# Patient Record
Sex: FEMALE | Race: WHITE | NOT HISPANIC OR LATINO | Employment: OTHER | URBAN - NONMETROPOLITAN AREA
[De-identification: names, ages, dates, MRNs, and addresses within clinical notes are randomized per-mention and may not be internally consistent; named-entity substitution may affect disease eponyms.]

---

## 2017-01-12 ENCOUNTER — OFFICE VISIT - GICH (OUTPATIENT)
Dept: FAMILY MEDICINE | Facility: OTHER | Age: 47
End: 2017-01-12

## 2017-01-12 ENCOUNTER — HISTORY (OUTPATIENT)
Dept: FAMILY MEDICINE | Facility: OTHER | Age: 47
End: 2017-01-12

## 2017-01-12 DIAGNOSIS — G57.92 MONONEUROPATHY OF LEFT LOWER EXTREMITY: ICD-10-CM

## 2017-01-12 DIAGNOSIS — G90.522 COMPLEX REGIONAL PAIN SYNDROME OF LEFT LOWER EXTREMITY: ICD-10-CM

## 2017-01-12 DIAGNOSIS — M25.572 PAIN IN LEFT ANKLE: ICD-10-CM

## 2017-01-12 DIAGNOSIS — G89.4 CHRONIC PAIN SYNDROME: ICD-10-CM

## 2017-01-12 DIAGNOSIS — F41.0 PANIC DISORDER WITHOUT AGORAPHOBIA: ICD-10-CM

## 2017-01-12 DIAGNOSIS — Z79.899 OTHER LONG TERM (CURRENT) DRUG THERAPY: ICD-10-CM

## 2017-01-23 LAB
6-MONOACETYL MORPHINE - HISTORICAL: ABNORMAL NG/ML
AMPHETAMINE URINE - HISTORICAL: ABNORMAL NG/ML
BARBITURATE URINE - HISTORICAL: ABNORMAL NG/ML
BENZODIAZEPINE URINE - HISTORICAL: ABNORMAL NG/ML
BUPRENORPHRINE URINE - HISTORICAL: ABNORMAL NG/ML
COCAINE METAB URINE - HISTORICAL: ABNORMAL NG/ML
CREAT UR - HISTORICAL: 163 MG/DL
ETHYLGLUCURONIDE URINE - HISTORICAL: ABNORMAL NG/ML
FENTANYL URINE - HISTORICAL: ABNORMAL NG/ML
MASS SPECTROMETRY URINE - HISTORICAL: ABNORMAL
METHADONE URINE - HISTORICAL: ABNORMAL NG/ML
OPIATES URINE - HISTORICAL: ABNORMAL NG/ML
OXYCODONE URINE - HISTORICAL: ABNORMAL NG/ML
PH URINE - HISTORICAL: 6
PROPOXYPHENE URINE - HISTORICAL: ABNORMAL NG/ML
THC 50 URINE - HISTORICAL: ABNORMAL NG/ML
TRAMADOL - HISTORICAL: ABNORMAL NG/ML

## 2017-04-11 ENCOUNTER — COMMUNICATION - GICH (OUTPATIENT)
Dept: FAMILY MEDICINE | Facility: OTHER | Age: 47
End: 2017-04-11

## 2017-04-11 DIAGNOSIS — F41.0 PANIC DISORDER WITHOUT AGORAPHOBIA: ICD-10-CM

## 2017-04-20 ENCOUNTER — OFFICE VISIT - GICH (OUTPATIENT)
Dept: FAMILY MEDICINE | Facility: OTHER | Age: 47
End: 2017-04-20

## 2017-04-20 ENCOUNTER — HISTORY (OUTPATIENT)
Dept: FAMILY MEDICINE | Facility: OTHER | Age: 47
End: 2017-04-20

## 2017-04-20 DIAGNOSIS — G90.522 COMPLEX REGIONAL PAIN SYNDROME OF LEFT LOWER EXTREMITY: ICD-10-CM

## 2017-04-20 DIAGNOSIS — G89.4 CHRONIC PAIN SYNDROME: ICD-10-CM

## 2017-04-20 DIAGNOSIS — M25.572 PAIN IN LEFT ANKLE: ICD-10-CM

## 2017-04-20 DIAGNOSIS — N30.90 CYSTITIS WITHOUT HEMATURIA: ICD-10-CM

## 2017-04-20 DIAGNOSIS — F51.02 ADJUSTMENT INSOMNIA: ICD-10-CM

## 2017-04-20 DIAGNOSIS — R30.0 DYSURIA: ICD-10-CM

## 2017-04-20 DIAGNOSIS — Z02.89 ENCOUNTER FOR OTHER ADMINISTRATIVE EXAMINATIONS: ICD-10-CM

## 2017-04-20 LAB
BACTERIA URINE: ABNORMAL BACTERIA/HPF
BILIRUB UR QL: ABNORMAL
CLARITY, URINE: ABNORMAL CLARITY
COLOR UR: YELLOW COLOR
EPITHELIAL CELLS: ABNORMAL EPI/HPF
GLUCOSE URINE: NEGATIVE MG/DL
KETONES UR QL: NEGATIVE MG/DL
LEUKOCYTE ESTERASE URINE: ABNORMAL
NITRITE UR QL STRIP: POSITIVE
OCCULT BLOOD,URINE - HISTORICAL: NEGATIVE
PH UR: 6 [PH]
PROTEIN QUALITATIVE,URINE - HISTORICAL: ABNORMAL MG/DL
RBC - HISTORICAL: ABNORMAL /HPF
SP GR UR STRIP: >=1.03
UROBILINOGEN,QUALITATIVE - HISTORICAL: NORMAL EU/DL
WBC - HISTORICAL: ABNORMAL /HPF

## 2017-04-23 LAB
CULTURE - HISTORICAL: ABNORMAL
CULTURE - HISTORICAL: ABNORMAL
SUSCEPTIBILITY RESULT - HISTORICAL: ABNORMAL

## 2017-05-02 LAB
6-MONOACETYL MORPHINE - HISTORICAL: ABNORMAL NG/ML
AMPHETAMINE URINE - HISTORICAL: ABNORMAL NG/ML
BARBITURATE URINE - HISTORICAL: ABNORMAL NG/ML
BENZODIAZEPINE URINE - HISTORICAL: ABNORMAL NG/ML
BUPRENORPHRINE URINE - HISTORICAL: ABNORMAL NG/ML
COCAINE METAB URINE - HISTORICAL: ABNORMAL NG/ML
CREAT UR - HISTORICAL: 236 MG/DL
ETHYLGLUCURONIDE URINE - HISTORICAL: ABNORMAL NG/ML
FENTANYL URINE - HISTORICAL: ABNORMAL NG/ML
MASS SPECTROMETRY URINE - HISTORICAL: ABNORMAL
METHADONE URINE - HISTORICAL: ABNORMAL NG/ML
OPIATES URINE - HISTORICAL: ABNORMAL NG/ML
OXYCODONE URINE - HISTORICAL: ABNORMAL NG/ML
PH URINE - HISTORICAL: 6.1
PROPOXYPHENE URINE - HISTORICAL: ABNORMAL NG/ML
THC 50 URINE - HISTORICAL: ABNORMAL NG/ML
TRAMADOL - HISTORICAL: ABNORMAL NG/ML

## 2017-05-04 ENCOUNTER — COMMUNICATION - GICH (OUTPATIENT)
Dept: FAMILY MEDICINE | Facility: OTHER | Age: 47
End: 2017-05-04

## 2017-06-06 ENCOUNTER — COMMUNICATION - GICH (OUTPATIENT)
Dept: FAMILY MEDICINE | Facility: OTHER | Age: 47
End: 2017-06-06

## 2017-06-06 DIAGNOSIS — M62.830 MUSCLE SPASM OF BACK: ICD-10-CM

## 2017-07-03 ENCOUNTER — COMMUNICATION - GICH (OUTPATIENT)
Dept: FAMILY MEDICINE | Facility: OTHER | Age: 47
End: 2017-07-03

## 2017-07-03 ENCOUNTER — HISTORY (OUTPATIENT)
Dept: EMERGENCY MEDICINE | Facility: OTHER | Age: 47
End: 2017-07-03

## 2017-07-19 ENCOUNTER — OFFICE VISIT - GICH (OUTPATIENT)
Dept: FAMILY MEDICINE | Facility: OTHER | Age: 47
End: 2017-07-19

## 2017-07-19 ENCOUNTER — HISTORY (OUTPATIENT)
Dept: FAMILY MEDICINE | Facility: OTHER | Age: 47
End: 2017-07-19

## 2017-07-19 DIAGNOSIS — Z02.89 ENCOUNTER FOR OTHER ADMINISTRATIVE EXAMINATIONS: ICD-10-CM

## 2017-07-19 DIAGNOSIS — G90.522 COMPLEX REGIONAL PAIN SYNDROME OF LEFT LOWER EXTREMITY: ICD-10-CM

## 2017-07-19 DIAGNOSIS — G89.4 CHRONIC PAIN SYNDROME: ICD-10-CM

## 2017-07-19 DIAGNOSIS — F17.200 NICOTINE DEPENDENCE, UNCOMPLICATED: ICD-10-CM

## 2017-07-19 DIAGNOSIS — M25.572 PAIN IN LEFT ANKLE: ICD-10-CM

## 2017-07-30 ENCOUNTER — HISTORY (OUTPATIENT)
Dept: EMERGENCY MEDICINE | Facility: OTHER | Age: 47
End: 2017-07-30

## 2017-08-01 ENCOUNTER — COMMUNICATION - GICH (OUTPATIENT)
Dept: FAMILY MEDICINE | Facility: OTHER | Age: 47
End: 2017-08-01

## 2017-08-01 DIAGNOSIS — F51.02 ADJUSTMENT INSOMNIA: ICD-10-CM

## 2017-08-03 ENCOUNTER — COMMUNICATION - GICH (OUTPATIENT)
Dept: INTERNAL MEDICINE | Facility: OTHER | Age: 47
End: 2017-08-03

## 2017-08-03 ENCOUNTER — COMMUNICATION - GICH (OUTPATIENT)
Dept: FAMILY MEDICINE | Facility: OTHER | Age: 47
End: 2017-08-03

## 2017-08-28 ENCOUNTER — COMMUNICATION - GICH (OUTPATIENT)
Dept: FAMILY MEDICINE | Facility: OTHER | Age: 47
End: 2017-08-28

## 2017-08-31 ENCOUNTER — COMMUNICATION - GICH (OUTPATIENT)
Dept: FAMILY MEDICINE | Facility: OTHER | Age: 47
End: 2017-08-31

## 2017-09-13 ENCOUNTER — HISTORY (OUTPATIENT)
Dept: FAMILY MEDICINE | Facility: OTHER | Age: 47
End: 2017-09-13

## 2017-09-13 ENCOUNTER — OFFICE VISIT - GICH (OUTPATIENT)
Dept: FAMILY MEDICINE | Facility: OTHER | Age: 47
End: 2017-09-13

## 2017-09-13 DIAGNOSIS — Z02.89 ENCOUNTER FOR OTHER ADMINISTRATIVE EXAMINATIONS: ICD-10-CM

## 2017-09-13 DIAGNOSIS — G90.522 COMPLEX REGIONAL PAIN SYNDROME OF LEFT LOWER EXTREMITY: ICD-10-CM

## 2017-09-13 DIAGNOSIS — G57.92 MONONEUROPATHY OF LEFT LOWER EXTREMITY: ICD-10-CM

## 2017-09-13 DIAGNOSIS — I47.10 SUPRAVENTRICULAR TACHYCARDIA (H): ICD-10-CM

## 2017-09-13 DIAGNOSIS — R41.3 OTHER AMNESIA: ICD-10-CM

## 2017-09-13 DIAGNOSIS — F41.0 PANIC DISORDER WITHOUT AGORAPHOBIA: ICD-10-CM

## 2017-09-13 DIAGNOSIS — G89.4 CHRONIC PAIN SYNDROME: ICD-10-CM

## 2017-10-10 ENCOUNTER — COMMUNICATION - GICH (OUTPATIENT)
Dept: FAMILY MEDICINE | Facility: OTHER | Age: 47
End: 2017-10-10

## 2017-10-11 ENCOUNTER — COMMUNICATION - GICH (OUTPATIENT)
Dept: FAMILY MEDICINE | Facility: OTHER | Age: 47
End: 2017-10-11

## 2017-10-18 ENCOUNTER — OFFICE VISIT - GICH (OUTPATIENT)
Dept: FAMILY MEDICINE | Facility: OTHER | Age: 47
End: 2017-10-18

## 2017-10-18 ENCOUNTER — HISTORY (OUTPATIENT)
Dept: FAMILY MEDICINE | Facility: OTHER | Age: 47
End: 2017-10-18

## 2017-10-18 DIAGNOSIS — G90.522 COMPLEX REGIONAL PAIN SYNDROME OF LEFT LOWER EXTREMITY: ICD-10-CM

## 2017-10-18 DIAGNOSIS — M62.830 MUSCLE SPASM OF BACK: ICD-10-CM

## 2017-10-18 DIAGNOSIS — Z02.89 ENCOUNTER FOR OTHER ADMINISTRATIVE EXAMINATIONS: ICD-10-CM

## 2017-10-18 DIAGNOSIS — M25.572 PAIN IN LEFT ANKLE: ICD-10-CM

## 2017-10-18 DIAGNOSIS — G89.4 CHRONIC PAIN SYNDROME: ICD-10-CM

## 2017-10-18 DIAGNOSIS — Z23 ENCOUNTER FOR IMMUNIZATION: ICD-10-CM

## 2017-10-18 ASSESSMENT — ANXIETY QUESTIONNAIRES
7. FEELING AFRAID AS IF SOMETHING AWFUL MIGHT HAPPEN: NEARLY EVERY DAY
5. BEING SO RESTLESS THAT IT IS HARD TO SIT STILL: NEARLY EVERY DAY
GAD7 TOTAL SCORE: 21
3. WORRYING TOO MUCH ABOUT DIFFERENT THINGS: NEARLY EVERY DAY
4. TROUBLE RELAXING: NEARLY EVERY DAY
1. FEELING NERVOUS, ANXIOUS, OR ON EDGE: NEARLY EVERY DAY
6. BECOMING EASILY ANNOYED OR IRRITABLE: NEARLY EVERY DAY
2. NOT BEING ABLE TO STOP OR CONTROL WORRYING: NEARLY EVERY DAY

## 2017-10-18 ASSESSMENT — PATIENT HEALTH QUESTIONNAIRE - PHQ9: SUM OF ALL RESPONSES TO PHQ QUESTIONS 1-9: 11

## 2017-11-08 ENCOUNTER — COMMUNICATION - GICH (OUTPATIENT)
Dept: FAMILY MEDICINE | Facility: OTHER | Age: 47
End: 2017-11-08

## 2017-11-08 DIAGNOSIS — M62.830 MUSCLE SPASM OF BACK: ICD-10-CM

## 2017-11-13 ENCOUNTER — HISTORY (OUTPATIENT)
Dept: FAMILY MEDICINE | Facility: OTHER | Age: 47
End: 2017-11-13

## 2017-11-13 ENCOUNTER — OFFICE VISIT - GICH (OUTPATIENT)
Dept: FAMILY MEDICINE | Facility: OTHER | Age: 47
End: 2017-11-13

## 2017-11-13 DIAGNOSIS — B97.89 OTHER VIRAL AGENTS AS THE CAUSE OF DISEASES CLASSIFIED ELSEWHERE: ICD-10-CM

## 2017-11-13 DIAGNOSIS — J06.9 ACUTE UPPER RESPIRATORY INFECTION: ICD-10-CM

## 2017-12-28 NOTE — TELEPHONE ENCOUNTER
Patient Information     Patient Name MRN Kym Smith [id number] Female 1970      Telephone Encounter by Darrin Stephen MD at 7/3/2017 11:57 AM     Author:  Darrin Stephen MD Service:  (none) Author Type:  Physician     Filed:  7/3/2017 11:59 AM Encounter Date:  7/3/2017 Status:  Signed     :  Darrin Stephen MD (Physician)            Meds should be good until .  Don't know that she needs a same day appointment.  Recommend she follow the recommendations from the ER and follow up when done with the treatment.  May benefit from urology appointment due to urinary retention.  Darrin Stephen MD ....................  7/3/2017   11:58 AM

## 2017-12-28 NOTE — TELEPHONE ENCOUNTER
Patient Information     Patient Name MRN Sex     Kym Perea [id number] Female 1970      Telephone Encounter by Mary Shafer RN at 10/11/2017  2:11 PM     Author:  Mary Shafer RN Service:  (none) Author Type:  NURS- Registered Nurse     Filed:  10/11/2017  2:12 PM Encounter Date:  10/11/2017 Status:  Signed     :  Mary Shafer RN (NURS- Registered Nurse)            Please disregard. Chart/refill request opened in error.    Mary Shafer RN .............. 10/11/2017  2:11 PM

## 2017-12-28 NOTE — PROGRESS NOTES
Patient Information     Patient Name MRN Sex Kym Meza [id number] Female 1970      Progress Notes by Darrin Stephen MD at 2017  2:00 PM     Author:  Darrin Stephen MD Service:  (none) Author Type:  Physician     Filed:  2017  5:43 PM Encounter Date:  2017 Status:  Signed     :  Darrin Stephen MD (Physician)            Nursing Notes:   Gosselin, Norma J  2017  2:07 PM  Addendum  Patient presents to clinic for ER Visit UTI. Medication refills.  Norma J Gosselin LPN....................  2017   2:03 PM        SUBJECTIVE:  Kym Perea  is a 47 y.o. female who comes in today for medication management and follow-up. She was seen in the emergency room on July 3. She had increased leg swelling at that time. On exam per the ER note, she had pitting edema to the knees. Potassium slightly low at 3, D-dimer was normal as was white count. magnesium was normal. She was told that she probably had a UTI although she had a contaminated specimen with many epithelial cells. She was treated with Cipro and given potassium. This started on some omeprazole for nausea.    She is still smoking. She is down to 6 cigarettes per day.     CHRONIC PAIN MANAGEMENT:    DIAGNOSIS: (G89.4) Chronic pain syndrome  (primary encounter diagnosis)  (G90.522) Complex regional pain syndrome type 1 affecting left lower leg  (Z02.89) Pain medication agreement signed 17  (F17.200) TOBACCO ABUSE  (M25.572) Pain in joint, ankle and foot, left     PAIN MEDICATION AGREEMENT: (YES)    DATE SIGNED: 16, 17      NON-MEDICATION MODALITIES MAXIMIZED? (YES) had surgery 2 years ago with Dr. Skelton, but has not seen him for a while.       NEUROPATHIC PAIN: (YES)  ON GABAPENTIN/LYRICA/TCA/SNRI: (YES) dose increased up to QID.       NOCICEPTIVE PAIN: (YES)      HISTORY OF CD: (NO)      MENTAL HEALTH DIAGNOSIS: (YES)  DIAGNOSIS:Anxiety  ON BENZODIAZEPINES: (YES)  DISCLOSURE REGARDING RISK OF  CONCOMITANT USE WITH OPIOIDS DISCUSSED: (YES)  DATE DISCUSSED: 5/16/16 and 6/2/16 and 8/30/16, 1/12/17      MEDICATION: Oxycodone 10 mg 4-6 tab per day, #180/month.       ORAL MORPHINE EQUIVALENTS: 90 OME/day      LAST TAPER TRIAL: just completed 5/16/16.  Off Morphine and then on Norco at much lower dose, gradual increase to now at 90 OME oxycodone per day max.        QUERY TODAY: (YES)  APPROPRIATE?:         (YES) the  is incorrect as it notes that I am the prescriber of her benzodiazepine's and I am not. They are prescribed by Phuong Magallanes NP.       TOXASSURE TODAY: (NO)  IF NO, DATE OF LAST TOXASURE: 6/2/16 and appropriate. Lorazepam is not listed on our med list but she has been on this as prescribed by Phuong Magallanes for breakthrough anxiety. Last test 1/12/17, 4/20/17.      PAIN SCORE FLOWSHEET REVIEWED: (YES)  STABLE OR IMPROVED: (YES)      AUDIT-10 QUESTIONNAIRE COMPLETED: (NO)      OSWESTRY PAIN DISABILITY INDEX: (NO)                  Past Medical, Family, and Social History reviewed and updated as noted below.   ROS is negative except as noted above       Allergies     Allergen  Reactions     Arthrotec 50 [Diclofenac-Misoprostol] GI Upset     Indocin [Indomethacin] Dizziness     Mobic [Meloxicam] GI Upset   ,   Family History       Problem   Relation Age of Onset     Cancer  Father      Bladder       Other  Father      Mild hypercholesterolemia       Other  Mother      scleroderma/lupus/Parkinson's syndrome       Good Health  Brother      Cancer-colon  Maternal Grandmother 40     Followed by lung  with metastasis to the bone di*     ,   Current Outpatient Prescriptions on File Prior to Visit       Medication  Sig Dispense Refill     albuterol HFA (PRO-AIR,VENTOLIN,PROVENTIL) 90 mcg/actuation inhaler Inhale 2 Puffs by mouth 4 times daily if needed (coughing). 1 Inhaler 11     ALPRAZolam 2 mg tablet Take one tablet by mouth up to 3 times daily as needed for anxiety 24 tablet 0      Amphetamine-Dextroamphetamine (ADDERALL) 30 mg tablet Take 1 tablet by mouth 2 times daily  0     atenolol (TENORMIN) 100 mg tablet TAKE 1 TABLET BY MOUTH ONCE DAILY. 90 tablet 3     Cholecalciferol, Vitamin D3, (VITAMIN D-3) 2,000 unit tablet Take 1 tablet by mouth once daily.  0     cyclobenzaprine (FLEXERIL) 10 mg tablet Take 1 tablet by mouth 3 times daily. Prn muscle spasm 30 tablet 3     fluticasone (50 mcg per actuation) nasal solution (FLONASE) Inhale 1 Spray into both nostrils once daily. 1 Bottle 0     gabapentin (NEURONTIN) 300 mg capsule Take 4 capsules by mouth 3 times daily. 360 capsule 11     LORazepam (ATIVAN) 1 mg tablet Take 1 tablet by mouth every 6 hours if needed.  0     neomycin-polymyxin-dexamethasone (MAXITROL) 3.5mg/mL-10,000 unit/mL-0.1 % ophthalmic suspension PLACE 1 GTT IN OU QID  2     oxyCODONE 10 mg tablet Take 1 tablet by mouth every 4 hours if needed  for Pain Maximum 6 tab per day. 42 tablet 0     traZODone (DESYREL) 150 mg tablet Take 2 tablets by mouth at bedtime. 14 tablet 0     No current facility-administered medications on file prior to visit.    ,   Past Medical History:     Diagnosis  Date     ANKLE PAIN, LEFT     Chronic left ankle pain secondary to reflex sympathetic dystrophy.  Patient  has had 4 previous surgeries, the most recent one done by Dr. Noriega in  2007. On Narcotic contract.       BACK PAIN, LUMBAR      BACK PAIN, THORACIC REGION, LEFT      CHOLECYSTECTOMY, LAPAROSCOPIC, HX OF 9/2/2011     Chronic pain syndrome      Complex regional pain syndrome     left ankle       DEPRESSION, ACUTE, RECURRENT      GERD      History of endometriosis      History of pregnancy     G2, P1-0-1-1 with one vaginal delivery.      Left ankle injury 1997    requiring surgery      Migraine headache      OBESITY      Overweight (BMI 25.0-29.9) 11/25/2014     Pain medication agreement signed 10/3/12, updated 10/1/14 10/2012    Hydrocodone 10/325mg, #240/mo signed 10/3/12, updated 10/1/  14       Pain medication agreement signed 10/3/12, updated 10/1/14 10/1/2014    MS Contin 30 mg q 8 hours #90 per month.  Morphine sulfate IR 15 mg 2-3 tab tid prn #180 per month       PANIC DISORDER     Dr Reynoso       Paroxysmal supraventricular tachycardia (HC) 5/24/2010     TOBACCO ABUSE    ,   Patient Active Problem List       Diagnosis  Date Noted     Pain medication agreement signed 4/20/17 04/21/2017     Class: Chronic      4/20/17:  Oxycodone 10 mg #180/mo    Hydrocodone 10/325mg, #240/mo. Stopped and changed to morphine. Agreement signed 10/3/12, updated 10/1/ 14.  Complex regional pain syndrome (aka Reflex Sympathetic Dystrophy)  MS Contin 30 mg q 8 hours #90 per month.  Morphine sulfate IR 15 mg 2-3 tab tid prn #180 per month, decreased to #90/month.   query 4/9/16 as part of chart review. Seems appropriate. 6/2/16 tapered and off Morphine. [place] 5/325 #120/month.  query appropriate. Tox[employer]ure appropriate.    August 2016Consultation with [facility]. Recommended spinal cord stimulator trial, but she is reluctant to pursue that. Pain clinic stated that continuing opioids was OK.        Complex regional pain syndrome type 1 affecting left lower leg  04/15/2016     Patient with a history of complex regional pain syndrome has seen Dr. Dino Skelton4/23/16         Migraine headache  11/25/2014     Overweight (BMI 25.0-29.9)  11/25/2014     BACK PAIN, LUMBAR  02/06/2012     SHOULDER PAIN, RIGHT  02/06/2012     BACK PAIN, THORACIC REGION, LEFT  10/28/2011     ABDOMINAL PAIN  09/07/2011     DEPRESSION, ACUTE, RECURRENT  07/13/2011     PAROXYSMAL SUPRAVENTRICULAR TACHYCARDIA  05/24/2010     TOBACCO ABUSE       Chronic pain syndrome       complex regional pain syndrome left ankle          ANKLE PAIN, LEFT       Chronic left ankle pain secondary to reflex sympathetic dystrophy.  Patient   has had 4 previous surgeries, the most recent one done by Dr. Noriega in   2007. On Narcotic  contract.          PANIC DISORDER       Previously patient of Dr. Reynoso since her teens. Patient is attempting slow taper down on her Xanax. 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  No other abnormalities noted.         CRANIO/MAXILLOFACIAL SURGERY      Jaw Surgery       LAP CHOLECYSTECTOMY       LAPAROSCOPY ABDOMEN DIAGNOSTIC  1995            LEG/ANKLE SURGERY  1997, 2000    Left ankle surgery x 2        TOTAL ABDOMINAL HYSTERECTOMY  11/1999    secondary to endometriosis, no malignancy; patient reports no malignancy, but abnormal cells were present *      and   Social History        Substance Use Topics          Smoking status:   Current Every Day Smoker      Packs/day:  0.50      Years:  30.00      Types:  Cigarettes      Start date:  12/2/1986      Smokeless tobacco:   Never Used       Comment: working on it       Alcohol use   0.0 oz/week     0 Standard drinks or equivalent per week        Comment: occasional       OBJECTIVE:  /74  Pulse 60  Temp 98.4  F (36.9  C)   EXAM:  Alert and cooperative, no distress. No pedal edema is noted today. Exquisitely sensitive about her left ankle where she has RSD.  ASSESSMENT/Plan :      Kym was seen today for follow up and medication management.    Diagnoses and all orders for this visit:    Chronic pain syndrome  -     Discontinue: oxyCODONE 10 mg tablet; Take 1 tablet by mouth every 4 hours if needed  for Pain Maximum 6 tab per day.  -     Discontinue: oxyCODONE 10 mg tablet; Take 1 tablet by mouth every 4 hours if needed  for Pain Maximum 6 tab per day.  -     oxyCODONE 10 mg tablet; Take 1 tablet by mouth every 4 hours if needed  for Pain Maximum 6 tab per day.    Complex regional pain syndrome type 1 affecting left lower leg  -     Discontinue: oxyCODONE 10 mg  tablet; Take 1 tablet by mouth every 4 hours if needed  for Pain Maximum 6 tab per day.  -     Discontinue: oxyCODONE 10 mg tablet; Take 1 tablet by mouth every 4 hours if needed  for Pain Maximum 6 tab per day.  -     oxyCODONE 10 mg tablet; Take 1 tablet by mouth every 4 hours if needed  for Pain Maximum 6 tab per day.    Pain medication agreement signed 4/20/17    TOBACCO ABUSE    Pain in joint, ankle and foot, left  -     Discontinue: oxyCODONE 10 mg tablet; Take 1 tablet by mouth every 4 hours if needed  for Pain Maximum 6 tab per day.  -     Discontinue: oxyCODONE 10 mg tablet; Take 1 tablet by mouth every 4 hours if needed  for Pain Maximum 6 tab per day.  -     oxyCODONE 10 mg tablet; Take 1 tablet by mouth every 4 hours if needed  for Pain Maximum 6 tab per day.    Encouraged to quit smoking. Renewed oxycodone 10 mg #180×3. She's having dental work done and we discussed pain medication around that time. She'll let us know what the dentist prescribes.     query is appropriate      Darrin Stephen MD

## 2017-12-28 NOTE — TELEPHONE ENCOUNTER
Patient Information     Patient Name MRN Kym Smith [id number] Female 1970      Telephone Encounter by Twyla Faria at 10/10/2017  3:44 PM     Author:  Twyla Faria Service:  (none) Author Type:  (none)     Filed:  10/10/2017  3:45 PM Encounter Date:  10/10/2017 Status:  Signed     :  Twyla Faria            Please call patient regarding questions on medication management. Thank you.

## 2017-12-28 NOTE — PROGRESS NOTES
"Patient Information     Patient Name MRN Sex Kym Meza [id number] Female 1970      Progress Notes by Darrin Stephen MD at 2017 11:15 AM     Author:  Darrin Stehpen MD Service:  (none) Author Type:  Physician     Filed:  2017  4:33 PM Encounter Date:  2017 Status:  Signed     :  Darrin Stephen MD (Physician)            Nursing Notes:   Petra Swanson  2017 11:35 AM  Signed  She is here for a follow up for amnesic episodes.  Petra Swanson LPN..................2017   11:25 AM      SUBJECTIVE:  Kym Perea  is a 47 y.o. female who comes in today for follow-up of some amnestic spells. I last saw her in July and she was having increased leg swelling at that time. She had some low potassium but her workup otherwise was negative. She has chronic pain with complex regional pain syndrome type I affecting her left lower leg and is on chronic opioids. She is on 90 morphine equivalents per day Max, ranging from 4-6 tablets of the 10 mg dose.. She had been on gabapentin at 300 mg 4 times a day, but now takes 2 capsules once daily. That was decreased because of these \"amnestic\" spells. They have her on alpha-lipoic acid as well. She dropped down 6 weeks ago.  Her foot has seemed worse.  She has more nerve pain with a lower dose.     She is on benzodiazepines through [facility]. She takes alprazolam. She is also on Adderall.    She had an incident where she was at work at a [employer] and her head hit the picnic table and she passed out for 25 minutes. They called 911, but she was fine and awoke and felt normal, so she didn't come in. It was like she was asleep but no one could wake her up. Her vitals were fine. She had been having a hard time sleeping.  She will go extended periods without sleeping. She stopped the Adderall completely and noticed no change. She also stopped the trazodone back in July. She is on alprazolam tid for the most part. She rarely will take " a lorazepam for panic, but usually only about once a month. She did take lorazepam that day.     She had a tooth pulled with nitrous oxide in June and slept a lot for 3 days.    She had two previous episodes. With those, she said her head felt funny but wasn't aware of anything that was way off. She asked one of the volunteers to call someone in. She will forget periods of time. She had similar episodes when she was on the methadone and morphine through the [facility] in [place].  She has been delaying taking her oxycodone at work until later in her shift, so during those times, she had not taken the oxycodone. Her coworkers said she was groggy and sleepy, but seemed to make sense. She was put on probation and had no episodes until she put her head down on the picnic table.         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 (YES) had surgery 2 years ago with Dr. Skelton, but has not seen him for a while.       NEUROPATHIC PAIN: (YES)  ON GABAPENTIN/LYRICA/TCA/SNRI: (YES) dose increased up to QID, but now on 600 mg daily.        NOCICEPTIVE PAIN: (YES)      HISTORY OF CD: (NO)      MENTAL HEALTH DIAGNOSIS: (YES)  DIAGNOSIS:Anxiety  ON BENZODIAZEPINES: (YES)  DISCLOSURE REGARDING RISK OF CONCOMITANT USE WITH OPIOIDS DISCUSSED: (YES)  DATE DISCUSSED: 5/16/16 and 6/2/16 and 8/30/16, 1/12/17      MEDICATION: Oxycodone 10 mg 4-6 tab per day, #180/month.       ORAL MORPHINE EQUIVALENTS: 90 OME/day      LAST TAPER TRIAL: just completed 5/16/16.  Off Morphine and then on Norco at much lower dose,  gradual increase to now at 90 OME oxycodone per day max.        QUERY TODAY: (YES)  APPROPRIATE?:         (YES) the  is incorrect as it notes that I am the prescriber of her benzodiazepine's and I am not. They are prescribed by Phuogn Magallanes NP.       TOXASSURE TODAY: (NO)  IF NO, DATE OF LAST TOXASURE: 6/2/16 and appropriate. Lorazepam is not listed on our med list but she has been on this as prescribed by Phuong Magallanes for breakthrough anxiety. Last test 1/12/17, 4/20/17, 7/30/17. Last test was also positive for ETG in addition to amphetamines and benzodiazepines, along with oxycodone.      PAIN SCORE FLOWSHEET REVIEWED: (YES)  STABLE OR IMPROVED: (YES)      AUDIT-10 QUESTIONNAIRE COMPLETED: (NO)      OSWESTRY PAIN DISABILITY INDEX: (NO)                 Past Medical, Family, and Social History reviewed and updated as noted below.   ROS is negative except as noted above       Allergies     Allergen  Reactions     Arthrotec 50 [Diclofenac-Misoprostol] GI Upset     Indocin [Indomethacin] Dizziness     Mobic [Meloxicam] GI Upset   ,   Family History       Problem   Relation Age of Onset     Cancer  Father      Bladder       Other  Father      Mild hypercholesterolemia       Other  Mother      scleroderma/lupus/Parkinson's syndrome       Good Health  Brother      Cancer-colon  Maternal Grandmother 40     Followed by lung  with metastasis to the bone di*     ,   Current Outpatient Prescriptions on File Prior to Visit       Medication  Sig Dispense Refill     albuterol HFA (PRO-AIR,VENTOLIN,PROVENTIL) 90 mcg/actuation inhaler Inhale 2 Puffs by mouth 4 times daily if needed (coughing). 1 Inhaler 11     ALPRAZolam 2 mg tablet Take one tablet by mouth up to 3 times daily as needed for anxiety 24 tablet 0     Amphetamine-Dextroamphetamine (ADDERALL) 30 mg tablet Take 1 tablet by mouth 2 times daily  0     Cholecalciferol, Vitamin D3, (VITAMIN D-3) 2,000 unit tablet Take 1 tablet by mouth once daily.  0     cyclobenzaprine  (FLEXERIL) 10 mg tablet Take 1 tablet by mouth 3 times daily. Prn muscle spasm 30 tablet 3     fluticasone (50 mcg per actuation) nasal solution (FLONASE) Inhale 1 Spray into both nostrils once daily. 1 Bottle 0     LORazepam (ATIVAN) 1 mg tablet Take 1 tablet by mouth every 6 hours if needed.  0     neomycin-polymyxin-dexamethasone (MAXITROL) 3.5mg/mL-10,000 unit/mL-0.1 % ophthalmic suspension PLACE 1 GTT IN OU QID  2     oxyCODONE 10 mg tablet Take 1 tablet by mouth every 4 hours if needed  for Pain Maximum 6 tab per day. 42 tablet 0     No current facility-administered medications on file prior to visit.    ,   Past Medical History:     Diagnosis  Date     ANKLE PAIN, LEFT     Chronic left ankle pain secondary to reflex sympathetic dystrophy.  Patient  has had 4 previous surgeries, the most recent one done by Dr. Noriega in  2007. On Narcotic contract.       BACK PAIN, LUMBAR      BACK PAIN, THORACIC REGION, LEFT      CHOLECYSTECTOMY, LAPAROSCOPIC, HX OF 9/2/2011     Chronic pain syndrome      Complex regional pain syndrome     left ankle       DEPRESSION, ACUTE, RECURRENT      GERD      History of endometriosis      History of pregnancy     G2, P1-0-1-1 with one vaginal delivery.      Left ankle injury 1997    requiring surgery      Migraine headache      OBESITY      Overweight (BMI 25.0-29.9) 11/25/2014     Pain medication agreement signed 10/3/12, updated 10/1/14 10/2012    Hydrocodone 10/325mg, #240/mo signed 10/3/12, updated 10/1/ 14       Pain medication agreement signed 10/3/12, updated 10/1/14 10/1/2014    MS Contin 30 mg q 8 hours #90 per month.  Morphine sulfate IR 15 mg 2-3 tab tid prn #180 per month       PANIC DISORDER     Dr Reynoso       Paroxysmal supraventricular tachycardia (HC) 5/24/2010     TOBACCO ABUSE    ,   Patient Active Problem List       Diagnosis  Date Noted     Pain medication agreement signed 4/20/17 04/21/2017     Class: Chronic      4/20/17:  Oxycodone 10 mg  #180/mo    Hydrocodone 10/325mg, #240/mo. Stopped and changed to morphine. Agreement signed 10/3/12, updated 10/1/ 14.  Complex regional pain syndrome (aka Reflex Sympathetic Dystrophy)  MS Contin 30 mg q 8 hours #90 per month.  Morphine sulfate IR 15 mg 2-3 tab tid prn #180 per month, decreased to #90/month.   query 4/9/16 as part of chart review. Seems appropriate. 6/2/16 tapered and off Morphine. [place] 5/325 #120/month.  query appropriate. ToxAssure appropriate.    August 2016[facility]ultation with [facility]. Recommended spinal cord stimulator trial, but she is reluctant to pursue that. Pain clinic stated that continuing opioids was OK.        Complex regional pain syndrome type 1 affecting left lower leg  04/15/2016     Patient with a history of complex regional pain syndrome has seen Dr. Dino Skelton4/23/16         Migraine headache  11/25/2014     Overweight (BMI 25.0-29.9)  11/25/2014     BACK PAIN, LUMBAR  02/06/2012     SHOULDER PAIN, RIGHT  02/06/2012     BACK PAIN, THORACIC REGION, LEFT  10/28/2011     ABDOMINAL PAIN  09/07/2011     DEPRESSION, ACUTE, RECURRENT  07/13/2011     PAROXYSMAL SUPRAVENTRICULAR TACHYCARDIA  05/24/2010     TOBACCO ABUSE       Chronic pain syndrome       complex regional pain syndrome left ankle          ANKLE PAIN, LEFT       Chronic left ankle pain secondary to reflex sympathetic dystrophy.  Patient   has had 4 previous surgeries, the most recent one done by Dr. Noriega in   2007. On Narcotic contract.          PANIC DISORDER       Previously patient of Dr. Reynoso since her teens. Patient is attempting slow taper down on her Xanax. 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  No other abnormalities noted.         CRANIO/MAXILLOFACIAL SURGERY      Jaw Surgery       LAP  CHOLECYSTECTOMY       LAPAROSCOPY ABDOMEN DIAGNOSTIC  1995            LEG/ANKLE SURGERY  1997, 2000    Left ankle surgery x 2        TOTAL ABDOMINAL HYSTERECTOMY  11/1999    secondary to endometriosis, no malignancy; patient reports no malignancy, but abnormal cells were present *      and   Social History        Substance Use Topics          Smoking status:   Current Every Day Smoker      Packs/day:  0.50      Years:  30.00      Types:  Cigarettes      Start date:  12/2/1986      Smokeless tobacco:   Never Used       Comment: working on it       Alcohol use   0.0 oz/week     0 Standard drinks or equivalent per week        Comment: occasional       OBJECTIVE:  /88  Pulse 88  Breastfeeding? No   EXAM:  Alert and cooperative, no distress. She has no focal neurologic findings. Affect is broad range inappropriate. Speech language and articulation are normal. Gait is normal.  ASSESSMENT/Plan :      Kym was seen today for follow up.    Diagnoses and all orders for this visit:    Chronic pain syndrome    Complex regional pain syndrome type 1 affecting left lower leg    Neuropathic pain of ankle, left    Amnestic state    Pain medication agreement signed 4/20/17    PAROXYSMAL SUPRAVENTRICULAR TACHYCARDIA  -     metoprolol succinate SR (TOPROL XL) 200 mg Sustained-Release tablet; Take 1 tablet by mouth once daily.    PANIC DISORDER     will switch from atenolol to metoprolol succinate as there is a nationwide shortage.    Discussion with regard to her episodes of hypersomnolence. Doesn't clearly sound that she's having amnestic events but certainly the benzodiazepines could be contributing to this. We had a shantal discussion today with regard to the fact that his probably more of a polypharmacy issue than any specific neurologic condition. Recommend that she work with her mental health provider to taper down on her benzodiazepine and had her sign a release so that I can talk with Phuong Magallanes. Recommended that she  decrease her oxycodone and stay towards the lower end of the dosing range that she has. Would recommend to continue to taper. I would favor going up on the gabapentin to help with the neuropathic pain however she is also on it for anxiety and would like to put from her mental health provider. Recommended that she completely abstain from alcohol given the multiple medications. She asked about a muscle relaxant for her bruxism and I told her did not think that was a good idea that she should avoid anything else that would potentially contribute to this problem. Discussed her last ToxAssure which did show that she had had alcohol but had been at their cabin for the weekend.    Recommend that she follow-up in 3-4 weeks to make sure that she is doing well. Plan to be continue to taper her opioids over time, likely continue gabapentin longer term, and avoid any other medications that would cloud her sensorium.    A total of 40 minutes was spent with the patient, greater than 50% of the time was spent in counseling/discussion of the aforementioned concerns.       Darrin Stephen MD

## 2017-12-28 NOTE — TELEPHONE ENCOUNTER
Patient Information     Patient Name MRN Sex Kym Smalls [id number] Female 1970      Telephone Encounter by Radha Gilliam at 2017  2:21 PM     Author:  Radha Gilliam Service:  (none) Author Type:  (none)     Filed:  2017  2:23 PM Encounter Date:  2017 Status:  Signed     :  Radha Gilliam            Patient calling regarding her back pain. States when her back flares up she typically goes to the chiropractor and this helps. This time however, is having a lot of spasms and is hoping to get an rx for Flexeril to try? Has taken in the past and it has helped. States she can hardly get off the couch at this point.  Please advise  Radha Gilliam LPN..............................2017  2:22 PM

## 2017-12-28 NOTE — TELEPHONE ENCOUNTER
Patient Information     Patient Name MRN Kym Smith [id number] Female 1970      Telephone Encounter by Emilia Richmond at 2017  4:17 PM     Author:  Emilia Richmond Service:  (none) Author Type:  (none)     Filed:  2017  4:18 PM Encounter Date:  2017 Status:  Signed     :  Emilia Richmond            [facility] - Patient states that they had another incident at work forgetting. States that they need to be seen ASAP to determine what is going on.  Patient is concerned about their job.  Please call.    Emilia Richmond ....................  2017   4:18 PM

## 2017-12-28 NOTE — TELEPHONE ENCOUNTER
Patient Information     Patient Name MRN Sex     Kym ePrea [id number] Female 1970      Telephone Encounter by Lyle Patterson RN at 11/10/2017  8:56 AM     Author:  Lyle Patterson RN  Service:  (none) Author Type:  NURS- Registered Nurse     Filed:  11/10/2017  9:51 AM  Encounter Date:  2017 Status:  Addendum     :  Lyle Patterson RN (NURS- Registered Nurse)        Related Notes: Original Note by Lyle Patterson RN (NURS- Registered Nurse) filed at 11/10/2017  9:03 AM            This is a Refill request from: [facility] [place]  Name of Medication: Flexeril  Quantity requested: 30 tabs  Last fill date: 10/18/17 as per rx request for a 10 day supply  Due for refill: Yes, as per chart review and per rx request  Last visit with RAKEL STEPHEN was on: 10/18/[place] [facility]  PCP:  Rakel Stephen MD  Controlled Substance Agreement: N/A to this rx   Diagnosis r/t this medication request: Lumbar paraspinal muscle spasm    Chart review shows that patient was last seen by PCP on 10/18/17 for diagnosis as noted above. Plan as per PCP in relation to rx as requested is noted below:    agreed to a small prescription of Flexeril to help with bruxism was she's having her dental work done. Cautioned with regard to possible other interactions.    Patient was also noted to have follow up completed in 3 months, or 2018. Writer is unable to fill rx as requested, and PCP is out of the office until 17. Writer will route a limited supply of rx as requested to PCP's teamlet for her consideration/approval at this time.     Unable to complete prescription refill per RN Medication Refill Policy.................... Lyle Patterson RN ....................  11/10/2017   8:56 AM

## 2017-12-28 NOTE — TELEPHONE ENCOUNTER
Patient Information     Patient Name MRN Kym Smith [id number] Female 1970      Telephone Encounter by Bobbi Samuels at 2017  4:33 PM     Author:  Bobbi Samuels Service:  (none) Author Type:  NURS- Student Practical Nurse     Filed:  2017  4:34 PM Encounter Date:  2017 Status:  Signed     :  Bobbi Samuels (NURS- Student Practical Nurse)            Called and spoke with patient about below message. Worked patient into schedule. Patient states understanding and no further questions at this time.    Bobbi Samuels LPN............................ 2017 4:34 PM

## 2017-12-28 NOTE — TELEPHONE ENCOUNTER
Patient Information     Patient Name MRN Sex Kym Smalls [id number] Female 1970      Telephone Encounter by Yoana Mojica at 7/3/2017  7:08 AM     Author:  Yoana Mojica Service:  (none) Author Type:  (none)     Filed:  7/3/2017  7:09 AM Encounter Date:  7/3/2017 Status:  Signed     :  Yoana Mojica            [facility]-PT SEEN IN ER LAST NITE WITH UTI -PEDAL EDEMA, HYPOCALCEMIA. QUESTIONS FOR NURSE AND POSSIBLE APPT NEED.  Yoana Mojica ....................  7/3/2017   7:09 AM

## 2017-12-28 NOTE — TELEPHONE ENCOUNTER
Patient Information     Patient Name MRN Sex Kym Smalls [id number] Female 1970      Telephone Encounter by Joana Gurrola DO at 8/3/2017 11:10 AM     Author:  Joana Gurrola DO Service:  (none) Author Type:  PHYS- Osteopathic     Filed:  8/3/2017 11:11 AM Encounter Date:  8/3/2017 Status:  Signed     :  Joana Gurrola DO (PHYS- Osteopathic)            Unfortunately I do not have any availability today.

## 2017-12-28 NOTE — PROGRESS NOTES
"Patient Information     Patient Name MRN Kym Smith [id number] Female 1970      Progress Notes by Darrin Stephen MD at 10/18/2017 11:00 AM     Author:  Darrin Stephen MD Service:  (none) Author Type:  Physician     Filed:  10/18/2017 12:50 PM Encounter Date:  10/18/2017 Status:  Signed     :  Darrin Stephen MD (Physician)            Nursing Notes:   Angeli Santamaria  10/18/2017 11:36 AM  Signed  Kym Perea is a 47 y.o. female  presenting today for medication management  Angeli SantamariaSHARA 10/18/2017 11:12 AM       SUBJECTIVE:  Kym Perea  is a 47 y.o. female who comes in today for follow-up and medication management. I last saw her about a month ago for some amnestic spells. The plan at that visit was as follows: \" will switch from atenolol to metoprolol succinate as there is a nationwide shortage.     Discussion with regard to her episodes of hypersomnolence. Doesn't clearly sound that she's having amnestic events but certainly the benzodiazepines could be contributing to this. We had a shantal discussion today with regard to the fact that his probably more of a polypharmacy issue than any specific neurologic condition. Recommend that she work with her mental health provider to taper down on her benzodiazepine and had her sign a release so that I can talk with Phuong Magallanes. Recommended that she decrease her oxycodone and stay towards the lower end of the dosing range that she has. Would recommend to continue to taper. I would favor going up on the gabapentin to help with the neuropathic pain however she is also on it for anxiety and would like to put from her mental health provider. Recommended that she completely abstain from alcohol given the multiple medications. She asked about a muscle relaxant for her bruxism and I told her did not think that was a good idea that she should avoid anything else that would potentially contribute to this problem. Discussed her last " "ToxAssure which did show that she had had alcohol but had been at their cabin for the weekend.     Recommend that she follow-up in 3-4 weeks to make sure that she is doing well. Plan to be continue to taper her opioids over time, likely continue gabapentin longer term, and avoid any other medications that would cloud her sensorium.\"    Phuong Elpidio did contact us and is working to taper her benzodiazepines. She is now down to 6 mg on Xanax. She has had no further spells. She is now supposed to start on Trintillix. She is off trazodone.  She is still on Adderall through Phuong Magallanes.     She is having some dental work done. She is awaiting a partial lower plate.  She is having her top teeth pulled and getting a full upper plate.  She has been using 6 tab per day on the oxycodone because of this.     She is currently on 600 mg gabapentin bid. She has been on this about a week.      CHRONIC PAIN MANAGEMENT:    DIAGNOSIS: (G90.522) Complex regional pain syndrome type 1 affecting left lower leg  (primary encounter diagnosis)  (G89.4) Chronic pain syndrome  (M25.572) Pain in joint, ankle and foot, left  (Z23) Needs flu shot  (Z02.89) Pain medication agreement signed 4/20/17  (M62.830) Lumbar paraspinal muscle spasm     PAIN MEDICATION AGREEMENT: (YES)    DATE SIGNED: 5/16/16, 4/20/17      NON-MEDICATION MODALITIES MAXIMIZED? (YES) had surgery 2 years ago with Dr. Skelton, but has not seen him for a while.       NEUROPATHIC PAIN: (YES)  ON GABAPENTIN/LYRICA/TCA/SNRI: (YES) dose increased up to QID, but now on 600 mg daily.        NOCICEPTIVE PAIN: (YES)      HISTORY OF CD: (NO)      MENTAL HEALTH DIAGNOSIS: (YES)  DIAGNOSIS:Anxiety  ON BENZODIAZEPINES: (YES)  DISCLOSURE REGARDING RISK OF CONCOMITANT USE WITH OPIOIDS DISCUSSED: (YES)  DATE DISCUSSED: 5/16/16 and 6/2/16 and 8/30/16, 1/12/17      MEDICATION: Oxycodone 10 mg 4-6 tab per day, #180/month.       ORAL MORPHINE EQUIVALENTS: 90 OME/day      LAST TAPER TRIAL: just " completed 5/16/16.  Off Morphine and then on Norco at much lower dose, gradual increase to now at 90 OME oxycodone per day max.        QUERY TODAY: (YES)  APPROPRIATE?:         (YES) the  is incorrect as it notes that I am the prescriber of her benzodiazepine's and I am not. They are prescribed by Phuong Magallanes NP. She is working to taper them.       TOXASSURE TODAY: (NO)  IF NO, DATE OF LAST TOXASURE: 6/2/16 and appropriate. Lorazepam is not listed on our med list but she has been on this as prescribed by Phuong Magallanes for breakthrough anxiety. Last test 1/12/17, 4/20/17, 7/30/17. Last test was also positive for ETG in addition to amphetamines and benzodiazepines, along with oxycodone.      PAIN SCORE FLOWSHEET REVIEWED: (YES)  STABLE OR IMPROVED: (YES)      AUDIT-10 QUESTIONNAIRE COMPLETED: (NO)      OSWESTRY PAIN DISABILITY INDEX: (NO)                 Past Medical, Family, and Social History reviewed and updated as noted below.   ROS is negative except as noted above       Allergies     Allergen  Reactions     Arthrotec 50 [Diclofenac-Misoprostol] GI Upset     Indocin [Indomethacin] Dizziness     Mobic [Meloxicam] GI Upset   ,   Family History       Problem   Relation Age of Onset     Cancer  Father      Bladder       Other  Father      Mild hypercholesterolemia       Other  Mother      scleroderma/lupus/Parkinson's syndrome       Good Health  Brother      Cancer-colon  Maternal Grandmother 40     Followed by lung  with metastasis to the bone di*     ,   Current Outpatient Prescriptions on File Prior to Visit       Medication  Sig Dispense Refill     albuterol HFA (PRO-AIR,VENTOLIN,PROVENTIL) 90 mcg/actuation inhaler Inhale 2 Puffs by mouth 4 times daily if needed (coughing). 1 Inhaler 11     ALPRAZolam 2 mg tablet Take one tablet by mouth up to 3 times daily as needed for anxiety 24 tablet 0     Amphetamine-Dextroamphetamine (ADDERALL) 30 mg tablet Take 1 tablet by mouth 2 times daily  0     Cholecalciferol,  Vitamin D3, (VITAMIN D-3) 2,000 unit tablet Take 1 tablet by mouth once daily.  0     fluticasone (50 mcg per actuation) nasal solution (FLONASE) Inhale 1 Spray into both nostrils once daily. 1 Bottle 0     gabapentin (NEURONTIN) 300 mg capsule Take 2 capsules by mouth once daily. 360 capsule 11     metoprolol succinate SR (TOPROL XL) 200 mg Sustained-Release tablet Take 1 tablet by mouth once daily. 90 tablet prn     neomycin-polymyxin-dexamethasone (MAXITROL) 3.5mg/mL-10,000 unit/mL-0.1 % ophthalmic suspension PLACE 1 GTT IN OU QID  2     No current facility-administered medications on file prior to visit.    ,   Past Medical History:     Diagnosis  Date     ANKLE PAIN, LEFT     Chronic left ankle pain secondary to reflex sympathetic dystrophy.  Patient  has had 4 previous surgeries, the most recent one done by Dr. Noriega in  2007. On Narcotic contract.       BACK PAIN, LUMBAR      BACK PAIN, THORACIC REGION, LEFT      CHOLECYSTECTOMY, LAPAROSCOPIC, HX OF 9/2/2011     Chronic pain syndrome      Complex regional pain syndrome     left ankle       DEPRESSION, ACUTE, RECURRENT      GERD      History of endometriosis      History of pregnancy     G2, P1-0-1-1 with one vaginal delivery.      Left ankle injury 1997    requiring surgery      Migraine headache      OBESITY      Overweight (BMI 25.0-29.9) 11/25/2014     Pain medication agreement signed 10/3/12, updated 10/1/14 10/2012    Hydrocodone 10/325mg, #240/mo signed 10/3/12, updated 10/1/ 14       Pain medication agreement signed 10/3/12, updated 10/1/14 10/1/2014    MS Contin 30 mg q 8 hours #90 per month.  Morphine sulfate IR 15 mg 2-3 tab tid prn #180 per month       PANIC DISORDER     Dr Reynoso       Paroxysmal supraventricular tachycardia (HC) 5/24/2010     TOBACCO ABUSE    ,   Patient Active Problem List       Diagnosis  Date Noted     Pain medication agreement signed 4/20/17 04/21/2017     Class: Chronic      4/20/17:  Oxycodone 10 mg  #180/mo    Hydrocodone 10/325mg, #240/mo. Stopped and changed to morphine. Agreement signed 10/3/12, updated 10/1/ 14.  Complex regional pain syndrome (aka Reflex Sympathetic Dystrophy)  MS Contin 30 mg q 8 hours #90 per month.  Morphine sulfate IR 15 mg 2-3 tab tid prn #180 per month, decreased to #90/month.   query 4/9/16 as part of chart review. Seems appropriate. 6/2/16 tapered and off Morphine. [place] 5/325 #120/month.  query appropriate. ToxAssure appropriate.    August 2016[facility]ation with [facility]. Recommended spinal cord stimulator trial, but she is reluctant to pursue that. Pain clinic stated that continuing opioids was OK.        Complex regional pain syndrome type 1 affecting left lower leg  04/15/2016     Patient with a history of complex regional pain syndrome has seen Dr. Dino Skelton4/23/16         Migraine headache  11/25/2014     Overweight (BMI 25.0-29.9)  11/25/2014     BACK PAIN, LUMBAR  02/06/2012     DEPRESSION, ACUTE, RECURRENT  07/13/2011     PAROXYSMAL SUPRAVENTRICULAR TACHYCARDIA  05/24/2010     TOBACCO ABUSE       Chronic pain syndrome       complex regional pain syndrome left ankle          ANKLE PAIN, LEFT       Chronic left ankle pain secondary to reflex sympathetic dystrophy.  Patient   has had 4 previous surgeries, the most recent one done by Dr. Noriega in   2007. On Narcotic contract.          PANIC DISORDER       Previously patient of Dr. Reynoso since her teens. Patient is attempting slow taper down on her Xanax. 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  No other abnormalities noted.         CRANIO/MAXILLOFACIAL SURGERY      Jaw Surgery       LAP CHOLECYSTECTOMY       LAPAROSCOPY ABDOMEN DIAGNOSTIC  1995            LEG/ANKLE SURGERY  1997, 2000    Left ankle  surgery x 2        TOTAL ABDOMINAL HYSTERECTOMY  11/1999    secondary to endometriosis, no malignancy; patient reports no malignancy, but abnormal cells were present *      and   Social History        Substance Use Topics          Smoking status:   Current Every Day Smoker      Packs/day:  0.50      Years:  30.00      Types:  Cigarettes      Start date:  12/2/1986      Smokeless tobacco:   Never Used       Comment: working on it       Alcohol use   0.0 oz/week     0 Standard drinks or equivalent per week        Comment: occasional       OBJECTIVE:  /84  Pulse 100  Temp 98.2  F (36.8  C) (Tympanic)   Breastfeeding? No   EXAM:  Alert and cooperative, no distress. 2 teeth noted on the bottom which her plate will clip to. Affect is broad ranging appropriate and mentation is clear today.  ASSESSMENT/Plan :      Kym was seen today for medication management.    Diagnoses and all orders for this visit:    Complex regional pain syndrome type 1 affecting left lower leg  -     AMB CONSULT TO PAIN CLINIC; Future  -     Discontinue: oxyCODONE 10 mg tablet; Take 1 tablet by mouth every 4 hours if needed  for Pain Maximum 6 tab per day.  -     Discontinue: oxyCODONE 10 mg tablet; Take 1 tablet by mouth every 4 hours if needed  for Pain Maximum 6 tab per day.  -     oxyCODONE 10 mg tablet; Take 1 tablet by mouth every 4 hours if needed  for Pain Maximum 6 tab per day.    Chronic pain syndrome  -     AMB CONSULT TO PAIN CLINIC; Future  -     Discontinue: oxyCODONE 10 mg tablet; Take 1 tablet by mouth every 4 hours if needed  for Pain Maximum 6 tab per day.  -     Discontinue: oxyCODONE 10 mg tablet; Take 1 tablet by mouth every 4 hours if needed  for Pain Maximum 6 tab per day.  -     oxyCODONE 10 mg tablet; Take 1 tablet by mouth every 4 hours if needed  for Pain Maximum 6 tab per day.    Pain in joint, ankle and foot, left  -     AMB CONSULT TO PAIN CLINIC; Future  -     Discontinue: oxyCODONE 10 mg tablet; Take 1  tablet by mouth every 4 hours if needed  for Pain Maximum 6 tab per day.  -     Discontinue: oxyCODONE 10 mg tablet; Take 1 tablet by mouth every 4 hours if needed  for Pain Maximum 6 tab per day.  -     oxyCODONE 10 mg tablet; Take 1 tablet by mouth every 4 hours if needed  for Pain Maximum 6 tab per day.    Needs flu shot  -     FLU VACCINE => 3 YRS PF QUADRIVALENT IIV4 IM  -     FL ADMIN VACC INITIAL SEASONAL AFFILIATE ONLY    Pain medication agreement signed 4/20/17  -     AMB CONSULT TO PAIN CLINIC; Future    Lumbar paraspinal muscle spasm  -     cyclobenzaprine (FLEXERIL) 10 mg tablet; Take 1 tablet by mouth 3 times daily. Prn muscle spasm    Other orders  -     Cancel: oxyCODONE 10 mg tablet; Take 1 tablet by mouth every 4 hours if needed  for Pain Earliest Fill Date: 10/18/17 Maximum 6 tab per day.      query is appropriate. Renewal on oxycodone 10 mg #180×3. She'll try and taper down by 1 tablet per month if possible once her dental work is done. She'll continue to follow-up with the dentist for this.    Discussion today with regard to referral to the [facility] in [place], with Dr. Eryn Ravi MD for assistance with more of a comprehensive pain management plan. She will continue to work with Phuong Magallanes for tapering her Xanax. We'll leave gabapentin at 600 mg twice a day for now but will gradually have her increase that by 1 tablet per day each month. Recommend follow-up in 3 months, sooner if needed    agreed to a small prescription of Flexeril to help with bruxism was she's having her dental work done. Cautioned with regard to possible other interactions.    We'll increase metoprolol up to 200 mg daily as previously directed as are pulses 100 at rest.    A total of 25 minutes was spent with the patient, greater than 50% of the time was spent in counseling/discussion of the aforementioned concerns.         Darrin Stephen MD

## 2017-12-28 NOTE — TELEPHONE ENCOUNTER
Patient Information     Patient Name MRN Kym Smith [id number] Female 1970      Telephone Encounter by Radha Gilliam at 2017  3:52 PM     Author:  Radha Gilliam Service:  (none) Author Type:  (none)     Filed:  2017  3:52 PM Encounter Date:  2017 Status:  Signed     :  Radha Gilliam            Patient notified of rx  Radha Gilliam LPN..............................2017  3:52 PM

## 2017-12-28 NOTE — TELEPHONE ENCOUNTER
Patient Information     Patient Name MRN Kym Smith [id number] Female 1970      Telephone Encounter by Darrin Stephen MD at 10/10/2017  4:23 PM     Author:  Darrin Stephen MD Service:  (none) Author Type:  Physician     Filed:  10/10/2017  4:23 PM Encounter Date:  10/10/2017 Status:  Signed     :  Darrin Stephen MD (Physician)            Can't do schedule II medications outside an office visit.  Darrin Stephen MD ....................  10/10/2017   4:23 PM

## 2017-12-28 NOTE — TELEPHONE ENCOUNTER
Patient Information     Patient Name MRN Kym Smith [id number] Female 1970      Telephone Encounter by Mandy Almazan RN at 8/3/2017 11:13 AM     Author:  Mandy Almazan RN Service:  (none) Author Type:  NURS- Registered Nurse     Filed:  8/3/2017 11:15 AM Encounter Date:  8/3/2017 Status:  Signed     :  Mandy Almazan RN (NURS- Registered Nurse)            Call place to the patient, who states she will call back and make an appointment for today.  Suggest she have a  as she is having memory problems.  MANDY ALMAZAN RN ....................  8/3/2017   11:15 AM

## 2017-12-28 NOTE — TELEPHONE ENCOUNTER
Patient Information     Patient Name MRN Kym Smith [id number] Female 1970      Telephone Encounter by Marianne Mistry at 10/10/2017  4:25 PM     Author:  Marianne Mistry Service:  (none) Author Type:  (none)     Filed:  10/10/2017  4:27 PM Encounter Date:  10/10/2017 Status:  Signed     :  Marianne Mistry            JVC- Patient called back wanting to know if she can get worked in for a med check before 10/24.

## 2017-12-28 NOTE — TELEPHONE ENCOUNTER
Patient Information     Patient Name MRN Sex     Kym Perea [id number] Female 1970      Telephone Encounter by Gosselin, Norma J at 2017  3:19 PM     Author:  Gosselin, Norma J Service:  (none) Author Type:  (none)     Filed:  2017  3:28 PM Encounter Date:  7/3/2017 Status:  Signed     :  Gosselin, Norma J            After patient's name and date of birth verified, patient given the below information.  Transferred to scheduling.  Norma J Gosselin LPN....................  2017   3:19 PM

## 2017-12-28 NOTE — TELEPHONE ENCOUNTER
Patient Information     Patient Name MRKym Petersen [id number] Female 1970      Telephone Encounter by Petra Swanson at 10/10/2017  4:18 PM     Author:  Petra Swanson Service:  (none) Author Type:  (none)     Filed:  10/10/2017  4:23 PM Encounter Date:  10/10/2017 Status:  Signed     :  Petra Swanson            The patient had an appointment for an ED follow up on 17. She is wondering if that would count as a medication management appointment as she needs her pain medications refilled. She is wondering if you would just write out the scripts. I told her they have to be given at an appointment but she wanted you asked.  Petra Swanson LPN..................10/10/2017   4:22 PM

## 2017-12-28 NOTE — TELEPHONE ENCOUNTER
Patient Information     Patient Name MRN Kym Smith [id number] Female 1970      Telephone Encounter by Gosselin, Norma J at 7/3/2017 10:54 AM     Author:  Gosselin, Norma J Service:  (none) Author Type:  (none)     Filed:  7/3/2017 10:59 AM Encounter Date:  7/3/2017 Status:  Signed     :  Gosselin, Norma J            She doesn't agree with the diagnosis from the ER. She was told to be seen in the clinic this week.  You have same day appointment on 17, do you want to use one of those?  She only wants to see you because she also needs narcotics refill.  Norma J Gosselin LPN....................  7/3/2017   10:57 AM

## 2017-12-28 NOTE — TELEPHONE ENCOUNTER
Patient Information     Patient Name MRN Kym Smith [id number] Female 1970      Telephone Encounter by Petra Swanson at 10/10/2017  4:32 PM     Author:  Petra Swanson Service:  (none) Author Type:  (none)     Filed:  10/10/2017  4:38 PM Encounter Date:  10/10/2017 Status:  Signed     :  Petra Swanson            After the patient's name and date of birth was verified, the patient was told the below information. The patient was given an appointment on Oct 18 th.      Petra Swanson LPN..................10/10/2017   4:37 PM

## 2017-12-28 NOTE — TELEPHONE ENCOUNTER
"Patient Information     Patient Name MRKym Petersen [id number] Female 1970      Telephone Encounter by Mandy Almazan RN at 8/3/2017 10:51 AM     Author:  Mandy Almazan RN Service:  (none) Author Type:  NURS- Registered Nurse     Filed:  8/3/2017 11:03 AM Encounter Date:  8/3/2017 Status:  Signed     :  Mandy Almazan RN (NURS- Registered Nurse)              Patient was seen in the ED for a diagnosis of UTI and started on antibiotics. She states that neither this ED visit or the last one showed she actually had an infection.  See ED labs from 7/3/17 (positive culture) and 17.   She feels she is not any better, and her legs are still swelled and her memory problems have not gotten better.  She needs to be seen today, as she has gotten somewhat worse.  The patient was waiting for a call back from the clinic to see if she could get into see Dr. Gurrola today.  I informed the patient that I would send this message to Dr. Gurrola and we would call her back, and see if she could get in today.  Pt requests physician consideration and a callback today please  MANDY ALMAZAN RN ....................  8/3/2017   11:01 AM    Reason for Disposition    [1] Taking antibiotics > 24 hours AND [2] symptoms WORSE    Answer Assessment - Initial Assessment Questions  1. INFECTION: \"What infection is the antibiotic being given for?\"      UTI  2. ANTIBIOTIC: \"What antibiotic are you taking\" \"How many times per day?\"      cipro  3. DURATION: \"When was the antibiotic started?\"      17  4. MAIN CONCERN OR SYMPTOM:  \"What is your main concern right now?\"      Memory problems, legs still swollen  5. BETTER-SAME-WORSE: \"Are you getting better, staying the same, or getting worse compared to when you first started the antibiotics?\" If getting worse, ask: \"In what way?\"       No UTI symptoms, just feeling like she is forgetful  6. FEVER: \"Do you have a fever?\" If so, ask: \"What is your temperature, " "how was it measured, and when did it start?\"      no  7. SYMPTOMS: \"Are there any other symptoms you're concerned about?\" If so, ask: \"When did it start?\"      Memory problems, has had this for a month  8. FOLLOW-UP APPOINTMENT: \"Do you have a follow-up appointment with your doctor?\"      no    Protocols used: ADULT INFECTION ON ANTIBIOTIC FOLLOW-UP CALL-A-AH            "

## 2017-12-28 NOTE — PATIENT INSTRUCTIONS
Patient Information     Patient Name MRN Kym Smith [id number] Female 1970      Patient Instructions by Margaret Obregon NP at 2017  5:00 PM     Author:  Margaret Obregon NP Service:  (none) Author Type:  PHYS- Nurse Practitioner     Filed:  2017  6:49 PM Encounter Date:  2017 Status:  Signed     :  Margaret Obregon NP (PHYS- Nurse Practitioner)            Cold Medicines   What are cold medicines?  Symptoms of the common cold start gradually over several days and usually last about two weeks. Symptoms may include sneezing, a stuffy or runny nose, sore throat, cough, watery eyes, mild headache, or body aches. A cold will go away on its own without treatment. However, there are many nonprescription products that may help relieve some of the symptoms of a cold. Cold medicines often contain more than one ingredient and are used to treat more than one symptom. Read the labels and buy products that have only the ingredients that you need. If you are not sure which medicine is best, ask your pharmacist.  How do they work?  Decongestants reduce swelling in your nose and sinuses. They may also lessen the amount of mucus made by your nose. If you use decongestants more often than directed, your stuffy nose may get worse.   Antihistamines block the effect of histamine. Histamine is a chemical your body makes when you have an allergic reaction. Antihistamines are most often used to treat itchy or watery eyes or a stuffy or runny nose caused by an allergy. Antihistamines may not help a stuffy or runny nose caused by a cold because they can make mucus thick and dry.  Mucolytics are medicines that make mucus thinner so that it is easier to cough up out of your throat and lungs.  Expectorants are cough medicines that may help to keep the mucus thin and bring up mucus from the lungs when you cough. This may relieve chest congestion and make it easier to breathe.   Cough  suppressants (antitussives) are medicines that lessen the urge to cough. They may give relief from a dry, hacking cough. If you have a cough that is wet sounding and produces mucus, it is important for you to cough the mucus up out of your lungs. For this reason, cough suppressants are not recommended for a wet sounding cough.  Fever and pain relievers, such as acetaminophen, aspirin, or other nonsteroidal anti-inflammatory drugs (NSAIDs), are often included in cold medicine. Read labels carefully to avoid taking more medicine than you need.  What else do I need to know about this medicine?    Talk to your healthcare provider if your symptoms start suddenly or you have severe symptoms. This may mean you have something more serious than a cold.    Follow the directions that come with your medicine, including information about food or alcohol. Make sure you know how and when to take your medicine. Do not take more or less than you are supposed to take.    Try to get all of your medicine at the same place. Your pharmacist can help make sure that all of your medicines are safe to take together.    Keep a list of your medicines with you. List all of the prescription medicines, nonprescription medicines, supplements, natural remedies, and vitamins that you take. Tell all healthcare providers who treat you about all of the products you are taking.    Many medicines have side effects. A side effect is a symptom or problem that is caused by the medicine. Ask your healthcare provider or pharmacist what side effects the medicine may cause and what you should do if you have side effects.    Nonsteroidal anti-inflammatory medicines (NSAIDs), such as ibuprofen, naproxen, and aspirin, may cause stomach bleeding and other problems. These risks increase with age. Read the label and take as directed. Unless recommended by your healthcare provider, do not take for more than 10 days for any reason.    Acetaminophen may cause liver  damage or other problems. Unless recommended by your provider, don't take more than 3000 milligrams (mg) in 24 hours. To make sure you don t take too much, check other medicines you take to see if they also contain acetaminophen. Ask your provider if you need to avoid drinking alcohol while taking this medicine.  If you have any questions, ask your healthcare provider or pharmacist for more information. Be sure to keep all appointments for provider visits or tests.      Symptoms likely due to virus. No antibiotic is needed at this time. Symptoms typically worse on days 2-5 and then stabilize and you are sick for days 5-12. Days 12-14 there is slow resolution and if there is a cough, studies show it can linger longer, however one is not as ill as in the beginning. If symptoms begin worsening or fail to improve after 14 days, return to clinic for reevaluation.

## 2017-12-28 NOTE — TELEPHONE ENCOUNTER
Patient Information     Patient Name MRN Kym Smith [id number] Female 1970      Telephone Encounter by Mandy Almazan RN at 8/3/2017 10:44 AM     Author:  Mandy Almazan RN Service:  (none) Author Type:  NURS- Registered Nurse     Filed:  8/3/2017 11:04 AM Encounter Date:  8/3/2017 Status:  Signed     :  Mandy Almazan RN (NURS- Registered Nurse)            See triage encounter.  Unable to complete prescription refill per RN Medication Refill Policy.................... MANDY ALMAZAN RN ....................  8/3/2017   11:04 AM

## 2017-12-28 NOTE — PROGRESS NOTES
Patient Information     Patient Name MRN Elissa Kym Meza [id number] Female 1970      Progress Notes by Margaret Obregon NP at 2017  5:00 PM     Author:  Margaret Obregon NP Service:  (none) Author Type:  PHYS- Nurse Practitioner     Filed:  2017 11:04 PM Encounter Date:  2017 Status:  Signed     :  Margaret Obregon NP (PHYS- Nurse Practitioner)            Nursing Notes:   Bobbi Yates  2017  6:48 PM  Signed  Patient presents to the clinic for URI. Started about week ago, last Wednesday. 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 C/o extreme cough.   Bobbi Yates LPN............................ 2017 6:28 PM    SUBJECTIVE:    Kym Perea is a 47 y.o. female who presents for cough and congestion    Cough   This is a new problem. Episode onset: 5 days. The problem occurs every few minutes. The cough is non-productive. Associated symptoms include ear congestion, nasal congestion, rhinorrhea and a sore throat. Pertinent negatives include no chest pain, chills, fever, headaches, hemoptysis, myalgias, postnasal drip, rash, shortness of breath, sweats, weight loss or wheezing. The symptoms are aggravated by lying down. Risk factors for lung disease include smoking/tobacco exposure. She has tried OTC cough suppressant, rest and a beta-agonist inhaler for the symptoms. The treatment provided mild relief. There is no history of bronchitis, COPD, emphysema, environmental allergies or pneumonia.       Current Outpatient Prescriptions on File Prior to Visit       Medication  Sig Dispense Refill     albuterol HFA (PRO-AIR,VENTOLIN,PROVENTIL) 90 mcg/actuation inhaler Inhale 2 Puffs by mouth 4 times daily if needed (coughing). 1 Inhaler 11     ALPRAZolam 2 mg tablet Take one tablet by mouth up to 3 times daily as needed for anxiety 24 tablet 0     Amphetamine-Dextroamphetamine (ADDERALL) 30 mg tablet Take 1  tablet by mouth 2 times daily  0     Cholecalciferol, Vitamin D3, (VITAMIN D-3) 2,000 unit tablet Take 1 tablet by mouth once daily.  0     cyclobenzaprine (FLEXERIL) 10 mg tablet Take 1 tablet by mouth 3 times daily if needed for Muscle Spasm. 30 tablet 0     fluticasone (50 mcg per actuation) nasal solution (FLONASE) Inhale 1 Spray into both nostrils once daily. 1 Bottle 0     gabapentin (NEURONTIN) 300 mg capsule Take 2 capsules by mouth once daily. 360 capsule 11     metoprolol succinate SR (TOPROL XL) 200 mg Sustained-Release tablet Take 1 tablet by mouth once daily. 90 tablet prn     neomycin-polymyxin-dexamethasone (MAXITROL) 3.5mg/mL-10,000 unit/mL-0.1 % ophthalmic suspension PLACE 1 GTT IN OU QID  2     oxyCODONE 10 mg tablet Take 1 tablet by mouth every 4 hours if needed  for Pain Maximum 6 tab per day. 180 tablet 0     No current facility-administered medications on file prior to visit.        REVIEW OF SYSTEMS:  Review of Systems   Constitutional: Negative for chills, fever and weight loss.   HENT: Positive for rhinorrhea and sore throat. Negative for postnasal drip.    Respiratory: Positive for cough. Negative for hemoptysis, shortness of breath and wheezing.    Cardiovascular: Negative for chest pain.   Musculoskeletal: Negative for myalgias.   Skin: Negative for rash.   Neurological: Negative for headaches.   Endo/Heme/Allergies: Negative for environmental allergies.       OBJECTIVE:  Pulse 100  Temp 99.3  F (37.4  C) (Tympanic)  Resp 22  Wt 90.8 kg (200 lb 3.2 oz)  Breastfeeding? No  BMI 29.14 kg/m2    EXAM:   Physical Exam   Constitutional: She is well-developed, well-nourished, and in no distress.   HENT:   Head: Normocephalic and atraumatic.   Right Ear: Tympanic membrane and ear canal normal.   Left Ear: Tympanic membrane and ear canal normal.   Nose: Mucosal edema and rhinorrhea present.   Mouth/Throat: Uvula is midline and mucous membranes are normal. Posterior oropharyngeal erythema  present. No oropharyngeal exudate or posterior oropharyngeal edema.   Speaks with a hoarse voice.    Eyes: Conjunctivae are normal.   Neck: Neck supple.   Cardiovascular: Normal rate, regular rhythm and normal heart sounds.    Pulmonary/Chest: Effort normal and breath sounds normal. No respiratory distress. She has no wheezes. She has no rales.   Skin: Skin is warm and dry. No rash noted.   Psychiatric: Mood and affect normal.   Nursing note and vitals reviewed.      ASSESSMENT/PLAN:    ICD-10-CM    1. Viral URI with cough J06.9      B97.89         Plan:  Home cares and OTC gone over. Viral illness is discussed. OTC discussed at length as she is asking for Robitussin with Codeine. Symptoms likely due to virus. No antibiotic is needed at this time. Symptoms typically worse on days 2-5 and then stabilize and you are sick for days 5-12. Days 12-14 there is slow resolution and if there is a cough, studies show it can linger longer, however one is not as ill as in the beginning. If symptoms begin worsening or fail to improve after 14 days, return to clinic for reevaluation. All questions were answered and she is in agreement with plan.       KAT EVANS NP ....................  11/13/2017   10:50 PM

## 2017-12-28 NOTE — TELEPHONE ENCOUNTER
Patient Information     Patient Name MRN Kym Smith [id number] Female 1970      Telephone Encounter by Petra Swanson at 2017  8:33 AM     Author:  Petra Swanson Service:  (none) Author Type:  (none)     Filed:  2017  8:34 AM Encounter Date:  2017 Status:  Signed     :  Petra Swanson            The patient was told that Darrin Stephen MD could not see her today. I offered other providers and she agreed. She was transferred to the appointment line.  Petra Swanson LPN..................2017   8:34 AM

## 2017-12-30 NOTE — NURSING NOTE
Patient Information     Patient Name MRN Sex Kym Smalls [id number] Female 1970      Nursing Note by Gosselin, Norma J at 2017  2:00 PM     Author:  Gosselin, Norma J  Service:  (none) Author Type:  (none)     Filed:  2017  2:07 PM  Encounter Date:  2017 Status:  Addendum     :  Gosselin, Norma J        Related Notes: Original Note by Gosselin, Norma J filed at 2017  2:04 PM            Patient presents to clinic for ER Visit UTI. Medication refills.  Norma J Gosselin LPN....................  2017   2:03 PM

## 2017-12-30 NOTE — NURSING NOTE
Patient Information     Patient Name MRN Kym Smith [id number] Female 1970      Nursing Note by Angeli Santamaria at 10/18/2017 11:00 AM     Author:  Angeli Santamaria Service:  (none) Author Type:  (none)     Filed:  10/18/2017 11:36 AM Encounter Date:  10/18/2017 Status:  Signed     :  Angeli Santamaria Eliazar is a 47 y.o. female  presenting today for medication management  Angeli Santamaria LPN 10/18/2017 11:12 AM

## 2017-12-30 NOTE — NURSING NOTE
Patient Information     Patient Name MRN Kym Smith [id number] Female 1970      Nursing Note by Petra Swanson at 2017 11:15 AM     Author:  Petra Swanson Service:  (none) Author Type:  (none)     Filed:  2017 11:35 AM Encounter Date:  2017 Status:  Signed     :  Petra Swanson            She is here for a follow up for amnesic episodes.  Petra Swanson LPN..................2017   11:25 AM

## 2017-12-30 NOTE — NURSING NOTE
Patient Information     Patient Name MRN Kym Smith [id number] Female 1970      Nursing Note by Bobbi Yates at 2017  5:00 PM     Author:  Bobbi Yates Service:  (none) Author Type:  NURS- Student Practical Nurse     Filed:  2017  6:48 PM Encounter Date:  2017 Status:  Signed     :  oBbbi Yates (NURS- Student Practical Nurse)            Patient presents to the clinic for URI. Started about week ago, last Wednesday. 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 C/o extreme cough.   Bobbi Yates, SHARA............................ 2017 6:28 PM

## 2018-01-03 NOTE — PROGRESS NOTES
"Patient Information     Patient Name MRN Sex Kym Meza [id number] Female 1970      Progress Notes by Darrin Stephen MD at 2017  1:30 PM     Author:  Darrin Stephen MD Service:  (none) Author Type:  Physician     Filed:  2017  3:30 PM Encounter Date:  2017 Status:  Signed     :  Darrin Stephen MD (Physician)            There are no exam notes on file for this visit.    SUBJECTIVE:  Kym Perea  is a 46 y.o. female who comes in today for follow-up and medication management. I last saw her 3 months ago. :\"She has complex regional pain syndrome of her left foot. Her previous plan is as follows: \"In the setting of reflex sympathetic dystrophy/complex regional pain syndrome, opiates are considered an appropriate treatment for this. These medications were recommended by [facility] and were managed by primary care. She has done fine with the taper and does not want to go back on higher doses of morphine, but is quite miserable and has a hard time sleeping. We again reviewed the recommendations of the CDC guidelines. We elected to place her on gabapentin 300 mg 3 times a day gradually ramping up to 900 mg 3 times a day.\" We reviewed her notes from [facility] pain clinic and it was their recommendation that she pursue a spinal cord stimulator trial because they feel that it would provide her the best pain relief. They stated that she could continue the current opiate regimen that she is on but that given the limited efficacy of the opiates, a spinal cord stimulator was strongly recommended.     At her last visit, she was referred back to Dr. Skelton for an opinion to make sure there is nothing more surgically that could be offered. Encouraged her to consider a spinal cord stimulator trial to decide whether or not he might be effective before dismissing that is an option.    She has decided to try the stimulator trial.         CHRONIC PAIN MANAGEMENT:    DIAGNOSIS: (G57.92) " Neuropathic pain of ankle, left  (primary encounter diagnosis)  (G90.522) Complex regional pain syndrome type 1 affecting left lower leg  (G89.4) Chronic pain syndrome  (F41.0) PANIC DISORDER  (M25.572) Pain in joint, ankle and foot, left  (Z79.899) Pain medication agreement signed 10/3/12, updated 10/1/14, 5/16/16     PAIN CLINIC EVALUATION:(YES)  DATE: [facility]. Referred back 5/16/16.  Was at [facility] 7/13/16.  Was told that she lives too far away and they would not take care of her since she lives too far away. They no longer do implanted pumps. They had no insights or recommendations.  The [facility] put her on methadone and morphine. Was seen at [facility] by Dr. Harjit Panchal in early August 2016. Dorsal column stimulator trial planned for spring 2017.      PAIN MEDICATION AGREEMENT: (YES)    DATE SIGNED: 5/16/16      NON-MEDICATION MODALITIES MAXIMIZED? (YES) had surgery 2 years ago with Dr. Skelton, but has not seen him for a while.       NEUROPATHIC PAIN: (YES)  ON GABAPENTIN/LYRICA/TCA/SNRI: (YES) dose increased up to QID.       NOCICEPTIVE PAIN: (YES)      HISTORY OF CD: (NO)      MENTAL HEALTH DIAGNOSIS: (YES)  DIAGNOSIS:Anxiety  ON BENZODIAZEPINES: (YES)  DISCLOSURE REGARDING RISK OF CONCOMITANT USE WITH OPIOIDS DISCUSSED: (YES)  DATE DISCUSSED: 5/16/16 and 6/2/16 and 8/30/16, 1/12/17      MEDICATION: Norco 10/325 #180/month, change to oxycodone 10 mg 4-6 tab per day, #180/month.       ORAL MORPHINE EQUIVALENTS: 60/d, now max 90 OME/day      LAST TAPER TRIAL: just completed 5/16/16.  Off Morphine and then on Norco at much lower dose, gradual increase to now at 90 OME oxycodone per day max.        QUERY TODAY: (YES)  APPROPRIATE?:         (YES) the  is incorrect as it notes that I am the prescriber of her benzodiazepine's and I am not. They are prescribed by Phuong Magallanes NP.       TOXASSURE TODAY: (NO)  IF NO, DATE OF LAST TOXASURE: 6/2/16 and appropriate. Lorazepam is not listed on  our med list but she has been on this as prescribed by Phuong Magallanes for breakthrough anxiety.      PAIN SCORE FLOWSHEET REVIEWED: (YES)  STABLE OR IMPROVED: (YES)      AUDIT-10 QUESTIONNAIRE COMPLETED: (NO)      OSWESTRY PAIN DISABILITY INDEX: (NO)      Past Medical, Family, and Social History reviewed and updated as noted below.   ROS is negative except as noted above       Allergies     Allergen  Reactions     Arthrotec 50 [Diclofenac-Misoprostol] GI Upset     Indocin [Indomethacin] Dizziness     Mobic [Meloxicam] GI Upset   ,   Family History       Problem   Relation Age of Onset     Cancer  Father      Bladder       Other  Father      Mild hypercholesterolemia       Other  Mother      scleroderma/lupus/Parkinson's syndrome       Good Health  Brother      Cancer-colon  Maternal Grandmother 40     Followed by lung  with metastasis to the bone di*     ,   Current Outpatient Prescriptions on File Prior to Visit       Medication  Sig Dispense Refill     albuterol HFA (PRO-AIR,VENTOLIN,PROVENTIL) 90 mcg/actuation inhaler Inhale 2 Puffs by mouth 4 times daily if needed (coughing). 1 Inhaler 11     ALPRAZolam 2 mg tablet TAKE 1 TABLET BY MOUTH UP TO FOUR TIMES DAILY AS NEEDED FOR ANXIETY 120 tablet 3     atenolol (TENORMIN) 100 mg tablet TAKE 1 TABLET BY MOUTH ONCE DAILY. 90 tablet 3     Cholecalciferol, Vitamin D3, (VITAMIN D-3) 2,000 unit tablet Take 1 tablet by mouth once daily.  0     fluticasone (50 mcg per actuation) nasal solution (FLONASE) Inhale 1 Spray into both nostrils once daily. 1 Bottle 0     LORazepam (ATIVAN) 1 mg tablet Take 1 tablet by mouth every 6 hours if needed.  0     traZODone (DESYREL) 150 mg tablet Take 2 tablets by mouth at bedtime. 30 tablet 0     No current facility-administered medications on file prior to visit.    ,   Past Medical History      Diagnosis   Date     ANKLE PAIN, LEFT       Chronic left ankle pain secondary to reflex sympathetic dystrophy.  Patient  has had 4 previous  surgeries, the most recent one done by Dr. Noriega in  2007. On Narcotic contract.       BACK PAIN, LUMBAR       BACK PAIN, THORACIC REGION, LEFT       CHOLECYSTECTOMY, LAPAROSCOPIC, HX OF  9/2/2011     Chronic pain syndrome       Complex regional pain syndrome       left ankle       DEPRESSION, ACUTE, RECURRENT       GERD       History of endometriosis       History of pregnancy       G2, P1-0-1-1 with one vaginal delivery.      Left ankle injury  1997     requiring surgery      Migraine headache       OBESITY       Overweight (BMI 25.0-29.9)  11/25/2014     Pain medication agreement signed 10/3/12, updated 10/1/14  10/2012     Hydrocodone 10/325mg, #240/mo signed 10/3/12, updated 10/1/ 14       Pain medication agreement signed 10/3/12, updated 10/1/14  10/1/2014     MS Contin 30 mg q 8 hours #90 per month.  Morphine sulfate IR 15 mg 2-3 tab tid prn #180 per month       PANIC DISORDER       Dr Reynoso       Paroxysmal supraventricular tachycardia (HC)  5/24/2010     TOBACCO ABUSE     ,   Patient Active Problem List       Diagnosis  Date Noted     Complex regional pain syndrome type 1 affecting left lower leg  04/15/2016     Patient with a history of complex regional pain syndrome has seen Dr. Dino Skelton4/23/16         Migraine headache  11/25/2014     Overweight (BMI 25.0-29.9)  11/25/2014     Pain medication agreement signed 10/3/12, updated 10/1/14, 5/16/16 05/29/2013     Class: Chronic      Hydrocodone 10/325mg, #240/mo. Stopped and changed to morphine. Agreement signed 10/3/12, updated 10/1/ 14.  Complex regional pain syndrome (aka Reflex Sympathetic Dystrophy)  MS Contin 30 mg q 8 hours #90 per month.  Morphine sulfate IR 15 mg 2-3 tab tid prn #180 per month, decreased to #90/month.   query 4/9/16 as part of chart review. Seems appropriate. 6/2/16 tapered and off Morphine. [place] 5/325 #120/month.  query appropriate. Tox[employer]ure appropriate.    August 2016Consultation with [facility].  Recommended spinal cord stimulator trial, but she is reluctant to pursue that. Pain clinic stated that continuing opioids was OK.        BACK PAIN, LUMBAR  02/06/2012     SHOULDER PAIN, RIGHT  02/06/2012     BACK PAIN, THORACIC REGION, LEFT  10/28/2011     ABDOMINAL PAIN  09/07/2011     DEPRESSION, ACUTE, RECURRENT  07/13/2011     PAROXYSMAL SUPRAVENTRICULAR TACHYCARDIA  05/24/2010     TOBACCO ABUSE       Chronic pain syndrome       complex regional pain syndrome left ankle          ANKLE PAIN, LEFT       Chronic left ankle pain secondary to reflex sympathetic dystrophy.  Patient   has had 4 previous surgeries, the most recent one done by Dr. Noriega in   2007. On Narcotic contract.          PANIC DISORDER       Previously patient of Dr. Reynoso since her teens. Patient is attempting slow taper down on her Xanax. 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  No other abnormalities noted.         Lap cholecystectomy       and   Social History        Substance Use Topics          Smoking status:   Current Every Day Smoker      Packs/day:  0.50      Types:  Cigarettes      Start date:  12/2/1986      Smokeless tobacco:   Never Used       Comment: working on it       Alcohol use   0.0 oz/week     0 Standard drinks or equivalent per week        Comment: occasional       OBJECTIVE:  Visit Vitals       /84     Pulse 72     Breastfeeding No      EXAM:  Alert cooperative, no distress. Walks with an antalgic  gait. Ankle exam is unchanged.  ASSESSMENT/Plan :      Kym was seen today for medication management.    Diagnoses and all orders for this visit:    Neuropathic pain of ankle, left  -     gabapentin (NEURONTIN) 300 mg capsule; Take 4 capsules by mouth 3 times daily.  -     COMPLIANCE DRUG ANALYSIS; Future  -     AMB CONSULT TO ORTHOPEDICS - AFFILIATE ONLY; Future  -     COMPLIANCE DRUG ANALYSIS    Complex regional pain syndrome type 1 affecting left lower leg  -     gabapentin (NEURONTIN) 300 mg capsule; Take 4 capsules by mouth 3 times daily.  -     COMPLIANCE DRUG ANALYSIS; Future  -     Discontinue: oxyCODONE 10 mg tablet; Take 1 tablet by mouth every 4 hours if needed  for Pain Maximum 6 tab per day.  -     Discontinue: oxyCODONE 10 mg tablet; Take 1 tablet by mouth every 4 hours if needed  for Pain Maximum 6 tab per day.  -     oxyCODONE 10 mg tablet; Take 1 tablet by mouth every 4 hours if needed  for Pain Maximum 6 tab per day.  -     COMPLIANCE DRUG ANALYSIS    Chronic pain syndrome  -     gabapentin (NEURONTIN) 300 mg capsule; Take 4 capsules by mouth 3 times daily.  -     COMPLIANCE DRUG ANALYSIS; Future  -     Discontinue: oxyCODONE 10 mg tablet; Take 1 tablet by mouth every 4 hours if needed  for Pain Maximum 6 tab per day.  -     Discontinue: oxyCODONE 10 mg tablet; Take 1 tablet by mouth every 4 hours if needed  for Pain Maximum 6 tab per day.  -     oxyCODONE 10 mg tablet; Take 1 tablet by mouth every 4 hours if needed  for Pain Maximum 6 tab per day.  -     COMPLIANCE DRUG ANALYSIS    PANIC DISORDER    Pain in joint, ankle and foot, left  -     COMPLIANCE DRUG ANALYSIS; Future  -     AMB CONSULT TO ORTHOPEDICS - AFFILIATE ONLY; Future  -     Discontinue: oxyCODONE 10 mg tablet; Take 1 tablet by mouth every 4 hours if needed  for Pain Maximum 6 tab per day.  -     Discontinue: oxyCODONE 10 mg tablet; Take 1 tablet by mouth every 4 hours if needed  for Pain Maximum 6 tab per day.  -     oxyCODONE 10  mg tablet; Take 1 tablet by mouth every 4 hours if needed  for Pain Maximum 6 tab per day.  -     COMPLIANCE DRUG ANALYSIS    Pain medication agreement signed 10/3/12, updated 10/1/14, 5/16/16    Recommend that she follow through with an appointment with Dr. Skelton to see if there is anything surgical that needs to be done. She will follow through with the [facility] to consider a trial of a spinal cord stimulator in the spring. She will call them to set that up.    We'll discontinue hydrocodone. We'll switch to oxycodone 10 mg 1 tablet every 4 hours as needed for pain with a maximum of 6 per day. This puts her at 90 morphine equivalents which is at the top end of the recommended dose per CDC guidelines. We discussed this in detail. She'll try minimize this and not go higher than 4 or 5 if possible, understanding the fact that there is really not much more room to go up. She's had trials of multiple different medications in the past and it was felt that after discussion, switching analgesia was again may be reasonable. It was recommended that she be on opiates by the pain clinic, and we will continue that until such time as they take over her care again.     query is appropriate and ToxAssure was done today. Prescription for oxycodone 10 mg #180×3. Follow-up in 3 months, sooner if needed    A total of 25 minutes was spent with the patient, greater than 50% of the time was spent in counseling/discussion of the aforementioned concerns.       Darrin Stephen MD

## 2018-01-04 NOTE — PROGRESS NOTES
"Patient Information     Patient Name MRN Sex     Kym Perea [id number] Female 1970      Progress Notes by Darrin Stephen MD at 2017  4:15 PM     Author:  Darrin Stephen MD Service:  (none) Author Type:  Physician     Filed:  2017  1:04 PM Encounter Date:  2017 Status:  Signed     :  Darrin Stephen MD (Physician)            There are no exam notes on file for this visit.      SUBJECTIVE:  Kym Perea  is a 47 y.o. female who comes in today for follow-up and medication management. I last saw her 3 months ago. She has complex regional pain syndrome of her left foot. The plan at her last visit was as follows: \"Recommend that she follow through with an appointment with Dr. Skelton to see if there is anything surgical that needs to be done. She will follow through with the [facility] to consider a trial of a spinal cord stimulator in the spring. She will call them to set that up.     We'll discontinue hydrocodone. We'll switch to oxycodone 10 mg 1 tablet every 4 hours as needed for pain with a maximum of 6 per day. This puts her at 90 morphine equivalents which is at the top end of the recommended dose per CDC guidelines. We discussed this in detail. She'll try minimize this and not go higher than 4 or 5 if possible, understanding the fact that there is really not much more room to go up. She's had trials of multiple different medications in the past and it was felt that after discussion, switching analgesia was again may be reasonable. It was recommended that she be on opiates by the pain clinic, and we will continue that until such time as they take over her care again.      query is appropriate and ToxAssure was done today. Prescription for oxycodone 10 mg #180×3. Follow-up in 3 months, sooner if needed.\"    She owes some money, so can't do the stimulator trial until then.     CHRONIC PAIN MANAGEMENT:    DIAGNOSIS: (G89.4) Chronic pain syndrome  (primary " encounter diagnosis)  (G90.522) Complex regional pain syndrome type 1 affecting left lower leg  (Z02.89) Pain medication agreement signed 10/3/12, updated 10/1/14, 5/16/16  (F51.02) Transient insomnia  (M25.572) Pain in joint, ankle and foot, left  (R30.0) Dysuria     PAIN CLINIC EVALUATION:(YES)  DATE: [facility]. Referred back 5/16/16.  Was at [facility] 7/13/16.  Was told that she lives too far away and they would not take care of her since she lives too far away. They no longer do implanted pumps. They had no insights or recommendations.  The [facility] put her on methadone and morphine. Was seen at [facility] by Dr. Harjit Panchal in early August 2016. Dorsal column stimulator trial planned for spring 2017.      PAIN MEDICATION AGREEMENT: (YES)    DATE SIGNED: 5/16/16      NON-MEDICATION MODALITIES MAXIMIZED? (YES) had surgery 2 years ago with Dr. Skelton, but has not seen him for a while.       NEUROPATHIC PAIN: (YES)  ON GABAPENTIN/LYRICA/TCA/SNRI: (YES) dose increased up to QID.       NOCICEPTIVE PAIN: (YES)      HISTORY OF CD: (NO)      MENTAL HEALTH DIAGNOSIS: (YES)  DIAGNOSIS:Anxiety  ON BENZODIAZEPINES: (YES)  DISCLOSURE REGARDING RISK OF CONCOMITANT USE WITH OPIOIDS DISCUSSED: (YES)  DATE DISCUSSED: 5/16/16 and 6/2/16 and 8/30/16, 1/12/17      MEDICATION: Oxycodone 10 mg 4-6 tab per day, #180/month.       ORAL MORPHINE EQUIVALENTS: 90 OME/day      LAST TAPER TRIAL: just completed 5/16/16.  Off Morphine and then on Norco at much lower dose, gradual increase to now at 90 OME oxycodone per day max.        QUERY TODAY: (YES)  APPROPRIATE?:         (YES) the  is incorrect as it notes that I am the prescriber of her benzodiazepine's and I am not. They are prescribed by Phuong Magallanes NP.       TOXASSURE TODAY: (YES)  IF NO, DATE OF LAST TOXASURE: 6/2/16 and appropriate. Lorazepam is not listed on our med list but she has been on this as prescribed by Phuong Magallanes for breakthrough anxiety. Last test  1/12/17.      PAIN SCORE FLOWSHEET REVIEWED: (YES)  STABLE OR IMPROVED: (YES)      AUDIT-10 QUESTIONNAIRE COMPLETED: (NO)      OSWESTRY PAIN DISABILITY INDEX: (NO)             Past Medical, Family, and Social History reviewed and updated as noted below.   ROS is negative except as noted above       Allergies     Allergen  Reactions     Arthrotec 50 [Diclofenac-Misoprostol] GI Upset     Indocin [Indomethacin] Dizziness     Mobic [Meloxicam] GI Upset   ,   Family History       Problem   Relation Age of Onset     Cancer  Father      Bladder       Other  Father      Mild hypercholesterolemia       Other  Mother      scleroderma/lupus/Parkinson's syndrome       Good Health  Brother      Cancer-colon  Maternal Grandmother 40     Followed by lung  with metastasis to the bone di*     ,   Current Outpatient Prescriptions on File Prior to Visit       Medication  Sig Dispense Refill     albuterol HFA (PRO-AIR,VENTOLIN,PROVENTIL) 90 mcg/actuation inhaler Inhale 2 Puffs by mouth 4 times daily if needed (coughing). 1 Inhaler 11     ALPRAZolam 2 mg tablet Take one tablet by mouth up to 3 times daily as needed for anxiety 24 tablet 0     atenolol (TENORMIN) 100 mg tablet TAKE 1 TABLET BY MOUTH ONCE DAILY. 90 tablet 3     Cholecalciferol, Vitamin D3, (VITAMIN D-3) 2,000 unit tablet Take 1 tablet by mouth once daily.  0     fluticasone (50 mcg per actuation) nasal solution (FLONASE) Inhale 1 Spray into both nostrils once daily. 1 Bottle 0     gabapentin (NEURONTIN) 300 mg capsule Take 4 capsules by mouth 3 times daily. 360 capsule 11     LORazepam (ATIVAN) 1 mg tablet Take 1 tablet by mouth every 6 hours if needed.  0     No current facility-administered medications on file prior to visit.    ,   Past Medical History:     Diagnosis  Date     ANKLE PAIN, LEFT     Chronic left ankle pain secondary to reflex sympathetic dystrophy.  Patient  has had 4 previous surgeries, the most recent one done by Dr. Noriega in  2007. On Narcotic  contract.       BACK PAIN, LUMBAR      BACK PAIN, THORACIC REGION, LEFT      CHOLECYSTECTOMY, LAPAROSCOPIC, HX OF 9/2/2011     Chronic pain syndrome      Complex regional pain syndrome     left ankle       DEPRESSION, ACUTE, RECURRENT      GERD      History of endometriosis      History of pregnancy     G2, P1-0-1-1 with one vaginal delivery.      Left ankle injury 1997    requiring surgery      Migraine headache      OBESITY      Overweight (BMI 25.0-29.9) 11/25/2014     Pain medication agreement signed 10/3/12, updated 10/1/14 10/2012    Hydrocodone 10/325mg, #240/mo signed 10/3/12, updated 10/1/ 14       Pain medication agreement signed 10/3/12, updated 10/1/14 10/1/2014    MS Contin 30 mg q 8 hours #90 per month.  Morphine sulfate IR 15 mg 2-3 tab tid prn #180 per month       PANIC DISORDER     Dr Reynoso       Paroxysmal supraventricular tachycardia (HC) 5/24/2010     TOBACCO ABUSE    ,   Patient Active Problem List       Diagnosis  Date Noted     Pain medication agreement signed 4/20/17 04/21/2017     Class: Chronic      4/20/17:  Oxycodone 10 mg #180/mo    Hydrocodone 10/325mg, #240/mo. Stopped and changed to morphine. Agreement signed 10/3/12, updated 10/1/ 14.  Complex regional pain syndrome (aka Reflex Sympathetic Dystrophy)  MS Contin 30 mg q 8 hours #90 per month.  Morphine sulfate IR 15 mg 2-3 tab tid prn #180 per month, decreased to #90/month.   query 4/9/16 as part of chart review. Seems appropriate. 6/2/16 tapered and off Morphine. [place] 5/325 #120/month.  query appropriate. ToxAssure appropriate.    August 2016Consultation with [facility]. Recommended spinal cord stimulator trial, but she is reluctant to pursue that. Pain clinic stated that continuing opioids was OK.        Complex regional pain syndrome type 1 affecting left lower leg  04/15/2016     Patient with a history of complex regional pain syndrome has seen Dr. Dino Skelton4/23/16         Migraine headache   11/25/2014     Overweight (BMI 25.0-29.9)  11/25/2014     BACK PAIN, LUMBAR  02/06/2012     SHOULDER PAIN, RIGHT  02/06/2012     BACK PAIN, THORACIC REGION, LEFT  10/28/2011     ABDOMINAL PAIN  09/07/2011     DEPRESSION, ACUTE, RECURRENT  07/13/2011     PAROXYSMAL SUPRAVENTRICULAR TACHYCARDIA  05/24/2010     TOBACCO ABUSE       Chronic pain syndrome       complex regional pain syndrome left ankle          ANKLE PAIN, LEFT       Chronic left ankle pain secondary to reflex sympathetic dystrophy.  Patient   has had 4 previous surgeries, the most recent one done by Dr. Noriega in   2007. On Narcotic contract.          PANIC DISORDER       Previously patient of Dr. Reynoso since her teens. Patient is attempting slow taper down on her Xanax. 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  No other abnormalities noted.         CRANIO/MAXILLOFACIAL SURGERY      Jaw Surgery       LAP CHOLECYSTECTOMY       LAPAROSCOPY ABDOMEN DIAGNOSTIC  1995            LEG/ANKLE SURGERY  1997, 2000    Left ankle surgery x 2        TOTAL ABDOMINAL HYSTERECTOMY  11/1999    secondary to endometriosis, no malignancy; patient reports no malignancy, but abnormal cells were present *      and   Social History        Substance Use Topics          Smoking status:   Current Every Day Smoker      Packs/day:  0.50      Types:  Cigarettes      Start date:  12/2/1986      Smokeless tobacco:   Never Used       Comment: working on it       Alcohol use   0.0 oz/week     0 Standard drinks or equivalent per week        Comment: occasional       OBJECTIVE:  /84  Pulse 56  Breastfeeding? No   EXAM:    Alert and cooperative, no distress. Examination of her right foot which has been bothering her really does not show any significant abnormality. As she was leaving, she  stated that she wanted to get checking in case she might have a bladder infection as she had a little bit of discomfort on voiding.  Results for orders placed or performed in visit on 04/20/17      URINALYSIS W REFLEX MICROSCOPIC IF POSITIVE      Result  Value Ref Range    COLOR                     Yellow Yellow Color    CLARITY                   Slightly Cloudy (A) Clear Clarity    SPECIFIC GRAVITY,URINE    >=1.030 (A) 1.010, 1.015, 1.020, 1.025                    PH,URINE                  6.0 6.0, 7.0, 8.0, 5.5, 6.5, 7.5, 8.5                    UROBILINOGEN,QUALITATIVE  Normal Normal EU/dl    PROTEIN, URINE Trace (A) Negative mg/dL    GLUCOSE, URINE Negative Negative mg/dL    KETONES,URINE             Negative Negative mg/dL    BILIRUBIN,URINE           Abnormal (A) Negative                    OCCULT BLOOD,URINE        Negative Negative                    NITRITE                   Positive (A) Negative                    LEUKOCYTE ESTERASE        Trace (A) Negative                   URINALYSIS MICROSCOPIC      Result  Value Ref Range    RBC 0-2 0-2, None Seen /HPF    WBC 26-50 (A) 0-2, 3-5, None Seen /HPF    BACTERIA                  Many (A) None Seen, Rare, Occasional, Few Bacteria/HPF    EPITHELIAL CELLS          Few None Seen, Few Epi/HPF      ASSESSMENT/Plan :      Kym was seen today for medication management.    Diagnoses and all orders for this visit:    Chronic pain syndrome  -     COMPLIANCE DRUG ANALYSIS; Future  -     Discontinue: oxyCODONE 10 mg tablet; Take 1 tablet by mouth every 4 hours if needed  for Pain Maximum 6 tab per day.  -     Discontinue: oxyCODONE 10 mg tablet; Take 1 tablet by mouth every 4 hours if needed  for Pain Maximum 6 tab per day.  -     Discontinue: oxyCODONE 10 mg tablet; Take 1 tablet by mouth every 4 hours if needed  for Pain Maximum 6 tab per day.  -     oxyCODONE 10 mg tablet; Take 1 tablet by mouth every 4 hours if needed  for Pain Maximum 6 tab per day.  -      COMPLIANCE DRUG ANALYSIS    Complex regional pain syndrome type 1 affecting left lower leg  -     COMPLIANCE DRUG ANALYSIS; Future  -     Discontinue: oxyCODONE 10 mg tablet; Take 1 tablet by mouth every 4 hours if needed  for Pain Maximum 6 tab per day.  -     Discontinue: oxyCODONE 10 mg tablet; Take 1 tablet by mouth every 4 hours if needed  for Pain Maximum 6 tab per day.  -     Discontinue: oxyCODONE 10 mg tablet; Take 1 tablet by mouth every 4 hours if needed  for Pain Maximum 6 tab per day.  -     oxyCODONE 10 mg tablet; Take 1 tablet by mouth every 4 hours if needed  for Pain Maximum 6 tab per day.  -     COMPLIANCE DRUG ANALYSIS    Pain medication agreement signed 10/3/12, updated 10/1/14, 5/16/16  -     COMPLIANCE DRUG ANALYSIS; Future  -     COMPLIANCE DRUG ANALYSIS    Transient insomnia  -     traZODone (DESYREL) 150 mg tablet; Take 2 tablets by mouth at bedtime.    Pain in joint, ankle and foot, left  -     Discontinue: oxyCODONE 10 mg tablet; Take 1 tablet by mouth every 4 hours if needed  for Pain Maximum 6 tab per day.  -     Discontinue: oxyCODONE 10 mg tablet; Take 1 tablet by mouth every 4 hours if needed  for Pain Maximum 6 tab per day.  -     Discontinue: oxyCODONE 10 mg tablet; Take 1 tablet by mouth every 4 hours if needed  for Pain Maximum 6 tab per day.  -     oxyCODONE 10 mg tablet; Take 1 tablet by mouth every 4 hours if needed  for Pain Maximum 6 tab per day.    Dysuria  -     URINALYSIS W REFLEX MICROSCOPIC IF POSITIVE; Future  -     URINALYSIS W REFLEX MICROSCOPIC IF POSITIVE  -     URINALYSIS MICROSCOPIC; Future  -     URINALYSIS MICROSCOPIC  -     URINE CULTURE; Future    Will notify of lab results when available. Urine culture submitted. Push fluids.     Prescription for trazodone 150 mg #14 and then her mental health provider will continue to fill this. She is leaving to go out of town and needed a few to get by until she got back. Doesn't get paid until the end of the month and  didn't have money to get the whole prescription for this or her pain medication and so also wanted a smaller prescription for a week of the oxycodone separate from her other longer-term prescription.     query is appropriate. ToxAssure today. Prescription for oxycodone 10 mg #42 for the next week. Then went ahead and refilled her prescription for April May and June 4 180 tablets ×3. Follow-up thereafter. She apparently will not be able to pursue her implanted stimulator trial until she pays off the bill left over at the pain clinic. She feels that the oxycodone gives her better relief than the hydrocodone did. She understands that she is at the maximum dose that I'm able to prescribe. Updated her pain medication agreement today. Continue to follow with Phuong Magallanes for mental health issues.    A total of 25 minutes was spent with the patient, greater than 50% of the time was spent in counseling/discussion of the aforementioned concerns.       Darrin Stephen MD

## 2018-01-04 NOTE — TELEPHONE ENCOUNTER
Patient Information     Patient Name MRN Kym Smith [id number] Female 1970      Telephone Encounter by Angeli Santamaria at 2017  9:26 AM     Author:  Angeli Santamaria Service:  (none) Author Type:  (none)     Filed:  2017  9:26 AM Encounter Date:  2017 Status:  Signed     :  Angeli Santamaria            Prescription faxed to Davide Santamaria LPN 2017 9:26 AM

## 2018-01-04 NOTE — ADDENDUM NOTE
Patient Information     Patient Name MRN Sex Kym Smalls [id number] Female 1970      Addendum Note by Darrin Stephen MD at 2017  3:43 PM     Author:  Darrin Stephen MD Service:  (none) Author Type:  Physician     Filed:  2017  3:43 PM Encounter Date:  2017 Status:  Signed     :  Darrin Stephen MD (Physician)       Addended by: DARRIN STEPHEN on: 2017 03:43 PM        Modules accepted: Orders

## 2018-01-04 NOTE — TELEPHONE ENCOUNTER
Patient Information     Patient Name MRN Kym Smith [id number] Female 1970      Telephone Encounter by Marvin Reyez MD at 2017  8:14 AM     Author:  Marvin Reyez MD Service:  (none) Author Type:  Physician     Filed:  2017  8:16 AM Encounter Date:  2017 Status:  Signed     :  Marvin Reyez MD (Physician)            I have not provided more than than 4 mg of alprazolam typically and never more than 6 mg. If she's at 8 mg I personally would make her see psychiatry for this. Given refill of 1 pill three times daily for the 8 days until she sees Dr Stephen instead of the four times daily.

## 2018-01-04 NOTE — TELEPHONE ENCOUNTER
Patient Information     Patient Name MRN Sex Kym Smalls [id number] Female 1970      Telephone Encounter by Lyle Patterson RN at 2017  4:26 PM     Author:  Lyle Patterson RN Service:  (none) Author Type:  NURS- Registered Nurse     Filed:  2017  4:33 PM Encounter Date:  2017 Status:  Signed     :  Lyle Patterson RN (NURS- Registered Nurse)            This is a Refill request from: [facility]  Name of Medication: Alprazolam  Quantity requested: 20 tabs  Last fill date: 3/22/17  Due for refill: Yes, coming due for refill on 17 per chart, 17 per rx request and last refill date  Last visit with RAKEL STEPHEN was on: 2017 in [facility]  PCP:  Rakel Stephen MD  Controlled Substance Agreement:  Yes, 16   Diagnosis r/t this medication request: Panic disorder without agoraphobia    Chart review shows that patient is due for a refill of requested Rx. However, patient is being seen by PCP per chart review for medication management on 17. Would only need a limited supply. PCP is out of the office until 17, will route request to PCP's teamlet for their consideration/approval.     Unable to complete prescription refill per RN Medication Refill Policy.................... Lyle Patterson RN ....................  2017   4:27 PM

## 2018-01-04 NOTE — TELEPHONE ENCOUNTER
Patient Information     Patient Name MRN Kym Smith [id number] Female 1970      Telephone Encounter by Nichelle Chaves at 2017  3:49 PM     Author:  Nichelle Chaves Service:  (none) Author Type:  (none)     Filed:  2017  3:59 PM Encounter Date:  2017 Status:  Signed     :  Nichelle Chaves            Patient seen on  and had a UA done. She was under the impression that someone was going to call her with these results. No one call regarding results. She went to  another prescription was notified that there was another prescription. It looks like Darrin Stephen MD sent these results via her MyChart. Patient was informed of this and stated that she doesn't have Mychart. Patient Mychart has been deactivate.  She was notified of the results and that she needed to  that antibiotics to treat it.   Nichelle Chaves LPN ..........2017 3:59 PM

## 2018-01-10 ENCOUNTER — HISTORY (OUTPATIENT)
Dept: EMERGENCY MEDICINE | Facility: OTHER | Age: 48
End: 2018-01-10

## 2018-01-11 ENCOUNTER — HISTORY (OUTPATIENT)
Dept: INTENSIVE CARE | Facility: OTHER | Age: 48
End: 2018-01-11

## 2018-01-12 ENCOUNTER — HISTORY (OUTPATIENT)
Dept: MEDSURG UNIT | Facility: OTHER | Age: 48
End: 2018-01-12

## 2018-01-18 ENCOUNTER — HISTORY (OUTPATIENT)
Dept: FAMILY MEDICINE | Facility: OTHER | Age: 48
End: 2018-01-18

## 2018-01-18 ENCOUNTER — OFFICE VISIT - GICH (OUTPATIENT)
Dept: FAMILY MEDICINE | Facility: OTHER | Age: 48
End: 2018-01-18

## 2018-01-18 DIAGNOSIS — Z02.89 ENCOUNTER FOR OTHER ADMINISTRATIVE EXAMINATIONS: ICD-10-CM

## 2018-01-18 DIAGNOSIS — F17.200 NICOTINE DEPENDENCE, UNCOMPLICATED: ICD-10-CM

## 2018-01-18 DIAGNOSIS — M25.572 PAIN IN LEFT ANKLE: ICD-10-CM

## 2018-01-18 DIAGNOSIS — J10.1 INFLUENZA DUE TO OTHER IDENTIFIED INFLUENZA VIRUS WITH OTHER RESPIRATORY MANIFESTATIONS: ICD-10-CM

## 2018-01-18 DIAGNOSIS — G89.4 CHRONIC PAIN SYNDROME: ICD-10-CM

## 2018-01-18 DIAGNOSIS — G90.522 COMPLEX REGIONAL PAIN SYNDROME OF LEFT LOWER EXTREMITY: ICD-10-CM

## 2018-01-18 ASSESSMENT — PATIENT HEALTH QUESTIONNAIRE - PHQ9: SUM OF ALL RESPONSES TO PHQ QUESTIONS 1-9: 10

## 2018-01-26 VITALS
DIASTOLIC BLOOD PRESSURE: 84 MMHG | SYSTOLIC BLOOD PRESSURE: 134 MMHG | DIASTOLIC BLOOD PRESSURE: 88 MMHG | HEART RATE: 56 BPM | SYSTOLIC BLOOD PRESSURE: 126 MMHG | DIASTOLIC BLOOD PRESSURE: 84 MMHG | SYSTOLIC BLOOD PRESSURE: 150 MMHG | HEART RATE: 88 BPM | HEART RATE: 72 BPM

## 2018-01-26 VITALS — DIASTOLIC BLOOD PRESSURE: 84 MMHG | TEMPERATURE: 98.2 F | SYSTOLIC BLOOD PRESSURE: 120 MMHG | HEART RATE: 100 BPM

## 2018-01-26 VITALS — BODY MASS INDEX: 28.99 KG/M2 | RESPIRATION RATE: 22 BRPM | HEART RATE: 100 BPM | WEIGHT: 200.2 LBS | TEMPERATURE: 99.3 F

## 2018-01-26 VITALS — SYSTOLIC BLOOD PRESSURE: 124 MMHG | HEART RATE: 60 BPM | DIASTOLIC BLOOD PRESSURE: 74 MMHG | TEMPERATURE: 98.4 F

## 2018-01-29 ENCOUNTER — DOCUMENTATION ONLY (OUTPATIENT)
Dept: FAMILY MEDICINE | Facility: OTHER | Age: 48
End: 2018-01-29

## 2018-01-29 PROBLEM — G89.4 CHRONIC PAIN DISORDER: Status: ACTIVE | Noted: 2018-01-29

## 2018-01-29 PROBLEM — J10.1 INFLUENZA A: Status: ACTIVE | Noted: 2018-01-11

## 2018-01-29 PROBLEM — E78.5 HYPERLIPIDEMIA: Status: ACTIVE | Noted: 2018-01-29

## 2018-01-29 PROBLEM — M25.579 PAIN IN JOINT, ANKLE AND FOOT: Status: ACTIVE | Noted: 2018-01-29

## 2018-01-29 PROBLEM — Z72.0 TOBACCO ABUSE: Status: ACTIVE | Noted: 2018-01-29

## 2018-01-29 PROBLEM — K21.9 ESOPHAGEAL REFLUX: Status: ACTIVE | Noted: 2018-01-29

## 2018-01-29 PROBLEM — F41.0 PANIC DISORDER: Status: ACTIVE | Noted: 2018-01-29

## 2018-01-29 PROBLEM — G47.00 INSOMNIA: Status: ACTIVE | Noted: 2018-01-29

## 2018-01-29 PROBLEM — Z02.89 PAIN MEDICATION AGREEMENT: Status: ACTIVE | Noted: 2017-04-21

## 2018-01-31 ASSESSMENT — ANXIETY QUESTIONNAIRES: GAD7 TOTAL SCORE: 21

## 2018-01-31 ASSESSMENT — PATIENT HEALTH QUESTIONNAIRE - PHQ9: SUM OF ALL RESPONSES TO PHQ QUESTIONS 1-9: 11

## 2018-02-09 VITALS — HEART RATE: 80 BPM | SYSTOLIC BLOOD PRESSURE: 132 MMHG | TEMPERATURE: 97.6 F | DIASTOLIC BLOOD PRESSURE: 88 MMHG

## 2018-02-10 ASSESSMENT — PATIENT HEALTH QUESTIONNAIRE - PHQ9: SUM OF ALL RESPONSES TO PHQ QUESTIONS 1-9: 10

## 2018-02-13 NOTE — NURSING NOTE
Patient Information     Patient Name MRN Kym Smith [id number] Female 1970      Nursing Note by Petra Swanson at 2018 10:15 AM     Author:  Petra Swanson Service:  (none) Author Type:  (none)     Filed:  2018 10:23 AM Encounter Date:  2018 Status:  Signed     :  Petra Swanson            She is here today for a hospital follow up.  Petra Swanson LPN..................2018   10:21 AM
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
## 2018-03-16 ENCOUNTER — APPOINTMENT (OUTPATIENT)
Dept: CT IMAGING | Facility: OTHER | Age: 48
End: 2018-03-16
Attending: EMERGENCY MEDICINE
Payer: COMMERCIAL

## 2018-03-16 ENCOUNTER — APPOINTMENT (OUTPATIENT)
Dept: GENERAL RADIOLOGY | Facility: OTHER | Age: 48
End: 2018-03-16
Attending: EMERGENCY MEDICINE
Payer: COMMERCIAL

## 2018-03-16 ENCOUNTER — HOSPITAL ENCOUNTER (EMERGENCY)
Facility: OTHER | Age: 48
Discharge: HOME | End: 2018-03-16
Attending: EMERGENCY MEDICINE | Admitting: EMERGENCY MEDICINE
Payer: COMMERCIAL

## 2018-03-16 VITALS
HEART RATE: 93 BPM | SYSTOLIC BLOOD PRESSURE: 138 MMHG | HEIGHT: 68 IN | TEMPERATURE: 97.5 F | WEIGHT: 200 LBS | BODY MASS INDEX: 30.31 KG/M2 | DIASTOLIC BLOOD PRESSURE: 103 MMHG | OXYGEN SATURATION: 94 % | RESPIRATION RATE: 16 BRPM
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
## 2018-03-16 DIAGNOSIS — S46.912A STRAIN OF LEFT SHOULDER, INITIAL ENCOUNTER: ICD-10-CM

## 2018-03-16 DIAGNOSIS — T14.90XA TRAUMA: ICD-10-CM

## 2018-03-16 DIAGNOSIS — S16.1XXA STRAIN OF NECK MUSCLE, INITIAL ENCOUNTER: ICD-10-CM

## 2018-03-16 PROCEDURE — 70450 CT HEAD/BRAIN W/O DYE: CPT

## 2018-03-16 PROCEDURE — 96372 THER/PROPH/DIAG INJ SC/IM: CPT | Performed by: EMERGENCY MEDICINE

## 2018-03-16 PROCEDURE — 72125 CT NECK SPINE W/O DYE: CPT

## 2018-03-16 PROCEDURE — 25000132 ZZH RX MED GY IP 250 OP 250 PS 637: Performed by: EMERGENCY MEDICINE

## 2018-03-16 PROCEDURE — 99284 EMERGENCY DEPT VISIT MOD MDM: CPT | Mod: Z6 | Performed by: EMERGENCY MEDICINE

## 2018-03-16 PROCEDURE — 72100 X-RAY EXAM L-S SPINE 2/3 VWS: CPT

## 2018-03-16 PROCEDURE — 99285 EMERGENCY DEPT VISIT HI MDM: CPT | Mod: 25 | Performed by: EMERGENCY MEDICINE

## 2018-03-16 PROCEDURE — 73030 X-RAY EXAM OF SHOULDER: CPT | Mod: LT

## 2018-03-16 PROCEDURE — 25000128 H RX IP 250 OP 636: Performed by: EMERGENCY MEDICINE

## 2018-03-16 RX ORDER — GABAPENTIN 300 MG/1
CAPSULE ORAL
COMMUNITY
End: 2018-03-16

## 2018-03-16 RX ORDER — DEXTROAMPHETAMINE SACCHARATE, AMPHETAMINE ASPARTATE, DEXTROAMPHETAMINE SULFATE AND AMPHETAMINE SULFATE 7.5; 7.5; 7.5; 7.5 MG/1; MG/1; MG/1; MG/1
30 TABLET ORAL 2 TIMES DAILY
Status: ON HOLD | COMMUNITY
Start: 2017-04-20 | End: 2021-10-22

## 2018-03-16 RX ORDER — ALPRAZOLAM 2 MG
2 TABLET ORAL 3 TIMES DAILY PRN
Status: ON HOLD | COMMUNITY
Start: 2018-01-18 | End: 2021-10-22

## 2018-03-16 RX ORDER — FLUTICASONE PROPIONATE 50 MCG
2 SPRAY, SUSPENSION (ML) NASAL DAILY PRN
COMMUNITY
End: 2023-06-09

## 2018-03-16 RX ORDER — METOPROLOL SUCCINATE 200 MG/1
200 TABLET, EXTENDED RELEASE ORAL
COMMUNITY
Start: 2017-09-13 | End: 2018-05-21

## 2018-03-16 RX ORDER — FENTANYL CITRATE 50 UG/ML
50 INJECTION, SOLUTION INTRAMUSCULAR; INTRAVENOUS ONCE
Status: COMPLETED | OUTPATIENT
Start: 2018-03-16 | End: 2018-03-16

## 2018-03-16 RX ORDER — ALBUTEROL SULFATE 90 UG/1
2 AEROSOL, METERED RESPIRATORY (INHALATION)
COMMUNITY
Start: 2016-07-14 | End: 2018-07-10

## 2018-03-16 RX ORDER — OXYCODONE HYDROCHLORIDE 10 MG/1
10 TABLET ORAL
COMMUNITY
Start: 2018-01-18 | End: 2018-04-18

## 2018-03-16 RX ORDER — AMITRIPTYLINE HYDROCHLORIDE 50 MG/1
50 TABLET ORAL
COMMUNITY
Start: 2018-01-18 | End: 2018-09-18

## 2018-03-16 RX ORDER — ACETAMINOPHEN 500 MG
1000 TABLET ORAL ONCE
Status: COMPLETED | OUTPATIENT
Start: 2018-03-16 | End: 2018-03-16

## 2018-03-16 RX ORDER — LORAZEPAM 2 MG/ML
1 INJECTION INTRAMUSCULAR ONCE
Status: COMPLETED | OUTPATIENT
Start: 2018-03-16 | End: 2018-03-16

## 2018-03-16 RX ADMIN — ACETAMINOPHEN 1000 MG: 500 TABLET, FILM COATED ORAL at 15:31

## 2018-03-16 RX ADMIN — LORAZEPAM 1 MG: 2 INJECTION, SOLUTION INTRAMUSCULAR; INTRAVENOUS at 15:31

## 2018-03-16 RX ADMIN — FENTANYL CITRATE 50 MCG: 50 INJECTION, SOLUTION INTRAMUSCULAR; INTRAVENOUS at 17:00

## 2018-03-16 ASSESSMENT — ENCOUNTER SYMPTOMS
CHEST TIGHTNESS: 0
DYSURIA: 0
NECK PAIN: 1
ARTHRALGIAS: 0
SHORTNESS OF BREATH: 0
FEVER: 0
WOUND: 0
AGITATION: 0
NAUSEA: 0
BACK PAIN: 1
CHILLS: 0
HEADACHES: 1
VOMITING: 0
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
## 2018-03-16 NOTE — ED NOTES
Pt home ambulatory with family, states that pain is improving, VSS, pt verbalizes understanding of discharge instructions

## 2018-03-16 NOTE — DISCHARGE INSTRUCTIONS
Muscle Strain in the Extremities  A muscle strain is a stretching and tearing of muscle fibers. This causes pain, especially when you move that muscle. There may also be some swelling and bruising.  Home care    Keep the hurt area raised to reduce pain and swelling. This is especially important during the first 48 hours.    Apply an ice pack over the injured area for 15 to 20 minutes every 3 to 6 hours. You should do this for the first 24 to 48 hours. You can make an ice pack by filling a plastic bag that seals at the top with ice cubes and then wrapping it with a thin towel. Be careful not to injure your skin with the ice treatments. Ice should never be applied directly to skin. Continue the use of ice packs for relief of pain and swelling as needed. After 48 hours, apply heat (warm shower or warm bath) for 15 to 20 minutes several times a day, or alternate ice and heat.    You may use over-the-counter pain medicine to control pain, unless another medicine was prescribed. If you have chronic liver or kidney disease or ever had a stomach ulcer or GI bleeding, talk with your healthcare provider before using these medicines.    For leg strains: If crutches have been recommended, don t put full weight on the hurt leg until you can do so without pain. You can return to sports when you are able to hop and run on the injured leg without pain.  Follow-up care  Follow up with your healthcare provider, or as advised.  When to seek medical advice  Call your healthcare provider right away if any of these occur:    The toes of the injured leg become swollen, cold, blue, numb, or tingly    Pain or swelling increases  Date Last Reviewed: 11/19/2015 2000-2017 [employer]. [address] [place] [address], [place] [address]. All rights reserved. This information is not intended as a substitute for professional medical care. Always follow your healthcare professional's instructions.          Understanding Cervical  Strain    There are 7 bones (vertebrae) in the neck that are part of the spine. These are called the cervical spine. Cervical strain is a medical term for neck pain. The neck has several layers of muscles. These are connected with tendons to the cervical spine and other bones. Neck pain is often the result of injury to these muscles and tendons.  Causes of cervical strain  Different types of stress on the neck can damage muscles and tendons (soft tissues) and cause cervical strain. Cervical tissues can be damaged by:    The neck being forced past its normal range of motion, such as in a car accident or sports injury    Constant, low-level stress, such as from poor posture or a poorly set-up workspace  Symptoms of cervical strain  These may include:    Neck pain or stiffness    Pain in the shoulders or upper back    Muscle spasms    Headache, often starting at the base of the neck    Irritability, difficulty concentrating, or sleeplessness  Treatment for cervical strain  This problem often gets better on its own. Treatments aim to reduce pain and inflammation and increase the range of motion of the neck. Possible treatments include:    Over-the-counter or prescription pain medicine. These help relieve pain and inflammation.    Stretching exercises to decrease neck stiffness.    Massage to decrease neck stiffness.    Cold or heat pack. These help reduce pain and swelling.  Call 911  Call emergency services right away if you have any of these:    Face drooping or numbness    Numbness or weakness, especially in the arms or on one side    Slurred speech or difficulty speaking    Blurred vision   When to call your healthcare provider  Call your healthcare provider right away if you have any of these:    Fever of 100.4 F (38 C) or higher, or as directed    Pain or stiffness that gets worse    Symptoms that don t get better, or get worse    Numbness, tingling, weakness or shooting pains into the arms or legs    New  symptoms  Date Last Reviewed: 3/10/2016    [id number] [employer]. [address] [place] [address], [place] [address]. All rights reserved. This information is not intended as a substitute for professional medical care. Always follow your healthcare professional's instructions.
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
## 2018-03-16 NOTE — ED AVS SNAPSHOT
[facility]    [address]    [place] [address]    Phone:  [phone]    Fax:  [phone]                                       Kym Perea   MRN: [id number]    Department:  [facility]   Date of Visit:  3/16/2018           After Visit Summary Signature Page     I have received my discharge instructions, and my questions have been answered. I have discussed any challenges I see with this plan with the nurse or doctor.    ..........................................................................................................................................  Patient/Patient Representative Signature      ..........................................................................................................................................  Patient Representative Print Name and Relationship to Patient    ..................................................               ................................................  Date                                            Time    ..........................................................................................................................................  Reviewed by Signature/Title    ...................................................              ..............................................  Date                                                            Time

## 2018-03-19 ENCOUNTER — HEALTH MAINTENANCE LETTER (OUTPATIENT)
Age: 48
End: 2018-03-19

## 2018-04-18 ENCOUNTER — TELEPHONE (OUTPATIENT)
Dept: FAMILY MEDICINE | Facility: OTHER | Age: 48
End: 2018-04-18

## 2018-04-18 ENCOUNTER — OFFICE VISIT (OUTPATIENT)
Dept: FAMILY MEDICINE | Facility: OTHER | Age: 48
End: 2018-04-18
Attending: FAMILY MEDICINE
Payer: MEDICARE

## 2018-04-18 VITALS — HEART RATE: 72 BPM | SYSTOLIC BLOOD PRESSURE: 124 MMHG | DIASTOLIC BLOOD PRESSURE: 84 MMHG

## 2018-04-18 DIAGNOSIS — M25.572 PAIN OF JOINT OF LEFT ANKLE AND FOOT: Primary | ICD-10-CM

## 2018-04-18 DIAGNOSIS — G89.4 CHRONIC PAIN DISORDER: ICD-10-CM

## 2018-04-18 DIAGNOSIS — Z02.89 PAIN MEDICATION AGREEMENT: ICD-10-CM

## 2018-04-18 DIAGNOSIS — G90.522 COMPLEX REGIONAL PAIN SYNDROME TYPE 1 AFFECTING LEFT LOWER LEG: ICD-10-CM

## 2018-04-18 DIAGNOSIS — Z72.0 TOBACCO ABUSE: ICD-10-CM

## 2018-04-18 PROCEDURE — G0463 HOSPITAL OUTPT CLINIC VISIT: HCPCS

## 2018-04-18 PROCEDURE — 99213 OFFICE O/P EST LOW 20 MIN: CPT | Performed by: FAMILY MEDICINE

## 2018-04-18 RX ORDER — OXYCODONE HYDROCHLORIDE 10 MG/1
10 TABLET ORAL EVERY 6 HOURS PRN
Qty: 180 TABLET | Refills: 0 | Status: SHIPPED | OUTPATIENT
Start: 2018-04-18 | End: 2018-04-18

## 2018-04-18 RX ORDER — OXYCODONE HYDROCHLORIDE 10 MG/1
10 TABLET ORAL EVERY 6 HOURS PRN
Qty: 180 TABLET | Refills: 0 | Status: SHIPPED | OUTPATIENT
Start: 2018-04-18 | End: 2018-05-21

## 2018-04-18 ASSESSMENT — ANXIETY QUESTIONNAIRES
2. NOT BEING ABLE TO STOP OR CONTROL WORRYING: MORE THAN HALF THE DAYS
6. BECOMING EASILY ANNOYED OR IRRITABLE: NEARLY EVERY DAY
5. BEING SO RESTLESS THAT IT IS HARD TO SIT STILL: SEVERAL DAYS
IF YOU CHECKED OFF ANY PROBLEMS ON THIS QUESTIONNAIRE, HOW DIFFICULT HAVE THESE PROBLEMS MADE IT FOR YOU TO DO YOUR WORK, TAKE CARE OF THINGS AT HOME, OR GET ALONG WITH OTHER PEOPLE: VERY DIFFICULT
GAD7 TOTAL SCORE: 12
1. FEELING NERVOUS, ANXIOUS, OR ON EDGE: MORE THAN HALF THE DAYS
3. WORRYING TOO MUCH ABOUT DIFFERENT THINGS: MORE THAN HALF THE DAYS
7. FEELING AFRAID AS IF SOMETHING AWFUL MIGHT HAPPEN: NOT AT ALL

## 2018-04-18 ASSESSMENT — PAIN SCALES - GENERAL: PAINLEVEL: EXTREME PAIN (8)

## 2018-04-18 ASSESSMENT — PATIENT HEALTH QUESTIONNAIRE - PHQ9: 5. POOR APPETITE OR OVEREATING: MORE THAN HALF THE DAYS

## 2018-04-18 NOTE — PROGRESS NOTES
Nursing Notes:   Petra Swanson LPN  4/18/2018  3:36 PM  Signed  She is here today to get medication refills.  Petra Swanson LPN..................4/18/2018   3:18 PM      SUBJECTIVE:  Kym Perea  is a 48 year old female who comes in today for follow-up and medication renewal.  I last saw her 3 months ago after her hospital follow-up for influenza.    She has chronic pain in her ankle due to reflex sympathetic dystrophy.  She sees Phuong Magallanes for her Adderall and management of her anxiety.  We were working on a taper of benzodiazepines. She is getting certified for medical cannabis. The hope is to go down on benzos and pain medications.     CHRONIC PAIN MANAGEMENT:    DIAGNOSIS:      1. Pain of joint of left ankle and foot    2. Complex regional pain syndrome type 1 affecting left lower leg    3. Pain medication agreement    4. Tobacco abuse    5. Chronic pain disorder        PAIN MEDICATION AGREEMENT: (YES)    DATE SIGNED: 5/16/16, 4/20/17, 4/18/18      NON-MEDICATION MODALITIES MAXIMIZED? (YES) had surgery 2 years ago with Dr. Skelton, but has not seen him for a while.       NEUROPATHIC PAIN: (YES)  ON GABAPENTIN/LYRICA/TCA/SNRI: (YES) dose increased up to QID, but now on 600 mg daily.        NOCICEPTIVE PAIN: (YES)      HISTORY OF CD: (NO)      MENTAL HEALTH DIAGNOSIS: (YES)  DIAGNOSIS:Anxiety  ON BENZODIAZEPINES: (YES)  DISCLOSURE REGARDING RISK OF CONCOMITANT USE WITH OPIOIDS DISCUSSED: (YES)  DATE DISCUSSED: 5/16/16 and 6/2/16 and 8/30/16, 1/12/17      MEDICATION: Oxycodone 10 mg 4-6 tab per day, #180/month.       ORAL MORPHINE EQUIVALENTS: 90 OME/day      LAST TAPER TRIAL: just completed 5/16/16.  Off Morphine and then on Norco at much lower dose, gradual increase to now at 90 OME oxycodone per day max.        QUERY TODAY: (YES)  APPROPRIATE?:         (YES) the  is incorrect as it notes that I am the prescriber of her benzodiazepine's and I am not. They are prescribed by Phuong Magallanes NP.  She is working to taper them.       TOXASSURE TODAY: (NO)  IF NO, DATE OF LAST TOXASURE: 6/2/16 and appropriate. Lorazepam is not listed on our med list but she has been on this as prescribed by Phuong Magallanes for breakthrough anxiety. Last test 1/12/17, 4/20/17, 7/30/17( test was also positive for ETG in addition to amphetamines and benzodiazepines, along with oxycodone). 1/10/18 no opiates but appropriate for other meds.        PAIN SCORE FLOWSHEET REVIEWED: (YES)  STABLE OR IMPROVED: (YES)      AUDIT-10 QUESTIONNAIRE COMPLETED: (NO)      OSWESTRY PAIN DISABILITY INDEX: (NO)  No flowsheet data found.    Past Medical, Family, and Social History reviewed and updated as noted below.   ROS is negative except as noted above       Allergies   Allergen Reactions     Arthrotec GI Disturbance     Indomethacin      Other reaction(s): Dizziness     Meloxicam GI Disturbance   ,   Family History   Problem Relation Age of Onset     CANCER Father      Cancer,Bladder     Other - See Comments Father      Mild hypercholesterolemia     Other - See Comments Mother      scleroderma/lupus/Parkinson's syndrome     Family History Negative Brother      Good Health     Colon Cancer Maternal Grandmother 40     Cancer-colon,Followed by lung  with metastasis to the bone di*   ,   Current Outpatient Prescriptions   Medication     albuterol (PROAIR HFA/PROVENTIL HFA/VENTOLIN HFA) 108 (90 BASE) MCG/ACT Inhaler     ALPRAZolam (XANAX) 2 MG tablet     amitriptyline (ELAVIL) 50 MG tablet     amphetamine-dextroamphetamine (ADDERALL) 30 MG per tablet     fluticasone (FLONASE) 50 MCG/ACT spray     metoprolol succinate (TOPROL-XL) 200 MG 24 hr tablet     oxyCODONE IR (ROXICODONE) 10 MG tablet     VITAMIN D, CHOLECALCIFEROL, PO     No current facility-administered medications for this visit.    ,   Past Medical History:   Diagnosis Date     Acquired absence of other organs (CODE)     9/2/2011     Chronic pain syndrome     No Comments Provided     Complex  regional pain syndrome I     left ankle     Encounter for other administrative examinations     10/2012,Hydrocodone 10/325mg, #240/mo signed 10/3/12, updated 10/1/ 14     Encounter for other administrative examinations     10/1/2014,MS Contin 30 mg q 8 hours #90 per month.  Morphine sulfate IR 15 mg 2-3 tab tid prn #180 per month     Gastro-esophageal reflux disease without esophagitis     No Comments Provided     Injury of left ankle     1997,requiring surgery     Low back pain     No Comments Provided     Major depressive disorder, recurrent severe without psychotic features (H)     No Comments Provided     Migraine without status migrainosus, not intractable     No Comments Provided     Nicotine dependence, uncomplicated     No Comments Provided     Obesity     No Comments Provided     Overweight     11/25/2014     Pain in ankle     Chronic left ankle pain secondary to reflex sympathetic dystrophy.  Patient  has had 4 previous surgeries, the most recent one done by Dr. Noriega in  2007. On Narcotic contract.     Pain in thoracic spine     No Comments Provided     Panic disorder without agoraphobia     Dr Reynoso     Personal history of other diseases of the female genital tract     No Comments Provided     Personal history of other medical treatment (CODE)     G2, P1-0-1-1 with one vaginal delivery.     Supraventricular tachycardia (H)     5/24/2010   ,   Patient Active Problem List    Diagnosis Date Noted     Pain in joint, ankle and foot 01/29/2018     Priority: Medium     Overview:   Chronic left ankle pain secondary to reflex sympathetic dystrophy.  Patient   has had 4 previous surgeries, the most recent one done by Dr. Noriega in   2007. On Narcotic contract.       Chronic pain disorder 01/29/2018     Priority: Medium     Overview:   complex regional pain syndrome left ankle       Esophageal reflux 01/29/2018     Priority: Medium     Hyperlipidemia 01/29/2018     Priority: Medium     Insomnia 01/29/2018      Priority: Medium     Panic disorder 01/29/2018     Priority: Medium     Overview:   Previously patient of Dr. Reynoso since her teens. Patient is attempting slow taper down on her Xanax. She's been on Xanax 2 mg 4 times a day for years       Tobacco abuse 01/29/2018     Priority: Medium     Influenza A 01/11/2018     Priority: Medium     Pain medication agreement 04/21/2017     Priority: Medium     Overview:   4/20/17:  Oxycodone 10 mg #180/mo    Hydrocodone 10/325mg, #240/mo. Stopped and changed to morphine. Agreement signed 10/3/12, updated 10/1/ 14.  Complex regional pain syndrome (aka Reflex Sympathetic Dystrophy)  MS Contin 30 mg q 8 hours #90 per month.  Morphine sulfate IR 15 mg 2-3 tab tid prn #180 per month, decreased to #90/month.   query 4/9/16 as part of chart review. Seems appropriate. 6/2/16 tapered and off Morphine. [place] 5/325 #120/month.  query appropriate. ToxAssure appropriate.    August 2016Consultation with [facility]. Recommended spinal cord stimulator trial, but she is reluctant to pursue that. Pain clinic stated that continuing opioids was OK.       Complex regional pain syndrome type 1 affecting left lower leg 04/15/2016     Priority: Medium     Overview:   Patient with a history of complex regional pain syndrome has seen Dr. Dino Skelton4/23/16        Migraine headache 11/25/2014     Priority: Medium     Overweight (BMI 25.0-29.9) 11/25/2014     Priority: Medium     Lumbago 02/06/2012     Priority: Medium     Major depressive disorder, recurrent episode, severe (H) 07/13/2011     Priority: Medium     Paroxysmal supraventricular tachycardia (H) 05/24/2010     Priority: Medium   ,   Past Surgical History:   Procedure Laterality Date     ANKLE SURGERY      1997, 2000,Left ankle surgery x 2     HYSTERECTOMY TOTAL ABDOMINAL      11/1999,secondary to endometriosis, no malignancy; patient reports no malignancy, but abnormal cells were present *     LAPAROSCOPIC  CHOLECYSTECTOMY      No Comments Provided     LAPAROSCOPY DIAGNOSTIC (GENERAL)      1995     OTHER SURGICAL HISTORY      21299,CRANIO/MAXILLOFACIAL SURGERY,Jaw Surgery     OTHER SURGICAL HISTORY      207040,[facility] EMG,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  No other abnormalities noted.    and   Social History   Substance Use Topics     Smoking status: Current Every Day Smoker     Packs/day: 1.00     Years: 30.00     Types: Cigarettes     Start date: 12/2/1986     Smokeless tobacco: Never Used     Alcohol use 0.0 oz/week      Comment: Alcoholic Drinks/day: occasional     OBJECTIVE:  /84 (BP Location: Right arm, Patient Position: Sitting, Cuff Size: Adult Large)  Pulse 72  Breastfeeding? No   EXAM:  Alert cooperative, no distress.  Affect is appropriate.  Exam is unchanged.  ASSESSMENT/Plan :    Kym was seen today for recheck medication.    Diagnoses and all orders for this visit:    Pain of joint of left ankle and foot  -     Discontinue: oxyCODONE IR (ROXICODONE) 10 MG tablet; Take 1 tablet (10 mg) by mouth every 6 hours as needed for moderate to severe pain  -     Discontinue: oxyCODONE IR (ROXICODONE) 10 MG tablet; Take 1 tablet (10 mg) by mouth every 6 hours as needed for moderate to severe pain  -     oxyCODONE IR (ROXICODONE) 10 MG tablet; Take 1 tablet (10 mg) by mouth every 6 hours as needed for moderate to severe pain    Complex regional pain syndrome type 1 affecting left lower leg  -     Discontinue: oxyCODONE IR (ROXICODONE) 10 MG tablet; Take 1 tablet (10 mg) by mouth every 6 hours as needed for moderate to severe pain  -     Discontinue: oxyCODONE IR (ROXICODONE) 10 MG tablet; Take 1 tablet (10 mg) by mouth every 6 hours as needed for moderate to severe pain  -     oxyCODONE IR (ROXICODONE) 10 MG tablet; Take 1 tablet (10 mg) by mouth every 6 hours as needed for moderate to severe pain    Pain medication agreement  -     Discontinue: oxyCODONE IR (ROXICODONE) 10 MG tablet; Take  1 tablet (10 mg) by mouth every 6 hours as needed for moderate to severe pain  -     Discontinue: oxyCODONE IR (ROXICODONE) 10 MG tablet; Take 1 tablet (10 mg) by mouth every 6 hours as needed for moderate to severe pain  -     oxyCODONE IR (ROXICODONE) 10 MG tablet; Take 1 tablet (10 mg) by mouth every 6 hours as needed for moderate to severe pain    Tobacco abuse    Chronic pain disorder  -     Discontinue: oxyCODONE IR (ROXICODONE) 10 MG tablet; Take 1 tablet (10 mg) by mouth every 6 hours as needed for moderate to severe pain  -     Discontinue: oxyCODONE IR (ROXICODONE) 10 MG tablet; Take 1 tablet (10 mg) by mouth every 6 hours as needed for moderate to severe pain  -     oxyCODONE IR (ROXICODONE) 10 MG tablet; Take 1 tablet (10 mg) by mouth every 6 hours as needed for moderate to severe pain       query is appropriate.  Renewed medication agreement today.  Renewal on oxycodone 10 mg #180×3.  She is going to pursue medical cannabis through her psychiatry NP for PTSD.  May be able to ramp down on opiates if she gets good pain relief as a side benefit.    Encouraged to quit smoking    A total of 15 minutes was spent with the patient, greater than 50% of the time was spent in counseling/discussion of the aforementioned concerns.     Darrin Stephen MD

## 2018-04-18 NOTE — NURSING NOTE
She is here today to get medication refills.  Petra Swanson LPN..................4/18/2018   3:18 PM

## 2018-04-18 NOTE — MR AVS SNAPSHOT
"              After Visit Summary   2018    Kym Perea    MRN: [id number]           Patient Information     Date Of Birth          1970        Visit Information        Provider Department      2018 3:00 PM Darrin Stephen MD [facility]        Today's Diagnoses     Pain of joint of left ankle and foot    -  1    Complex regional pain syndrome type 1 affecting left lower leg        Pain medication agreement        Tobacco abuse        Chronic pain disorder           Follow-ups after your visit        Who to contact     If you have questions or need follow up information about today's clinic visit or your schedule please contact [facility] directly at [phone].  Normal or non-critical lab and imaging results will be communicated to you by AxisRoomshart, letter or phone within 4 business days after the clinic has received the results. If you do not hear from us within 7 days, please contact the clinic through AxisRoomshart or phone. If you have a critical or abnormal lab result, we will notify you by phone as soon as possible.  Submit refill requests through [employer]jet or call your pharmacy and they will forward the refill request to us. Please allow 3 business days for your refill to be completed.          Additional Information About Your Visit        MyChart Information     G[employer]jet lets you send messages to your doctor, view your test results, renew your prescriptions, schedule appointments and more. To sign up, go to [email or web address] . Click on \"Log in\" on the left side of the screen, which will take you to the Welcome page. Then click on \"Sign up Now\" on the right side of the page.     You will be asked to enter the access code listed below, as well as some personal information. Please follow the directions to create your username and password.     Your access code is: [email or web address]  Expires: 2018  5:44 PM     Your access code will  in 90 " days. If you need help or a new code, please call your [place] clinic or [phone].        Care EveryWhere ID     This is your Care EveryWhere ID. This could be used by other organizations to access your [place] medical records  [id number]        Your Vitals Were     Pulse Breastfeeding?                72 No           Blood Pressure from Last 3 Encounters:   04/18/18 124/84   03/16/18 (!) 138/103   01/18/18 132/88    Weight from Last 3 Encounters:   03/16/18 200 lb (90.7 kg)   11/13/17 200 lb 3.2 oz (90.8 kg)   12/02/16 210 lb (95.3 kg)              Today, you had the following     No orders found for display         Today's Medication Changes          These changes are accurate as of 4/18/18  4:18 PM.  If you have any questions, ask your nurse or doctor.               Start taking these medicines.        Dose/Directions    oxyCODONE IR 10 MG tablet   Commonly known as:  ROXICODONE   Used for:  Pain of joint of left ankle and foot, Complex regional pain syndrome type 1 affecting left lower leg, Pain medication agreement, Chronic pain disorder   Started by:  Darrin Stephen MD        Dose:  10 mg   Take 1 tablet (10 mg) by mouth every 6 hours as needed for moderate to severe pain   Quantity:  180 tablet   Refills:  0            Where to get your medicines      Some of these will need a paper prescription and others can be bought over the counter.  Ask your nurse if you have questions.     Bring a paper prescription for each of these medications     oxyCODONE IR 10 MG tablet               Information about OPIOIDS     PRESCRIPTION OPIOIDS: WHAT YOU NEED TO KNOW   You have a prescription for an opioid (narcotic) pain medicine. Opioids can cause addiction. If you have a history of chemical dependency of any type, you are at a higher risk of becoming addicted to opioids. Only take this medicine after all other options have been tried. Take it for as short a time and as few doses as possible.     Do  not:    Drive. If you drive while taking these medicines, you could be arrested for driving under the influence (DUI).    Operate heavy machinery    Do any other dangerous activities while taking these medicines.     Drink any alcohol while taking these medicines.      Take with any other medicines that contain acetaminophen. Read all labels carefully. Look for the word  acetaminophen  or  Tylenol.  Ask your pharmacist if you have questions or are unsure.    Store your pills in a secure place, locked if possible. We will not replace any lost or stolen medicine. If you don t finish your medicine, please throw away (dispose) as directed by your pharmacist. The [place] Pollution Control Agency has more information about safe disposal: https://www.pca.[facility].[place].us/living-green/managing-unwanted-medications    All opioids tend to cause constipation. Drink plenty of water and eat foods that have a lot of fiber, such as fruits, vegetables, prune juice, apple juice and high-fiber cereal. Take a laxative (Miralax, milk of magnesia, Colace, Senna) if you don t move your bowels at least every other day.          Primary Care Provider Office Phone # Fax #    Darrin Stephen [phone] [address] [facility] [address]        Equal Access to Services     LANDON THOMASON : Hadii davida Nixon, waaxda judy, qaybta kaalmada batsheva, jeff rodgers. So [facility] [phone].    ATENCIÓN: Si habla español, tiene a ahmadi disposición servicios gratuitos de asistencia lingüística. Llame al [phone].    We comply with applicable federal civil rights laws and [place] laws. We do not discriminate on the basis of race, color, national origin, age, disability, sex, sexual orientation, or gender identity.            Thank you!     Thank you for choosing [facility] AND [facility]  for your care. Our goal is always to provide you with excellent care. Hearing back  from our patients is one way we can continue to improve our services. Please take a few minutes to complete the written survey that you may receive in the mail after your visit with us. Thank you!             Your Updated Medication List - Protect others around you: Learn how to safely use, store and throw away your medicines at www.disposemymeds.org.          This list is accurate as of 4/18/18  4:18 PM.  Always use your most recent med list.                   Brand Name Dispense Instructions for use Diagnosis    albuterol 108 (90 Base) MCG/ACT Inhaler    PROAIR HFA/PROVENTIL HFA/VENTOLIN HFA     Inhale 2 puffs into the lungs        ALPRAZolam 2 MG tablet    XANAX     Take 1 tablet 3 times daily prn        amitriptyline 50 MG tablet    ELAVIL     Take 50 mg by mouth        amphetamine-dextroamphetamine 30 MG per tablet    ADDERALL     Take 30 mg by mouth        fluticasone 50 MCG/ACT spray    FLONASE     2 sprays        metoprolol succinate 200 MG 24 hr tablet    TOPROL-XL     Take 200 mg by mouth        oxyCODONE IR 10 MG tablet    ROXICODONE    180 tablet    Take 1 tablet (10 mg) by mouth every 6 hours as needed for moderate to severe pain    Pain of joint of left ankle and foot, Complex regional pain syndrome type 1 affecting left lower leg, Pain medication agreement, Chronic pain disorder       VITAMIN D (CHOLECALCIFEROL) PO      Take 1,000 Units by mouth daily

## 2018-04-19 ASSESSMENT — PATIENT HEALTH QUESTIONNAIRE - PHQ9: SUM OF ALL RESPONSES TO PHQ QUESTIONS 1-9: 12

## 2018-04-19 ASSESSMENT — ANXIETY QUESTIONNAIRES: GAD7 TOTAL SCORE: 12

## 2018-05-16 PROCEDURE — 99284 EMERGENCY DEPT VISIT MOD MDM: CPT | Mod: 25 | Performed by: EMERGENCY MEDICINE

## 2018-05-16 PROCEDURE — 96372 THER/PROPH/DIAG INJ SC/IM: CPT | Performed by: EMERGENCY MEDICINE

## 2018-05-16 PROCEDURE — 99283 EMERGENCY DEPT VISIT LOW MDM: CPT | Mod: Z6 | Performed by: EMERGENCY MEDICINE

## 2018-05-16 RX ORDER — GABAPENTIN 300 MG/1
CAPSULE ORAL
Qty: 4236 CAPSULE | Refills: 0 | OUTPATIENT
Start: 2018-05-16

## 2018-05-16 NOTE — TELEPHONE ENCOUNTER
Chart review shows that Rx for gabapentin was discontinued in patient's chart on 3/16/18 by JUAN Zee for no noted reason. Patient was seen in the ED on that date, and subsequently was seen by PCP on 4/18/18. No mention of Gabapentin in office visit notes on that date (4/18/18). Call placed to patient to discuss. Patient reports she is no longer taking Gabapentin. States she never requested a refill from pharmacy. Patient would like this writer to refuse refill request at this time as well as requests writer contact pharmacy to have Rx removed from her active med list.     Writer completed all of the above as per patient's request. Will close this encounter at this time.    Unable to complete prescription refill per RN Medication Refill Policy. Lyle Patterson 5/16/2018 2:49 PM

## 2018-05-17 ENCOUNTER — HOSPITAL ENCOUNTER (EMERGENCY)
Facility: OTHER | Age: 48
Discharge: HOME | End: 2018-05-17
Attending: EMERGENCY MEDICINE | Admitting: EMERGENCY MEDICINE
Payer: COMMERCIAL

## 2018-05-17 ENCOUNTER — TELEPHONE (OUTPATIENT)
Dept: FAMILY MEDICINE | Facility: OTHER | Age: 48
End: 2018-05-17

## 2018-05-17 VITALS
HEART RATE: 86 BPM | TEMPERATURE: 97.6 F | WEIGHT: 166 LBS | RESPIRATION RATE: 18 BRPM | BODY MASS INDEX: 24.59 KG/M2 | SYSTOLIC BLOOD PRESSURE: 124 MMHG | HEIGHT: 69 IN | OXYGEN SATURATION: 95 % | DIASTOLIC BLOOD PRESSURE: 89 MMHG

## 2018-05-17 DIAGNOSIS — M54.42 CHRONIC LOW BACK PAIN WITH BILATERAL SCIATICA, UNSPECIFIED BACK PAIN LATERALITY: ICD-10-CM

## 2018-05-17 DIAGNOSIS — G89.29 CHRONIC LOW BACK PAIN WITH BILATERAL SCIATICA, UNSPECIFIED BACK PAIN LATERALITY: ICD-10-CM

## 2018-05-17 DIAGNOSIS — M54.41 CHRONIC LOW BACK PAIN WITH BILATERAL SCIATICA, UNSPECIFIED BACK PAIN LATERALITY: ICD-10-CM

## 2018-05-17 PROCEDURE — 25000128 H RX IP 250 OP 636: Performed by: EMERGENCY MEDICINE

## 2018-05-17 RX ORDER — KETOROLAC TROMETHAMINE 30 MG/ML
30 INJECTION, SOLUTION INTRAMUSCULAR; INTRAVENOUS ONCE
Status: COMPLETED | OUTPATIENT
Start: 2018-05-17 | End: 2018-05-17

## 2018-05-17 RX ADMIN — KETOROLAC TROMETHAMINE 30 MG: 30 INJECTION, SOLUTION INTRAMUSCULAR; INTRAVENOUS at 00:27

## 2018-05-17 ASSESSMENT — ENCOUNTER SYMPTOMS
FEVER: 0
DIZZINESS: 0
LIGHT-HEADEDNESS: 0
CHILLS: 0
HEADACHES: 0
BACK PAIN: 1
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
## 2018-05-17 NOTE — ED AVS SNAPSHOT
[facility]    [address]    [place] [address]    Phone:  [phone]    Fax:  [phone]                                       Kym Perea   MRN: [id number]    Department:  [facility]   Date of Visit:  5/16/2018           After Visit Summary Signature Page     I have received my discharge instructions, and my questions have been answered. I have discussed any challenges I see with this plan with the nurse or doctor.    ..........................................................................................................................................  Patient/Patient Representative Signature      ..........................................................................................................................................  Patient Representative Print Name and Relationship to Patient    ..................................................               ................................................  Date                                            Time    ..........................................................................................................................................  Reviewed by Signature/Title    ...................................................              ..............................................  Date                                                            Time

## 2018-05-17 NOTE — ED NOTES
COLUMBIA-SUICIDE SEVERITY RATING SCALE   Screen with Triage Points for Emergency Department      Ask questions that are bolded and underlined.   Past  month   Ask Questions 1 and 2 YES NO   1)  Have you wished you were dead or wished you could go to sleep and not wake up?   no   2)  Have you actually had any thoughts of killing yourself?   no   If YES to 2, ask questions 3, 4, 5, and 6.  If NO to 2, go directly to question 6.   3)  Have you been thinking about how you might do this?   E.g.  I thought about taking an overdose but I never made a specific plan as to when where or how I would actually do it .and I would never go through with it.       4)  Have you had these thoughts and had some intention of acting on them?   As opposed to  I have the thoughts but I definitely will not do anything about them.       5)  Have you started to work out or worked out the details of how to kill yourself? Do you intend to carry out this plan?      6)  Have you ever done anything, started to do anything, or prepared to do anything to end your life?  Examples: Collected pills, obtained a gun, gave away valuables, wrote a will or suicide note, took out pills but didn t swallow any, held a gun but changed your mind or it was grabbed from your hand, went to the roof but didn t jump; or actually took pills, tried to shoot yourself, cut yourself, tried to hang yourself, etc.    If YES, ask: Was this within the past three months?  Lifetime     no    Past 3 Months        Item 1:  Behavioral Health Referral at Discharge  Item 2:  Behavioral Health Referral at Discharge   Item 3:  Behavioral Health Consult (Psychiatric Nurse/) and consider Patient Safety Precautions  Item 4:  Immediate Notification of Physician and/or Behavioral Health and Patient Safety Precautions   Item 5:  Immediate Notification of Physician and/or Behavioral Health and Patient Safety Precautions  Item 6:  Over 3 months ago: Behavioral Health Consult  (Psychiatric Nurse/) and consider Patient Safety Precautions  OR  Item 6:  3 months ago or less: Immediate Notification of Physician and/or Behavioral Health and Patient Safety Precautions

## 2018-05-17 NOTE — ED AVS SNAPSHOT
[facility]    [address]    [place] [address]    Phone:  [phone]    Fax:  [phone]                                       Kym Perea   MRN: [id number]    Department:  [facility]   Date of Visit:  5/16/2018           Patient Information     Date Of Birth          1970        Your diagnoses for this visit were:     Chronic low back pain with bilateral sciatica, unspecified back pain laterality        You were seen by Samir Lan MD.        Discharge Instructions       1.  If he having worsening lower back pain, fever or chills, worsening lower extremity weakness or groin sensory loss or incontinence or urinary retention return to ER right away  2.  Otherwise consult with your primary care physician in 1-2 days for further recommendation    24 Hour Appointment Hotline       To make an appointment at any [facility], call [phone] ([phone]). If you don't have a family doctor or clinic, we will help you find one. [facility] are conveniently located to serve the needs of you and your family.             Review of your medicines      Our records show that you are taking the medicines listed below. If these are incorrect, please call your family doctor or clinic.        Dose / Directions Last dose taken    albuterol 108 (90 Base) MCG/ACT Inhaler   Commonly known as:  PROAIR HFA/PROVENTIL HFA/VENTOLIN HFA   Dose:  2 puff        Inhale 2 puffs into the lungs   Refills:  0        ALPRAZolam 2 MG tablet   Commonly known as:  XANAX        Take 1 tablet 3 times daily prn   Refills:  0        amitriptyline 50 MG tablet   Commonly known as:  ELAVIL   Dose:  50 mg        Take 50 mg by mouth   Refills:  0        amphetamine-dextroamphetamine 30 MG per tablet   Commonly known as:  ADDERALL   Dose:  30 mg        Take 30 mg by mouth   Refills:  0        fluticasone 50 MCG/ACT spray   Commonly known as:  FLONASE   Dose:  2  "spray        2 sprays   Refills:  0        metoprolol succinate 200 MG 24 hr tablet   Commonly known as:  TOPROL-XL   Dose:  200 mg        Take 200 mg by mouth   Refills:  0        oxyCODONE IR 10 MG tablet   Commonly known as:  ROXICODONE   Dose:  10 mg   Quantity:  180 tablet        Take 1 tablet (10 mg) by mouth every 6 hours as needed for moderate to severe pain   Refills:  0        VITAMIN D (CHOLECALCIFEROL) PO   Dose:  1000 Units        Take 1,000 Units by mouth daily   Refills:  0                Orders Needing Specimen Collection     None      Pending Results     No orders found from 5/15/2018 to 5/18/2018.            Pending Culture Results     No orders found from 5/15/2018 to 5/18/2018.            Pending Results Instructions     If you had any lab results that were not finalized at the time of your Discharge, you can call the ED Lab Result RN at [phone]. You will be contacted by this team for any positive Lab results or changes in treatment. The nurses are available 7 days a week from 10A to 6:30P.  You can leave a message 24 hours per day and they will return your call.        Thank you for choosing [facility]       Thank you for choosing [facility] for your care. Our goal is always to provide you with excellent care. Hearing back from our patients is one way we can continue to improve our services. Please take a few minutes to complete the written survey that you may receive in the mail after you visit with us. Thank you!        [employer] Information     [employer] lets you send messages to your doctor, view your test results, renew your prescriptions, schedule appointments and more. To sign up, go to [email or web address] . Click on \"Log in\" on the left side of the screen, which will take you to the Welcome page. Then click on \"Sign up Now\" on the right side of the page.     You will be asked to enter the access code listed below, as well as some personal information. Please follow the directions to " create your username and password.     Your access code is: [email or web address]  Expires: 2018  5:44 PM     Your access code will  in 90 days. If you need help or a new code, please call your [facility] clinic or [phone].        Care EveryWhere ID     This is your Care EveryWhere ID. This could be used by other organizations to access your [facility] medical records  [id number]        Equal Access to Services     [facility] CenterIVANNA : Hadii davida taylor hadasho Soomaali, waaxda luqadaha, qaybta kaalmada adeegyada, jeff del toro . So [facility] [phone].    ATENCIÓN: Si habla español, tiene a ahmadi disposición servicios gratuitos de asistencia lingüística. Llame al [phone].    We comply with applicable federal civil rights laws and [place] laws. We do not discriminate on the basis of race, color, national origin, age, disability, sex, sexual orientation, or gender identity.            After Visit Summary       This is your record. Keep this with you and show to your community pharmacist(s) and doctor(s) at your next visit.

## 2018-05-17 NOTE — TELEPHONE ENCOUNTER
The patient was told that Darrin Stephen MD would not be able to see her next week. She was offered other providers and she stated she would work something out.  Petra Swanson LPN..................5/17/2018   2:55 PM
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
## 2018-05-17 NOTE — DISCHARGE INSTRUCTIONS
1.  If he having worsening lower back pain, fever or chills, worsening lower extremity weakness or groin sensory loss or incontinence or urinary retention return to ER right away  2.  Otherwise consult with your primary care physician in 1-2 days for further recommendation

## 2018-05-18 ENCOUNTER — TELEPHONE (OUTPATIENT)
Dept: FAMILY MEDICINE | Facility: OTHER | Age: 48
End: 2018-05-18

## 2018-05-18 NOTE — TELEPHONE ENCOUNTER
Writer returned call to patient. Patient is questioning the amount of oxycodone she can utilize in a month as per her contract. States that she used to be able to take 180 oxycodone 10 mg in a month, with a maximum of 6 per day. However, without patient knowing as per her report, the sig on her Rx for oxycodone was changed and so she could only have 4 tabs a day per current order. Writer reviewed last two office visits with PCP. Notes previous order for oxycodone on 1/18/18 as follows:    -     oxyCODONE 10 mg tablet; Take 1 tablet by mouth every 4 hours if needed  for Pain Maximum 6 tab per day.    Current order for oxycodone on 4/18/18 changed to read as follows:    -     oxyCODONE IR (ROXICODONE) 10 MG tablet; Take 1 tablet (10 mg) by mouth every 6 hours as needed for moderate to severe pain    No indication as to why Rx was changed in office visit notes on 4/18/18, and now patient is out of Rx as requested, PCP is out of the office, as well as clinic closes in 30 minutes.     Patient is in need of a new Rx. Writer advised patient that he would route this encounter to the doc of the day, as well as speak to the doc of the day to see what he could do. Patient happy with plan of care. Would like a call back with a plan.    Lyle Patterson RN on 5/18/2018 at 4:03 PM

## 2018-05-18 NOTE — TELEPHONE ENCOUNTER
Writer spoke to Dr. Pratt who reviewed patient's chart as well as this writer's documentation. Dr. Pratt is not able to fill patient's oxycodone at this time. Request will have to have to wait until next week, and patient should be seen by her PCP. Dr. Pratt states that if patient needs pain management or goes into withdrawals, she will need to be seen in the ED in the interim. As writer was communicating this information to patient, Dr. Pratt states she will give an approval for pharmacy to fill current rx early if they will do it. Patient requests that this writer try that route. Call placed to [facility] in [employer]. Spoke to adria sy. Pharmacist states it is too early to fill Rx even with an approval from a provider without a new script. He is unable to process Rx in their system. Writer again placed call to patient. Alerted her as such. Patient was advised to see a provider on Monday. PCP with an opening at 1015. Writer gave approval for patient to see PCP at 1015 on Monday 5/21/18.    Patient happy with plan of care. Scheduling staff scheduled appointment as noted above. Patient will present to the ED over the weekend as needed. Writer will close this encounter at this time.    Lyle Patterson RN on 5/18/2018 at 4:28 PM

## 2018-05-18 NOTE — TELEPHONE ENCOUNTER
Patient called and has some questions in regards to her pain contract, she is wanting to speak with someone.     Kennedi Brizuela on 5/18/2018 at 3:45 PM

## 2018-05-21 ENCOUNTER — TELEPHONE (OUTPATIENT)
Dept: FAMILY MEDICINE | Facility: OTHER | Age: 48
End: 2018-05-21

## 2018-05-21 ENCOUNTER — OFFICE VISIT (OUTPATIENT)
Dept: FAMILY MEDICINE | Facility: OTHER | Age: 48
End: 2018-05-21
Attending: FAMILY MEDICINE
Payer: MEDICARE

## 2018-05-21 VITALS
WEIGHT: 188.6 LBS | SYSTOLIC BLOOD PRESSURE: 134 MMHG | BODY MASS INDEX: 27.85 KG/M2 | DIASTOLIC BLOOD PRESSURE: 102 MMHG | HEART RATE: 100 BPM

## 2018-05-21 DIAGNOSIS — G90.522 COMPLEX REGIONAL PAIN SYNDROME TYPE 1 AFFECTING LEFT LOWER LEG: Primary | ICD-10-CM

## 2018-05-21 DIAGNOSIS — Z02.89 PAIN MEDICATION AGREEMENT: ICD-10-CM

## 2018-05-21 DIAGNOSIS — M54.16 LUMBAR RADICULOPATHY: ICD-10-CM

## 2018-05-21 DIAGNOSIS — M25.572 PAIN OF JOINT OF LEFT ANKLE AND FOOT: ICD-10-CM

## 2018-05-21 DIAGNOSIS — G89.4 CHRONIC PAIN DISORDER: ICD-10-CM

## 2018-05-21 DIAGNOSIS — I47.10 PAROXYSMAL SUPRAVENTRICULAR TACHYCARDIA (H): ICD-10-CM

## 2018-05-21 PROCEDURE — G0463 HOSPITAL OUTPT CLINIC VISIT: HCPCS

## 2018-05-21 PROCEDURE — 99214 OFFICE O/P EST MOD 30 MIN: CPT | Performed by: FAMILY MEDICINE

## 2018-05-21 RX ORDER — OXYCODONE HYDROCHLORIDE 10 MG/1
10 TABLET ORAL EVERY 4 HOURS PRN
Qty: 180 TABLET | Refills: 0 | Status: SHIPPED | OUTPATIENT
Start: 2018-05-21 | End: 2018-05-21

## 2018-05-21 RX ORDER — ATENOLOL 100 MG/1
100 TABLET ORAL DAILY
Qty: 90 TABLET | Refills: 3 | Status: SHIPPED | OUTPATIENT
Start: 2018-05-21 | End: 2019-04-03

## 2018-05-21 RX ORDER — OXYCODONE HYDROCHLORIDE 10 MG/1
10 TABLET ORAL EVERY 4 HOURS PRN
Qty: 180 TABLET | Refills: 0 | Status: SHIPPED | OUTPATIENT
Start: 2018-05-21 | End: 2018-09-18

## 2018-05-21 ASSESSMENT — ANXIETY QUESTIONNAIRES
GAD7 TOTAL SCORE: 19
6. BECOMING EASILY ANNOYED OR IRRITABLE: NEARLY EVERY DAY
IF YOU CHECKED OFF ANY PROBLEMS ON THIS QUESTIONNAIRE, HOW DIFFICULT HAVE THESE PROBLEMS MADE IT FOR YOU TO DO YOUR WORK, TAKE CARE OF THINGS AT HOME, OR GET ALONG WITH OTHER PEOPLE: VERY DIFFICULT
3. WORRYING TOO MUCH ABOUT DIFFERENT THINGS: NEARLY EVERY DAY
2. NOT BEING ABLE TO STOP OR CONTROL WORRYING: NEARLY EVERY DAY
7. FEELING AFRAID AS IF SOMETHING AWFUL MIGHT HAPPEN: SEVERAL DAYS
1. FEELING NERVOUS, ANXIOUS, OR ON EDGE: NEARLY EVERY DAY
5. BEING SO RESTLESS THAT IT IS HARD TO SIT STILL: NEARLY EVERY DAY

## 2018-05-21 ASSESSMENT — PATIENT HEALTH QUESTIONNAIRE - PHQ9: 5. POOR APPETITE OR OVEREATING: NEARLY EVERY DAY

## 2018-05-21 ASSESSMENT — PAIN SCALES - GENERAL: PAINLEVEL: EXTREME PAIN (9)
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
## 2018-05-21 NOTE — MR AVS SNAPSHOT
After Visit Summary   5/21/2018    Kym Perea    MRN: [id number]           Patient Information     Date Of Birth          1970        Visit Information        Provider Department      5/21/2018 10:15 AM Darrin Stephen MD [facility]        Today's Diagnoses     Complex regional pain syndrome type 1 affecting left lower leg    -  1    Chronic pain disorder        Pain of joint of left ankle and foot        Pain medication agreement 4/18/18        Pain medication agreement        Lumbar radiculopathy        Paroxysmal supraventricular tachycardia (H)           Follow-ups after your visit        Your next 10 appointments already scheduled     May 23, 2018 12:35 PM CDT   (Arrive by 12:20 PM)   MR LUMBAR SPINE W/O CONTRAST with [address] ([facility])    [address]  [place] [address] [phone]           Take your medicines as usual, unless your doctor tells you not to. Bring a list of your current medicines to your exam (including vitamins, minerals and over-the-counter drugs). Also bring the results of similar scans you may have had.  Please remove any body piercings and hair extensions before you arrive.  Follow your doctor s orders. If you do not, we may have to postpone your exam.  You may or may not receive IV contrast for this exam pending the discretion of the Radiologist.  You do not need to do anything special to prepare.  The MRI machine uses a strong magnet. Please wear clothes without metal (snaps, zippers). A sweatsuit works well, or we may give you a hospital gown.   **IMPORTANT** THE INSTRUCTIONS BELOW ARE ONLY FOR THOSE PATIENTS WHO HAVE BEEN PRESCRIBED SEDATION OR GENERAL ANESTHESIA DURING THEIR MRI PROCEDURE:  IF YOUR DOCTOR PRESCRIBED ORAL SEDATION (take medicine to help you relax during your exam):   You must get the medicine from your doctor (oral medication) before you  arrive. Bring the medicine to the exam. Do not take it at home. You ll be told when to take it upon arriving for your exam.   Arrive one hour early. Bring someone who can take you home after the test. Your medicine will make you sleepy. After the exam, you may not drive, take a bus or take a taxi by yourself.  IF YOUR DOCTOR PRESCRIBED IV SEDATION:   Arrive one hour early. Bring someone who can take you home after the test. Your medicine will make you sleepy. After the exam, you may not drive, take a bus or take a taxi by yourself.   No eating 6 hours before your exam. You may have clear liquids up until 4 hours before your exam. (Clear liquids include water, clear tea, black coffee and fruit juice without pulp.)  IF YOUR DOCTOR PRESCRIBED ANESTHESIA (be asleep for your exam):   Arrive 1 1/2 hours early. Bring someone who can take you home after the test. You may not drive, take a bus or take a taxi by yourself.   No eating 8 hours before your exam. You may have clear liquids up until 4 hours before your exam. (Clear liquids include water, clear tea, black coffee and fruit juice without pulp.)   You will spend four to five hours in the recovery room.  Please call the Imaging Department at your exam site with any questions.            Jun 06, 2018  8:00 AM CDT   Office Visit with Darrin Stephen MD   [facility]    [address]  [place] [address]   [phone]           Bring a current list of meds and any records pertaining to this visit. For Physicals, please bring immunization records and any forms needing to be filled out. Please arrive 10 minutes early to complete paperwork.              Future tests that were ordered for you today     Open Future Orders        Priority Expected Expires Ordered    MR Lumbar Spine w/o Contrast Routine  5/21/2019 5/21/2018            Who to contact     If you have questions or need follow up information  "about today's clinic visit or your schedule please contact [facility] directly at [phone].  Normal or non-critical lab and imaging results will be communicated to you by [employer], letter or phone within 4 business days after the clinic has received the results. If you do not hear from us within 7 days, please contact the clinic through [employer] or phone. If you have a critical or abnormal lab result, we will notify you by phone as soon as possible.  Submit refill requests through [employer] or call your pharmacy and they will forward the refill request to us. Please allow 3 business days for your refill to be completed.          Additional Information About Your Visit        [employer] App Information     [employer] lets you send messages to your doctor, view your test results, renew your prescriptions, schedule appointments and more. To sign up, go to [email or web address] . Click on \"Log in\" on the left side of the screen, which will take you to the Welcome page. Then click on \"Sign up Now\" on the right side of the page.     You will be asked to enter the access code listed below, as well as some personal information. Please follow the directions to create your username and password.     Your access code is: [email or web address]  Expires: 2018  5:44 PM     Your access code will  in 90 days. If you need help or a new code, please call your [facility] clinic or [phone].        Care EveryWhere ID     This is your Care EveryWhere ID. This could be used by other organizations to access your [facility] medical records  [id number]        Your Vitals Were     Pulse Breastfeeding? BMI (Body Mass Index)             100 No 27.85 kg/m2          Blood Pressure from Last 3 Encounters:   18 (!) 134/102   18 124/89   18 124/84    Weight from Last 3 Encounters:   18 188 lb 9.6 oz (85.5 kg)   18 166 lb (75.3 kg)   18 200 lb (90.7 kg)                 Today's Medication Changes "          These changes are accurate as of 5/21/18  6:44 PM.  If you have any questions, ask your nurse or doctor.               Start taking these medicines.        Dose/Directions    atenolol 100 MG tablet   Commonly known as:  TENORMIN   Used for:  Paroxysmal supraventricular tachycardia (H)   Started by:  Darrin Stephen MD        Dose:  100 mg   Take 1 tablet (100 mg) by mouth daily   Quantity:  90 tablet   Refills:  3       oxyCODONE IR 10 MG tablet   Commonly known as:  ROXICODONE   Used for:  Pain of joint of left ankle and foot, Complex regional pain syndrome type 1 affecting left lower leg, Pain medication agreement, Chronic pain disorder   Started by:  Darrin Stephen MD        Dose:  10 mg   Take 1 tablet (10 mg) by mouth every 4 hours as needed for moderate to severe pain Maximum 6 tab per day   Quantity:  180 tablet   Refills:  0         Stop taking these medicines if you haven't already. Please contact your care team if you have questions.     metoprolol succinate 200 MG 24 hr tablet   Commonly known as:  TOPROL-XL   Stopped by:  Darrin Stephen MD                Where to get your medicines      These medications were sent to [facility] [address] [place], [place] - [address]  [address] [facility] [address]     Phone:  [phone]     atenolol 100 MG tablet         Some of these will need a paper prescription and others can be bought over the counter.  Ask your nurse if you have questions.     Bring a paper prescription for each of these medications     oxyCODONE IR 10 MG tablet               Information about OPIOIDS     PRESCRIPTION OPIOIDS: WHAT YOU NEED TO KNOW   You have a prescription for an opioid (narcotic) pain medicine. Opioids can cause addiction. If you have a history of chemical dependency of any type, you are at a higher risk of becoming addicted to opioids. Only take this medicine after all other options have been tried. Take it for as short a time and as  few doses as possible.     Do not:    Drive. If you drive while taking these medicines, you could be arrested for driving under the influence (DUI).    Operate heavy machinery    Do any other dangerous activities while taking these medicines.     Drink any alcohol while taking these medicines.      Take with any other medicines that contain acetaminophen. Read all labels carefully. Look for the word  acetaminophen  or  Tylenol.  Ask your pharmacist if you have questions or are unsure.    Store your pills in a secure place, locked if possible. We will not replace any lost or stolen medicine. If you don t finish your medicine, please throw away (dispose) as directed by your pharmacist. The [place] Pollution Control Agency has more information about safe disposal: https://www.pca.[facility].[place].us/living-green/managing-unwanted-medications    All opioids tend to cause constipation. Drink plenty of water and eat foods that have a lot of fiber, such as fruits, vegetables, prune juice, apple juice and high-fiber cereal. Take a laxative (Miralax, milk of magnesia, Colace, Senna) if you don t move your bowels at least every other day.          Primary Care Provider Office Phone # Fax #    Darrin GRIFFITH MD [facility] [phone]       [address] [facility] [address]        Equal Access to Services     LANDON THOMASON AH: Hadii davida resendizo Sonoreen, waaxda luqadaha, qaybta kaalmada adeекатеринаyada, jeff rodgers. So [facility] [phone].    ATENCIÓN: Si habla español, tiene a ahmadi disposición servicios gratuitos de asistencia lingüística. Llame al [phone].    We comply with applicable federal civil rights laws and [place] laws. We do not discriminate on the basis of race, color, national origin, age, disability, sex, sexual orientation, or gender identity.            Thank you!     Thank you for choosing [facility]  for your care. Our goal is always to provide you  with excellent care. Hearing back from our patients is one way we can continue to improve our services. Please take a few minutes to complete the written survey that you may receive in the mail after your visit with us. Thank you!             Your Updated Medication List - Protect others around you: Learn how to safely use, store and throw away your medicines at www.disposemymeds.org.          This list is accurate as of 5/21/18  6:44 PM.  Always use your most recent med list.                   Brand Name Dispense Instructions for use Diagnosis    albuterol 108 (90 Base) MCG/ACT Inhaler    PROAIR HFA/PROVENTIL HFA/VENTOLIN HFA     Inhale 2 puffs into the lungs        ALPRAZolam 2 MG tablet    XANAX     Take 1 tablet 3 times daily prn        amitriptyline 50 MG tablet    ELAVIL     Take 50 mg by mouth        amphetamine-dextroamphetamine 30 MG per tablet    ADDERALL     Take 30 mg by mouth        atenolol 100 MG tablet    TENORMIN    90 tablet    Take 1 tablet (100 mg) by mouth daily    Paroxysmal supraventricular tachycardia (H)       fluticasone 50 MCG/ACT spray    FLONASE     2 sprays        oxyCODONE IR 10 MG tablet    ROXICODONE    180 tablet    Take 1 tablet (10 mg) by mouth every 4 hours as needed for moderate to severe pain Maximum 6 tab per day    Pain of joint of left ankle and foot, Complex regional pain syndrome type 1 affecting left lower leg, Pain medication agreement, Chronic pain disorder       VITAMIN D (CHOLECALCIFEROL) PO      Take 1,000 Units by mouth daily

## 2018-05-21 NOTE — TELEPHONE ENCOUNTER
Spoke with patient and notified her that she will have to wait for 1st available. Scheduled patient for early June.     Olga Perez LPN...................5/21/2018  2:06 PM

## 2018-05-21 NOTE — NURSING NOTE
She is here today to talk about her Rx's.  Petra Swanson LPN..................5/21/2018   10:32 AM

## 2018-05-22 ASSESSMENT — PATIENT HEALTH QUESTIONNAIRE - PHQ9: SUM OF ALL RESPONSES TO PHQ QUESTIONS 1-9: 13

## 2018-05-22 ASSESSMENT — ANXIETY QUESTIONNAIRES: GAD7 TOTAL SCORE: 19

## 2018-05-23 ENCOUNTER — HOSPITAL ENCOUNTER (OUTPATIENT)
Dept: MRI IMAGING | Facility: OTHER | Age: 48
Discharge: HOME | End: 2018-05-23
Attending: FAMILY MEDICINE | Admitting: FAMILY MEDICINE
Payer: MEDICARE

## 2018-05-23 DIAGNOSIS — M54.16 LUMBAR RADICULOPATHY: ICD-10-CM

## 2018-05-23 PROCEDURE — 72148 MRI LUMBAR SPINE W/O DYE: CPT

## 2018-06-04 RX ORDER — GABAPENTIN 300 MG/1
1200 CAPSULE ORAL 3 TIMES DAILY
Qty: 120 CAPSULE | Refills: 1 | OUTPATIENT
Start: 2018-06-04

## 2018-06-06 ENCOUNTER — TELEPHONE (OUTPATIENT)
Dept: FAMILY MEDICINE | Facility: OTHER | Age: 48
End: 2018-06-06

## 2018-06-06 NOTE — TELEPHONE ENCOUNTER
The patient was told that Darrin Stephen MD would not be able to see her. She would have to see another provider.  Petra Swanson LPN..................6/6/2018   1:42 PM

## 2018-06-08 ENCOUNTER — TELEPHONE (OUTPATIENT)
Dept: FAMILY MEDICINE | Facility: OTHER | Age: 48
End: 2018-06-08

## 2018-06-08 NOTE — TELEPHONE ENCOUNTER
Spoke with patient and also scheduling put in Iliana Salvador MD Schedule on June 12th at 330 pm. Kym Cole LPN......................6/8/2018 3:54 PM

## 2018-06-08 NOTE — TELEPHONE ENCOUNTER
Please call patient regarding work in, for MRI follow up and cough/nasal congestion. She would like to see an MD.

## 2018-06-11 RX ORDER — GABAPENTIN 300 MG/1
CAPSULE ORAL
COMMUNITY
Start: 2018-04-10 | End: 2018-07-10

## 2018-06-11 RX ORDER — METOPROLOL SUCCINATE 200 MG/1
200 TABLET, EXTENDED RELEASE ORAL DAILY
COMMUNITY
Start: 2018-05-08 | End: 2018-12-20

## 2018-06-12 ENCOUNTER — OFFICE VISIT (OUTPATIENT)
Dept: FAMILY MEDICINE | Facility: OTHER | Age: 48
End: 2018-06-12
Attending: FAMILY MEDICINE
Payer: MEDICARE

## 2018-06-12 VITALS
SYSTOLIC BLOOD PRESSURE: 122 MMHG | HEIGHT: 69 IN | BODY MASS INDEX: 27.4 KG/M2 | DIASTOLIC BLOOD PRESSURE: 80 MMHG | HEART RATE: 74 BPM | WEIGHT: 185 LBS

## 2018-06-12 DIAGNOSIS — Z53.9 ERRONEOUS ENCOUNTER--DISREGARD: Primary | ICD-10-CM

## 2018-06-12 ASSESSMENT — ANXIETY QUESTIONNAIRES
3. WORRYING TOO MUCH ABOUT DIFFERENT THINGS: MORE THAN HALF THE DAYS
7. FEELING AFRAID AS IF SOMETHING AWFUL MIGHT HAPPEN: NOT AT ALL
GAD7 TOTAL SCORE: 11
5. BEING SO RESTLESS THAT IT IS HARD TO SIT STILL: SEVERAL DAYS
1. FEELING NERVOUS, ANXIOUS, OR ON EDGE: NEARLY EVERY DAY
2. NOT BEING ABLE TO STOP OR CONTROL WORRYING: MORE THAN HALF THE DAYS
6. BECOMING EASILY ANNOYED OR IRRITABLE: MORE THAN HALF THE DAYS
IF YOU CHECKED OFF ANY PROBLEMS ON THIS QUESTIONNAIRE, HOW DIFFICULT HAVE THESE PROBLEMS MADE IT FOR YOU TO DO YOUR WORK, TAKE CARE OF THINGS AT HOME, OR GET ALONG WITH OTHER PEOPLE: SOMEWHAT DIFFICULT

## 2018-06-12 ASSESSMENT — PATIENT HEALTH QUESTIONNAIRE - PHQ9: 5. POOR APPETITE OR OVEREATING: SEVERAL DAYS

## 2018-06-12 NOTE — NURSING NOTE
Patient is here to follow up for MRI she had done.   Emma Grayson LPN ....................6/12/2018  3:45 PM

## 2018-06-12 NOTE — MR AVS SNAPSHOT
After Visit Summary   6/12/2018    Kym Perea    MRN: [id number]           Patient Information     Date Of Birth          1970        Visit Information        Provider Department      6/12/2018 3:30 PM Iliana Salvador MD [facility]        Today's Diagnoses     ERRONEOUS ENCOUNTER--DISREGARD    -  1       Follow-ups after your visit        Your next 10 appointments already scheduled     Jun 14, 2018  9:30 AM CDT   [facility]    [address]  [place] [address] [phone]           Bring a current list of meds and any records pertaining to this visit. For Physicals, please bring immunization records and any forms needing to be filled out. Please arrive 10 minutes early to complete paperwork.            Jun 20, 2018  9:15 AM CDT   SHORT with Darrin Stephen MD   [facility]    [address]  [place] [id number] [phone]              Who to contact     If you have questions or need follow up information about today's clinic visit or your schedule please contact [facility] directly at [phone].  Normal or non-critical lab and imaging results will be communicated to you by Cadenthart, letter or phone within 4 business days after the clinic has received the results. If you do not hear from us within 7 days, please contact the clinic through Cadenthart or phone. If you have a critical or abnormal lab result, we will notify you by phone as soon as possible.  Submit refill requests through Teros or call your pharmacy and they will forward the refill request to us. Please allow 3 business days for your refill to be completed.          Additional Information About Your Visit        Teros Information     Teros lets you send messages to your doctor,  "view your test results, renew your prescriptions, schedule appointments and more. To sign up, go to [email or web address] . Click on \"Log in\" on the left side of the screen, which will take you to the Welcome page. Then click on \"Sign up Now\" on the right side of the page.     You will be asked to enter the access code listed below, as well as some personal information. Please follow the directions to create your username and password.     Your access code is: [email or web address]  Expires: 2018  5:44 PM     Your access code will  in 90 days. If you need help or a new code, please call your [facility] clinic or [phone].        Care EveryWhere ID     This is your Care EveryWhere ID. This could be used by other organizations to access your [facility] medical records  [id number]        Your Vitals Were     Pulse Height BMI (Body Mass Index)             74 5' 9\" (1.753 m) 27.32 kg/m2          Blood Pressure from Last 3 Encounters:   18 122/80   18 (!) 134/102   18 124/89    Weight from Last 3 Encounters:   18 185 lb (83.9 kg)   18 188 lb 9.6 oz (85.5 kg)   18 166 lb (75.3 kg)              Today, you had the following     No orders found for display       Primary Care Provider Office Phone # Fax #    Darrin GRIFFITH MD Z[facility] [phone]       [address] [facility] [address]        Equal Access to Services     [facility]: Hadii davida taylor hadasho Sonoreen, waaxda luqadaha, qaybta kaalmada batsheva, jeff del toro . So [facility] [phone].    ATENCIÓN: Si habla español, tiene a ahmadi disposición servicios gratuitos de asistencia lingüística. Llame al [phone].    We comply with applicable federal civil rights laws and [place] laws. We do not discriminate on the basis of race, color, national origin, age, disability, sex, sexual orientation, or gender identity.            Thank you!     Thank you for choosing [facility] AND " [facility]  for your care. Our goal is always to provide you with excellent care. Hearing back from our patients is one way we can continue to improve our services. Please take a few minutes to complete the written survey that you may receive in the mail after your visit with us. Thank you!             Your Updated Medication List - Protect others around you: Learn how to safely use, store and throw away your medicines at www.disposemymeds.org.          This list is accurate as of 6/12/18  5:12 PM.  Always use your most recent med list.                   Brand Name Dispense Instructions for use Diagnosis    albuterol 108 (90 Base) MCG/ACT Inhaler    PROAIR HFA/PROVENTIL HFA/VENTOLIN HFA     Inhale 2 puffs into the lungs        ALPRAZolam 2 MG tablet    XANAX     Take 1 tablet 3 times daily prn        amitriptyline 50 MG tablet    ELAVIL     Take 50 mg by mouth        amphetamine-dextroamphetamine 30 MG per tablet    ADDERALL     Take 30 mg by mouth        atenolol 100 MG tablet    TENORMIN    90 tablet    Take 1 tablet (100 mg) by mouth daily    Paroxysmal supraventricular tachycardia (H)       fluticasone 50 MCG/ACT spray    FLONASE     2 sprays        gabapentin 300 MG capsule    NEURONTIN          metoprolol succinate 200 MG 24 hr tablet    TOPROL-XL     Take 200 mg by mouth daily        oxyCODONE IR 10 MG tablet    ROXICODONE    180 tablet    Take 1 tablet (10 mg) by mouth every 4 hours as needed for moderate to severe pain Maximum 6 tab per day    Pain of joint of left ankle and foot, Complex regional pain syndrome type 1 affecting left lower leg, Pain medication agreement, Chronic pain disorder       VITAMIN D (CHOLECALCIFEROL) PO      Take 1,000 Units by mouth daily

## 2018-06-12 NOTE — PROGRESS NOTES
Patient was not seen today as I was called away for a delivery.  Iliana Salvador MD on 6/12/2018 at 5:12 PM

## 2018-06-13 ASSESSMENT — ANXIETY QUESTIONNAIRES: GAD7 TOTAL SCORE: 11

## 2018-06-13 ASSESSMENT — PATIENT HEALTH QUESTIONNAIRE - PHQ9: SUM OF ALL RESPONSES TO PHQ QUESTIONS 1-9: 10

## 2018-06-14 ENCOUNTER — OFFICE VISIT (OUTPATIENT)
Dept: FAMILY MEDICINE | Facility: OTHER | Age: 48
End: 2018-06-14
Attending: FAMILY MEDICINE
Payer: MEDICARE

## 2018-06-14 VITALS
BODY MASS INDEX: 27.4 KG/M2 | HEART RATE: 76 BPM | DIASTOLIC BLOOD PRESSURE: 80 MMHG | HEIGHT: 69 IN | WEIGHT: 185 LBS | SYSTOLIC BLOOD PRESSURE: 124 MMHG

## 2018-06-14 DIAGNOSIS — M47.816 LUMBAR FACET ARTHROPATHY: Primary | ICD-10-CM

## 2018-06-14 PROCEDURE — G0463 HOSPITAL OUTPT CLINIC VISIT: HCPCS

## 2018-06-14 PROCEDURE — 99213 OFFICE O/P EST LOW 20 MIN: CPT | Performed by: FAMILY MEDICINE

## 2018-06-14 NOTE — LETTER
June 14, 2018      Kym Perea  [address] [address]        To Whom It May Concern:    Kym Perea was seen in our clinic today. I would recommend no work until she is seen by her primary care provider Dr. Stephen on 6/20/18.    Sincerely,        Iliana Salvador MD

## 2018-06-14 NOTE — NURSING NOTE
Patient is here to go over results of MRI. Emma Grayson LPN ....................6/14/2018  9:15 AM
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
## 2018-06-14 NOTE — MR AVS SNAPSHOT
"              After Visit Summary   6/14/2018    Kym Perea    MRN: [id number]           Patient Information     Date Of Birth          1970        Visit Information        Provider Department      6/14/2018 9:30 AM Iliana Salvador MD [facility]        Today's Diagnoses     Lumbar facet arthropathy    -  1       Follow-ups after your visit        Your next 10 appointments already scheduled     Jun 20, 2018  9:15 AM CDT   [facility]    [address]  [place] [id number] [phone]              Who to contact     If you have questions or need follow up information about today's clinic visit or your schedule please contact [facility] directly at [phone].  Normal or non-critical lab and imaging results will be communicated to you by Pinnacle Pharmaceuticalshart, letter or phone within 4 business days after the clinic has received the results. If you do not hear from us within 7 days, please contact the clinic through P[employer] Pharm[employer]shart or phone. If you have a critical or abnormal lab result, we will notify you by phone as soon as possible.  Submit refill requests through [email or web address] or call your pharmacy and they will forward the refill request to us. Please allow 3 business days for your refill to be completed.          Additional Information About Your Visit        MyChart Information     Buyt.In lets you send messages to your doctor, view your test results, renew your prescriptions, schedule appointments and more. To sign up, go to [email or web address] . Click on \"Log in\" on the left side of the screen, which will take you to the Welcome page. Then click on \"Sign up Now\" on the right side of the page.     You will be asked to enter the access code listed below, as well as some personal information. Please follow the directions to create your username and password.     Your " "access code is: [email or web address]  Expires: 2018  5:44 PM     Your access code will  in 90 days. If you need help or a new code, please call your [facility] clinic or [phone].        Care EveryWhere ID     This is your Care EveryWhere ID. This could be used by other organizations to access your [facility] medical records  [id number]        Your Vitals Were     Pulse Height BMI (Body Mass Index)             76 5' 9\" (1.753 m) 27.32 kg/m2          Blood Pressure from Last 3 Encounters:   18 124/80   18 122/80   18 (!) 134/102    Weight from Last 3 Encounters:   18 185 lb (83.9 kg)   18 185 lb (83.9 kg)   18 188 lb 9.6 oz (85.5 kg)              Today, you had the following     No orders found for display       Primary Care Provider Office Phone # Fax #    Darrin GRIFFITH MD [facility] [phone]       [address] [facility] [address]        Equal Access to Services     [facility]: Hadii davida ku hadmarilino Sonoreen, waaxda luqadaha, qaybta kaalmada batsheva, jeff del toro . So [facility] [phone].    ATENCIÓN: Si habla español, tiene a ahmadi disposición servicios gratuitos de asistencia lingüística. [facility] [phone].    We comply with applicable federal civil rights laws and [place] laws. We do not discriminate on the basis of race, color, national origin, age, disability, sex, sexual orientation, or gender identity.            Thank you!     Thank you for choosing [facility] AND [facility]  for your care. Our goal is always to provide you with excellent care. Hearing back from our patients is one way we can continue to improve our services. Please take a few minutes to complete the written survey that you may receive in the mail after your visit with us. Thank you!             Your Updated Medication List - Protect others around you: Learn how to safely use, store and throw away your medicines at www.disposemymeds.org.    "       This list is accurate as of 6/14/18  9:54 AM.  Always use your most recent med list.                   Brand Name Dispense Instructions for use Diagnosis    albuterol 108 (90 Base) MCG/ACT Inhaler    PROAIR HFA/PROVENTIL HFA/VENTOLIN HFA     Inhale 2 puffs into the lungs        ALPRAZolam 2 MG tablet    XANAX     Take 1 tablet 3 times daily prn        amitriptyline 50 MG tablet    ELAVIL     Take 50 mg by mouth        amphetamine-dextroamphetamine 30 MG per tablet    ADDERALL     Take 30 mg by mouth        atenolol 100 MG tablet    TENORMIN    90 tablet    Take 1 tablet (100 mg) by mouth daily    Paroxysmal supraventricular tachycardia (H)       fluticasone 50 MCG/ACT spray    FLONASE     2 sprays        gabapentin 300 MG capsule    NEURONTIN          metoprolol succinate 200 MG 24 hr tablet    TOPROL-XL     Take 200 mg by mouth daily        oxyCODONE IR 10 MG tablet    ROXICODONE    180 tablet    Take 1 tablet (10 mg) by mouth every 4 hours as needed for moderate to severe pain Maximum 6 tab per day    Pain of joint of left ankle and foot, Complex regional pain syndrome type 1 affecting left lower leg, Pain medication agreement, Chronic pain disorder       VITAMIN D (CHOLECALCIFEROL) PO      Take 1,000 Units by mouth daily

## 2018-06-16 ASSESSMENT — ENCOUNTER SYMPTOMS
UNEXPECTED WEIGHT CHANGE: 0
BACK PAIN: 1
PARESTHESIAS: 1

## 2018-06-20 ENCOUNTER — TELEPHONE (OUTPATIENT)
Dept: FAMILY MEDICINE | Facility: OTHER | Age: 48
End: 2018-06-20

## 2018-06-20 NOTE — TELEPHONE ENCOUNTER
[facility] - Patient called and stated that her rides car wouldn't start.  She is requesting another appointment and is asking the nurse to please call her.      Radha Dodson

## 2018-06-20 NOTE — TELEPHONE ENCOUNTER
Patient notified per [facility] nurse unable to work in, not able to be seen until 7/10/18. Patient upset, said was told by another provider to be seen by [facility] to get note to return  to work. Patient accepted to be scheduled 7[facility]10/18[facility]  Inge Rodriguez LPN .............6/20/2018     11:27 AM

## 2018-07-05 ENCOUNTER — TELEPHONE (OUTPATIENT)
Dept: FAMILY MEDICINE | Facility: OTHER | Age: 48
End: 2018-07-05

## 2018-07-06 NOTE — TELEPHONE ENCOUNTER
Left message to call back. Patient needs to go to release of information to get paperwork. ...................Ezra Gaspar....7/6/2018 8:12 AM

## 2018-07-10 ENCOUNTER — OFFICE VISIT (OUTPATIENT)
Dept: FAMILY MEDICINE | Facility: OTHER | Age: 48
End: 2018-07-10
Attending: FAMILY MEDICINE
Payer: MEDICARE

## 2018-07-10 VITALS — DIASTOLIC BLOOD PRESSURE: 84 MMHG | SYSTOLIC BLOOD PRESSURE: 136 MMHG | HEART RATE: 92 BPM

## 2018-07-10 DIAGNOSIS — J45.20 MILD INTERMITTENT ASTHMA WITHOUT COMPLICATION: ICD-10-CM

## 2018-07-10 DIAGNOSIS — G90.522 COMPLEX REGIONAL PAIN SYNDROME TYPE 1 AFFECTING LEFT LOWER LEG: Primary | ICD-10-CM

## 2018-07-10 DIAGNOSIS — Z79.899 MEDICAL CANNABIS USE: ICD-10-CM

## 2018-07-10 DIAGNOSIS — M47.816 LUMBAR FACET ARTHROPATHY: ICD-10-CM

## 2018-07-10 DIAGNOSIS — Z02.89 PAIN MEDICATION AGREEMENT: ICD-10-CM

## 2018-07-10 PROCEDURE — G0463 HOSPITAL OUTPT CLINIC VISIT: HCPCS

## 2018-07-10 PROCEDURE — 99214 OFFICE O/P EST MOD 30 MIN: CPT | Performed by: FAMILY MEDICINE

## 2018-07-10 RX ORDER — ALBUTEROL SULFATE 90 UG/1
2 AEROSOL, METERED RESPIRATORY (INHALATION) EVERY 4 HOURS PRN
Qty: 1 INHALER | Refills: 11 | Status: SHIPPED | OUTPATIENT
Start: 2018-07-10 | End: 2021-02-11

## 2018-07-10 NOTE — PROGRESS NOTES
Nursing Notes:   Hermelinda Portillo LPN  7/10/2018  1:42 PM  Signed  Patient here for medication management.  Hermelinda Fontanez............................... 7/10/2018 1:38 PM      SUBJECTIVE:  Kym Perea  is a 48 year old female who comes in today for follow-up and medication management.  I last saw her in May about a month and a half ago and we referred her for MRI scan of the lumbar spine because of radicular symptoms.  She had that done in May 23:  MR LUMBAR SPINE W/O CONTRAST     HISTORY: Lumbar radiculopathy. .      TECHNIQUE: Sagittal T1, T2 and STIR as well as axial T2 images of the  lumbar spine were obtained.     COMPARISON: Radiographs 3/16/2018.     FINDINGS:       Trace degenerative anterolisthesis of L4 and L5 is present without  pars defects. Lumbar lordosis is otherwise preserved.  The vertebral  body heights are preserved.  The marrow signal is unremarkable.  Intervertebral disc heights are fairly well-maintained.     The distal cord and conus medullaris have a normal caliber and  morphology.  The conus terminates at L2.  No abnormal cord signal is  seen in the distal cord or conus.  There are no masses in the spinal  canal or paravertebral soft tissues.     At L1-2 and L2-3, the central spinal canal and neural foramina are  patent.     At L3-4, there is a mild diffuse disc bulge with mild facet and  ligamentum flavum hypertrophy. Spinal stenosis is mild. The neural  foramen remain patent.     At L4-5, there is a moderate diffuse disc bulge with advanced  degenerative facet hypertrophy with bilateral facet effusions. Spinal  stenosis is moderate. Bilateral subarticular zone narrowing is  present. Foraminal narrowing is minimal.      At L5-S1, there is a minimal diffuse disc bulge with moderate facet  hypertrophy. Spinal canal remains patent. Foraminal stenoses are  minimal.         IMPRESSION:     Focal advanced facet hypertrophy at L4-5, associated bilateral facet  effusions and edema,  results in moderate spinal and subarticular zone  narrowing. Consider bilateral L4-5 facet steroid injections for  diagnostic and therapeutic purposes.     TOBY LIGHT MD    She did not have significant radicular findings but did have bilateral facet arthropathy at L4-5.  While he was out of town.  Patient saw Dr. Tutu Holbrook and discussed the possibility of injections but the patient was adamantly opposed to that and she declined physical therapy.    She has a court date yesterday.  She had a urine drug test showing oxycodone, oxymorphone, and Adderall. She is certified for medical cannabis.      Past Medical, Family, and Social History reviewed and updated as noted below.   ROS is negative except as noted above       Allergies   Allergen Reactions     Arthrotec GI Disturbance     Indomethacin      Other reaction(s): Dizziness     Meloxicam GI Disturbance   ,   Family History   Problem Relation Age of Onset     Cancer Father      Cancer,Bladder     Other - See Comments Father      Mild hypercholesterolemia     Other - See Comments Mother      scleroderma/lupus/Parkinson's syndrome     Family History Negative Brother      Good Health     Colon Cancer Maternal Grandmother 40     Cancer-colon,Followed by lung  with metastasis to the bone di*   ,   Current Outpatient Prescriptions   Medication     albuterol (PROAIR HFA/PROVENTIL HFA/VENTOLIN HFA) 108 (90 Base) MCG/ACT Inhaler     ALPRAZolam (XANAX) 2 MG tablet     amitriptyline (ELAVIL) 50 MG tablet     amphetamine-dextroamphetamine (ADDERALL) 30 MG per tablet     atenolol (TENORMIN) 100 MG tablet     fluticasone (FLONASE) 50 MCG/ACT spray     metoprolol succinate (TOPROL-XL) 200 MG 24 hr tablet     oxyCODONE IR (ROXICODONE) 10 MG tablet     VITAMIN D, CHOLECALCIFEROL, PO     [DISCONTINUED] albuterol (PROAIR HFA/PROVENTIL HFA/VENTOLIN HFA) 108 (90 BASE) MCG/ACT Inhaler     No current facility-administered medications for this visit.    ,   Past Medical  History:   Diagnosis Date     Acquired absence of other organs (CODE)     9/2/2011     Chronic pain syndrome     No Comments Provided     Complex regional pain syndrome I     left ankle     Encounter for other administrative examinations     10/2012,Hydrocodone 10/325mg, #240/mo signed 10/3/12, updated 10/1/ 14     Encounter for other administrative examinations     10/1/2014,MS Contin 30 mg q 8 hours #90 per month.  Morphine sulfate IR 15 mg 2-3 tab tid prn #180 per month     Gastro-esophageal reflux disease without esophagitis     No Comments Provided     Injury of left ankle     1997,requiring surgery     Low back pain     No Comments Provided     Major depressive disorder, recurrent severe without psychotic features (H)     No Comments Provided     Migraine without status migrainosus, not intractable     No Comments Provided     Nicotine dependence, uncomplicated     No Comments Provided     Obesity     No Comments Provided     Overweight     11/25/2014     Pain in ankle     Chronic left ankle pain secondary to reflex sympathetic dystrophy.  Patient  has had 4 previous surgeries, the most recent one done by Dr. Noriega in  2007. On Narcotic contract.     Pain in thoracic spine     No Comments Provided     Panic disorder without agoraphobia     Dr Reynoso     Personal history of other diseases of the female genital tract     No Comments Provided     Personal history of other medical treatment (CODE)     G2, P1-0-1-1 with one vaginal delivery.     Supraventricular tachycardia (H)     5/24/2010   ,   Patient Active Problem List    Diagnosis Date Noted     Medical cannabis use 07/10/2018     Priority: Medium     Pain in joint, ankle and foot 01/29/2018     Priority: Medium     Overview:   Chronic left ankle pain secondary to reflex sympathetic dystrophy.  Patient   has had 4 previous surgeries, the most recent one done by Dr. Noriega in   2007. On Narcotic contract.       Chronic pain disorder 01/29/2018      Priority: Medium     Overview:   complex regional pain syndrome left ankle       Esophageal reflux 01/29/2018     Priority: Medium     Hyperlipidemia 01/29/2018     Priority: Medium     Insomnia 01/29/2018     Priority: Medium     Panic disorder 01/29/2018     Priority: Medium     Overview:   Previously patient of Dr. Reynoso since her teens. Patient is attempting slow taper down on her Xanax. She's been on Xanax 2 mg 4 times a day for years       Tobacco abuse 01/29/2018     Priority: Medium     Influenza A 01/11/2018     Priority: Medium     Pain medication agreement 4/18/18 04/21/2017     Priority: Medium     Overview:   4/20/17:  Oxycodone 10 mg #180/mo    Hydrocodone 10/325mg, #240/mo. Stopped and changed to morphine. Agreement signed 10/3/12, updated 10/1/ 14.  Complex regional pain syndrome (aka Reflex Sympathetic Dystrophy)  MS Contin 30 mg q 8 hours #90 per month.  Morphine sulfate IR 15 mg 2-3 tab tid prn #180 per month, decreased to #90/month.   query 4/9/16 as part of chart review. Seems appropriate. 6/2/16 tapered and off Morphine. Hominy 5/325 #120/month.  query appropriate. ToxAssure appropriate.    August 2016 Consultation with [facility]. Recommended spinal cord stimulator trial, but she is reluctant to pursue that. Pain clinic stated that continuing opioids was OK.       Complex regional pain syndrome type 1 affecting left lower leg 04/15/2016     Priority: Medium     Overview:   Patient with a history of complex regional pain syndrome has seen Dr. Dino Skelton4/23/16        Migraine headache 11/25/2014     Priority: Medium     Overweight (BMI 25.0-29.9) 11/25/2014     Priority: Medium     Lumbago 02/06/2012     Priority: Medium     Major depressive disorder, recurrent episode, severe (H) 07/13/2011     Priority: Medium     Paroxysmal supraventricular tachycardia (H) 05/24/2010     Priority: Medium   ,   Past Surgical History:   Procedure Laterality Date     ANKLE SURGERY       1997, 2000,Left ankle surgery x 2     HYSTERECTOMY TOTAL ABDOMINAL      11/1999,secondary to endometriosis, no malignancy; patient reports no malignancy, but abnormal cells were present *     LAPAROSCOPIC CHOLECYSTECTOMY      No Comments Provided     LAPAROSCOPY DIAGNOSTIC (GENERAL)      1995     OTHER SURGICAL HISTORY      21299,CRANIO/MAXILLOFACIAL SURGERY,Jaw Surgery     OTHER SURGICAL HISTORY      207040,[facility] EMG,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  No other abnormalities noted.    and   Social History   Substance Use Topics     Smoking status: Current Every Day Smoker     Packs/day: 1.00     Years: 30.00     Types: Cigarettes     Start date: 12/2/1986     Smokeless tobacco: Never Used     Alcohol use 0.0 oz/week      Comment: Alcoholic Drinks/day: occasional     OBJECTIVE:  /84 (BP Location: Right arm, Patient Position: Sitting, Cuff Size: Adult Regular)  Pulse 92   EXAM:  Alert cooperative, no distress.  Back exam was not repeated but MRI was reviewed with her.   ASSESSMENT/Plan :    Kym was seen today for recheck medication.    Diagnoses and all orders for this visit:    Complex regional pain syndrome type 1 affecting left lower leg    Pain medication agreement 4/18/18    Medical cannabis use    Lumbar facet arthropathy  -     XR Lumbar Sacral Facet Inj Single Bilateral; Future    Mild intermittent asthma without complication  -     albuterol (PROAIR HFA/PROVENTIL HFA/VENTOLIN HFA) 108 (90 Base) MCG/ACT Inhaler; Inhale 2 puffs into the lungs every 4 hours as needed for shortness of breath / dyspnea or wheezing    Renewed albuterol.    Discussion about her back in the facet arthropathy and recommended that she do diagnostic/therapeutic L4-5 bilateral facet injection and referral sent for same.  She feels she would not be able to work at her regular job with her back in the condition that it is.  She declines physical therapy.  She is already on a medication contract and that does  not seem to be terribly helpful when comes to her back.    Letter written stating that oxymorphone is a known metabolite of oxycodone which is prescribed under her medication contract.    A total of 25 minutes was spent with the patient, greater than 50% of the time was spent in counseling/discussion of the aforementioned concerns.     Darrin Stephen MD

## 2018-07-10 NOTE — NURSING NOTE
Patient here for medication management.  Hermelinda Fontanez............................... 7/10/2018 1:38 PM

## 2018-07-10 NOTE — MR AVS SNAPSHOT
After Visit Summary   7/10/2018    Kym Perea    MRN: [id number]           Patient Information     Date Of Birth          1970        Visit Information        Provider Department      7/10/2018 1:00 PM Darrin Stephen MD [facility]        Today's Diagnoses     Complex regional pain syndrome type 1 affecting left lower leg    -  1    Pain medication agreement 4/18/18        Medical cannabis use        Lumbar facet arthropathy        Mild intermittent asthma without complication           Follow-ups after your visit        Future tests that were ordered for you today     Open Future Orders        Priority Expected Expires Ordered    XR Lumbar Sacral Facet Inj Single Bilateral Routine 7/10/2018 7/10/2019 7/10/2018            Who to contact     If you have questions or need follow up information about today's clinic visit or your schedule please contact [facility] directly at [phone].  Normal or non-critical lab and imaging results will be communicated to you by MyChart, letter or phone within 4 business days after the clinic has received the results. If you do not hear from us within 7 days, please contact the clinic through MyChart or phone. If you have a critical or abnormal lab result, we will notify you by phone as soon as possible.  Submit refill requests through [employer] or call your pharmacy and they will forward the refill request to us. Please allow 3 business days for your refill to be completed.          Additional Information About Your Visit        Care EveryWhere ID     This is your Care EveryWhere ID. This could be used by other organizations to access your [place] medical records  [id number]        Your Vitals Were     Pulse                   92            Blood Pressure from Last 3 Encounters:   07/10/18 136/84   06/14/18 124/80   06/12/18 122/80    Weight from Last 3 Encounters:   06/14/18 185 lb (83.9 kg)   06/12/18 185  lb (83.9 kg)   05/21/18 188 lb 9.6 oz (85.5 kg)                 Today's Medication Changes          These changes are accurate as of 7/10/18  5:22 PM.  If you have any questions, ask your nurse or doctor.               These medicines have changed or have updated prescriptions.        Dose/Directions    albuterol 108 (90 Base) MCG/ACT Inhaler   Commonly known as:  PROAIR HFA/PROVENTIL HFA/VENTOLIN HFA   This may have changed:    - when to take this  - reasons to take this   Used for:  Mild intermittent asthma without complication   Changed by:  Darrin Stephen MD        Dose:  2 puff   Inhale 2 puffs into the lungs every 4 hours as needed for shortness of breath / dyspnea or wheezing   Quantity:  1 Inhaler   Refills:  11            Where to get your medicines      These medications were sent to [facility] [facility] - [place], [place] - [address]  [address] [facility] [address]     Phone:  [phone]     albuterol 108 (90 Base) MCG/ACT Inhaler                Primary Care Provider Office Phone # Fax #    Darrin Stephen [phone]       [address]  [facility] [address]        Equal Access to Services     LANDON THOMASON AH: Hadii davida taylor hadasho Soomaali, waaxda luqadaha, qaybta kaalmada adeegyada, jeff rodgers. So [facility] [phone].    ATENCIÓN: Si habla español, tiene a ahmadi disposición servicios gratuitos de asistencia lingüística. Llame al [phone].    We comply with applicable federal civil rights laws and [place] laws. We do not discriminate on the basis of race, color, national origin, age, disability, sex, sexual orientation, or gender identity.            Thank you!     Thank you for choosing [facility]  for your care. Our goal is always to provide you with excellent care. Hearing back from our patients is one way we can continue to improve our services. Please take a few minutes to complete the  written survey that you may receive in the mail after your visit with us. Thank you!             Your Updated Medication List - Protect others around you: Learn how to safely use, store and throw away your medicines at www.[employer].org.          This list is accurate as of 7/10/18  5:22 PM.  Always use your most recent med list.                   Brand Name Dispense Instructions for use Diagnosis    albuterol 108 (90 Base) MCG/ACT Inhaler    PROAIR HFA/PROVENTIL HFA/VENTOLIN HFA    1 Inhaler    Inhale 2 puffs into the lungs every 4 hours as needed for shortness of breath / dyspnea or wheezing    Mild intermittent asthma without complication       ALPRAZolam 2 MG tablet    XANAX     Take 1 tablet 3 times daily prn        amitriptyline 50 MG tablet    ELAVIL     Take 50 mg by mouth        amphetamine-dextroamphetamine 30 MG per tablet    ADDERALL     Take 30 mg by mouth        atenolol 100 MG tablet    TENORMIN    90 tablet    Take 1 tablet (100 mg) by mouth daily    Paroxysmal supraventricular tachycardia (H)       fluticasone 50 MCG/ACT spray    FLONASE     2 sprays        metoprolol succinate 200 MG 24 hr tablet    TOPROL-XL     Take 200 mg by mouth daily        oxyCODONE IR 10 MG tablet    ROXICODONE    180 tablet    Take 1 tablet (10 mg) by mouth every 4 hours as needed for moderate to severe pain Maximum 6 tab per day    Pain of joint of left ankle and foot, Complex regional pain syndrome type 1 affecting left lower leg, Pain medication agreement, Chronic pain disorder       VITAMIN D (CHOLECALCIFEROL) PO      Take 1,000 Units by mouth daily

## 2018-07-10 NOTE — LETTER
[facility]  [address]  [place] [address]  Phone: [phone]  Fax: [phone]    July 10, 2018        Kym Perea  [address]  [place] [address]          To whom it may concern:    RE: Kym Perea (: 1970)    Patient was seen and treated today at our clinic.  She is under a medication contract for oxycodone for chronic ankle pain with reflex sympathetic dystrophy/chronic regional pain syndrome.  She has been compliant with this contract and has met [facility] guidelines for being prescribed this medication including urine drug testing and  queries from the state data base on a regular basis. She is prescribed Adderall and alprazolam by her mental health provider.     It is my understanding that she had a positive urine drug test for stimulants, oxycodone and oxymorphone.  Oxymorphone is a common metabolite from oxycodone so is considered an appropriate finding in this setting.    Please contact me for questions or concerns.      Sincerely,        Darrin Stephen MD

## 2018-07-23 NOTE — PROGRESS NOTES
Patient Information     Patient Name  Kym Perea MRN  [id number] Sex  Female   1970      Letter by Darrin Stephen MD at      Author:  Darrin Stephen MD Service:  (none) Author Type:  (none)    Filed:   Encounter Date:  2017 Status:  (Other)             Work Note         2017        Kym Perea  was seen today at [facility]. She has no evidence of a neurologic abnormality that would preclude her performing her normal duties at work.  It appears she may have had a side effect from a medication and that has been adjusted.  This may take further modification over time.    Kym is able to return to work with no restrictions.            If you have any questions regarding the validity of this note please call the number above.             Darrin Stephen MD

## 2018-07-25 ENCOUNTER — OFFICE VISIT (OUTPATIENT)
Dept: FAMILY MEDICINE | Facility: OTHER | Age: 48
End: 2018-07-25
Attending: PHYSICIAN ASSISTANT
Payer: MEDICARE

## 2018-07-25 ENCOUNTER — HOSPITAL ENCOUNTER (OUTPATIENT)
Dept: GENERAL RADIOLOGY | Facility: OTHER | Age: 48
End: 2018-07-25
Attending: PHYSICIAN ASSISTANT
Payer: MEDICARE

## 2018-07-25 ENCOUNTER — HOSPITAL ENCOUNTER (OUTPATIENT)
Dept: GENERAL RADIOLOGY | Facility: OTHER | Age: 48
Discharge: HOME | End: 2018-07-25
Attending: PHYSICIAN ASSISTANT | Admitting: PHYSICIAN ASSISTANT
Payer: MEDICARE

## 2018-07-25 VITALS — HEART RATE: 92 BPM | TEMPERATURE: 96.9 F | RESPIRATION RATE: 20 BRPM

## 2018-07-25 DIAGNOSIS — M79.601 PAIN OF RIGHT UPPER EXTREMITY: ICD-10-CM
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
## 2018-07-25 DIAGNOSIS — S43.401A SPRAIN OF RIGHT SHOULDER, UNSPECIFIED SHOULDER SPRAIN TYPE, INITIAL ENCOUNTER: ICD-10-CM

## 2018-07-25 DIAGNOSIS — S40.021A CONTUSION OF RIGHT UPPER EXTREMITY, INITIAL ENCOUNTER: Primary | ICD-10-CM

## 2018-07-25 PROCEDURE — 73030 X-RAY EXAM OF SHOULDER: CPT | Mod: RT

## 2018-07-25 PROCEDURE — G0463 HOSPITAL OUTPT CLINIC VISIT: HCPCS | Mod: 25

## 2018-07-25 PROCEDURE — 99213 OFFICE O/P EST LOW 20 MIN: CPT | Performed by: PHYSICIAN ASSISTANT

## 2018-07-25 PROCEDURE — G0463 HOSPITAL OUTPT CLINIC VISIT: HCPCS

## 2018-07-25 PROCEDURE — 25000125 ZZHC RX 250: Performed by: PHYSICIAN ASSISTANT

## 2018-07-25 PROCEDURE — 73080 X-RAY EXAM OF ELBOW: CPT | Mod: RT

## 2018-07-25 RX ORDER — ONDANSETRON 4 MG/1
4 TABLET, ORALLY DISINTEGRATING ORAL ONCE
Status: COMPLETED | OUTPATIENT
Start: 2018-07-25 | End: 2018-07-25

## 2018-07-25 RX ADMIN — ONDANSETRON 4 MG: 4 TABLET, ORALLY DISINTEGRATING ORAL at 17:10

## 2018-07-25 ASSESSMENT — PAIN SCALES - GENERAL: PAINLEVEL: WORST PAIN (10)

## 2018-07-25 NOTE — PATIENT INSTRUCTIONS
Fall with right arm injury and bruising  XR should and elbow - negative for fracture or dislocation  Will treat symptomatically for sprain/contusion  Apply ice to affected areas 10-20 minutes every 1-2 hours  Ibuprofen 600-800 mg every 6-8 hours, max 2400 mg/day. Take with food and or Tylenol 650-1000 mg every 4-6 hours, max 4000 mg/day  Follow up with PCP in one week if symptoms persist or worsen  Seek immediate care for    Increased pain or swelling    Hand or fingers become cold, blue, numb or tingly    Signs of infection: Warmth, drainage, or increased redness or pain around the injury    Inability to move the injured body part     Frequent bruising for unknown reasons    Upper Extremity Contusion  You have a contusion (bruise) of an upper extremity (arm, wrist, hand, or fingers). Symptoms include pain, swelling, and skin discoloration. No bones are broken. This injury may take from a few days to a few weeks to heal.  During that time, the bruise may change from reddish in color, to purple-blue, to green-yellow, to yellow-brown.  Home care    Unless another medicine was prescribed, you can take acetaminophen, ibuprofen, or naproxen to control pain. (If you have chronic liver or kidney disease or ever had a stomach ulcer or gastrointestinal bleeding, talk with your doctor before using these medicines.)    Elevate the injured area to reduce pain and swelling. As much as possible, sit or lie down with the injured area raised about the level of your heart. This is especially important during the first 48 hours.    Ice the injured area to help reduce pain and swelling. Wrap a cold source (ice pack or ice cubes in a plastic bag) in a thin towel. Apply to the bruised area for 20 minutes every 1 to 2 hours the first day. Continue this 3 to 4 times a day until the pain and swelling goes away.    If a sling was provided, you may remove it to shower or bathe. To prevent joint stiffness, do not wear it for more than 1  week.  Follow-up care  Follow up with your healthcare provide, or as advised. Call if you are not improving within the next 1 to 2 weeks.  When to seek medical advice   Call your healthcare provider right away if any of these occur:    Increased pain or swelling    Hand or fingers become cold, blue, numb or tingly    Signs of infection: Warmth, drainage, or increased redness or pain around the injury    Inability to move the injured body part     Frequent bruising for unknown reasons  Date Last Reviewed: 2/1/2017 2000-2017 [employer]. [address] [place] [id number]. All rights reserved. This information is not intended as a substitute for professional medical care. Always follow your healthcare professional's instructions.

## 2018-07-25 NOTE — NURSING NOTE
Patient presents to the clinic for right arm pain. States her left leg gave out this weekend and she fell and landed on a gamez cot that was on the ground.   Bobbi Yates LPN............. July 25, 2018 4:46 PM
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
## 2018-07-25 NOTE — MR AVS SNAPSHOT
After Visit Summary   7/25/2018    Kym Perea    MRN: [id number]           Patient Information     Date Of Birth          1970        Visit Information        Provider Department      7/25/2018 4:30 PM Rosanne Smalls PA-C [facility]        Today's Diagnoses     Fall, initial encounter    -  1    Pain of right upper extremity          Care Instructions    Fall with right arm injury and bruising  XR should and elbow - negative for fracture or dislocation  Will treat symptomatically for sprain/contusion  Apply ice to affected areas 10-20 minutes every 1-2 hours  Ibuprofen 600-800 mg every 6-8 hours, max 2400 mg/day. Take with food and or Tylenol 650-1000 mg every 4-6 hours, max 4000 mg/day  Follow up with PCP in one week if symptoms persist or worsen  Seek immediate care for    Increased pain or swelling    Hand or fingers become cold, blue, numb or tingly    Signs of infection: Warmth, drainage, or increased redness or pain around the injury    Inability to move the injured body part     Frequent bruising for unknown reasons    Upper Extremity Contusion  You have a contusion (bruise) of an upper extremity (arm, wrist, hand, or fingers). Symptoms include pain, swelling, and skin discoloration. No bones are broken. This injury may take from a few days to a few weeks to heal.  During that time, the bruise may change from reddish in color, to purple-blue, to green-yellow, to yellow-brown.  Home care    Unless another medicine was prescribed, you can take acetaminophen, ibuprofen, or naproxen to control pain. (If you have chronic liver or kidney disease or ever had a stomach ulcer or gastrointestinal bleeding, talk with your doctor before using these medicines.)    Elevate the injured area to reduce pain and swelling. As much as possible, sit or lie down with the injured area raised about the level of your heart. This is especially important during the first 48 hours.    Ice  the injured area to help reduce pain and swelling. Wrap a cold source (ice pack or ice cubes in a plastic bag) in a thin towel. Apply to the bruised area for 20 minutes every 1 to 2 hours the first day. Continue this 3 to 4 times a day until the pain and swelling goes away.    If a sling was provided, you may remove it to shower or bathe. To prevent joint stiffness, do not wear it for more than 1 week.  Follow-up care  Follow up with your healthcare provide, or as advised. Call if you are not improving within the next 1 to 2 weeks.  When to seek medical advice   Call your healthcare provider right away if any of these occur:    Increased pain or swelling    Hand or fingers become cold, blue, numb or tingly    Signs of infection: Warmth, drainage, or increased redness or pain around the injury    Inability to move the injured body part     Frequent bruising for unknown reasons  Date Last Reviewed: 2/1/2017 2000-2017 [employer]. [address] [place] [address]. All rights reserved. This information is not intended as a substitute for professional medical care. Always follow your healthcare professional's instructions.                Follow-ups after your visit        Follow-up notes from your care team     Return if symptoms worsen or fail to improve.      Future tests that were ordered for you today     Open Future Orders        Priority Expected Expires Ordered    XR Shoulder Right G/E 3 Views Routine 7/25/2018 7/25/2019 7/25/2018    XR Elbow Right G/E 3 Views Routine 7/25/2018 7/25/2019 7/25/2018            Who to contact     If you have questions or need follow up information about today's clinic visit or your schedule please contact [facility] directly at [phone].  Normal or non-critical lab and imaging results will be communicated to you by MyChart, letter or phone within 4 business days after the clinic has received the results. If you do not hear from us  within 7 days, please contact the clinic through [employer] or phone. If you have a critical or abnormal lab result, we will notify you by phone as soon as possible.  Submit refill requests through [employer] or call your pharmacy and they will forward the refill request to us. Please allow 3 business days for your refill to be completed.          Additional Information About Your Visit        Care EveryWhere ID     This is your Care EveryWhere ID. This could be used by other organizations to access your [place] medical records  [id number]        Your Vitals Were     Pulse Temperature Respirations Breastfeeding?          92 96.9  F (36.1  C) (Tympanic) 20 No         Blood Pressure from Last 3 Encounters:   07/10/18 136/84   06/14/18 124/80   06/12/18 122/80    Weight from Last 3 Encounters:   06/14/18 185 lb (83.9 kg)   06/12/18 185 lb (83.9 kg)   05/21/18 188 lb 9.6 oz (85.5 kg)               Primary Care Provider Office Phone # Fax #    Darrin GRIFFITH MD Zafar [phone]       [address] [facility] [address]        Equal Access to Services     [facility]: Hadii aad ku hadasho Soomaali, waaxda luqadaha, qaybta kaalmada adeекатеринаyadeny, jeff del toro . So [facility] [phone].    ATENCIÓN: Si habla español, tiene a ahmadi disposición servicios gratweros de asistencia lingüística. [facility] [phone].    We comply with applicable federal civil rights laws and [place] laws. We do not discriminate on the basis of race, color, national origin, age, disability, sex, sexual orientation, or gender identity.            Thank you!     Thank you for choosing [facility]  for your care. Our goal is always to provide you with excellent care. Hearing back from our patients is one way we can continue to improve our services. Please take a few minutes to complete the written survey that you may receive in the mail after your visit with us. Thank you!             Your  Updated Medication List - Protect others around you: Learn how to safely use, store and throw away your medicines at www.disposemymeds.org.          This list is accurate as of 7/25/18  6:34 PM.  Always use your most recent med list.                   Brand Name Dispense Instructions for use Diagnosis    albuterol 108 (90 Base) MCG/ACT Inhaler    PROAIR HFA/PROVENTIL HFA/VENTOLIN HFA    1 Inhaler    Inhale 2 puffs into the lungs every 4 hours as needed for shortness of breath / dyspnea or wheezing    Mild intermittent asthma without complication       ALPRAZolam 2 MG tablet    XANAX     Take 1 tablet 3 times daily prn        amitriptyline 50 MG tablet    ELAVIL     Take 50 mg by mouth        amphetamine-dextroamphetamine 30 MG per tablet    ADDERALL     Take 30 mg by mouth        atenolol 100 MG tablet    TENORMIN    90 tablet    Take 1 tablet (100 mg) by mouth daily    Paroxysmal supraventricular tachycardia (H)       fluticasone 50 MCG/ACT spray    FLONASE     2 sprays        metoprolol succinate 200 MG 24 hr tablet    TOPROL-XL     Take 200 mg by mouth daily        oxyCODONE IR 10 MG tablet    ROXICODONE    180 tablet    Take 1 tablet (10 mg) by mouth every 4 hours as needed for moderate to severe pain Maximum 6 tab per day    Pain of joint of left ankle and foot, Complex regional pain syndrome type 1 affecting left lower leg, Pain medication agreement, Chronic pain disorder       VITAMIN D (CHOLECALCIFEROL) PO      Take 1,000 Units by mouth daily

## 2018-08-17 ENCOUNTER — TELEPHONE (OUTPATIENT)
Dept: FAMILY MEDICINE | Facility: OTHER | Age: 48
End: 2018-08-17

## 2018-08-17 NOTE — TELEPHONE ENCOUNTER
Patient states that her oxycodone script is due on Sunday and she doesn't believe she got another rx to continue with her oxycodone schedule. After looking through her chart, it appears that [facility] gave her 3 rx on her 5/21 visit, the 3rd script being filled on July 20th. Patient should be due for an additional apt at this point and no other scripts should've been given. Informed patient of this. Patient states she has an apt on Tuesday 8/21 and will wait until then if necessary. Informed her of the policy of needing an apt for narcotic refills.  Azeb Cordero LPN .............8/17/2018  11:37 AM

## 2018-08-17 NOTE — TELEPHONE ENCOUNTER
[facility]:  Patient states she was only given 1 script for last month & will need another prior to her upcomming appointment on 8/21/2018. She would like a call today to advise  She will be in for a back injection today at 12:40      Thank you

## 2018-09-17 ENCOUNTER — TELEPHONE (OUTPATIENT)
Dept: FAMILY MEDICINE | Facility: OTHER | Age: 48
End: 2018-09-17

## 2018-09-17 NOTE — TELEPHONE ENCOUNTER
Chart review reveals that patient is on a CSA dated 4/19/18 with relation to Rx as requested. All refills should come at office visits with a provider. Writer also notes that as per 8/17/18 telephone encounter, patient was reminded of this as she was requesting a refill of her oxycodone over the phone at that time. Patient subsequently no showed her appointment as scheduled with PCP for 8/21/18.     No appointment is scheduled at this time. Call placed to patient to discuss. Patient was unavailable at listed number at this time. Writer was unable to leave a message for patient as well on her voicemail. Writer will attempt to contact patient at a later time.    Lyle Patterson RN on 9/17/2018 at 3:38 PM

## 2018-09-17 NOTE — TELEPHONE ENCOUNTER
Writer placed call to patient again to discuss her concerns as noted previously. Was able to reach patient at this time. Writer and patient discussed her CSA, the need for an office visit for refills of her oxycodone, as well as that it is a violation to call the facility for a refill as per her CSA.    Patient states understanding of all of the above. States she is willing to see PCP in the office. Appointment is noted to be open for tomorrow at 1545 with PCP for a 30 minute slot. Patient would like that time.    Was transferred to scheduling for appointment as noted above. Writer will close this encounter.    Lyle Patterson RN on 9/17/2018 at 4:05 PM

## 2018-09-18 ENCOUNTER — OFFICE VISIT (OUTPATIENT)
Dept: FAMILY MEDICINE | Facility: OTHER | Age: 48
End: 2018-09-18
Attending: FAMILY MEDICINE
Payer: MEDICARE

## 2018-09-18 VITALS
RESPIRATION RATE: 20 BRPM | SYSTOLIC BLOOD PRESSURE: 116 MMHG | DIASTOLIC BLOOD PRESSURE: 74 MMHG | HEART RATE: 76 BPM | BODY MASS INDEX: 26.7 KG/M2 | TEMPERATURE: 97.3 F | HEIGHT: 69 IN | WEIGHT: 180.25 LBS

## 2018-09-18 DIAGNOSIS — Z02.89 PAIN MEDICATION AGREEMENT: ICD-10-CM

## 2018-09-18 DIAGNOSIS — G90.522 COMPLEX REGIONAL PAIN SYNDROME TYPE 1 AFFECTING LEFT LOWER LEG: Primary | ICD-10-CM

## 2018-09-18 DIAGNOSIS — M25.572 PAIN OF JOINT OF LEFT ANKLE AND FOOT: ICD-10-CM

## 2018-09-18 DIAGNOSIS — Z79.899 MEDICAL CANNABIS USE: ICD-10-CM

## 2018-09-18 DIAGNOSIS — G89.4 CHRONIC PAIN DISORDER: ICD-10-CM

## 2018-09-18 PROCEDURE — G0463 HOSPITAL OUTPT CLINIC VISIT: HCPCS

## 2018-09-18 PROCEDURE — 99213 OFFICE O/P EST LOW 20 MIN: CPT | Performed by: FAMILY MEDICINE

## 2018-09-18 PROCEDURE — 80307 DRUG TEST PRSMV CHEM ANLYZR: CPT | Performed by: FAMILY MEDICINE

## 2018-09-18 RX ORDER — AMITRIPTYLINE HYDROCHLORIDE 50 MG/1
50 TABLET ORAL DAILY PRN
Qty: 60 TABLET | COMMUNITY
Start: 2018-09-18 | End: 2018-12-20

## 2018-09-18 RX ORDER — OXYCODONE HYDROCHLORIDE 10 MG/1
10 TABLET ORAL EVERY 4 HOURS PRN
Qty: 180 TABLET | Refills: 0 | Status: SHIPPED | OUTPATIENT
Start: 2018-09-18 | End: 2018-12-20

## 2018-09-18 RX ORDER — OXYCODONE HYDROCHLORIDE 10 MG/1
10 TABLET ORAL EVERY 4 HOURS PRN
Qty: 180 TABLET | Refills: 0 | Status: SHIPPED | OUTPATIENT
Start: 2018-09-18 | End: 2018-09-18

## 2018-09-18 ASSESSMENT — ANXIETY QUESTIONNAIRES
5. BEING SO RESTLESS THAT IT IS HARD TO SIT STILL: NEARLY EVERY DAY
3. WORRYING TOO MUCH ABOUT DIFFERENT THINGS: NEARLY EVERY DAY
1. FEELING NERVOUS, ANXIOUS, OR ON EDGE: NEARLY EVERY DAY
2. NOT BEING ABLE TO STOP OR CONTROL WORRYING: NEARLY EVERY DAY
GAD7 TOTAL SCORE: 18
7. FEELING AFRAID AS IF SOMETHING AWFUL MIGHT HAPPEN: NOT AT ALL
6. BECOMING EASILY ANNOYED OR IRRITABLE: NEARLY EVERY DAY
IF YOU CHECKED OFF ANY PROBLEMS ON THIS QUESTIONNAIRE, HOW DIFFICULT HAVE THESE PROBLEMS MADE IT FOR YOU TO DO YOUR WORK, TAKE CARE OF THINGS AT HOME, OR GET ALONG WITH OTHER PEOPLE: SOMEWHAT DIFFICULT

## 2018-09-18 ASSESSMENT — PATIENT HEALTH QUESTIONNAIRE - PHQ9: 5. POOR APPETITE OR OVEREATING: NEARLY EVERY DAY

## 2018-09-18 ASSESSMENT — PAIN SCALES - GENERAL: PAINLEVEL: MODERATE PAIN (5)

## 2018-09-18 NOTE — NURSING NOTE
Patient presents to clinic for medication management and ongoing back pain.    Mary Finley LPN............9/18/2018 4:04 PM

## 2018-09-18 NOTE — PROGRESS NOTES
Nursing Notes:   Mary Finley LPN  9/18/2018  4:12 PM  Signed  Patient presents to clinic for medication management and ongoing back pain.    Mary Finley LPN............9/18/2018 4:04 PM      SUBJECTIVE:  Kym Perea  is a 48 year old female who comes in today for follow-up ongoing medication management.  I last saw her in early July.  She had MRI that showed significant bilateral facet arthropathy at L4-5.  She saw 1 of my partners and physical therapy and injections were offered but she declined.  It was my recommendation at her last appointment that she do diagnostic/therapeutic L4-5 bilateral facet injections and I sent a referral for same.  She has not followed through with that.    She continues on medication contract for oxycodone 10 mg up to 6 tablets per day for complex regional pain syndrome involving her left foot and ankle..  She has certification for medical cannabis as well.  She also sees Ramiro Portillo DC for her back.    Past Medical, Family, and Social History reviewed and updated as noted below.   ROS is negative except as noted above       Allergies   Allergen Reactions     Arthrotec GI Disturbance     Indomethacin      Other reaction(s): Dizziness     Meloxicam GI Disturbance   ,   Family History   Problem Relation Age of Onset     Cancer Father      Cancer,Bladder     Other - See Comments Father      Mild hypercholesterolemia     Other - See Comments Mother      scleroderma/lupus/Parkinson's syndrome     Family History Negative Brother      Good Health     Colon Cancer Maternal Grandmother 40     Cancer-colon,Followed by lung  with metastasis to the bone di*   ,   Current Outpatient Prescriptions   Medication     albuterol (PROAIR HFA/PROVENTIL HFA/VENTOLIN HFA) 108 (90 Base) MCG/ACT Inhaler     ALPRAZolam (XANAX) 2 MG tablet     amitriptyline (ELAVIL) 50 MG tablet     amphetamine-dextroamphetamine (ADDERALL) 30 MG per tablet     atenolol (TENORMIN) 100 MG tablet     fluticasone  (FLONASE) 50 MCG/ACT spray     metoprolol succinate (TOPROL-XL) 200 MG 24 hr tablet     order for DME     oxyCODONE IR (ROXICODONE) 10 MG tablet     VITAMIN D, CHOLECALCIFEROL, PO     [DISCONTINUED] amitriptyline (ELAVIL) 50 MG tablet     No current facility-administered medications for this visit.    ,   Past Medical History:   Diagnosis Date     Acquired absence of other organs (CODE)     9/2/2011     Chronic pain syndrome     No Comments Provided     Complex regional pain syndrome I     left ankle     Encounter for other administrative examinations     10/2012,Hydrocodone 10/325mg, #240/mo signed 10/3/12, updated 10/1/ 14     Encounter for other administrative examinations     10/1/2014,MS Contin 30 mg q 8 hours #90 per month.  Morphine sulfate IR 15 mg 2-3 tab tid prn #180 per month     Gastro-esophageal reflux disease without esophagitis     No Comments Provided     Injury of left ankle     1997,requiring surgery     Low back pain     No Comments Provided     Major depressive disorder, recurrent severe without psychotic features (H)     No Comments Provided     Migraine without status migrainosus, not intractable     No Comments Provided     Nicotine dependence, uncomplicated     No Comments Provided     Obesity     No Comments Provided     Overweight     11/25/2014     Pain in ankle     Chronic left ankle pain secondary to reflex sympathetic dystrophy.  Patient  has had 4 previous surgeries, the most recent one done by Dr. Noriega in  2007. On Narcotic contract.     Pain in thoracic spine     No Comments Provided     Panic disorder without agoraphobia     Dr Reynoso     Personal history of other diseases of the female genital tract     No Comments Provided     Personal history of other medical treatment (CODE)     G2, P1-0-1-1 with one vaginal delivery.     Supraventricular tachycardia (H)     5/24/2010   ,   Patient Active Problem List    Diagnosis Date Noted     Medical cannabis use 07/10/2018      Priority: Medium     Pain in joint, ankle and foot 01/29/2018     Priority: Medium     Overview:   Chronic left ankle pain secondary to reflex sympathetic dystrophy.  Patient   has had 4 previous surgeries, the most recent one done by Dr. Noriega in   2007. On Narcotic contract.       Chronic pain disorder 01/29/2018     Priority: Medium     Overview:   complex regional pain syndrome left ankle       Esophageal reflux 01/29/2018     Priority: Medium     Hyperlipidemia 01/29/2018     Priority: Medium     Insomnia 01/29/2018     Priority: Medium     Panic disorder 01/29/2018     Priority: Medium     Overview:   Previously patient of Dr. Reynoso since her teens. Patient is attempting slow taper down on her Xanax. She's been on Xanax 2 mg 4 times a day for years       Tobacco abuse 01/29/2018     Priority: Medium     Influenza A 01/11/2018     Priority: Medium     Pain medication agreement 4/18/18 04/21/2017     Priority: Medium     Overview:   4/20/17:  Oxycodone 10 mg #180/mo    Hydrocodone 10/325mg, #240/mo. Stopped and changed to morphine. Agreement signed 10/3/12, updated 10/1/ 14.  Complex regional pain syndrome (aka Reflex Sympathetic Dystrophy)  MS Contin 30 mg q 8 hours #90 per month.  Morphine sulfate IR 15 mg 2-3 tab tid prn #180 per month, decreased to #90/month.   query 4/9/16 as part of chart review. Seems appropriate. 6/2/16 tapered and off Morphine. [place] 5/325 #120/month.  query appropriate. ToxAssure appropriate.    August 2016 Consultation with [facility]. Recommended spinal cord stimulator trial, but she is reluctant to pursue that. Pain clinic stated that continuing opioids was OK.       Complex regional pain syndrome type 1 affecting left lower leg 04/15/2016     Priority: Medium     Overview:   Patient with a history of complex regional pain syndrome has seen Dr. Dino Skelton4/23/16        Migraine headache 11/25/2014     Priority: Medium     Overweight (BMI 25.0-29.9)  "11/25/2014     Priority: Medium     Lumbago 02/06/2012     Priority: Medium     Major depressive disorder, recurrent episode, severe (H) 07/13/2011     Priority: Medium     Paroxysmal supraventricular tachycardia (H) 05/24/2010     Priority: Medium   ,   Past Surgical History:   Procedure Laterality Date     ANKLE SURGERY      1997, 2000,Left ankle surgery x 2     HYSTERECTOMY TOTAL ABDOMINAL      11/1999,secondary to endometriosis, no malignancy; patient reports no malignancy, but abnormal cells were present *     LAPAROSCOPIC CHOLECYSTECTOMY      No Comments Provided     LAPAROSCOPY DIAGNOSTIC (GENERAL)      1995     OTHER SURGICAL HISTORY      21299,CRANIO/MAXILLOFACIAL SURGERY,Jaw Surgery     OTHER SURGICAL HISTORY      207040,[facility] EMG,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  No other abnormalities noted.    and   Social History   Substance Use Topics     Smoking status: Current Every Day Smoker     Packs/day: 1.00     Years: 30.00     Types: Cigarettes     Start date: 12/2/1986     Smokeless tobacco: Never Used     Alcohol use 0.0 oz/week      Comment: Alcoholic Drinks/day: occasional     OBJECTIVE:  /74 (BP Location: Right arm, Patient Position: Sitting, Cuff Size: Adult Regular)  Pulse 76  Temp 97.3  F (36.3  C) (Tympanic)  Resp 20  Ht 5' 9\" (1.753 m)  Wt 180 lb 4 oz (81.8 kg)  Breastfeeding? No  BMI 26.62 kg/m2   EXAM:  Alert cooperative, no distress.  Affect is broad ranging and appropriate.  ASSESSMENT/Plan :    Kym was seen today for medication follow-up.    Diagnoses and all orders for this visit:    Complex regional pain syndrome type 1 affecting left lower leg  -     Drug  Screen Comprehensive , Urine with Reported Meds (MedTox) (Pain Care Package)  -     Discontinue: oxyCODONE IR (ROXICODONE) 10 MG tablet; Take 1 tablet (10 mg) by mouth every 4 hours as needed for moderate to severe pain Maximum 6 tab per day  -     Discontinue: oxyCODONE IR (ROXICODONE) 10 MG tablet; " Take 1 tablet (10 mg) by mouth every 4 hours as needed for moderate to severe pain Maximum 6 tab per day  -     oxyCODONE IR (ROXICODONE) 10 MG tablet; Take 1 tablet (10 mg) by mouth every 4 hours as needed for moderate to severe pain Maximum 6 tab per day    Chronic pain disorder  -     Drug  Screen Comprehensive , Urine with Reported Meds (MedTox) (Pain Care Package)  -     Discontinue: oxyCODONE IR (ROXICODONE) 10 MG tablet; Take 1 tablet (10 mg) by mouth every 4 hours as needed for moderate to severe pain Maximum 6 tab per day  -     Discontinue: oxyCODONE IR (ROXICODONE) 10 MG tablet; Take 1 tablet (10 mg) by mouth every 4 hours as needed for moderate to severe pain Maximum 6 tab per day  -     oxyCODONE IR (ROXICODONE) 10 MG tablet; Take 1 tablet (10 mg) by mouth every 4 hours as needed for moderate to severe pain Maximum 6 tab per day    Pain of joint of left ankle and foot  -     Drug  Screen Comprehensive , Urine with Reported Meds (MedTox) (Pain Care Package)  -     Discontinue: oxyCODONE IR (ROXICODONE) 10 MG tablet; Take 1 tablet (10 mg) by mouth every 4 hours as needed for moderate to severe pain Maximum 6 tab per day  -     Discontinue: oxyCODONE IR (ROXICODONE) 10 MG tablet; Take 1 tablet (10 mg) by mouth every 4 hours as needed for moderate to severe pain Maximum 6 tab per day  -     oxyCODONE IR (ROXICODONE) 10 MG tablet; Take 1 tablet (10 mg) by mouth every 4 hours as needed for moderate to severe pain Maximum 6 tab per day    Pain medication agreement 4/18/18  -     Drug  Screen Comprehensive , Urine with Reported Meds (MedTox) (Pain Care Package)  -     Discontinue: oxyCODONE IR (ROXICODONE) 10 MG tablet; Take 1 tablet (10 mg) by mouth every 4 hours as needed for moderate to severe pain Maximum 6 tab per day  -     Discontinue: oxyCODONE IR (ROXICODONE) 10 MG tablet; Take 1 tablet (10 mg) by mouth every 4 hours as needed for moderate to severe pain Maximum 6 tab per day  -     oxyCODONE IR  (ROXICODONE) 10 MG tablet; Take 1 tablet (10 mg) by mouth every 4 hours as needed for moderate to severe pain Maximum 6 tab per day    Medical cannabis use  -     Drug  Screen Comprehensive , Urine with Reported Meds (MedTox) (Pain Care Package)    Pain medication agreement  -     Drug  Screen Comprehensive , Urine with Reported Meds (MedTox) (Pain Care Package)  -     Discontinue: oxyCODONE IR (ROXICODONE) 10 MG tablet; Take 1 tablet (10 mg) by mouth every 4 hours as needed for moderate to severe pain Maximum 6 tab per day  -     Discontinue: oxyCODONE IR (ROXICODONE) 10 MG tablet; Take 1 tablet (10 mg) by mouth every 4 hours as needed for moderate to severe pain Maximum 6 tab per day  -     oxyCODONE IR (ROXICODONE) 10 MG tablet; Take 1 tablet (10 mg) by mouth every 4 hours as needed for moderate to severe pain Maximum 6 tab per day       query is appropriate.  ToxAssure today.  Renewal on oxycodone 10 mg #180×3.    Encouraged her to pursue the facet injections as ordered.  She has a lot of anxiety around this because she is afraid of needles.    She has been working on weight loss and was congratulated that things are heading in the right direction.    Still has not quit smoking and was encouraged to do so.    A total of 15 minutes was spent with the patient, greater than 50% of the time was spent in counseling/discussion of the aforementioned concerns.     Darrin Stephen MD

## 2018-09-18 NOTE — MR AVS SNAPSHOT
"              After Visit Summary   9/18/2018    Kym Perea    MRN: [id number]           Patient Information     Date Of Birth          1970        Visit Information        Provider Department      9/18/2018 3:45 PM Darrin Stephen MD [facility]        Today's Diagnoses     Complex regional pain syndrome type 1 affecting left lower leg    -  1    Chronic pain disorder        Pain of joint of left ankle and foot        Pain medication agreement 4/18/18        Medical cannabis use        Pain medication agreement           Follow-ups after your visit        Who to contact     If you have questions or need follow up information about today's clinic visit or your schedule please contact [facility] directly at [phone].  Normal or non-critical lab and imaging results will be communicated to you by MyChart, letter or phone within 4 business days after the clinic has received the results. If you do not hear from us within 7 days, please contact the clinic through MyChart or phone. If you have a critical or abnormal lab result, we will notify you by phone as soon as possible.  Submit refill requests through [employer] or call your pharmacy and they will forward the refill request to us. Please allow 3 business days for your refill to be completed.          Additional Information About Your Visit        Care EveryWhere ID     This is your Care EveryWhere ID. This could be used by other organizations to access your [facility] medical records  [id number]        Your Vitals Were     Pulse Temperature Respirations Height Breastfeeding? BMI (Body Mass Index)    76 97.3  F (36.3  C) (Tympanic) 20 5' 9\" (1.753 m) No 26.62 kg/m2       Blood Pressure from Last 3 Encounters:   09/18/18 116/74   07/10/18 136/84   06/14/18 124/80    Weight from Last 3 Encounters:   09/18/18 180 lb 4 oz (81.8 kg)   06/14/18 185 lb (83.9 kg)   06/12/18 185 lb (83.9 kg)              We Performed " the Following     Drug  Screen Comprehensive , Urine with Reported Meds (MedTox) (Pain Care Package)          Today's Medication Changes          These changes are accurate as of 9/18/18  5:55 PM.  If you have any questions, ask your nurse or doctor.               Start taking these medicines.        Dose/Directions    oxyCODONE IR 10 MG tablet   Commonly known as:  ROXICODONE   Used for:  Pain of joint of left ankle and foot, Complex regional pain syndrome type 1 affecting left lower leg, Pain medication agreement, Chronic pain disorder   Started by:  Darrin Stephen MD        Dose:  10 mg   Take 1 tablet (10 mg) by mouth every 4 hours as needed for moderate to severe pain Maximum 6 tab per day   Quantity:  180 tablet   Refills:  0         These medicines have changed or have updated prescriptions.        Dose/Directions    amitriptyline 50 MG tablet   Commonly known as:  ELAVIL   This may have changed:    - when to take this  - reasons to take this   Changed by:  Darrin Stephen MD        Dose:  50 mg   Take 1 tablet (50 mg) by mouth daily as needed for sleep   Quantity:  60 tablet   Refills:  0            Where to get your medicines      Some of these will need a paper prescription and others can be bought over the counter.  Ask your nurse if you have questions.     Bring a paper prescription for each of these medications     oxyCODONE IR 10 MG tablet               Information about OPIOIDS     PRESCRIPTION OPIOIDS: WHAT YOU NEED TO KNOW   We gave you an opioid (narcotic) pain medicine. It is important to manage your pain, but opioids are not always the best choice. You should first try all the other options your care team gave you. Take this medicine for as short a time (and as few doses) as possible.    Some activities can increase your pain, such as bandage changes or therapy sessions. It may help to take your pain medicine 30 to 60 minutes before these activities. Reduce your stress by getting enough  sleep, working on hobbies you enjoy and practicing relaxation or meditation. Talk to your care team about ways to manage your pain beyond prescription opioids.    These medicines have risks:    DO NOT drive when on new or higher doses of pain medicine. These medicines can affect your alertness and reaction times, and you could be arrested for driving under the influence (DUI). If you need to use opioids long-term, talk to your care team about driving.    DO NOT operate heavy machinery    DO NOT do any other dangerous activities while taking these medicines.    DO NOT drink any alcohol while taking these medicines.     If the opioid prescribed includes acetaminophen, DO NOT take with any other medicines that contain acetaminophen. Read all labels carefully. Look for the word  acetaminophen  or  Tylenol.  Ask your pharmacist if you have questions or are unsure.    You can get addicted to pain medicines, especially if you have a history of addiction (chemical, alcohol or substance dependence). Talk to your care team about ways to reduce this risk.    All opioids tend to cause constipation. Drink plenty of water and eat foods that have a lot of fiber, such as fruits, vegetables, prune juice, apple juice and high-fiber cereal. Take a laxative (Miralax, milk of magnesia, Colace, Senna) if you don t move your bowels at least every other day. Other side effects include upset stomach, sleepiness, dizziness, throwing up, tolerance (needing more of the medicine to have the same effect), physical dependence and slowed breathing.    Store your pills in a secure place, locked if possible. We will not replace any lost or stolen medicine. If you don t finish your medicine, please throw away (dispose) as directed by your pharmacist. The [place] [employer]ution [employer] has more information about safe disposal: https://www.pca.state.[place]/living-green/managing-unwanted-medications         Primary Care Provider Office Phone # Fax #     Darrin Stephen [phone]       [address] [facility] [address]        Equal Access to Services     LANDON THOMASON : Jeanette aad ku hadmarilinevette Sharonnoreen, kit shimaviancaha, koffita kaimani herman, jeff dodson yinkaекатерина goodwin lajanetzhanna vishal. So [facility] [phone].    ATENCIÓN: Si habla español, tiene a ahmadi disposición servicios gratuitos de asistencia lingüística. Llame al [phone].    We comply with applicable federal civil rights laws and [place] laws. We do not discriminate on the basis of race, color, national origin, age, disability, sex, sexual orientation, or gender identity.            Thank you!     Thank you for choosing [facility] AND [facility]  for your care. Our goal is always to provide you with excellent care. Hearing back from our patients is one way we can continue to improve our services. Please take a few minutes to complete the written survey that you may receive in the mail after your visit with us. Thank you!             Your Updated Medication List - Protect others around you: Learn how to safely use, store and throw away your medicines at www.disposemymeds.org.          This list is accurate as of 9/18/18  5:55 PM.  Always use your most recent med list.                   Brand Name Dispense Instructions for use Diagnosis    albuterol 108 (90 Base) MCG/ACT inhaler    PROAIR HFA/PROVENTIL HFA/VENTOLIN HFA    1 Inhaler    Inhale 2 puffs into the lungs every 4 hours as needed for shortness of breath / dyspnea or wheezing    Mild intermittent asthma without complication       ALPRAZolam 2 MG tablet    XANAX     Take 1 tablet 3 times daily prn        amitriptyline 50 MG tablet    ELAVIL    60 tablet    Take 1 tablet (50 mg) by mouth daily as needed for sleep        amphetamine-dextroamphetamine 30 MG per tablet    ADDERALL     Take 30 mg by mouth        atenolol 100 MG tablet    TENORMIN    90 tablet    Take 1 tablet (100 mg) by mouth daily    Paroxysmal  supraventricular tachycardia (H)       fluticasone 50 MCG/ACT spray    FLONASE     2 sprays        metoprolol succinate 200 MG 24 hr tablet    TOPROL-XL     Take 200 mg by mouth daily        order for DME     1 each    Equipment being ordered: right arm sling    Fall, initial encounter, Pain of right upper extremity       oxyCODONE IR 10 MG tablet    ROXICODONE    180 tablet    Take 1 tablet (10 mg) by mouth every 4 hours as needed for moderate to severe pain Maximum 6 tab per day    Pain of joint of left ankle and foot, Complex regional pain syndrome type 1 affecting left lower leg, Pain medication agreement, Chronic pain disorder       VITAMIN D (CHOLECALCIFEROL) PO      Take 1,000 Units by mouth daily

## 2018-09-20 ASSESSMENT — PATIENT HEALTH QUESTIONNAIRE - PHQ9: SUM OF ALL RESPONSES TO PHQ QUESTIONS 1-9: 13

## 2018-09-20 ASSESSMENT — ASTHMA QUESTIONNAIRES: ACT_TOTALSCORE: 23

## 2018-09-20 ASSESSMENT — ANXIETY QUESTIONNAIRES: GAD7 TOTAL SCORE: 18

## 2018-09-23 LAB — PAIN DRUG SCR UR W RPTD MEDS: NORMAL

## 2018-10-16 ENCOUNTER — TELEPHONE (OUTPATIENT)
Dept: FAMILY MEDICINE | Facility: OTHER | Age: 48
End: 2018-10-16

## 2018-10-16 NOTE — TELEPHONE ENCOUNTER
Spoke with Dr. Maddox from [facility] ([phone]).  Patient was they are asking for pain medications and refill of oxycodone and Xanax.  Patient is under contract with us for oxycodone and should have prescriptions for October and November.  Her alprazolam and Adderall are actually prescribed by her psychiatrist.  They did not fill any of her medications.  Seeking medications elsewhere is not appropriate given her contract.  She did not mention the fact that she has a contract with me to the physician she saw in [place].  They did not fill any prescriptions for her.     Darrin Stephen MD on 10/16/2018 at 3:20 PM

## 2018-10-16 NOTE — TELEPHONE ENCOUNTER
States they hurt their back in [place], saw a doctor and that the doctor stated they were going to call [facility]. She would like to know why they would call

## 2018-11-05 ENCOUNTER — HOSPITAL ENCOUNTER (EMERGENCY)
Facility: OTHER | Age: 48
Discharge: HOME | End: 2018-11-05
Attending: PHYSICIAN ASSISTANT | Admitting: PHYSICIAN ASSISTANT
Payer: MEDICARE

## 2018-11-05 VITALS
SYSTOLIC BLOOD PRESSURE: 114 MMHG | DIASTOLIC BLOOD PRESSURE: 71 MMHG | HEIGHT: 69 IN | BODY MASS INDEX: 24.73 KG/M2 | WEIGHT: 167 LBS | RESPIRATION RATE: 14 BRPM | OXYGEN SATURATION: 100 % | TEMPERATURE: 96.8 F

## 2018-11-05 DIAGNOSIS — F41.0 ANXIETY ATTACK: ICD-10-CM

## 2018-11-05 PROCEDURE — 99283 EMERGENCY DEPT VISIT LOW MDM: CPT | Mod: Z6 | Performed by: PHYSICIAN ASSISTANT

## 2018-11-05 PROCEDURE — 96372 THER/PROPH/DIAG INJ SC/IM: CPT | Performed by: PHYSICIAN ASSISTANT

## 2018-11-05 PROCEDURE — 99284 EMERGENCY DEPT VISIT MOD MDM: CPT | Mod: 25 | Performed by: PHYSICIAN ASSISTANT

## 2018-11-05 PROCEDURE — 25000128 H RX IP 250 OP 636: Performed by: PHYSICIAN ASSISTANT

## 2018-11-05 RX ORDER — LORAZEPAM 2 MG/ML
2 INJECTION INTRAMUSCULAR ONCE
Status: COMPLETED | OUTPATIENT
Start: 2018-11-05 | End: 2018-11-05

## 2018-11-05 RX ADMIN — LORAZEPAM 2 MG: 2 INJECTION INTRAMUSCULAR; INTRAVENOUS at 15:53

## 2018-11-05 ASSESSMENT — ENCOUNTER SYMPTOMS
DIFFICULTY URINATING: 0
HALLUCINATIONS: 0
NERVOUS/ANXIOUS: 1
FEVER: 0
COLOR CHANGE: 0
HYPERACTIVE: 0
HEADACHES: 0
EYE REDNESS: 0
NECK STIFFNESS: 0
SHORTNESS OF BREATH: 0
CONFUSION: 0
SLEEP DISTURBANCE: 0
DECREASED CONCENTRATION: 0
ABDOMINAL PAIN: 0
DYSPHORIC MOOD: 0
ARTHRALGIAS: 0

## 2018-11-05 NOTE — ED PROVIDER NOTES
History     Chief Complaint   Patient presents with     Mental Health Problem     HPI Comments: This is a 48-year-old female who has a history of lumbago, chronic pain disorder, complex regional pain syndrome affecting her left lower leg, anxiety with panic disorder.  Currently on Xanax as well as medical cannabis for her pain.  Reports that maybe once or twice a year she has issues where her medications did not help with her anxiety and this is 1 of them.  She denies any suicidal thoughts no depression no suicidal ideation.  She is here mainly for shot of Ativan to get ahead of her anxiety.  No fever no chills, no sore throat, no lightheadedness or dizziness, no nausea or vomiting, no diarrhea or constipation.    The history is provided by the patient.         Problem List:    Patient Active Problem List    Diagnosis Date Noted     Medical cannabis use 07/10/2018     Priority: Medium     Pain in joint, ankle and foot 01/29/2018     Priority: Medium     Overview:   Chronic left ankle pain secondary to reflex sympathetic dystrophy.  Patient   has had 4 previous surgeries, the most recent one done by Dr. Noriega in   2007. On Narcotic contract.       Chronic pain disorder 01/29/2018     Priority: Medium     Overview:   complex regional pain syndrome left ankle       Esophageal reflux 01/29/2018     Priority: Medium     Hyperlipidemia 01/29/2018     Priority: Medium     Insomnia 01/29/2018     Priority: Medium     Panic disorder 01/29/2018     Priority: Medium     Overview:   Previously patient of Dr. Reynoso since her teens. Patient is attempting slow taper down on her Xanax. She's been on Xanax 2 mg 4 times a day for years       Tobacco abuse 01/29/2018     Priority: Medium     Influenza A 01/11/2018     Priority: Medium     Pain medication agreement 4/18/18 04/21/2017     Priority: Medium     Overview:   4/20/17:  Oxycodone 10 mg #180/mo    Hydrocodone 10/325mg, #240/mo. Stopped and changed to morphine.  Agreement signed 10/3/12, updated 10/1/ 14.  Complex regional pain syndrome (aka Reflex Sympathetic Dystrophy)  MS Contin 30 mg q 8 hours #90 per month.  Morphine sulfate IR 15 mg 2-3 tab tid prn #180 per month, decreased to #90/month.   query 4/9/16 as part of chart review. Seems appropriate. 6/2/16 tapered and off Morphine. [place] 5/325 #120/month.  query appropriate. ToxAssure appropriate.    August 2016 Consultation with [facility]. Recommended spinal cord stimulator trial, but she is reluctant to pursue that. Pain clinic stated that continuing opioids was OK.       Complex regional pain syndrome type 1 affecting left lower leg 04/15/2016     Priority: Medium     Overview:   Patient with a history of complex regional pain syndrome has seen Dr. Dino Skelton4/23/16        Migraine headache 11/25/2014     Priority: Medium     Overweight (BMI 25.0-29.9) 11/25/2014     Priority: Medium     Lumbago 02/06/2012     Priority: Medium     Major depressive disorder, recurrent episode, severe (H) 07/13/2011     Priority: Medium     Paroxysmal supraventricular tachycardia (H) 05/24/2010     Priority: Medium        Past Medical History:    Past Medical History:   Diagnosis Date     Acquired absence of other organs (CODE)      Chronic pain syndrome      Complex regional pain syndrome I      Encounter for other administrative examinations      Encounter for other administrative examinations      Gastro-esophageal reflux disease without esophagitis      Injury of left ankle      Low back pain      Major depressive disorder, recurrent severe without psychotic features (H)      Migraine without status migrainosus, not intractable      Nicotine dependence, uncomplicated      Obesity      Overweight      Pain in ankle      Pain in thoracic spine      Panic disorder without agoraphobia      Personal history of other diseases of the female genital tract      Personal history of other medical treatment (CODE)       Supraventricular tachycardia (H)        Past Surgical History:    Past Surgical History:   Procedure Laterality Date     ANKLE SURGERY      1997, 2000,Left ankle surgery x 2     HYSTERECTOMY TOTAL ABDOMINAL      11/1999,secondary to endometriosis, no malignancy; patient reports no malignancy, but abnormal cells were present *     LAPAROSCOPIC CHOLECYSTECTOMY      No Comments Provided     LAPAROSCOPY DIAGNOSTIC (GENERAL)      1995     OTHER SURGICAL HISTORY      21299,CRANIO/MAXILLOFACIAL SURGERY,Jaw Surgery     OTHER SURGICAL HISTORY      207040,[facility] EMG,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  No other abnormalities noted.       Family History:    Family History   Problem Relation Age of Onset     Cancer Father      Cancer,Bladder     Other - See Comments Father      Mild hypercholesterolemia     Other - See Comments Mother      scleroderma/lupus/Parkinson's syndrome     Family History Negative Brother      Good Health     Colon Cancer Maternal Grandmother 40     Cancer-colon,Followed by lung  with metastasis to the bone di*       Social History:  Marital Status:   [4]  Social History   Substance Use Topics     Smoking status: Current Every Day Smoker     Packs/day: 1.00     Years: 30.00     Types: Cigarettes     Start date: 12/2/1986     Smokeless tobacco: Never Used     Alcohol use 0.0 oz/week      Comment: Alcoholic Drinks/day: occasional        Medications:      albuterol (PROAIR HFA/PROVENTIL HFA/VENTOLIN HFA) 108 (90 Base) MCG/ACT Inhaler   ALPRAZolam (XANAX) 2 MG tablet   amitriptyline (ELAVIL) 50 MG tablet   amphetamine-dextroamphetamine (ADDERALL) 30 MG per tablet   atenolol (TENORMIN) 100 MG tablet   oxyCODONE IR (ROXICODONE) 10 MG tablet   VITAMIN D, CHOLECALCIFEROL, PO   fluticasone (FLONASE) 50 MCG/ACT spray   metoprolol succinate (TOPROL-XL) 200 MG 24 hr tablet   order for DME         Review of Systems   Constitutional: Negative for fever.   HENT: Negative for congestion.    Eyes:  "Negative for redness.   Respiratory: Negative for shortness of breath.    Cardiovascular: Negative for chest pain.   Gastrointestinal: Negative for abdominal pain.   Genitourinary: Negative for difficulty urinating.   Musculoskeletal: Negative for arthralgias and neck stiffness.   Skin: Negative for color change.   Neurological: Negative for headaches.   Psychiatric/Behavioral: Negative for behavioral problems, confusion, decreased concentration, dysphoric mood, hallucinations, self-injury, sleep disturbance and suicidal ideas. The patient is nervous/anxious. The patient is not hyperactive.        Physical Exam   BP: 114/71  Heart Rate: 65  Temp: 96.8  F (36  C)  Resp: 14  Height: 175.3 cm (5' 9\")  Weight: 75.8 kg (167 lb)  SpO2: 100 %      Physical Exam   Constitutional: She is oriented to person, place, and time. No distress.   HENT:   Head: Atraumatic.   Mouth/Throat: Oropharynx is clear and moist. No oropharyngeal exudate.   Eyes: Pupils are equal, round, and reactive to light. No scleral icterus.   Cardiovascular: Normal heart sounds and intact distal pulses.    Pulmonary/Chest: Breath sounds normal. No respiratory distress.   Abdominal: Soft. Bowel sounds are normal. There is no tenderness.   Musculoskeletal: She exhibits no edema or tenderness.   Neurological: She is alert and oriented to person, place, and time.   Skin: Skin is warm. No rash noted. She is not diaphoretic.       ED Course     ED Course     Procedures             No results found for this or any previous visit (from the past 24 hour(s)).    Medications   LORazepam (ATIVAN) injection 2 mg (2 mg Intramuscular Given 11/5/18 2543)       Assessments & Plan (with Medical Decision Making)     I have reviewed the nursing notes.    I have reviewed the findings, diagnosis, plan and need for follow up with the patient.      New Prescriptions    No medications on file       Final diagnoses:   Anxiety attack     Afebrile.  Vital signs stable.  History of " anxiety and panic attacks.  Currently on Xanax as well as medical marijuana for chronic pain syndrome.  She was given 2 mg Ativan IM.  She was observed over an extended time frame with improvement of her anxiety attack.  She feels ready to return home at this time.  She does have Xanax for chronic long-term use.  Discussed close follow-up with her primary care provider if her symptoms worsen for further evaluation as needed.  11/5/2018   [facility]     Librado Sheets PA-C  11/05/18 8219

## 2018-11-05 NOTE — ED AVS SNAPSHOT
[facility]    [address]    [place] [address]    Phone:  [phone]    Fax:  [phone]                                       Kym Perea   MRN: [id number]    Department:  [facility]   Date of Visit:  11/5/2018           After Visit Summary Signature Page     I have received my discharge instructions, and my questions have been answered. I have discussed any challenges I see with this plan with the nurse or doctor.    ..........................................................................................................................................  Patient/Patient Representative Signature      ..........................................................................................................................................  Patient Representative Print Name and Relationship to Patient    ..................................................               ................................................  Date                                   Time    ..........................................................................................................................................  Reviewed by Signature/Title    ...................................................              ..............................................  Date                                               Time          22EPIC Rev 08/18

## 2018-11-05 NOTE — ED AVS SNAPSHOT
[facility]    [address]    [place] [address]    Phone:  [phone]    Fax:  [phone]                                       Kym Perea   MRN: [id number]    Department:  [facility]   Date of Visit:  11/5/2018           Patient Information     Date Of Birth          1970        Your diagnoses for this visit were:     Anxiety attack        You were seen by Librado Sheets PA-C.      24 Hour Appointment Hotline     To schedule an appointment at [facility], please call [phone]. If you don't have a family doctor or clinic, we will help you find one. [facility] clinics are conveniently located to serve the needs of you and your family.           Review of your medicines      Our records show that you are taking the medicines listed below. If these are incorrect, please call your family doctor or clinic.        Dose / Directions Last dose taken    albuterol 108 (90 Base) MCG/ACT inhaler   Commonly known as:  PROAIR HFA/PROVENTIL HFA/VENTOLIN HFA   Dose:  2 puff   Quantity:  1 Inhaler        Inhale 2 puffs into the lungs every 4 hours as needed for shortness of breath / dyspnea or wheezing   Refills:  11        ALPRAZolam 2 MG tablet   Commonly known as:  XANAX        Take 1 tablet 3 times daily prn   Refills:  0        amitriptyline 50 MG tablet   Commonly known as:  ELAVIL   Dose:  50 mg   Quantity:  60 tablet        Take 1 tablet (50 mg) by mouth daily as needed for sleep   Refills:  0        amphetamine-dextroamphetamine 30 MG per tablet   Commonly known as:  ADDERALL   Dose:  30 mg        Take 30 mg by mouth   Refills:  0        atenolol 100 MG tablet   Commonly known as:  TENORMIN   Dose:  100 mg   Quantity:  90 tablet        Take 1 tablet (100 mg) by mouth daily   Refills:  3        fluticasone 50 MCG/ACT spray   Commonly known as:  FLONASE   Dose:  2 spray        2 sprays   Refills:  0        metoprolol succinate 200 MG 24 hr  "tablet   Commonly known as:  TOPROL-XL   Dose:  200 mg        Take 200 mg by mouth daily   Refills:  0        order for DME   Quantity:  1 each        Equipment being ordered: right arm sling   Refills:  0        oxyCODONE IR 10 MG tablet   Commonly known as:  ROXICODONE   Dose:  10 mg   Quantity:  180 tablet        Take 1 tablet (10 mg) by mouth every 4 hours as needed for moderate to severe pain Maximum 6 tab per day   Refills:  0        VITAMIN D (CHOLECALCIFEROL) PO   Dose:  1000 Units        Take 1,000 Units by mouth daily   Refills:  0                Orders Needing Specimen Collection     None      Pending Results     No orders found from 11/3/2018 to 11/6/2018.            Pending Culture Results     No orders found from 11/3/2018 to 11/6/2018.            Pending Results Instructions     If you had any lab results that were not finalized at the time of your Discharge, you can call the ED Lab Result RN at [phone]. You will be contacted by this team for any positive Lab results or changes in treatment. The nurses are available 7 days a week from 10A to 6:30P.  You can leave a message 24 hours per day and they will return your call.        Thank you for choosing [facility]       Thank you for choosing [facility] for your care. Our goal is always to provide you with excellent care. Hearing back from our patients is one way we can continue to improve our services. Please take a few minutes to complete the written survey that you may receive in the mail after you visit with us. Thank you!        [employer] Information     [employer] lets you send messages to your doctor, view your test results, renew your prescriptions, schedule appointments and more. To sign up, go to [email or web address].[email or web address]org/[employer] . Click on \"Log in\" on the left side of the screen, which will take you to the Welcome page. Then click on \"Sign up Now\" on the right side of the page.     You will be asked to enter the access code listed below, as well as some " personal information. Please follow the directions to create your username and password.     Your access code is: [email or web address]  Expires: 2/3/2019  4:22 PM     Your access code will  in 90 days. If you need help or a new code, please call your [facility] clinic or [phone].        Care EveryWhere ID     This is your Care EveryWhere ID. This could be used by other organizations to access your [facility] medical records  [id number]        Equal Access to Services     [facility] : Hadnai resendizo Sonoreen, waaxda luqadaha, qaybta kaalmada adeespinoza, jeff del toro . So [facility] [phone].    ATENCIÓN: Si habla español, tiene a ahmadi disposición servicios gratuitos de asistencia lingüística. Llame al [phone].    We comply with applicable federal civil rights laws and [place] laws. We do not discriminate on the basis of race, color, national origin, age, disability, sex, sexual orientation, or gender identity.            After Visit Summary       This is your record. Keep this with you and show to your community pharmacist(s) and doctor(s) at your next visit.

## 2018-11-05 NOTE — ED TRIAGE NOTES
"Patient has history of anxiety is on Xanax at home for panic attacks.  States \" Once in awhile my anxiety gets out of control and the Xanax doesn't help.\"  Patient states that she has multiple stressors in her life right now.   Patient denies thoughts of suicide or hurting herself, is restless in chair.      "

## 2018-11-19 DIAGNOSIS — I47.10 PAROXYSMAL SUPRAVENTRICULAR TACHYCARDIA (H): ICD-10-CM

## 2018-11-20 RX ORDER — ATENOLOL 100 MG/1
TABLET ORAL
Qty: 90 TABLET | Refills: 3 | OUTPATIENT
Start: 2018-11-20

## 2018-11-20 NOTE — TELEPHONE ENCOUNTER
Redundant Refill Request for Atenolol refused;    Medication Detail         Disp Refills Start End MERISSA     atenolol (TENORMIN) 100 MG tablet 90 tablet 3 5/21/2018  --     Sig - Route: Take 1 tablet (100 mg) by mouth daily - Oral     Class: E-Prescribe     Order: [id number]     E-Prescribing Status: Receipt confirmed by pharmacy (5/21/2018 11:23 AM CDT)       Printout Tracking      External Result Report       Pharmacy      [id number] - [place], [place] - [address]     Unable to complete prescription refill per RN Medication Refill Policy. Lyle Patterson 11/20/2018 4:38 PM

## 2018-11-30 DIAGNOSIS — I47.10 PAROXYSMAL SUPRAVENTRICULAR TACHYCARDIA (H): ICD-10-CM

## 2018-12-03 RX ORDER — ATENOLOL 100 MG/1
TABLET ORAL
Qty: 90 TABLET | Refills: 3 | OUTPATIENT
Start: 2018-12-03

## 2018-12-03 NOTE — TELEPHONE ENCOUNTER
Redundant Refill Request for Atenolol refused;    Medication Detail         Disp Refills Start End MERISSA     atenolol (TENORMIN) 100 MG tablet 90 tablet 3 5/21/2018  --     Sig - Route: Take 1 tablet (100 mg) by mouth daily - Oral     Class: E-Prescribe     Order: [id number]     E-Prescribing Status: Receipt confirmed by pharmacy (5/21/2018 11:23 AM CDT)       Printout Tracking      External Result Report       Pharmacy      [id number] - [place], [place] - [address]     Unable to complete prescription refill per RN Medication Refill Policy. Lyle Patterson 12/3/2018 11:16 AM

## 2018-12-19 ENCOUNTER — TELEPHONE (OUTPATIENT)
Dept: FAMILY MEDICINE | Facility: OTHER | Age: 48
End: 2018-12-19

## 2018-12-19 NOTE — TELEPHONE ENCOUNTER
The patient was told that Darrin Stephen MD can not see her before Jan. 2 nd.  Petra Swanson LPN..................12/19/2018   2:48 PM

## 2018-12-19 NOTE — TELEPHONE ENCOUNTER
J-Pt has appt to see you for her med chk, Jan 2nd but is out of meds. Please advise. Thank you.  Yoana Mojica

## 2018-12-20 ENCOUNTER — OFFICE VISIT (OUTPATIENT)
Dept: FAMILY MEDICINE | Facility: OTHER | Age: 48
End: 2018-12-20
Attending: NURSE PRACTITIONER
Payer: MEDICARE

## 2018-12-20 VITALS
DIASTOLIC BLOOD PRESSURE: 80 MMHG | SYSTOLIC BLOOD PRESSURE: 112 MMHG | WEIGHT: 165.38 LBS | BODY MASS INDEX: 24.42 KG/M2 | HEART RATE: 66 BPM

## 2018-12-20 DIAGNOSIS — G90.522 COMPLEX REGIONAL PAIN SYNDROME TYPE 1 AFFECTING LEFT LOWER LEG: ICD-10-CM

## 2018-12-20 DIAGNOSIS — G89.4 CHRONIC PAIN DISORDER: ICD-10-CM

## 2018-12-20 DIAGNOSIS — Z02.89 PAIN MEDICATION AGREEMENT: ICD-10-CM

## 2018-12-20 DIAGNOSIS — Z79.899 MEDICATION MANAGEMENT: Primary | ICD-10-CM

## 2018-12-20 DIAGNOSIS — M25.572 PAIN OF JOINT OF LEFT ANKLE AND FOOT: ICD-10-CM

## 2018-12-20 PROCEDURE — 99214 OFFICE O/P EST MOD 30 MIN: CPT | Performed by: NURSE PRACTITIONER

## 2018-12-20 PROCEDURE — G0463 HOSPITAL OUTPT CLINIC VISIT: HCPCS

## 2018-12-20 RX ORDER — OXYCODONE HYDROCHLORIDE 10 MG/1
10 TABLET ORAL EVERY 4 HOURS PRN
Qty: 84 TABLET | Refills: 0 | Status: SHIPPED | OUTPATIENT
Start: 2018-12-20 | End: 2019-01-02

## 2018-12-20 ASSESSMENT — PAIN SCALES - GENERAL: PAINLEVEL: EXTREME PAIN (8)

## 2018-12-20 NOTE — NURSING NOTE
Patient presents to clinic today for medication refill on oxycodone.     No LMP recorded. Patient has had a hysterectomy.  Medication Reconciliation: complete     Declines PHQ and LAINE    Olga Perez LPN  12/20/2018 9:08 AM

## 2018-12-20 NOTE — PROGRESS NOTES
HPI:    Kym Perea is a 48 year old female who presents to clinic today for medication management. Her PCP is Dr Stephen. Has routine evaluation with him every 3 months. Has chronic pain related to complex regional pain syndrome of ankle. Takes OxyContin 10 mg up to every 4 hours, she is working on medical cannabis. Trying to get OxyContin and xanax down with Phuong Magallanes. Has follow-up with Dr Stephen 1/2/2019 but is out of medications currently. Has been getting refills about the 20th monthly.     Past Medical History:   Diagnosis Date     Acquired absence of other organs (CODE)     9/2/2011     Chronic pain syndrome     No Comments Provided     Complex regional pain syndrome I     left ankle     Encounter for other administrative examinations     10/2012,Hydrocodone 10/325mg, #240/mo signed 10/3/12, updated 10/1/ 14     Encounter for other administrative examinations     10/1/2014,MS Contin 30 mg q 8 hours #90 per month.  Morphine sulfate IR 15 mg 2-3 tab tid prn #180 per month     Gastro-esophageal reflux disease without esophagitis     No Comments Provided     Injury of left ankle     1997,requiring surgery     Low back pain     No Comments Provided     Major depressive disorder, recurrent severe without psychotic features (H)     No Comments Provided     Migraine without status migrainosus, not intractable     No Comments Provided     Nicotine dependence, uncomplicated     No Comments Provided     Obesity     No Comments Provided     Overweight     11/25/2014     Pain in ankle     Chronic left ankle pain secondary to reflex sympathetic dystrophy.  Patient  has had 4 previous surgeries, the most recent one done by Dr. Noriega in  2007. On Narcotic contract.     Pain in thoracic spine     No Comments Provided     Panic disorder without agoraphobia     Dr Reynoso     Personal history of other diseases of the female genital tract     No Comments Provided     Personal history of other medical treatment  (CODE)     G2, P1-0-1-1 with one vaginal delivery.     Supraventricular tachycardia (H)     5/24/2010       Past Surgical History:   Procedure Laterality Date     ANKLE SURGERY      1997, 2000,Left ankle surgery x 2     HYSTERECTOMY TOTAL ABDOMINAL      11/1999,secondary to endometriosis, no malignancy; patient reports no malignancy, but abnormal cells were present *     LAPAROSCOPIC CHOLECYSTECTOMY      No Comments Provided     LAPAROSCOPY DIAGNOSTIC (GENERAL)      1995     OTHER SURGICAL HISTORY      21299,CRANIO/MAXILLOFACIAL SURGERY,Jaw Surgery     OTHER SURGICAL HISTORY      207040,[facility] EMG,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  No other abnormalities noted.       Family History   Problem Relation Age of Onset     Cancer Father         Cancer,Bladder     Other - See Comments Father         Mild hypercholesterolemia     Other - See Comments Mother         scleroderma/lupus/Parkinson's syndrome     Family History Negative Brother         Good Health     Colon Cancer Maternal Grandmother 40        Cancer-colon,Followed by lung  with metastasis to the bone di*       Social History     Socioeconomic History     Marital status:      Spouse name: Not on file     Number of children: Not on file     Years of education: Not on file     Highest education level: Not on file   Social Needs     Financial resource strain: Not on file     Food insecurity - worry: Not on file     Food insecurity - inability: Not on file     Transportation needs - medical: Not on file     Transportation needs - non-medical: Not on file   Occupational History     Not on file   Tobacco Use     Smoking status: Current Every Day Smoker     Packs/day: 1.00     Years: 30.00     Pack years: 30.00     Types: Cigarettes     Start date: 12/2/1986     Smokeless tobacco: Never Used   Substance and Sexual Activity     Alcohol use: Yes     Alcohol/week: 0.0 oz     Comment: Alcoholic Drinks/day: occasional     Drug use: No     Comment:  Drug use: No     Sexual activity: Not Currently     Partners: Male   Other Topics Concern     Parent/sibling w/ CABG, MI or angioplasty before 65F 55M? 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 She has a daughter who is now 20, lives in [place]       Current Outpatient Medications   Medication Sig Dispense Refill     albuterol (PROAIR HFA/PROVENTIL HFA/VENTOLIN HFA) 108 (90 Base) MCG/ACT Inhaler Inhale 2 puffs into the lungs every 4 hours as needed for shortness of breath / dyspnea or wheezing 1 Inhaler 11     ALPRAZolam (XANAX) 2 MG tablet Take 1 tablet 3 times daily prn       amphetamine-dextroamphetamine (ADDERALL) 30 MG per tablet Take 30 mg by mouth       atenolol (TENORMIN) 100 MG tablet Take 1 tablet (100 mg) by mouth daily 90 tablet 3     fluticasone (FLONASE) 50 MCG/ACT spray 2 sprays       oxyCODONE IR (ROXICODONE) 10 MG tablet Take 1 tablet (10 mg) by mouth every 4 hours as needed for moderate to severe pain Maximum 6 tab per day 84 tablet 0     VITAMIN D, CHOLECALCIFEROL, PO Take 1,000 Units by mouth daily         Allergies   Allergen Reactions     Arthrotec GI Disturbance     Indomethacin      Other reaction(s): Dizziness     Meloxicam GI Disturbance       ROS:  Pertinent positives and negatives are noted in HPI.    EXAM:  General appearance: well appearing female, in no acute distress  Respiratory: clear to auscultation bilaterally  Cardiac: RRR with no murmurs  Musculoskeletal: slow to change positions  Dermatological: no rashes or lesions  Psychological: normal affect, alert and pleasant    PHQ Depression Screen  PHQ-9 SCORE 5/21/2018 6/12/2018 9/18/2018   PHQ-9 Total Score 13 10 13       ASSESSMENT AND PLAN:    1. Medication management    2. Complex regional pain syndrome type 1 affecting left lower leg    3. Pain medication agreement 4/18/18    4. Pain of joint of left ankle and foot    5. Pain medication  agreement    6. Chronic pain disorder      [place]  reviewed and scanned into [place]. This is appropriate with no concerning findings. Last drug screen 9/18/2018 with findings as expected. Refilled OxyContin to bridge until follow-up with PCP 1/2/2019. I spent approximately 30 minutes with the patient (exclusive of separately billed services/procedures), with greater than 50% spent in counseling, prognosis, risks and benefits of management or follow-up.  Reviewed importance of compliance with chosen treatment options and follow-up.  Risk factor reduction and patient education and coordinating care, establishing and/or reviewing the patient's medical record also completed during today's exam .        Chela Nolan..................12/20/2018 9:06 AM

## 2019-01-02 ENCOUNTER — OFFICE VISIT (OUTPATIENT)
Dept: FAMILY MEDICINE | Facility: OTHER | Age: 49
End: 2019-01-02
Attending: FAMILY MEDICINE
Payer: MEDICARE

## 2019-01-02 VITALS
DIASTOLIC BLOOD PRESSURE: 88 MMHG | SYSTOLIC BLOOD PRESSURE: 134 MMHG | HEART RATE: 76 BPM | BODY MASS INDEX: 23.78 KG/M2 | TEMPERATURE: 98.9 F | WEIGHT: 161 LBS | RESPIRATION RATE: 16 BRPM

## 2019-01-02 DIAGNOSIS — G90.522 COMPLEX REGIONAL PAIN SYNDROME TYPE 1 AFFECTING LEFT LOWER LEG: ICD-10-CM

## 2019-01-02 DIAGNOSIS — Z02.89 PAIN MEDICATION AGREEMENT: ICD-10-CM

## 2019-01-02 DIAGNOSIS — F43.21 GRIEF REACTION: ICD-10-CM

## 2019-01-02 DIAGNOSIS — Z72.0 TOBACCO ABUSE: ICD-10-CM

## 2019-01-02 DIAGNOSIS — M25.572 PAIN OF JOINT OF LEFT ANKLE AND FOOT: Primary | ICD-10-CM

## 2019-01-02 DIAGNOSIS — G89.4 CHRONIC PAIN DISORDER: ICD-10-CM

## 2019-01-02 DIAGNOSIS — F41.0 PANIC DISORDER: ICD-10-CM

## 2019-01-02 DIAGNOSIS — Z79.899 MEDICAL CANNABIS USE: ICD-10-CM

## 2019-01-02 PROCEDURE — G0463 HOSPITAL OUTPT CLINIC VISIT: HCPCS

## 2019-01-02 PROCEDURE — 99214 OFFICE O/P EST MOD 30 MIN: CPT | Performed by: FAMILY MEDICINE

## 2019-01-02 RX ORDER — OXYCODONE HYDROCHLORIDE 10 MG/1
10 TABLET ORAL EVERY 4 HOURS PRN
Qty: 180 TABLET | Refills: 0 | Status: SHIPPED | OUTPATIENT
Start: 2019-01-02 | End: 2019-04-03

## 2019-01-02 RX ORDER — OXYCODONE HYDROCHLORIDE 10 MG/1
10 TABLET ORAL EVERY 4 HOURS PRN
Qty: 180 TABLET | Refills: 0 | Status: SHIPPED | OUTPATIENT
Start: 2019-01-02 | End: 2019-01-02

## 2019-01-02 ASSESSMENT — PAIN SCALES - GENERAL: PAINLEVEL: WORST PAIN (10)

## 2019-01-02 NOTE — NURSING NOTE
"Chief Complaint   Patient presents with     Recheck Medication       Initial /88   Pulse 76   Temp 98.9  F (37.2  C) (Temporal)   Resp 16   Wt 73 kg (161 lb)   Breastfeeding? No   BMI 23.78 kg/m   Estimated body mass index is 23.78 kg/m  as calculated from the following:    Height as of 11/5/18: 1.753 m (5' 9\").    Weight as of this encounter: 73 kg (161 lb).  Medication Reconciliation: complete    Petra Swanson LPN  "

## 2019-01-02 NOTE — PROGRESS NOTES
"Nursing Notes:   Petra Swanson LPN  2019 11:40 AM  Signed  Chief Complaint   Patient presents with     Recheck Medication       Initial /88   Pulse 76   Temp 98.9  F (37.2  C) (Temporal)   Resp 16   Wt 73 kg (161 lb)   Breastfeeding? No   BMI 23.78 kg/m    Estimated body mass index is 23.78 kg/m  as calculated from the following:    Height as of 18: 1.753 m (5' 9\").    Weight as of this encounter: 73 kg (161 lb).  Medication Reconciliation: complete    Petra Swanson LPN    SUBJECTIVE:  Kym Perea  is a 48 year old female who comes in today for follow-up and medication management.    I last saw her in September.  She has complex regional pain syndrome regarding her left foot and ankle.  She also has bilateral facet arthropathy at L4-5 and we had recommended diagnostic/therapeutic L4-5 bilateral facet injections but she did not want to do that.  She continues to use medical cannabis and follows with Ramiro Portillo DC for her back. She has been adjusted on the medical cannabis recently but the cost is significant.  It is helpful.     She was seen by Chela Nolan NP on  because she forgot to make an appointment with me and could not get in.  She was given a small prescription to get her through to today when she had a scheduled appointment.    Jerry (her former boyfriend)  of an esophageal variceal rupture this weekend. He was 47.     Her panic has been increased.     She has redness of both eyes.      CHRONIC PAIN MANAGEMENT:    DIAGNOSIS:      1. Pain of joint of left ankle and foot    2. Complex regional pain syndrome type 1 affecting left lower leg    3. Medical cannabis use    4. Panic disorder    5. Tobacco abuse    6. Chronic pain disorder    7. Pain medication agreement    8. Grief reaction        PAIN MEDICATION AGREEMENT: (YES)    DATE SIGNED: 16, 17, 18      NON-MEDICATION MODALITIES MAXIMIZED? (YES) had surgery 2 years ago with Dr. Skelton, " but has not seen him for a while.       NEUROPATHIC PAIN: (YES)  ON GABAPENTIN/LYRICA/TCA/SNRI: (YES) dose increased up to QID, but now on 600 mg daily.        NOCICEPTIVE PAIN: (YES)      HISTORY OF CD: (NO)      MENTAL HEALTH DIAGNOSIS: (YES)  DIAGNOSIS:Anxiety  ON BENZODIAZEPINES: (YES)  DISCLOSURE REGARDING RISK OF CONCOMITANT USE WITH OPIOIDS DISCUSSED: (YES)  DATE DISCUSSED: 5/16/16 and 6/2/16 and 8/30/16, 1/12/17, 1/2/19      MEDICATION: Oxycodone 10 mg 4-6 tab per day, #180/month.       ORAL MORPHINE EQUIVALENTS: 90 OME/day      LAST TAPER TRIAL: just completed 5/16/16.  Off Morphine and then on Norco at much lower dose, gradual increase to now at 90 OME oxycodone per day max.        QUERY TODAY: (YES)  APPROPRIATE?:         (YES) the  is incorrect as it notes that I am the prescriber of her benzodiazepine's and I am not. They are prescribed by Phuong Magallanes NP. She is working to taper them.       TOXASSURE TODAY: (NO)  IF NO, DATE OF LAST TOXASURE: 1/12/17, 4/20/17, 7/30/17( test was also positive for ETG in addition to amphetamines and benzodiazepines, along with oxycodone). 1/10/18 no opiates but appropriate for other meds. 9/18/18 appropriate but no opiates.        PAIN SCORE FLOWSHEET REVIEWED: (YES)  STABLE OR IMPROVED: (YES)      AUDIT-10 QUESTIONNAIRE COMPLETED: (NO)      OSWESTRY PAIN DISABILITY INDEX: (NO)  No flowsheet data found.             Past Medical, Family, and Social History reviewed and updated as noted below.   ROS is negative except as noted above       Allergies   Allergen Reactions     Arthrotec GI Disturbance     Indomethacin      Other reaction(s): Dizziness     Meloxicam GI Disturbance   ,   Family History   Problem Relation Age of Onset     Cancer Father         Cancer,Bladder     Other - See Comments Father         Mild hypercholesterolemia     Other - See Comments Mother         scleroderma/lupus/Parkinson's syndrome     Family History Negative Brother         Good Health      Colon Cancer Maternal Grandmother 40        Cancer-colon,Followed by lung  with metastasis to the bone di*   ,   Current Outpatient Medications   Medication     albuterol (PROAIR HFA/PROVENTIL HFA/VENTOLIN HFA) 108 (90 Base) MCG/ACT Inhaler     ALPRAZolam (XANAX) 2 MG tablet     amphetamine-dextroamphetamine (ADDERALL) 30 MG per tablet     atenolol (TENORMIN) 100 MG tablet     fluticasone (FLONASE) 50 MCG/ACT spray     oxyCODONE IR (ROXICODONE) 10 MG tablet     VITAMIN D, CHOLECALCIFEROL, PO     No current facility-administered medications for this visit.    ,   Past Medical History:   Diagnosis Date     Acquired absence of other organs (CODE)     9/2/2011     Chronic pain syndrome     No Comments Provided     Complex regional pain syndrome I     left ankle     Encounter for other administrative examinations     10/2012,Hydrocodone 10/325mg, #240/mo signed 10/3/12, updated 10/1/ 14     Encounter for other administrative examinations     10/1/2014,MS Contin 30 mg q 8 hours #90 per month.  Morphine sulfate IR 15 mg 2-3 tab tid prn #180 per month     Gastro-esophageal reflux disease without esophagitis     No Comments Provided     Injury of left ankle     1997,requiring surgery     Low back pain     No Comments Provided     Major depressive disorder, recurrent severe without psychotic features (H)     No Comments Provided     Migraine without status migrainosus, not intractable     No Comments Provided     Nicotine dependence, uncomplicated     No Comments Provided     Obesity     No Comments Provided     Overweight     11/25/2014     Pain in ankle     Chronic left ankle pain secondary to reflex sympathetic dystrophy.  Patient  has had 4 previous surgeries, the most recent one done by Dr. Noriega in  2007. On Narcotic contract.     Pain in thoracic spine     No Comments Provided     Panic disorder without agoraphobia     Dr Reynoso     Personal history of other diseases of the female genital tract     No  Comments Provided     Personal history of other medical treatment (CODE)     G2, P1-0-1-1 with one vaginal delivery.     Supraventricular tachycardia (H)     5/24/2010   ,   Patient Active Problem List    Diagnosis Date Noted     Medical cannabis use 07/10/2018     Priority: Medium     Pain in joint, ankle and foot 01/29/2018     Priority: Medium     Overview:   Chronic left ankle pain secondary to reflex sympathetic dystrophy.  Patient   has had 4 previous surgeries, the most recent one done by Dr. Noriega in   2007. On Narcotic contract.       Chronic pain disorder 01/29/2018     Priority: Medium     Overview:   complex regional pain syndrome left ankle       Esophageal reflux 01/29/2018     Priority: Medium     Hyperlipidemia 01/29/2018     Priority: Medium     Insomnia 01/29/2018     Priority: Medium     Panic disorder 01/29/2018     Priority: Medium     Overview:   Previously patient of Dr. Reynoso since her teens. Patient is attempting slow taper down on her Xanax. She's been on Xanax 2 mg 4 times a day for years       Tobacco abuse 01/29/2018     Priority: Medium     Influenza A 01/11/2018     Priority: Medium     Pain medication agreement 4/18/18 04/21/2017     Priority: Medium     Overview:   4/20/17:  Oxycodone 10 mg #180/mo    Hydrocodone 10/325mg, #240/mo. Stopped and changed to morphine. Agreement signed 10/3/12, updated 10/1/ 14.  Complex regional pain syndrome (aka Reflex Sympathetic Dystrophy)  MS Contin 30 mg q 8 hours #90 per month.  Morphine sulfate IR 15 mg 2-3 tab tid prn #180 per month, decreased to #90/month.   query 4/9/16 as part of chart review. Seems appropriate. 6/2/16 tapered and off Morphine. [place] 5/325 #120/month.  query appropriate. ToxAssure appropriate.    August 2016 Consultation with [facility]. Recommended spinal cord stimulator trial, but she is reluctant to pursue that. Pain clinic stated that continuing opioids was OK.       Complex regional pain  syndrome type 1 affecting left lower leg 04/15/2016     Priority: Medium     Overview:   Patient with a history of complex regional pain syndrome has seen Dr. Dino Skelton4/23/16        Migraine headache 11/25/2014     Priority: Medium     Overweight (BMI 25.0-29.9) 11/25/2014     Priority: Medium     Lumbago 02/06/2012     Priority: Medium     Major depressive disorder, recurrent episode, severe (H) 07/13/2011     Priority: Medium     Paroxysmal supraventricular tachycardia (H) 05/24/2010     Priority: Medium   ,   Past Surgical History:   Procedure Laterality Date     ANKLE SURGERY      1997, 2000,Left ankle surgery x 2     HYSTERECTOMY TOTAL ABDOMINAL      11/1999,secondary to endometriosis, no malignancy; patient reports no malignancy, but abnormal cells were present *     LAPAROSCOPIC CHOLECYSTECTOMY      No Comments Provided     LAPAROSCOPY DIAGNOSTIC (GENERAL)      1995     OTHER SURGICAL HISTORY      21299,CRANIO/MAXILLOFACIAL SURGERY,Jaw Surgery     OTHER SURGICAL HISTORY      207040,[facility] EMG,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  No other abnormalities noted.    and   Social History     Tobacco Use     Smoking status: Current Every Day Smoker     Packs/day: 1.00     Years: 30.00     Pack years: 30.00     Types: Cigarettes     Start date: 12/2/1986     Smokeless tobacco: Never Used   Substance Use Topics     Alcohol use: Yes     Alcohol/week: 0.0 oz     Comment: Alcoholic Drinks/day: occasional     OBJECTIVE:  /88   Pulse 76   Temp 98.9  F (37.2  C) (Temporal)   Resp 16   Wt 73 kg (161 lb)   Breastfeeding? No   BMI 23.78 kg/m     EXAM:  Alert cooperative, no distress.  Has conjunctival injection of her left side no preauricular nodes are palpable.  Right eye is less injected.  Extraocular movements are intact.  Affect is appropriately sad with grief reaction.   query is appropriate.  ASSESSMENT/Plan :    Kym was seen today for recheck medication.    Diagnoses and all  orders for this visit:    Pain of joint of left ankle and foot  -     Discontinue: oxyCODONE IR (ROXICODONE) 10 MG tablet; Take 1 tablet (10 mg) by mouth every 4 hours as needed for moderate to severe pain Maximum 6 tab per day  -     Discontinue: oxyCODONE IR (ROXICODONE) 10 MG tablet; Take 1 tablet (10 mg) by mouth every 4 hours as needed for moderate to severe pain Maximum 6 tab per day  -     oxyCODONE IR (ROXICODONE) 10 MG tablet; Take 1 tablet (10 mg) by mouth every 4 hours as needed for moderate to severe pain Maximum 6 tab per day    Complex regional pain syndrome type 1 affecting left lower leg  -     Discontinue: oxyCODONE IR (ROXICODONE) 10 MG tablet; Take 1 tablet (10 mg) by mouth every 4 hours as needed for moderate to severe pain Maximum 6 tab per day  -     Discontinue: oxyCODONE IR (ROXICODONE) 10 MG tablet; Take 1 tablet (10 mg) by mouth every 4 hours as needed for moderate to severe pain Maximum 6 tab per day  -     oxyCODONE IR (ROXICODONE) 10 MG tablet; Take 1 tablet (10 mg) by mouth every 4 hours as needed for moderate to severe pain Maximum 6 tab per day    Medical cannabis use    Panic disorder    Tobacco abuse    Chronic pain disorder  -     Discontinue: oxyCODONE IR (ROXICODONE) 10 MG tablet; Take 1 tablet (10 mg) by mouth every 4 hours as needed for moderate to severe pain Maximum 6 tab per day  -     Discontinue: oxyCODONE IR (ROXICODONE) 10 MG tablet; Take 1 tablet (10 mg) by mouth every 4 hours as needed for moderate to severe pain Maximum 6 tab per day  -     oxyCODONE IR (ROXICODONE) 10 MG tablet; Take 1 tablet (10 mg) by mouth every 4 hours as needed for moderate to severe pain Maximum 6 tab per day    Pain medication agreement  -     Discontinue: oxyCODONE IR (ROXICODONE) 10 MG tablet; Take 1 tablet (10 mg) by mouth every 4 hours as needed for moderate to severe pain Maximum 6 tab per day  -     Discontinue: oxyCODONE IR (ROXICODONE) 10 MG tablet; Take 1 tablet (10 mg) by mouth every  4 hours as needed for moderate to severe pain Maximum 6 tab per day  -     oxyCODONE IR (ROXICODONE) 10 MG tablet; Take 1 tablet (10 mg) by mouth every 4 hours as needed for moderate to severe pain Maximum 6 tab per day    Grief reaction      Recommend she see the eye doctor about her eye.    Support and encouragement offered regarding her grief.      query is appropriate.  Renewal on oxycodone 10 mg #180 x 3.  Cautioned her again about use of benzodiazepines and opiates.  She has been trying to decrease her oxycodone intake especially with medical cannabis and was encouraged to continue to do so.    A total of 25 minutes was spent with the patient, greater than 50% of the time was spent in counseling/discussion of the aforementioned concerns.     Darrin Stephen MD

## 2019-02-28 ENCOUNTER — HOSPITAL ENCOUNTER (EMERGENCY)
Facility: OTHER | Age: 49
Discharge: HOME | End: 2019-02-28
Attending: FAMILY MEDICINE | Admitting: FAMILY MEDICINE
Payer: MEDICARE

## 2019-02-28 VITALS
TEMPERATURE: 97.4 F | HEART RATE: 74 BPM | DIASTOLIC BLOOD PRESSURE: 94 MMHG | RESPIRATION RATE: 20 BRPM | SYSTOLIC BLOOD PRESSURE: 168 MMHG | HEIGHT: 69 IN | OXYGEN SATURATION: 100 % | WEIGHT: 159 LBS | BODY MASS INDEX: 23.55 KG/M2

## 2019-02-28 DIAGNOSIS — G43.809 OTHER MIGRAINE WITHOUT STATUS MIGRAINOSUS, NOT INTRACTABLE: ICD-10-CM

## 2019-02-28 PROCEDURE — 99284 EMERGENCY DEPT VISIT MOD MDM: CPT | Mod: 25 | Performed by: FAMILY MEDICINE

## 2019-02-28 PROCEDURE — 99283 EMERGENCY DEPT VISIT LOW MDM: CPT | Mod: Z6 | Performed by: FAMILY MEDICINE

## 2019-02-28 PROCEDURE — 25800030 ZZH RX IP 258 OP 636: Performed by: FAMILY MEDICINE

## 2019-02-28 PROCEDURE — 25000128 H RX IP 250 OP 636: Performed by: FAMILY MEDICINE

## 2019-02-28 PROCEDURE — 96374 THER/PROPH/DIAG INJ IV PUSH: CPT | Performed by: FAMILY MEDICINE

## 2019-02-28 PROCEDURE — 96375 TX/PRO/DX INJ NEW DRUG ADDON: CPT | Performed by: FAMILY MEDICINE

## 2019-02-28 RX ORDER — DIPHENHYDRAMINE HYDROCHLORIDE 50 MG/ML
25 INJECTION INTRAMUSCULAR; INTRAVENOUS ONCE
Status: COMPLETED | OUTPATIENT
Start: 2019-02-28 | End: 2019-02-28

## 2019-02-28 RX ORDER — SODIUM CHLORIDE, SODIUM LACTATE, POTASSIUM CHLORIDE, CALCIUM CHLORIDE 600; 310; 30; 20 MG/100ML; MG/100ML; MG/100ML; MG/100ML
1000 INJECTION, SOLUTION INTRAVENOUS CONTINUOUS
Status: DISCONTINUED | OUTPATIENT
Start: 2019-02-28 | End: 2019-02-28 | Stop reason: HOSPADM

## 2019-02-28 RX ADMIN — DIPHENHYDRAMINE HYDROCHLORIDE 25 MG: 50 INJECTION INTRAMUSCULAR; INTRAVENOUS at 21:27

## 2019-02-28 RX ADMIN — SODIUM CHLORIDE, SODIUM LACTATE, POTASSIUM CHLORIDE, AND CALCIUM CHLORIDE 1000 ML: 600; 310; 30; 20 INJECTION, SOLUTION INTRAVENOUS at 21:28

## 2019-02-28 RX ADMIN — PROCHLORPERAZINE EDISYLATE 10 MG: 5 INJECTION INTRAMUSCULAR; INTRAVENOUS at 21:27

## 2019-02-28 ASSESSMENT — MIFFLIN-ST. JEOR: SCORE: 1420.6

## 2019-02-28 NOTE — ED AVS SNAPSHOT
[facility]  [address]  [place] [address]  Phone:  [phone]  Fax:  [phone]                                    Kym Perea   MRN: [id number]    Department:  [facility]   Date of Visit:  2/28/2019           After Visit Summary Signature Page    I have received my discharge instructions, and my questions have been answered. I have discussed any challenges I see with this plan with the nurse or doctor.    ..........................................................................................................................................  Patient/Patient Representative Signature      ..........................................................................................................................................  Patient Representative Print Name and Relationship to Patient    ..................................................               ................................................  Date                                   Time    ..........................................................................................................................................  Reviewed by Signature/Title    ...................................................              ..............................................  Date                                               Time          22EPIC Rev 08/18

## 2019-03-01 ASSESSMENT — ENCOUNTER SYMPTOMS
EYE REDNESS: 0
ABDOMINAL PAIN: 0
CONFUSION: 0
FEVER: 0
POLYPHAGIA: 0
POLYDIPSIA: 0
ARTHRALGIAS: 0
HEADACHES: 1
DIFFICULTY URINATING: 0
NECK STIFFNESS: 0
COLOR CHANGE: 0
PHOTOPHOBIA: 1
SHORTNESS OF BREATH: 0

## 2019-03-01 NOTE — ED PROVIDER NOTES
History   No chief complaint on file.    HPI  Kym Perea is a 48 year old female who comes into the ER with her usual migraine.  Nurse's notes below were reviewed, similar history is related to me.  Nausea without vomiting.  Headache came on slowly.  Her usual home meds are not working.  Pt presents to ED with c/o migraine. Pt reports she developed a HA today and it has gotten worse, no home medication is helping. Pt reports right sided pressure in her head and face. Pt is sensitive to light and is nauseated.  Allergies:  Allergies   Allergen Reactions     Arthrotec GI Disturbance     Indomethacin      Other reaction(s): Dizziness     Meloxicam GI Disturbance       Problem List:    Patient Active Problem List    Diagnosis Date Noted     Medical cannabis use 07/10/2018     Priority: Medium     Pain in joint, ankle and foot 01/29/2018     Priority: Medium     Overview:   Chronic left ankle pain secondary to reflex sympathetic dystrophy.  Patient   has had 4 previous surgeries, the most recent one done by Dr. Noriega in   2007. On Narcotic contract.       Chronic pain disorder 01/29/2018     Priority: Medium     Overview:   complex regional pain syndrome left ankle       Esophageal reflux 01/29/2018     Priority: Medium     Hyperlipidemia 01/29/2018     Priority: Medium     Insomnia 01/29/2018     Priority: Medium     Panic disorder 01/29/2018     Priority: Medium     Overview:   Previously patient of Dr. Reynoso since her teens. Patient is attempting slow taper down on her Xanax. She's been on Xanax 2 mg 4 times a day for years       Tobacco abuse 01/29/2018     Priority: Medium     Influenza A 01/11/2018     Priority: Medium     Pain medication agreement 4/18/18 04/21/2017     Priority: Medium     Overview:   4/20/17:  Oxycodone 10 mg #180/mo    Hydrocodone 10/325mg, #240/mo. Stopped and changed to morphine. Agreement signed 10/3/12, updated 10/1/ 14.  Complex regional pain syndrome (aka Reflex  Sympathetic Dystrophy)  MS Contin 30 mg q 8 hours #90 per month.  Morphine sulfate IR 15 mg 2-3 tab tid prn #180 per month, decreased to #90/month.   query 4/9/16 as part of chart review. Seems appropriate. 6/2/16 tapered and off Morphine. [place] 5/325 #120/month.  query appropriate. ToxAssure appropriate.    August 2016 Consultation with [facility]. Recommended spinal cord stimulator trial, but she is reluctant to pursue that. Pain clinic stated that continuing opioids was OK.       Complex regional pain syndrome type 1 affecting left lower leg 04/15/2016     Priority: Medium     Overview:   Patient with a history of complex regional pain syndrome has seen Dr. Dino Skelton4/23/16        Migraine headache 11/25/2014     Priority: Medium     Overweight (BMI 25.0-29.9) 11/25/2014     Priority: Medium     Lumbago 02/06/2012     Priority: Medium     Major depressive disorder, recurrent episode, severe (H) 07/13/2011     Priority: Medium     Paroxysmal supraventricular tachycardia (H) 05/24/2010     Priority: Medium        Past Medical History:    Past Medical History:   Diagnosis Date     Acquired absence of other organs (CODE)      Chronic pain syndrome      Complex regional pain syndrome I      Encounter for other administrative examinations      Encounter for other administrative examinations      Gastro-esophageal reflux disease without esophagitis      Injury of left ankle      Low back pain      Major depressive disorder, recurrent severe without psychotic features (H)      Migraine without status migrainosus, not intractable      Nicotine dependence, uncomplicated      Obesity      Overweight      Pain in ankle      Pain in thoracic spine      Panic disorder without agoraphobia      Personal history of other diseases of the female genital tract      Personal history of other medical treatment (CODE)      Supraventricular tachycardia (H)        Past Surgical History:    Past Surgical History:    Procedure Laterality Date     ANKLE SURGERY      1997, 2000,Left ankle surgery x 2     HYSTERECTOMY TOTAL ABDOMINAL      11/1999,secondary to endometriosis, no malignancy; patient reports no malignancy, but abnormal cells were present *     LAPAROSCOPIC CHOLECYSTECTOMY      No Comments Provided     LAPAROSCOPY DIAGNOSTIC (GENERAL)      1995     OTHER SURGICAL HISTORY      21299,CRANIO/MAXILLOFACIAL SURGERY,Jaw Surgery     OTHER SURGICAL HISTORY      207040,[facility] EMG,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  No other abnormalities noted.       Family History:    Family History   Problem Relation Age of Onset     Cancer Father         Cancer,Bladder     Other - See Comments Father         Mild hypercholesterolemia     Other - See Comments Mother         scleroderma/lupus/Parkinson's syndrome     Family History Negative Brother         Good Health     Colon Cancer Maternal Grandmother 40        Cancer-colon,Followed by lung  with metastasis to the bone di*       Social History:  Marital Status:   [4]  Social History     Tobacco Use     Smoking status: Current Every Day Smoker     Packs/day: 1.00     Years: 30.00     Pack years: 30.00     Types: Cigarettes     Start date: 12/2/1986     Smokeless tobacco: Never Used   Substance Use Topics     Alcohol use: Yes     Alcohol/week: 0.0 oz     Comment: Alcoholic Drinks/day: occasional     Drug use: No     Comment: Drug use: No        Medications:      albuterol (PROAIR HFA/PROVENTIL HFA/VENTOLIN HFA) 108 (90 Base) MCG/ACT Inhaler   ALPRAZolam (XANAX) 2 MG tablet   amphetamine-dextroamphetamine (ADDERALL) 30 MG per tablet   atenolol (TENORMIN) 100 MG tablet   oxyCODONE IR (ROXICODONE) 10 MG tablet   fluticasone (FLONASE) 50 MCG/ACT spray   VITAMIN D, CHOLECALCIFEROL, PO         Review of Systems   Constitutional: Negative for fever.   HENT: Negative for congestion.    Eyes: Positive for photophobia. Negative for redness.   Respiratory: Negative for  "shortness of breath.    Cardiovascular: Negative for chest pain.   Gastrointestinal: Negative for abdominal pain.   Endocrine: Negative for polydipsia, polyphagia and polyuria.   Genitourinary: Negative for difficulty urinating.   Musculoskeletal: Negative for arthralgias and neck stiffness.   Skin: Negative for color change.   Neurological: Positive for headaches.   Psychiatric/Behavioral: Negative for confusion.       Physical Exam   BP: (!) 168/94  Pulse: 74  Temp: 97.4  F (36.3  C)  Resp: 20  Height: 175.3 cm (5' 9\")  Weight: 72.1 kg (159 lb)  SpO2: 100 %      Physical Exam   Constitutional: No distress.   HENT:   Head: Atraumatic.   Mouth/Throat: Oropharynx is clear and moist. No oropharyngeal exudate.   Eyes: Pupils are equal, round, and reactive to light. No scleral icterus.   Cardiovascular: Normal heart sounds and intact distal pulses.   Pulmonary/Chest: Breath sounds normal. No respiratory distress.   Abdominal: Soft. Bowel sounds are normal. There is no tenderness.   Musculoskeletal: She exhibits no edema or tenderness.   Skin: Skin is warm. No rash noted. She is not diaphoretic.   Nursing note and vitals reviewed.      ED Course        Procedures    No results found for this or any previous visit (from the past 24 hour(s)).    Medications   lactated ringers BOLUS 1,000 mL (0 mLs Intravenous Stopped 2/28/19 2143)   diphenhydrAMINE (BENADRYL) injection 25 mg (25 mg Intravenous Given 2/28/19 2127)   prochlorperazine (COMPAZINE) injection 10 mg (10 mg Intravenous Given 2/28/19 2127)       Assessments & Plan (with Medical Decision Making)          Medication List      There are no discharge medications for this visit.     After a relatively short time in the emergency department she is feeling better and asking to go home.  Resume normal outpatient regimen.  Patient verbalized understanding of plan is in agreement she left the ER and considerably improved condition.    Final diagnoses:   Other migraine " without status migrainosus, not intractable       2/28/2019   [facility] Hospital[facility]ksandr MD  03/01/19 [id number]

## 2019-03-01 NOTE — ED TRIAGE NOTES
Pt presents to ED with c/o migraine. Pt reports she developed a HA today and it has gotten worse, no home medication is helping. Pt reports right sided pressure in her head and face. Pt is sensitive to light and is nauseated.    .Staci Small RN on 2/28/2019 at 8:49 PM

## 2019-03-11 ENCOUNTER — OFFICE VISIT (OUTPATIENT)
Dept: FAMILY MEDICINE | Facility: OTHER | Age: 49
End: 2019-03-11
Attending: FAMILY MEDICINE
Payer: MEDICARE

## 2019-03-11 VITALS
SYSTOLIC BLOOD PRESSURE: 110 MMHG | RESPIRATION RATE: 14 BRPM | TEMPERATURE: 97.3 F | DIASTOLIC BLOOD PRESSURE: 68 MMHG | HEART RATE: 56 BPM

## 2019-03-11 DIAGNOSIS — Z86.72 PERSONAL HISTORY OF THROMBOPHLEBITIS: Primary | ICD-10-CM

## 2019-03-11 PROCEDURE — 99213 OFFICE O/P EST LOW 20 MIN: CPT | Performed by: FAMILY MEDICINE

## 2019-03-11 PROCEDURE — G0463 HOSPITAL OUTPT CLINIC VISIT: HCPCS

## 2019-03-11 ASSESSMENT — PAIN SCALES - GENERAL: PAINLEVEL: SEVERE PAIN (6)

## 2019-03-11 NOTE — NURSING NOTE
Patient here for IV site check on the top of right hand. Medication Reconciliation: complete.    Angeli Dorsey LPN  3/11/2019 4:14 PM

## 2019-04-03 ENCOUNTER — OFFICE VISIT (OUTPATIENT)
Dept: FAMILY MEDICINE | Facility: OTHER | Age: 49
End: 2019-04-03
Attending: FAMILY MEDICINE
Payer: MEDICARE

## 2019-04-03 VITALS
SYSTOLIC BLOOD PRESSURE: 122 MMHG | DIASTOLIC BLOOD PRESSURE: 86 MMHG | HEART RATE: 96 BPM | TEMPERATURE: 98 F | RESPIRATION RATE: 16 BRPM

## 2019-04-03 DIAGNOSIS — M25.572 PAIN OF JOINT OF LEFT ANKLE AND FOOT: Primary | ICD-10-CM

## 2019-04-03 DIAGNOSIS — R11.0 NAUSEA: ICD-10-CM

## 2019-04-03 DIAGNOSIS — G89.4 CHRONIC PAIN DISORDER: ICD-10-CM

## 2019-04-03 DIAGNOSIS — G90.522 COMPLEX REGIONAL PAIN SYNDROME TYPE 1 AFFECTING LEFT LOWER LEG: ICD-10-CM

## 2019-04-03 DIAGNOSIS — M26.69 TMJ INFLAMMATION: ICD-10-CM

## 2019-04-03 DIAGNOSIS — Z79.899 MEDICAL CANNABIS USE: ICD-10-CM

## 2019-04-03 DIAGNOSIS — I47.10 PAROXYSMAL SUPRAVENTRICULAR TACHYCARDIA (H): ICD-10-CM

## 2019-04-03 DIAGNOSIS — Z02.89 PAIN MEDICATION AGREEMENT: ICD-10-CM

## 2019-04-03 DIAGNOSIS — Z72.0 TOBACCO ABUSE: ICD-10-CM

## 2019-04-03 DIAGNOSIS — F41.0 PANIC DISORDER: ICD-10-CM

## 2019-04-03 PROCEDURE — G0463 HOSPITAL OUTPT CLINIC VISIT: HCPCS

## 2019-04-03 PROCEDURE — 99214 OFFICE O/P EST MOD 30 MIN: CPT | Performed by: FAMILY MEDICINE

## 2019-04-03 PROCEDURE — 80307 DRUG TEST PRSMV CHEM ANLYZR: CPT | Performed by: FAMILY MEDICINE

## 2019-04-03 RX ORDER — ATENOLOL 100 MG/1
100 TABLET ORAL DAILY
Qty: 90 TABLET | Refills: 3 | Status: SHIPPED | OUTPATIENT
Start: 2019-04-03 | End: 2020-03-18

## 2019-04-03 RX ORDER — PROMETHAZINE HYDROCHLORIDE 25 MG/1
25 TABLET ORAL EVERY 6 HOURS PRN
Qty: 30 TABLET | Refills: 3 | Status: SHIPPED | OUTPATIENT
Start: 2019-04-03 | End: 2019-07-05

## 2019-04-03 RX ORDER — OXYCODONE HYDROCHLORIDE 10 MG/1
10 TABLET ORAL EVERY 4 HOURS PRN
Qty: 180 TABLET | Refills: 0 | Status: SHIPPED | OUTPATIENT
Start: 2019-04-03 | End: 2019-04-03

## 2019-04-03 RX ORDER — OXYCODONE HYDROCHLORIDE 10 MG/1
10 TABLET ORAL EVERY 4 HOURS PRN
Qty: 180 TABLET | Refills: 0 | Status: SHIPPED | OUTPATIENT
Start: 2019-04-03 | End: 2019-07-05

## 2019-04-03 ASSESSMENT — PATIENT HEALTH QUESTIONNAIRE - PHQ9
SUM OF ALL RESPONSES TO PHQ QUESTIONS 1-9: 13
5. POOR APPETITE OR OVEREATING: NEARLY EVERY DAY

## 2019-04-03 ASSESSMENT — ANXIETY QUESTIONNAIRES
3. WORRYING TOO MUCH ABOUT DIFFERENT THINGS: NEARLY EVERY DAY
1. FEELING NERVOUS, ANXIOUS, OR ON EDGE: NEARLY EVERY DAY
6. BECOMING EASILY ANNOYED OR IRRITABLE: NEARLY EVERY DAY
GAD7 TOTAL SCORE: 18
5. BEING SO RESTLESS THAT IT IS HARD TO SIT STILL: NEARLY EVERY DAY
2. NOT BEING ABLE TO STOP OR CONTROL WORRYING: NEARLY EVERY DAY
7. FEELING AFRAID AS IF SOMETHING AWFUL MIGHT HAPPEN: NOT AT ALL
IF YOU CHECKED OFF ANY PROBLEMS ON THIS QUESTIONNAIRE, HOW DIFFICULT HAVE THESE PROBLEMS MADE IT FOR YOU TO DO YOUR WORK, TAKE CARE OF THINGS AT HOME, OR GET ALONG WITH OTHER PEOPLE: VERY DIFFICULT

## 2019-04-03 ASSESSMENT — PAIN SCALES - GENERAL: PAINLEVEL: MODERATE PAIN (4)

## 2019-04-03 NOTE — PROGRESS NOTES
"Nursing Notes:   Petra Swanson LPN  4/3/2019  3:45 PM  Signed  Chief Complaint   Patient presents with     Recheck Medication       Initial /86   Pulse 96   Temp 98  F (36.7  C) (Temporal)   Resp 16   Breastfeeding? No  Estimated body mass index is 23.48 kg/m  as calculated from the following:    Height as of 2/28/19: 1.753 m (5' 9\").    Weight as of 2/28/19: 72.1 kg (159 lb).  Medication Reconciliation: complete    Petra Swanson LPN    SUBJECTIVE:  Kym Perea  is a 49 year old female who comes in today for follow-up and medication management.  I last saw her 3 months ago.  She has complex regional pain syndrome involving her left foot and ankle.  She has bilateral facet arthropathy at L4-5 and we had recommended diagnostic/therapeutic L4-5 bilateral facet injections but she did not want to go ahead with that.  She is followed by Ramiro Portillo DC for her back.  She continues to use medical cannabis but cost is a significant factor.  She does find that it is helpful.    She was seen a few weeks ago with sore hand likely due to a thrombophlebitis from an IV after having been in the emergency room for migraine a week or so prior.    She has had some trouble with persistent diarrhea.  She had her teeth pulled and got dentures fitted and they don't really fit right. She has trouble with her jaw joints. She has been grinding at night. She doesn't have a mouth/.  She goes to [facility].     She has lost nearly 50# in the last year. She has some nausea.     CHRONIC PAIN MANAGEMENT:    DIAGNOSIS:      1. Pain of joint of left ankle and foot    2. Medical cannabis use    3. Tobacco abuse    4. Pain medication agreement 4/18/18, 4/3/19    5. Panic disorder    6. Complex regional pain syndrome type 1 affecting left lower leg    7. Pain medication agreement    8. Chronic pain disorder    9. Paroxysmal supraventricular tachycardia (H)    10. TMJ inflammation    11. Nausea        PAIN MEDICATION " AGREEMENT: (YES)    DATE SIGNED: 5/16/16, 4/20/17, 4/18/18, 4/3/19      NON-MEDICATION MODALITIES MAXIMIZED? (YES) had surgery 2 years ago with Dr. Skelton, but has not seen him for a while.       NEUROPATHIC PAIN: (YES)  ON GABAPENTIN/LYRICA/TCA/SNRI: (YES) dose increased up to QID, but now on 600 mg daily.        NOCICEPTIVE PAIN: (YES)      HISTORY OF CD: (NO)      MENTAL HEALTH DIAGNOSIS: (YES)  DIAGNOSIS:Anxiety  ON BENZODIAZEPINES: (YES)  DISCLOSURE REGARDING RISK OF CONCOMITANT USE WITH OPIOIDS DISCUSSED: (YES)  DATE DISCUSSED: 5/16/16 and 6/2/16 and 8/30/16, 1/12/17, 1/2/19, 4/3/19      MEDICATION: Oxycodone 10 mg 4-6 tab per day, #180/month.       ORAL MORPHINE EQUIVALENTS: 90 OME/day      LAST TAPER TRIAL: just completed 5/16/16.  Off Morphine and then on Norco at much lower dose, gradual increase to now at 90 OME oxycodone per day max.        QUERY TODAY: (YES)  APPROPRIATE?:         (YES) the  is incorrect as it notes that I am the prescriber of her benzodiazepine's and I am not. They are prescribed by Phuong Magallanes NP. She is working to taper them.       TOXASSURE TODAY: (YES)  IF NO, DATE OF LAST TOXASURE: 1/12/17, 4/20/17, 7/30/17( test was also positive for ETG in addition to amphetamines and benzodiazepines, along with oxycodone). 1/10/18 no opiates but appropriate for other meds. 9/18/18 appropriate but no opiates.        PAIN SCORE FLOWSHEET REVIEWED: (YES)  STABLE OR IMPROVED: (YES)      AUDIT-10 QUESTIONNAIRE COMPLETED: (NO)      OSWESTRY PAIN DISABILITY INDEX: (NO)  No flowsheet data found.               Past Medical, Family, and Social History reviewed and updated as noted below.   ROS is negative except as noted above       Allergies   Allergen Reactions     Arthrotec GI Disturbance     Indomethacin      Other reaction(s): Dizziness     Meloxicam GI Disturbance   ,   Family History   Problem Relation Age of Onset     Cancer Father         Cancer,Bladder     Other - See Comments Father          Mild hypercholesterolemia     Other - See Comments Mother         scleroderma/lupus/Parkinson's syndrome     Family History Negative Brother         Good Health     Colon Cancer Maternal Grandmother 40        Cancer-colon,Followed by lung  with metastasis to the bone di*   ,   Current Outpatient Medications   Medication     albuterol (PROAIR HFA/PROVENTIL HFA/VENTOLIN HFA) 108 (90 Base) MCG/ACT Inhaler     ALPRAZolam (XANAX) 2 MG tablet     amphetamine-dextroamphetamine (ADDERALL) 30 MG per tablet     atenolol (TENORMIN) 100 MG tablet     fluticasone (FLONASE) 50 MCG/ACT spray     oxyCODONE IR (ROXICODONE) 10 MG tablet     promethazine (PHENERGAN) 25 MG tablet     VITAMIN D, CHOLECALCIFEROL, PO     No current facility-administered medications for this visit.    ,   Past Medical History:   Diagnosis Date     Acquired absence of other organs (CODE)     9/2/2011     Chronic pain syndrome     No Comments Provided     Complex regional pain syndrome I     left ankle     Encounter for other administrative examinations     10/2012,Hydrocodone 10/325mg, #240/mo signed 10/3/12, updated 10/1/ 14     Encounter for other administrative examinations     10/1/2014,MS Contin 30 mg q 8 hours #90 per month.  Morphine sulfate IR 15 mg 2-3 tab tid prn #180 per month     Gastro-esophageal reflux disease without esophagitis     No Comments Provided     Injury of left ankle     1997,requiring surgery     Low back pain     No Comments Provided     Major depressive disorder, recurrent severe without psychotic features (H)     No Comments Provided     Migraine without status migrainosus, not intractable     No Comments Provided     Nicotine dependence, uncomplicated     No Comments Provided     Obesity     No Comments Provided     Overweight     11/25/2014     Pain in ankle     Chronic left ankle pain secondary to reflex sympathetic dystrophy.  Patient  has had 4 previous surgeries, the most recent one done by Dr. Noriega in  2007.  On Narcotic contract.     Pain in thoracic spine     No Comments Provided     Panic disorder without agoraphobia     Dr Reynoso     Personal history of other diseases of the female genital tract     No Comments Provided     Personal history of other medical treatment (CODE)     G2, P1-0-1-1 with one vaginal delivery.     Supraventricular tachycardia (H)     5/24/2010   ,   Patient Active Problem List    Diagnosis Date Noted     Medical cannabis use 07/10/2018     Priority: Medium     Pain in joint, ankle and foot 01/29/2018     Priority: Medium     Overview:   Chronic left ankle pain secondary to reflex sympathetic dystrophy.  Patient   has had 4 previous surgeries, the most recent one done by Dr. Noriega in   2007. On Narcotic contract.       Chronic pain disorder 01/29/2018     Priority: Medium     Overview:   complex regional pain syndrome left ankle       Esophageal reflux 01/29/2018     Priority: Medium     Hyperlipidemia 01/29/2018     Priority: Medium     Insomnia 01/29/2018     Priority: Medium     Panic disorder 01/29/2018     Priority: Medium     Overview:   Previously patient of Dr. Reynoso since her teens. Patient is attempting slow taper down on her Xanax. She's been on Xanax 2 mg 4 times a day for years       Tobacco abuse 01/29/2018     Priority: Medium     Influenza A 01/11/2018     Priority: Medium     Pain medication agreement 4/18/18, 4/3/19 04/21/2017     Priority: Medium     Overview:   4/20/17:  Oxycodone 10 mg #180/mo    Hydrocodone 10/325mg, #240/mo. Stopped and changed to morphine. Agreement signed 10/3/12, updated 10/1/ 14.  Complex regional pain syndrome (aka Reflex Sympathetic Dystrophy)  MS Contin 30 mg q 8 hours #90 per month.  Morphine sulfate IR 15 mg 2-3 tab tid prn #180 per month, decreased to #90/month.   query 4/9/16 as part of chart review. Seems appropriate. 6/2/16 tapered and off Morphine. Roma 5/325 #120/month.  query appropriate. ToxAssure appropriate.    August  2016 Consultation with [facility]. Recommended spinal cord stimulator trial, but she is reluctant to pursue that. Pain clinic stated that continuing opioids was OK.       Complex regional pain syndrome type 1 affecting left lower leg 04/15/2016     Priority: Medium     Overview:   Patient with a history of complex regional pain syndrome has seen Dr. Dino Skelton4/23/16        Migraine headache 11/25/2014     Priority: Medium     Overweight (BMI 25.0-29.9) 11/25/2014     Priority: Medium     Lumbago 02/06/2012     Priority: Medium     Major depressive disorder, recurrent episode, severe (H) 07/13/2011     Priority: Medium     Paroxysmal supraventricular tachycardia (H) 05/24/2010     Priority: Medium   ,   Past Surgical History:   Procedure Laterality Date     ANKLE SURGERY      1997, 2000,Left ankle surgery x 2     HYSTERECTOMY TOTAL ABDOMINAL      11/1999,secondary to endometriosis, no malignancy; patient reports no malignancy, but abnormal cells were present *     LAPAROSCOPIC CHOLECYSTECTOMY      No Comments Provided     LAPAROSCOPY DIAGNOSTIC (GENERAL)      1995     OTHER SURGICAL HISTORY      21299,CRANIO/MAXILLOFACIAL SURGERY,Jaw Surgery     OTHER SURGICAL HISTORY      207040,[facility] EMG,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  No other abnormalities noted.    and   Social History     Tobacco Use     Smoking status: Current Every Day Smoker     Packs/day: 1.00     Years: 30.00     Pack years: 30.00     Types: Cigarettes     Start date: 12/2/1986     Smokeless tobacco: Never Used   Substance Use Topics     Alcohol use: Yes     Alcohol/week: 0.0 oz     Comment: Alcoholic Drinks/day: occasional     OBJECTIVE:  /86   Pulse 96   Temp 98  F (36.7  C) (Temporal)   Resp 16   Breastfeeding? No    EXAM:  Alert and cooperative, no distress.  Weight is down significantly.  Dentures are not well fitting.  Some tenderness over both TMJ.  Affect appears appropriate.  ASSESSMENT/Plan  :    Kym was seen today for recheck medication.    Diagnoses and all orders for this visit:    Pain of joint of left ankle and foot  -     Drug  Screen Comprehensive , Urine with Reported Meds (MedTox) (Pain Care Package)  -     Discontinue: oxyCODONE IR (ROXICODONE) 10 MG tablet; Take 1 tablet (10 mg) by mouth every 4 hours as needed for moderate to severe pain Maximum 6 tab per day  -     Discontinue: oxyCODONE IR (ROXICODONE) 10 MG tablet; Take 1 tablet (10 mg) by mouth every 4 hours as needed for moderate to severe pain Maximum 6 tab per day  -     oxyCODONE IR (ROXICODONE) 10 MG tablet; Take 1 tablet (10 mg) by mouth every 4 hours as needed for moderate to severe pain Maximum 6 tab per day    Medical cannabis use  -     Drug  Screen Comprehensive , Urine with Reported Meds (MedTox) (Pain Care Package)    Tobacco abuse  -     Drug  Screen Comprehensive , Urine with Reported Meds (MedTox) (Pain Care Package)    Pain medication agreement 4/18/18, 4/3/19  -     Drug  Screen Comprehensive , Urine with Reported Meds (MedTox) (Pain Care Package)  -     Discontinue: oxyCODONE IR (ROXICODONE) 10 MG tablet; Take 1 tablet (10 mg) by mouth every 4 hours as needed for moderate to severe pain Maximum 6 tab per day  -     Discontinue: oxyCODONE IR (ROXICODONE) 10 MG tablet; Take 1 tablet (10 mg) by mouth every 4 hours as needed for moderate to severe pain Maximum 6 tab per day  -     oxyCODONE IR (ROXICODONE) 10 MG tablet; Take 1 tablet (10 mg) by mouth every 4 hours as needed for moderate to severe pain Maximum 6 tab per day    Panic disorder  -     Drug  Screen Comprehensive , Urine with Reported Meds (MedTox) (Pain Care Package)    Complex regional pain syndrome type 1 affecting left lower leg  -     Discontinue: oxyCODONE IR (ROXICODONE) 10 MG tablet; Take 1 tablet (10 mg) by mouth every 4 hours as needed for moderate to severe pain Maximum 6 tab per day  -     Discontinue: oxyCODONE IR (ROXICODONE) 10 MG tablet; Take  1 tablet (10 mg) by mouth every 4 hours as needed for moderate to severe pain Maximum 6 tab per day  -     oxyCODONE IR (ROXICODONE) 10 MG tablet; Take 1 tablet (10 mg) by mouth every 4 hours as needed for moderate to severe pain Maximum 6 tab per day    Pain medication agreement  -     Drug  Screen Comprehensive , Urine with Reported Meds (MedTox) (Pain Care Package)  -     Discontinue: oxyCODONE IR (ROXICODONE) 10 MG tablet; Take 1 tablet (10 mg) by mouth every 4 hours as needed for moderate to severe pain Maximum 6 tab per day  -     Discontinue: oxyCODONE IR (ROXICODONE) 10 MG tablet; Take 1 tablet (10 mg) by mouth every 4 hours as needed for moderate to severe pain Maximum 6 tab per day  -     oxyCODONE IR (ROXICODONE) 10 MG tablet; Take 1 tablet (10 mg) by mouth every 4 hours as needed for moderate to severe pain Maximum 6 tab per day    Chronic pain disorder  -     Discontinue: oxyCODONE IR (ROXICODONE) 10 MG tablet; Take 1 tablet (10 mg) by mouth every 4 hours as needed for moderate to severe pain Maximum 6 tab per day  -     Discontinue: oxyCODONE IR (ROXICODONE) 10 MG tablet; Take 1 tablet (10 mg) by mouth every 4 hours as needed for moderate to severe pain Maximum 6 tab per day  -     oxyCODONE IR (ROXICODONE) 10 MG tablet; Take 1 tablet (10 mg) by mouth every 4 hours as needed for moderate to severe pain Maximum 6 tab per day    Paroxysmal supraventricular tachycardia (H)  -     atenolol (TENORMIN) 100 MG tablet; Take 1 tablet (100 mg) by mouth daily    TMJ inflammation  -     PHYSICAL THERAPY REFERRAL; Future    Nausea  -     promethazine (PHENERGAN) 25 MG tablet; Take 1 tablet (25 mg) by mouth every 6 hours as needed for nausea      Trial of Phenergan for nausea and referred to physical therapy for evaluation and treatment of her TMJ inflammation.  Continue to follow-up with a dentist.     query is appropriate.  ToxAssure ordered today but since she had diarrhea did not leave a specimen.  Renewal on  oxycodone 10 mg #180 x 3.  Follow-up in 3 months, sooner if needed.    A total of 25 minutes was spent with the patient, greater than 50% of the time was spent in counseling/discussion of the aforementioned concerns.         Darrni Stephen MD

## 2019-04-03 NOTE — NURSING NOTE
"Chief Complaint   Patient presents with     Recheck Medication       Initial /86   Pulse 96   Temp 98  F (36.7  C) (Temporal)   Resp 16   Breastfeeding? No  Estimated body mass index is 23.48 kg/m  as calculated from the following:    Height as of 2/28/19: 1.753 m (5' 9\").    Weight as of 2/28/19: 72.1 kg (159 lb).  Medication Reconciliation: complete    Petra Swanson LPN  "

## 2019-04-04 ASSESSMENT — ANXIETY QUESTIONNAIRES: GAD7 TOTAL SCORE: 18

## 2019-04-15 ENCOUNTER — APPOINTMENT (OUTPATIENT)
Dept: LAB | Facility: OTHER | Age: 49
End: 2019-04-15
Attending: FAMILY MEDICINE
Payer: MEDICARE

## 2019-04-18 ENCOUNTER — HOSPITAL ENCOUNTER (EMERGENCY)
Facility: OTHER | Age: 49
Discharge: HOME | End: 2019-04-18
Attending: FAMILY MEDICINE | Admitting: FAMILY MEDICINE
Payer: MEDICARE

## 2019-04-18 ENCOUNTER — APPOINTMENT (OUTPATIENT)
Dept: GENERAL RADIOLOGY | Facility: OTHER | Age: 49
End: 2019-04-18
Attending: FAMILY MEDICINE
Payer: MEDICARE

## 2019-04-18 VITALS
RESPIRATION RATE: 38 BRPM | HEIGHT: 69 IN | HEART RATE: 96 BPM | WEIGHT: 150 LBS | BODY MASS INDEX: 22.22 KG/M2 | SYSTOLIC BLOOD PRESSURE: 145 MMHG | TEMPERATURE: 97.8 F | OXYGEN SATURATION: 97 % | DIASTOLIC BLOOD PRESSURE: 104 MMHG

## 2019-04-18 DIAGNOSIS — E87.6 HYPOKALEMIA: ICD-10-CM

## 2019-04-18 DIAGNOSIS — R11.0 NAUSEA: ICD-10-CM

## 2019-04-18 DIAGNOSIS — F41.0 PANIC DISORDER WITHOUT AGORAPHOBIA: ICD-10-CM

## 2019-04-18 DIAGNOSIS — R63.0 ANOREXIA: ICD-10-CM

## 2019-04-18 DIAGNOSIS — F43.21 GRIEF REACTION: ICD-10-CM

## 2019-04-18 DIAGNOSIS — G89.4 CHRONIC PAIN DISORDER: ICD-10-CM

## 2019-04-18 DIAGNOSIS — F17.200 TOBACCO DEPENDENCE SYNDROME: ICD-10-CM

## 2019-04-18 LAB
ANION GAP SERPL CALCULATED.3IONS-SCNC: 10 MMOL/L (ref 3–14)
BASOPHILS # BLD AUTO: 0 10E9/L (ref 0–0.2)
BASOPHILS NFR BLD AUTO: 0.2 %
BUN SERPL-MCNC: 11 MG/DL (ref 7–25)
CALCIUM SERPL-MCNC: 10.1 MG/DL (ref 8.6–10.3)
CHLORIDE SERPL-SCNC: 105 MMOL/L (ref 98–107)
CO2 SERPL-SCNC: 22 MMOL/L (ref 21–31)
CREAT SERPL-MCNC: 0.65 MG/DL (ref 0.6–1.2)
DIFFERENTIAL METHOD BLD: ABNORMAL
EOSINOPHIL # BLD AUTO: 0 10E9/L (ref 0–0.7)
EOSINOPHIL NFR BLD AUTO: 0.1 %
ERYTHROCYTE [DISTWIDTH] IN BLOOD BY AUTOMATED COUNT: 12.9 % (ref 10–15)
ETHANOL SERPL-MCNC: <0.01 %
GFR SERPL CREATININE-BSD FRML MDRD: >90 ML/MIN/{1.73_M2}
GLUCOSE SERPL-MCNC: 118 MG/DL (ref 70–105)
HCT VFR BLD AUTO: 43.8 % (ref 35–47)
HGB BLD-MCNC: 15.2 G/DL (ref 11.7–15.7)
IMM GRANULOCYTES # BLD: 0.1 10E9/L (ref 0–0.4)
IMM GRANULOCYTES NFR BLD: 0.3 %
LYMPHOCYTES # BLD AUTO: 2.5 10E9/L (ref 0.8–5.3)
LYMPHOCYTES NFR BLD AUTO: 17.6 %
MCH RBC QN AUTO: 31.5 PG (ref 26.5–33)
MCHC RBC AUTO-ENTMCNC: 34.7 G/DL (ref 31.5–36.5)
MCV RBC AUTO: 91 FL (ref 78–100)
MONOCYTES # BLD AUTO: 0.5 10E9/L (ref 0–1.3)
MONOCYTES NFR BLD AUTO: 3.6 %
NEUTROPHILS # BLD AUTO: 11.2 10E9/L (ref 1.6–8.3)
NEUTROPHILS NFR BLD AUTO: 78.2 %
PLATELET # BLD AUTO: 309 10E9/L (ref 150–450)
POTASSIUM SERPL-SCNC: 2.5 MMOL/L (ref 3.5–5.1)
RBC # BLD AUTO: 4.83 10E12/L (ref 3.8–5.2)
SODIUM SERPL-SCNC: 137 MMOL/L (ref 134–144)
WBC # BLD AUTO: 14.4 10E9/L (ref 4–11)

## 2019-04-18 PROCEDURE — 85025 COMPLETE CBC W/AUTO DIFF WBC: CPT | Performed by: FAMILY MEDICINE

## 2019-04-18 PROCEDURE — 93010 ELECTROCARDIOGRAM REPORT: CPT | Performed by: INTERNAL MEDICINE

## 2019-04-18 PROCEDURE — 93005 ELECTROCARDIOGRAM TRACING: CPT | Performed by: FAMILY MEDICINE

## 2019-04-18 PROCEDURE — 99284 EMERGENCY DEPT VISIT MOD MDM: CPT | Mod: Z6 | Performed by: FAMILY MEDICINE

## 2019-04-18 PROCEDURE — 80320 DRUG SCREEN QUANTALCOHOLS: CPT | Performed by: FAMILY MEDICINE

## 2019-04-18 PROCEDURE — 80048 BASIC METABOLIC PNL TOTAL CA: CPT | Performed by: FAMILY MEDICINE

## 2019-04-18 PROCEDURE — 71046 X-RAY EXAM CHEST 2 VIEWS: CPT

## 2019-04-18 PROCEDURE — 36415 COLL VENOUS BLD VENIPUNCTURE: CPT | Performed by: FAMILY MEDICINE

## 2019-04-18 PROCEDURE — 99285 EMERGENCY DEPT VISIT HI MDM: CPT | Mod: 25 | Performed by: FAMILY MEDICINE

## 2019-04-18 RX ORDER — ONDANSETRON 2 MG/ML
4 INJECTION INTRAMUSCULAR; INTRAVENOUS EVERY 6 HOURS PRN
Status: DISCONTINUED | OUTPATIENT
Start: 2019-04-18 | End: 2019-04-18 | Stop reason: HOSPADM

## 2019-04-18 RX ORDER — POTASSIUM CHLORIDE 1500 MG/1
20 TABLET, EXTENDED RELEASE ORAL
Status: DISCONTINUED | OUTPATIENT
Start: 2019-04-18 | End: 2019-04-18 | Stop reason: HOSPADM

## 2019-04-18 RX ORDER — SODIUM CHLORIDE AND POTASSIUM CHLORIDE 150; 900 MG/100ML; MG/100ML
INJECTION, SOLUTION INTRAVENOUS CONTINUOUS
Status: DISCONTINUED | OUTPATIENT
Start: 2019-04-18 | End: 2019-04-18 | Stop reason: HOSPADM

## 2019-04-18 ASSESSMENT — ENCOUNTER SYMPTOMS
NERVOUS/ANXIOUS: 1
SHORTNESS OF BREATH: 1
CHILLS: 0
FEVER: 0
APPETITE CHANGE: 1
BACK PAIN: 1
LIGHT-HEADEDNESS: 1
EYES NEGATIVE: 1
NAUSEA: 1
CONSTIPATION: 1
DIAPHORESIS: 1
VOMITING: 0
AGITATION: 1
DYSURIA: 0
DYSPHORIC MOOD: 1

## 2019-04-18 ASSESSMENT — MIFFLIN-ST. JEOR: SCORE: 1374.78

## 2019-04-18 NOTE — ED TRIAGE NOTES
"Patient passed out in triage in wc, did not fall. Prior to passing out, patient was agitated and upset at this nurse for asking why she was here. Patient was brought to [facility] with provider present doing jaw thrust to open airway. Patient awoke with this. Patient getting agitated with questions and states, \"my fiance  on new years dimitri\". Patient shaking and hyperventilating and moving around in bed.   Marianne Ventura RN on 2019 at 8:10 AM    "

## 2019-04-18 NOTE — ED PROVIDER NOTES
History     Chief Complaint   Patient presents with     Shortness of Breath     Anxiety     HPI  Kym Perea is a 49 year old female who presents to clinic initially with c/o SOB, passing out, and not eating or  drinking for a couple days.  She is sent to the ED as she is both agitated and presyncopal.  Here in the ED triage she is getting into a wheelchair and has a bizarre episode of reporting impending syncope and slumping her head forward but also aware.  Help is called by triage nursing and patient has good color.  With jaw lift she opens eyes and becomes agitated and cursing.  She states she needs to lay down and abruptly tries to stand out of her wheel chair and rapidly removes her sweat shirt and curses staff and then lays down in the fetal position.     She has a long hx of anxiety and panic d/o and has been having much more problems since her   at New Years.  She lives alone without support.  She is not currently in counseling.      Allergies:  Allergies   Allergen Reactions     Arthrotec GI Disturbance     Indomethacin      Other reaction(s): Dizziness     Meloxicam GI Disturbance       Problem List:    Patient Active Problem List    Diagnosis Date Noted     Medical cannabis use 07/10/2018     Priority: Medium     Pain in joint, ankle and foot 2018     Priority: Medium     Overview:   Chronic left ankle pain secondary to reflex sympathetic dystrophy.  Patient   has had 4 previous surgeries, the most recent one done by Dr. Noriega in   . On Narcotic contract.       Chronic pain disorder 2018     Priority: Medium     Overview:   complex regional pain syndrome left ankle       Esophageal reflux 2018     Priority: Medium     Hyperlipidemia 2018     Priority: Medium     Insomnia 2018     Priority: Medium     Panic disorder 2018     Priority: Medium     Overview:   Previously patient of Dr. Reynoso since her teens. Patient is attempting slow taper  down on her Xanax. She's been on Xanax 2 mg 4 times a day for years       Tobacco abuse 01/29/2018     Priority: Medium     Influenza A 01/11/2018     Priority: Medium     Pain medication agreement 4/18/18, 4/3/19 04/21/2017     Priority: Medium     Overview:   4/20/17:  Oxycodone 10 mg #180/mo    Hydrocodone 10/325mg, #240/mo. Stopped and changed to morphine. Agreement signed 10/3/12, updated 10/1/ 14.  Complex regional pain syndrome (aka Reflex Sympathetic Dystrophy)  MS Contin 30 mg q 8 hours #90 per month.  Morphine sulfate IR 15 mg 2-3 tab tid prn #180 per month, decreased to #90/month.   query 4/9/16 as part of chart review. Seems appropriate. 6/2/16 tapered and off Morphine. [place] 5/325 #120/month.  query appropriate. ToxAssure appropriate.    August 2016 Consultation with [facility]. Recommended spinal cord stimulator trial, but she is reluctant to pursue that. Pain clinic stated that continuing opioids was OK.       Complex regional pain syndrome type 1 affecting left lower leg 04/15/2016     Priority: Medium     Overview:   Patient with a history of complex regional pain syndrome has seen Dr. Dino Skelton4/23/16        Migraine headache 11/25/2014     Priority: Medium     Overweight (BMI 25.0-29.9) 11/25/2014     Priority: Medium     Lumbago 02/06/2012     Priority: Medium     Major depressive disorder, recurrent episode, severe (H) 07/13/2011     Priority: Medium     Paroxysmal supraventricular tachycardia (H) 05/24/2010     Priority: Medium        Past Medical History:    Past Medical History:   Diagnosis Date     Acquired absence of other organs (CODE)      Chronic pain syndrome      Complex regional pain syndrome I      Encounter for other administrative examinations      Encounter for other administrative examinations      Gastro-esophageal reflux disease without esophagitis      Injury of left ankle      Low back pain      Major depressive disorder, recurrent severe without  psychotic features (H)      Migraine without status migrainosus, not intractable      Nicotine dependence, uncomplicated      Obesity      Overweight      Pain in ankle      Pain in thoracic spine      Panic disorder without agoraphobia      Personal history of other diseases of the female genital tract      Personal history of other medical treatment (CODE)      Supraventricular tachycardia (H)        Past Surgical History:    Past Surgical History:   Procedure Laterality Date     ANKLE SURGERY      1997, 2000,Left ankle surgery x 2     HYSTERECTOMY TOTAL ABDOMINAL      11/1999,secondary to endometriosis, no malignancy; patient reports no malignancy, but abnormal cells were present *     LAPAROSCOPIC CHOLECYSTECTOMY      No Comments Provided     LAPAROSCOPY DIAGNOSTIC (GENERAL)      1995     OTHER SURGICAL HISTORY      21299,CRANIO/MAXILLOFACIAL SURGERY,Jaw Surgery     OTHER SURGICAL HISTORY      207040,[facility] EMG,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  No other abnormalities noted.       Family History:    Family History   Problem Relation Age of Onset     Cancer Father         Cancer,Bladder     Other - See Comments Father         Mild hypercholesterolemia     Other - See Comments Mother         scleroderma/lupus/Parkinson's syndrome     Family History Negative Brother         Good Health     Colon Cancer Maternal Grandmother 40        Cancer-colon,Followed by lung  with metastasis to the bone di*       Social History:  Marital Status:   [4]  Social History     Tobacco Use     Smoking status: Current Every Day Smoker     Packs/day: 1.00     Years: 30.00     Pack years: 30.00     Types: Cigarettes     Start date: 12/2/1986     Smokeless tobacco: Never Used   Substance Use Topics     Alcohol use: Yes     Alcohol/week: 0.0 oz     Comment: Alcoholic Drinks/day: occasional     Drug use: No     Comment: medical cannabis        Medications:      albuterol (PROAIR HFA/PROVENTIL HFA/VENTOLIN HFA) 108  "(90 Base) MCG/ACT Inhaler   ALPRAZolam (XANAX) 2 MG tablet   amphetamine-dextroamphetamine (ADDERALL) 30 MG per tablet   atenolol (TENORMIN) 100 MG tablet   fluticasone (FLONASE) 50 MCG/ACT spray   oxyCODONE IR (ROXICODONE) 10 MG tablet   promethazine (PHENERGAN) 25 MG tablet   VITAMIN D, CHOLECALCIFEROL, PO         Review of Systems   Constitutional: Positive for appetite change and diaphoresis (gets sweats when she panics.). Negative for chills and fever.        Poor appetite and unable to eat for 2 days.   HENT: Negative.    Eyes: Negative.    Respiratory: Positive for shortness of breath.    Cardiovascular: Positive for chest pain.   Gastrointestinal: Positive for constipation (no BM for several days.) and nausea. Negative for vomiting.   Genitourinary: Positive for decreased urine volume. Negative for dysuria.   Musculoskeletal: Positive for back pain.   Skin: Negative for rash.   Neurological: Positive for light-headedness.   Psychiatric/Behavioral: Positive for agitation and dysphoric mood. Negative for self-injury and suicidal ideas. The patient is nervous/anxious.        Physical Exam   BP: (!) 149/98  Pulse: 110  Heart Rate: 113  Temp: 97.8  F (36.6  C)  Resp: (!) 43  Height: 175.3 cm (5' 9\")  Weight: 68 kg (150 lb)  SpO2: 99 %    Vitals:    04/18/19 0915 04/18/19 0930 04/18/19 0945 04/18/19 1000   BP:  (!) 156/113 (!) 149/105 (!) 145/104   Pulse:  94 85 96   Resp: (!) 38      Temp:       TempSrc:       SpO2: 97% 97% 96% 97%   Weight:       Height:           Physical Exam   Constitutional: She appears well-developed and well-nourished. She appears distressed.   Anxious oppositional insulting mid-aged female who is thought to be possibly passing out in triage, but with fairly normal tone, and then with jaw thrust she is obviously awake and begins cussing and disparaging staff.  She stands and vigorously whips off her clothes and climbs into bed say she is passing out and then lays in the right decubitus " fetal position.  She is intermittently tachycardic and having hyperventilation syndrome and does become very agitated at times and does get sweaty when this is happening.     HENT:   Head: Normocephalic and atraumatic.   Right Ear: External ear normal.   Left Ear: External ear normal.   Nose: Nose normal.   OP with dry lips and poor dentition but no obvious infection and still moist mucous membranes.   Eyes: Pupils are equal, round, and reactive to light. Conjunctivae and EOM are normal. No scleral icterus.   Neck: Normal range of motion. Neck supple. No tracheal deviation present. No thyromegaly present.   Cardiovascular: Regular rhythm and normal heart sounds. Exam reveals no gallop and no friction rub.   No murmur heard.  Intermittent tachycardia with agitation.   Pulmonary/Chest: Effort normal and breath sounds normal. No respiratory distress.   Abdominal: Soft. Bowel sounds are normal. She exhibits no distension. There is no tenderness.   Musculoskeletal: Normal range of motion. She exhibits no edema.   Lymphadenopathy:     She has no cervical adenopathy.   Neurological: She is alert. No cranial nerve deficit. She exhibits normal muscle tone. Coordination normal.   Patient reporting she is confused as to date and thinks it is about .  She knows where she is.     Skin: Skin is warm and dry. Capillary refill takes 2 to 3 seconds. No rash noted. She is not diaphoretic.   Psychiatric:   Extremely agitated and oppositional refusing to answer nursing questions at first.  She later confides that she is grieving the loss of her  since he  the first of the year.  She reports hx of panic d/o since age 19.  She lives alone without support.   Nursing note and vitals reviewed.      ED Course        Procedures          EKG: NSR and no acute ST/T elevation/depression.    Critical Care time:  none               Results for orders placed or performed during the hospital encounter of 19 (from the past  24 hour(s))   CBC with platelets differential   Result Value Ref Range    WBC 14.4 (H) 4.0 - 11.0 10e9/L    RBC Count 4.83 3.8 - 5.2 10e12/L    Hemoglobin 15.2 11.7 - 15.7 g/dL    Hematocrit 43.8 35.0 - 47.0 %    MCV 91 78 - 100 fl    MCH 31.5 26.5 - 33.0 pg    MCHC 34.7 31.5 - 36.5 g/dL    RDW 12.9 10.0 - 15.0 %    Platelet Count 309 150 - 450 10e9/L    Diff Method Automated Method     % Neutrophils 78.2 %    % Lymphocytes 17.6 %    % Monocytes 3.6 %    % Eosinophils 0.1 %    % Basophils 0.2 %    % Immature Granulocytes 0.3 %    Absolute Neutrophil 11.2 (H) 1.6 - 8.3 10e9/L    Absolute Lymphocytes 2.5 0.8 - 5.3 10e9/L    Absolute Monocytes 0.5 0.0 - 1.3 10e9/L    Absolute Eosinophils 0.0 0.0 - 0.7 10e9/L    Absolute Basophils 0.0 0.0 - 0.2 10e9/L    Abs Immature Granulocytes 0.1 0 - 0.4 10e9/L   Basic metabolic panel   Result Value Ref Range    Sodium 137 134 - 144 mmol/L    Potassium 2.5 (L) 3.5 - 5.1 mmol/L    Chloride 105 98 - 107 mmol/L    Carbon Dioxide 22 21 - 31 mmol/L    Anion Gap 10 3 - 14 mmol/L    Glucose 118 (H) 70 - 105 mg/dL    Urea Nitrogen 11 7 - 25 mg/dL    Creatinine 0.65 0.60 - 1.20 mg/dL    GFR Estimate >90 >60 mL/min/[1.73_m2]    GFR Estimate If Black >90 >60 mL/min/[1.73_m2]    Calcium 10.1 8.6 - 10.3 mg/dL   Ethanol GH   Result Value Ref Range    Ethanol g/dL <0.01 <0.01 %   XR Chest 2 Views    Narrative    XR CHEST 2 VW    HISTORY: 49 years Female cough SOB    COMPARISON: 1/10/2018    TECHNIQUE: 2 views of the chest were obtained.    FINDINGS: Two views of the chest were obtained. Heart size and  pulmonary vascularity are within normal limits, lungs are clear on  both views. No consolidating air space opacities are present.          Impression    IMPRESSION: Clear chest.    RANJITH POWELL MD       Medications - No data to display  10:30 AM Patient eloped as staff was coming in to start IV and give potassium.    10:45 AM Nursing has tried to call patient without success and I have now  tried to call her but no answer and no ability to leave a message.    11:40 AM Nursing has talked with [employer] [facility] and patient is not working there or living there.  I have discussed with CRT who is familiar with patient but doesn't know her current living situation.   We will try talking with Law Enforcement.    11:50 AM We have talked with Law Enforcement who will try and do a safety check on patient and have her call here for treatment of her hypokalemia.    1:00 PM Police have been to her house but she is not there and they will continue to check for her.    Assessments & Plan (with Medical Decision Making)   49 year old female with grief reaction and behavioral disturbance complicating her chronic anxiety and panic d/o presenting with nausea and anorexia and CRT is asked to see patient and labs are returned with hypokalemia; but before CRT arrives and nursing can place her IV she has eloped from the ED and left AMA.  We have tried to contact her to encourage her to follow up in the ED or at least begin some oral potassium replacement but we have not been able to contact her.  It is possible that she has periodic paralysis with intermittent hypokalemia contributing to her sx or it could be due to hyperventilation or excessive albuterol use, and would benefit from outpatient follow up to advise her on diagnosing and managing her sx.  We did discuss grief reaction as well as long term treatment of panic d/o with counseling and she is encouraged to f/u with counseling.     I have reviewed the nursing notes.    I have reviewed the findings, diagnosis, plan and need for follow up with the patient.          Medication List      There are no discharge medications for this visit.         Final diagnoses:   Panic disorder without agoraphobia   Grief reaction   Nausea   Anorexia   Hypokalemia   Tobacco dependence syndrome   Chronic pain disorder       4/18/2019   [facility]     Rich Nolan,  MD  04/18/19 2035

## 2019-04-18 NOTE — ED NOTES
Pt has been refusing everything and yelling at staff, MD notified and will go back into talk to pt

## 2019-04-18 NOTE — ED TRIAGE NOTES
Patient arrives from clinic with c/o not eating or drinking for the last couple of days. States she is very weak and short of breath. Also experiencing anxiety due to fiance passing last year. Marianne Ventura RN on 4/18/2019 at 8:06 AM

## 2019-04-19 LAB — PAIN DRUG SCR UR W RPTD MEDS: NORMAL

## 2019-04-26 ENCOUNTER — OFFICE VISIT (OUTPATIENT)
Dept: FAMILY MEDICINE | Facility: OTHER | Age: 49
End: 2019-04-26
Attending: NURSE PRACTITIONER
Payer: MEDICARE

## 2019-04-26 VITALS
BODY MASS INDEX: 22.15 KG/M2 | TEMPERATURE: 97.8 F | HEART RATE: 84 BPM | DIASTOLIC BLOOD PRESSURE: 70 MMHG | SYSTOLIC BLOOD PRESSURE: 110 MMHG | RESPIRATION RATE: 16 BRPM | HEIGHT: 69 IN

## 2019-04-26 DIAGNOSIS — M26.609 TEMPOROMANDIBULAR JOINT DISORDER: Primary | ICD-10-CM

## 2019-04-26 PROCEDURE — 99214 OFFICE O/P EST MOD 30 MIN: CPT | Performed by: NURSE PRACTITIONER

## 2019-04-26 PROCEDURE — G0463 HOSPITAL OUTPT CLINIC VISIT: HCPCS

## 2019-04-26 RX ORDER — CYCLOBENZAPRINE HCL 10 MG
5-10 TABLET ORAL 3 TIMES DAILY PRN
Qty: 20 TABLET | Refills: 0 | Status: SHIPPED | OUTPATIENT
Start: 2019-04-26 | End: 2019-07-05

## 2019-04-26 ASSESSMENT — PAIN SCALES - GENERAL: PAINLEVEL: SEVERE PAIN (7)

## 2019-04-27 ASSESSMENT — ENCOUNTER SYMPTOMS
HEADACHES: 1
NECK STIFFNESS: 0
CONSTITUTIONAL NEGATIVE: 1
SORE THROAT: 0
COUGH: 0
NECK PAIN: 0
JOINT SWELLING: 0

## 2019-04-27 NOTE — PATIENT INSTRUCTIONS
Patient Education   Keep up on Ibuprofen, take flexeril as needed.  TMJ Syndrome  The temporomandibular joint (TMJ) is the joint that connects your lower jaw to your head. You can feel it in front of your ears when you open and close your mouth. TMJ disorders involve chronic or recurrent pain in the joint. When treated, symptoms of TMJ disorders usually go away within a few months.  Causes  There is no widely agreed-on cause of TMJ disorders. They have been linked to injury, arthritis, chronic fatigue syndrome, and fibromyalgia. A definite connection has not been shown, though.  Symptoms    Pain in the face, jaw, or neck    Pain with jaw movement or chewing    Locking or catching sensation of the jaw    Clicking, popping, or grinding sounds with movement of the TMJ    Headache    Ear pain  Home care  Modest, nonsurgical treatments are a good first step toward relieving symptoms. Try the approaches described below.    Rest the jaw by avoiding crunchy or hard-to-chew foods. Don t eat hard or sticky candies. Soft foods and liquids are easier on the jaw.    Protect your jaw while yawning. If you need to yawn, put your fist under your chin to prevent your mouth from opening up too wide.    To help relieve pain, try applying hot or cold packs to the painful area. Try both hot and cold to find out which works best for you. To make a cold pack, put ice cubes in a plastic bag that seals at the top. Wrap the bag in a clean, thin towel or cloth. Never put ice or an ice pack directly on the skin. If you use hot packs (small towels soaked in hot water), be careful not to burn yourself.    You may take acetaminophen or ibuprofen for pain, unless you were given a different pain medicine. (Note: If you have chronic liver or kidney disease or have ever had a stomach ulcer or gastrointestinal bleeding, talk with your healthcare provider before using these medicines. Also talk to your provider if you are taking medicine to prevent  blood clots.) Don t give aspirin to a child younger than age 19 unless directed by the child s provider. Taking aspirin can put a child at risk for Reye syndrome. This is a rare but very serious disorder that most often affects the brain and the liver.  Reducing stress  If stress seems to be contributing to your symptoms, try to identify the sources of stress in your life. These aren t always obvious. Common stressors include:    Everyday hassles. These include things such as traffic jams, missed appointments, or car trouble.    Major life changes. These can be good, such as a new baby or job promotion. And they can be bad, such as losing a job or losing a loved one.    Overload. The feeling that you have too many responsibilities and can't take care of everything at once.    Helplessness. Feeling like your problems are more than you can solve.  When possible, do something about your sources of stress. See if you can avoid hassles, limit the amount of change in your life at one time, and take breaks when you feel overloaded.  Unfortunately, many stressful situations cannot be avoided. So learning how to manage stress better is very important. Getting regular exercise, eating nutritious, balanced meals, and getting adequate rest all help to make everyday stress more manageable. Certain techniques are also helpful: relaxation and breathing exercises, visualization, biofeedback, meditation, or simply taking some time out to clear your mind. For more information, talk with your healthcare provider.  Follow-up care  Follow up with your healthcare provider, or as advised.

## 2019-04-27 NOTE — PROGRESS NOTES
SUBJECTIVE:   Kym Perea is a 49 year old female who presents to clinic today for the following health issues:    HPI  Presents with bilateral ear and jaw pain.   Lost her dentures and can't find them which has caused her symptoms to increase.    Has a history of TMJ, has been treated with muscle relaxer in the past with good improvement. Was into the dentist this past week and has new dentures ordered.   Has been taking Tylenol and ibuprofen for pain.    Patient Active Problem List    Diagnosis Date Noted     Medical cannabis use 07/10/2018     Priority: Medium     Pain in joint, ankle and foot 01/29/2018     Priority: Medium     Overview:   Chronic left ankle pain secondary to reflex sympathetic dystrophy.  Patient   has had 4 previous surgeries, the most recent one done by Dr. Noriega in   2007. On Narcotic contract.       Chronic pain disorder 01/29/2018     Priority: Medium     Overview:   complex regional pain syndrome left ankle       Esophageal reflux 01/29/2018     Priority: Medium     Hyperlipidemia 01/29/2018     Priority: Medium     Insomnia 01/29/2018     Priority: Medium     Panic disorder 01/29/2018     Priority: Medium     Overview:   Previously patient of Dr. Reynoso since her teens. Patient is attempting slow taper down on her Xanax. She's been on Xanax 2 mg 4 times a day for years       Tobacco abuse 01/29/2018     Priority: Medium     Influenza A 01/11/2018     Priority: Medium     Pain medication agreement 4/18/18, 4/3/19 04/21/2017     Priority: Medium     Overview:   4/20/17:  Oxycodone 10 mg #180/mo    Hydrocodone 10/325mg, #240/mo. Stopped and changed to morphine. Agreement signed 10/3/12, updated 10/1/ 14.  Complex regional pain syndrome (aka Reflex Sympathetic Dystrophy)  MS Contin 30 mg q 8 hours #90 per month.  Morphine sulfate IR 15 mg 2-3 tab tid prn #180 per month, decreased to #90/month.   query 4/9/16 as part of chart review. Seems appropriate. 6/2/16 tapered and off  Morphine. Norco 5/325 #120/month.  query appropriate. ToxAssure appropriate.    August 2016 Consultation with [facility]. Recommended spinal cord stimulator trial, but she is reluctant to pursue that. Pain clinic stated that continuing opioids was OK.       Complex regional pain syndrome type 1 affecting left lower leg 04/15/2016     Priority: Medium     Overview:   Patient with a history of complex regional pain syndrome has seen Dr. Dino Skelton4/23/16        Migraine headache 11/25/2014     Priority: Medium     Overweight (BMI 25.0-29.9) 11/25/2014     Priority: Medium     Lumbago 02/06/2012     Priority: Medium     Major depressive disorder, recurrent episode, severe (H) 07/13/2011     Priority: Medium     Paroxysmal supraventricular tachycardia (H) 05/24/2010     Priority: Medium     Past Medical History:   Diagnosis Date     Acquired absence of other organs (CODE)     9/2/2011     Chronic pain syndrome     No Comments Provided     Complex regional pain syndrome I     left ankle     Encounter for other administrative examinations     10/2012,Hydrocodone 10/325mg, #240/mo signed 10/3/12, updated 10/1/ 14     Encounter for other administrative examinations     10/1/2014,MS Contin 30 mg q 8 hours #90 per month.  Morphine sulfate IR 15 mg 2-3 tab tid prn #180 per month     Gastro-esophageal reflux disease without esophagitis     No Comments Provided     Injury of left ankle     1997,requiring surgery     Low back pain     No Comments Provided     Major depressive disorder, recurrent severe without psychotic features (H)     No Comments Provided     Migraine without status migrainosus, not intractable     No Comments Provided     Nicotine dependence, uncomplicated     No Comments Provided     Obesity     No Comments Provided     Overweight     11/25/2014     Pain in ankle     Chronic left ankle pain secondary to reflex sympathetic dystrophy.  Patient  has had 4 previous surgeries, the most recent  one done by Dr. Noriega in  2007. On Narcotic contract.     Pain in thoracic spine     No Comments Provided     Panic disorder without agoraphobia     Dr Reynoso     Personal history of other diseases of the female genital tract     No Comments Provided     Personal history of other medical treatment (CODE)     G2, P1-0-1-1 with one vaginal delivery.     Supraventricular tachycardia (H)     5/24/2010      Past Surgical History:   Procedure Laterality Date     ANKLE SURGERY      1997, 2000,Left ankle surgery x 2     HYSTERECTOMY TOTAL ABDOMINAL      11/1999,secondary to endometriosis, no malignancy; patient reports no malignancy, but abnormal cells were present *     LAPAROSCOPIC CHOLECYSTECTOMY      No Comments Provided     LAPAROSCOPY DIAGNOSTIC (GENERAL)      1995     OTHER SURGICAL HISTORY      21299,CRANIO/MAXILLOFACIAL SURGERY,Jaw Surgery     OTHER SURGICAL HISTORY      207040,[facility],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  No other abnormalities noted.     Family History   Problem Relation Age of Onset     Cancer Father         Cancer,Bladder     Other - See Comments Father         Mild hypercholesterolemia     Other - See Comments Mother         scleroderma/lupus/Parkinson's syndrome     Family History Negative Brother         Good Health     Colon Cancer Maternal Grandmother 40        Cancer-colon,Followed by lung  with metastasis to the bone di*     Social History     Tobacco Use     Smoking status: Current Every Day Smoker     Packs/day: 0.50     Years: 30.00     Pack years: 15.00     Types: Cigarettes     Start date: 12/2/1986     Smokeless tobacco: Never Used   Substance Use Topics     Alcohol use: Not Currently     Alcohol/week: 0.0 oz     Social History     Social History Narrative     ; currently unemployed. 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 She has a daughter who is now 20, lives in [place]     Current Outpatient Medications   Medication Sig Dispense  "Refill     albuterol (PROAIR HFA/PROVENTIL HFA/VENTOLIN HFA) 108 (90 Base) MCG/ACT Inhaler Inhale 2 puffs into the lungs every 4 hours as needed for shortness of breath / dyspnea or wheezing 1 Inhaler 11     ALPRAZolam (XANAX) 2 MG tablet Take 1 tablet 3 times daily prn       amphetamine-dextroamphetamine (ADDERALL) 30 MG per tablet Take 30 mg by mouth       atenolol (TENORMIN) 100 MG tablet Take 1 tablet (100 mg) by mouth daily 90 tablet 3     cyclobenzaprine (FLEXERIL) 10 MG tablet Take 0.5-1 tablets (5-10 mg) by mouth 3 times daily as needed for muscle spasms 20 tablet 0     fluticasone (FLONASE) 50 MCG/ACT spray 2 sprays       oxyCODONE IR (ROXICODONE) 10 MG tablet Take 1 tablet (10 mg) by mouth every 4 hours as needed for moderate to severe pain Maximum 6 tab per day 180 tablet 0     promethazine (PHENERGAN) 25 MG tablet Take 1 tablet (25 mg) by mouth every 6 hours as needed for nausea 30 tablet 3     VITAMIN D, CHOLECALCIFEROL, PO Take 1,000 Units by mouth daily       Allergies   Allergen Reactions     Arthrotec GI Disturbance     Indomethacin      Other reaction(s): Dizziness     Meloxicam GI Disturbance       Review of Systems   Constitutional: Negative.    HENT: Positive for ear pain. Negative for ear discharge and sore throat.         TMJ jaw pain   Respiratory: Negative for cough.    Musculoskeletal: Negative for joint swelling, neck pain and neck stiffness.   Skin: Negative.    Neurological: Positive for headaches.        OBJECTIVE:     /70 (BP Location: Right arm, Patient Position: Sitting, Cuff Size: Adult Regular)   Pulse 84   Temp 97.8  F (36.6  C) (Tympanic)   Resp 16   Ht 1.753 m (5' 9\")   BMI 22.15 kg/m    Body mass index is 22.15 kg/m .  Physical Exam   Constitutional: She is oriented to person, place, and time. Vital signs are normal. She appears well-developed and well-nourished. She is active and cooperative.  Non-toxic appearance. She does not appear ill.   HENT:   Head: " Normocephalic and atraumatic.   Right Ear: Hearing, tympanic membrane and external ear normal.   Left Ear: Hearing, tympanic membrane and external ear normal.   Nose: Nose normal.   Mouth/Throat: Uvula is midline, oropharynx is clear and moist and mucous membranes are normal. She does not have dentures. Abnormal dentition. No dental abscesses or dental caries. No oropharyngeal exudate.   Tenderness bilaterally both temporomandibular joint areas. No erythema, no edema, no ecchymosis.    Eyes: Conjunctivae are normal. Right eye exhibits no discharge. Left eye exhibits no discharge. No scleral icterus.   Neck: Normal range of motion. Neck supple.   Cardiovascular: Normal rate.   Pulmonary/Chest: Effort normal.   Lymphadenopathy:     She has no cervical adenopathy.   Neurological: She is alert and oriented to person, place, and time.   Skin: Skin is warm and dry.   Nursing note and vitals reviewed.      Diagnostic Test Results:  No results found for this or any previous visit (from the past 24 hour(s)).    ASSESSMENT/PLAN:       ICD-10-CM    1. Temporomandibular joint disorder M26.609 cyclobenzaprine (FLEXERIL) 10 MG tablet       PLAN:  Patient is well versed in her diagnosis.   Advised to massage and continue with ibuprofen.  Given flexeril for additional pain relief.   Encouraged to continue working with her dentist on dentures and TMJ treatments.   Given Epic educational materials.    I explained my diagnostic considerations and recommendations to patient who voiced understanding and agreement with the treatment plan. All questions were answered. We discussed potential side effects of any prescribed or recommended therapies, as well as expectations for response to treatments.    Disclaimer:  This note consists of words and symbols derived from keyboarding, dictation, or using voice recognition software. As a result, there may be errors in the script that have gone undetected. Please consider this when interpreting  information found in this note.      CHRISTY Martin, NP-C  4/26/2019 at 7:21 PM  [facility]

## 2019-04-27 NOTE — NURSING NOTE
Patient presents in the clinic with concerns of jaw pain, that is related to her lack of dentures. Patient reports he lost them around two weeks ago, and since has had issues with jaw pain.  Gay Steele LPN 4/26/2019 7:20 PM  Chief Complaint   Patient presents with     Jaw Pain       Medication Reconciliation: complete    Joanne Steele LPN

## 2019-05-30 ENCOUNTER — TELEPHONE (OUTPATIENT)
Dept: FAMILY MEDICINE | Facility: OTHER | Age: 49
End: 2019-05-30

## 2019-05-30 NOTE — LETTER
May 30, 2019      Kym Perea  [address] [address]        Dear Kym,     In order to optimize your asthma management, we recently reviewed your medical record and found that you are due for an asthma follow up.      Please take the time to fill out the attached asthma control test and we will call you to follow up and go over these questions.      We appreciate the opportunity to serve you and look forward to supporting your healthcare needs in the future.       Sincerely,    SHARA Hidalgo

## 2019-05-30 NOTE — TELEPHONE ENCOUNTER
Panel Management Review      Patient has the following on her problem list:     Asthma review     ACT Total Scores 9/18/2018   ACT TOTAL SCORE (Goal Greater than or Equal to 20) 23   In the past 12 months, how many times did you visit the emergency room for your asthma without being admitted to the hospital? 0   In the past 12 months, how many times were you hospitalized overnight because of your asthma? 0      1. Is Asthma diagnosis on the Problem List? No   2. Is Asthma listed on Health Maintenance? Yes    3. Patient is due for:  ACT      Composite cancer screening  Chart review shows that this patient is due/due soon for the following None  Summary:    Patient is due/failing the following:   ACT    Action needed:   Patient needs to do ACT.    Type of outreach:    Copy of ACT mailed to patient, will reach out in 5 days.    Questions for provider review:    None                                                                                                                                    Shirin Daly LPN 5/30/2019   9:21 AM       Chart routed to Care Team .

## 2019-06-05 NOTE — TELEPHONE ENCOUNTER
Attempted to contact patient.  The current number we have for this patient is not a working or valid number.  Shirin Daly LPN 6/5/2019   10:23 AM

## 2019-07-05 ENCOUNTER — TELEPHONE (OUTPATIENT)
Dept: FAMILY MEDICINE | Facility: OTHER | Age: 49
End: 2019-07-05

## 2019-07-05 ENCOUNTER — OFFICE VISIT (OUTPATIENT)
Dept: FAMILY MEDICINE | Facility: OTHER | Age: 49
End: 2019-07-05
Attending: NURSE PRACTITIONER
Payer: MEDICARE

## 2019-07-05 VITALS
DIASTOLIC BLOOD PRESSURE: 74 MMHG | OXYGEN SATURATION: 99 % | WEIGHT: 161 LBS | RESPIRATION RATE: 20 BRPM | HEART RATE: 80 BPM | SYSTOLIC BLOOD PRESSURE: 124 MMHG | BODY MASS INDEX: 23.85 KG/M2 | HEIGHT: 69 IN | TEMPERATURE: 98.5 F

## 2019-07-05 DIAGNOSIS — M25.572 PAIN OF JOINT OF LEFT ANKLE AND FOOT: ICD-10-CM

## 2019-07-05 DIAGNOSIS — G89.4 CHRONIC PAIN DISORDER: ICD-10-CM

## 2019-07-05 DIAGNOSIS — Z02.89 PAIN MEDICATION AGREEMENT: ICD-10-CM

## 2019-07-05 DIAGNOSIS — G90.522 COMPLEX REGIONAL PAIN SYNDROME TYPE 1 AFFECTING LEFT LOWER LEG: ICD-10-CM

## 2019-07-05 PROCEDURE — 99213 OFFICE O/P EST LOW 20 MIN: CPT | Performed by: NURSE PRACTITIONER

## 2019-07-05 PROCEDURE — G0463 HOSPITAL OUTPT CLINIC VISIT: HCPCS

## 2019-07-05 RX ORDER — OXYCODONE HYDROCHLORIDE 10 MG/1
10 TABLET ORAL EVERY 4 HOURS PRN
Qty: 72 TABLET | Refills: 0 | Status: SHIPPED | OUTPATIENT
Start: 2019-07-05 | End: 2019-07-18

## 2019-07-05 ASSESSMENT — PAIN SCALES - GENERAL: PAINLEVEL: SEVERE PAIN (7)

## 2019-07-05 ASSESSMENT — ANXIETY QUESTIONNAIRES
2. NOT BEING ABLE TO STOP OR CONTROL WORRYING: NEARLY EVERY DAY
1. FEELING NERVOUS, ANXIOUS, OR ON EDGE: NEARLY EVERY DAY
5. BEING SO RESTLESS THAT IT IS HARD TO SIT STILL: NEARLY EVERY DAY
6. BECOMING EASILY ANNOYED OR IRRITABLE: NEARLY EVERY DAY
3. WORRYING TOO MUCH ABOUT DIFFERENT THINGS: NEARLY EVERY DAY
7. FEELING AFRAID AS IF SOMETHING AWFUL MIGHT HAPPEN: NEARLY EVERY DAY
IF YOU CHECKED OFF ANY PROBLEMS ON THIS QUESTIONNAIRE, HOW DIFFICULT HAVE THESE PROBLEMS MADE IT FOR YOU TO DO YOUR WORK, TAKE CARE OF THINGS AT HOME, OR GET ALONG WITH OTHER PEOPLE: EXTREMELY DIFFICULT
GAD7 TOTAL SCORE: 21

## 2019-07-05 ASSESSMENT — PATIENT HEALTH QUESTIONNAIRE - PHQ9
SUM OF ALL RESPONSES TO PHQ QUESTIONS 1-9: 18
5. POOR APPETITE OR OVEREATING: NEARLY EVERY DAY

## 2019-07-05 ASSESSMENT — MIFFLIN-ST. JEOR: SCORE: 1424.67

## 2019-07-05 NOTE — TELEPHONE ENCOUNTER
Patient called trying to see if [facility] has had any cancellations. Patient is aware that [facility] is out of clinic until Tuesday.  Patient will be out of meds today. Has upcoming appt with PCP but needing medications to get her through until 07/16/2019. Please call patient and advise how she can obtain medication to get her through to appt date of 07/16/2019. Thank you!    [place] on 7/5/2019 at 9:25 AM

## 2019-07-05 NOTE — NURSING NOTE
"Chief Complaint   Patient presents with     Refill Request     Pain med     Has appt scheduled with Darrin Stephen 7/17/19.      Initial /74   Pulse 80   Temp 98.5  F (36.9  C) (Temporal)   Resp 20   Ht 1.753 m (5' 9\")   Wt 73 kg (161 lb)   SpO2 99%   BMI 23.78 kg/m   Estimated body mass index is 23.78 kg/m  as calculated from the following:    Height as of this encounter: 1.753 m (5' 9\").    Weight as of this encounter: 73 kg (161 lb).    Medication Reconciliation: complete      Norma J. Gosselin, LPN  "

## 2019-07-05 NOTE — TELEPHONE ENCOUNTER
After verifying pts name and date of birth with pt, pt notified she can make an appointment with another physician, wait until Dr. Stephen is back until Tuesday and if the pain gets out of hand she can go to the ER. Pt requesting a clinic appointment today and was scheduled on 7/5 @1500 for medication management.  Svetlana Nolan

## 2019-07-06 ASSESSMENT — ANXIETY QUESTIONNAIRES: GAD7 TOTAL SCORE: 21

## 2019-07-06 ASSESSMENT — ASTHMA QUESTIONNAIRES: ACT_TOTALSCORE: 18

## 2019-07-18 ENCOUNTER — OFFICE VISIT (OUTPATIENT)
Dept: FAMILY MEDICINE | Facility: OTHER | Age: 49
End: 2019-07-18
Attending: FAMILY MEDICINE
Payer: MEDICARE

## 2019-07-18 VITALS
RESPIRATION RATE: 16 BRPM | HEART RATE: 64 BPM | DIASTOLIC BLOOD PRESSURE: 68 MMHG | SYSTOLIC BLOOD PRESSURE: 104 MMHG | BODY MASS INDEX: 20.94 KG/M2 | WEIGHT: 141.8 LBS | TEMPERATURE: 97.3 F

## 2019-07-18 DIAGNOSIS — M25.572 PAIN OF JOINT OF LEFT ANKLE AND FOOT: Primary | ICD-10-CM

## 2019-07-18 DIAGNOSIS — Z02.89 PAIN MEDICATION AGREEMENT: ICD-10-CM

## 2019-07-18 DIAGNOSIS — Z72.0 TOBACCO ABUSE: ICD-10-CM

## 2019-07-18 DIAGNOSIS — G90.522 COMPLEX REGIONAL PAIN SYNDROME TYPE 1 AFFECTING LEFT LOWER LEG: ICD-10-CM

## 2019-07-18 DIAGNOSIS — Z79.899 MEDICAL CANNABIS USE: ICD-10-CM

## 2019-07-18 DIAGNOSIS — G89.4 CHRONIC PAIN DISORDER: ICD-10-CM

## 2019-07-18 PROCEDURE — 99213 OFFICE O/P EST LOW 20 MIN: CPT | Performed by: FAMILY MEDICINE

## 2019-07-18 PROCEDURE — G0463 HOSPITAL OUTPT CLINIC VISIT: HCPCS

## 2019-07-18 RX ORDER — OXYCODONE HYDROCHLORIDE 10 MG/1
10 TABLET ORAL EVERY 4 HOURS PRN
Qty: 168 TABLET | Refills: 0 | Status: SHIPPED | OUTPATIENT
Start: 2019-07-18 | End: 2019-09-11

## 2019-07-18 RX ORDER — OXYCODONE HYDROCHLORIDE 10 MG/1
10 TABLET ORAL EVERY 4 HOURS PRN
Qty: 168 TABLET | Refills: 0 | Status: SHIPPED | OUTPATIENT
Start: 2019-07-18 | End: 2019-07-18

## 2019-07-18 ASSESSMENT — PATIENT HEALTH QUESTIONNAIRE - PHQ9
SUM OF ALL RESPONSES TO PHQ QUESTIONS 1-9: 16
5. POOR APPETITE OR OVEREATING: NEARLY EVERY DAY

## 2019-07-18 ASSESSMENT — ANXIETY QUESTIONNAIRES
IF YOU CHECKED OFF ANY PROBLEMS ON THIS QUESTIONNAIRE, HOW DIFFICULT HAVE THESE PROBLEMS MADE IT FOR YOU TO DO YOUR WORK, TAKE CARE OF THINGS AT HOME, OR GET ALONG WITH OTHER PEOPLE: VERY DIFFICULT
3. WORRYING TOO MUCH ABOUT DIFFERENT THINGS: NEARLY EVERY DAY
2. NOT BEING ABLE TO STOP OR CONTROL WORRYING: NEARLY EVERY DAY
1. FEELING NERVOUS, ANXIOUS, OR ON EDGE: NEARLY EVERY DAY
7. FEELING AFRAID AS IF SOMETHING AWFUL MIGHT HAPPEN: NEARLY EVERY DAY
GAD7 TOTAL SCORE: 21
5. BEING SO RESTLESS THAT IT IS HARD TO SIT STILL: NEARLY EVERY DAY
6. BECOMING EASILY ANNOYED OR IRRITABLE: NEARLY EVERY DAY

## 2019-07-18 ASSESSMENT — PAIN SCALES - GENERAL: PAINLEVEL: SEVERE PAIN (7)

## 2019-07-18 NOTE — PROGRESS NOTES
"Nursing Notes:   Petra Swanson LPN  7/18/2019  9:28 AM  Signed  Chief Complaint   Patient presents with     Recheck Medication       Initial /68   Pulse 64   Temp 97.3  F (36.3  C) (Temporal)   Resp 16   Wt 64.3 kg (141 lb 12.8 oz)   Breastfeeding? No   BMI 20.94 kg/m    Estimated body mass index is 20.94 kg/m  as calculated from the following:    Height as of 7/5/19: 1.753 m (5' 9\").    Weight as of this encounter: 64.3 kg (141 lb 12.8 oz).  Medication Reconciliation: complete    Petra Swanson LPN    SUBJECTIVE:  Kym Perea  is a 49 year old female who comes in today for follow-up and medication management.  I last saw her 3 months ago.  She has complex regional pain syndrome involving her left foot and ankle.  She has bilateral facet arthropathy at L4-5 but has not really wanted to pursue injections. She sees a chiropractor regularly.  She uses medical cannabis but cost is limiting for her.    Her daughter got  a few weeks ago.     She is still smoking.     CHRONIC PAIN MANAGEMENT:    DIAGNOSIS:      1. Pain of joint of left ankle and foot    2. Chronic pain disorder    3. Complex regional pain syndrome type 1 affecting left lower leg    4. Pain medication agreement 4/18/18, 4/3/19    5. Medical cannabis use    6. Tobacco abuse    7. Pain medication agreement        PAIN MEDICATION AGREEMENT: (YES)    DATE SIGNED: 5/16/16, 4/20/17, 4/18/18, 4/3/19      NON-MEDICATION MODALITIES MAXIMIZED? (YES) had surgery 2 years ago with Dr. Skelton, but has not seen him for a while.       NEUROPATHIC PAIN: (YES)  ON GABAPENTIN/LYRICA/TCA/SNRI: (YES) dose increased up to QID, but now on 600 mg daily.        NOCICEPTIVE PAIN: (YES)      HISTORY OF CD: (NO)      MENTAL HEALTH DIAGNOSIS: (YES)  DIAGNOSIS:Anxiety  ON BENZODIAZEPINES: (YES)  DISCLOSURE REGARDING RISK OF CONCOMITANT USE WITH OPIOIDS DISCUSSED: (YES)  DATE DISCUSSED: 5/16/16 and 6/2/16 and 8/30/16, 1/12/17, 1/2/19, " 4/3/19      MEDICATION: Oxycodone 10 mg 4-6 tab per day, #168/q 28 days.       ORAL MORPHINE EQUIVALENTS: 90 OME/day      LAST TAPER TRIAL: just completed 5/16/16.  Off Morphine and then on Norco at much lower dose, gradual increase to now at 90 OME oxycodone per day max.        QUERY TODAY: (YES)  APPROPRIATE?:         (YES) the  is incorrect as it notes that I am the prescriber of her benzodiazepine's and I am not. They are prescribed by Phuong Magallanes NP. She is working to taper them.       TOXASSURE TODAY: (NO)  IF NO, DATE OF LAST TOXASURE: 1/12/17, 4/20/17, 7/30/17( test was also positive for ETG in addition to amphetamines and benzodiazepines, along with oxycodone). 1/10/18 no opiates but appropriate for other meds. 9/18/18 appropriate but no opiates. 4/3/19 appropriate.       PAIN SCORE FLOWSHEET REVIEWED: (YES)  STABLE OR IMPROVED: (YES)      AUDIT-10 QUESTIONNAIRE COMPLETED: (NO)      OSWESTRY PAIN DISABILITY INDEX: (NO)  No flowsheet data found.        Past Medical, Family, and Social History reviewed and updated as noted below.   ROS is negative except as noted above       Allergies   Allergen Reactions     Arthrotec GI Disturbance     Indomethacin      Other reaction(s): Dizziness     Meloxicam GI Disturbance   ,   Family History   Problem Relation Age of Onset     Cancer Father         Cancer,Bladder     Other - See Comments Father         Mild hypercholesterolemia     Other - See Comments Mother         scleroderma/lupus/Parkinson's syndrome     Family History Negative Brother         Good Health     Colon Cancer Maternal Grandmother 40        Cancer-colon,Followed by lung  with metastasis to the bone di*   ,   Current Outpatient Medications   Medication     albuterol (PROAIR HFA/PROVENTIL HFA/VENTOLIN HFA) 108 (90 Base) MCG/ACT Inhaler     ALPRAZolam (XANAX) 2 MG tablet     amphetamine-dextroamphetamine (ADDERALL) 30 MG per tablet     atenolol (TENORMIN) 100 MG tablet     fluticasone (FLONASE) 50  MCG/ACT spray     oxyCODONE IR (ROXICODONE) 10 MG tablet     VITAMIN D, CHOLECALCIFEROL, PO     No current facility-administered medications for this visit.    ,   Past Medical History:   Diagnosis Date     Acquired absence of other organs (CODE)     9/2/2011     Chronic pain syndrome     No Comments Provided     Complex regional pain syndrome I     left ankle     Encounter for other administrative examinations     10/2012,Hydrocodone 10/325mg, #240/mo signed 10/3/12, updated 10/1/ 14     Encounter for other administrative examinations     10/1/2014,MS Contin 30 mg q 8 hours #90 per month.  Morphine sulfate IR 15 mg 2-3 tab tid prn #180 per month     Gastro-esophageal reflux disease without esophagitis     No Comments Provided     Injury of left ankle     1997,requiring surgery     Low back pain     No Comments Provided     Major depressive disorder, recurrent severe without psychotic features (H)     No Comments Provided     Migraine without status migrainosus, not intractable     No Comments Provided     Nicotine dependence, uncomplicated     No Comments Provided     Obesity     No Comments Provided     Overweight     11/25/2014     Pain in ankle     Chronic left ankle pain secondary to reflex sympathetic dystrophy.  Patient  has had 4 previous surgeries, the most recent one done by Dr. Noriega in  2007. On Narcotic contract.     Pain in thoracic spine     No Comments Provided     Panic disorder without agoraphobia     Dr Reynoso     Personal history of other diseases of the female genital tract     No Comments Provided     Personal history of other medical treatment (CODE)     G2, P1-0-1-1 with one vaginal delivery.     Supraventricular tachycardia (H)     5/24/2010   ,   Patient Active Problem List    Diagnosis Date Noted     Medical cannabis use 07/10/2018     Priority: Medium     Pain in joint, ankle and foot 01/29/2018     Priority: Medium     Overview:   Chronic left ankle pain secondary to reflex  sympathetic dystrophy.  Patient   has had 4 previous surgeries, the most recent one done by Dr. Noriega in   2007. On Narcotic contract.       Chronic pain disorder 01/29/2018     Priority: Medium     Overview:   complex regional pain syndrome left ankle       Esophageal reflux 01/29/2018     Priority: Medium     Hyperlipidemia 01/29/2018     Priority: Medium     Insomnia 01/29/2018     Priority: Medium     Panic disorder 01/29/2018     Priority: Medium     Overview:   Previously patient of Dr. Reynoso since her teens. Patient is attempting slow taper down on her Xanax. She's been on Xanax 2 mg 4 times a day for years       Tobacco abuse 01/29/2018     Priority: Medium     Influenza A 01/11/2018     Priority: Medium     Pain medication agreement 4/18/18, 4/3/19 04/21/2017     Priority: Medium     Overview:   4/20/17:  Oxycodone 10 mg #180/mo    Hydrocodone 10/325mg, #240/mo. Stopped and changed to morphine. Agreement signed 10/3/12, updated 10/1/ 14.  Complex regional pain syndrome (aka Reflex Sympathetic Dystrophy)  MS Contin 30 mg q 8 hours #90 per month.  Morphine sulfate IR 15 mg 2-3 tab tid prn #180 per month, decreased to #90/month.   query 4/9/16 as part of chart review. Seems appropriate. 6/2/16 tapered and off Morphine. [place] 5/325 #120/month.  query appropriate. ToxAssure appropriate.    August 2016 Consultation with [facility]. Recommended spinal cord stimulator trial, but she is reluctant to pursue that. Pain clinic stated that continuing opioids was OK.       Complex regional pain syndrome type 1 affecting left lower leg 04/15/2016     Priority: Medium     Overview:   Patient with a history of complex regional pain syndrome has seen Dr. Dino Skelton4/23/16        Migraine headache 11/25/2014     Priority: Medium     Overweight (BMI 25.0-29.9) 11/25/2014     Priority: Medium     Lumbago 02/06/2012     Priority: Medium     Major depressive disorder, recurrent episode, severe (H)  07/13/2011     Priority: Medium     Paroxysmal supraventricular tachycardia (H) 05/24/2010     Priority: Medium   ,   Past Surgical History:   Procedure Laterality Date     ANKLE SURGERY      1997, 2000,Left ankle surgery x 2     HYSTERECTOMY TOTAL ABDOMINAL      11/1999,secondary to endometriosis, no malignancy; patient reports no malignancy, but abnormal cells were present *     LAPAROSCOPIC CHOLECYSTECTOMY      No Comments Provided     LAPAROSCOPY DIAGNOSTIC (GENERAL)      1995     OTHER SURGICAL HISTORY      21299,CRANIO/MAXILLOFACIAL SURGERY,Jaw Surgery     OTHER SURGICAL HISTORY      207040,[facility],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  No other abnormalities noted.    and   Social History     Tobacco Use     Smoking status: Current Every Day Smoker     Packs/day: 0.50     Years: 30.00     Pack years: 15.00     Types: Cigarettes     Start date: 12/2/1986     Smokeless tobacco: Never Used   Substance Use Topics     Alcohol use: Not Currently     Alcohol/week: 0.0 oz     OBJECTIVE:  /68   Pulse 64   Temp 97.3  F (36.3  C) (Temporal)   Resp 16   Wt 64.3 kg (141 lb 12.8 oz)   Breastfeeding? No   BMI 20.94 kg/m     EXAM:  Alert and cooperative, no distress.  Affect is appropriate.  Foot exam was not repeated today.  ASSESSMENT/Plan :    Kym was seen today for recheck medication.    Diagnoses and all orders for this visit:    Pain of joint of left ankle and foot  -     Discontinue: oxyCODONE IR (ROXICODONE) 10 MG tablet; Take 1 tablet (10 mg) by mouth every 4 hours as needed for moderate to severe pain Maximum 6 tab per day  -     oxyCODONE IR (ROXICODONE) 10 MG tablet; Take 1 tablet (10 mg) by mouth every 4 hours as needed for moderate to severe pain Maximum 6 tab per day    Chronic pain disorder  -     Discontinue: oxyCODONE IR (ROXICODONE) 10 MG tablet; Take 1 tablet (10 mg) by mouth every 4 hours as needed for moderate to severe pain Maximum 6 tab per day  -     oxyCODONE IR  (ROXICODONE) 10 MG tablet; Take 1 tablet (10 mg) by mouth every 4 hours as needed for moderate to severe pain Maximum 6 tab per day    Complex regional pain syndrome type 1 affecting left lower leg  -     Discontinue: oxyCODONE IR (ROXICODONE) 10 MG tablet; Take 1 tablet (10 mg) by mouth every 4 hours as needed for moderate to severe pain Maximum 6 tab per day  -     oxyCODONE IR (ROXICODONE) 10 MG tablet; Take 1 tablet (10 mg) by mouth every 4 hours as needed for moderate to severe pain Maximum 6 tab per day    Pain medication agreement 4/18/18, 4/3/19  -     Discontinue: oxyCODONE IR (ROXICODONE) 10 MG tablet; Take 1 tablet (10 mg) by mouth every 4 hours as needed for moderate to severe pain Maximum 6 tab per day  -     oxyCODONE IR (ROXICODONE) 10 MG tablet; Take 1 tablet (10 mg) by mouth every 4 hours as needed for moderate to severe pain Maximum 6 tab per day    Medical cannabis use    Tobacco abuse    Pain medication agreement  -     Discontinue: oxyCODONE IR (ROXICODONE) 10 MG tablet; Take 1 tablet (10 mg) by mouth every 4 hours as needed for moderate to severe pain Maximum 6 tab per day  -     oxyCODONE IR (ROXICODONE) 10 MG tablet; Take 1 tablet (10 mg) by mouth every 4 hours as needed for moderate to severe pain Maximum 6 tab per day      Discussed the new federal law regarding opiate prescriptions not being able to be filled any further than 30 days from the prescription date.  Discussed doing 28-day prescription so that she has to come in every 2 months rather than monthly.   query is appropriate.  Prescription for oxycodone 10 mg #1 six 8 x 2.  Follow-up in 2 months, sooner if needed.    Encouraged to quit smoking.    A total of 15 minutes was spent with the patient, greater than 50% of the time was spent in counseling/discussion of the aforementioned concerns.     Darrin Stephen MD

## 2019-07-18 NOTE — NURSING NOTE
"Chief Complaint   Patient presents with     Recheck Medication       Initial /68   Pulse 64   Temp 97.3  F (36.3  C) (Temporal)   Resp 16   Wt 64.3 kg (141 lb 12.8 oz)   Breastfeeding? No   BMI 20.94 kg/m   Estimated body mass index is 20.94 kg/m  as calculated from the following:    Height as of 7/5/19: 1.753 m (5' 9\").    Weight as of this encounter: 64.3 kg (141 lb 12.8 oz).  Medication Reconciliation: complete    Petra Swanson LPN  "

## 2019-07-19 ASSESSMENT — ASTHMA QUESTIONNAIRES: ACT_TOTALSCORE: 21

## 2019-07-19 ASSESSMENT — ANXIETY QUESTIONNAIRES: GAD7 TOTAL SCORE: 21

## 2019-09-11 ENCOUNTER — OFFICE VISIT (OUTPATIENT)
Dept: FAMILY MEDICINE | Facility: OTHER | Age: 49
End: 2019-09-11
Attending: FAMILY MEDICINE
Payer: MEDICARE

## 2019-09-11 VITALS
RESPIRATION RATE: 16 BRPM | SYSTOLIC BLOOD PRESSURE: 112 MMHG | HEART RATE: 73 BPM | OXYGEN SATURATION: 99 % | DIASTOLIC BLOOD PRESSURE: 86 MMHG | TEMPERATURE: 97.2 F

## 2019-09-11 DIAGNOSIS — M25.572 PAIN OF JOINT OF LEFT ANKLE AND FOOT: ICD-10-CM

## 2019-09-11 DIAGNOSIS — Z02.89 PAIN MEDICATION AGREEMENT: ICD-10-CM

## 2019-09-11 DIAGNOSIS — G90.522 COMPLEX REGIONAL PAIN SYNDROME TYPE 1 AFFECTING LEFT LOWER LEG: ICD-10-CM

## 2019-09-11 DIAGNOSIS — Z72.0 TOBACCO ABUSE: ICD-10-CM

## 2019-09-11 DIAGNOSIS — G89.4 CHRONIC PAIN DISORDER: Primary | ICD-10-CM

## 2019-09-11 DIAGNOSIS — Z79.899 MEDICAL CANNABIS USE: ICD-10-CM

## 2019-09-11 DIAGNOSIS — F41.0 PANIC DISORDER: ICD-10-CM

## 2019-09-11 DIAGNOSIS — R10.9 FLANK PAIN: ICD-10-CM

## 2019-09-11 DIAGNOSIS — M26.609 TEMPOROMANDIBULAR JOINT DISORDER: ICD-10-CM

## 2019-09-11 LAB
ALBUMIN UR-MCNC: NEGATIVE MG/DL
APPEARANCE UR: CLEAR
BACTERIA #/AREA URNS HPF: ABNORMAL /HPF
BILIRUB UR QL STRIP: NEGATIVE
COLOR UR AUTO: YELLOW
GLUCOSE UR STRIP-MCNC: NEGATIVE MG/DL
HGB UR QL STRIP: NEGATIVE
KETONES UR STRIP-MCNC: NEGATIVE MG/DL
LEUKOCYTE ESTERASE UR QL STRIP: ABNORMAL
NITRATE UR QL: POSITIVE
PH UR STRIP: 5.5 PH (ref 5–9)
RBC #/AREA URNS AUTO: ABNORMAL /HPF
SOURCE: ABNORMAL
SP GR UR STRIP: <1.005 (ref 1–1.03)
UROBILINOGEN UR STRIP-ACNC: 0.2 EU/DL (ref 0.2–1)
WBC #/AREA URNS AUTO: ABNORMAL /HPF

## 2019-09-11 PROCEDURE — 99214 OFFICE O/P EST MOD 30 MIN: CPT | Performed by: FAMILY MEDICINE

## 2019-09-11 PROCEDURE — 87086 URINE CULTURE/COLONY COUNT: CPT | Mod: ZL | Performed by: FAMILY MEDICINE

## 2019-09-11 PROCEDURE — 87088 URINE BACTERIA CULTURE: CPT | Mod: ZL | Performed by: FAMILY MEDICINE

## 2019-09-11 PROCEDURE — G0463 HOSPITAL OUTPT CLINIC VISIT: HCPCS

## 2019-09-11 PROCEDURE — 81001 URINALYSIS AUTO W/SCOPE: CPT | Mod: ZL | Performed by: FAMILY MEDICINE

## 2019-09-11 PROCEDURE — 80307 DRUG TEST PRSMV CHEM ANLYZR: CPT | Mod: ZL | Performed by: FAMILY MEDICINE

## 2019-09-11 RX ORDER — OXYCODONE HYDROCHLORIDE 10 MG/1
10 TABLET ORAL EVERY 4 HOURS PRN
Qty: 168 TABLET | Refills: 0 | Status: SHIPPED | OUTPATIENT
Start: 2019-09-11 | End: 2019-09-11

## 2019-09-11 RX ORDER — CYCLOBENZAPRINE HCL 10 MG
10 TABLET ORAL 3 TIMES DAILY PRN
Qty: 30 TABLET | Refills: 3 | Status: SHIPPED | OUTPATIENT
Start: 2019-09-11 | End: 2020-01-07

## 2019-09-11 RX ORDER — OXYCODONE HYDROCHLORIDE 10 MG/1
10 TABLET ORAL EVERY 4 HOURS PRN
Qty: 168 TABLET | Refills: 0 | Status: SHIPPED | OUTPATIENT
Start: 2019-09-11 | End: 2019-10-18

## 2019-09-11 ASSESSMENT — PAIN SCALES - GENERAL: PAINLEVEL: SEVERE PAIN (7)

## 2019-09-11 NOTE — NURSING NOTE
"Chief Complaint   Patient presents with     Recheck Medication       Initial /86   Pulse 73   Temp 97.2  F (36.2  C) (Tympanic)   Resp 16   SpO2 99%   Breastfeeding? No  Estimated body mass index is 20.94 kg/m  as calculated from the following:    Height as of 7/5/19: 1.753 m (5' 9\").    Weight as of 7/18/19: 64.3 kg (141 lb 12.8 oz).  Medication Reconciliation: complete    Petra Swanson LPN  "

## 2019-09-11 NOTE — PROGRESS NOTES
"Nursing Notes:   Petra Swanson LPN  2019  9:34 AM  Signed  Chief Complaint   Patient presents with     Recheck Medication       Initial /86   Pulse 73   Temp 97.2  F (36.2  C) (Tympanic)   Resp 16   SpO2 99%   Breastfeeding? No  Estimated body mass index is 20.94 kg/m  as calculated from the following:    Height as of 19: 1.753 m (5' 9\").    Weight as of 19: 64.3 kg (141 lb 12.8 oz).  Medication Reconciliation: complete    Petra Swanson LPN    SUBJECTIVE:  Kym Perea  is a 49 year old female who comes in today for follow-up and medication management.  I have not seen her since July.  She has complex regional pain syndrome involving her left foot and ankle.  She has bilateral facet arthropathy of L4-5 and she declined facet injections at L4-5.  She continues to see Ramiro Portillo DC for her back.  She uses medical cannabis but because of cost it is difficult for her to afford it.    [facility]. She has had some issues and arguing with the  since New Year's Natali when Max  after getting out of treatment. There were people he let live with him and basically were squatters. The house went into [facility].  She is living in apartment near the [facility].     Her daughter lives in [place]. She is in a bit of spat with her mom and with her daughter. Her mom has Parkinson's disease. She has some cognitive issues.  Her daughter was just diagnosed with Ehler-Danlos syndrome.     She is having a bit of low back and flank pain and wonders about a UTI. No fever. No dysuria.     She has not been taking her oxycodone in the last couple of weeks. She thinks they are making her vomit.     CHRONIC PAIN MANAGEMENT:    DIAGNOSIS:      1. Chronic pain disorder    2. Pain medication agreement 18, 4/3/19    3. Complex regional pain syndrome type 1 affecting left lower leg    4. Pain of joint of left ankle and foot    5. Medical cannabis use    6. " Panic disorder    7. Tobacco abuse    8. Pain medication agreement    9. Flank pain           PAIN MEDICATION AGREEMENT: (YES)    DATE SIGNED: 5/16/16, 4/20/17, 4/18/18, 4/3/19      NON-MEDICATION MODALITIES MAXIMIZED? (YES) had surgery 2 years ago with Dr. Skelton, but has not seen him for a while.       NEUROPATHIC PAIN: (YES)  ON GABAPENTIN/LYRICA/TCA/SNRI: (YES) dose increased up to QID, but now on 600 mg daily.        NOCICEPTIVE PAIN: (YES)      HISTORY OF CD: (NO)      MENTAL HEALTH DIAGNOSIS: (YES)  DIAGNOSIS:Anxiety  ON BENZODIAZEPINES: (YES)  DISCLOSURE REGARDING RISK OF CONCOMITANT USE WITH OPIOIDS DISCUSSED: (YES)  DATE DISCUSSED: 5/16/16 and 6/2/16 and 8/30/16, 1/12/17, 1/2/19, 4/3/19, 9/11/19      MEDICATION: Oxycodone 10 mg 4-6 tab per day, #168/q 28 days.       ORAL MORPHINE EQUIVALENTS: 90 OME/day      LAST TAPER TRIAL: just completed 5/16/16.  Off Morphine and then on Norco at much lower dose, gradual increase to now at 90 OME oxycodone per day max.        QUERY TODAY: (YES)  APPROPRIATE?:         (YES) the  is incorrect as it notes that I am the prescriber of her benzodiazepine's and I am not. They are prescribed by Phuong Magallanes NP. She is working to taper them.       TOXASSURE TODAY: (YES)  IF NO, DATE OF LAST TOXASURE: 1/12/17, 4/20/17, 7/30/17( test was also positive for ETG in addition to amphetamines and benzodiazepines, along with oxycodone). 1/10/18 no opiates but appropriate for other meds. 9/18/18 appropriate but no opiates. 4/3/19 appropriate.       PAIN SCORE FLOWSHEET REVIEWED: (YES)  STABLE OR IMPROVED: (YES)      AUDIT-10 QUESTIONNAIRE COMPLETED: (NO)      OSWESTRY PAIN DISABILITY INDEX: (NO)  No flowsheet data found.         Past Medical, Family, and Social History reviewed and updated as noted below.   ROS is negative except as noted above       Allergies   Allergen Reactions     Arthrotec GI Disturbance     Indomethacin      Other reaction(s): Dizziness     Meloxicam GI  Disturbance   ,   Family History   Problem Relation Age of Onset     Cancer Father         Cancer,Bladder     Other - See Comments Father         Mild hypercholesterolemia     Other - See Comments Mother         scleroderma/lupus/Parkinson's syndrome     Family History Negative Brother         Good Health     Ariel-Danlos syndrome Daughter      Colon Cancer Maternal Grandmother 40        Cancer-colon,Followed by lung  with metastasis to the bone di*   ,   Current Outpatient Medications   Medication     albuterol (PROAIR HFA/PROVENTIL HFA/VENTOLIN HFA) 108 (90 Base) MCG/ACT Inhaler     ALPRAZolam (XANAX) 2 MG tablet     amphetamine-dextroamphetamine (ADDERALL) 30 MG per tablet     atenolol (TENORMIN) 100 MG tablet     fluticasone (FLONASE) 50 MCG/ACT spray     oxyCODONE IR (ROXICODONE) 10 MG tablet     VITAMIN D, CHOLECALCIFEROL, PO     No current facility-administered medications for this visit.    ,   Past Medical History:   Diagnosis Date     Acquired absence of other organs (CODE)     9/2/2011     Chronic pain syndrome     No Comments Provided     Complex regional pain syndrome I     left ankle     Encounter for other administrative examinations     10/2012,Hydrocodone 10/325mg, #240/mo signed 10/3/12, updated 10/1/ 14     Encounter for other administrative examinations     10/1/2014,MS Contin 30 mg q 8 hours #90 per month.  Morphine sulfate IR 15 mg 2-3 tab tid prn #180 per month     Gastro-esophageal reflux disease without esophagitis     No Comments Provided     Injury of left ankle     1997,requiring surgery     Low back pain     No Comments Provided     Major depressive disorder, recurrent severe without psychotic features (H)     No Comments Provided     Migraine without status migrainosus, not intractable     No Comments Provided     Nicotine dependence, uncomplicated     No Comments Provided     Obesity     No Comments Provided     Overweight     11/25/2014     Pain in ankle     Chronic left ankle pain  secondary to reflex sympathetic dystrophy.  Patient  has had 4 previous surgeries, the most recent one done by Dr. Noriega in  2007. On Narcotic contract.     Pain in thoracic spine     No Comments Provided     Panic disorder without agoraphobia     Dr Reynoso     Personal history of other diseases of the female genital tract     No Comments Provided     Personal history of other medical treatment (CODE)     G2, P1-0-1-1 with one vaginal delivery.     Supraventricular tachycardia (H)     5/24/2010   ,   Patient Active Problem List    Diagnosis Date Noted     Medical cannabis use 07/10/2018     Priority: Medium     Pain in joint, ankle and foot 01/29/2018     Priority: Medium     Overview:   Chronic left ankle pain secondary to reflex sympathetic dystrophy.  Patient   has had 4 previous surgeries, the most recent one done by Dr. Noriega in   2007. On Narcotic contract.       Chronic pain disorder 01/29/2018     Priority: Medium     Overview:   complex regional pain syndrome left ankle       Esophageal reflux 01/29/2018     Priority: Medium     Hyperlipidemia 01/29/2018     Priority: Medium     Insomnia 01/29/2018     Priority: Medium     Panic disorder 01/29/2018     Priority: Medium     Overview:   Previously patient of Dr. Reynoso since her teens. Patient is attempting slow taper down on her Xanax. She's been on Xanax 2 mg 4 times a day for years       Tobacco abuse 01/29/2018     Priority: Medium     Influenza A 01/11/2018     Priority: Medium     Pain medication agreement 4/18/18, 4/3/19 04/21/2017     Priority: Medium     Overview:   4/20/17:  Oxycodone 10 mg #180/mo    Hydrocodone 10/325mg, #240/mo. Stopped and changed to morphine. Agreement signed 10/3/12, updated 10/1/ 14.  Complex regional pain syndrome (aka Reflex Sympathetic Dystrophy)  MS Contin 30 mg q 8 hours #90 per month.  Morphine sulfate IR 15 mg 2-3 tab tid prn #180 per month, decreased to #90/month.   query 4/9/16 as part of chart review.  Seems appropriate. 6/2/16 tapered and off Morphine. Cameron 5/325 #120/month.  query appropriate. ToxAssure appropriate.    August 2016 Consultation with [facility]. Recommended spinal cord stimulator trial, but she is reluctant to pursue that. Pain clinic stated that continuing opioids was OK.       Complex regional pain syndrome type 1 affecting left lower leg 04/15/2016     Priority: Medium     Overview:   Patient with a history of complex regional pain syndrome has seen Dr. Dino Skelton4/23/16        Migraine headache 11/25/2014     Priority: Medium     Overweight (BMI 25.0-29.9) 11/25/2014     Priority: Medium     Lumbago 02/06/2012     Priority: Medium     Major depressive disorder, recurrent episode, severe (H) 07/13/2011     Priority: Medium     Paroxysmal supraventricular tachycardia (H) 05/24/2010     Priority: Medium   ,   Past Surgical History:   Procedure Laterality Date     ANKLE SURGERY      1997, 2000,Left ankle surgery x 2     HYSTERECTOMY TOTAL ABDOMINAL      11/1999,secondary to endometriosis, no malignancy; patient reports no malignancy, but abnormal cells were present *     LAPAROSCOPIC CHOLECYSTECTOMY      No Comments Provided     LAPAROSCOPY DIAGNOSTIC (GENERAL)      1995     OTHER SURGICAL HISTORY      21299,CRANIO/MAXILLOFACIAL SURGERY,Jaw Surgery     OTHER SURGICAL HISTORY      207040,[facility] EMG,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  No other abnormalities noted.    and   Social History     Tobacco Use     Smoking status: Current Every Day Smoker     Packs/day: 0.50     Years: 30.00     Pack years: 15.00     Types: Cigarettes     Start date: 12/2/1986     Smokeless tobacco: Never Used   Substance Use Topics     Alcohol use: Not Currently     Alcohol/week: 0.0 oz     OBJECTIVE:  /86   Pulse 73   Temp 97.2  F (36.2  C) (Tympanic)   Resp 16   SpO2 99%   Breastfeeding? No    EXAM:  Appears to be her normal self, alert and cooperative, no distress.  No  flank pain is noted.  Foot exam is unchanged.    Results for orders placed or performed in visit on 09/11/19   *UA reflex to Microscopic   Result Value Ref Range    Color Urine Yellow     Appearance Urine Clear     Glucose Urine Negative NEG^Negative mg/dL    Bilirubin Urine Negative NEG^Negative    Ketones Urine Negative NEG^Negative mg/dL    Specific Gravity Urine <1.005 1.000 - 1.030    Blood Urine Negative NEG^Negative    pH Urine 5.5 5.0 - 9.0 pH    Protein Albumin Urine Negative NEG^Negative mg/dL    Urobilinogen Urine 0.2 0.2 - 1.0 EU/dL    Nitrite Urine Positive (A) NEG^Negative    Leukocyte Esterase Urine Small (A) NEG^Negative    Source Midstream Urine    Urine Microscopic   Result Value Ref Range    WBC Urine 5-10 (A) OTO5^0 - 5 /HPF    RBC Urine O - 2 OTO2^O - 2 /HPF    Bacteria Urine Many (A) NEG^Negative /HPF      ASSESSMENT/Plan :    Kym was seen today for recheck medication.    Diagnoses and all orders for this visit:    Chronic pain disorder  -     Drug  Screen Comprehensive , Urine with Reported Meds (MedTox) (Pain Care Package)  -     Discontinue: oxyCODONE IR (ROXICODONE) 10 MG tablet; Take 1 tablet (10 mg) by mouth every 4 hours as needed for moderate to severe pain Maximum 6 tab per day  -     oxyCODONE IR (ROXICODONE) 10 MG tablet; Take 1 tablet (10 mg) by mouth every 4 hours as needed for moderate to severe pain Maximum 6 tab per day    Pain medication agreement 4/18/18, 4/3/19  -     Drug  Screen Comprehensive , Urine with Reported Meds (MedTox) (Pain Care Package)  -     Discontinue: oxyCODONE IR (ROXICODONE) 10 MG tablet; Take 1 tablet (10 mg) by mouth every 4 hours as needed for moderate to severe pain Maximum 6 tab per day  -     oxyCODONE IR (ROXICODONE) 10 MG tablet; Take 1 tablet (10 mg) by mouth every 4 hours as needed for moderate to severe pain Maximum 6 tab per day    Complex regional pain syndrome type 1 affecting left lower leg  -     Drug  Screen Comprehensive , Urine with  Reported Meds (MedTox) (Pain Care Package)  -     Discontinue: oxyCODONE IR (ROXICODONE) 10 MG tablet; Take 1 tablet (10 mg) by mouth every 4 hours as needed for moderate to severe pain Maximum 6 tab per day  -     oxyCODONE IR (ROXICODONE) 10 MG tablet; Take 1 tablet (10 mg) by mouth every 4 hours as needed for moderate to severe pain Maximum 6 tab per day    Pain of joint of left ankle and foot  -     Drug  Screen Comprehensive , Urine with Reported Meds (MedTox) (Pain Care Package)  -     Discontinue: oxyCODONE IR (ROXICODONE) 10 MG tablet; Take 1 tablet (10 mg) by mouth every 4 hours as needed for moderate to severe pain Maximum 6 tab per day  -     oxyCODONE IR (ROXICODONE) 10 MG tablet; Take 1 tablet (10 mg) by mouth every 4 hours as needed for moderate to severe pain Maximum 6 tab per day    Medical cannabis use    Panic disorder  -     Drug  Screen Comprehensive , Urine with Reported Meds (MedTox) (Pain Care Package)    Tobacco abuse    Pain medication agreement  -     Drug  Screen Comprehensive , Urine with Reported Meds (MedTox) (Pain Care Package)  -     Discontinue: oxyCODONE IR (ROXICODONE) 10 MG tablet; Take 1 tablet (10 mg) by mouth every 4 hours as needed for moderate to severe pain Maximum 6 tab per day  -     oxyCODONE IR (ROXICODONE) 10 MG tablet; Take 1 tablet (10 mg) by mouth every 4 hours as needed for moderate to severe pain Maximum 6 tab per day    Flank pain  -     *UA reflex to Microscopic      Urine culture is submitted.  Will hold on antibiotic treatment until we know for sure if there is an infection based on her symptoms being more likely musculoskeletal.    Her nausea is unlikely to be due to her pain medication.  Discussed decreasing that and trying to stay off of it if possible.  She can also continue to use medical cannabis.  Prescription for Flexeril which she uses from time to time for TMJ symptoms.     query is appropriate.  ToxAssure today.  Renewal on oxycodone 10 mg  #168×2.    Encouraged to quit smoking.    Continue to follow with psychiatry.  Support and encouragement offered regarding her multiple psychosocial stressors.    A total of 25 minutes was spent with the patient, greater than 50% of the time was spent in counseling/discussion of the aforementioned concerns.     Darrin Stephen MD

## 2019-09-13 ENCOUNTER — TELEPHONE (OUTPATIENT)
Dept: FAMILY MEDICINE | Facility: OTHER | Age: 49
End: 2019-09-13

## 2019-09-13 DIAGNOSIS — N30.00 ACUTE CYSTITIS WITHOUT HEMATURIA: Primary | ICD-10-CM

## 2019-09-13 LAB
BACTERIA SPEC CULT: ABNORMAL
SPECIMEN SOURCE: ABNORMAL

## 2019-09-13 RX ORDER — NITROFURANTOIN 25; 75 MG/1; MG/1
100 CAPSULE ORAL 2 TIMES DAILY
Qty: 10 CAPSULE | Refills: 0 | Status: SHIPPED | OUTPATIENT
Start: 2019-09-13 | End: 2019-11-13

## 2019-09-13 RX ORDER — SULFAMETHOXAZOLE/TRIMETHOPRIM 800-160 MG
1 TABLET ORAL 2 TIMES DAILY
Qty: 10 TABLET | Refills: 0 | Status: SHIPPED | OUTPATIENT
Start: 2019-09-13 | End: 2019-11-13

## 2019-09-13 NOTE — PROGRESS NOTES
I canceled Nitrofurantoin as patient notes to have some back pain and this provides poor coverage for pylo.     Plan on Bactrim BID x 5 days.     I called pharmacy to cancel Macrobid order.   I have yet to get hold of patient to notify that prescription is available.

## 2019-09-13 NOTE — TELEPHONE ENCOUNTER
Urine culture grew E. Coli with full sensitivities    Plan on treatment with antibiotics Macrobid 100 mg BID for 5 days.     Attempted to call patient with these results no answer and was unable to leave voicemail.

## 2019-09-15 ENCOUNTER — HOSPITAL ENCOUNTER (EMERGENCY)
Facility: OTHER | Age: 49
Discharge: LEFT BEFORE DISCHARGE | End: 2019-09-15
Attending: FAMILY MEDICINE
Payer: MEDICARE

## 2019-09-15 VITALS
RESPIRATION RATE: 18 BRPM | WEIGHT: 139 LBS | DIASTOLIC BLOOD PRESSURE: 85 MMHG | BODY MASS INDEX: 20.59 KG/M2 | TEMPERATURE: 97.9 F | OXYGEN SATURATION: 100 % | HEIGHT: 69 IN | SYSTOLIC BLOOD PRESSURE: 118 MMHG | HEART RATE: 65 BPM

## 2019-09-15 DIAGNOSIS — N30.00 ACUTE CYSTITIS WITHOUT HEMATURIA: ICD-10-CM

## 2019-09-15 RX ORDER — PHENAZOPYRIDINE HYDROCHLORIDE 100 MG/1
200 TABLET, FILM COATED ORAL ONCE
Status: DISCONTINUED | OUTPATIENT
Start: 2019-09-15 | End: 2019-09-15

## 2019-09-15 RX ORDER — SULFAMETHOXAZOLE/TRIMETHOPRIM 800-160 MG
1 TABLET ORAL ONCE
Status: DISCONTINUED | OUTPATIENT
Start: 2019-09-15 | End: 2019-09-15

## 2019-09-15 ASSESSMENT — MIFFLIN-ST. JEOR: SCORE: 1324.88

## 2019-09-16 LAB — PAIN DRUG SCR UR W RPTD MEDS: NORMAL

## 2019-09-16 NOTE — ED NOTES
Patient came out to ER nurses station stating she was not going to wait any longer. Patient was offered AMA forms to fill out, as she was choosing to leave AMA and prior to being seen by MD. Patient stated she was too upset/anxious and felt like she was going to throw up and refused to sign the AMA forms prior to leaving. Patient left ER at 2010.

## 2019-09-16 NOTE — ED TRIAGE NOTES
Patient presents with dysuria and back pain. Was seen by primary MD on Wednesday and had urine done which came back positive for UTI. Patient reports she was not notified of prescription being called in until after pharmacy was closed on Friday so has not been able to start antibiotic. Also states she is out of all her medications but she can not pick them up until tomorrow. Reports having severe anxiety without xanax available.

## 2019-09-16 NOTE — ED NOTES
"Patient brought back to room 907. Asked patient to change into a gown, patient declined. Asked patient to do her vital signs, patient upset and explained she knew what she had going on and that the main problem was that her perscriptions were  \"sitting behind the counter at the [facility] pharmacy and she needs her medications.\" Patient was very upset and stated she apologizes but when she gets to having a panic attack she gets cranky. Obtained patient's vital signs and informed patient that writer would let the MD know she was having some anxiety and did not want to wait any longer then she had to. Patient states she has been her between rapid clinic and ER since around 1200.     Notified MD about patients request, MD aware and will see patient when she is available.   "

## 2019-09-16 NOTE — TELEPHONE ENCOUNTER
Called again, no answer, unable to leave message.   Inge Rodriguez LPN .............9/16/2019     9:00 AM

## 2019-09-17 NOTE — TELEPHONE ENCOUNTER
Notified patient of lab results, that antibiotics have been sent to pharmacy.  She will call if symptoms persist or with any questions or concerns.    Mary Finley LPN............9/17/2019 11:32 AM

## 2019-10-18 ENCOUNTER — TELEPHONE (OUTPATIENT)
Dept: FAMILY MEDICINE | Facility: OTHER | Age: 49
End: 2019-10-18

## 2019-10-18 DIAGNOSIS — M25.572 PAIN OF JOINT OF LEFT ANKLE AND FOOT: ICD-10-CM

## 2019-10-18 DIAGNOSIS — Z02.89 PAIN MEDICATION AGREEMENT: ICD-10-CM

## 2019-10-18 DIAGNOSIS — G90.522 COMPLEX REGIONAL PAIN SYNDROME TYPE 1 AFFECTING LEFT LOWER LEG: ICD-10-CM

## 2019-10-18 DIAGNOSIS — G89.4 CHRONIC PAIN DISORDER: ICD-10-CM

## 2019-10-18 RX ORDER — OXYCODONE HYDROCHLORIDE 10 MG/1
10 TABLET ORAL EVERY 4 HOURS PRN
Qty: 168 TABLET | Refills: 0 | Status: SHIPPED | OUTPATIENT
Start: 2019-10-18 | End: 2019-11-13

## 2019-10-18 NOTE — TELEPHONE ENCOUNTER
Prescription that was written on 9/11/19 was turned in by patient and shredded.  Prescription written today for the Oxycodone 10 mg #168.  She should know that this is a one-time event.  In the future, she needs to make sure than any prescriptions given need to come from her primary care provider and need to be filled in a timely manner.  In the future, prescriptions will not be replaced.  Iliana Salvador MD

## 2019-10-18 NOTE — TELEPHONE ENCOUNTER
Patient stopped by the clinic. She has an rx for oxycodone, that was written on September 11th. She tried to refill it today and it has been over 30 days so it cannot be filled. She is looking to get another rx, can this be done? She turned in the old script and I have it for you to look at. She is waiting in the waiting room.  Parish Tamayo LPN on 10/18/2019 at 2:27 PM

## 2019-10-18 NOTE — TELEPHONE ENCOUNTER
Pt is looking for cultured results and states that new prescription was not received at the pharmacy

## 2019-10-18 NOTE — TELEPHONE ENCOUNTER
Prescription was given to [facility] up front and the patient was told the information.   Parish Tamayo LPN on 10/18/2019 at 3:10 PM

## 2019-11-11 ENCOUNTER — TELEPHONE (OUTPATIENT)
Dept: FAMILY MEDICINE | Facility: OTHER | Age: 49
End: 2019-11-11

## 2019-11-11 NOTE — TELEPHONE ENCOUNTER
Patient had question about seeing a different provider to get her medications refilled when her provider is not available.  She was told if she is running out of medication she can make an appointment with another provider.  Patient also informed to schedule her appointments well in advance.    Marianne Elkins LPN on 11/11/2019 at 10:48 AM

## 2019-11-13 ENCOUNTER — TELEPHONE (OUTPATIENT)
Dept: FAMILY MEDICINE | Facility: OTHER | Age: 49
End: 2019-11-13

## 2019-11-13 ENCOUNTER — OFFICE VISIT (OUTPATIENT)
Dept: FAMILY MEDICINE | Facility: OTHER | Age: 49
End: 2019-11-13
Attending: FAMILY MEDICINE
Payer: MEDICARE

## 2019-11-13 VITALS
DIASTOLIC BLOOD PRESSURE: 66 MMHG | TEMPERATURE: 97.5 F | SYSTOLIC BLOOD PRESSURE: 122 MMHG | HEART RATE: 68 BPM | RESPIRATION RATE: 16 BRPM

## 2019-11-13 DIAGNOSIS — Z72.0 TOBACCO ABUSE: ICD-10-CM

## 2019-11-13 DIAGNOSIS — Z23 NEEDS FLU SHOT: ICD-10-CM

## 2019-11-13 DIAGNOSIS — R30.0 DYSURIA: ICD-10-CM

## 2019-11-13 DIAGNOSIS — G89.4 CHRONIC PAIN DISORDER: ICD-10-CM

## 2019-11-13 DIAGNOSIS — Z79.899 MEDICAL CANNABIS USE: ICD-10-CM

## 2019-11-13 DIAGNOSIS — M25.572 PAIN OF JOINT OF LEFT ANKLE AND FOOT: ICD-10-CM

## 2019-11-13 DIAGNOSIS — Z02.89 PAIN MEDICATION AGREEMENT: ICD-10-CM

## 2019-11-13 DIAGNOSIS — G90.522 COMPLEX REGIONAL PAIN SYNDROME TYPE 1 AFFECTING LEFT LOWER LEG: Primary | ICD-10-CM

## 2019-11-13 PROCEDURE — 99213 OFFICE O/P EST LOW 20 MIN: CPT | Performed by: FAMILY MEDICINE

## 2019-11-13 PROCEDURE — G0463 HOSPITAL OUTPT CLINIC VISIT: HCPCS

## 2019-11-13 RX ORDER — OXYCODONE HYDROCHLORIDE 10 MG/1
10 TABLET ORAL EVERY 4 HOURS PRN
Qty: 168 TABLET | Refills: 0 | Status: SHIPPED | OUTPATIENT
Start: 2019-11-13 | End: 2019-11-13

## 2019-11-13 RX ORDER — OXYCODONE HYDROCHLORIDE 10 MG/1
10 TABLET ORAL EVERY 4 HOURS PRN
Qty: 168 TABLET | Refills: 0 | Status: SHIPPED | OUTPATIENT
Start: 2019-11-13 | End: 2020-01-07

## 2019-11-13 RX ORDER — DIVALPROEX SODIUM 125 MG/1
125 TABLET, DELAYED RELEASE ORAL DAILY
Refills: 0 | COMMUNITY
Start: 2019-11-06 | End: 2020-06-26 | Stop reason: DRUGHIGH

## 2019-11-13 RX ORDER — DIVALPROEX SODIUM 250 MG/1
250 TABLET, DELAYED RELEASE ORAL
Refills: 0 | Status: ON HOLD | COMMUNITY
Start: 2019-10-30 | End: 2021-10-22

## 2019-11-13 ASSESSMENT — PAIN SCALES - GENERAL: PAINLEVEL: SEVERE PAIN (7)

## 2019-11-13 NOTE — NURSING NOTE
"Chief Complaint   Patient presents with     Recheck Medication       Initial /66 (BP Location: Right arm, Patient Position: Sitting, Cuff Size: Adult Regular)   Pulse 68   Temp 97.5  F (36.4  C) (Tympanic)   Resp 16   LMP  (LMP Unknown)   Breastfeeding No  Estimated body mass index is 20.53 kg/m  as calculated from the following:    Height as of 9/15/19: 1.753 m (5' 9\").    Weight as of 9/15/19: 63 kg (139 lb).  Medication Reconciliation: Completed     Marianne Hsu LPN  "

## 2019-11-13 NOTE — PROGRESS NOTES
"Nursing Notes:   Marianne Hsu LPN  11/13/2019 12:53 PM  Sign at exiting of workspace  Chief Complaint   Patient presents with     Recheck Medication       Initial /66 (BP Location: Right arm, Patient Position: Sitting, Cuff Size: Adult Regular)   Pulse 68   Temp 97.5  F (36.4  C) (Tympanic)   Resp 16   LMP  (LMP Unknown)   Breastfeeding No  Estimated body mass index is 20.53 kg/m  as calculated from the following:    Height as of 9/15/19: 1.753 m (5' 9\").    Weight as of 9/15/19: 63 kg (139 lb).  Medication Reconciliation: Completed     Marianne Hsu LPN      SUBJECTIVE:  Kym Perea  is a 49 year old female who comes in today for follow-up and medication management.  I last saw her 2 months ago.  She has complex regional pain syndrome involving her left foot and ankle and bilateral facet arthropathy of L4-5.  She has declined facet injections and she continues to see Dr. Portillo for chiropractic treatment of her back.  She is on medical cannabis but because it makes it difficult for her to use it.    At her last visit, she was having some nausea but we felt it was unlikely to be due to her pain medication but discussed decreasing it and trying to stay off of it if possible.  She was encouraged to quit smoking and continue to follow with psychiatry. She is now on Depakote 250-125-250    She had recent UTI and was treated with 5 days of Bactrim.  She has got recurrent symptoms, but is unable to void today so did not leave a urine.  She was going to come back and then did not have her return.    CHRONIC PAIN MANAGEMENT:    DIAGNOSIS:      1. Complex regional pain syndrome type 1 affecting left lower leg    2. Pain of joint of left ankle and foot    3. Medical cannabis use    4. Chronic pain disorder    5. Tobacco abuse    6. Pain medication agreement 4/18/18, 4/3/19    7. Pain medication agreement    8. Needs flu shot    9. Dysuria        PAIN MEDICATION AGREEMENT: (YES)    DATE SIGNED: " 5/16/16, 4/20/17, 4/18/18, 4/3/19      NON-MEDICATION MODALITIES MAXIMIZED? (YES) had surgery 2 years ago with Dr. Skelton, but has not seen him for a while.       NEUROPATHIC PAIN: (YES)  ON GABAPENTIN/LYRICA/TCA/SNRI: (YES) dose increased up to QID, but now on 600 mg daily.        NOCICEPTIVE PAIN: (YES)      HISTORY OF CD: (NO)      MENTAL HEALTH DIAGNOSIS: (YES)  DIAGNOSIS:Anxiety  ON BENZODIAZEPINES: (YES)  DISCLOSURE REGARDING RISK OF CONCOMITANT USE WITH OPIOIDS DISCUSSED: (YES)  DATE DISCUSSED: 5/16/16 and 6/2/16 and 8/30/16, 1/12/17, 1/2/19, 4/3/19, 9/11/19      MEDICATION: Oxycodone 10 mg 4-6 tab per day, #168/q 28 days.       ORAL MORPHINE EQUIVALENTS: 90 OME/day      LAST TAPER TRIAL: just completed 5/16/16.  Off Morphine and then on Norco at much lower dose, gradual increase to now at 90 OME oxycodone per day max.        QUERY TODAY: (YES)  APPROPRIATE?:         (YES) the  is incorrect as it notes that I am the prescriber of her benzodiazepine's and I am not. They are prescribed by Phuong Magallanes NP. She is working to taper them.       TOXASSURE TODAY: (NO)  IF NO, DATE OF LAST TOXASURE: 1/12/17, 4/20/17, 7/30/17( test was also positive for ETG in addition to amphetamines and benzodiazepines, along with oxycodone). 1/10/18 no opiates but appropriate for other meds. 9/18/18 appropriate but no opiates. 4/3/19, 9/11/19 appropriate.       PAIN SCORE FLOWSHEET REVIEWED: (YES)  STABLE OR IMPROVED: (YES)      AUDIT-10 QUESTIONNAIRE COMPLETED: (NO)      OSWESTRY PAIN DISABILITY INDEX: (NO)  No flowsheet data found.        She is due for her flu shot.         Past Medical, Family, and Social History reviewed and updated as noted below.   ROS is negative except as noted above       Allergies   Allergen Reactions     Arthrotec GI Disturbance     Indomethacin      Other reaction(s): Dizziness     Meloxicam GI Disturbance   ,   Family History   Problem Relation Age of Onset     Cancer Father          Cancer,Bladder     Other - See Comments Father         Mild hypercholesterolemia     Other - See Comments Mother         scleroderma/lupus/Parkinson's syndrome     Family History Negative Brother         Good Health     Ariel-Danlos syndrome Daughter      Colon Cancer Maternal Grandmother 40        Cancer-colon,Followed by lung  with metastasis to the bone di*   ,   Current Outpatient Medications   Medication     oxyCODONE IR (ROXICODONE) 10 MG tablet     albuterol (PROAIR HFA/PROVENTIL HFA/VENTOLIN HFA) 108 (90 Base) MCG/ACT Inhaler     ALPRAZolam (XANAX) 2 MG tablet     amphetamine-dextroamphetamine (ADDERALL) 30 MG per tablet     atenolol (TENORMIN) 100 MG tablet     cyclobenzaprine (FLEXERIL) 10 MG tablet     divalproex sodium delayed-release (DEPAKOTE) 125 MG DR tablet     divalproex sodium delayed-release (DEPAKOTE) 250 MG DR tablet     fluticasone (FLONASE) 50 MCG/ACT spray     VITAMIN D, CHOLECALCIFEROL, PO     No current facility-administered medications for this visit.    ,   Past Medical History:   Diagnosis Date     Acquired absence of other organs (CODE)     9/2/2011     Chronic pain syndrome     No Comments Provided     Complex regional pain syndrome I     left ankle     Encounter for other administrative examinations     10/2012,Hydrocodone 10/325mg, #240/mo signed 10/3/12, updated 10/1/ 14     Encounter for other administrative examinations     10/1/2014,MS Contin 30 mg q 8 hours #90 per month.  Morphine sulfate IR 15 mg 2-3 tab tid prn #180 per month     Gastro-esophageal reflux disease without esophagitis     No Comments Provided     Injury of left ankle     1997,requiring surgery     Low back pain     No Comments Provided     Major depressive disorder, recurrent severe without psychotic features (H)     No Comments Provided     Migraine without status migrainosus, not intractable     No Comments Provided     Nicotine dependence, uncomplicated     No Comments Provided     Obesity     No Comments  Provided     Overweight     11/25/2014     Pain in ankle     Chronic left ankle pain secondary to reflex sympathetic dystrophy.  Patient  has had 4 previous surgeries, the most recent one done by Dr. Noriega in  2007. On Narcotic contract.     Pain in thoracic spine     No Comments Provided     Panic disorder without agoraphobia     Dr Reynoso     Personal history of other diseases of the female genital tract     No Comments Provided     Personal history of other medical treatment (CODE)     G2, P1-0-1-1 with one vaginal delivery.     Supraventricular tachycardia (H)     5/24/2010   ,   Patient Active Problem List    Diagnosis Date Noted     Medical cannabis use 07/10/2018     Priority: Medium     Pain in joint, ankle and foot 01/29/2018     Priority: Medium     Overview:   Chronic left ankle pain secondary to reflex sympathetic dystrophy.  Patient   has had 4 previous surgeries, the most recent one done by Dr. Noriega in   2007. On Narcotic contract.       Chronic pain disorder 01/29/2018     Priority: Medium     Overview:   complex regional pain syndrome left ankle       Esophageal reflux 01/29/2018     Priority: Medium     Hyperlipidemia 01/29/2018     Priority: Medium     Insomnia 01/29/2018     Priority: Medium     Panic disorder 01/29/2018     Priority: Medium     Overview:   Previously patient of Dr. Reynoso since her teens. Patient is attempting slow taper down on her Xanax. She's been on Xanax 2 mg 4 times a day for years       Tobacco abuse 01/29/2018     Priority: Medium     Influenza A 01/11/2018     Priority: Medium     Pain medication agreement 4/18/18, 4/3/19 04/21/2017     Priority: Medium     Overview:   4/20/17:  Oxycodone 10 mg #180/mo    Hydrocodone 10/325mg, #240/mo. Stopped and changed to morphine. Agreement signed 10/3/12, updated 10/1/ 14.  Complex regional pain syndrome (aka Reflex Sympathetic Dystrophy)  MS Contin 30 mg q 8 hours #90 per month.  Morphine sulfate IR 15 mg 2-3 tab tid  prn #180 per month, decreased to #90/month.   query 4/9/16 as part of chart review. Seems appropriate. 6/2/16 tapered and off Morphine. [place] 5/325 #120/month.  query appropriate. ToxAssure appropriate.    August 2016 Consultation with [facility]. Recommended spinal cord stimulator trial, but she is reluctant to pursue that. Pain clinic stated that continuing opioids was OK.       Complex regional pain syndrome type 1 affecting left lower leg 04/15/2016     Priority: Medium     Overview:   Patient with a history of complex regional pain syndrome has seen Dr. Dino Skelton4/23/16        Migraine headache 11/25/2014     Priority: Medium     Overweight (BMI 25.0-29.9) 11/25/2014     Priority: Medium     Lumbago 02/06/2012     Priority: Medium     Major depressive disorder, recurrent episode, severe (H) 07/13/2011     Priority: Medium     Paroxysmal supraventricular tachycardia (H) 05/24/2010     Priority: Medium   ,   Past Surgical History:   Procedure Laterality Date     ANKLE SURGERY      1997, 2000,Left ankle surgery x 2     HYSTERECTOMY TOTAL ABDOMINAL      11/1999,secondary to endometriosis, no malignancy; patient reports no malignancy, but abnormal cells were present *     LAPAROSCOPIC CHOLECYSTECTOMY      No Comments Provided     LAPAROSCOPY DIAGNOSTIC (GENERAL)      1995     OTHER SURGICAL HISTORY      21299,CRANIO/MAXILLOFACIAL SURGERY,Jaw Surgery     OTHER SURGICAL HISTORY      207040,[facility] EMG,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  No other abnormalities noted.    and   Social History     Tobacco Use     Smoking status: Current Every Day Smoker     Packs/day: 0.50     Years: 30.00     Pack years: 15.00     Types: Cigarettes     Start date: 12/2/1986     Smokeless tobacco: Never Used   Substance Use Topics     Alcohol use: Not Currently     Alcohol/week: 0.0 standard drinks     OBJECTIVE:  /66 (BP Location: Right arm, Patient Position: Sitting, Cuff Size: Adult  Regular)   Pulse 68   Temp 97.5  F (36.4  C) (Tympanic)   Resp 16   LMP  (LMP Unknown)   Breastfeeding No    EXAM:  Alert and cooperative, no distress.  Affect appears appropriate.  Foot and ankle exam are unchanged.  ASSESSMENT/Plan :    Kym was seen today for recheck medication.    Diagnoses and all orders for this visit:    Complex regional pain syndrome type 1 affecting left lower leg  -     Discontinue: oxyCODONE IR (ROXICODONE) 10 MG tablet; Take 1 tablet (10 mg) by mouth every 4 hours as needed for moderate to severe pain Maximum 6 tab per day  -     oxyCODONE IR (ROXICODONE) 10 MG tablet; Take 1 tablet (10 mg) by mouth every 4 hours as needed for moderate to severe pain Maximum 6 tab per day    Pain of joint of left ankle and foot  -     Discontinue: oxyCODONE IR (ROXICODONE) 10 MG tablet; Take 1 tablet (10 mg) by mouth every 4 hours as needed for moderate to severe pain Maximum 6 tab per day  -     oxyCODONE IR (ROXICODONE) 10 MG tablet; Take 1 tablet (10 mg) by mouth every 4 hours as needed for moderate to severe pain Maximum 6 tab per day    Medical cannabis use    Chronic pain disorder  -     Discontinue: oxyCODONE IR (ROXICODONE) 10 MG tablet; Take 1 tablet (10 mg) by mouth every 4 hours as needed for moderate to severe pain Maximum 6 tab per day  -     oxyCODONE IR (ROXICODONE) 10 MG tablet; Take 1 tablet (10 mg) by mouth every 4 hours as needed for moderate to severe pain Maximum 6 tab per day    Tobacco abuse    Pain medication agreement 4/18/18, 4/3/19  -     Discontinue: oxyCODONE IR (ROXICODONE) 10 MG tablet; Take 1 tablet (10 mg) by mouth every 4 hours as needed for moderate to severe pain Maximum 6 tab per day  -     oxyCODONE IR (ROXICODONE) 10 MG tablet; Take 1 tablet (10 mg) by mouth every 4 hours as needed for moderate to severe pain Maximum 6 tab per day    Pain medication agreement  -     Discontinue: oxyCODONE IR (ROXICODONE) 10 MG tablet; Take 1 tablet (10 mg) by mouth every 4  hours as needed for moderate to severe pain Maximum 6 tab per day  -     oxyCODONE IR (ROXICODONE) 10 MG tablet; Take 1 tablet (10 mg) by mouth every 4 hours as needed for moderate to severe pain Maximum 6 tab per day    Needs flu shot  -     HC FLU VAC PRESRV FREE QUAD SPLIT VIR > 6 MONTHS IM    Dysuria  -     *UA reflex to Microscopic  -     Urine Culture Aerobic Bacterial      Flu vaccine today.     query is appropriate.  Renewal on oxycodone 10 mg #168 x2.  Reviewed the new legislation regarding medication refills in some detail.    Return for urinalysis if symptoms persist.    A total of 15 minutes was spent with the patient, greater than 50% of the time was spent in counseling/discussion of the aforementioned concerns.       Darrin Stephen MD

## 2020-01-07 ENCOUNTER — OFFICE VISIT (OUTPATIENT)
Dept: FAMILY MEDICINE | Facility: OTHER | Age: 50
End: 2020-01-07
Attending: FAMILY MEDICINE
Payer: MEDICARE

## 2020-01-07 VITALS
HEART RATE: 78 BPM | TEMPERATURE: 96.5 F | DIASTOLIC BLOOD PRESSURE: 80 MMHG | SYSTOLIC BLOOD PRESSURE: 122 MMHG | RESPIRATION RATE: 16 BRPM | OXYGEN SATURATION: 99 %

## 2020-01-07 DIAGNOSIS — Z79.899 MEDICAL CANNABIS USE: ICD-10-CM

## 2020-01-07 DIAGNOSIS — Z02.89 PAIN MEDICATION AGREEMENT: ICD-10-CM

## 2020-01-07 DIAGNOSIS — G89.4 CHRONIC PAIN DISORDER: Primary | ICD-10-CM

## 2020-01-07 DIAGNOSIS — G90.522 COMPLEX REGIONAL PAIN SYNDROME TYPE 1 AFFECTING LEFT LOWER LEG: ICD-10-CM

## 2020-01-07 DIAGNOSIS — Z72.0 TOBACCO ABUSE: ICD-10-CM

## 2020-01-07 DIAGNOSIS — M75.82 ROTATOR CUFF TENDINITIS, LEFT: ICD-10-CM

## 2020-01-07 DIAGNOSIS — M25.572 PAIN OF JOINT OF LEFT ANKLE AND FOOT: ICD-10-CM

## 2020-01-07 DIAGNOSIS — M26.609 TEMPOROMANDIBULAR JOINT DISORDER: ICD-10-CM

## 2020-01-07 PROCEDURE — 99214 OFFICE O/P EST MOD 30 MIN: CPT | Performed by: FAMILY MEDICINE

## 2020-01-07 PROCEDURE — G0463 HOSPITAL OUTPT CLINIC VISIT: HCPCS

## 2020-01-07 RX ORDER — OXYCODONE HYDROCHLORIDE 10 MG/1
10 TABLET ORAL EVERY 4 HOURS PRN
Qty: 168 TABLET | Refills: 0 | Status: SHIPPED | OUTPATIENT
Start: 2020-01-07 | End: 2020-04-14

## 2020-01-07 RX ORDER — OXYCODONE HYDROCHLORIDE 10 MG/1
10 TABLET ORAL EVERY 4 HOURS PRN
Qty: 168 TABLET | Refills: 0 | Status: CANCELLED | OUTPATIENT
Start: 2020-01-07

## 2020-01-07 RX ORDER — CYCLOBENZAPRINE HCL 10 MG
10 TABLET ORAL 3 TIMES DAILY PRN
Qty: 30 TABLET | Refills: 3 | Status: SHIPPED | OUTPATIENT
Start: 2020-01-07 | End: 2020-02-03 | Stop reason: ALTCHOICE

## 2020-01-07 RX ORDER — OXYCODONE HYDROCHLORIDE 10 MG/1
10 TABLET ORAL EVERY 4 HOURS PRN
Qty: 168 TABLET | Refills: 0 | Status: SHIPPED | OUTPATIENT
Start: 2020-01-07 | End: 2020-02-28

## 2020-01-07 ASSESSMENT — PAIN SCALES - GENERAL: PAINLEVEL: WORST PAIN (10)

## 2020-01-07 NOTE — NURSING NOTE
"Chief Complaint   Patient presents with     Medication Follow-up     narcotic refill     Her for narcotic refill  Also would like to have here left foot.    Initial /80   Pulse 78   Temp 96.5  F (35.8  C) (Tympanic)   Resp 16   LMP  (LMP Unknown)   SpO2 99%  Estimated body mass index is 20.53 kg/m  as calculated from the following:    Height as of 9/15/19: 1.753 m (5' 9\").    Weight as of 9/15/19: 63 kg (139 lb).    Medication Reconciliation: complete      Garret Gaspar LPN  "

## 2020-01-07 NOTE — PROGRESS NOTES
"Nursing Notes:   Garret Gaspar LPN  1/7/2020  1:58 PM  Signed  Chief Complaint   Patient presents with     Medication Follow-up     narcotic refill     Her for narcotic refill  Also would like to have here left foot.    Initial /80   Pulse 78   Temp 96.5  F (35.8  C) (Tympanic)   Resp 16   LMP  (LMP Unknown)   SpO2 99%  Estimated body mass index is 20.53 kg/m  as calculated from the following:    Height as of 9/15/19: 1.753 m (5' 9\").    Weight as of 9/15/19: 63 kg (139 lb).    Medication Reconciliation: complete      Garret Gaspar LPN    SUBJECTIVE:  Kym Perea  is a 49 year old female who comes in for follow up and medication management. I last saw her 2 months ago.  She has complex regional pain syndrome involving her left foot and ankle and bilateral facet arthropathy of L4-5.  She follows with Dr. Portillo for chiropractic treatment.  She is on medical cannabis.  She has declined facet injection.  She continues to follow with psychiatry for her mental health issues.    She fell before Xmas.  She bent her left foot under herself and sprained her foot.  She showed me a picture from December 20 which was about a week after the fall and it was quite swollen and black and blue.  It is much better now.    She also has had a pinched nerve and has some rib issues since her fall and Dr. Portillo has been working with her.     CHRONIC PAIN MANAGEMENT:    DIAGNOSIS:      1. Chronic pain disorder    2. Pain of joint of left ankle and foot    3. Complex regional pain syndrome type 1 affecting left lower leg    4. Medical cannabis use    5. Pain medication agreement 4/18/18, 4/3/19    6. Tobacco abuse    7. Temporomandibular joint disorder    8. Pain medication agreement    9. Rotator cuff tendinitis, left        PAIN MEDICATION AGREEMENT: (YES)    DATE SIGNED: 5/16/16, 4/20/17, 4/18/18, 4/3/19      NON-MEDICATION MODALITIES MAXIMIZED? (YES) had surgery 2 years ago with Dr. Skelton, but has not " seen him for a while.       NEUROPATHIC PAIN: (YES)  ON GABAPENTIN/LYRICA/TCA/SNRI: (YES) dose increased up to QID, but now on 600 mg daily.        NOCICEPTIVE PAIN: (YES)      HISTORY OF CD: (NO)      MENTAL HEALTH DIAGNOSIS: (YES)  DIAGNOSIS:Anxiety  ON BENZODIAZEPINES: (YES)  DISCLOSURE REGARDING RISK OF CONCOMITANT USE WITH OPIOIDS DISCUSSED: (YES)  DATE DISCUSSED: 5/16/16 and 6/2/16 and 8/30/16, 1/12/17, 1/2/19, 4/3/19, 9/11/19      MEDICATION: Oxycodone 10 mg 4-6 tab per day, #168/q 28 days.       ORAL MORPHINE EQUIVALENTS: 90 OME/day      LAST TAPER TRIAL: just completed 5/16/16.  Off Morphine and then on Norco at much lower dose, gradual increase to now at 90 OME oxycodone per day max.        QUERY TODAY: (YES)  APPROPRIATE?:         (YES) the  is incorrect as it notes that I am the prescriber of her benzodiazepine's and I am not. They are prescribed by Phuong Magallanes NP. She is working to taper them.       TOXASSURE TODAY: (NO)  IF NO, DATE OF LAST TOXASURE: 1/12/17, 4/20/17, 7/30/17( test was also positive for ETG in addition to amphetamines and benzodiazepines, along with oxycodone). 1/10/18 no opiates but appropriate for other meds. 9/18/18 appropriate but no opiates. 4/3/19, 9/11/19 appropriate.       PAIN SCORE FLOWSHEET REVIEWED: (YES)  STABLE OR IMPROVED: (YES)      AUDIT-10 QUESTIONNAIRE COMPLETED: (NO)      OSWESTRY PAIN DISABILITY INDEX: (NO)  No flowsheet data found.               Past Medical, Family, and Social History reviewed and updated as noted below.   ROS is negative except as noted above       Allergies   Allergen Reactions     Arthrotec GI Disturbance     Indomethacin      Other reaction(s): Dizziness     Meloxicam GI Disturbance   ,   Family History   Problem Relation Age of Onset     Cancer Father         Cancer,Bladder     Other - See Comments Father         Mild hypercholesterolemia     Other - See Comments Mother         scleroderma/lupus/Parkinson's syndrome     Family History  Negative Brother         Good Health     Ariel-Danlos syndrome Daughter      Colon Cancer Maternal Grandmother 40        Cancer-colon,Followed by lung  with metastasis to the bone di*   ,   Current Outpatient Medications   Medication     albuterol (PROAIR HFA/PROVENTIL HFA/VENTOLIN HFA) 108 (90 Base) MCG/ACT Inhaler     ALPRAZolam (XANAX) 2 MG tablet     amphetamine-dextroamphetamine (ADDERALL) 30 MG per tablet     atenolol (TENORMIN) 100 MG tablet     cyclobenzaprine (FLEXERIL) 10 MG tablet     divalproex sodium delayed-release (DEPAKOTE) 125 MG DR tablet     divalproex sodium delayed-release (DEPAKOTE) 250 MG DR tablet     fluticasone (FLONASE) 50 MCG/ACT spray     oxyCODONE IR (ROXICODONE) 10 MG tablet     oxyCODONE IR (ROXICODONE) 10 MG tablet     VITAMIN D, CHOLECALCIFEROL, PO     No current facility-administered medications for this visit.    ,   Past Medical History:   Diagnosis Date     Acquired absence of other organs (CODE)     9/2/2011     Chronic pain syndrome     No Comments Provided     Complex regional pain syndrome I     left ankle     Encounter for other administrative examinations     10/2012,Hydrocodone 10/325mg, #240/mo signed 10/3/12, updated 10/1/ 14     Encounter for other administrative examinations     10/1/2014,MS Contin 30 mg q 8 hours #90 per month.  Morphine sulfate IR 15 mg 2-3 tab tid prn #180 per month     Gastro-esophageal reflux disease without esophagitis     No Comments Provided     Injury of left ankle     1997,requiring surgery     Low back pain     No Comments Provided     Major depressive disorder, recurrent severe without psychotic features (H)     No Comments Provided     Migraine without status migrainosus, not intractable     No Comments Provided     Nicotine dependence, uncomplicated     No Comments Provided     Obesity     No Comments Provided     Overweight     11/25/2014     Pain in ankle     Chronic left ankle pain secondary to reflex sympathetic dystrophy.  Patient   has had 4 previous surgeries, the most recent one done by Dr. Noriega in  2007. On Narcotic contract.     Pain in thoracic spine     No Comments Provided     Panic disorder without agoraphobia     Dr Reynoso     Personal history of other diseases of the female genital tract     No Comments Provided     Personal history of other medical treatment (CODE)     G2, P1-0-1-1 with one vaginal delivery.     Supraventricular tachycardia (H)     5/24/2010   ,   Patient Active Problem List    Diagnosis Date Noted     Medical cannabis use 07/10/2018     Priority: Medium     Pain in joint, ankle and foot 01/29/2018     Priority: Medium     Overview:   Chronic left ankle pain secondary to reflex sympathetic dystrophy.  Patient   has had 4 previous surgeries, the most recent one done by Dr. Noriega in   2007. On Narcotic contract.       Chronic pain disorder 01/29/2018     Priority: Medium     Overview:   complex regional pain syndrome left ankle       Esophageal reflux 01/29/2018     Priority: Medium     Hyperlipidemia 01/29/2018     Priority: Medium     Insomnia 01/29/2018     Priority: Medium     Panic disorder 01/29/2018     Priority: Medium     Overview:   Previously patient of Dr. Reynoso since her teens. Patient is attempting slow taper down on her Xanax. She's been on Xanax 2 mg 4 times a day for years       Tobacco abuse 01/29/2018     Priority: Medium     Influenza A 01/11/2018     Priority: Medium     Pain medication agreement 4/18/18, 4/3/19 04/21/2017     Priority: Medium     Overview:   4/20/17:  Oxycodone 10 mg #180/mo    Hydrocodone 10/325mg, #240/mo. Stopped and changed to morphine. Agreement signed 10/3/12, updated 10/1/ 14.  Complex regional pain syndrome (aka Reflex Sympathetic Dystrophy)  MS Contin 30 mg q 8 hours #90 per month.  Morphine sulfate IR 15 mg 2-3 tab tid prn #180 per month, decreased to #90/month.   query 4/9/16 as part of chart review. Seems appropriate. 6/2/16 tapered and off Morphine.  Norco 5/325 #120/month.  query appropriate. ToxAssure appropriate.    August 2016 Consultation with [facility]. Recommended spinal cord stimulator trial, but she is reluctant to pursue that. Pain clinic stated that continuing opioids was OK.       Complex regional pain syndrome type 1 affecting left lower leg 04/15/2016     Priority: Medium     Overview:   Patient with a history of complex regional pain syndrome has seen Dr. Dino Skelton4/23/16        Migraine headache 11/25/2014     Priority: Medium     Overweight (BMI 25.0-29.9) 11/25/2014     Priority: Medium     Lumbago 02/06/2012     Priority: Medium     Major depressive disorder, recurrent episode, severe (H) 07/13/2011     Priority: Medium     Paroxysmal supraventricular tachycardia (H) 05/24/2010     Priority: Medium   ,   Past Surgical History:   Procedure Laterality Date     ANKLE SURGERY      1997, 2000,Left ankle surgery x 2     HYSTERECTOMY TOTAL ABDOMINAL      11/1999,secondary to endometriosis, no malignancy; patient reports no malignancy, but abnormal cells were present *     LAPAROSCOPIC CHOLECYSTECTOMY      No Comments Provided     LAPAROSCOPY DIAGNOSTIC (GENERAL)      1995     OTHER SURGICAL HISTORY      21299,CRANIO/MAXILLOFACIAL SURGERY,Jaw Surgery     OTHER SURGICAL HISTORY      207040,[facility],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  No other abnormalities noted.    and   Social History     Tobacco Use     Smoking status: Current Every Day Smoker     Packs/day: 0.50     Years: 30.00     Pack years: 15.00     Types: Cigarettes     Start date: 12/2/1986     Smokeless tobacco: Never Used   Substance Use Topics     Alcohol use: Not Currently     Alcohol/week: 0.0 standard drinks     OBJECTIVE:  /80   Pulse 78   Temp 96.5  F (35.8  C) (Tympanic)   Resp 16   LMP  (LMP Unknown)   SpO2 99%    EXAM:  Alert and cooperative, no distress.  Her left foot looks to be about her baseline.  She has exquisite  tenderness and sensitivity on the outside part of the ankle which is her baseline.  May be a little worse since her injury.  Suspect she probably sprained it and flared up her RSD.  Shoulder range of motion is preserved except on internal rotation and positive impingement testing is noted on cocking motion but no definite weakness is noted in the rotator cuff.  ASSESSMENT/Plan :    Kym was seen today for medication follow-up.    Diagnoses and all orders for this visit:    Chronic pain disorder  -     oxyCODONE IR (ROXICODONE) 10 MG tablet; Take 1 tablet (10 mg) by mouth every 4 hours as needed for moderate to severe pain Maximum 6 tab per day  -     oxyCODONE IR (ROXICODONE) 10 MG tablet; Take 1 tablet (10 mg) by mouth every 4 hours as needed for severe pain Max 6 tab per day.    Pain of joint of left ankle and foot  -     oxyCODONE IR (ROXICODONE) 10 MG tablet; Take 1 tablet (10 mg) by mouth every 4 hours as needed for moderate to severe pain Maximum 6 tab per day  -     oxyCODONE IR (ROXICODONE) 10 MG tablet; Take 1 tablet (10 mg) by mouth every 4 hours as needed for severe pain Max 6 tab per day.    Complex regional pain syndrome type 1 affecting left lower leg  -     oxyCODONE IR (ROXICODONE) 10 MG tablet; Take 1 tablet (10 mg) by mouth every 4 hours as needed for moderate to severe pain Maximum 6 tab per day  -     oxyCODONE IR (ROXICODONE) 10 MG tablet; Take 1 tablet (10 mg) by mouth every 4 hours as needed for severe pain Max 6 tab per day.    Medical cannabis use    Pain medication agreement 4/18/18, 4/3/19  -     oxyCODONE IR (ROXICODONE) 10 MG tablet; Take 1 tablet (10 mg) by mouth every 4 hours as needed for moderate to severe pain Maximum 6 tab per day  -     oxyCODONE IR (ROXICODONE) 10 MG tablet; Take 1 tablet (10 mg) by mouth every 4 hours as needed for severe pain Max 6 tab per day.    Tobacco abuse    Temporomandibular joint disorder  -     cyclobenzaprine (FLEXERIL) 10 MG tablet; Take 1 tablet  (10 mg) by mouth 3 times daily as needed for muscle spasms    Pain medication agreement  -     oxyCODONE IR (ROXICODONE) 10 MG tablet; Take 1 tablet (10 mg) by mouth every 4 hours as needed for moderate to severe pain Maximum 6 tab per day  -     oxyCODONE IR (ROXICODONE) 10 MG tablet; Take 1 tablet (10 mg) by mouth every 4 hours as needed for severe pain Max 6 tab per day.    Rotator cuff tendinitis, left      Discussed ice and anti-inflammatories for her rotator cuff tendinitis and she will continue to see Dr. Portillo.  Renewed Flexeril.     query is appropriate.  Renewed oxycodone 10 mg #168.  Continue to follow with psychiatry for her mental health issues.    I suspect with her foot she flared up her complex regional pain syndrome after her sprain.  Seems to be recovering reasonably well but may take some time to get better.    Encouraged to quit smoking.    A total of 25 minutes was spent with the patient, greater than 50% of the time was spent in counseling/discussion of the aforementioned concerns.     Darrin Stephen MD

## 2020-02-03 ENCOUNTER — APPOINTMENT (OUTPATIENT)
Dept: CT IMAGING | Facility: OTHER | Age: 50
End: 2020-02-03
Attending: PHYSICIAN ASSISTANT
Payer: MEDICARE

## 2020-02-03 ENCOUNTER — HOSPITAL ENCOUNTER (EMERGENCY)
Facility: OTHER | Age: 50
Discharge: HOME | End: 2020-02-03
Attending: PHYSICIAN ASSISTANT | Admitting: PHYSICIAN ASSISTANT
Payer: MEDICARE

## 2020-02-03 VITALS
HEIGHT: 69 IN | OXYGEN SATURATION: 97 % | TEMPERATURE: 97.4 F | WEIGHT: 140 LBS | DIASTOLIC BLOOD PRESSURE: 79 MMHG | SYSTOLIC BLOOD PRESSURE: 97 MMHG | BODY MASS INDEX: 20.73 KG/M2 | RESPIRATION RATE: 16 BRPM

## 2020-02-03 DIAGNOSIS — S16.1XXA CERVICAL STRAIN, INITIAL ENCOUNTER: ICD-10-CM

## 2020-02-03 DIAGNOSIS — M54.2 CERVICAL MUSCLE PAIN: ICD-10-CM
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
## 2020-02-03 DIAGNOSIS — S46.002A ROTATOR CUFF INJURY, LEFT, INITIAL ENCOUNTER: ICD-10-CM

## 2020-02-03 DIAGNOSIS — M75.42 IMPINGEMENT SYNDROME, SHOULDER, LEFT: ICD-10-CM

## 2020-02-03 DIAGNOSIS — M25.512 ACUTE PAIN OF LEFT SHOULDER: ICD-10-CM

## 2020-02-03 PROCEDURE — 99284 EMERGENCY DEPT VISIT MOD MDM: CPT | Mod: 25 | Performed by: PHYSICIAN ASSISTANT

## 2020-02-03 PROCEDURE — 72128 CT CHEST SPINE W/O DYE: CPT

## 2020-02-03 PROCEDURE — 99284 EMERGENCY DEPT VISIT MOD MDM: CPT | Mod: Z6 | Performed by: PHYSICIAN ASSISTANT

## 2020-02-03 PROCEDURE — 73200 CT UPPER EXTREMITY W/O DYE: CPT | Mod: LT

## 2020-02-03 PROCEDURE — 25000132 ZZH RX MED GY IP 250 OP 250 PS 637: Mod: GY | Performed by: PHYSICIAN ASSISTANT

## 2020-02-03 PROCEDURE — 72125 CT NECK SPINE W/O DYE: CPT

## 2020-02-03 PROCEDURE — 96372 THER/PROPH/DIAG INJ SC/IM: CPT | Performed by: PHYSICIAN ASSISTANT

## 2020-02-03 PROCEDURE — 25000128 H RX IP 250 OP 636: Performed by: PHYSICIAN ASSISTANT

## 2020-02-03 RX ORDER — LORAZEPAM 2 MG/ML
0.5 INJECTION INTRAMUSCULAR ONCE
Status: COMPLETED | OUTPATIENT
Start: 2020-02-03 | End: 2020-02-03

## 2020-02-03 RX ORDER — KETOROLAC TROMETHAMINE 30 MG/ML
60 INJECTION, SOLUTION INTRAMUSCULAR; INTRAVENOUS ONCE
Status: COMPLETED | OUTPATIENT
Start: 2020-02-03 | End: 2020-02-03

## 2020-02-03 RX ORDER — FENTANYL CITRATE 50 UG/ML
100 INJECTION, SOLUTION INTRAMUSCULAR; INTRAVENOUS ONCE
Status: COMPLETED | OUTPATIENT
Start: 2020-02-03 | End: 2020-02-03

## 2020-02-03 RX ORDER — CYCLOBENZAPRINE HCL 10 MG
10 TABLET ORAL 3 TIMES DAILY PRN
Qty: 6 TABLET | Refills: 0 | Status: SHIPPED | OUTPATIENT
Start: 2020-02-03 | End: 2020-02-27

## 2020-02-03 RX ORDER — IBUPROFEN 800 MG/1
800 TABLET, FILM COATED ORAL EVERY 8 HOURS PRN
Qty: 60 TABLET | Refills: 0 | Status: SHIPPED | OUTPATIENT
Start: 2020-02-03 | End: 2020-08-04

## 2020-02-03 RX ORDER — CYCLOBENZAPRINE HCL 10 MG
10 TABLET ORAL ONCE
Status: COMPLETED | OUTPATIENT
Start: 2020-02-03 | End: 2020-02-03

## 2020-02-03 RX ADMIN — CYCLOBENZAPRINE HYDROCHLORIDE 10 MG: 10 TABLET, FILM COATED ORAL at 20:30

## 2020-02-03 RX ADMIN — LORAZEPAM 0.5 MG: 2 INJECTION INTRAMUSCULAR; INTRAVENOUS at 20:31

## 2020-02-03 RX ADMIN — FENTANYL CITRATE 100 MCG: 50 INJECTION, SOLUTION INTRAMUSCULAR; INTRAVENOUS at 20:34

## 2020-02-03 RX ADMIN — KETOROLAC TROMETHAMINE 60 MG: 30 INJECTION, SOLUTION INTRAMUSCULAR at 20:34

## 2020-02-03 ASSESSMENT — ENCOUNTER SYMPTOMS
DIARRHEA: 0
SORE THROAT: 0
APPETITE CHANGE: 0
FACIAL ASYMMETRY: 0
WEAKNESS: 0
FATIGUE: 0
CONSTIPATION: 0
WHEEZING: 0
RHINORRHEA: 0
TROUBLE SWALLOWING: 0
SHORTNESS OF BREATH: 0
FREQUENCY: 0
LIGHT-HEADEDNESS: 0
FACIAL SWELLING: 0
ACTIVITY CHANGE: 0
TREMORS: 0
SPEECH DIFFICULTY: 0
COLOR CHANGE: 0
NAUSEA: 0
FEVER: 0
HEADACHES: 0
DIZZINESS: 0
NECK STIFFNESS: 0
EYE PAIN: 0
STRIDOR: 0
CHEST TIGHTNESS: 0
BACK PAIN: 0
SEIZURES: 0
NECK PAIN: 1
COUGH: 0
ABDOMINAL PAIN: 0
VOMITING: 0
DYSURIA: 0

## 2020-02-03 ASSESSMENT — MIFFLIN-ST. JEOR: SCORE: 1329.42

## 2020-02-03 NOTE — ED TRIAGE NOTES
"ED Nursing Triage Note (General)   ________________________________    Kym Perea is a 49 year old Female that presents to triage private car  With history of  FALL ONE MONTH AGO AND CONTINUED PAIN NOW 10/10 DESPITE SEEING A CHIROPRACTOR reported by patient   Significant symptoms had onset 1 month(s) ago.  /68   Temp 97.8  F (36.6  C) (Tympanic)   Ht 1.753 m (5' 9\")   Wt 63.5 kg (140 lb)   LMP  (LMP Unknown)   SpO2 98%   BMI 20.67 kg/m  t  Patient appears alert , in moderate distress., and cooperative behavior.    GCS Total = 15  Airway: intact  Breathing noted as Normal.  Circulation Normal  Skin normal  Action taken: TO WAITING ROOM      PRE HOSPITAL PRIOR LIVING SITUATION Alone  "

## 2020-02-03 NOTE — ED AVS SNAPSHOT
[facility]  [address]  [place] [address]  Phone:  [phone]  Fax:  [phone]                                    Kym Perea   MRN: [id number]    Department:  [facility]   Date of Visit:  2/3/2020           After Visit Summary Signature Page    I have received my discharge instructions, and my questions have been answered. I have discussed any challenges I see with this plan with the nurse or doctor.    ..........................................................................................................................................  Patient/Patient Representative Signature      ..........................................................................................................................................  Patient Representative Print Name and Relationship to Patient    ..................................................               ................................................  Date                                   Time    ..........................................................................................................................................  Reviewed by Signature/Title    ...................................................              ..............................................  Date                                               Time          22EPIC Rev 08/18

## 2020-02-04 NOTE — PROGRESS NOTES
"Pt reports \"things are starting to relax.\" Rates left shoulder pain 4/10, neck pain 6/10. Sitting in chair.   "

## 2020-02-04 NOTE — ED PROVIDER NOTES
History     Chief Complaint   Patient presents with     Fall     LEFT SHOULDER INJURY     This is a 49-year-old female who reports that she has a history of RSD.  This makes her left leg sometimes give out on her.  She reports that a few weeks ago she was walking when she felt her left leg gave out she fell falling forward and hit her face on the ground.  Initially she had significant lumbar pain as well as neck pain she was seen by her chiropractor and has had lots of adjustments with no significant improvement.  Furthermore she thinks she injured her left shoulder as well.  She rates her pain as 10/10.  As far as her left arm is concerned she has difficulty raising it above shoulder level due to pain.  The pain is primarily in the armpit area and in the subscapular area of her left shoulder.  Denies any other issues.            Allergies:  Allergies   Allergen Reactions     Arthrotec GI Disturbance     Indomethacin      Other reaction(s): Dizziness     Meloxicam GI Disturbance       Problem List:    Patient Active Problem List    Diagnosis Date Noted     Medical cannabis use 07/10/2018     Priority: Medium     Pain in joint, ankle and foot 01/29/2018     Priority: Medium     Overview:   Chronic left ankle pain secondary to reflex sympathetic dystrophy.  Patient   has had 4 previous surgeries, the most recent one done by Dr. Noriega in   2007. On Narcotic contract.       Chronic pain disorder 01/29/2018     Priority: Medium     Overview:   complex regional pain syndrome left ankle       Esophageal reflux 01/29/2018     Priority: Medium     Hyperlipidemia 01/29/2018     Priority: Medium     Insomnia 01/29/2018     Priority: Medium     Panic disorder 01/29/2018     Priority: Medium     Overview:   Previously patient of Dr. Reynoso since her teens. Patient is attempting slow taper down on her Xanax. She's been on Xanax 2 mg 4 times a day for years       Tobacco abuse 01/29/2018     Priority: Medium     Influenza  A 01/11/2018     Priority: Medium     Pain medication agreement 4/18/18, 4/3/19 04/21/2017     Priority: Medium     Overview:   4/20/17:  Oxycodone 10 mg #180/mo    Hydrocodone 10/325mg, #240/mo. Stopped and changed to morphine. Agreement signed 10/3/12, updated 10/1/ 14.  Complex regional pain syndrome (aka Reflex Sympathetic Dystrophy)  MS Contin 30 mg q 8 hours #90 per month.  Morphine sulfate IR 15 mg 2-3 tab tid prn #180 per month, decreased to #90/month.   query 4/9/16 as part of chart review. Seems appropriate. 6/2/16 tapered and off Morphine. [place] 5/325 #120/month.  query appropriate. ToxAssure appropriate.    August 2016 Consultation with [facility]. Recommended spinal cord stimulator trial, but she is reluctant to pursue that. Pain clinic stated that continuing opioids was OK.       Complex regional pain syndrome type 1 affecting left lower leg 04/15/2016     Priority: Medium     Overview:   Patient with a history of complex regional pain syndrome has seen Dr. Dino Skelton4/23/16        Migraine headache 11/25/2014     Priority: Medium     Overweight (BMI 25.0-29.9) 11/25/2014     Priority: Medium     Lumbago 02/06/2012     Priority: Medium     Major depressive disorder, recurrent episode, severe (H) 07/13/2011     Priority: Medium     Paroxysmal supraventricular tachycardia (H) 05/24/2010     Priority: Medium        Past Medical History:    Past Medical History:   Diagnosis Date     Acquired absence of other organs (CODE)      Chronic pain syndrome      Complex regional pain syndrome I      Encounter for other administrative examinations      Encounter for other administrative examinations      Gastro-esophageal reflux disease without esophagitis      Injury of left ankle      Low back pain      Major depressive disorder, recurrent severe without psychotic features (H)      Migraine without status migrainosus, not intractable      Nicotine dependence, uncomplicated      Obesity       Overweight      Pain in ankle      Pain in thoracic spine      Panic disorder without agoraphobia      Personal history of other diseases of the female genital tract      Personal history of other medical treatment (CODE)      Supraventricular tachycardia (H)        Past Surgical History:    Past Surgical History:   Procedure Laterality Date     ANKLE SURGERY      1997, 2000,Left ankle surgery x 2     HYSTERECTOMY TOTAL ABDOMINAL      11/1999,secondary to endometriosis, no malignancy; patient reports no malignancy, but abnormal cells were present *     LAPAROSCOPIC CHOLECYSTECTOMY      No Comments Provided     LAPAROSCOPY DIAGNOSTIC (GENERAL)      1995     OTHER SURGICAL HISTORY      21299,CRANIO/MAXILLOFACIAL SURGERY,Jaw Surgery     OTHER SURGICAL HISTORY      207040,[facility] EMG,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  No other abnormalities noted.       Family History:    Family History   Problem Relation Age of Onset     Cancer Father         Cancer,Bladder     Other - See Comments Father         Mild hypercholesterolemia     Other - See Comments Mother         scleroderma/lupus/Parkinson's syndrome     Family History Negative Brother         Good Health     Ariel-Danlos syndrome Daughter      Colon Cancer Maternal Grandmother 40        Cancer-colon,Followed by lung  with metastasis to the bone di*       Social History:  Marital Status:   [4]  Social History     Tobacco Use     Smoking status: Current Every Day Smoker     Packs/day: 0.50     Years: 30.00     Pack years: 15.00     Types: Cigarettes     Start date: 12/2/1986     Smokeless tobacco: Never Used   Substance Use Topics     Alcohol use: Not Currently     Alcohol/week: 0.0 standard drinks     Comment: OCCASIONAL WINE     Drug use: No     Comment: medical cannabis        Medications:    albuterol (PROAIR HFA/PROVENTIL HFA/VENTOLIN HFA) 108 (90 Base) MCG/ACT Inhaler  ALPRAZolam (XANAX) 2 MG tablet  amphetamine-dextroamphetamine (ADDERALL)  "30 MG per tablet  atenolol (TENORMIN) 100 MG tablet  cyclobenzaprine (FLEXERIL) 10 MG tablet  divalproex sodium delayed-release (DEPAKOTE) 125 MG DR tablet  divalproex sodium delayed-release (DEPAKOTE) 250 MG DR tablet  oxyCODONE IR (ROXICODONE) 10 MG tablet  VITAMIN D, CHOLECALCIFEROL, PO  fluticasone (FLONASE) 50 MCG/ACT spray  oxyCODONE IR (ROXICODONE) 10 MG tablet          Review of Systems   Constitutional: Negative for activity change, appetite change, fatigue and fever.   HENT: Negative for drooling, facial swelling, rhinorrhea, sore throat and trouble swallowing.    Eyes: Negative for pain and visual disturbance.   Respiratory: Negative for cough, chest tightness, shortness of breath, wheezing and stridor.    Cardiovascular: Negative for chest pain and leg swelling.   Gastrointestinal: Negative for abdominal pain, constipation, diarrhea, nausea and vomiting.   Genitourinary: Negative for dysuria, frequency and urgency.   Musculoskeletal: Positive for neck pain. Negative for back pain and neck stiffness.        Neck pain and thoracic pain as well as left shoulder pain   Skin: Negative for color change.   Neurological: Negative for dizziness, tremors, seizures, facial asymmetry, speech difficulty, weakness, light-headedness and headaches.       Physical Exam   BP: 115/68  Heart Rate: 70  Temp: 97.8  F (36.6  C)  Height: 175.3 cm (5' 9\")  Weight: 63.5 kg (140 lb)  SpO2: 98 %      Physical Exam  Constitutional:       General: She is not in acute distress.     Appearance: She is not ill-appearing, toxic-appearing or diaphoretic.   HENT:      Head: No raccoon eyes or De Paz's sign.      Jaw: No trismus.      Right Ear: No drainage or tenderness.      Left Ear: No drainage or tenderness.      Nose: Nose normal.   Eyes:      General: No scleral icterus.     Extraocular Movements: Extraocular movements intact.      Right eye: Normal extraocular motion and no nystagmus.      Left eye: Normal extraocular motion and no " nystagmus.      Pupils: Pupils are equal, round, and reactive to light.      Right eye: Pupil is reactive and not sluggish.      Left eye: Pupil is reactive and not sluggish.      Funduscopic exam:     Right eye: No AV nicking, arteriolar narrowing or papilledema. Red reflex present.         Left eye: No AV nicking, arteriolar narrowing or papilledema. Red reflex present.  Neck:      Musculoskeletal: Normal range of motion. Normal range of motion. No neck rigidity, pain with movement, spinous process tenderness or muscular tenderness.      Vascular: No JVD.      Trachea: No tracheal deviation.   Cardiovascular:      Rate and Rhythm: Normal rate and regular rhythm.   Pulmonary:      Effort: Pulmonary effort is normal. No respiratory distress.      Breath sounds: Normal breath sounds. No stridor. No wheezing.   Abdominal:      General: There is no distension.      Palpations: There is no mass.      Tenderness: There is no abdominal tenderness. There is no right CVA tenderness, left CVA tenderness, guarding or rebound.   Musculoskeletal:         General: Tenderness and signs of injury present. No deformity.      Comments: Tenderness to palpation at the C7 area.  She also has some tenderness to about the T10 area.  Pain on palpation to the left subscapular area.  Her left shoulder she has active range of motion up to 90 degrees of abduction and flexion.  Passively this is full but elicits pain.  Empty can test is positive for eliciting pain in her armpit.  Otherwise she has good distal cap refill and neurologically she is intact.   Lymphadenopathy:      Cervical: No cervical adenopathy.      Right cervical: No superficial cervical adenopathy.     Left cervical: No superficial cervical adenopathy.   Skin:     General: Skin is warm and dry.      Capillary Refill: Capillary refill takes less than 2 seconds.   Neurological:      Mental Status: She is alert and oriented to person, place, and time.      GCS: GCS eye subscore  is 4. GCS verbal subscore is 5. GCS motor subscore is 6.      Motor: No tremor or seizure activity.      Coordination: Coordination normal.      Gait: Gait normal.         ED Course     Results for orders placed or performed during the hospital encounter of 02/03/20 (from the past 24 hour(s))   CT Cervical Spine w/o Contrast    Narrative    CT CERVICAL SPINE W/O CONTRAST, 2/3/2020 8:51 PM    History: Female, age 49 years; C-spine trauma, ligamentous injury  suspected    Comparison: C-spine CT 3/16/2018    TECHNIQUE: CT was performed of the cervical spine. Sagittal, coronal,  and axial reconstructions were reviewed.    FINDINGS: The  cervical spine demonstrates mild generalized  straightening. No acute fracture, no acute subluxation. Paraspinous  musculature is normal in appearance. There is slight fat stranding  involving the nuchal ligament at C7 and T1.      Impression    IMPRESSION:   No evidence of acute or subacute bony abnormality.     Mild inflammatory changes suggesting localized ligamentous injury  overlying the spinous process at C7 and T1.    Mild cervical spine straightening suggesting muscle spasm.    Mild degenerative changes.    STEVEN WELLER MD   CT Thoracic Spine w/o Contrast    Narrative    CT THORACIC SPINE W/O CONTRAST, 2/3/2020 9:00 PM    History: Female, age 49 years; Polytrauma, critical, T/L spine injury  suspected    Comparison: None.    TECHNIQUE: CT was performed of the thoracic spine. Sagittal, coronal,  and axial reconstructions were reviewed.    FINDINGS: The  cervical spine demonstrates normal curvature. No acute  fracture, no acute subluxation. Paraspinous musculature is normal.  There is no lymphadenopathy in the neck. Visualized portions of the  lungs demonstrate mild atelectasis.      Impression    IMPRESSION:   No evidence of acute abnormality. No evidence of fracture, subluxation  or apparent soft tissue injury.     Mild degenerative changes of the thoracic spine.    STEVEN  MD NITHIN   CT Shoulder Left w/o Contrast    Narrative    CT SHOULDER LEFT W/O CONTRAST, 2/3/2020 9:02 PM    History: Female, age 49 years; Shoulder pain, rotator cuff  tear/impingement suspected    Comparison: Left shoulder x-rays 3/16/2018    Technique: CT scan was performed of the left shoulder without  contrast. Sagittal, coronal and axial reconstructions were reviewed. .    FINDINGS: Bony structures are normal. Glenohumeral and AC joints are  congruent. There is no evidence of an acute rib fracture.    Undersurface spurring of the acromion process abuts the rotator cuff.  There is no distinct evidence of acute injury. Small effusion  suggested at the glenohumeral joint. Neurovascular structures are  grossly normal.      Impression    IMPRESSION:   1.  No distinct evidence of acute bony or soft tissue injury.    2.  Findings suggesting subacromial impingement and possibly a small  glenohumeral joint effusion.    STEVEN WELLER MD       Medications   ketorolac (TORADOL) injection 60 mg (60 mg Intramuscular Given 2/3/20 2034)   fentaNYL (PF) (SUBLIMAZE) injection 100 mcg (100 mcg Intramuscular Given 2/3/20 2034)   cyclobenzaprine (FLEXERIL) tablet 10 mg (10 mg Oral Given 2/3/20 2030)   LORazepam (ATIVAN) injection 0.5 mg (0.5 mg Intramuscular Given 2/3/20 2031)       Assessments & Plan (with Medical Decision Making)     I have reviewed the nursing notes.    I have reviewed the findings, diagnosis, plan and need for follow up with the patient.      Discharge Medication List as of 2/3/2020  9:48 PM      START taking these medications    Details   ibuprofen (ADVIL/MOTRIN) 800 MG tablet Take 1 tablet (800 mg) by mouth every 8 hours as needed for moderate pain, Disp-60 tablet, R-0, Local Print             Final diagnoses:   Fall, initial encounter   Cervical strain, initial encounter   Cervical muscle pain   Impingement syndrome, shoulder, left   Rotator cuff injury, left, initial encounter   Acute pain of left  shoulder     Afebrile.  Vital signs stable.  Patient with a fall few weeks ago and now with continued neck pain and left shoulder pain.  She was given Toradol, Flexeril, fentanyl, and Ativan in the ER IM for pain relief.  CT cervical shows No evidence of acute or subacute bony abnormality.  Mild inflammatory changes suggesting localized ligamentous injury  overlying the spinous process at C7 and T1. Mild cervical spine straightening suggesting muscle spasm.  Mild degenerative changes.  CT thoracic is unremarkable.  CT of her left shoulder without contrast shows  No distinct evidence of acute bony or soft tissue injury.   Findings suggesting subacromial impingement and possibly a small glenohumeral joint effusion.  I discussed these findings with the patient.  I discussed the need for further follow-up with orthopedics for further evaluation.  I discussed ice and heat therapies.  Rx for Flexeril and Motrin.  She has a pain contract and has medications for pain.  Follow-up sooner if there is any other concerns problems or questions.  2/3/2020   [facility]     Librado Sheets PA-C  02/03/20 [id number]

## 2020-02-04 NOTE — PROGRESS NOTES
Pt reports left shoulder and neck pain rated 8/10. Medications administered per orders. Heat pack given.

## 2020-02-06 ENCOUNTER — TRANSFERRED RECORDS (OUTPATIENT)
Dept: HEALTH INFORMATION MANAGEMENT | Facility: OTHER | Age: 50
End: 2020-02-06

## 2020-02-06 ENCOUNTER — OFFICE VISIT (OUTPATIENT)
Dept: FAMILY MEDICINE | Facility: OTHER | Age: 50
End: 2020-02-06
Attending: FAMILY MEDICINE
Payer: MEDICARE

## 2020-02-06 VITALS
SYSTOLIC BLOOD PRESSURE: 94 MMHG | RESPIRATION RATE: 16 BRPM | TEMPERATURE: 98 F | DIASTOLIC BLOOD PRESSURE: 66 MMHG | OXYGEN SATURATION: 98 % | HEART RATE: 120 BPM

## 2020-02-06 DIAGNOSIS — M75.82 TENDINITIS OF LEFT ROTATOR CUFF: ICD-10-CM

## 2020-02-06 DIAGNOSIS — M75.42 IMPINGEMENT SYNDROME OF SHOULDER REGION, LEFT: Primary | ICD-10-CM

## 2020-02-06 DIAGNOSIS — M19.112 POST-TRAUMATIC OSTEOARTHRITIS, LEFT SHOULDER: ICD-10-CM

## 2020-02-06 PROCEDURE — 99213 OFFICE O/P EST LOW 20 MIN: CPT | Mod: 25 | Performed by: FAMILY MEDICINE

## 2020-02-06 PROCEDURE — G0463 HOSPITAL OUTPT CLINIC VISIT: HCPCS | Mod: 25 | Performed by: FAMILY MEDICINE

## 2020-02-06 PROCEDURE — 25000128 H RX IP 250 OP 636: Performed by: FAMILY MEDICINE

## 2020-02-06 PROCEDURE — 20610 DRAIN/INJ JOINT/BURSA W/O US: CPT | Performed by: FAMILY MEDICINE

## 2020-02-06 RX ORDER — TRIAMCINOLONE ACETONIDE 40 MG/ML
80 INJECTION, SUSPENSION INTRA-ARTICULAR; INTRAMUSCULAR ONCE
Status: COMPLETED | OUTPATIENT
Start: 2020-02-06 | End: 2020-02-06

## 2020-02-06 RX ADMIN — TRIAMCINOLONE ACETONIDE 80 MG: 40 INJECTION, SUSPENSION INTRA-ARTICULAR; INTRAMUSCULAR at 17:36

## 2020-02-06 ASSESSMENT — PAIN SCALES - GENERAL: PAINLEVEL: EXTREME PAIN (9)

## 2020-02-06 NOTE — PROGRESS NOTES
"Nursing Notes:   Petra Swanson LPN  2/6/2020  4:07 PM  Signed  Chief Complaint   Patient presents with     RECHECK     from the ED       Initial BP 94/66   Pulse 120   Temp 98  F (36.7  C) (Temporal)   Resp 16   LMP  (LMP Unknown)   SpO2 98%   Breastfeeding No  Estimated body mass index is 20.67 kg/m  as calculated from the following:    Height as of 2/3/20: 1.753 m (5' 9\").    Weight as of 2/3/20: 63.5 kg (140 lb).  Medication Reconciliation: complete    SHARA Arroyo Kelly K., LPN  2/6/2020  5:38 PM  Signed  Chief Complaint   Patient presents with     RECHECK     from the ED     Clinic Administered Medication Documentation    MEDICATION LIST:   Injection Documentation     Injection was administered by provider (please see MAR for given by information). Please see MAR and medication order for additional information.     Site: Joint injection   Medication Used: Kenalog 80 mg    Exp: 02/2021  Petra Swanson LPN..................2/6/2020   5:38 PM                    SUBJECTIVE:  Kym Perea  is a 49 year old female who comes in today for follow-up from the emergency room.  She was seen 3 days ago because she was having pain in her shoulder and left arm.  Apparently she fell a few weeks prior.  She had some lumbar pain initially and some neck pain and was seen by the chiropractor and had some adjustments.CT of her C-spine was negative.  CT of her thoracic spine was negative.  CT of her left shoulder showed some undersurface spurring of the acromion process where it abuts the rotator cuff and could suggest radiologic findings of subacromial impingement and possibly a small glenohumeral joint effusion.  Flexeril,  Motrin and ice were recommended.  She had pain medications through her pain contract.  I last saw her for refill of her medications on 7 January.    She has pain in her shoulder when she reaches above her head.  Has some axillary pain as well.      Past Medical, Family, " and Social History reviewed and updated as noted below.   ROS is negative except as noted above       Allergies   Allergen Reactions     Arthrotec GI Disturbance     Indomethacin      Other reaction(s): Dizziness     Meloxicam GI Disturbance   ,   Family History   Problem Relation Age of Onset     Cancer Father         Cancer,Bladder     Other - See Comments Father         Mild hypercholesterolemia     Other - See Comments Mother         scleroderma/lupus/Parkinson's syndrome     Family History Negative Brother         Good Health     Ariel-Danlos syndrome Daughter      Colon Cancer Maternal Grandmother 40        Cancer-colon,Followed by lung  with metastasis to the bone di*   ,   Current Outpatient Medications   Medication     albuterol (PROAIR HFA/PROVENTIL HFA/VENTOLIN HFA) 108 (90 Base) MCG/ACT Inhaler     ALPRAZolam (XANAX) 2 MG tablet     amphetamine-dextroamphetamine (ADDERALL) 30 MG per tablet     atenolol (TENORMIN) 100 MG tablet     cyclobenzaprine (FLEXERIL) 10 MG tablet     divalproex sodium delayed-release (DEPAKOTE) 125 MG DR tablet     divalproex sodium delayed-release (DEPAKOTE) 250 MG DR tablet     fluticasone (FLONASE) 50 MCG/ACT spray     ibuprofen (ADVIL/MOTRIN) 800 MG tablet     oxyCODONE IR (ROXICODONE) 10 MG tablet     oxyCODONE IR (ROXICODONE) 10 MG tablet     VITAMIN D, CHOLECALCIFEROL, PO     No current facility-administered medications for this visit.    ,   Past Medical History:   Diagnosis Date     Acquired absence of other organs (CODE)     9/2/2011     Chronic pain syndrome     No Comments Provided     Complex regional pain syndrome I     left ankle     Encounter for other administrative examinations     10/2012,Hydrocodone 10/325mg, #240/mo signed 10/3/12, updated 10/1/ 14     Encounter for other administrative examinations     10/1/2014,MS Contin 30 mg q 8 hours #90 per month.  Morphine sulfate IR 15 mg 2-3 tab tid prn #180 per month     Gastro-esophageal reflux disease without  esophagitis     No Comments Provided     Injury of left ankle     1997,requiring surgery     Low back pain     No Comments Provided     Major depressive disorder, recurrent severe without psychotic features (H)     No Comments Provided     Migraine without status migrainosus, not intractable     No Comments Provided     Nicotine dependence, uncomplicated     No Comments Provided     Obesity     No Comments Provided     Overweight     11/25/2014     Pain in ankle     Chronic left ankle pain secondary to reflex sympathetic dystrophy.  Patient  has had 4 previous surgeries, the most recent one done by Dr. Noriega in  2007. On Narcotic contract.     Pain in thoracic spine     No Comments Provided     Panic disorder without agoraphobia     Dr Reynoso     Personal history of other diseases of the female genital tract     No Comments Provided     Personal history of other medical treatment (CODE)     G2, P1-0-1-1 with one vaginal delivery.     Supraventricular tachycardia (H)     5/24/2010   ,   Patient Active Problem List    Diagnosis Date Noted     Medical cannabis use 07/10/2018     Priority: Medium     Pain in joint, ankle and foot 01/29/2018     Priority: Medium     Overview:   Chronic left ankle pain secondary to reflex sympathetic dystrophy.  Patient   has had 4 previous surgeries, the most recent one done by Dr. Noriega in   2007. On Narcotic contract.       Chronic pain disorder 01/29/2018     Priority: Medium     Overview:   complex regional pain syndrome left ankle       Esophageal reflux 01/29/2018     Priority: Medium     Hyperlipidemia 01/29/2018     Priority: Medium     Insomnia 01/29/2018     Priority: Medium     Panic disorder 01/29/2018     Priority: Medium     Overview:   Previously patient of Dr. Reynoso since her teens. Patient is attempting slow taper down on her Xanax. She's been on Xanax 2 mg 4 times a day for years       Tobacco abuse 01/29/2018     Priority: Medium     Influenza A 01/11/2018      Priority: Medium     Pain medication agreement 4/18/18, 4/3/19 04/21/2017     Priority: Medium     Overview:   4/20/17:  Oxycodone 10 mg #180/mo    Hydrocodone 10/325mg, #240/mo. Stopped and changed to morphine. Agreement signed 10/3/12, updated 10/1/ 14.  Complex regional pain syndrome (aka Reflex Sympathetic Dystrophy)  MS Contin 30 mg q 8 hours #90 per month.  Morphine sulfate IR 15 mg 2-3 tab tid prn #180 per month, decreased to #90/month.   query 4/9/16 as part of chart review. Seems appropriate. 6/2/16 tapered and off Morphine. [place] 5/325 #120/month.  query appropriate. ToxAssure appropriate.    August 2016 Consultation with [facility]. Recommended spinal cord stimulator trial, but she is reluctant to pursue that. Pain clinic stated that continuing opioids was OK.       Complex regional pain syndrome type 1 affecting left lower leg 04/15/2016     Priority: Medium     Overview:   Patient with a history of complex regional pain syndrome has seen Dr. Dino Skelton4/23/16        Migraine headache 11/25/2014     Priority: Medium     Overweight (BMI 25.0-29.9) 11/25/2014     Priority: Medium     Lumbago 02/06/2012     Priority: Medium     Major depressive disorder, recurrent episode, severe (H) 07/13/2011     Priority: Medium     Paroxysmal supraventricular tachycardia (H) 05/24/2010     Priority: Medium   ,   Past Surgical History:   Procedure Laterality Date     ANKLE SURGERY      1997, 2000,Left ankle surgery x 2     HYSTERECTOMY TOTAL ABDOMINAL      11/1999,secondary to endometriosis, no malignancy; patient reports no malignancy, but abnormal cells were present *     LAPAROSCOPIC CHOLECYSTECTOMY      No Comments Provided     LAPAROSCOPY DIAGNOSTIC (GENERAL)      1995     OTHER SURGICAL HISTORY      21299,CRANIO/MAXILLOFACIAL SURGERY,Jaw Surgery     OTHER SURGICAL HISTORY      207040,[facility],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  No other abnormalities noted.    and    Social History     Tobacco Use     Smoking status: Current Every Day Smoker     Packs/day: 0.50     Years: 30.00     Pack years: 15.00     Types: Cigarettes     Start date: 12/2/1986     Smokeless tobacco: Never Used   Substance Use Topics     Alcohol use: Not Currently     Alcohol/week: 0.0 standard drinks     Comment: OCCASIONAL WINE     OBJECTIVE:  BP 94/66   Pulse 120   Temp 98  F (36.7  C) (Temporal)   Resp 16   LMP  (LMP Unknown)   SpO2 98%   Breastfeeding No    EXAM:  Alert and cooperative, no distress.  Shoulder range of motion is diminished on flexion at about 90 degrees and abduction about 90 degrees.  She has positive impingement testing and seems to be hyperesthetic to palpation about the bony landmarks of the shoulder.  She has no weakness on isolation of the supraspinatus.  Reviewed imaging studies were done in the emergency room  ASSESSMENT/Plan :    Kym was seen today for recheck.    Diagnoses and all orders for this visit:    Impingement syndrome of shoulder region, left  -     Large Joint/Bursa injection and/or drainage (Shoulder, Knee)  -     triamcinolone (KENALOG-40) injection 80 mg  -     PHYSICAL THERAPY REFERRAL; Future    Tendinitis of left rotator cuff  -     Large Joint/Bursa injection and/or drainage (Shoulder, Knee)  -     triamcinolone (KENALOG-40) injection 80 mg  -     PHYSICAL THERAPY REFERRAL; Future    Post-traumatic osteoarthritis, left shoulder   -     Large Joint/Bursa injection and/or drainage (Shoulder, Knee)  -     PHYSICAL THERAPY REFERRAL; Future    While she probably has some early osteoarthritis, I think she has symptoms that are most consistent with rotator cuff tendinitis and shoulder impingement.  After discussion of options, we elected to go ahead with a subacromial space injection on the left.     LEFT SHOULDER INJECTION:Risks, benefits, alternatives discussed. Pt wishes to proceed.  Chloraprep used for sterile prep x2 prior to injection. Timeout is  performed. Ethyl Chloride used as skin refrigerant for topical anesthesia. A posterior approach is used.  The subacromial space is injected with a mixture of 2ml each of 1% lidocaine without epinephrine, 0.5% Marcaine, and Kenalog 40mg/ml. After injection, bandaid placed. Patient noted improvement in symptoms.  Ice 20 min tid and prn.  RTC if not improved over next 7 days.     She was referred to physical therapy for evaluation and treatment.  If she is not improving over the course of the next several weeks, would then consider further evaluation.  She will continue with icing and NSAIDs as tolerated.  Darrin Stephen MD

## 2020-02-06 NOTE — NURSING NOTE
Chief Complaint   Patient presents with     RECHECK     from the ED     Clinic Administered Medication Documentation    MEDICATION LIST:   Injection Documentation     Injection was administered by provider (please see MAR for given by information). Please see MAR and medication order for additional information.     Site: Joint injection   Medication Used: Kenalog 80 mg    Exp: 02/2021  Petra Swanson LPN..................2/6/2020   5:38 PM

## 2020-02-06 NOTE — NURSING NOTE
"Chief Complaint   Patient presents with     RECHECK     from the ED       Initial BP 94/66   Pulse 120   Temp 98  F (36.7  C) (Temporal)   Resp 16   LMP  (LMP Unknown)   SpO2 98%   Breastfeeding No  Estimated body mass index is 20.67 kg/m  as calculated from the following:    Height as of 2/3/20: 1.753 m (5' 9\").    Weight as of 2/3/20: 63.5 kg (140 lb).  Medication Reconciliation: complete    Petra Swanson LPN  "

## 2020-02-27 DIAGNOSIS — M62.838 MUSCLE SPASM: Primary | ICD-10-CM

## 2020-02-27 RX ORDER — CYCLOBENZAPRINE HCL 10 MG
10 TABLET ORAL 3 TIMES DAILY PRN
Qty: 6 TABLET | Refills: 0 | Status: SHIPPED | OUTPATIENT
Start: 2020-02-27 | End: 2020-03-04

## 2020-02-28 ENCOUNTER — OFFICE VISIT (OUTPATIENT)
Dept: FAMILY MEDICINE | Facility: OTHER | Age: 50
End: 2020-02-28
Attending: FAMILY MEDICINE
Payer: MEDICARE

## 2020-02-28 VITALS
RESPIRATION RATE: 16 BRPM | TEMPERATURE: 97.2 F | DIASTOLIC BLOOD PRESSURE: 88 MMHG | OXYGEN SATURATION: 99 % | SYSTOLIC BLOOD PRESSURE: 126 MMHG | HEART RATE: 102 BPM

## 2020-02-28 DIAGNOSIS — Z02.89 PAIN MEDICATION AGREEMENT: ICD-10-CM

## 2020-02-28 DIAGNOSIS — M75.42 IMPINGEMENT SYNDROME OF SHOULDER REGION, LEFT: Primary | ICD-10-CM

## 2020-02-28 DIAGNOSIS — G90.522 COMPLEX REGIONAL PAIN SYNDROME TYPE 1 AFFECTING LEFT LOWER LEG: ICD-10-CM

## 2020-02-28 DIAGNOSIS — Z79.899 MEDICAL CANNABIS USE: ICD-10-CM

## 2020-02-28 DIAGNOSIS — Z01.812 PRE-PROCEDURE LAB EXAM: ICD-10-CM

## 2020-02-28 DIAGNOSIS — M25.572 PAIN OF JOINT OF LEFT ANKLE AND FOOT: ICD-10-CM

## 2020-02-28 DIAGNOSIS — G89.4 CHRONIC PAIN DISORDER: ICD-10-CM

## 2020-02-28 DIAGNOSIS — Z72.0 TOBACCO ABUSE: ICD-10-CM

## 2020-02-28 PROCEDURE — G0463 HOSPITAL OUTPT CLINIC VISIT: HCPCS

## 2020-02-28 PROCEDURE — 99213 OFFICE O/P EST LOW 20 MIN: CPT | Performed by: FAMILY MEDICINE

## 2020-02-28 PROCEDURE — 80307 DRUG TEST PRSMV CHEM ANLYZR: CPT | Mod: ZL | Performed by: FAMILY MEDICINE

## 2020-02-28 RX ORDER — OXYCODONE HYDROCHLORIDE 10 MG/1
10 TABLET ORAL EVERY 4 HOURS PRN
Qty: 168 TABLET | Refills: 0 | Status: SHIPPED | OUTPATIENT
Start: 2020-02-28 | End: 2020-03-18

## 2020-02-28 ASSESSMENT — ANXIETY QUESTIONNAIRES
IF YOU CHECKED OFF ANY PROBLEMS ON THIS QUESTIONNAIRE, HOW DIFFICULT HAVE THESE PROBLEMS MADE IT FOR YOU TO DO YOUR WORK, TAKE CARE OF THINGS AT HOME, OR GET ALONG WITH OTHER PEOPLE: VERY DIFFICULT
5. BEING SO RESTLESS THAT IT IS HARD TO SIT STILL: MORE THAN HALF THE DAYS
3. WORRYING TOO MUCH ABOUT DIFFERENT THINGS: MORE THAN HALF THE DAYS
GAD7 TOTAL SCORE: 14
6. BECOMING EASILY ANNOYED OR IRRITABLE: MORE THAN HALF THE DAYS
1. FEELING NERVOUS, ANXIOUS, OR ON EDGE: MORE THAN HALF THE DAYS
7. FEELING AFRAID AS IF SOMETHING AWFUL MIGHT HAPPEN: MORE THAN HALF THE DAYS
2. NOT BEING ABLE TO STOP OR CONTROL WORRYING: MORE THAN HALF THE DAYS

## 2020-02-28 ASSESSMENT — PATIENT HEALTH QUESTIONNAIRE - PHQ9
SUM OF ALL RESPONSES TO PHQ QUESTIONS 1-9: 16
5. POOR APPETITE OR OVEREATING: MORE THAN HALF THE DAYS

## 2020-02-28 ASSESSMENT — PAIN SCALES - GENERAL: PAINLEVEL: EXTREME PAIN (8)

## 2020-02-28 NOTE — NURSING NOTE
"Chief Complaint   Patient presents with     Anxiety     increased anxiety       Initial /88   Pulse 102   Temp 97.2  F (36.2  C) (Temporal)   Resp 16   LMP  (LMP Unknown)   SpO2 99%   Breastfeeding No  Estimated body mass index is 20.67 kg/m  as calculated from the following:    Height as of 2/3/20: 1.753 m (5' 9\").    Weight as of 2/3/20: 63.5 kg (140 lb).  Medication Reconciliation: complete    Petra Swanson LPN  "

## 2020-02-28 NOTE — PROGRESS NOTES
"Nursing Notes:   Perta Swanson, LPN  2/28/2020 10:50 AM  Signed  Chief Complaint   Patient presents with     Anxiety     increased anxiety       Initial /88   Pulse 102   Temp 97.2  F (36.2  C) (Temporal)   Resp 16   LMP  (LMP Unknown)   SpO2 99%   Breastfeeding No  Estimated body mass index is 20.67 kg/m  as calculated from the following:    Height as of 2/3/20: 1.753 m (5' 9\").    Weight as of 2/3/20: 63.5 kg (140 lb).  Medication Reconciliation: complete    Petra Swanson LPN    SUBJECTIVE:  Kym Perea  is a 49 year old female who comes in today for follow-up.  I saw her 3 weeks ago for a shoulder impingement on the left and we injected her shoulder and referred her to physical therapy.  She was also to continue ice and anti-inflammatories.  She has not started physical therapy yet.  She got transient relief from the injection in her shoulder but no relief from the steroid.  She is interested in pursuing further evaluation    She has complex regional pain syndrome involving her left foot and ankle and bilateral facet arthropathy of L4-5.  She has declined facet injection and is on medical cannabis in addition to a pain medication agreement.  She continues to follow with psychiatry for her mental health issues.    She continues to smoke.    She would be due for her opiate refill on March 5.    She is back living with her parents.  Her roommate Lex (who is an addict) who is related to [employer] who works at [facility] had concerns about how she was using her medications.  He apparently talked to [employer].  They sent her for a rule 25 which she had.  She sees Phuong Magallanes for her psychiatric medications..  Lex took over her medications and still has them.  She has not had any meds for a week.  She did a rule 25 at [facility].     She has another court date because apparently she was pulled over again. She has court on Tuesday March 3. She was going through the bank and she was " pulling out and apparently was stopped and looked like she was sleeping.  She has been trying to taper the Depakote    CHRONIC PAIN MANAGEMENT:    DIAGNOSIS:      1. Impingement syndrome of shoulder region, left    2. Pain of joint of left ankle and foot    3. Complex regional pain syndrome type 1 affecting left lower leg    4. Pain medication agreement 4/18/18, 4/3/19    5. Medical cannabis use    6. Tobacco abuse    7. Pain medication agreement    8. Chronic pain disorder    9. Pre-procedure lab exam        PAIN MEDICATION AGREEMENT: (YES)    DATE SIGNED: 5/16/16, 4/20/17, 4/18/18, 4/3/19      NON-MEDICATION MODALITIES MAXIMIZED? (YES) had surgery 2 years ago with Dr. Skelton, but has not seen him for a while.       NEUROPATHIC PAIN: (YES)  ON GABAPENTIN/LYRICA/TCA/SNRI: (YES) dose increased up to QID, but now on 600 mg daily.        NOCICEPTIVE PAIN: (YES)      HISTORY OF CD: (NO)      MENTAL HEALTH DIAGNOSIS: (YES)  DIAGNOSIS:Anxiety  ON BENZODIAZEPINES: (YES)  DISCLOSURE REGARDING RISK OF CONCOMITANT USE WITH OPIOIDS DISCUSSED: (YES)  DATE DISCUSSED: 5/16/16 and 6/2/16 and 8/30/16, 1/12/17, 1/2/19, 4/3/19, 9/11/19, 2/28/2020      MEDICATION: Oxycodone 10 mg 4-6 tab per day, #168/q 28 days.       ORAL MORPHINE EQUIVALENTS: 90 OME/day      LAST TAPER TRIAL: just completed 5/16/16.  Off Morphine and then on Norco at much lower dose, gradual increase to now at 90 OME oxycodone per day max.        QUERY TODAY: (YES)  APPROPRIATE?:         (YES) the  is incorrect as it notes that I am the prescriber of her benzodiazepine's and I am not. They are prescribed by Phuong Magallanes NP. She is working to taper them.       TOXASSURE TODAY: (YES)  IF NO, DATE OF LAST TOXASURE: 1/12/17, 4/20/17, 7/30/17( test was also positive for ETG in addition to amphetamines and benzodiazepines, along with oxycodone). 1/10/18 no opiates but appropriate for other meds. 9/18/18 appropriate but no opiates. 4/3/19,  9/11/19 appropriate.       PAIN SCORE FLOWSHEET REVIEWED: (YES)  STABLE OR IMPROVED: (YES)      AUDIT-10 QUESTIONNAIRE COMPLETED: (NO)      OSWESTRY PAIN DISABILITY INDEX: (NO)  No flowsheet data found.                 Past Medical, Family, and Social History reviewed and updated as noted below.   ROS is negative except as noted above       Allergies   Allergen Reactions     Arthrotec GI Disturbance     Indomethacin      Other reaction(s): Dizziness     Meloxicam GI Disturbance   ,   Family History   Problem Relation Age of Onset     Cancer Father         Cancer,Bladder     Other - See Comments Father         Mild hypercholesterolemia     Other - See Comments Mother         scleroderma/lupus/Parkinson's syndrome     Family History Negative Brother         Good Health     Ariel-Danlos syndrome Daughter      Colon Cancer Maternal Grandmother 40        Cancer-colon,Followed by lung  with metastasis to the bone di*   ,   Current Outpatient Medications   Medication     albuterol (PROAIR HFA/PROVENTIL HFA/VENTOLIN HFA) 108 (90 Base) MCG/ACT Inhaler     ALPRAZolam (XANAX) 2 MG tablet     amphetamine-dextroamphetamine (ADDERALL) 30 MG per tablet     atenolol (TENORMIN) 100 MG tablet     cyclobenzaprine (FLEXERIL) 10 MG tablet     divalproex sodium delayed-release (DEPAKOTE) 125 MG DR tablet     divalproex sodium delayed-release (DEPAKOTE) 250 MG DR tablet     fluticasone (FLONASE) 50 MCG/ACT spray     ibuprofen (ADVIL/MOTRIN) 800 MG tablet     oxyCODONE IR (ROXICODONE) 10 MG tablet     oxyCODONE IR (ROXICODONE) 10 MG tablet     VITAMIN D, CHOLECALCIFEROL, PO     No current facility-administered medications for this visit.    ,   Past Medical History:   Diagnosis Date     Acquired absence of other organs (CODE)     9/2/2011     Chronic pain syndrome     No Comments Provided     Complex regional pain syndrome I     left ankle     Encounter for other administrative examinations     10/2012,Hydrocodone 10/325mg, #240/mo signed  10/3/12, updated 10/1/ 14     Encounter for other administrative examinations     10/1/2014,MS Contin 30 mg q 8 hours #90 per month.  Morphine sulfate IR 15 mg 2-3 tab tid prn #180 per month     Gastro-esophageal reflux disease without esophagitis     No Comments Provided     Injury of left ankle     1997,requiring surgery     Low back pain     No Comments Provided     Major depressive disorder, recurrent severe without psychotic features (H)     No Comments Provided     Migraine without status migrainosus, not intractable     No Comments Provided     Nicotine dependence, uncomplicated     No Comments Provided     Obesity     No Comments Provided     Overweight     11/25/2014     Pain in ankle     Chronic left ankle pain secondary to reflex sympathetic dystrophy.  Patient  has had 4 previous surgeries, the most recent one done by Dr. Noriega in  2007. On Narcotic contract.     Pain in thoracic spine     No Comments Provided     Panic disorder without agoraphobia     Dr Reynoso     Personal history of other diseases of the female genital tract     No Comments Provided     Personal history of other medical treatment (CODE)     G2, P1-0-1-1 with one vaginal delivery.     Supraventricular tachycardia (H)     5/24/2010   ,   Patient Active Problem List    Diagnosis Date Noted     Medical cannabis use 07/10/2018     Priority: Medium     Pain in joint, ankle and foot 01/29/2018     Priority: Medium     Overview:   Chronic left ankle pain secondary to reflex sympathetic dystrophy.  Patient   has had 4 previous surgeries, the most recent one done by Dr. Noriega in   2007. On Narcotic contract.       Chronic pain disorder 01/29/2018     Priority: Medium     Overview:   complex regional pain syndrome left ankle       Esophageal reflux 01/29/2018     Priority: Medium     Hyperlipidemia 01/29/2018     Priority: Medium     Insomnia 01/29/2018     Priority: Medium     Panic disorder 01/29/2018     Priority: Medium      Overview:   Previously patient of Dr. Reynoso since her teens. Patient is attempting slow taper down on her Xanax. She's been on Xanax 2 mg 4 times a day for years       Tobacco abuse 01/29/2018     Priority: Medium     Influenza A 01/11/2018     Priority: Medium     Pain medication agreement 4/18/18, 4/3/19 04/21/2017     Priority: Medium     Overview:   4/20/17:  Oxycodone 10 mg #180/mo    Hydrocodone 10/325mg, #240/mo. Stopped and changed to morphine. Agreement signed 10/3/12, updated 10/1/ 14.  Complex regional pain syndrome (aka Reflex Sympathetic Dystrophy)  MS Contin 30 mg q 8 hours #90 per month.  Morphine sulfate IR 15 mg 2-3 tab tid prn #180 per month, decreased to #90/month.   query 4/9/16 as part of chart review. Seems appropriate. 6/2/16 tapered and off Morphine. [place] 5/325 #120/month.  query appropriate. [employer]ure appropriate.    August 2016 Consultation with [facility]. Recommended spinal cord stimulator trial, but she is reluctant to pursue that. Pain clinic stated that continuing opioids was OK.       Complex regional pain syndrome type 1 affecting left lower leg 04/15/2016     Priority: Medium     Overview:   Patient with a history of complex regional pain syndrome has seen Dr. Dino Skelton4/23/16        Migraine headache 11/25/2014     Priority: Medium     Overweight (BMI 25.0-29.9) 11/25/2014     Priority: Medium     Lumbago 02/06/2012     Priority: Medium     Major depressive disorder, recurrent episode, severe (H) 07/13/2011     Priority: Medium     Paroxysmal supraventricular tachycardia (H) 05/24/2010     Priority: Medium   ,   Past Surgical History:   Procedure Laterality Date     ANKLE SURGERY      1997, 2000,Left ankle surgery x 2     HYSTERECTOMY TOTAL ABDOMINAL      11/1999,secondary to endometriosis, no malignancy; patient reports no malignancy, but abnormal cells were present *     LAPAROSCOPIC CHOLECYSTECTOMY      No Comments Provided     LAPAROSCOPY  DIAGNOSTIC (GENERAL)      1995     OTHER SURGICAL HISTORY      21299,CRANIO/MAXILLOFACIAL SURGERY,Jaw Surgery     OTHER SURGICAL HISTORY      207040,[facility] EMG,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  No other abnormalities noted.    and   Social History     Tobacco Use     Smoking status: Current Every Day Smoker     Packs/day: 0.50     Years: 30.00     Pack years: 15.00     Types: Cigarettes     Start date: 12/2/1986     Smokeless tobacco: Never Used   Substance Use Topics     Alcohol use: Not Currently     Alcohol/week: 0.0 standard drinks     Comment: OCCASIONAL WINE     OBJECTIVE:  /88   Pulse 102   Temp 97.2  F (36.2  C) (Temporal)   Resp 16   LMP  (LMP Unknown)   SpO2 99%   Breastfeeding No    EXAM:  Alert and cooperative, no distress.  She shows no signs of opiate withdrawal.  Examination of her left shoulder is unchanged from previous.  ASSESSMENT/Plan :    Kym was seen today for anxiety.    Diagnoses and all orders for this visit:    Impingement syndrome of shoulder region, left  -     XR SHOULDER CONTRAST CT/MR INJECTION ; Future  -     MR SHOULDER ARTHROGRAM  LEFT W CONTRAST; Future  -     ORTHOPEDICS ADULT REFERRAL    Pain of joint of left ankle and foot  -     Drug  Screen Comprehensive , Urine with Reported Meds (Med[facility]ox) (Pain Care Package)  -     oxyCODONE IR (ROXICODONE) 10 MG tablet; Take 1 tablet (10 mg) by mouth every 4 hours as needed for moderate to severe pain Maximum 6 tab per day    Complex regional pain syndrome type 1 affecting left lower leg  -     Drug  Screen Comprehensive , Urine with Reported Meds (MedTox) (Pain Care Package)  -     oxyCODONE IR (ROXICODONE) 10 MG tablet; Take 1 tablet (10 mg) by mouth every 4 hours as needed for moderate to severe pain Maximum 6 tab per day    Pain medication agreement 4/18/18, 4/3/19  -     Drug  Screen Comprehensive , Urine with Reported Meds (MedTox) (Pain Care Package)  -     oxyCODONE IR (ROXICODONE) 10 MG tablet; Take  1 tablet (10 mg) by mouth every 4 hours as needed for moderate to severe pain Maximum 6 tab per day    Medical cannabis use  -     Drug  Screen Comprehensive , Urine with Reported Meds (MedTox) (Pain Care Package)    Tobacco abuse    Pain medication agreement  -     Drug  Screen Comprehensive , Urine with Reported Meds (MedTox) (Pain Care Package)  -     oxyCODONE IR (ROXICODONE) 10 MG tablet; Take 1 tablet (10 mg) by mouth every 4 hours as needed for moderate to severe pain Maximum 6 tab per day    Chronic pain disorder  -     oxyCODONE IR (ROXICODONE) 10 MG tablet; Take 1 tablet (10 mg) by mouth every 4 hours as needed for moderate to severe pain Maximum 6 tab per day    Pre-procedure lab exam  -     Creatinine; Future      Referred for MR arthrogram and referral for orthopedic appointment with Dr. Varela.     query is appropriate.  Tox[employer]ure today.  Wrote her prescription for March 5 for oxycodone.  Discussed the cross-reactivity's and interactions between her psychiatric medications, medical cannabis, muscle relaxant, pain medication.  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  We discussed not using the the medications together and to watch for overlap.    A total of 15 minutes was spent with the patient, greater than 50% of the time was spent in counseling/discussion of the aforementioned concerns.      Darrin Stephen MD

## 2020-02-29 ASSESSMENT — ANXIETY QUESTIONNAIRES: GAD7 TOTAL SCORE: 14

## 2020-03-04 DIAGNOSIS — M62.838 MUSCLE SPASM: ICD-10-CM

## 2020-03-04 RX ORDER — CYCLOBENZAPRINE HCL 10 MG
10 TABLET ORAL 3 TIMES DAILY PRN
Qty: 30 TABLET | Refills: 4 | Status: SHIPPED | OUTPATIENT
Start: 2020-03-04 | End: 2020-08-04

## 2020-03-04 NOTE — TELEPHONE ENCOUNTER
She is asking for a refill of her Flexeril. She was only given 6 of them at last refill.  Petra Swanson LPN..................3/4/2020   10:09 AM

## 2020-03-05 ENCOUNTER — HOSPITAL ENCOUNTER (OUTPATIENT)
Dept: GENERAL RADIOLOGY | Facility: OTHER | Age: 50
End: 2020-03-05
Attending: FAMILY MEDICINE
Payer: MEDICARE

## 2020-03-05 ENCOUNTER — HOSPITAL ENCOUNTER (OUTPATIENT)
Dept: MRI IMAGING | Facility: OTHER | Age: 50
End: 2020-03-05
Attending: FAMILY MEDICINE
Payer: MEDICARE

## 2020-03-05 DIAGNOSIS — M75.42 IMPINGEMENT SYNDROME OF SHOULDER REGION, LEFT: ICD-10-CM

## 2020-03-05 LAB — PAIN DRUG SCR UR W RPTD MEDS: NORMAL

## 2020-03-05 PROCEDURE — 77002 NEEDLE LOCALIZATION BY XRAY: CPT

## 2020-03-05 PROCEDURE — 25500064 ZZH RX 255 OP 636: Performed by: RADIOLOGY

## 2020-03-05 PROCEDURE — A9575 INJ GADOTERATE MEGLUMI 0.1ML: HCPCS | Performed by: RADIOLOGY

## 2020-03-05 PROCEDURE — 25000125 ZZHC RX 250: Performed by: RADIOLOGY

## 2020-03-05 PROCEDURE — 73222 MRI JOINT UPR EXTREM W/DYE: CPT | Mod: LT

## 2020-03-05 RX ORDER — GADOTERATE MEGLUMINE 376.9 MG/ML
0.1 INJECTION INTRAVENOUS ONCE
Status: COMPLETED | OUTPATIENT
Start: 2020-03-05 | End: 2020-03-05

## 2020-03-05 RX ORDER — LIDOCAINE HYDROCHLORIDE 10 MG/ML
2 INJECTION, SOLUTION INFILTRATION; PERINEURAL ONCE
Status: COMPLETED | OUTPATIENT
Start: 2020-03-05 | End: 2020-03-05

## 2020-03-05 RX ADMIN — LIDOCAINE HYDROCHLORIDE 2 ML: 10 INJECTION, SOLUTION INFILTRATION; PERINEURAL at 13:33

## 2020-03-05 RX ADMIN — IOHEXOL 5 ML: 240 INJECTION, SOLUTION INTRATHECAL; INTRAVASCULAR; INTRAVENOUS; ORAL at 13:33

## 2020-03-05 RX ADMIN — GADOTERATE MEGLUMINE 0.1 ML: 376.9 INJECTION INTRAVENOUS at 13:33

## 2020-03-18 ENCOUNTER — TELEPHONE (OUTPATIENT)
Dept: FAMILY MEDICINE | Facility: OTHER | Age: 50
End: 2020-03-18

## 2020-03-18 ENCOUNTER — OFFICE VISIT (OUTPATIENT)
Dept: FAMILY MEDICINE | Facility: OTHER | Age: 50
End: 2020-03-18
Attending: FAMILY MEDICINE
Payer: MEDICARE

## 2020-03-18 VITALS
DIASTOLIC BLOOD PRESSURE: 64 MMHG | TEMPERATURE: 97.5 F | RESPIRATION RATE: 16 BRPM | HEART RATE: 73 BPM | OXYGEN SATURATION: 99 % | SYSTOLIC BLOOD PRESSURE: 94 MMHG

## 2020-03-18 DIAGNOSIS — Z02.89 PAIN MEDICATION AGREEMENT: ICD-10-CM

## 2020-03-18 DIAGNOSIS — G89.4 CHRONIC PAIN DISORDER: ICD-10-CM

## 2020-03-18 DIAGNOSIS — M25.572 PAIN OF JOINT OF LEFT ANKLE AND FOOT: ICD-10-CM

## 2020-03-18 DIAGNOSIS — G90.522 COMPLEX REGIONAL PAIN SYNDROME TYPE 1 AFFECTING LEFT LOWER LEG: Primary | ICD-10-CM

## 2020-03-18 DIAGNOSIS — I47.10 PAROXYSMAL SUPRAVENTRICULAR TACHYCARDIA (H): ICD-10-CM

## 2020-03-18 DIAGNOSIS — F41.0 PANIC DISORDER: ICD-10-CM

## 2020-03-18 DIAGNOSIS — Z79.899 MEDICAL CANNABIS USE: ICD-10-CM

## 2020-03-18 PROCEDURE — G0463 HOSPITAL OUTPT CLINIC VISIT: HCPCS

## 2020-03-18 PROCEDURE — 99213 OFFICE O/P EST LOW 20 MIN: CPT | Performed by: FAMILY MEDICINE

## 2020-03-18 RX ORDER — OXYCODONE HYDROCHLORIDE 10 MG/1
10 TABLET ORAL EVERY 4 HOURS PRN
Qty: 168 TABLET | Refills: 0 | Status: SHIPPED | OUTPATIENT
Start: 2020-03-18 | End: 2020-04-14

## 2020-03-18 RX ORDER — ATENOLOL 100 MG/1
100 TABLET ORAL DAILY
Qty: 90 TABLET | Refills: 3 | Status: SHIPPED | OUTPATIENT
Start: 2020-03-18 | End: 2020-08-04

## 2020-03-18 ASSESSMENT — PAIN SCALES - GENERAL: PAINLEVEL: MODERATE PAIN (5)

## 2020-03-18 NOTE — TELEPHONE ENCOUNTER
The patient states she is going to come in for an appointment as she would like Dr Stephen to look at her shoulder.  Petra Swanson LPN..................3/18/2020   10:12 AM

## 2020-03-18 NOTE — NURSING NOTE
"No chief complaint on file.      Initial BP 94/64   Pulse 73   Temp 97.5  F (36.4  C) (Temporal)   Resp 16   LMP  (LMP Unknown)   SpO2 99%   Breastfeeding No  Estimated body mass index is 20.67 kg/m  as calculated from the following:    Height as of 2/3/20: 1.753 m (5' 9\").    Weight as of 2/3/20: 63.5 kg (140 lb).  Medication Reconciliation: complete    Petra Swanson LPN    "

## 2020-03-18 NOTE — PROGRESS NOTES
"Nursing Notes:   Petra Swanson LPN  3/18/2020  5:04 PM  Sign at exiting of workspace  No chief complaint on file.      Initial BP 94/64   Pulse 73   Temp 97.5  F (36.4  C) (Temporal)   Resp 16   LMP  (LMP Unknown)   SpO2 99%   Breastfeeding No  Estimated body mass index is 20.67 kg/m  as calculated from the following:    Height as of 2/3/20: 1.753 m (5' 9\").    Weight as of 2/3/20: 63.5 kg (140 lb).  Medication Reconciliation: complete    Petra Swanson LPN      SUBJECTIVE:  Kym Perea  is a 49 year old female who comes in for follow up and medication management. I saw her 3 weeks ago for left shoulder impingement and sent her for MR arthrogram and orthopedic consultation. The MR showed possible extrinsic impingement to her left shoulder.     She has complex regional pain syndrome involving her left foot and ankle and bilateral facet arthropathy of L4-5.  She has declined facet injection and is on medical cannabis in addition to a pain medication agreement.  She continues to follow with psychiatry for her mental health issues.     She had a court date on March 3 that was coming up at her last visit. It was postponed again.     CHRONIC PAIN MANAGEMENT:    DIAGNOSIS:      1. Complex regional pain syndrome type 1 affecting left lower leg    2. Pain of joint of left ankle and foot    3. Panic disorder    4. Chronic pain disorder    5. Pain medication agreement 4/18/18, 4/3/19    6. Medical cannabis use        PAIN MEDICATION AGREEMENT: (YES)    DATE SIGNED: 5/16/16, 4/20/17, 4/18/18, 4/3/19      NON-MEDICATION MODALITIES MAXIMIZED? (YES) had surgery 2 years ago with Dr. Skelton, but has not seen him for a while.       NEUROPATHIC PAIN: (YES)  ON GABAPENTIN/LYRICA/TCA/SNRI: (YES) dose increased up to QID, but now on 600 mg daily.        NOCICEPTIVE PAIN: (YES)      HISTORY OF CD: (NO)      MENTAL HEALTH DIAGNOSIS: (YES)  DIAGNOSIS:Anxiety  ON BENZODIAZEPINES: (YES)  DISCLOSURE REGARDING RISK OF " CONCOMITANT USE WITH OPIOIDS DISCUSSED: (YES)  DATE DISCUSSED: 5/16/16 and 6/2/16 and 8/30/16, 1/12/17, 1/2/19, 4/3/19, 9/11/19, 2/28/2020      MEDICATION: Oxycodone 10 mg 4-6 tab per day, #168/q 28 days.       ORAL MORPHINE EQUIVALENTS: 90 OME/day      LAST TAPER TRIAL: just completed 5/16/16.  Off Morphine and then on Norco at much lower dose, gradual increase to now at 90 OME oxycodone per day max.        QUERY TODAY: (YES)  APPROPRIATE?:         (YES) the  is incorrect as it notes that I am the prescriber of her benzodiazepine's and I am not. They are prescribed by Phuong Magallanes NP. She is working to taper them.       TOXASSURE TODAY: (NO)  IF NO, DATE OF LAST TOXASURE: 1/12/17, 4/20/17, 7/30/17( test was also positive for ETG in addition to amphetamines and benzodiazepines, along with oxycodone). 1/10/18 no opiates but appropriate for other meds. 9/18/18 appropriate but no opiates. 4/3/19, 9/11/19, 2/28/2020 appropriate.       PAIN SCORE FLOWSHEET REVIEWED: (YES)  STABLE OR IMPROVED: (YES)      AUDIT-10 QUESTIONNAIRE COMPLETED: (NO)      OSWESTRY PAIN DISABILITY INDEX: (NO)  No flowsheet data found.                  Past Medical, Family, and Social History reviewed and updated as noted below.   ROS is negative except as noted above       Allergies   Allergen Reactions     Arthrotec GI Disturbance     Indomethacin      Other reaction(s): Dizziness     Meloxicam GI Disturbance   ,   Family History   Problem Relation Age of Onset     Cancer Father         Cancer,Bladder     Other - See Comments Father         Mild hypercholesterolemia     Other - See Comments Mother         scleroderma/lupus/Parkinson's syndrome     Family History Negative Brother         Good Health     Ariel-Danlos syndrome Daughter      Colon Cancer Maternal Grandmother 40        Cancer-colon,Followed by lung  with metastasis to the bone di*   ,   Current Outpatient Medications   Medication     albuterol (PROAIR HFA/PROVENTIL HFA/VENTOLIN  HFA) 108 (90 Base) MCG/ACT Inhaler     ALPRAZolam (XANAX) 2 MG tablet     amphetamine-dextroamphetamine (ADDERALL) 30 MG per tablet     atenolol (TENORMIN) 100 MG tablet     cyclobenzaprine (FLEXERIL) 10 MG tablet     divalproex sodium delayed-release (DEPAKOTE) 125 MG DR tablet     divalproex sodium delayed-release (DEPAKOTE) 250 MG DR tablet     fluticasone (FLONASE) 50 MCG/ACT spray     ibuprofen (ADVIL/MOTRIN) 800 MG tablet     oxyCODONE IR (ROXICODONE) 10 MG tablet     oxyCODONE IR (ROXICODONE) 10 MG tablet     VITAMIN D, CHOLECALCIFEROL, PO     No current facility-administered medications for this visit.    ,   Past Medical History:   Diagnosis Date     Acquired absence of other organs (CODE)     9/2/2011     Chronic pain syndrome     No Comments Provided     Complex regional pain syndrome I     left ankle     Encounter for other administrative examinations     10/2012,Hydrocodone 10/325mg, #240/mo signed 10/3/12, updated 10/1/ 14     Encounter for other administrative examinations     10/1/2014,MS Contin 30 mg q 8 hours #90 per month.  Morphine sulfate IR 15 mg 2-3 tab tid prn #180 per month     Gastro-esophageal reflux disease without esophagitis     No Comments Provided     Injury of left ankle     1997,requiring surgery     Low back pain     No Comments Provided     Major depressive disorder, recurrent severe without psychotic features (H)     No Comments Provided     Migraine without status migrainosus, not intractable     No Comments Provided     Nicotine dependence, uncomplicated     No Comments Provided     Obesity     No Comments Provided     Overweight     11/25/2014     Pain in ankle     Chronic left ankle pain secondary to reflex sympathetic dystrophy.  Patient  has had 4 previous surgeries, the most recent one done by Dr. Noriega in  2007. On Narcotic contract.     Pain in thoracic spine     No Comments Provided     Panic disorder without agoraphobia     Dr Reynoso     Personal history of other  diseases of the female genital tract     No Comments Provided     Personal history of other medical treatment (CODE)     G2, P1-0-1-1 with one vaginal delivery.     Supraventricular tachycardia (H)     5/24/2010   ,   Patient Active Problem List    Diagnosis Date Noted     Medical cannabis use 07/10/2018     Priority: Medium     Pain in joint, ankle and foot 01/29/2018     Priority: Medium     Overview:   Chronic left ankle pain secondary to reflex sympathetic dystrophy.  Patient   has had 4 previous surgeries, the most recent one done by Dr. Noriega in   2007. On Narcotic contract.       Chronic pain disorder 01/29/2018     Priority: Medium     Overview:   complex regional pain syndrome left ankle       Esophageal reflux 01/29/2018     Priority: Medium     Hyperlipidemia 01/29/2018     Priority: Medium     Insomnia 01/29/2018     Priority: Medium     Panic disorder 01/29/2018     Priority: Medium     Overview:   Previously patient of Dr. Reynoso since her teens. Patient is attempting slow taper down on her Xanax. She's been on Xanax 2 mg 4 times a day for years       Tobacco abuse 01/29/2018     Priority: Medium     Influenza A 01/11/2018     Priority: Medium     Pain medication agreement 4/18/18, 4/3/19 04/21/2017     Priority: Medium     Overview:   4/20/17:  Oxycodone 10 mg #180/mo    Hydrocodone 10/325mg, #240/mo. Stopped and changed to morphine. Agreement signed 10/3/12, updated 10/1/ 14.  Complex regional pain syndrome (aka Reflex Sympathetic Dystrophy)  MS Contin 30 mg q 8 hours #90 per month.  Morphine sulfate IR 15 mg 2-3 tab tid prn #180 per month, decreased to #90/month.   query 4/9/16 as part of chart review. Seems appropriate. 6/2/16 tapered and off Morphine. [place] 5/325 #120/month.  query appropriate. ToxAssure appropriate.    August 2016 Consultation with [facility]. Recommended spinal cord stimulator trial, but she is reluctant to pursue that. Pain clinic stated that continuing  opioids was OK.       Complex regional pain syndrome type 1 affecting left lower leg 04/15/2016     Priority: Medium     Overview:   Patient with a history of complex regional pain syndrome has seen Dr. Dino Skelton4/23/16        Migraine headache 11/25/2014     Priority: Medium     Overweight (BMI 25.0-29.9) 11/25/2014     Priority: Medium     Lumbago 02/06/2012     Priority: Medium     Major depressive disorder, recurrent episode, severe (H) 07/13/2011     Priority: Medium     Paroxysmal supraventricular tachycardia (H) 05/24/2010     Priority: Medium   ,   Past Surgical History:   Procedure Laterality Date     ANKLE SURGERY      1997, 2000,Left ankle surgery x 2     HYSTERECTOMY TOTAL ABDOMINAL      11/1999,secondary to endometriosis, no malignancy; patient reports no malignancy, but abnormal cells were present *     LAPAROSCOPIC CHOLECYSTECTOMY      No Comments Provided     LAPAROSCOPY DIAGNOSTIC (GENERAL)      1995     OTHER SURGICAL HISTORY      21299,CRANIO/MAXILLOFACIAL SURGERY,Jaw Surgery     OTHER SURGICAL HISTORY      207040,[facility] EMG,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  No other abnormalities noted.    and   Social History     Tobacco Use     Smoking status: Current Every Day Smoker     Packs/day: 0.50     Years: 30.00     Pack years: 15.00     Types: Cigarettes     Start date: 12/2/1986     Smokeless tobacco: Never Used   Substance Use Topics     Alcohol use: Not Currently     Alcohol/week: 0.0 standard drinks     Comment: OCCASIONAL WINE     OBJECTIVE:  BP 94/64   Pulse 73   Temp 97.5  F (36.4  C) (Temporal)   Resp 16   LMP  (LMP Unknown)   SpO2 99%   Breastfeeding No    EXAM:  Alert and cooperative, no distress.  Reviewed MRI results with her in detail.  Affect is broad ranging and appropriate.  ASSESSMENT/Plan :    Diagnoses and all orders for this visit:    Complex regional pain syndrome type 1 affecting left lower leg    Pain of joint of left ankle and foot    Panic  disorder    Chronic pain disorder    Pain medication agreement 4/18/18, 4/3/19    Medical cannabis use       query is appropriate.  Renewal on oxycodone pursuant to her contract.  Needs renewal of contract at her next visit.    MRI results reviewed and report printed at her request.  She has an upcoming appointment with Dr. Varela.    A total of 15 minutes was spent with the patient, greater than 50% of the time was spent in counseling/discussion of the aforementioned concerns.     Darrin Stephen MD

## 2020-03-18 NOTE — TELEPHONE ENCOUNTER
JVC - patient is wondering if her appt today is needed - she can not remember when she is due again for her med renewal. Please call soon about appt today - if needs to be rescheduled.   Pooja Kang on 3/18/2020 at 10:03 AM

## 2020-04-13 ENCOUNTER — TELEPHONE (OUTPATIENT)
Dept: FAMILY MEDICINE | Facility: OTHER | Age: 50
End: 2020-04-13

## 2020-04-13 NOTE — TELEPHONE ENCOUNTER
The patient was notified her appointment tomorrow will be a telephone visit. She stated that will be fine.  Petra Swanson LPN..................4/13/2020   3:37 PM

## 2020-04-14 ENCOUNTER — VIRTUAL VISIT (OUTPATIENT)
Dept: FAMILY MEDICINE | Facility: OTHER | Age: 50
End: 2020-04-14
Attending: FAMILY MEDICINE
Payer: MEDICARE

## 2020-04-14 DIAGNOSIS — G89.4 CHRONIC PAIN DISORDER: ICD-10-CM

## 2020-04-14 DIAGNOSIS — Z79.899 MEDICAL CANNABIS USE: ICD-10-CM

## 2020-04-14 DIAGNOSIS — Z02.89 PAIN MEDICATION AGREEMENT: ICD-10-CM

## 2020-04-14 DIAGNOSIS — M25.572 PAIN OF JOINT OF LEFT ANKLE AND FOOT: ICD-10-CM

## 2020-04-14 DIAGNOSIS — G90.522 COMPLEX REGIONAL PAIN SYNDROME TYPE 1 AFFECTING LEFT LOWER LEG: Primary | ICD-10-CM

## 2020-04-14 PROCEDURE — 99441 ZZC PHYSICIAN TELEPHONE EVALUATION 5-10 MIN: CPT | Performed by: FAMILY MEDICINE

## 2020-04-14 RX ORDER — OXYCODONE HYDROCHLORIDE 10 MG/1
10 TABLET ORAL EVERY 4 HOURS PRN
Qty: 168 TABLET | Refills: 0 | Status: SHIPPED | OUTPATIENT
Start: 2020-04-14 | End: 2020-06-26

## 2020-04-14 RX ORDER — OXYCODONE HYDROCHLORIDE 10 MG/1
10 TABLET ORAL EVERY 4 HOURS PRN
Qty: 168 TABLET | Refills: 0 | Status: SHIPPED | OUTPATIENT
Start: 2020-04-14 | End: 2020-07-30

## 2020-04-14 ASSESSMENT — ASTHMA QUESTIONNAIRES
QUESTION_3 LAST FOUR WEEKS HOW OFTEN DID YOUR ASTHMA SYMPTOMS (WHEEZING, COUGHING, SHORTNESS OF BREATH, CHEST TIGHTNESS OR PAIN) WAKE YOU UP AT NIGHT OR EARLIER THAN USUAL IN THE MORNING: NOT AT ALL
QUESTION_1 LAST FOUR WEEKS HOW MUCH OF THE TIME DID YOUR ASTHMA KEEP YOU FROM GETTING AS MUCH DONE AT WORK, SCHOOL OR AT HOME: NONE OF THE TIME
ACT_TOTALSCORE: 24
QUESTION_4 LAST FOUR WEEKS HOW OFTEN HAVE YOU USED YOUR RESCUE INHALER OR NEBULIZER MEDICATION (SUCH AS ALBUTEROL): ONCE A WEEK OR LESS
QUESTION_2 LAST FOUR WEEKS HOW OFTEN HAVE YOU HAD SHORTNESS OF BREATH: NOT AT ALL
QUESTION_5 LAST FOUR WEEKS HOW WOULD YOU RATE YOUR ASTHMA CONTROL: COMPLETELY CONTROLLED

## 2020-04-14 ASSESSMENT — PAIN SCALES - GENERAL: PAINLEVEL: MODERATE PAIN (5)

## 2020-04-14 NOTE — PROGRESS NOTES
"Kym Perea is a 50 year old female who is being evaluated via a billable telephone visit.      The patient has been notified of following:     \"This telephone visit will be conducted via a call between you and your physician/provider. We have found that certain health care needs can be provided without the need for a physical exam.  This service lets us provide the care you need with a short phone conversation.  If a prescription is necessary we can send it directly to your pharmacy.  If lab work is needed we can place an order for that and you can then stop by our lab to have the test done at a later time.    Telephone visits are billed at different rates depending on your insurance coverage. During this emergency period, for some insurers they may be billed the same as an in-person visit.  Please reach out to your insurance provider with any questions.    If during the course of the call the physician/provider feels a telephone visit is not appropriate, you will not be charged for this service.\"    Patient has given verbal consent for Telephone visit?  Yes    How would you like to obtain your AVS? Mail a copy     Nursing Notes:   Petra Swanson LPN  4/14/2020  3:59 PM  Signed  Chief Complaint   Patient presents with     Recheck Medication       Initial LMP  (LMP Unknown)   Breastfeeding No  Estimated body mass index is 20.67 kg/m  as calculated from the following:    Height as of 2/3/20: 1.753 m (5' 9\").    Weight as of 2/3/20: 63.5 kg (140 lb).  Medication Reconciliation: complete    Petra Swanson LPN        Subjective     Kym Perea is a 50 year old female who presents to clinic today for the following health issues:    Patient is calling for telephone visit requesting medication refill on her opiates.  I saw her on March 18.  Her history at that time was as follows: \"I saw her 3 weeks ago for left shoulder impingement and sent her for MR arthrogram and orthopedic consultation. The MR " "showed possible extrinsic impingement to her left shoulder.      She has complex regional pain syndrome involving her left foot and ankle and bilateral facet arthropathy of L4-5.  She has declined facet injection and is on medical cannabis in addition to a pain medication agreement.  She continues to follow with psychiatry for her mental health issues.      She had a court date on March 3 that was coming up at her last visit. It was postponed again.\"    As at that visit, we renewed her oxycodone pursuant to her contract.  She is due for contract renewal at her next visit.  She was scheduled to see Dr. Varela for an orthopedic appointment.  Her prescription was scheduled to be filled on April 2 and she filled it on that day according to the .  She continues on alprazolam through [facility] and is aware of the interaction and increased risk of death due to benzodiazepines and oxycodone.  She does not take them at the same time.    Patient Active Problem List   Diagnosis     Pain in joint, ankle and foot     Lumbago     Chronic pain disorder     Complex regional pain syndrome type 1 affecting left lower leg     Major depressive disorder, recurrent episode, severe (H)     Esophageal reflux     Hyperlipidemia     Influenza A     Insomnia     Migraine headache     Overweight (BMI 25.0-29.9)     Pain medication agreement 4/18/18, 4/3/19     Panic disorder     Paroxysmal supraventricular tachycardia (H)     Tobacco abuse     Medical cannabis use     Past Surgical History:   Procedure Laterality Date     ANKLE SURGERY      1997, 2000,Left ankle surgery x 2     HYSTERECTOMY TOTAL ABDOMINAL      11/1999,secondary to endometriosis, no malignancy; patient reports no malignancy, but abnormal cells were present *     LAPAROSCOPIC CHOLECYSTECTOMY      No Comments Provided     LAPAROSCOPY DIAGNOSTIC (GENERAL)      1995     OTHER SURGICAL HISTORY      21299,CRANIO/MAXILLOFACIAL SURGERY,Jaw Surgery     OTHER SURGICAL HISTORY      " 526213,[facility] EMG,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  No other abnormalities noted.       Social History     Tobacco Use     Smoking status: Current Every Day Smoker     Packs/day: 0.50     Years: 30.00     Pack years: 15.00     Types: Cigarettes     Start date: 12/2/1986     Smokeless tobacco: Never Used   Substance Use Topics     Alcohol use: Not Currently     Alcohol/week: 0.0 standard drinks     Comment: OCCASIONAL WINE     Family History   Problem Relation Age of Onset     Cancer Father         Cancer,Bladder     Other - See Comments Father         Mild hypercholesterolemia     Other - See Comments Mother         scleroderma/lupus/Parkinson's syndrome     Family History Negative Brother         Good Health     Ariel-Danlos syndrome Daughter      Colon Cancer Maternal Grandmother 40        Cancer-colon,Followed by lung  with metastasis to the bone di*         Current Outpatient Medications   Medication Sig Dispense Refill     albuterol (PROAIR HFA/PROVENTIL HFA/VENTOLIN HFA) 108 (90 Base) MCG/ACT Inhaler Inhale 2 puffs into the lungs every 4 hours as needed for shortness of breath / dyspnea or wheezing 1 Inhaler 11     ALPRAZolam (XANAX) 2 MG tablet Take 1 tablet 3 times daily prn       amphetamine-dextroamphetamine (ADDERALL) 30 MG per tablet Take 30 mg by mouth       atenolol (TENORMIN) 100 MG tablet Take 1 tablet (100 mg) by mouth daily 90 tablet 3     cyclobenzaprine (FLEXERIL) 10 MG tablet Take 1 tablet (10 mg) by mouth 3 times daily as needed for muscle spasms 30 tablet 4     divalproex sodium delayed-release (DEPAKOTE) 125 MG DR tablet Take 125 mg by mouth daily  0     divalproex sodium delayed-release (DEPAKOTE) 250 MG DR tablet TAKE 1 TABLET BY MOUTH TWICE A DAY AS NEEDED  0     fluticasone (FLONASE) 50 MCG/ACT spray 2 sprays       ibuprofen (ADVIL/MOTRIN) 800 MG tablet Take 1 tablet (800 mg) by mouth every 8 hours as needed for moderate pain 60 tablet 0     oxyCODONE IR (ROXICODONE) 10  MG tablet Take 1 tablet (10 mg) by mouth every 4 hours as needed for moderate to severe pain Maximum 6 tab per day 168 tablet 0     oxyCODONE IR (ROXICODONE) 10 MG tablet Take 1 tablet (10 mg) by mouth every 4 hours as needed for severe pain Max 6 tab per day. 168 tablet 0     VITAMIN D, CHOLECALCIFEROL, PO Take 1,000 Units by mouth daily       Allergies   Allergen Reactions     Arthrotec GI Disturbance     Indomethacin      Other reaction(s): Dizziness     Meloxicam GI Disturbance       Reviewed and updated as needed this visit by Provider         Review of Systems   ROS is negative except as noted above          Objective   Reported vitals:  LMP  (LMP Unknown)   Breastfeeding No    healthy, alert and no distress  PSYCH: Alert and oriented times 3; coherent speech, normal   rate and volume, able to articulate logical thoughts, able   to abstract reason, no tangential thoughts, no hallucinations   or delusions  Her affect is normal  RESP: No cough, no audible wheezing, able to talk in full sentences  Remainder of exam unable to be completed due to telephone visits    Diagnostic Test Results:  none         Assessment/Plan:  Kym was seen today for recheck medication.    Diagnoses and all orders for this visit:    Complex regional pain syndrome type 1 affecting left lower leg  -     oxyCODONE IR (ROXICODONE) 10 MG tablet; Take 1 tablet (10 mg) by mouth every 4 hours as needed for moderate to severe pain Maximum 6 tab per day  -     oxyCODONE IR (ROXICODONE) 10 MG tablet; Take 1 tablet (10 mg) by mouth every 4 hours as needed for severe pain Max 6 tab per day.    Pain of joint of left ankle and foot  -     oxyCODONE IR (ROXICODONE) 10 MG tablet; Take 1 tablet (10 mg) by mouth every 4 hours as needed for moderate to severe pain Maximum 6 tab per day  -     oxyCODONE IR (ROXICODONE) 10 MG tablet; Take 1 tablet (10 mg) by mouth every 4 hours as needed for severe pain Max 6 tab per day.    Chronic pain disorder  -      oxyCODONE IR (ROXICODONE) 10 MG tablet; Take 1 tablet (10 mg) by mouth every 4 hours as needed for moderate to severe pain Maximum 6 tab per day  -     oxyCODONE IR (ROXICODONE) 10 MG tablet; Take 1 tablet (10 mg) by mouth every 4 hours as needed for severe pain Max 6 tab per day.    Medical cannabis use    Pain medication agreement 4/18/18, 4/3/19  -     oxyCODONE IR (ROXICODONE) 10 MG tablet; Take 1 tablet (10 mg) by mouth every 4 hours as needed for moderate to severe pain Maximum 6 tab per day  -     oxyCODONE IR (ROXICODONE) 10 MG tablet; Take 1 tablet (10 mg) by mouth every 4 hours as needed for severe pain Max 6 tab per day.    Pain medication agreement  -     oxyCODONE IR (ROXICODONE) 10 MG tablet; Take 1 tablet (10 mg) by mouth every 4 hours as needed for moderate to severe pain Maximum 6 tab per day  -     oxyCODONE IR (ROXICODONE) 10 MG tablet; Take 1 tablet (10 mg) by mouth every 4 hours as needed for severe pain Max 6 tab per day.       No follow-ups on file.    Pain medication renewed for April 30 in May 28 pursuant to her contract.  She will need to re-up her paperwork when the pandemic quarantine is done.   query is appropriate.  Phone call duration:  5 minutes    Darrin Stephen MD

## 2020-04-14 NOTE — NURSING NOTE
"Chief Complaint   Patient presents with     Recheck Medication       Initial LMP  (LMP Unknown)   Breastfeeding No  Estimated body mass index is 20.67 kg/m  as calculated from the following:    Height as of 2/3/20: 1.753 m (5' 9\").    Weight as of 2/3/20: 63.5 kg (140 lb).  Medication Reconciliation: complete    Petra Swanson LPN  "

## 2020-04-15 ASSESSMENT — ASTHMA QUESTIONNAIRES: ACT_TOTALSCORE: 24

## 2020-06-15 ENCOUNTER — TELEPHONE (OUTPATIENT)
Dept: FAMILY MEDICINE | Facility: OTHER | Age: 50
End: 2020-06-15

## 2020-06-15 NOTE — TELEPHONE ENCOUNTER
Patient needs med refill by 6/27  - no available appts w/[facility] by then. Wants only [place].  Would like a work in.  Please call to advise.   Pooja Kang on 6/15/2020 at 3:42 PM

## 2020-06-16 NOTE — TELEPHONE ENCOUNTER
The patient was told that Darrin Stephen MD would not be able to see her. She would have to schedule with another provider.  Petra Swanson LPN..................6/16/2020   12:09 PM

## 2020-06-26 ENCOUNTER — VIRTUAL VISIT (OUTPATIENT)
Dept: FAMILY MEDICINE | Facility: OTHER | Age: 50
End: 2020-06-26
Attending: FAMILY MEDICINE
Payer: COMMERCIAL

## 2020-06-26 DIAGNOSIS — G90.522 COMPLEX REGIONAL PAIN SYNDROME TYPE 1 AFFECTING LEFT LOWER LEG: ICD-10-CM

## 2020-06-26 DIAGNOSIS — M25.572 PAIN OF JOINT OF LEFT ANKLE AND FOOT: ICD-10-CM

## 2020-06-26 DIAGNOSIS — Z02.89 PAIN MEDICATION AGREEMENT: ICD-10-CM

## 2020-06-26 DIAGNOSIS — G89.4 CHRONIC PAIN DISORDER: ICD-10-CM

## 2020-06-26 PROCEDURE — 99213 OFFICE O/P EST LOW 20 MIN: CPT | Mod: 95 | Performed by: FAMILY MEDICINE

## 2020-06-26 RX ORDER — OXYCODONE HYDROCHLORIDE 10 MG/1
10 TABLET ORAL EVERY 4 HOURS PRN
Qty: 168 TABLET | Refills: 0 | Status: SHIPPED | OUTPATIENT
Start: 2020-06-26 | End: 2020-08-04

## 2020-06-26 ASSESSMENT — ENCOUNTER SYMPTOMS
CONSTIPATION: 0
FEVER: 0
CHILLS: 0

## 2020-06-26 ASSESSMENT — PAIN SCALES - GENERAL: PAINLEVEL: MODERATE PAIN (4)
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
## 2020-06-26 NOTE — NURSING NOTE
"Chief Complaint   Patient presents with     Medication Request         Initial LMP  (LMP Unknown)  Estimated body mass index is 20.67 kg/m  as calculated from the following:    Height as of 2/3/20: 1.753 m (5' 9\").    Weight as of 2/3/20: 63.5 kg (140 lb).    Medication Reconciliation: complete      Norma J. Gosselin, LPN  "

## 2020-07-29 ENCOUNTER — TELEPHONE (OUTPATIENT)
Dept: FAMILY MEDICINE | Facility: OTHER | Age: 50
End: 2020-07-29

## 2020-07-29 NOTE — TELEPHONE ENCOUNTER
Patient notified and transferred to the appointment line.  Parul Harden LPN, LPN  7/29/2020  4:49 PM

## 2020-07-29 NOTE — TELEPHONE ENCOUNTER
Spoke with patient. She is out of her meds and needs refill. She has had virtual visits the last two times and is wondering if she can do that again or have to come into clinic for visit? Needs her oxycodone 10mg filled. Please advise.  Pooja Zambrano LPN.........................7/29/2020  2:55 PM

## 2020-07-29 NOTE — TELEPHONE ENCOUNTER
Pt needs a call today as you are going to be gone-to do with her narcotic med--and her new ins.is what I could get out of our conversation. Please call today

## 2020-07-30 ENCOUNTER — TELEPHONE (OUTPATIENT)
Dept: FAMILY MEDICINE | Facility: OTHER | Age: 50
End: 2020-07-30

## 2020-07-30 ENCOUNTER — VIRTUAL VISIT (OUTPATIENT)
Dept: FAMILY MEDICINE | Facility: OTHER | Age: 50
End: 2020-07-30
Attending: PHYSICIAN ASSISTANT
Payer: COMMERCIAL

## 2020-07-30 DIAGNOSIS — G90.522 COMPLEX REGIONAL PAIN SYNDROME TYPE 1 AFFECTING LEFT LOWER LEG: ICD-10-CM

## 2020-07-30 DIAGNOSIS — M25.572 PAIN OF JOINT OF LEFT ANKLE AND FOOT: ICD-10-CM

## 2020-07-30 DIAGNOSIS — G89.4 CHRONIC PAIN DISORDER: ICD-10-CM

## 2020-07-30 DIAGNOSIS — Z02.89 PAIN MEDICATION AGREEMENT: ICD-10-CM

## 2020-07-30 PROCEDURE — 99213 OFFICE O/P EST LOW 20 MIN: CPT | Mod: 95 | Performed by: PHYSICIAN ASSISTANT

## 2020-07-30 PROCEDURE — G0463 HOSPITAL OUTPT CLINIC VISIT: HCPCS | Mod: TEL

## 2020-07-30 RX ORDER — OXYCODONE HYDROCHLORIDE 10 MG/1
10 TABLET ORAL EVERY 4 HOURS PRN
Qty: 168 TABLET | Refills: 0 | Status: SHIPPED | OUTPATIENT
Start: 2020-07-30 | End: 2020-08-04

## 2020-07-30 ASSESSMENT — PAIN SCALES - GENERAL: PAINLEVEL: SEVERE PAIN (7)

## 2020-07-30 NOTE — NURSING NOTE
Chief Complaint   Patient presents with     Refill Request     Refill needed on Oxy. Cannot get into PCP until next week. Has been out for about a week.     Medication Reconciliation: complete    Saadia Aggarwal LPN

## 2020-07-30 NOTE — PROGRESS NOTES
"Kym Perea is a 50 year old female who is being evaluated via a billable telephone visit.      The patient has been notified of following:     \"This telephone visit will be conducted via a call between you and your physician/provider. We have found that certain health care needs can be provided without the need for a physical exam.  This service lets us provide the care you need with a short phone conversation.  If a prescription is necessary we can send it directly to your pharmacy.  If lab work is needed we can place an order for that and you can then stop by our lab to have the test done at a later time.    Telephone visits are billed at different rates depending on your insurance coverage. During this emergency period, for some insurers they may be billed the same as an in-person visit.  Please reach out to your insurance provider with any questions.    If during the course of the call the physician/provider feels a telephone visit is not appropriate, you will not be charged for this service.\"    Patient has given verbal consent for Telephone visit?  Yes    What phone number would you like to be contacted at? [phone]    How would you like to obtain your AVS? Declined    Subjective     Kym Perea is a 50 year old female who presents via phone visit today for the following health issues:    HPI  Patient on a pain contract and her primary care provider is on vacation.  She calls for a refill of her Roxicodone.   query completed and found stable without conflict.  She has a history of the [place], complex regional pain syndrome affecting the lower leg, chronic pain disorder, acid reflux, hyperlipidemia, paroxysmal supraventricular tachycardia, depression, cannabis use, tobacco abuse, panic disorder, insomnia, and obesity.  Medications include Flexeril, oxycodone 10 mg IR, Xanax, Adderall, Depakote, albuterol HFA, ibuprofen, and Flonase.  She has allergies to meloxicam, indomethacin, and " Arthrotec.      Patient Active Problem List   Diagnosis     Pain in joint, ankle and foot     Lumbago     Chronic pain disorder     Complex regional pain syndrome type 1 affecting left lower leg     Major depressive disorder, recurrent episode, severe (H)     Esophageal reflux     Hyperlipidemia     Influenza A     Insomnia     Migraine headache     Overweight (BMI 25.0-29.9)     Pain medication agreement 4/18/18, 4/3/19     Panic disorder     Paroxysmal supraventricular tachycardia (H)     Tobacco abuse     Medical cannabis use     Past Surgical History:   Procedure Laterality Date     ANKLE SURGERY      1997, 2000,Left ankle surgery x 2     HYSTERECTOMY TOTAL ABDOMINAL      11/1999,secondary to endometriosis, no malignancy; patient reports no malignancy, but abnormal cells were present *     LAPAROSCOPIC CHOLECYSTECTOMY      No Comments Provided     LAPAROSCOPY DIAGNOSTIC (GENERAL)      1995     OTHER SURGICAL HISTORY      21299,CRANIO/MAXILLOFACIAL SURGERY,Jaw Surgery     OTHER SURGICAL HISTORY      207040,[facility],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  No other abnormalities noted.       Social History     Tobacco Use     Smoking status: Current Every Day Smoker     Packs/day: 0.50     Years: 30.00     Pack years: 15.00     Types: Cigarettes     Start date: 12/2/1986     Smokeless tobacco: Never Used   Substance Use Topics     Alcohol use: Not Currently     Alcohol/week: 0.0 standard drinks     Comment: OCCASIONAL WINE     Family History   Problem Relation Age of Onset     Cancer Father         Cancer,Bladder     Other - See Comments Father         Mild hypercholesterolemia     Other - See Comments Mother         scleroderma/lupus/Parkinson's syndrome     Family History Negative Brother         Good Health     Ariel-Danlos syndrome Daughter      Colon Cancer Maternal Grandmother 40        Cancer-colon,Followed by lung  with metastasis to the bone di*           Reviewed and updated as needed this  visit by Provider         Review of Systems   Constitutional, HEENT, cardiovascular, pulmonary, gi and gu systems are negative, except as otherwise noted.       Objective   Reported vitals:  LMP  (LMP Unknown)    healthy, alert and no distress  PSYCH: Alert and oriented times 3; coherent speech, normal   rate and volume, able to articulate logical thoughts, able   to abstract reason, no tangential thoughts, no hallucinations   or delusions  Her affect is normal  RESP: No cough, no audible wheezing, able to talk in full sentences  Remainder of exam unable to be completed due to telephone visits    Diagnostic Test Results:  Labs reviewed in Epic        Assessment/Plan:    1. Chronic pain disorder  2. Pain of joint of left ankle and foot  3. Complex regional pain syndrome type 1 affecting left lower leg  4. Pain medication agreement 4/18/18, 4/3/19     query completed and found stable and without conflict.    Explained that patient will need to see her primary care provider for ongoing pain management contract.    Follow-up as needed      No follow-ups on file.      Phone call duration:  5 minutes    ZULAY Clemente

## 2020-07-30 NOTE — TELEPHONE ENCOUNTER
Called patient and verified name and date of birth. She is wondering since Dr. Stephen is out of office if she can see another provider for her pain medications. I told her to schedule her appointments with Dr. Stephen in advance to prevent running out before her next appointment.   Transferred to scheduling to make appointment.  Gloria Neil LPN on 7/30/2020 at 3:56 PM

## 2020-08-04 ENCOUNTER — VIRTUAL VISIT (OUTPATIENT)
Dept: FAMILY MEDICINE | Facility: OTHER | Age: 50
End: 2020-08-04
Attending: FAMILY MEDICINE
Payer: COMMERCIAL

## 2020-08-04 VITALS
BODY MASS INDEX: 21.56 KG/M2 | HEART RATE: 76 BPM | SYSTOLIC BLOOD PRESSURE: 120 MMHG | WEIGHT: 146 LBS | DIASTOLIC BLOOD PRESSURE: 70 MMHG

## 2020-08-04 DIAGNOSIS — M25.572 PAIN OF JOINT OF LEFT ANKLE AND FOOT: ICD-10-CM

## 2020-08-04 DIAGNOSIS — Z12.31 VISIT FOR SCREENING MAMMOGRAM: ICD-10-CM

## 2020-08-04 DIAGNOSIS — I47.10 PAROXYSMAL SUPRAVENTRICULAR TACHYCARDIA (H): ICD-10-CM

## 2020-08-04 DIAGNOSIS — Z02.89 PAIN MEDICATION AGREEMENT: ICD-10-CM

## 2020-08-04 DIAGNOSIS — G89.4 CHRONIC PAIN DISORDER: ICD-10-CM

## 2020-08-04 DIAGNOSIS — M62.838 MUSCLE SPASM: ICD-10-CM

## 2020-08-04 DIAGNOSIS — Z72.0 TOBACCO ABUSE: ICD-10-CM

## 2020-08-04 DIAGNOSIS — G90.522 COMPLEX REGIONAL PAIN SYNDROME TYPE 1 AFFECTING LEFT LOWER LEG: Primary | ICD-10-CM

## 2020-08-04 PROCEDURE — 99213 OFFICE O/P EST LOW 20 MIN: CPT | Mod: 95 | Performed by: FAMILY MEDICINE

## 2020-08-04 RX ORDER — OXYCODONE HYDROCHLORIDE 10 MG/1
10 TABLET ORAL EVERY 4 HOURS PRN
Qty: 168 TABLET | Refills: 0 | Status: SHIPPED | OUTPATIENT
Start: 2020-08-04 | End: 2020-10-27

## 2020-08-04 RX ORDER — CYCLOBENZAPRINE HCL 10 MG
10 TABLET ORAL 3 TIMES DAILY PRN
Qty: 30 TABLET | Refills: 4 | Status: CANCELLED | OUTPATIENT
Start: 2020-08-04

## 2020-08-04 RX ORDER — ATENOLOL 100 MG/1
100 TABLET ORAL DAILY
Qty: 90 TABLET | Refills: 3 | Status: ON HOLD | OUTPATIENT
Start: 2020-08-04 | End: 2021-10-22

## 2020-08-04 RX ORDER — IBUPROFEN 800 MG/1
800 TABLET, FILM COATED ORAL EVERY 8 HOURS PRN
Qty: 90 TABLET | Refills: 11 | Status: SHIPPED | OUTPATIENT
Start: 2020-08-04 | End: 2021-09-21

## 2020-08-04 RX ORDER — TIZANIDINE 2 MG/1
2-4 TABLET ORAL 3 TIMES DAILY PRN
Qty: 30 TABLET | Refills: 4 | Status: SHIPPED | OUTPATIENT
Start: 2020-08-04 | End: 2020-12-08

## 2020-08-04 ASSESSMENT — PATIENT HEALTH QUESTIONNAIRE - PHQ9: SUM OF ALL RESPONSES TO PHQ QUESTIONS 1-9: 15

## 2020-08-04 ASSESSMENT — PAIN SCALES - GENERAL: PAINLEVEL: EXTREME PAIN (8)

## 2020-08-04 NOTE — NURSING NOTE
"Chief Complaint   Patient presents with     Refill Request       Initial /70   Pulse 76   Wt 66.2 kg (146 lb)   LMP  (LMP Unknown)   BMI 21.56 kg/m   Estimated body mass index is 21.56 kg/m  as calculated from the following:    Height as of 2/3/20: 1.753 m (5' 9\").    Weight as of this encounter: 66.2 kg (146 lb).  Medication Reconciliation: complete    Elodia Maciel  "

## 2020-08-04 NOTE — PROGRESS NOTES
"Kym Perea is a 50 year old female who is being evaluated via a billable telephone visit.      The patient has been notified of following:     \"This telephone visit will be conducted via a call between you and your physician/provider. We have found that certain health care needs can be provided without the need for a physical exam.  This service lets us provide the care you need with a short phone conversation.  If a prescription is necessary we can send it directly to your pharmacy.  If lab work is needed we can place an order for that and you can then stop by our lab to have the test done at a later time.    Telephone visits are billed at different rates depending on your insurance coverage. During this emergency period, for some insurers they may be billed the same as an in-person visit.  Please reach out to your insurance provider with any questions.    If during the course of the call the physician/provider feels a telephone visit is not appropriate, you will not be charged for this service.\"    Patient has given verbal consent for Telephone visit?  Yes    What phone number would you like to be contacted at? [phone]    How would you like to obtain your AVS? Mail a copy    Subjective     Kym Perea is a 50 year old female who presents via phone visit today for the following health issues:    WALKER Murphy is hip telephone visit for renewal of medications.  Her last visit with me was in April.  She had a refill from Dr. Grimes and Ani and ZULAY Jackson on July 30.    She has complex regional pain syndrome involving her left foot and ankle and bilateral facet arthropathy of L4-5.  She has declined facet injection and is on medical cannabis in addition to a pain medication agreement.  She continues to follow with psychiatry for her mental health issues. She continues on alprazolam through [facility] and is aware of the interaction and increased risk of death due to benzodiazepines and " oxycodone.  She does not take them at the same time.  She is also on medical cannabis.  She is due for pain medication agreement update but since this is a virtual visit were unable to do that.  Would recommend an in person visit at her next refill.    She is taking care of her parents.  Her mom was in the nursing home for a while. She is home now. He dad fell down an embankment at [facility] when he stopped the car to void.  He was discovered by a passing [employer] boat and they called 911.     She is having trouble with her neck and shoulder. She was on Flexeril and didn't tolerate it, despite cutting them in half.     CHRONIC PAIN MANAGEMENT:    DIAGNOSIS:      1. Complex regional pain syndrome type 1 affecting left lower leg    2. Paroxysmal supraventricular tachycardia (H)    3. Muscle spasm    4. Chronic pain disorder    5. Pain medication agreement 4/18/18, 4/3/19    6. Pain of joint of left ankle and foot    7. Tobacco abuse    8. Pain medication agreement    9. Visit for screening mammogram        PAIN MEDICATION AGREEMENT: (YES)    DATE SIGNED: 5/16/16, 4/20/17, 4/18/18, 4/3/19      NON-MEDICATION MODALITIES MAXIMIZED? (YES) had surgery 2 years ago with Dr. Skelton, but has not seen him for a while.       NEUROPATHIC PAIN: (YES)  ON GABAPENTIN/LYRICA/TCA/SNRI: (YES) dose increased up to QID, but now on 600 mg daily.        NOCICEPTIVE PAIN: (YES)      HISTORY OF CD: (NO)      MENTAL HEALTH DIAGNOSIS: (YES)  DIAGNOSIS:Anxiety  ON BENZODIAZEPINES: (YES)  DISCLOSURE REGARDING RISK OF CONCOMITANT USE WITH OPIOIDS DISCUSSED: (YES)  DATE DISCUSSED: 5/16/16 and 6/2/16 and 8/30/16, 1/12/17, 1/2/19, 4/3/19, 9/11/19, 2/28/2020      MEDICATION: Oxycodone 10 mg 4-6 tab per day, #168/q 28 days.       ORAL MORPHINE EQUIVALENTS: 90 OME/day      LAST TAPER TRIAL: just completed 5/16/16.  Off Morphine and then on Norco at much lower dose, gradual increase to now at 90 OME oxycodone per day max.        QUERY TODAY:  (YES)  APPROPRIATE?:         (YES) the  is incorrect as it notes that I am the prescriber of her benzodiazepine's and I am not. They are prescribed by Phuong Magallanes NP. She is working to taper them.       TOXASSURE TODAY: (NO)  IF NO, DATE OF LAST TOXASURE: 1/12/17, 4/20/17, 7/30/17( test was also positive for ETG in addition to amphetamines and benzodiazepines, along with oxycodone). 1/10/18 no opiates but appropriate for other meds. 9/18/18 appropriate but no opiates. 4/3/19, 9/11/19, 2/28/2020 appropriate.       PAIN SCORE FLOWSHEET REVIEWED: (YES)  STABLE OR IMPROVED: (YES)      AUDIT-10 QUESTIONNAIRE COMPLETED: (NO)      OSWESTRY PAIN DISABILITY INDEX: (NO)  No flowsheet data found.                Patient Active Problem List   Diagnosis     Pain in joint, ankle and foot     Lumbago     Chronic pain disorder     Complex regional pain syndrome type 1 affecting left lower leg     Major depressive disorder, recurrent episode, severe (H)     Esophageal reflux     Hyperlipidemia     Influenza A     Insomnia     Migraine headache     Overweight (BMI 25.0-29.9)     Pain medication agreement 4/18/18, 4/3/19     Panic disorder     Paroxysmal supraventricular tachycardia (H)     Tobacco abuse     Medical cannabis use     Past Surgical History:   Procedure Laterality Date     ANKLE SURGERY      1997, 2000,Left ankle surgery x 2     HYSTERECTOMY TOTAL ABDOMINAL      11/1999,secondary to endometriosis, no malignancy; patient reports no malignancy, but abnormal cells were present *     LAPAROSCOPIC CHOLECYSTECTOMY      No Comments Provided     LAPAROSCOPY DIAGNOSTIC (GENERAL)      1995     OTHER SURGICAL HISTORY      21299,CRANIO/MAXILLOFACIAL SURGERY,Jaw Surgery     OTHER SURGICAL HISTORY      207040,[facility] EMG,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  No other abnormalities noted.       Social History     Tobacco Use     Smoking status: Current Every Day Smoker     Packs/day: 0.50     Years: 30.00     Pack years:  15.00     Types: Cigarettes     Start date: 12/2/1986     Smokeless tobacco: Never Used   Substance Use Topics     Alcohol use: Not Currently     Alcohol/week: 0.0 standard drinks     Comment: OCCASIONAL WINE     Family History   Problem Relation Age of Onset     Cancer Father         Cancer,Bladder     Other - See Comments Father         Mild hypercholesterolemia     Other - See Comments Mother         scleroderma/lupus/Parkinson's syndrome     Family History Negative Brother         Good Health     Ariel-Danlos syndrome Daughter      Colon Cancer Maternal Grandmother 40        Cancer-colon,Followed by lung  with metastasis to the bone di*         Current Outpatient Medications   Medication Sig Dispense Refill     albuterol (PROAIR HFA/PROVENTIL HFA/VENTOLIN HFA) 108 (90 Base) MCG/ACT Inhaler Inhale 2 puffs into the lungs every 4 hours as needed for shortness of breath / dyspnea or wheezing 1 Inhaler 11     ALPRAZolam (XANAX) 2 MG tablet Take 1 tablet 3 times daily prn       amphetamine-dextroamphetamine (ADDERALL) 30 MG per tablet Take 30 mg by mouth       atenolol (TENORMIN) 100 MG tablet Take 1 tablet (100 mg) by mouth daily 90 tablet 3     divalproex sodium delayed-release (DEPAKOTE) 250 MG DR tablet TAKE 1 TABLET BY MOUTH TWICE A DAY AS NEEDED  0     ibuprofen (ADVIL/MOTRIN) 800 MG tablet Take 1 tablet (800 mg) by mouth every 8 hours as needed for moderate pain 90 tablet 11     oxyCODONE IR (ROXICODONE) 10 MG tablet Take 1 tablet (10 mg) by mouth every 4 hours as needed for severe pain Max 6 tab per day. 168 tablet 0     oxyCODONE IR (ROXICODONE) 10 MG tablet Take 1 tablet (10 mg) by mouth every 4 hours as needed for moderate to severe pain Maximum 6 tab per day 168 tablet 0     tiZANidine (ZANAFLEX) 2 MG tablet Take 1-2 tablets (2-4 mg) by mouth 3 times daily as needed for muscle spasms 30 tablet 4     VITAMIN D, CHOLECALCIFEROL, PO Take 1,000 Units by mouth daily       fluticasone (FLONASE) 50 MCG/ACT spray  2 sprays       Allergies   Allergen Reactions     Arthrotec GI Disturbance     Indomethacin      Other reaction(s): Dizziness     Meloxicam GI Disturbance       Reviewed and updated as needed this visit by Provider         Review of Systems   ROS is negative except as noted above          Objective   Reported vitals:  /70   Pulse 76   Wt 66.2 kg (146 lb)   LMP  (LMP Unknown)   BMI 21.56 kg/m     healthy, alert and no distress  PSYCH: Alert and oriented times 3; coherent speech, normal   rate and volume, able to articulate logical thoughts, able   to abstract reason, no tangential thoughts, no hallucinations   or delusions  Her affect is normal  RESP: No cough, no audible wheezing, able to talk in full sentences  Remainder of exam unable to be completed due to telephone visits    Diagnostic Test Results:  Labs reviewed in Epic        Assessment/Plan:    Kym was seen today for refill request.    Diagnoses and all orders for this visit:    Complex regional pain syndrome type 1 affecting left lower leg  -     oxyCODONE IR (ROXICODONE) 10 MG tablet; Take 1 tablet (10 mg) by mouth every 4 hours as needed for severe pain Max 6 tab per day.  -     oxyCODONE IR (ROXICODONE) 10 MG tablet; Take 1 tablet (10 mg) by mouth every 4 hours as needed for moderate to severe pain Maximum 6 tab per day    Paroxysmal supraventricular tachycardia (H)  -     atenolol (TENORMIN) 100 MG tablet; Take 1 tablet (100 mg) by mouth daily    Muscle spasm  -     tiZANidine (ZANAFLEX) 2 MG tablet; Take 1-2 tablets (2-4 mg) by mouth 3 times daily as needed for muscle spasms    Chronic pain disorder  -     oxyCODONE IR (ROXICODONE) 10 MG tablet; Take 1 tablet (10 mg) by mouth every 4 hours as needed for severe pain Max 6 tab per day.  -     oxyCODONE IR (ROXICODONE) 10 MG tablet; Take 1 tablet (10 mg) by mouth every 4 hours as needed for moderate to severe pain Maximum 6 tab per day    Pain medication agreement 4/18/18, 4/3/19  -      oxyCODONE IR (ROXICODONE) 10 MG tablet; Take 1 tablet (10 mg) by mouth every 4 hours as needed for severe pain Max 6 tab per day.  -     oxyCODONE IR (ROXICODONE) 10 MG tablet; Take 1 tablet (10 mg) by mouth every 4 hours as needed for moderate to severe pain Maximum 6 tab per day    Pain of joint of left ankle and foot  -     ibuprofen (ADVIL/MOTRIN) 800 MG tablet; Take 1 tablet (800 mg) by mouth every 8 hours as needed for moderate pain  -     oxyCODONE IR (ROXICODONE) 10 MG tablet; Take 1 tablet (10 mg) by mouth every 4 hours as needed for severe pain Max 6 tab per day.  -     oxyCODONE IR (ROXICODONE) 10 MG tablet; Take 1 tablet (10 mg) by mouth every 4 hours as needed for moderate to severe pain Maximum 6 tab per day    Tobacco abuse    Pain medication agreement  -     oxyCODONE IR (ROXICODONE) 10 MG tablet; Take 1 tablet (10 mg) by mouth every 4 hours as needed for severe pain Max 6 tab per day.  -     oxyCODONE IR (ROXICODONE) 10 MG tablet; Take 1 tablet (10 mg) by mouth every 4 hours as needed for moderate to severe pain Maximum 6 tab per day    Visit for screening mammogram  -     MA Screen Bilateral w/Dontae; Future       query is appropriate.  She will come in for an in person appointment in October and refilled her August and September prescriptions.  She needs an updated agreement at that time as well.    Stop Flexeril and trial of tizanidine if she needs it for her neck.  2 mg dose and instructed on use.    Mammogram is ordered.    Encouraged to quit smoking    No follow-ups on file.      Phone call duration:  11 minutes    Darrin Stephen MD

## 2020-10-26 ENCOUNTER — TELEPHONE (OUTPATIENT)
Dept: FAMILY MEDICINE | Facility: OTHER | Age: 50
End: 2020-10-26

## 2020-10-26 NOTE — TELEPHONE ENCOUNTER
Patient reports that she is out of OXY today.  Patient doesn't want to come into the clinic for an in person visit due to the following symptoms: Fatigue, sleeping over 12 hours, feeling run down, and sinus pressure/congestion.  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    Patient does want to personally drop off paperwork from local police department.  They are claiming she was intoxicated when she was recently pulled over.    Patient informed there are no openings with Darrin Stephen MD at this time.    Britney Grullno LPN 10/26/2020 9:48 AM

## 2020-10-26 NOTE — TELEPHONE ENCOUNTER
Patient would like a work in for narcotic refill and paperwork. Patient says she is out of medication.     Lyn Salinas on 10/26/2020 at 9:19 AM

## 2020-10-26 NOTE — TELEPHONE ENCOUNTER
"FYI:  Patient notified of providers note. Patient placed with Darrin Stephen MD at next open availability 11-5-2020, \"I should be feeling better by then\".  Patient was offered to be transferred to appointment line to see someone else for this 1 refill and/or get her urine checked out due to concerns for UTI.  Patient declined.  Patient was informed that the schedule can change at anytime and she can call every AM to see if there are any cancellations.    Patient declined any further assistance at this time.      Britney Grullon LPN 10/26/2020 10:35 AM      "

## 2020-10-26 NOTE — TELEPHONE ENCOUNTER
She was told at the time of her virtual visit in August that she would need an in person appointment in October to do urine test, update her agreement and do her refills. We can do a virtual visit for one refill since she didn't make her appointment if she can't get in. She has been doing this long enough, that she knows she needs regular appointments.     Darrin Stephen MD on 10/26/2020 at 10:01 AM

## 2020-10-27 ENCOUNTER — OFFICE VISIT (OUTPATIENT)
Dept: FAMILY MEDICINE | Facility: OTHER | Age: 50
End: 2020-10-27
Attending: FAMILY MEDICINE
Payer: COMMERCIAL

## 2020-10-27 VITALS
TEMPERATURE: 97.3 F | RESPIRATION RATE: 16 BRPM | HEART RATE: 70 BPM | OXYGEN SATURATION: 99 % | DIASTOLIC BLOOD PRESSURE: 82 MMHG | SYSTOLIC BLOOD PRESSURE: 124 MMHG

## 2020-10-27 DIAGNOSIS — Z23 NEEDS FLU SHOT: ICD-10-CM

## 2020-10-27 DIAGNOSIS — G89.4 CHRONIC PAIN DISORDER: ICD-10-CM

## 2020-10-27 DIAGNOSIS — G90.522 COMPLEX REGIONAL PAIN SYNDROME TYPE 1 AFFECTING LEFT LOWER LEG: Primary | ICD-10-CM

## 2020-10-27 DIAGNOSIS — Z72.0 TOBACCO ABUSE: ICD-10-CM

## 2020-10-27 DIAGNOSIS — Z02.89 PAIN MEDICATION AGREEMENT: ICD-10-CM

## 2020-10-27 DIAGNOSIS — Z79.899 MEDICAL CANNABIS USE: ICD-10-CM

## 2020-10-27 DIAGNOSIS — F41.0 PANIC DISORDER: ICD-10-CM

## 2020-10-27 DIAGNOSIS — M25.572 PAIN OF JOINT OF LEFT ANKLE AND FOOT: ICD-10-CM

## 2020-10-27 DIAGNOSIS — R30.0 DYSURIA: ICD-10-CM

## 2020-10-27 LAB
ALBUMIN UR-MCNC: 10 MG/DL
APPEARANCE UR: CLEAR
BACTERIA #/AREA URNS HPF: ABNORMAL /HPF
BILIRUB UR QL STRIP: NEGATIVE
COLOR UR AUTO: YELLOW
GLUCOSE UR STRIP-MCNC: NEGATIVE MG/DL
HGB UR QL STRIP: NEGATIVE
HYALINE CASTS #/AREA URNS LPF: 15 /LPF (ref 0–2)
KETONES UR STRIP-MCNC: NEGATIVE MG/DL
LEUKOCYTE ESTERASE UR QL STRIP: ABNORMAL
MUCOUS THREADS #/AREA URNS LPF: PRESENT /LPF
NITRATE UR QL: NEGATIVE
PH UR STRIP: 5.5 PH (ref 5–7)
RBC #/AREA URNS AUTO: 2 /HPF (ref 0–2)
SOURCE: ABNORMAL
SP GR UR STRIP: 1.02 (ref 1–1.03)
SQUAMOUS #/AREA URNS AUTO: 7 /HPF (ref 0–1)
UROBILINOGEN UR STRIP-MCNC: NORMAL MG/DL (ref 0–2)
WBC #/AREA URNS AUTO: 9 /HPF (ref 0–5)

## 2020-10-27 PROCEDURE — G0463 HOSPITAL OUTPT CLINIC VISIT: HCPCS | Mod: 25

## 2020-10-27 PROCEDURE — 99213 OFFICE O/P EST LOW 20 MIN: CPT | Performed by: FAMILY MEDICINE

## 2020-10-27 PROCEDURE — 87086 URINE CULTURE/COLONY COUNT: CPT | Mod: ZL | Performed by: FAMILY MEDICINE

## 2020-10-27 PROCEDURE — G0463 HOSPITAL OUTPT CLINIC VISIT: HCPCS

## 2020-10-27 PROCEDURE — 80307 DRUG TEST PRSMV CHEM ANLYZR: CPT | Mod: ZL | Performed by: FAMILY MEDICINE

## 2020-10-27 PROCEDURE — 90686 IIV4 VACC NO PRSV 0.5 ML IM: CPT

## 2020-10-27 PROCEDURE — 81001 URINALYSIS AUTO W/SCOPE: CPT | Mod: ZL,XU | Performed by: FAMILY MEDICINE

## 2020-10-27 RX ORDER — NITROFURANTOIN 25; 75 MG/1; MG/1
100 CAPSULE ORAL 2 TIMES DAILY
Qty: 14 CAPSULE | Refills: 0 | Status: SHIPPED | OUTPATIENT
Start: 2020-10-27 | End: 2020-11-03

## 2020-10-27 RX ORDER — OXYCODONE HYDROCHLORIDE 10 MG/1
10 TABLET ORAL EVERY 4 HOURS PRN
Qty: 168 TABLET | Refills: 0 | Status: SHIPPED | OUTPATIENT
Start: 2020-10-27 | End: 2020-10-30

## 2020-10-27 RX ORDER — OXYCODONE HYDROCHLORIDE 10 MG/1
10 TABLET ORAL EVERY 4 HOURS PRN
Qty: 168 TABLET | Refills: 0 | Status: SHIPPED | OUTPATIENT
Start: 2020-10-27 | End: 2020-12-16

## 2020-10-27 ASSESSMENT — PAIN SCALES - GENERAL: PAINLEVEL: SEVERE PAIN (7)

## 2020-10-27 NOTE — PROGRESS NOTES
"Nursing Notes:   Petra Swanson LPN  10/27/2020  2:39 PM  Signed  Chief Complaint   Patient presents with     Recheck Medication       Initial /82   Pulse 70   Temp 97.3  F (36.3  C) (Temporal)   Resp 16   LMP  (LMP Unknown)   SpO2 99%   Breastfeeding No  Estimated body mass index is 21.56 kg/m  as calculated from the following:    Height as of 2/3/20: 1.753 m (5' 9\").    Weight as of 8/4/20: 66.2 kg (146 lb).  Medication Reconciliation: complete    Petra Swanson LPN      SUBJECTIVE:  Kym Perea  is a 50 year old female comes in today for follow-up and medication management.  She continues on oxycodone through medication agreement.  She needs renewal of that agreement.  She is due for ToxAssure.    She is having some dysuria.  She will have some intermittent back ache. She will push juice and take some azo and water. No fever or chills. She has had waxing waning symptoms. She has had some increased tiredness and fatigue.  No respiratory or intestinal symptoms.     She has been working hard at social distancing.      Past Medical, Family, and Social History reviewed and updated as noted below.   ROS is negative except as noted above       Allergies   Allergen Reactions     Arthrotec GI Disturbance     Indomethacin      Other reaction(s): Dizziness     Meloxicam GI Disturbance   ,   Family History   Problem Relation Age of Onset     Cancer Father         Cancer,Bladder     Other - See Comments Father         Mild hypercholesterolemia     Other - See Comments Mother         scleroderma/lupus/Parkinson's syndrome     Family History Negative Brother         Good Health     Ariel-Danlos syndrome Daughter      Colon Cancer Maternal Grandmother 40        Cancer-colon,Followed by lung  with metastasis to the bone di*   ,   Current Outpatient Medications   Medication     albuterol (PROAIR HFA/PROVENTIL HFA/VENTOLIN HFA) 108 (90 Base) MCG/ACT Inhaler     ALPRAZolam (XANAX) 2 MG tablet     " amphetamine-dextroamphetamine (ADDERALL) 30 MG per tablet     atenolol (TENORMIN) 100 MG tablet     divalproex sodium delayed-release (DEPAKOTE) 250 MG DR tablet     fluticasone (FLONASE) 50 MCG/ACT spray     ibuprofen (ADVIL/MOTRIN) 800 MG tablet     oxyCODONE IR (ROXICODONE) 10 MG tablet     oxyCODONE IR (ROXICODONE) 10 MG tablet     tiZANidine (ZANAFLEX) 2 MG tablet     VITAMIN D, CHOLECALCIFEROL, PO     No current facility-administered medications for this visit.    ,   Past Medical History:   Diagnosis Date     Acquired absence of other organs (CODE)     9/2/2011     Chronic pain syndrome     No Comments Provided     Complex regional pain syndrome I     left ankle     Encounter for other administrative examinations     10/2012,Hydrocodone 10/325mg, #240/mo signed 10/3/12, updated 10/1/ 14     Encounter for other administrative examinations     10/1/2014,MS Contin 30 mg q 8 hours #90 per month.  Morphine sulfate IR 15 mg 2-3 tab tid prn #180 per month     Gastro-esophageal reflux disease without esophagitis     No Comments Provided     Injury of left ankle     1997,requiring surgery     Low back pain     No Comments Provided     Major depressive disorder, recurrent severe without psychotic features (H)     No Comments Provided     Migraine without status migrainosus, not intractable     No Comments Provided     Nicotine dependence, uncomplicated     No Comments Provided     Obesity     No Comments Provided     Overweight     11/25/2014     Pain in ankle     Chronic left ankle pain secondary to reflex sympathetic dystrophy.  Patient  has had 4 previous surgeries, the most recent one done by Dr. Norieag in  2007. On Narcotic contract.     Pain in thoracic spine     No Comments Provided     Panic disorder without agoraphobia     Dr Reynoso     Personal history of other diseases of the female genital tract     No Comments Provided     Personal history of other medical treatment (CODE)     G2, P1-0-1-1 with one  vaginal delivery.     Supraventricular tachycardia (H)     5/24/2010   ,   Patient Active Problem List    Diagnosis Date Noted     Medical cannabis use 07/10/2018     Priority: Medium     Pain in joint, ankle and foot 01/29/2018     Priority: Medium     Overview:   Chronic left ankle pain secondary to reflex sympathetic dystrophy.  Patient   has had 4 previous surgeries, the most recent one done by Dr. Noriega in   2007. On Narcotic contract.       Chronic pain disorder 01/29/2018     Priority: Medium     Overview:   complex regional pain syndrome left ankle       Esophageal reflux 01/29/2018     Priority: Medium     Hyperlipidemia 01/29/2018     Priority: Medium     Insomnia 01/29/2018     Priority: Medium     Panic disorder 01/29/2018     Priority: Medium     Overview:   Previously patient of Dr. Reynoso since her teens. Patient is attempting slow taper down on her Xanax. She's been on Xanax 2 mg 4 times a day for years       Tobacco abuse 01/29/2018     Priority: Medium     Influenza A 01/11/2018     Priority: Medium     Pain medication agreement, 4/18/18, 4/3/19, 10/27/2020 04/21/2017     Priority: Medium     Overview:   4/20/17:  Oxycodone 10 mg #180/mo    Hydrocodone 10/325mg, #240/mo. Stopped and changed to morphine. Agreement signed 10/3/12, updated 10/1/ 14.  Complex regional pain syndrome (aka Reflex Sympathetic Dystrophy)  MS Contin 30 mg q 8 hours #90 per month.  Morphine sulfate IR 15 mg 2-3 tab tid prn #180 per month, decreased to #90/month.   query 4/9/16 as part of chart review. Seems appropriate. 6/2/16 tapered and off Morphine. [place] 5/325 #120/month.  query appropriate. ToxAssure appropriate.    August 2016 Consultation with [facility]. Recommended spinal cord stimulator trial, but she is reluctant to pursue that. Pain clinic stated that continuing opioids was OK.       Complex regional pain syndrome type 1 affecting left lower leg 04/15/2016     Priority: Medium      Overview:   Patient with a history of complex regional pain syndrome has seen Dr. Dino Skelton4/23/16        Migraine headache 11/25/2014     Priority: Medium     Overweight (BMI 25.0-29.9) 11/25/2014     Priority: Medium     Lumbago 02/06/2012     Priority: Medium     Major depressive disorder, recurrent episode, severe (H) 07/13/2011     Priority: Medium     Paroxysmal supraventricular tachycardia (H) 05/24/2010     Priority: Medium   ,   Past Surgical History:   Procedure Laterality Date     ANKLE SURGERY      1997, 2000,Left ankle surgery x 2     HYSTERECTOMY TOTAL ABDOMINAL      11/1999,secondary to endometriosis, no malignancy; patient reports no malignancy, but abnormal cells were present *     LAPAROSCOPIC CHOLECYSTECTOMY      No Comments Provided     LAPAROSCOPY DIAGNOSTIC (GENERAL)      1995     OTHER SURGICAL HISTORY      21299,CRANIO/MAXILLOFACIAL SURGERY,Jaw Surgery     OTHER SURGICAL HISTORY      207040,[facility] EMG,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  No other abnormalities noted.    and   Social History     Tobacco Use     Smoking status: Current Every Day Smoker     Packs/day: 0.50     Years: 30.00     Pack years: 15.00     Types: Cigarettes     Start date: 12/2/1986     Smokeless tobacco: Never Used     Tobacco comment: trying   Substance Use Topics     Alcohol use: Not Currently     Alcohol/week: 0.0 standard drinks     Comment: OCCASIONAL WINE     OBJECTIVE:  /82   Pulse 70   Temp 97.3  F (36.3  C) (Temporal)   Resp 16   LMP  (LMP Unknown)   SpO2 99%   Breastfeeding No    EXAM:  Alert cooperative, no distress.  Affect is appropriate.  Lungs are clear, no rales rhonchi or wheezes are heard.  Cardiac RRR without murmur.  TMs are clear on the left slightly dull on the right.  She has no significant CVA tenderness.  Mild suprapubic tenderness but no flank pain.  ASSESSMENT/Plan :    Kym was seen today for recheck medication.    Diagnoses and all orders for this  visit:    Complex regional pain syndrome type 1 affecting left lower leg  -     Drug  Screen Comprehensive , Urine with Reported Meds (MedTox) (Pain Care Package)    Medical cannabis use  -     Drug  Screen Comprehensive , Urine with Reported Meds (MedTox) (Pain Care Package)    Panic disorder  -     Drug  Screen Comprehensive , Urine with Reported Meds (MedTox) (Pain Care Package)    Tobacco abuse  -     Drug  Screen Comprehensive , Urine with Reported Meds (MedTox) (Pain Care Package)    Chronic pain disorder  -     Drug  Screen Comprehensive , Urine with Reported Meds (MedTox) (Pain Care Package)    Pain medication agreement 4/18/18, 4/3/19, 10/27/2020  -     Drug  Screen Comprehensive , Urine with Reported Meds (MedTox) (Pain Care Package)    Needs flu shot  -     [facility]-IMM- FLU VAC PRESRV FREE QUAD SPLIT VIR > 6 MONTHS IM    Dysuria  -     *UA reflex to Microscopic      For her dysuria, will check UA and treat empirically with Macrobid pending culture.    Flu shot today.    ToxAssure today.  Medication contract is renewed.   PDMP Review       Value Time User    State PDMP site checked  Yes 10/27/2020  2:54 PM Darrin Stephen MD         Renewal on oxycodone 10 mg #168x2 pursuant to her contract.    A total of 15 minutes was spent with the patient, greater than 50% of the time was spent in counseling/discussion of the aforementioned concerns.     Darrin Stephen MD

## 2020-10-27 NOTE — NURSING NOTE
"Chief Complaint   Patient presents with     Recheck Medication       Initial /82   Pulse 70   Temp 97.3  F (36.3  C) (Temporal)   Resp 16   LMP  (LMP Unknown)   SpO2 99%   Breastfeeding No  Estimated body mass index is 21.56 kg/m  as calculated from the following:    Height as of 2/3/20: 1.753 m (5' 9\").    Weight as of 8/4/20: 66.2 kg (146 lb).  Medication Reconciliation: complete    Petra Swanson LPN  "

## 2020-10-30 ENCOUNTER — TELEPHONE (OUTPATIENT)
Dept: FAMILY MEDICINE | Facility: OTHER | Age: 50
End: 2020-10-30

## 2020-10-30 DIAGNOSIS — Z02.89 PAIN MEDICATION AGREEMENT: ICD-10-CM

## 2020-10-30 DIAGNOSIS — G89.4 CHRONIC PAIN DISORDER: ICD-10-CM

## 2020-10-30 DIAGNOSIS — G90.522 COMPLEX REGIONAL PAIN SYNDROME TYPE 1 AFFECTING LEFT LOWER LEG: ICD-10-CM

## 2020-10-30 DIAGNOSIS — M25.572 PAIN OF JOINT OF LEFT ANKLE AND FOOT: ICD-10-CM

## 2020-10-30 LAB
BACTERIA SPEC CULT: NO GROWTH
SPECIMEN SOURCE: NORMAL

## 2020-10-30 NOTE — TELEPHONE ENCOUNTER
Received fax from [facility] stating that they received 2 rx's for patient's Oxycodone 10mg.  They both had the same effective date so they dc'd one of them.  Please send new Rx for next month.  Prescription lyn'd up.  Fax in inbox.  Shirin Daly LPN 10/30/2020   1:52 PM

## 2020-10-31 LAB — PAIN DRUG SCR UR W RPTD MEDS: NORMAL

## 2020-11-02 RX ORDER — OXYCODONE HYDROCHLORIDE 10 MG/1
10 TABLET ORAL EVERY 4 HOURS PRN
Qty: 168 TABLET | Refills: 0 | Status: SHIPPED | OUTPATIENT
Start: 2020-11-25 | End: 2020-12-16

## 2020-12-08 ENCOUNTER — TELEPHONE (OUTPATIENT)
Dept: FAMILY MEDICINE | Facility: OTHER | Age: 50
End: 2020-12-08

## 2020-12-08 DIAGNOSIS — M62.838 MUSCLE SPASM: ICD-10-CM

## 2020-12-08 RX ORDER — TIZANIDINE 2 MG/1
2-4 TABLET ORAL 3 TIMES DAILY PRN
Qty: 30 TABLET | Refills: 4 | Status: SHIPPED | OUTPATIENT
Start: 2020-12-08 | End: 2020-12-16

## 2020-12-08 NOTE — TELEPHONE ENCOUNTER
The patient has been using these as she is having a lot of jaw pain and issues from her dentures and she can not get into her dentist due to covid as they are only doing emergent visits.  Petra Swanson LPN..................12/8/2020   4:58 PM

## 2020-12-09 NOTE — TELEPHONE ENCOUNTER
I told her to check with the pharmacy to see if her Rx was refilled. She does not have her voicemail set up yet.  Petra Swanson LPN..................12/9/2020   8:48 AM

## 2020-12-16 ENCOUNTER — OFFICE VISIT (OUTPATIENT)
Dept: FAMILY MEDICINE | Facility: OTHER | Age: 50
End: 2020-12-16
Attending: FAMILY MEDICINE
Payer: COMMERCIAL

## 2020-12-16 VITALS
OXYGEN SATURATION: 99 % | DIASTOLIC BLOOD PRESSURE: 80 MMHG | HEART RATE: 91 BPM | SYSTOLIC BLOOD PRESSURE: 114 MMHG | RESPIRATION RATE: 16 BRPM | TEMPERATURE: 96.5 F

## 2020-12-16 DIAGNOSIS — F41.0 PANIC DISORDER: ICD-10-CM

## 2020-12-16 DIAGNOSIS — G89.4 CHRONIC PAIN DISORDER: ICD-10-CM

## 2020-12-16 DIAGNOSIS — M62.838 MUSCLE SPASM: ICD-10-CM

## 2020-12-16 DIAGNOSIS — Z02.89 PAIN MEDICATION AGREEMENT: ICD-10-CM

## 2020-12-16 DIAGNOSIS — Z72.0 TOBACCO ABUSE: ICD-10-CM

## 2020-12-16 DIAGNOSIS — G90.522 COMPLEX REGIONAL PAIN SYNDROME TYPE 1 AFFECTING LEFT LOWER LEG: ICD-10-CM

## 2020-12-16 DIAGNOSIS — R11.0 NAUSEA: ICD-10-CM

## 2020-12-16 DIAGNOSIS — M25.572 PAIN OF JOINT OF LEFT ANKLE AND FOOT: Primary | ICD-10-CM

## 2020-12-16 DIAGNOSIS — Z63.79 STRESS DUE TO ILLNESS OF FAMILY MEMBER: ICD-10-CM

## 2020-12-16 DIAGNOSIS — D48.5 NEOPLASM OF UNCERTAIN BEHAVIOR OF SKIN: ICD-10-CM

## 2020-12-16 DIAGNOSIS — L82.0 INFLAMED SEBORRHEIC KERATOSIS: ICD-10-CM

## 2020-12-16 DIAGNOSIS — Z79.899 MEDICAL CANNABIS USE: ICD-10-CM

## 2020-12-16 PROCEDURE — G0463 HOSPITAL OUTPT CLINIC VISIT: HCPCS

## 2020-12-16 PROCEDURE — 17110 DESTRUCTION B9 LES UP TO 14: CPT | Performed by: FAMILY MEDICINE

## 2020-12-16 PROCEDURE — 99214 OFFICE O/P EST MOD 30 MIN: CPT | Performed by: FAMILY MEDICINE

## 2020-12-16 RX ORDER — HYDROXYZINE PAMOATE 25 MG/1
25 CAPSULE ORAL 3 TIMES DAILY PRN
Qty: 60 CAPSULE | Refills: 11 | Status: ON HOLD | OUTPATIENT
Start: 2020-12-16 | End: 2021-10-22

## 2020-12-16 RX ORDER — OXYCODONE HYDROCHLORIDE 10 MG/1
10 TABLET ORAL EVERY 4 HOURS PRN
Qty: 168 TABLET | Refills: 0 | Status: SHIPPED | OUTPATIENT
Start: 2020-12-16 | End: 2021-02-11

## 2020-12-16 ASSESSMENT — PAIN SCALES - GENERAL: PAINLEVEL: SEVERE PAIN (6)

## 2020-12-16 ASSESSMENT — ASTHMA QUESTIONNAIRES
QUESTION_4 LAST FOUR WEEKS HOW OFTEN HAVE YOU USED YOUR RESCUE INHALER OR NEBULIZER MEDICATION (SUCH AS ALBUTEROL): ONCE A WEEK OR LESS
QUESTION_2 LAST FOUR WEEKS HOW OFTEN HAVE YOU HAD SHORTNESS OF BREATH: ONCE OR TWICE A WEEK
ACT_TOTALSCORE: 22
QUESTION_5 LAST FOUR WEEKS HOW WOULD YOU RATE YOUR ASTHMA CONTROL: COMPLETELY CONTROLLED
QUESTION_3 LAST FOUR WEEKS HOW OFTEN DID YOUR ASTHMA SYMPTOMS (WHEEZING, COUGHING, SHORTNESS OF BREATH, CHEST TIGHTNESS OR PAIN) WAKE YOU UP AT NIGHT OR EARLIER THAN USUAL IN THE MORNING: NOT AT ALL
QUESTION_1 LAST FOUR WEEKS HOW MUCH OF THE TIME DID YOUR ASTHMA KEEP YOU FROM GETTING AS MUCH DONE AT WORK, SCHOOL OR AT HOME: A LITTLE OF THE TIME

## 2020-12-16 NOTE — PROGRESS NOTES
"Nursing Notes:   Petra Swanson LPN  12/16/2020  9:58 AM  Signed  Chief Complaint   Patient presents with     Recheck Medication       Initial /80   Pulse 91   Temp 96.5  F (35.8  C) (Temporal)   Resp 16   LMP  (LMP Unknown)   SpO2 99%   Breastfeeding No  Estimated body mass index is 21.56 kg/m  as calculated from the following:    Height as of 2/3/20: 1.753 m (5' 9\").    Weight as of 8/4/20: 66.2 kg (146 lb).  Medication Reconciliation: complete    Petra Swanson LPN      SUBJECTIVE:  Kym Perea  is a 50 year old female who comes in today for follow-up and medication management.  I last saw her on October 27.  She continues on oxycodone through medication agreement.  That was renewed at her last visit and she also had ToxAssure. Her last fill was 11/25/2020.    She has been healthy. She has had vehicle issues. She fell recently.  She has a lot of stress due to her parents' medical issues. [facility] has been living with her folks.     She has a mole on her right cheekbone that is closed nose that seems to have gotten bigger and darker.    She has 2 other irritated moles on her trunk on the right flank and on the back.  These appear to be inflamed seborrheic keratoses and were frozen lightly with liquid nitrogen x2.    CHRONIC PAIN MANAGEMENT:    DIAGNOSIS:      1. Pain of joint of left ankle and foot    2. Chronic pain disorder    3. Complex regional pain syndrome type 1 affecting left lower leg    4. Panic disorder    5. Tobacco abuse    6. Pain medication agreement, 4/18/18, 4/3/19, 10/27/2020    7. Medical cannabis use    8. Neoplasm of uncertain behavior of skin    9. Inflamed seborrheic keratosis    10. Muscle spasm    11. Nausea    12. Stress due to illness of family member        PAIN MEDICATION AGREEMENT: (YES)    DATE SIGNED: 5/16/16, 4/20/17, 4/18/18, 4/3/19, 10/27/2020      NON-MEDICATION MODALITIES MAXIMIZED? (YES) had surgery 2 years ago with Dr. Skelton, but has not seen him " for a while.       NEUROPATHIC PAIN: (YES)  ON GABAPENTIN/LYRICA/TCA/SNRI: (YES) dose increased up to QID, but now on 600 mg daily.        NOCICEPTIVE PAIN: (YES)      HISTORY OF CD: (NO)      MENTAL HEALTH DIAGNOSIS: (YES)  DIAGNOSIS:Anxiety  ON BENZODIAZEPINES: (YES)  DISCLOSURE REGARDING RISK OF CONCOMITANT USE WITH OPIOIDS DISCUSSED: (YES)  DATE DISCUSSED: 5/16/16 and 6/2/16 and 8/30/16, 1/12/17, 1/2/19, 4/3/19, 9/11/19, 2/28/2020      MEDICATION: Oxycodone 10 mg 4-6 tab per day, #168/q 28 days.       ORAL MORPHINE EQUIVALENTS: 90 OME/day      LAST TAPER TRIAL: just completed 5/16/16.  Off Morphine and then on Norco at much lower dose, gradual increase to now at 90 OME oxycodone per day max.        QUERY TODAY: (YES)  APPROPRIATE?:         (YES) the  is incorrect as it notes that I am the prescriber of her benzodiazepine's and I am not. They are prescribed by Phuong Magallanes NP. She is working to taper them.       TOXASSURE TODAY: (NO)  IF NO, DATE OF LAST TOXASURE: 1/12/17, 4/20/17, 7/30/17( test was also positive for ETG in addition to amphetamines and benzodiazepines, along with oxycodone). 1/10/18 no opiates but appropriate for other meds. 9/18/18 appropriate but no opiates. 4/3/19, 9/11/19, 2/28/2020, 10/24/2020 appropriate, but no opiates. She is on medical cannabis.        PAIN SCORE FLOWSHEET REVIEWED: (YES)  STABLE OR IMPROVED: (YES)      AUDIT-10 QUESTIONNAIRE COMPLETED: (NO)      OSWESTRY PAIN DISABILITY INDEX: (NO)  No flowsheet data found.                 Past Medical, Family, and Social History reviewed and updated as noted below.   ROS is negative except as noted above       Allergies   Allergen Reactions     Arthrotec GI Disturbance     Indomethacin      Other reaction(s): Dizziness     Meloxicam GI Disturbance     Moxifloxacin    ,   Family History   Problem Relation Age of Onset     Cancer Father         Cancer,Bladder     Other - See Comments Father         Mild hypercholesterolemia      Other - See Comments Mother         scleroderma/lupus/Parkinson's syndrome     Family History Negative Brother         Good Health     Ariel-Danlos syndrome Daughter      Colon Cancer Maternal Grandmother 40        Cancer-colon,Followed by lung  with metastasis to the bone di*   ,   Current Outpatient Medications   Medication     albuterol (PROAIR HFA/PROVENTIL HFA/VENTOLIN HFA) 108 (90 Base) MCG/ACT Inhaler     ALPRAZolam (XANAX) 2 MG tablet     amphetamine-dextroamphetamine (ADDERALL) 30 MG per tablet     atenolol (TENORMIN) 100 MG tablet     divalproex sodium delayed-release (DEPAKOTE) 250 MG DR tablet     fluticasone (FLONASE) 50 MCG/ACT spray     hydrOXYzine (VISTARIL) 25 MG capsule     ibuprofen (ADVIL/MOTRIN) 800 MG tablet     oxyCODONE IR (ROXICODONE) 10 MG tablet     oxyCODONE IR (ROXICODONE) 10 MG tablet     tiZANidine (ZANAFLEX) 4 MG tablet     VITAMIN D, CHOLECALCIFEROL, PO     No current facility-administered medications for this visit.    ,   Past Medical History:   Diagnosis Date     Acquired absence of other organs (CODE)     9/2/2011     Chronic pain syndrome     No Comments Provided     Complex regional pain syndrome I     left ankle     Encounter for other administrative examinations     10/2012,Hydrocodone 10/325mg, #240/mo signed 10/3/12, updated 10/1/ 14     Encounter for other administrative examinations     10/1/2014,MS Contin 30 mg q 8 hours #90 per month.  Morphine sulfate IR 15 mg 2-3 tab tid prn #180 per month     Gastro-esophageal reflux disease without esophagitis     No Comments Provided     Injury of left ankle     1997,requiring surgery     Low back pain     No Comments Provided     Major depressive disorder, recurrent severe without psychotic features (H)     No Comments Provided     Migraine without status migrainosus, not intractable     No Comments Provided     Nicotine dependence, uncomplicated     No Comments Provided     Obesity     No Comments Provided     Overweight      11/25/2014     Pain in ankle     Chronic left ankle pain secondary to reflex sympathetic dystrophy.  Patient  has had 4 previous surgeries, the most recent one done by Dr. Noriega in  2007. On Narcotic contract.     Pain in thoracic spine     No Comments Provided     Panic disorder without agoraphobia     Dr Reynoso     Personal history of other diseases of the female genital tract     No Comments Provided     Personal history of other medical treatment (CODE)     G2, P1-0-1-1 with one vaginal delivery.     Supraventricular tachycardia (H)     5/24/2010   ,   Patient Active Problem List    Diagnosis Date Noted     Medical cannabis use 07/10/2018     Priority: Medium     Pain in joint, ankle and foot 01/29/2018     Priority: Medium     Overview:   Chronic left ankle pain secondary to reflex sympathetic dystrophy.  Patient   has had 4 previous surgeries, the most recent one done by Dr. Noriega in   2007. On Narcotic contract.       Chronic pain disorder 01/29/2018     Priority: Medium     Overview:   complex regional pain syndrome left ankle       Esophageal reflux 01/29/2018     Priority: Medium     Hyperlipidemia 01/29/2018     Priority: Medium     Insomnia 01/29/2018     Priority: Medium     Panic disorder 01/29/2018     Priority: Medium     Overview:   Previously patient of Dr. Reynoso since her teens. Patient is attempting slow taper down on her Xanax. She's been on Xanax 2 mg 4 times a day for years       Tobacco abuse 01/29/2018     Priority: Medium     Influenza A 01/11/2018     Priority: Medium     Pain medication agreement, 4/18/18, 4/3/19, 10/27/2020 04/21/2017     Priority: Medium     Overview:   4/20/17:  Oxycodone 10 mg #180/mo    Hydrocodone 10/325mg, #240/mo. Stopped and changed to morphine. Agreement signed 10/3/12, updated 10/1/ 14.  Complex regional pain syndrome (aka Reflex Sympathetic Dystrophy)  MS Contin 30 mg q 8 hours #90 per month.  Morphine sulfate IR 15 mg 2-3 tab tid prn #180 per  month, decreased to #90/month.   query 4/9/16 as part of chart review. Seems appropriate. 6/2/16 tapered and off Morphine. [place] 5/325 #120/month.  query appropriate. ToxAssure appropriate.    August 2016 Consultation with [facility]. Recommended spinal cord stimulator trial, but she is reluctant to pursue that. Pain clinic stated that continuing opioids was OK.       Complex regional pain syndrome type 1 affecting left lower leg 04/15/2016     Priority: Medium     Overview:   Patient with a history of complex regional pain syndrome has seen Dr. Dino Skelton4/23/16        Migraine headache 11/25/2014     Priority: Medium     Overweight (BMI 25.0-29.9) 11/25/2014     Priority: Medium     Lumbago 02/06/2012     Priority: Medium     Major depressive disorder, recurrent episode, severe (H) 07/13/2011     Priority: Medium     Paroxysmal supraventricular tachycardia (H) 05/24/2010     Priority: Medium   ,   Past Surgical History:   Procedure Laterality Date     ANKLE SURGERY      1997, 2000,Left ankle surgery x 2     HYSTERECTOMY TOTAL ABDOMINAL      11/1999,secondary to endometriosis, no malignancy; patient reports no malignancy, but abnormal cells were present *     LAPAROSCOPIC CHOLECYSTECTOMY      No Comments Provided     LAPAROSCOPY DIAGNOSTIC (GENERAL)      1995     OTHER SURGICAL HISTORY      21299,CRANIO/MAXILLOFACIAL SURGERY,Jaw Surgery     OTHER SURGICAL HISTORY      207040,[facility] EMG,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  No other abnormalities noted.    and   Social History     Tobacco Use     Smoking status: Current Every Day Smoker     Packs/day: 0.50     Years: 30.00     Pack years: 15.00     Types: Cigarettes     Start date: 12/2/1986     Smokeless tobacco: Never Used     Tobacco comment: trying   Substance Use Topics     Alcohol use: Not Currently     Alcohol/week: 0.0 standard drinks     Comment: OCCASIONAL WINE     OBJECTIVE:  /80   Pulse 91   Temp 96.5  F  (35.8  C) (Temporal)   Resp 16   LMP  (LMP Unknown)   SpO2 99%   Breastfeeding No    EXAM:  Alert cooperative, no distress.  Skin exam is as noted above.  Affect is broad ranging and appropriate.  She is attentive and articulate today.  No formal thought disorder.  On her right cheek as described, she has about a 6 mm x 5 mm oval dark tan slightly raised lesion that appears to be a seborrheic keratosis but has no scale.  ASSESSMENT/Plan :    Kym was seen today for recheck medication.    Diagnoses and all orders for this visit:    Pain of joint of left ankle and foot  -     oxyCODONE IR (ROXICODONE) 10 MG tablet; Take 1 tablet (10 mg) by mouth every 4 hours as needed for moderate to severe pain Maximum 6 tab per day  -     oxyCODONE IR (ROXICODONE) 10 MG tablet; Take 1 tablet (10 mg) by mouth every 4 hours as needed for severe pain Max 6 tab per day.    Chronic pain disorder  -     oxyCODONE IR (ROXICODONE) 10 MG tablet; Take 1 tablet (10 mg) by mouth every 4 hours as needed for moderate to severe pain Maximum 6 tab per day  -     oxyCODONE IR (ROXICODONE) 10 MG tablet; Take 1 tablet (10 mg) by mouth every 4 hours as needed for severe pain Max 6 tab per day.    Complex regional pain syndrome type 1 affecting left lower leg  -     oxyCODONE IR (ROXICODONE) 10 MG tablet; Take 1 tablet (10 mg) by mouth every 4 hours as needed for moderate to severe pain Maximum 6 tab per day  -     oxyCODONE IR (ROXICODONE) 10 MG tablet; Take 1 tablet (10 mg) by mouth every 4 hours as needed for severe pain Max 6 tab per day.    Panic disorder    Tobacco abuse    Pain medication agreement, 4/18/18, 4/3/19, 10/27/2020  -     oxyCODONE IR (ROXICODONE) 10 MG tablet; Take 1 tablet (10 mg) by mouth every 4 hours as needed for moderate to severe pain Maximum 6 tab per day  -     oxyCODONE IR (ROXICODONE) 10 MG tablet; Take 1 tablet (10 mg) by mouth every 4 hours as needed for severe pain Max 6 tab per day.    Medical cannabis  use    Neoplasm of uncertain behavior of skin  -     OTOLARYNGOLOGY REFERRAL    Inflamed seborrheic keratosis  -     DESTRUCT BENIGN LESION, UP TO 14    Muscle spasm  -     tiZANidine (ZANAFLEX) 4 MG tablet; Take 1 tablet (4 mg) by mouth 3 times daily as needed for muscle spasms    Nausea  -     hydrOXYzine (VISTARIL) 25 MG capsule; Take 1 capsule (25 mg) by mouth 3 times daily as needed for other (nausea)    Stress due to illness of family member      We discussed the lesion on her face.  Referred to Dr. Funes for evaluation and consideration of options.  I think it is likely that this is a seborrheic keratosis and can be safely watched but it is bothersome to her and she may want to have it removed.    Renewal on tizanidine but since she usually takes 2 tablets will increase to the 4 mg dose.    We will add some hydroxyzine as at times she gets some nausea from her oxycodone.    PDMP Review       Value Time User    State PDMP site checked  Yes 12/16/2020 10:06 AM Darrin Stephen MD         Renewal of her oxycodone pursuant to her contract.    Lengthy discussion with the patient with regard to the multiple psychosocial stressors of her parents health and her living with them.    A total of 25 minutes was spent with the patient, greater than 50% of the time was spent in counseling/discussion of the aforementioned concerns.     Darrin Stephen MD

## 2020-12-16 NOTE — NURSING NOTE
"Chief Complaint   Patient presents with     Recheck Medication       Initial /80   Pulse 91   Temp 96.5  F (35.8  C) (Temporal)   Resp 16   LMP  (LMP Unknown)   SpO2 99%   Breastfeeding No  Estimated body mass index is 21.56 kg/m  as calculated from the following:    Height as of 2/3/20: 1.753 m (5' 9\").    Weight as of 8/4/20: 66.2 kg (146 lb).  Medication Reconciliation: complete    Petra Swanson LPN  "

## 2020-12-17 ASSESSMENT — ASTHMA QUESTIONNAIRES: ACT_TOTALSCORE: 22

## 2021-01-19 ENCOUNTER — TELEPHONE (OUTPATIENT)
Dept: FAMILY MEDICINE | Facility: OTHER | Age: 51
End: 2021-01-19

## 2021-01-19 NOTE — TELEPHONE ENCOUNTER
After proper identification writer spoke with patient regarding lab results she dropped off previously. Writer called patient for reason as to why. Patient states she was pulled over for a DUI and those are her toxicology reports. She reports she blew a 0.0 and those are her toxicology blood results. She is looking for a note from you stating she is prescribed the medications that reflect her lab results and that her results are normal for her.  If this is appropriate she will  the note here. Her court date is January 27th.  Nina Villalpando LPN on 1/19/2021 at 1:57 PM

## 2021-01-19 NOTE — TELEPHONE ENCOUNTER
Spoke with patient and brought letter to [facility] where she will pick it up soon.  Nina Villalpando LPN on 1/19/2021 at 4:57 PM

## 2021-01-19 NOTE — LETTER
2021      Kym Perea (: 1970)  [address] [place] [address]        To Whom It May Concern,     I received the results of a toxicology test on Kym Perea and have been asked to comment on these findings. I am the primary physician for this patient who has chronic left ankle and foot pain due to complex regional pain syndrome which is managed with oxycodone under medication contract with regular monitoring, visits and urine drug testing according to CDC guidelines.  The presence of oxycodone and its metabolite oxymorphone that is noted in her [facility] drug test is expected and indicates compliance with her treatment plan. She has shown no evidence of misuse or abuse of her medication and the [place] PDMP is reviewed prior to each refill at an office visit as well.    This patient also is treated with Adderall for Adult ADHD by her mental health provider, Phuong Magallanes NP.  Amphetamine was detected in her test indicating medication compliance. This also is an expected result.    I hope that this has been helpful to you.  Please feel free to contact our office if you have further questions.       Sincerely,        Darrin Stephen MD

## 2021-02-11 ENCOUNTER — OFFICE VISIT (OUTPATIENT)
Dept: FAMILY MEDICINE | Facility: OTHER | Age: 51
End: 2021-02-11
Attending: FAMILY MEDICINE
Payer: COMMERCIAL

## 2021-02-11 VITALS
TEMPERATURE: 96.1 F | SYSTOLIC BLOOD PRESSURE: 110 MMHG | OXYGEN SATURATION: 99 % | HEART RATE: 64 BPM | DIASTOLIC BLOOD PRESSURE: 64 MMHG | RESPIRATION RATE: 18 BRPM

## 2021-02-11 DIAGNOSIS — G89.4 CHRONIC PAIN DISORDER: ICD-10-CM

## 2021-02-11 DIAGNOSIS — E55.9 VITAMIN D DEFICIENCY: ICD-10-CM

## 2021-02-11 DIAGNOSIS — K64.9 HEMORRHOIDS, UNSPECIFIED HEMORRHOID TYPE: ICD-10-CM

## 2021-02-11 DIAGNOSIS — Z02.89 PAIN MEDICATION AGREEMENT: ICD-10-CM

## 2021-02-11 DIAGNOSIS — Z72.0 TOBACCO ABUSE: ICD-10-CM

## 2021-02-11 DIAGNOSIS — M54.59 MECHANICAL LOW BACK PAIN: ICD-10-CM

## 2021-02-11 DIAGNOSIS — G90.522 COMPLEX REGIONAL PAIN SYNDROME TYPE 1 AFFECTING LEFT LOWER LEG: ICD-10-CM

## 2021-02-11 DIAGNOSIS — D48.5 NEOPLASM OF UNCERTAIN BEHAVIOR OF SKIN: ICD-10-CM

## 2021-02-11 DIAGNOSIS — Z12.31 VISIT FOR SCREENING MAMMOGRAM: ICD-10-CM

## 2021-02-11 DIAGNOSIS — R30.0 DYSURIA: ICD-10-CM

## 2021-02-11 DIAGNOSIS — M25.572 PAIN OF JOINT OF LEFT ANKLE AND FOOT: Primary | ICD-10-CM

## 2021-02-11 DIAGNOSIS — Z79.899 MEDICAL CANNABIS USE: ICD-10-CM

## 2021-02-11 DIAGNOSIS — F41.0 PANIC DISORDER: ICD-10-CM

## 2021-02-11 DIAGNOSIS — J45.20 MILD INTERMITTENT ASTHMA WITHOUT COMPLICATION: ICD-10-CM

## 2021-02-11 LAB
ALBUMIN UR-MCNC: 30 MG/DL
APPEARANCE UR: CLEAR
BILIRUB UR QL STRIP: NEGATIVE
COLOR UR AUTO: YELLOW
GLUCOSE UR STRIP-MCNC: NEGATIVE MG/DL
HGB UR QL STRIP: NEGATIVE
HYALINE CASTS #/AREA URNS LPF: 2 /LPF (ref 0–2)
KETONES UR STRIP-MCNC: 5 MG/DL
LEUKOCYTE ESTERASE UR QL STRIP: ABNORMAL
MUCOUS THREADS #/AREA URNS LPF: PRESENT /LPF
NITRATE UR QL: NEGATIVE
PH UR STRIP: 6 PH (ref 5–7)
RBC #/AREA URNS AUTO: 2 /HPF (ref 0–2)
SOURCE: ABNORMAL
SP GR UR STRIP: >1.035 (ref 1–1.03)
SQUAMOUS #/AREA URNS AUTO: 5 /HPF (ref 0–1)
UROBILINOGEN UR STRIP-MCNC: 2 MG/DL (ref 0–2)
WBC #/AREA URNS AUTO: 12 /HPF (ref 0–5)

## 2021-02-11 PROCEDURE — G0463 HOSPITAL OUTPT CLINIC VISIT: HCPCS

## 2021-02-11 PROCEDURE — 99214 OFFICE O/P EST MOD 30 MIN: CPT | Performed by: FAMILY MEDICINE

## 2021-02-11 PROCEDURE — 81001 URINALYSIS AUTO W/SCOPE: CPT | Mod: ZL,XU | Performed by: FAMILY MEDICINE

## 2021-02-11 PROCEDURE — 87086 URINE CULTURE/COLONY COUNT: CPT | Mod: ZL | Performed by: FAMILY MEDICINE

## 2021-02-11 PROCEDURE — 80307 DRUG TEST PRSMV CHEM ANLYZR: CPT | Mod: ZL | Performed by: FAMILY MEDICINE

## 2021-02-11 RX ORDER — ALBUTEROL SULFATE 90 UG/1
2 AEROSOL, METERED RESPIRATORY (INHALATION) EVERY 4 HOURS PRN
Qty: 1 INHALER | Refills: 11 | Status: CANCELLED | OUTPATIENT
Start: 2021-02-11

## 2021-02-11 RX ORDER — ALBUTEROL SULFATE 90 UG/1
2 AEROSOL, METERED RESPIRATORY (INHALATION) EVERY 4 HOURS PRN
Qty: 1 INHALER | Refills: 11 | Status: SHIPPED | OUTPATIENT
Start: 2021-02-11 | End: 2023-11-30

## 2021-02-11 RX ORDER — OXYCODONE HYDROCHLORIDE 10 MG/1
10 TABLET ORAL EVERY 4 HOURS PRN
Qty: 168 TABLET | Refills: 0 | Status: ON HOLD | OUTPATIENT
Start: 2021-02-11 | End: 2021-10-22

## 2021-02-11 RX ORDER — OXYCODONE HYDROCHLORIDE 10 MG/1
10 TABLET ORAL EVERY 4 HOURS PRN
Qty: 168 TABLET | Refills: 0 | Status: CANCELLED | OUTPATIENT
Start: 2021-02-11

## 2021-02-11 RX ORDER — TIZANIDINE 2 MG/1
TABLET ORAL
COMMUNITY
Start: 2020-12-08 | End: 2021-10-19

## 2021-02-11 RX ORDER — OXYCODONE HYDROCHLORIDE 10 MG/1
10 TABLET ORAL EVERY 4 HOURS PRN
Qty: 168 TABLET | Refills: 0 | Status: SHIPPED | OUTPATIENT
Start: 2021-02-11 | End: 2021-10-19

## 2021-02-11 ASSESSMENT — PAIN SCALES - GENERAL: PAINLEVEL: SEVERE PAIN (6)

## 2021-02-11 NOTE — PROGRESS NOTES
"Nursing Notes:   Nina Villalpando LPN  2/11/2021  2:18 PM  Signed  Patient presents to clinic for medication renewal. She has concerns that she may have a kidney infection and requested a test.    Chief Complaint   Patient presents with     Recheck Medication       Initial /64 (BP Location: Right arm, Patient Position: Sitting, Cuff Size: Adult Regular)   Pulse 64   Temp 96.1  F (35.6  C) (Tympanic)   Resp 18   LMP  (LMP Unknown)   SpO2 99%   Breastfeeding No  Estimated body mass index is 21.56 kg/m  as calculated from the following:    Height as of 2/3/20: 1.753 m (5' 9\").    Weight as of 8/4/20: 66.2 kg (146 lb).  Medication Reconciliation: complete    Nina Villalpando LPN        SUBJECTIVE:  Kym Perea  is a 50 year old female who comes in today for follow-up and medication management.  I saw her 2 months ago.  She continues on oxycodone through medication agreement.    Her last fill of oxycodone was on January 20.  She would be due again February 16 and then March 16.  PDMP Review       Value Time User    State PDMP site checked  Yes 2/11/2021 12:57 PM Darrin Stephen MD         CHRONIC PAIN MANAGEMENT:    DIAGNOSIS:      1. Pain of joint of left ankle and foot    2. Chronic pain disorder    3. Complex regional pain syndrome type 1 affecting left lower leg    4. Panic disorder    5. Tobacco abuse    6. Pain medication agreement, 4/18/18, 4/3/19, 10/27/2020    7. Medical cannabis use    8. Mild intermittent asthma without complication    9. Dysuria    10. Visit for screening mammogram    11. Neoplasm of uncertain behavior of skin    12. Hemorrhoids, unspecified hemorrhoid type    13. Vitamin D deficiency    14. Mechanical low back pain        PAIN MEDICATION AGREEMENT: (YES)    DATE SIGNED: 5/16/16, 4/20/17, 4/18/18, 4/3/19, 10/27/2020      NON-MEDICATION MODALITIES MAXIMIZED? (YES) had surgery 2 years ago with Dr. Skelton, but has not seen him for a while.       NEUROPATHIC PAIN: " (YES)  ON GABAPENTIN/LYRICA/TCA/SNRI: (YES) dose increased up to QID, but now on 600 mg daily.        NOCICEPTIVE PAIN: (YES)      HISTORY OF CD: (NO)      MENTAL HEALTH DIAGNOSIS: (YES)  DIAGNOSIS:Anxiety  ON BENZODIAZEPINES: (YES)  DISCLOSURE REGARDING RISK OF CONCOMITANT USE WITH OPIOIDS DISCUSSED: (YES)  DATE DISCUSSED: 5/16/16 and 6/2/16 and 8/30/16, 1/12/17, 1/2/19, 4/3/19, 9/11/19, 2/28/2020      MEDICATION: Oxycodone 10 mg 4-6 tab per day, #168/q 28 days.       ORAL MORPHINE EQUIVALENTS: 90 OME/day      LAST TAPER TRIAL: just completed 5/16/16.  Off Morphine and then on Norco at much lower dose, gradual increase to now at 90 OME oxycodone per day max.        QUERY TODAY: (YES)  APPROPRIATE?:         (YES) the  is incorrect as it notes that I am the prescriber of her benzodiazepine's and I am not. They are prescribed by Phuong Magallanes NP. She is working to taper them.       TOXASSURE TODAY: (NO)  IF NO, DATE OF LAST TOXASURE: 1/12/17, 4/20/17, 7/30/17( test was also positive for ETG in addition to amphetamines and benzodiazepines, along with oxycodone). 1/10/18 no opiates but appropriate for other meds. 9/18/18 appropriate but no opiates. 4/3/19, 9/11/19, 2/28/2020, 10/24/2020 appropriate, but no opiates. She is on medical cannabis.        PAIN SCORE FLOWSHEET REVIEWED: (YES)  STABLE OR IMPROVED: (YES)      AUDIT-10 QUESTIONNAIRE COMPLETED: (NO)      OSWESTRY PAIN DISABILITY INDEX: (NO)  No flowsheet data found.      She is concerned about her kidneys.  She is not really having dysuria.  She describes low back pain down by the belt line.    She throws up with stress.  She is having trouble with her teeth. She is seeing the dentist later today. She will get gagging and vomiting from her dentures.  She may need to get a note to see if she can get a different kind of denture.      She needs a colonoscopy this year. She has bothersome hemorrhoids. She also has a changing nevus on the right side of her nose.      Past Medical, Family, and Social History reviewed and updated as noted below.   ROS is negative except as noted above       Allergies   Allergen Reactions     Arthrotec GI Disturbance     Indomethacin      Other reaction(s): Dizziness     Meloxicam GI Disturbance     Moxifloxacin    ,   Family History   Problem Relation Age of Onset     Cancer Father         Cancer,Bladder     Other - See Comments Father         Mild hypercholesterolemia     Other - See Comments Mother         scleroderma/lupus/Parkinson's syndrome     Family History Negative Brother         Good Health     Ariel-Danlos syndrome Daughter      Colon Cancer Maternal Grandmother 40        Cancer-colon,Followed by lung  with metastasis to the bone di*   ,   Current Outpatient Medications   Medication     albuterol (PROAIR HFA/PROVENTIL HFA/VENTOLIN HFA) 108 (90 Base) MCG/ACT inhaler     ALPRAZolam (XANAX) 2 MG tablet     amphetamine-dextroamphetamine (ADDERALL) 30 MG per tablet     atenolol (TENORMIN) 100 MG tablet     Cholecalciferol (VITAMIN D-3) 125 MCG (5000 UT) TABS     divalproex sodium delayed-release (DEPAKOTE) 250 MG DR tablet     fluticasone (FLONASE) 50 MCG/ACT spray     hydrOXYzine (VISTARIL) 25 MG capsule     ibuprofen (ADVIL/MOTRIN) 800 MG tablet     oxyCODONE IR (ROXICODONE) 10 MG tablet     oxyCODONE IR (ROXICODONE) 10 MG tablet     tiZANidine (ZANAFLEX) 4 MG tablet     VITAMIN D, CHOLECALCIFEROL, PO     tiZANidine (ZANAFLEX) 2 MG tablet     No current facility-administered medications for this visit.    ,   Past Medical History:   Diagnosis Date     Acquired absence of other organs (CODE)     9/2/2011     Chronic pain syndrome     No Comments Provided     Complex regional pain syndrome I     left ankle     Encounter for other administrative examinations     10/2012,Hydrocodone 10/325mg, #240/mo signed 10/3/12, updated 10/1/ 14     Encounter for other administrative examinations     10/1/2014,MS Contin 30 mg q 8 hours #90 per month.   Morphine sulfate IR 15 mg 2-3 tab tid prn #180 per month     Gastro-esophageal reflux disease without esophagitis     No Comments Provided     Injury of left ankle     1997,requiring surgery     Low back pain     No Comments Provided     Major depressive disorder, recurrent severe without psychotic features (H)     No Comments Provided     Migraine without status migrainosus, not intractable     No Comments Provided     Nicotine dependence, uncomplicated     No Comments Provided     Obesity     No Comments Provided     Overweight     11/25/2014     Pain in ankle     Chronic left ankle pain secondary to reflex sympathetic dystrophy.  Patient  has had 4 previous surgeries, the most recent one done by Dr. Noriega in  2007. On Narcotic contract.     Pain in thoracic spine     No Comments Provided     Panic disorder without agoraphobia     Dr Reynoso     Personal history of other diseases of the female genital tract     No Comments Provided     Personal history of other medical treatment (CODE)     G2, P1-0-1-1 with one vaginal delivery.     Supraventricular tachycardia (H)     5/24/2010   ,   Patient Active Problem List    Diagnosis Date Noted     Medical cannabis use 07/10/2018     Priority: Medium     Pain in joint, ankle and foot 01/29/2018     Priority: Medium     Overview:   Chronic left ankle pain secondary to reflex sympathetic dystrophy.  Patient   has had 4 previous surgeries, the most recent one done by Dr. Noriega in   2007. On Narcotic contract.       Chronic pain disorder 01/29/2018     Priority: Medium     Overview:   complex regional pain syndrome left ankle       Esophageal reflux 01/29/2018     Priority: Medium     Hyperlipidemia 01/29/2018     Priority: Medium     Insomnia 01/29/2018     Priority: Medium     Panic disorder 01/29/2018     Priority: Medium     Overview:   Previously patient of Dr. Reynoso since her teens. Patient is attempting slow taper down on her Xanax. She's been on Xanax 2 mg  4 times a day for years       Tobacco abuse 01/29/2018     Priority: Medium     Influenza A 01/11/2018     Priority: Medium     Pain medication agreement, 4/18/18, 4/3/19, 10/27/2020 04/21/2017     Priority: Medium     Overview:   4/20/17:  Oxycodone 10 mg #180/mo    Hydrocodone 10/325mg, #240/mo. Stopped and changed to morphine. Agreement signed 10/3/12, updated 10/1/ 14.  Complex regional pain syndrome (aka Reflex Sympathetic Dystrophy)  MS Contin 30 mg q 8 hours #90 per month.  Morphine sulfate IR 15 mg 2-3 tab tid prn #180 per month, decreased to #90/month.   query 4/9/16 as part of chart review. Seems appropriate. 6/2/16 tapered and off Morphine. [place] 5/325 #120/month.  query appropriate. ToxAssure appropriate.    August 2016 Consultation with [facility]. Recommended spinal cord stimulator trial, but she is reluctant to pursue that. Pain clinic stated that continuing opioids was OK.       Complex regional pain syndrome type 1 affecting left lower leg 04/15/2016     Priority: Medium     Overview:   Patient with a history of complex regional pain syndrome has seen Dr. Dino Skelton4/23/16        Migraine headache 11/25/2014     Priority: Medium     Overweight (BMI 25.0-29.9) 11/25/2014     Priority: Medium     Lumbago 02/06/2012     Priority: Medium     Major depressive disorder, recurrent episode, severe (H) 07/13/2011     Priority: Medium     Paroxysmal supraventricular tachycardia (H) 05/24/2010     Priority: Medium   ,   Past Surgical History:   Procedure Laterality Date     ANKLE SURGERY      1997, 2000,Left ankle surgery x 2     HYSTERECTOMY TOTAL ABDOMINAL      11/1999,secondary to endometriosis, no malignancy; patient reports no malignancy, but abnormal cells were present *     LAPAROSCOPIC CHOLECYSTECTOMY      No Comments Provided     LAPAROSCOPY DIAGNOSTIC (GENERAL)      1995     OTHER SURGICAL HISTORY      21299,CRANIO/MAXILLOFACIAL SURGERY,Jaw Surgery     OTHER SURGICAL HISTORY       488018,[facility] EMG,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  No other abnormalities noted.    and   Social History     Tobacco Use     Smoking status: Current Every Day Smoker     Packs/day: 0.50     Years: 30.00     Pack years: 15.00     Types: Cigarettes     Start date: 12/2/1986     Smokeless tobacco: Never Used     Tobacco comment: trying   Substance Use Topics     Alcohol use: Not Currently     Alcohol/week: 0.0 standard drinks     Comment: OCCASIONAL WINE     OBJECTIVE:  /64 (BP Location: Right arm, Patient Position: Sitting, Cuff Size: Adult Regular)   Pulse 64   Temp 96.1  F (35.6  C) (Tympanic)   Resp 18   LMP  (LMP Unknown)   SpO2 99%   Breastfeeding No    EXAM:  Alert cooperative, no distress.  She showed up 20 minutes late for her 20-minute appointment.  We agreed to see her anyway.  She has a changing nevus on the right side of her nose that should be excised.    Examination of low back reveals some mild asymmetry and spasm in the lumbar region.    We did not specifically look at her hemorrhoids today but she would like to see a general surgeon for consultation in that regard.    Results for orders placed or performed in visit on 02/11/21   UA reflex to Microscopic and Culture     Status: Abnormal    Specimen: Midstream Urine   Result Value Ref Range    Color Urine Yellow     Appearance Urine Clear     Glucose Urine Negative NEG^Negative mg/dL    Bilirubin Urine Negative NEG^Negative    Ketones Urine 5 (A) NEG^Negative mg/dL    Specific Gravity Urine >1.035 (H) 1.003 - 1.035    Blood Urine Negative NEG^Negative    pH Urine 6.0 5.0 - 7.0 pH    Protein Albumin Urine 30 (A) NEG^Negative mg/dL    Urobilinogen mg/dL 2.0 0.0 - 2.0 mg/dL    Nitrite Urine Negative NEG^Negative    Leukocyte Esterase Urine Small (A) NEG^Negative    Source Midstream Urine     RBC Urine 2 0 - 2 /HPF    WBC Urine 12 (H) 0 - 5 /HPF    Squamous Epithelial /HPF Urine 5 (H) 0 - 1 /HPF    Mucous Urine Present (A)  NEG^Negative /LPF    Hyaline Casts 2 0 - 2 /LPF      ASSESSMENT/Plan :    Kym was seen today for recheck medication.    Diagnoses and all orders for this visit:    Pain of joint of left ankle and foot  -     Drug  Screen Comprehensive , Urine with Reported Meds (MedTox) (Pain Care Package)  -     Urine Culture Aerobic Bacterial  -     oxyCODONE IR (ROXICODONE) 10 MG tablet; Take 1 tablet (10 mg) by mouth every 4 hours as needed for moderate to severe pain Maximum 6 tab per day  -     oxyCODONE IR (ROXICODONE) 10 MG tablet; Take 1 tablet (10 mg) by mouth every 4 hours as needed for severe pain Max 6 tab per day.    Chronic pain disorder  -     Drug  Screen Comprehensive , Urine with Reported Meds (MedTox) (Pain Care Package)  -     oxyCODONE IR (ROXICODONE) 10 MG tablet; Take 1 tablet (10 mg) by mouth every 4 hours as needed for moderate to severe pain Maximum 6 tab per day  -     oxyCODONE IR (ROXICODONE) 10 MG tablet; Take 1 tablet (10 mg) by mouth every 4 hours as needed for severe pain Max 6 tab per day.    Complex regional pain syndrome type 1 affecting left lower leg  -     Drug  Screen Comprehensive , Urine with Reported Meds (MedTox) (Pain Care Package)  -     oxyCODONE IR (ROXICODONE) 10 MG tablet; Take 1 tablet (10 mg) by mouth every 4 hours as needed for moderate to severe pain Maximum 6 tab per day  -     oxyCODONE IR (ROXICODONE) 10 MG tablet; Take 1 tablet (10 mg) by mouth every 4 hours as needed for severe pain Max 6 tab per day.    Panic disorder  -     Drug  Screen Comprehensive , Urine with Reported Meds (MedTox) (Pain Care Package)    Tobacco abuse  -     Drug  Screen Comprehensive , Urine with Reported Meds (MedTox) (Pain Care Package)    Pain medication agreement, 4/18/18, 4/3/19, 10/27/2020  -     Drug  Screen Comprehensive , Urine with Reported Meds (MedTox) (Pain Care Package)  -     oxyCODONE IR (ROXICODONE) 10 MG tablet; Take 1 tablet (10 mg) by mouth every 4 hours as needed for moderate  to severe pain Maximum 6 tab per day  -     oxyCODONE IR (ROXICODONE) 10 MG tablet; Take 1 tablet (10 mg) by mouth every 4 hours as needed for severe pain Max 6 tab per day.    Medical cannabis use  -     Drug  Screen Comprehensive , Urine with Reported Meds (MedTox) (Pain Care Package)    Mild intermittent asthma without complication  -     albuterol (PROAIR HFA/PROVENTIL HFA/VENTOLIN HFA) 108 (90 Base) MCG/ACT inhaler; Inhale 2 puffs into the lungs every 4 hours as needed for shortness of breath / dyspnea or wheezing    Dysuria  -     UA reflex to Microscopic and Culture    Visit for screening mammogram  -     MA Screen Bilateral w/Dontae; Future    Neoplasm of uncertain behavior of skin  -     GENERAL SURG ADULT REFERRAL; Future    Hemorrhoids, unspecified hemorrhoid type  -     GENERAL SURG ADULT REFERRAL; Future    Vitamin D deficiency  -     Cholecalciferol (VITAMIN D-3) 125 MCG (5000 UT) TABS; Take 1 tablet by mouth daily    Mechanical low back pain      She will seek out the chiropractor Ramiro Portillo for her back.    Renewed vitamin D and albuterol.    ToxAssure today.  Medications renewed pursuant to her contract.    General surgery referral for consideration of excision of the changing nevus on her face.  Also would like to see them with regard to her hemorrhoids.  Discussed fiber and keeping her stool soft and eating higher fiber diet.  Also is in need of colonoscopy this year.  Family history of colon cancer in her maternal grandmother.  Her mom has had precancerous polyps by her report.    Mammogram scheduled.     A total of 35 minutes was spent with the patient, reviewing records, tests, ordering medications, tests or procedures and documenting clinical information in the EHR in addition to counseling and discussion with regard to the aforementioned concerns.    Darrin Stephen MD

## 2021-02-11 NOTE — NURSING NOTE
"Patient presents to clinic for medication renewal. She has concerns that she may have a kidney infection and requested a test.    Chief Complaint   Patient presents with     Recheck Medication       Initial /64 (BP Location: Right arm, Patient Position: Sitting, Cuff Size: Adult Regular)   Pulse 64   Temp 96.1  F (35.6  C) (Tympanic)   Resp 18   LMP  (LMP Unknown)   SpO2 99%   Breastfeeding No  Estimated body mass index is 21.56 kg/m  as calculated from the following:    Height as of 2/3/20: 1.753 m (5' 9\").    Weight as of 8/4/20: 66.2 kg (146 lb).  Medication Reconciliation: complete    Nina Villalpando LPN    "

## 2021-02-11 NOTE — LETTER
February 21, 2021      Kym Perea  [address] [address] [address]        Dear Gemini,    Your urine test was positive for methamphetamine.  This is a violation of your medication agreement and we will no longer be able to prescribe controlled substances for you.  The remainder of the medications in the urine test were expected.  This is a highly sensitive test and doesn't need secondary confirmation as it is a gas chromatography test which is the gold standard.    I would suggest tapering your oxycodone down over the next few weeks to minimize your withdrawal from this medication.  Your March prescription has been cancelled.    I would encourage you to talk with your mental health provider about this as well.    I'm happy to refer you for evaluation for chemical dependency if you would like.    Sincerely,          Darrin Stephen MD                             Resulted Orders   Drug  Screen Comprehensive , Urine with Reported Meds (MedTox) (Pain Care Package)   Result Value Ref Range    Pain Drug SCR UR W RPTD Meds FINAL       Comment:      (Note)  ====================================================================  TOXASSURE COMP DRUG ANALYSIS,UR  ====================================================================  Test                             Result       Flag       Units        Drug Present   Methamphetamine                60                      ng/mg creat   Amphetamine                    73                      ng/mg creat    Sources of methamphetamine include illicit sources, as a    scheduled prescription medication, as a metabolite of some    prescription drugs, or use of an l-methamphetamine inhaler.    Amphetamine is an expected metabolite of methamphetamine.    Amphetamine is also available as a schedule II prescription drug.   Alprazolam                     60                      ng/mg creat   Alpha-hydroxyalprazolam        >699                    ng/mg creat    Source of  alprazolam is a scheduled prescription medication.    Alpha-hydroxyalprazolam is an expected metabolite of alprazolam.   Carboxy-THC                     >350                    ng/mg creat    Carboxy-THC is a metabolite of tetrahydrocannabinol  (THC).    Source of THC is most commonly illicit, but THC is also present    in a scheduled prescription medication.   Tizanidine                     PRESENT                               Acetaminophen                  PRESENT                               Ibuprofen                      PRESENT                               Hydroxyzine                    PRESENT                               Atenolol                       PRESENT                               Dextromethorphan               PRESENT                               Dextrorphan/Levorphanol        PRESENT                                Dextrorphan is an expected metabolite of dextromethorphan, an    over-the-counter or prescription cough suppressant. Dextrorphan    cannot be distinguished from the scheduled prescription    medication levorphanol by the method used for analysis.   Guaifenesin                    PRESE NT                                Guaifenesin may be administered as an over-the-counter or    prescription drug; it may also be present as a breakdown product    of methocarbamol.  ====================================================================  Test                      Result    Flag   Units      Ref Range        Creatinine              286              mg/dL      >=20            ====================================================================  Declared Medications:  Medication list was not provided.  ====================================================================  For clinical consultation, please call [phone].  ====================================================================  Analysis performed by [employer] [employer] [place] [address]     UA reflex to Microscopic and  Culture   Result Value Ref Range    Color Urine Yellow     Appearance Urine Clear     Glucose Urine Negative NEG^Negative mg/dL    Bilirubin Urine Negative NEG^Negative    Ketones Urine 5 (A) NEG^Negative mg/dL    Specific Gravity Urine >1.035 (H) 1.003 - 1.035      Comment:      CORRECTED ON 02/11 AT 1437: PREVIOUSLY REPORTED AS 1.038    Blood Urine Negative NEG^Negative    pH Urine 6.0 5.0 - 7.0 pH    Protein Albumin Urine 30 (A) NEG^Negative mg/dL    Urobilinogen mg/dL 2.0 0.0 - 2.0 mg/dL    Nitrite Urine Negative NEG^Negative    Leukocyte Esterase Urine Small (A) NEG^Negative    Source Midstream Urine     RBC Urine 2 0 - 2 /HPF    WBC Urine 12 (H) 0 - 5 /HPF    Squamous Epithelial /HPF Urine 5 (H) 0 - 1 /HPF    Mucous Urine Present (A) NEG^Negative /LPF    Hyaline Casts 2 0 - 2 /LPF   Urine Culture Aerobic Bacterial   Result Value Ref Range    Specimen Description Midstream Urine     Culture Micro       Urine culture negative (no growth of uropathogens).

## 2021-02-13 LAB
BACTERIA SPEC CULT: NORMAL
SPECIMEN SOURCE: NORMAL

## 2021-02-16 LAB — PAIN DRUG SCR UR W RPTD MEDS: NORMAL

## 2021-02-17 ENCOUNTER — PATIENT OUTREACH (OUTPATIENT)
Dept: FAMILY MEDICINE | Facility: OTHER | Age: 51
End: 2021-02-17

## 2021-02-17 NOTE — TELEPHONE ENCOUNTER
Patient Quality Outreach      Summary:    Patient has the following on her problem list/HM:   Depression / Dysthymia review    6 Month Remission: 4-8 month window range:   12 Month Remission: 10-14 month window range:        PHQ-9 SCORE 7/18/2019 2/28/2020 8/4/2020   PHQ-9 Total Score 16 16 15       If PHQ-9 recheck is 5 or more, route to provider for next steps.    Patient is due/failing the following:   PHQ-9 Needed, Depression follow-up visit and DAP    Type of outreach:    Sent letter.    Questions for provider review:    None                                                                                                                                     Saadia Aggarwal LPN on 2/17/2021 at 3:31 PM        Chart routed to .

## 2021-02-17 NOTE — LETTER
February 17, 2021      Kym SOTOMAYOR Eliazar  [address] [address] [facility] [address]      Your healthcare team cares about your health. To provide you with the best care,   we have reviewed your chart and based on our findings, we see that you are due to:     - DEPRESSION FOLLOW UP: Complete the attached questionnaires to ensure your mental health needs are being properly met.  Please fill them out and send back to us via [employer], and feel free to call us with any questions or concerns at [phone].      If you have already completed these items, please contact the clinic via phone or   [facility] so your care team can review and update your records. Thank you for   choosing [facility] for your healthcare needs. For any questions,   concerns, or to schedule an appointment please contact the clinic.       Healthy Regards,      Your [facility] Care Team

## 2021-03-06 ENCOUNTER — HOSPITAL ENCOUNTER (EMERGENCY)
Facility: OTHER | Age: 51
Discharge: HOME | End: 2021-03-07
Attending: EMERGENCY MEDICINE | Admitting: EMERGENCY MEDICINE
Payer: COMMERCIAL

## 2021-03-06 DIAGNOSIS — R10.84 ABDOMINAL PAIN, GENERALIZED: ICD-10-CM

## 2021-03-06 DIAGNOSIS — K59.00 CONSTIPATION, UNSPECIFIED CONSTIPATION TYPE: ICD-10-CM

## 2021-03-06 DIAGNOSIS — E27.8 ADRENAL HYPERPLASIA (H): ICD-10-CM

## 2021-03-06 LAB
ALBUMIN SERPL-MCNC: 3.7 G/DL (ref 3.5–5.7)
ALBUMIN UR-MCNC: NEGATIVE MG/DL
ALP SERPL-CCNC: 49 U/L (ref 34–104)
ALT SERPL W P-5'-P-CCNC: 9 U/L (ref 7–52)
AMORPH CRY #/AREA URNS HPF: ABNORMAL /HPF
ANION GAP SERPL CALCULATED.3IONS-SCNC: 5 MMOL/L (ref 3–14)
APPEARANCE UR: CLEAR
AST SERPL W P-5'-P-CCNC: 11 U/L (ref 13–39)
BASOPHILS # BLD AUTO: 0.1 10E9/L (ref 0–0.2)
BASOPHILS NFR BLD AUTO: 0.5 %
BILIRUB SERPL-MCNC: 0.2 MG/DL (ref 0.3–1)
BILIRUB UR QL STRIP: NEGATIVE
BUN SERPL-MCNC: 16 MG/DL (ref 7–25)
CALCIUM SERPL-MCNC: 8.9 MG/DL (ref 8.6–10.3)
CHLORIDE SERPL-SCNC: 105 MMOL/L (ref 98–107)
CO2 SERPL-SCNC: 26 MMOL/L (ref 21–31)
COLOR UR AUTO: YELLOW
CREAT SERPL-MCNC: 0.87 MG/DL (ref 0.6–1.2)
DIFFERENTIAL METHOD BLD: ABNORMAL
EOSINOPHIL # BLD AUTO: 0.2 10E9/L (ref 0–0.7)
EOSINOPHIL NFR BLD AUTO: 1.5 %
ERYTHROCYTE [DISTWIDTH] IN BLOOD BY AUTOMATED COUNT: 12.2 % (ref 10–15)
GFR SERPL CREATININE-BSD FRML MDRD: 69 ML/MIN/{1.73_M2}
GLUCOSE SERPL-MCNC: 101 MG/DL (ref 70–105)
GLUCOSE UR STRIP-MCNC: NEGATIVE MG/DL
HCT VFR BLD AUTO: 34.7 % (ref 35–47)
HGB BLD-MCNC: 11.5 G/DL (ref 11.7–15.7)
HGB UR QL STRIP: NEGATIVE
IMM GRANULOCYTES # BLD: 0 10E9/L (ref 0–0.4)
IMM GRANULOCYTES NFR BLD: 0.2 %
KETONES UR STRIP-MCNC: NEGATIVE MG/DL
LEUKOCYTE ESTERASE UR QL STRIP: ABNORMAL
LIPASE SERPL-CCNC: 12 U/L (ref 11–82)
LYMPHOCYTES # BLD AUTO: 3.7 10E9/L (ref 0.8–5.3)
LYMPHOCYTES NFR BLD AUTO: 34.7 %
MCH RBC QN AUTO: 31.5 PG (ref 26.5–33)
MCHC RBC AUTO-ENTMCNC: 33.1 G/DL (ref 31.5–36.5)
MCV RBC AUTO: 95 FL (ref 78–100)
MONOCYTES # BLD AUTO: 0.5 10E9/L (ref 0–1.3)
MONOCYTES NFR BLD AUTO: 5 %
MUCOUS THREADS #/AREA URNS LPF: PRESENT /LPF
NEUTROPHILS # BLD AUTO: 6.2 10E9/L (ref 1.6–8.3)
NEUTROPHILS NFR BLD AUTO: 58.1 %
NITRATE UR QL: NEGATIVE
PH UR STRIP: 6.5 PH (ref 5–7)
PLATELET # BLD AUTO: 168 10E9/L (ref 150–450)
POTASSIUM SERPL-SCNC: 4 MMOL/L (ref 3.5–5.1)
PROT SERPL-MCNC: 6 G/DL (ref 6.4–8.9)
RBC # BLD AUTO: 3.65 10E12/L (ref 3.8–5.2)
RBC #/AREA URNS AUTO: 3 /HPF (ref 0–2)
SODIUM SERPL-SCNC: 136 MMOL/L (ref 134–144)
SOURCE: ABNORMAL
SP GR UR STRIP: 1.01 (ref 1–1.03)
SQUAMOUS #/AREA URNS AUTO: 1 /HPF (ref 0–1)
UROBILINOGEN UR STRIP-MCNC: NORMAL MG/DL (ref 0–2)
WBC # BLD AUTO: 10.7 10E9/L (ref 4–11)
WBC #/AREA URNS AUTO: 9 /HPF (ref 0–5)

## 2021-03-06 PROCEDURE — 99285 EMERGENCY DEPT VISIT HI MDM: CPT | Mod: 25 | Performed by: EMERGENCY MEDICINE

## 2021-03-06 PROCEDURE — 85025 COMPLETE CBC W/AUTO DIFF WBC: CPT | Performed by: EMERGENCY MEDICINE

## 2021-03-06 PROCEDURE — 96376 TX/PRO/DX INJ SAME DRUG ADON: CPT | Performed by: EMERGENCY MEDICINE

## 2021-03-06 PROCEDURE — 82533 TOTAL CORTISOL: CPT | Performed by: EMERGENCY MEDICINE

## 2021-03-06 PROCEDURE — 80053 COMPREHEN METABOLIC PANEL: CPT | Performed by: EMERGENCY MEDICINE

## 2021-03-06 PROCEDURE — 96375 TX/PRO/DX INJ NEW DRUG ADDON: CPT | Performed by: EMERGENCY MEDICINE

## 2021-03-06 PROCEDURE — 83690 ASSAY OF LIPASE: CPT | Performed by: EMERGENCY MEDICINE

## 2021-03-06 PROCEDURE — 99284 EMERGENCY DEPT VISIT MOD MDM: CPT | Performed by: EMERGENCY MEDICINE

## 2021-03-06 PROCEDURE — 36415 COLL VENOUS BLD VENIPUNCTURE: CPT | Performed by: EMERGENCY MEDICINE

## 2021-03-06 PROCEDURE — 96374 THER/PROPH/DIAG INJ IV PUSH: CPT | Performed by: EMERGENCY MEDICINE

## 2021-03-06 PROCEDURE — 81001 URINALYSIS AUTO W/SCOPE: CPT | Performed by: EMERGENCY MEDICINE

## 2021-03-06 PROCEDURE — 250N000011 HC RX IP 250 OP 636: Performed by: EMERGENCY MEDICINE

## 2021-03-06 RX ORDER — FENTANYL CITRATE 50 UG/ML
50 INJECTION, SOLUTION INTRAMUSCULAR; INTRAVENOUS ONCE
Status: COMPLETED | OUTPATIENT
Start: 2021-03-06 | End: 2021-03-06

## 2021-03-06 RX ORDER — ONDANSETRON 2 MG/ML
4 INJECTION INTRAMUSCULAR; INTRAVENOUS EVERY 30 MIN PRN
Status: DISCONTINUED | OUTPATIENT
Start: 2021-03-06 | End: 2021-03-07 | Stop reason: HOSPADM

## 2021-03-06 RX ADMIN — FENTANYL CITRATE 50 MCG: 50 INJECTION, SOLUTION INTRAMUSCULAR; INTRAVENOUS at 23:07

## 2021-03-06 RX ADMIN — ONDANSETRON 4 MG: 2 INJECTION INTRAMUSCULAR; INTRAVENOUS at 23:28

## 2021-03-06 ASSESSMENT — ENCOUNTER SYMPTOMS
DIARRHEA: 0
CONSTIPATION: 0
VOMITING: 0
FEVER: 0
ARTHRALGIAS: 0
CHEST TIGHTNESS: 0
ABDOMINAL PAIN: 1
LIGHT-HEADEDNESS: 0
DYSURIA: 0
CHILLS: 0
SHORTNESS OF BREATH: 0
AGITATION: 0

## 2021-03-06 ASSESSMENT — MIFFLIN-ST. JEOR: SCORE: 1351.63

## 2021-03-07 ENCOUNTER — APPOINTMENT (OUTPATIENT)
Dept: CT IMAGING | Facility: OTHER | Age: 51
End: 2021-03-07
Attending: EMERGENCY MEDICINE
Payer: COMMERCIAL

## 2021-03-07 VITALS
HEART RATE: 58 BPM | WEIGHT: 146 LBS | SYSTOLIC BLOOD PRESSURE: 104 MMHG | OXYGEN SATURATION: 95 % | TEMPERATURE: 96.5 F | BODY MASS INDEX: 21.62 KG/M2 | HEIGHT: 69 IN | RESPIRATION RATE: 20 BRPM | DIASTOLIC BLOOD PRESSURE: 63 MMHG

## 2021-03-07 LAB — CORTIS SERPL-MCNC: 14.4 UG/DL (ref 4–22)

## 2021-03-07 PROCEDURE — 250N000011 HC RX IP 250 OP 636: Performed by: EMERGENCY MEDICINE

## 2021-03-07 PROCEDURE — 74177 CT ABD & PELVIS W/CONTRAST: CPT | Mod: TC

## 2021-03-07 PROCEDURE — 255N000002 HC RX 255 OP 636: Performed by: EMERGENCY MEDICINE

## 2021-03-07 PROCEDURE — 250N000013 HC RX MED GY IP 250 OP 250 PS 637: Performed by: EMERGENCY MEDICINE

## 2021-03-07 RX ORDER — FENTANYL CITRATE 50 UG/ML
50 INJECTION, SOLUTION INTRAMUSCULAR; INTRAVENOUS ONCE
Status: COMPLETED | OUTPATIENT
Start: 2021-03-07 | End: 2021-03-07

## 2021-03-07 RX ORDER — MAGNESIUM CARB/ALUMINUM HYDROX 105-160MG
296 TABLET,CHEWABLE ORAL ONCE
Status: COMPLETED | OUTPATIENT
Start: 2021-03-07 | End: 2021-03-07

## 2021-03-07 RX ADMIN — IOHEXOL 100 ML: 350 INJECTION, SOLUTION INTRAVENOUS at 00:51

## 2021-03-07 RX ADMIN — FENTANYL CITRATE 50 MCG: 50 INJECTION, SOLUTION INTRAMUSCULAR; INTRAVENOUS at 01:43

## 2021-03-07 RX ADMIN — MAGNESIUM CITRATE 296 ML: 1.75 LIQUID ORAL at 01:45

## 2021-03-07 NOTE — ED PROVIDER NOTES
History     Chief Complaint   Patient presents with     Abdominal Pain     HPI  Kym Perea is a 50 year old female who is here with abdominal pain.  Pain is right lower quadrant.  It began 2 or 3 days ago.  Today it got significantly worse while she was making dinner.  When this began she thought it might be constipation, she does take oxycodone on a daily basis, however this has not changed anytime recently.  Because she thought might be constipation so she stopped her oxycodone.  Did have a bowel movement today which seemed normal.  Pain is sharp.  It has moved a little bit more towards her midline from the right lower quadrant.  Does radiate up, she did have some pain shooting up into her shoulder.  Also pain in her flank.  Urinating normally.  This has no effect on the pain.  No vomiting.  No fevers chills.    Allergies:  Allergies   Allergen Reactions     Arthrotec GI Disturbance     Indomethacin      Other reaction(s): Dizziness     Meloxicam GI Disturbance     Moxifloxacin        Problem List:    Patient Active Problem List    Diagnosis Date Noted     Medical cannabis use 07/10/2018     Priority: Medium     Pain in joint, ankle and foot 01/29/2018     Priority: Medium     Overview:   Chronic left ankle pain secondary to reflex sympathetic dystrophy.  Patient   has had 4 previous surgeries, the most recent one done by Dr. Noriega in   2007. On Narcotic contract.       Chronic pain disorder 01/29/2018     Priority: Medium     Overview:   complex regional pain syndrome left ankle       Esophageal reflux 01/29/2018     Priority: Medium     Hyperlipidemia 01/29/2018     Priority: Medium     Insomnia 01/29/2018     Priority: Medium     Panic disorder 01/29/2018     Priority: Medium     Overview:   Previously patient of Dr. Reynoso since her teens. Patient is attempting slow taper down on her Xanax. She's been on Xanax 2 mg 4 times a day for years       Tobacco abuse 01/29/2018     Priority: Medium      Influenza A 01/11/2018     Priority: Medium     Pain medication agreement, 4/18/18, 4/3/19, 10/27/2020 04/21/2017     Priority: Medium     Overview:   4/20/17:  Oxycodone 10 mg #180/mo    Hydrocodone 10/325mg, #240/mo. Stopped and changed to morphine. Agreement signed 10/3/12, updated 10/1/ 14.  Complex regional pain syndrome (aka Reflex Sympathetic Dystrophy)  MS Contin 30 mg q 8 hours #90 per month.  Morphine sulfate IR 15 mg 2-3 tab tid prn #180 per month, decreased to #90/month.   query 4/9/16 as part of chart review. Seems appropriate. 6/2/16 tapered and off Morphine. Redbird 5/325 #120/month.  query appropriate. ToxAssure appropriate.    August 2016 Consultation with [facility]. Recommended spinal cord stimulator trial, but she is reluctant to pursue that. Pain clinic stated that continuing opioids was OK.       Complex regional pain syndrome type 1 affecting left lower leg 04/15/2016     Priority: Medium     Overview:   Patient with a history of complex regional pain syndrome has seen Dr. Dino Skelton4/23/16        Migraine headache 11/25/2014     Priority: Medium     Overweight (BMI 25.0-29.9) 11/25/2014     Priority: Medium     Lumbago 02/06/2012     Priority: Medium     Major depressive disorder, recurrent episode, severe (H) 07/13/2011     Priority: Medium     Paroxysmal supraventricular tachycardia (H) 05/24/2010     Priority: Medium        Past Medical History:    Past Medical History:   Diagnosis Date     Acquired absence of other organs (CODE)      Chronic pain syndrome      Complex regional pain syndrome I      Encounter for other administrative examinations      Encounter for other administrative examinations      Gastro-esophageal reflux disease without esophagitis      Injury of left ankle      Low back pain      Major depressive disorder, recurrent severe without psychotic features (H)      Migraine without status migrainosus, not intractable      Nicotine dependence,  uncomplicated      Obesity      Overweight      Pain in ankle      Pain in thoracic spine      Panic disorder without agoraphobia      Personal history of other diseases of the female genital tract      Personal history of other medical treatment (CODE)      Supraventricular tachycardia (H)        Past Surgical History:    Past Surgical History:   Procedure Laterality Date     ANKLE SURGERY      1997, 2000,Left ankle surgery x 2     HYSTERECTOMY TOTAL ABDOMINAL      11/1999,secondary to endometriosis, no malignancy; patient reports no malignancy, but abnormal cells were present *     LAPAROSCOPIC CHOLECYSTECTOMY      No Comments Provided     LAPAROSCOPY DIAGNOSTIC (GENERAL)      1995     OTHER SURGICAL HISTORY      21299,CRANIO/MAXILLOFACIAL SURGERY,Jaw Surgery     OTHER SURGICAL HISTORY      207040,[facility] EMG,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  No other abnormalities noted.       Family History:    Family History   Problem Relation Age of Onset     Cancer Father         Cancer,Bladder     Other - See Comments Father         Mild hypercholesterolemia     Other - See Comments Mother         scleroderma/lupus/Parkinson's syndrome     Family History Negative Brother         Good Health     Ariel-Danlos syndrome Daughter      Colon Cancer Maternal Grandmother 40        Cancer-colon,Followed by lung  with metastasis to the bone di*       Social History:  Marital Status:   [4]  Social History     Tobacco Use     Smoking status: Current Every Day Smoker     Packs/day: 0.50     Years: 30.00     Pack years: 15.00     Types: Cigarettes     Start date: 12/2/1986     Smokeless tobacco: Never Used     Tobacco comment: trying   Substance Use Topics     Alcohol use: Not Currently     Alcohol/week: 0.0 standard drinks     Comment: OCCASIONAL WINE     Drug use: No     Comment: medical cannabis        Medications:    albuterol (PROAIR HFA/PROVENTIL HFA/VENTOLIN HFA) 108 (90 Base) MCG/ACT inhaler  ALPRAZolam  "(XANAX) 2 MG tablet  amphetamine-dextroamphetamine (ADDERALL) 30 MG per tablet  atenolol (TENORMIN) 100 MG tablet  Cholecalciferol (VITAMIN D-3) 125 MCG (5000 UT) TABS  divalproex sodium delayed-release (DEPAKOTE) 250 MG DR tablet  fluticasone (FLONASE) 50 MCG/ACT spray  hydrOXYzine (VISTARIL) 25 MG capsule  ibuprofen (ADVIL/MOTRIN) 800 MG tablet  oxyCODONE IR (ROXICODONE) 10 MG tablet  oxyCODONE IR (ROXICODONE) 10 MG tablet  tiZANidine (ZANAFLEX) 2 MG tablet  tiZANidine (ZANAFLEX) 4 MG tablet  VITAMIN D, CHOLECALCIFEROL, PO          Review of Systems   Constitutional: Negative for chills and fever.   HENT: Negative for congestion.    Eyes: Negative for visual disturbance.   Respiratory: Negative for chest tightness and shortness of breath.    Cardiovascular: Negative for chest pain.   Gastrointestinal: Positive for abdominal pain. Negative for constipation, diarrhea and vomiting.   Genitourinary: Negative for dysuria.   Musculoskeletal: Negative for arthralgias.   Skin: Negative for rash.   Neurological: Negative for light-headedness.   Psychiatric/Behavioral: Negative for agitation.       Physical Exam   BP: 115/64  Pulse: 62  Temp: 96.5  F (35.8  C)  Resp: 20  Height: 175.3 cm (5' 9\")  Weight: 66.2 kg (146 lb)  SpO2: 100 %      Physical Exam  Vitals signs and nursing note reviewed.   Constitutional:       Appearance: She is well-developed.   HENT:      Head: Normocephalic and atraumatic.      Mouth/Throat:      Mouth: Mucous membranes are moist.   Eyes:      Conjunctiva/sclera: Conjunctivae normal.   Cardiovascular:      Rate and Rhythm: Normal rate and regular rhythm.      Heart sounds: Normal heart sounds.   Pulmonary:      Effort: Pulmonary effort is normal.      Breath sounds: Normal breath sounds.   Abdominal:      General: Abdomen is flat. Bowel sounds are normal. There is no distension.      Comments: Very tender pretty much everywhere I palpate in her abdomen, however worse in the right lower quadrant.  " Positive rebound.  Also very tender right CVA.   Skin:     General: Skin is warm and dry.   Neurological:      Mental Status: She is alert and oriented to person, place, and time.   Psychiatric:         Mood and Affect: Mood normal.         Behavior: Behavior normal.         ED Course        Procedures                   Results for orders placed or performed during the hospital encounter of 03/06/21 (from the past 24 hour(s))   CBC with platelets differential   Result Value Ref Range    WBC 10.7 4.0 - 11.0 10e9/L    RBC Count 3.65 (L) 3.8 - 5.2 10e12/L    Hemoglobin 11.5 (L) 11.7 - 15.7 g/dL    Hematocrit 34.7 (L) 35.0 - 47.0 %    MCV 95 78 - 100 fl    MCH 31.5 26.5 - 33.0 pg    MCHC 33.1 31.5 - 36.5 g/dL    RDW 12.2 10.0 - 15.0 %    Platelet Count 168 150 - 450 10e9/L    Diff Method Automated Method     % Neutrophils 58.1 %    % Lymphocytes 34.7 %    % Monocytes 5.0 %    % Eosinophils 1.5 %    % Basophils 0.5 %    % Immature Granulocytes 0.2 %    Absolute Neutrophil 6.2 1.6 - 8.3 10e9/L    Absolute Lymphocytes 3.7 0.8 - 5.3 10e9/L    Absolute Monocytes 0.5 0.0 - 1.3 10e9/L    Absolute Eosinophils 0.2 0.0 - 0.7 10e9/L    Absolute Basophils 0.1 0.0 - 0.2 10e9/L    Abs Immature Granulocytes 0.0 0 - 0.4 10e9/L   Comprehensive metabolic panel   Result Value Ref Range    Sodium 136 134 - 144 mmol/L    Potassium 4.0 3.5 - 5.1 mmol/L    Chloride 105 98 - 107 mmol/L    Carbon Dioxide 26 21 - 31 mmol/L    Anion Gap 5 3 - 14 mmol/L    Glucose 101 70 - 105 mg/dL    Urea Nitrogen 16 7 - 25 mg/dL    Creatinine 0.87 0.60 - 1.20 mg/dL    GFR Estimate 69 >60 mL/min/[1.73_m2]    GFR Estimate If Black 84 >60 mL/min/[1.73_m2]    Calcium 8.9 8.6 - 10.3 mg/dL    Bilirubin Total 0.2 (L) 0.3 - 1.0 mg/dL    Albumin 3.7 3.5 - 5.7 g/dL    Protein Total 6.0 (L) 6.4 - 8.9 g/dL    Alkaline Phosphatase 49 34 - 104 U/L    ALT 9 7 - 52 U/L    AST 11 (L) 13 - 39 U/L   Lipase   Result Value Ref Range    Lipase 12 11 - 82 U/L   UA reflex to  Microscopic and Culture    Specimen: Urine clean catch; Midstream Urine   Result Value Ref Range    Color Urine Yellow     Appearance Urine Clear     Glucose Urine Negative NEG^Negative mg/dL    Bilirubin Urine Negative NEG^Negative    Ketones Urine Negative NEG^Negative mg/dL    Specific Gravity Urine 1.010 1.003 - 1.035    Blood Urine Negative NEG^Negative    pH Urine 6.5 5.0 - 7.0 pH    Protein Albumin Urine Negative NEG^Negative mg/dL    Urobilinogen mg/dL Normal 0.0 - 2.0 mg/dL    Nitrite Urine Negative NEG^Negative    Leukocyte Esterase Urine Small (A) NEG^Negative    Source Midstream Urine     RBC Urine 3 (H) 0 - 2 /HPF    WBC Urine 9 (H) 0 - 5 /HPF    Squamous Epithelial /HPF Urine 1 0 - 1 /HPF    Mucous Urine Present (A) NEG^Negative /LPF    Amorphous Crystals Few (A) NEG^Negative /HPF   CT Abdomen Pelvis w Contrast    Narrative    PROCEDURE INFORMATION:   Exam: CT Abdomen And Pelvis With Contrast   Exam date and time: 3/7/2021 12:16 AM   Age: 50 years old   Clinical indication: Abdominal pain; Generalized; Prior surgery; Surgery date:   6+ months; Surgery type: Cholecystectomy , hysterectomy     TECHNIQUE:   Imaging protocol: Computed tomography of the abdomen and pelvis with contrast.   Radiation optimization: All CT scans at this facility use at least one of these   dose optimization techniques: automated exposure control; mA and/or kV   adjustment per patient size (includes targeted exams where dose is matched to   clinical indication); or iterative reconstruction.   Contrast material: OMNIPAQUE 350; Contrast volume: 100 ml; Contrast route:   INTRAVENOUS (IV);      COMPARISON:   CT ABDOMEN PELVIS WO 11/25/2014 6:09 PM     FINDINGS:    PANCREATICOHEPATOBILIARY: Liver shows biliary ductal dilation without an   obstructive abnormality likely related to cholecystectomy. No discrete hepatic   mass or abnormality. Pancreas and spleen are unremarkable.    .    GENITOURINARY: Symmetric bilateral adrenal  hyperplasia. Mild renal cortical   lobulation/scarring. Mild fullness of the renal collecting systems and ureters   bilaterally without an obstructive abnormality. Urinary bladder is within   normal limits. Hysterectomy. No free fluid in the pelvis.    .    Macro diverticulosis Colon contains moderate amount of fecal matter suggestive   of fecal retention/constipation. Prominent air and fluid-filled loops of   proximal small bowel in the LEFT upper abdomen likely within normal limits. No   free intraperitoneal air or fluid collection. A normal APPENDIX is visualized.    .    OTHER STRUCTURES: Atherosclerotic disease of the aorta without evidence of   aneurysm. No bulky lymph node enlargement. Advanced chronic degenerative   degenerative changes in the lower lumbar spine involving predominantly the   facet joints with degenerative spondylolisthesis of L4 on L5.       Impression    IMPRESSION:   1. Bilateral symmetric adrenal hyperplasia. Recommend correlation with serum   cortisol levels and CT of the brain to evaluate for a pituitary mass.   2. Numerous non-emergent/incidental findings as described without any acute   abdominopelvic abnormality.     THIS DOCUMENT HAS BEEN ELECTRONICALLY SIGNED BY VON AQUINO MD       Medications   ondansetron (ZOFRAN) injection 4 mg (4 mg Intravenous Given 3/6/21 2328)   fentaNYL (PF) (SUBLIMAZE) injection 50 mcg (has no administration in time range)   magnesium citrate solution 296 mL (has no administration in time range)   fentaNYL (PF) (SUBLIMAZE) injection 50 mcg (50 mcg Intravenous Given 3/6/21 2307)   iohexol (OMNIPAQUE) 350 mg/mL solution 100 mL (100 mLs Intravenous Given 3/7/21 0051)       Assessments & Plan (with Medical Decision Making)     I have reviewed the nursing notes.    I have reviewed the findings, diagnosis, plan and need for follow up with the patient.  CT scan with results as above, I believe her pain is likely due to constipation.  We discussed options for  this and she chose to have a bottle of magnesium citrate immediately before she leaves.  If this does not work the morning she could try this again.  If still no improvement return here for a recheck.  CT scan also showed bilateral adrenal hyperplasia.  I have added a cortisol level onto her labs.  We discussed doing head CT scan, however she does not wish to do that right now and like to follow-up in clinic for this.  I did ask her to call Monday morning to schedule an appointment with her primary provider to discuss.  Will be discharged to home.  Return if worse.    New Prescriptions    No medications on file       Final diagnoses:   Abdominal pain, generalized   Constipation, unspecified constipation type   Adrenal hyperplasia (H)       3/6/2021   [facility]     Kal Mack MD  03/07/21 0129

## 2021-03-07 NOTE — ED TRIAGE NOTES
Patient arrives to triage brought in by daughter with complaints of severe right lower abdominal pain that extends up on right side and now complaining of new back pain. Nausea no vomiting.

## 2021-03-07 NOTE — PROGRESS NOTES
1.  Has the patient had a previous reaction to IV contrast? n    2.  Does the patient have kidney disease? n    3.  Is the patient on dialysis? n    If YES to any of these questions, exam will be reviewed with a Radiologist before administering contrast.    Creatinine   Date Value Ref Range Status   03/06/2021 0.87 0.60 - 1.20 mg/dL Final     GFR > 60    1.  Is patient currently taking metformin? n     If NO: Technologist will give the patient normal post CT instructions.     If YES: Technologist will obtain GFR from Lab.    2.  Is GFR is greater than 60? y     If YES: Technologist will give the patient normal post CT instructions.     If NO: Technologist will give the patient METFORMIN post CT instructions.

## 2021-03-07 NOTE — ED NOTES
Patient discharged to home. Discharge instructions reviewed, educated on follow up. Verbalized understanding of all instruction.

## 2021-03-07 NOTE — DISCHARGE INSTRUCTIONS
If if you have bowel movement you are still having pain, follow-up in clinic or ER for recheck.  If this magnesium citrate does not work you could try another bottle in the morning.  Call the clinic Monday to schedule an appointment with your primary provider to discuss possible CT scan of head because of your enlarged adrenal glands.  Return if worse.

## 2021-03-18 ENCOUNTER — TELEPHONE (OUTPATIENT)
Dept: FAMILY MEDICINE | Facility: OTHER | Age: 51
End: 2021-03-18

## 2021-03-18 NOTE — TELEPHONE ENCOUNTER
I asked the patient if she received the letter that Dr Stephen sent her. She stated no she has moved. I read the letter to her about her Rx for Oxycodone.  Petra Swanson LPN..................3/18/2021   1:18 PM

## 2021-03-18 NOTE — TELEPHONE ENCOUNTER
Please call the patient.  She did not get her RX for Oxycodone.  She did not pick it up in time or request it in time.  She is out and needs this filled.      Do Gaspar on 3/18/2021 at 11:47 AM

## 2021-03-29 ENCOUNTER — OFFICE VISIT (OUTPATIENT)
Dept: FAMILY MEDICINE | Facility: OTHER | Age: 51
End: 2021-03-29
Attending: NURSE PRACTITIONER
Payer: COMMERCIAL

## 2021-03-29 VITALS
OXYGEN SATURATION: 98 % | HEART RATE: 71 BPM | RESPIRATION RATE: 16 BRPM | TEMPERATURE: 97.9 F | DIASTOLIC BLOOD PRESSURE: 64 MMHG | SYSTOLIC BLOOD PRESSURE: 104 MMHG

## 2021-03-29 DIAGNOSIS — R51.9 ACUTE INTRACTABLE HEADACHE, UNSPECIFIED HEADACHE TYPE: Primary | ICD-10-CM

## 2021-03-29 DIAGNOSIS — R11.0 NAUSEA: ICD-10-CM

## 2021-03-29 PROCEDURE — G0463 HOSPITAL OUTPT CLINIC VISIT: HCPCS | Mod: 25

## 2021-03-29 PROCEDURE — U0003 INFECTIOUS AGENT DETECTION BY NUCLEIC ACID (DNA OR RNA); SEVERE ACUTE RESPIRATORY SYNDROME CORONAVIRUS 2 (SARS-COV-2) (CORONAVIRUS DISEASE [COVID-19]), AMPLIFIED PROBE TECHNIQUE, MAKING USE OF HIGH THROUGHPUT TECHNOLOGIES AS DESCRIBED BY CMS-2020-01-R: HCPCS | Mod: ZL | Performed by: NURSE PRACTITIONER

## 2021-03-29 PROCEDURE — C9803 HOPD COVID-19 SPEC COLLECT: HCPCS

## 2021-03-29 PROCEDURE — 99213 OFFICE O/P EST LOW 20 MIN: CPT | Performed by: NURSE PRACTITIONER

## 2021-03-29 PROCEDURE — U0005 INFEC AGEN DETEC AMPLI PROBE: HCPCS | Mod: ZL | Performed by: NURSE PRACTITIONER

## 2021-03-29 ASSESSMENT — PAIN SCALES - GENERAL: PAINLEVEL: EXTREME PAIN (9)

## 2021-03-30 LAB
LABORATORY COMMENT REPORT: NORMAL
SARS-COV-2 RNA RESP QL NAA+PROBE: NEGATIVE
SARS-COV-2 RNA RESP QL NAA+PROBE: NORMAL
SPECIMEN SOURCE: NORMAL
SPECIMEN SOURCE: NORMAL

## 2021-03-30 NOTE — PROGRESS NOTES
HPI:    Kym Perea is a 51 year old female  who presents to [facility] today for COVID testing. The patient reports exposure to COVID over this past weekend. Symptoms include feeling fatigued, headache and nausea. Denies fever or chills. Denies any other upper respiratory symptoms.     Past Medical History:   Diagnosis Date     Acquired absence of other organs (CODE)     9/2/2011     Chronic pain syndrome     No Comments Provided     Complex regional pain syndrome I     left ankle     Encounter for other administrative examinations     10/2012,Hydrocodone 10/325mg, #240/mo signed 10/3/12, updated 10/1/ 14     Encounter for other administrative examinations     10/1/2014,MS Contin 30 mg q 8 hours #90 per month.  Morphine sulfate IR 15 mg 2-3 tab tid prn #180 per month     Gastro-esophageal reflux disease without esophagitis     No Comments Provided     Injury of left ankle     1997,requiring surgery     Low back pain     No Comments Provided     Major depressive disorder, recurrent severe without psychotic features (H)     No Comments Provided     Migraine without status migrainosus, not intractable     No Comments Provided     Nicotine dependence, uncomplicated     No Comments Provided     Obesity     No Comments Provided     Overweight     11/25/2014     Pain in ankle     Chronic left ankle pain secondary to reflex sympathetic dystrophy.  Patient  has had 4 previous surgeries, the most recent one done by Dr. Noriega in  2007. On Narcotic contract.     Pain in thoracic spine     No Comments Provided     Panic disorder without agoraphobia     Dr Reynoso     Personal history of other diseases of the female genital tract     No Comments Provided     Personal history of other medical treatment (CODE)     G2, P1-0-1-1 with one vaginal delivery.     Supraventricular tachycardia (H)     5/24/2010     Past Surgical History:   Procedure Laterality Date     ANKLE SURGERY      1997, 2000,Left ankle surgery x 2      HYSTERECTOMY TOTAL ABDOMINAL      11/1999,secondary to endometriosis, no malignancy; patient reports no malignancy, but abnormal cells were present *     LAPAROSCOPIC CHOLECYSTECTOMY      No Comments Provided     LAPAROSCOPY DIAGNOSTIC (GENERAL)      1995     OTHER SURGICAL HISTORY      21299,CRANIO/MAXILLOFACIAL SURGERY,Jaw Surgery     OTHER SURGICAL HISTORY      207040,[facility] EMG,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  No other abnormalities noted.     Social History     Tobacco Use     Smoking status: Current Every Day Smoker     Packs/day: 0.50     Years: 30.00     Pack years: 15.00     Types: Cigarettes     Start date: 12/2/1986     Smokeless tobacco: Never Used     Tobacco comment: trying   Substance Use Topics     Alcohol use: Not Currently     Alcohol/week: 0.0 standard drinks     Comment: OCCASIONAL WINE     Current Outpatient Medications   Medication Sig Dispense Refill     albuterol (PROAIR HFA/PROVENTIL HFA/VENTOLIN HFA) 108 (90 Base) MCG/ACT inhaler Inhale 2 puffs into the lungs every 4 hours as needed for shortness of breath / dyspnea or wheezing 1 Inhaler 11     ALPRAZolam (XANAX) 2 MG tablet Take 1 tablet 3 times daily prn       amphetamine-dextroamphetamine (ADDERALL) 30 MG per tablet Take 30 mg by mouth       atenolol (TENORMIN) 100 MG tablet Take 1 tablet (100 mg) by mouth daily 90 tablet 3     Cholecalciferol (VITAMIN D-3) 125 MCG (5000 UT) TABS Take 1 tablet by mouth daily 90 tablet 11     divalproex sodium delayed-release (DEPAKOTE) 250 MG DR tablet TAKE 1 TABLET BY MOUTH TWICE A DAY AS NEEDED  0     fluticasone (FLONASE) 50 MCG/ACT spray 2 sprays       hydrOXYzine (VISTARIL) 25 MG capsule Take 1 capsule (25 mg) by mouth 3 times daily as needed for other (nausea) 60 capsule 11     ibuprofen (ADVIL/MOTRIN) 800 MG tablet Take 1 tablet (800 mg) by mouth every 8 hours as needed for moderate pain 90 tablet 11     oxyCODONE IR (ROXICODONE) 10 MG tablet Take 1 tablet (10 mg) by mouth every  4 hours as needed for moderate to severe pain Maximum 6 tab per day 168 tablet 0     oxyCODONE IR (ROXICODONE) 10 MG tablet Take 1 tablet (10 mg) by mouth every 4 hours as needed for severe pain Max 6 tab per day. 168 tablet 0     tiZANidine (ZANAFLEX) 2 MG tablet TAKE 1-2 TABLETS (2-4 MG) BY MOUTH 3 TIMES DAILY AS NEEDED FOR MUSCLE SPASMS       tiZANidine (ZANAFLEX) 4 MG tablet Take 1 tablet (4 mg) by mouth 3 times daily as needed for muscle spasms 60 tablet 11     VITAMIN D, CHOLECALCIFEROL, PO Take 1,000 Units by mouth daily       Allergies   Allergen Reactions     Arthrotec GI Disturbance     Indomethacin      Other reaction(s): Dizziness     Meloxicam GI Disturbance     Moxifloxacin          Past medical history, past surgical history, current medications and allergies reviewed and accurate to the best of my knowledge.        ROS:  Refer to HPI    /64 (BP Location: Left arm, Patient Position: Sitting, Cuff Size: Adult Regular)   Pulse 71   Temp 97.9  F (36.6  C) (Tympanic)   Resp 16   LMP  (LMP Unknown)   SpO2 98%     EXAM:  General Appearance: Well appearing female, appropriate appearance for age. No acute distress  Orophayrnx: moist mucous membranes, posterior pharynx without erythema, tonsils without hypertrophy, no erythema, no exudates or petechiae, no post nasal drip seen, no trismus, voice clear.    Neck: supple without adenopathy  Respiratory: normal chest wall and respirations.  Normal effort.  Clear to auscultation bilaterally, no wheezing, crackles or rhonchi.  No increased work of breathing.  No cough appreciated.  Cardiac: RRR with no murmurs  Abdomen: soft, Patient reports generalized tenderness of her abdomen, no rigidity, no rebound tenderness or guarding, normal bowel sounds present  Psychological: normal affect, alert, oriented, and pleasant.           ASSESSMENT/PLAN:  1. Acute intractable headache, unspecified headache type    - Symptomatic COVID-19 Virus (Coronavirus) by PCR  -  SARS-CoV-2 COVID-19 Virus (Coronavirus) by PCR    2. Nausea    COVID result pending.     Instructed the patient to remain in quarantine until she receives her COVID result.      Symptomatic treatment - Encouraged fluids, etc     May use over-the-counter Tylenol or ibuprofen PRN    Discussed warning signs/symptoms indicative of need to f/u    Follow up if symptoms persist or worsen or concerns      I explained my diagnostic considerations and recommendations to the patient, who voiced understanding and agreement with the treatment plan. All questions were answered. We discussed potential side effects of any prescribed or recommended therapies, as well as expectations for response to treatments.    Disclaimer:  This note consists of words and symbols derived from keyboarding, dictation, or using voice recognition software. As a result, there may be errors in the script that have gone undetected. Please consider this when interpreting information found in this note.

## 2021-04-02 ENCOUNTER — ALLIED HEALTH/NURSE VISIT (OUTPATIENT)
Dept: FAMILY MEDICINE | Facility: OTHER | Age: 51
End: 2021-04-02
Attending: FAMILY MEDICINE
Payer: COMMERCIAL

## 2021-04-02 DIAGNOSIS — R05.9 COUGH: Primary | ICD-10-CM

## 2021-04-02 PROCEDURE — U0003 INFECTIOUS AGENT DETECTION BY NUCLEIC ACID (DNA OR RNA); SEVERE ACUTE RESPIRATORY SYNDROME CORONAVIRUS 2 (SARS-COV-2) (CORONAVIRUS DISEASE [COVID-19]), AMPLIFIED PROBE TECHNIQUE, MAKING USE OF HIGH THROUGHPUT TECHNOLOGIES AS DESCRIBED BY CMS-2020-01-R: HCPCS | Mod: ZL | Performed by: FAMILY MEDICINE

## 2021-04-02 PROCEDURE — C9803 HOPD COVID-19 SPEC COLLECT: HCPCS

## 2021-04-02 PROCEDURE — U0005 INFEC AGEN DETEC AMPLI PROBE: HCPCS | Mod: ZL | Performed by: FAMILY MEDICINE

## 2021-04-15 ENCOUNTER — TELEPHONE (OUTPATIENT)
Dept: FAMILY MEDICINE | Facility: OTHER | Age: 51
End: 2021-04-15

## 2021-04-15 NOTE — TELEPHONE ENCOUNTER
States pt said she had signed a release letter for medication info. They are wanting a call back to see if she has and is needing to discuss that

## 2021-04-16 NOTE — TELEPHONE ENCOUNTER
I called and left a message for the  to ask what I can do about this message.  Petra Swanson LPN..................4/16/2021   2:28 PM

## 2021-04-16 NOTE — TELEPHONE ENCOUNTER
I was told by Sneha the supervisor from medical records to tell the caller he needs to call our [facility] office for any information on the patient. I told him he needed to call [facility].  Petra Swanson LPN..................4/16/2021   4:23 PM

## 2021-04-26 ENCOUNTER — TELEPHONE (OUTPATIENT)
Dept: FAMILY MEDICINE | Facility: OTHER | Age: 51
End: 2021-04-26

## 2021-04-26 NOTE — TELEPHONE ENCOUNTER
Calling regarding subpoena for pt's medical records, states case has been resolved and no further action is needed.

## 2021-09-18 DIAGNOSIS — M25.572 PAIN OF JOINT OF LEFT ANKLE AND FOOT: ICD-10-CM

## 2021-09-21 RX ORDER — IBUPROFEN 800 MG/1
800 TABLET, FILM COATED ORAL EVERY 8 HOURS PRN
Qty: 90 TABLET | Refills: 4 | Status: SHIPPED | OUTPATIENT
Start: 2021-09-21 | End: 2022-03-31

## 2021-09-21 NOTE — TELEPHONE ENCOUNTER
[id number] sent Rx request for the following:      Requested Prescriptions   Pending Prescriptions Disp Refills     ibuprofen (ADVIL/MOTRIN) 800 MG tablet [Pharmacy Med Name: IBUPROFEN 800MG TABLET] 90 tablet 10     Sig: TAKE 1 TABLET (800 MG) BY MOUTH EVERY 8 HOURS AS NEEDED FOR MODERATE PAIN   Last Prescription Date:   8/4/20  Last Fill Qty/Refills:         90, R-11    Last Office Visit:              2/11/21   Future Office visit:           None  Routing refill request to provider for review/approval because:    NSAID Medications Failed - 9/21/2021 11:48 AM        Failed - Normal AST on file in past 12 months        Failed - Normal CBC on file in past 12 months     Unable to complete prescription refill per RN Medication Refill Policy. Mary Shafer RN .............. 9/21/2021  11:51 AM

## 2021-10-18 ENCOUNTER — APPOINTMENT (OUTPATIENT)
Dept: CT IMAGING | Facility: OTHER | Age: 51
End: 2021-10-18
Attending: FAMILY MEDICINE
Payer: COMMERCIAL

## 2021-10-18 ENCOUNTER — HOSPITAL ENCOUNTER (EMERGENCY)
Facility: OTHER | Age: 51
Discharge: OTHER INSTITUTION | End: 2021-10-19
Attending: FAMILY MEDICINE | Admitting: FAMILY MEDICINE
Payer: COMMERCIAL

## 2021-10-18 ENCOUNTER — APPOINTMENT (OUTPATIENT)
Dept: GENERAL RADIOLOGY | Facility: OTHER | Age: 51
End: 2021-10-18
Attending: FAMILY MEDICINE
Payer: COMMERCIAL

## 2021-10-18 DIAGNOSIS — N17.9 ACUTE KIDNEY INJURY (H): ICD-10-CM

## 2021-10-18 DIAGNOSIS — R44.3 HALLUCINATIONS: ICD-10-CM

## 2021-10-18 DIAGNOSIS — R46.89 ABNORMAL BEHAVIOR: ICD-10-CM

## 2021-10-18 LAB
ALBUMIN SERPL-MCNC: 4.7 G/DL (ref 3.5–5.7)
ALP SERPL-CCNC: 55 U/L (ref 34–104)
ALT SERPL W P-5'-P-CCNC: 20 U/L (ref 7–52)
ANION GAP SERPL CALCULATED.3IONS-SCNC: 13 MMOL/L (ref 3–14)
AST SERPL W P-5'-P-CCNC: 24 U/L (ref 13–39)
BASOPHILS # BLD AUTO: 0.1 10E3/UL (ref 0–0.2)
BASOPHILS NFR BLD AUTO: 0 %
BILIRUB SERPL-MCNC: 0.4 MG/DL (ref 0.3–1)
BUN SERPL-MCNC: 13 MG/DL (ref 7–25)
CALCIUM SERPL-MCNC: 9.9 MG/DL (ref 8.6–10.3)
CHLORIDE BLD-SCNC: 106 MMOL/L (ref 98–107)
CO2 SERPL-SCNC: 21 MMOL/L (ref 21–31)
CREAT SERPL-MCNC: 1.63 MG/DL (ref 0.6–1.2)
EOSINOPHIL # BLD AUTO: 0 10E3/UL (ref 0–0.7)
EOSINOPHIL NFR BLD AUTO: 0 %
ERYTHROCYTE [DISTWIDTH] IN BLOOD BY AUTOMATED COUNT: 12.8 % (ref 10–15)
ETHANOL SERPL-MCNC: <0.01 G/DL
GFR SERPL CREATININE-BSD FRML MDRD: 36 ML/MIN/1.73M2
GLUCOSE BLD-MCNC: 111 MG/DL (ref 70–105)
HCT VFR BLD AUTO: 37.5 % (ref 35–47)
HGB BLD-MCNC: 13.2 G/DL (ref 11.7–15.7)
IMM GRANULOCYTES # BLD: 0.1 10E3/UL
IMM GRANULOCYTES NFR BLD: 1 %
LYMPHOCYTES # BLD AUTO: 3.7 10E3/UL (ref 0.8–5.3)
LYMPHOCYTES NFR BLD AUTO: 23 %
MCH RBC QN AUTO: 31.6 PG (ref 26.5–33)
MCHC RBC AUTO-ENTMCNC: 35.2 G/DL (ref 31.5–36.5)
MCV RBC AUTO: 90 FL (ref 78–100)
MONOCYTES # BLD AUTO: 0.9 10E3/UL (ref 0–1.3)
MONOCYTES NFR BLD AUTO: 6 %
NEUTROPHILS # BLD AUTO: 11.1 10E3/UL (ref 1.6–8.3)
NEUTROPHILS NFR BLD AUTO: 70 %
NRBC # BLD AUTO: 0 10E3/UL
NRBC BLD AUTO-RTO: 0 /100
PLATELET # BLD AUTO: 233 10E3/UL (ref 150–450)
POTASSIUM BLD-SCNC: 4 MMOL/L (ref 3.5–5.1)
PROT SERPL-MCNC: 7.2 G/DL (ref 6.4–8.9)
RBC # BLD AUTO: 4.18 10E6/UL (ref 3.8–5.2)
SODIUM SERPL-SCNC: 140 MMOL/L (ref 134–144)
TROPONIN I SERPL-MCNC: 5.2 PG/ML (ref 0–34)
WBC # BLD AUTO: 15.9 10E3/UL (ref 4–11)

## 2021-10-18 PROCEDURE — 99285 EMERGENCY DEPT VISIT HI MDM: CPT | Performed by: FAMILY MEDICINE

## 2021-10-18 PROCEDURE — 80306 DRUG TEST PRSMV INSTRMNT: CPT | Performed by: FAMILY MEDICINE

## 2021-10-18 PROCEDURE — C9803 HOPD COVID-19 SPEC COLLECT: HCPCS | Performed by: FAMILY MEDICINE

## 2021-10-18 PROCEDURE — 84484 ASSAY OF TROPONIN QUANT: CPT | Performed by: FAMILY MEDICINE

## 2021-10-18 PROCEDURE — 85025 COMPLETE CBC W/AUTO DIFF WBC: CPT | Performed by: FAMILY MEDICINE

## 2021-10-18 PROCEDURE — 73030 X-RAY EXAM OF SHOULDER: CPT | Mod: RT

## 2021-10-18 PROCEDURE — 99285 EMERGENCY DEPT VISIT HI MDM: CPT | Mod: 25 | Performed by: FAMILY MEDICINE

## 2021-10-18 PROCEDURE — 70450 CT HEAD/BRAIN W/O DYE: CPT

## 2021-10-18 PROCEDURE — 258N000003 HC RX IP 258 OP 636: Performed by: FAMILY MEDICINE

## 2021-10-18 PROCEDURE — 82077 ASSAY SPEC XCP UR&BREATH IA: CPT | Performed by: FAMILY MEDICINE

## 2021-10-18 PROCEDURE — 81001 URINALYSIS AUTO W/SCOPE: CPT | Mod: XU | Performed by: FAMILY MEDICINE

## 2021-10-18 PROCEDURE — 80053 COMPREHEN METABOLIC PANEL: CPT | Performed by: FAMILY MEDICINE

## 2021-10-18 RX ORDER — SODIUM CHLORIDE 9 MG/ML
INJECTION, SOLUTION INTRAVENOUS CONTINUOUS
Status: DISCONTINUED | OUTPATIENT
Start: 2021-10-18 | End: 2021-10-19

## 2021-10-18 RX ADMIN — SODIUM CHLORIDE 1000 ML: 9 INJECTION, SOLUTION INTRAVENOUS at 21:23

## 2021-10-18 RX ADMIN — SODIUM CHLORIDE: 9 INJECTION, SOLUTION INTRAVENOUS at 22:47

## 2021-10-19 ENCOUNTER — HOSPITAL ENCOUNTER (INPATIENT)
Facility: HOSPITAL | Age: 51
LOS: 3 days | Discharge: LEFT BEFORE DISCHARGE | DRG: 894 | End: 2021-10-22
Attending: NURSE PRACTITIONER | Admitting: PSYCHIATRY & NEUROLOGY
Payer: COMMERCIAL

## 2021-10-19 ENCOUNTER — TELEPHONE (OUTPATIENT)
Dept: BEHAVIORAL HEALTH | Facility: CLINIC | Age: 51
End: 2021-10-19

## 2021-10-19 VITALS
BODY MASS INDEX: 27.62 KG/M2 | HEART RATE: 64 BPM | DIASTOLIC BLOOD PRESSURE: 67 MMHG | RESPIRATION RATE: 13 BRPM | SYSTOLIC BLOOD PRESSURE: 124 MMHG | OXYGEN SATURATION: 98 % | WEIGHT: 187 LBS | TEMPERATURE: 97.9 F

## 2021-10-19 DIAGNOSIS — F31.9 BIPOLAR I DISORDER (H): Primary | ICD-10-CM

## 2021-10-19 PROBLEM — R45.89 SUICIDAL BEHAVIOR: Status: ACTIVE | Noted: 2021-10-19

## 2021-10-19 LAB
ALBUMIN SERPL-MCNC: 3.8 G/DL (ref 3.5–5.7)
ALBUMIN UR-MCNC: 50 MG/DL
ALP SERPL-CCNC: 46 U/L (ref 34–104)
ALT SERPL W P-5'-P-CCNC: 15 U/L (ref 7–52)
AMMONIA PLAS-SCNC: 11 UMOL/L (ref 10–50)
AMPHETAMINES UR QL: NOT DETECTED
ANION GAP SERPL CALCULATED.3IONS-SCNC: 11 MMOL/L (ref 3–14)
APPEARANCE UR: ABNORMAL
AST SERPL W P-5'-P-CCNC: 19 U/L (ref 13–39)
BACTERIA #/AREA URNS HPF: ABNORMAL /HPF
BARBITURATES UR QL SCN: NOT DETECTED
BENZODIAZ UR QL SCN: NOT DETECTED
BILIRUB SERPL-MCNC: 0.5 MG/DL (ref 0.3–1)
BILIRUB UR QL STRIP: NEGATIVE
BUN SERPL-MCNC: 14 MG/DL (ref 7–25)
BUPRENORPHINE UR QL: NOT DETECTED
CALCIUM SERPL-MCNC: 8.8 MG/DL (ref 8.6–10.3)
CANNABINOIDS UR QL: ABNORMAL
CELLULAR CAST: 11 /LPF
CHLORIDE BLD-SCNC: 110 MMOL/L (ref 98–107)
CO2 SERPL-SCNC: 21 MMOL/L (ref 21–31)
COCAINE UR QL SCN: NOT DETECTED
COLOR UR AUTO: YELLOW
CREAT SERPL-MCNC: 0.91 MG/DL (ref 0.6–1.2)
D-METHAMPHET UR QL: NOT DETECTED
GFR SERPL CREATININE-BSD FRML MDRD: 74 ML/MIN/1.73M2
GLUCOSE BLD-MCNC: 87 MG/DL (ref 70–105)
GLUCOSE UR STRIP-MCNC: 50 MG/DL
HGB UR QL STRIP: ABNORMAL
HYALINE CASTS: 11 /LPF
KETONES UR STRIP-MCNC: ABNORMAL MG/DL
LEUKOCYTE ESTERASE UR QL STRIP: NEGATIVE
METHADONE UR QL SCN: NOT DETECTED
MUCOUS THREADS #/AREA URNS LPF: PRESENT /LPF
NITRATE UR QL: NEGATIVE
OPIATES UR QL SCN: NOT DETECTED
OXYCODONE UR QL SCN: NOT DETECTED
PCP UR QL SCN: NOT DETECTED
PH UR STRIP: 6 [PH] (ref 5–9)
POTASSIUM BLD-SCNC: 3.1 MMOL/L (ref 3.5–5.1)
PROPOXYPH UR QL: NOT DETECTED
PROT SERPL-MCNC: 5.8 G/DL (ref 6.4–8.9)
RBC URINE: 4 /HPF
SARS-COV-2 RNA RESP QL NAA+PROBE: NEGATIVE
SODIUM SERPL-SCNC: 142 MMOL/L (ref 134–144)
SP GR UR STRIP: 1.02 (ref 1–1.03)
SQUAMOUS EPITHELIAL: 23 /HPF
TRICYCLICS UR QL SCN: NOT DETECTED
UROBILINOGEN UR STRIP-MCNC: NORMAL MG/DL
WBC URINE: 22 /HPF

## 2021-10-19 PROCEDURE — 96372 THER/PROPH/DIAG INJ SC/IM: CPT | Performed by: STUDENT IN AN ORGANIZED HEALTH CARE EDUCATION/TRAINING PROGRAM

## 2021-10-19 PROCEDURE — 36415 COLL VENOUS BLD VENIPUNCTURE: CPT | Performed by: NURSE PRACTITIONER

## 2021-10-19 PROCEDURE — 82140 ASSAY OF AMMONIA: CPT | Performed by: NURSE PRACTITIONER

## 2021-10-19 PROCEDURE — U0003 INFECTIOUS AGENT DETECTION BY NUCLEIC ACID (DNA OR RNA); SEVERE ACUTE RESPIRATORY SYNDROME CORONAVIRUS 2 (SARS-COV-2) (CORONAVIRUS DISEASE [COVID-19]), AMPLIFIED PROBE TECHNIQUE, MAKING USE OF HIGH THROUGHPUT TECHNOLOGIES AS DESCRIBED BY CMS-2020-01-R: HCPCS | Performed by: FAMILY MEDICINE

## 2021-10-19 PROCEDURE — 80053 COMPREHEN METABOLIC PANEL: CPT | Performed by: STUDENT IN AN ORGANIZED HEALTH CARE EDUCATION/TRAINING PROGRAM

## 2021-10-19 PROCEDURE — 124N000001 HC R&B MH

## 2021-10-19 PROCEDURE — 250N000013 HC RX MED GY IP 250 OP 250 PS 637: Performed by: NURSE PRACTITIONER

## 2021-10-19 PROCEDURE — 36415 COLL VENOUS BLD VENIPUNCTURE: CPT | Performed by: STUDENT IN AN ORGANIZED HEALTH CARE EDUCATION/TRAINING PROGRAM

## 2021-10-19 PROCEDURE — 250N000011 HC RX IP 250 OP 636: Performed by: STUDENT IN AN ORGANIZED HEALTH CARE EDUCATION/TRAINING PROGRAM

## 2021-10-19 RX ORDER — LANOLIN ALCOHOL/MO/W.PET/CERES
3 CREAM (GRAM) TOPICAL
Status: DISCONTINUED | OUTPATIENT
Start: 2021-10-19 | End: 2021-10-22 | Stop reason: HOSPADM

## 2021-10-19 RX ORDER — ACETAMINOPHEN 325 MG/1
650 TABLET ORAL EVERY 4 HOURS PRN
Status: DISCONTINUED | OUTPATIENT
Start: 2021-10-19 | End: 2021-10-22 | Stop reason: HOSPADM

## 2021-10-19 RX ORDER — NICOTINE 21 MG/24HR
1 PATCH, TRANSDERMAL 24 HOURS TRANSDERMAL DAILY
Status: DISCONTINUED | OUTPATIENT
Start: 2021-10-20 | End: 2021-10-22 | Stop reason: HOSPADM

## 2021-10-19 RX ORDER — OLANZAPINE 10 MG/2ML
5 INJECTION, POWDER, FOR SOLUTION INTRAMUSCULAR 2 TIMES DAILY PRN
Status: DISCONTINUED | OUTPATIENT
Start: 2021-10-19 | End: 2021-10-22 | Stop reason: HOSPADM

## 2021-10-19 RX ORDER — AMOXICILLIN 250 MG
1 CAPSULE ORAL 2 TIMES DAILY PRN
Status: DISCONTINUED | OUTPATIENT
Start: 2021-10-19 | End: 2021-10-22 | Stop reason: HOSPADM

## 2021-10-19 RX ORDER — MAGNESIUM HYDROXIDE/ALUMINUM HYDROXICE/SIMETHICONE 120; 1200; 1200 MG/30ML; MG/30ML; MG/30ML
30 SUSPENSION ORAL EVERY 4 HOURS PRN
Status: DISCONTINUED | OUTPATIENT
Start: 2021-10-19 | End: 2021-10-22 | Stop reason: HOSPADM

## 2021-10-19 RX ORDER — OLANZAPINE 5 MG/1
5 TABLET, ORALLY DISINTEGRATING ORAL 2 TIMES DAILY PRN
Status: DISCONTINUED | OUTPATIENT
Start: 2021-10-19 | End: 2021-10-22 | Stop reason: HOSPADM

## 2021-10-19 RX ORDER — PHENOBARBITAL 32.4 MG/1
32.4 TABLET ORAL 3 TIMES DAILY
Status: DISCONTINUED | OUTPATIENT
Start: 2021-10-19 | End: 2021-10-20

## 2021-10-19 RX ORDER — OLANZAPINE 10 MG/2ML
10 INJECTION, POWDER, FOR SOLUTION INTRAMUSCULAR ONCE
Status: COMPLETED | OUTPATIENT
Start: 2021-10-19 | End: 2021-10-19

## 2021-10-19 RX ADMIN — OLANZAPINE 10 MG: 10 INJECTION, POWDER, FOR SOLUTION INTRAMUSCULAR at 10:13

## 2021-10-19 RX ADMIN — NICOTINE POLACRILEX 2 MG: 2 GUM, CHEWING ORAL at 21:57

## 2021-10-19 RX ADMIN — PHENOBARBITAL 32.4 MG: 32.4 TABLET ORAL at 21:57

## 2021-10-19 ASSESSMENT — ENCOUNTER SYMPTOMS
NERVOUS/ANXIOUS: 1
AGITATION: 1
HALLUCINATIONS: 0
CONFUSION: 1
CHEST TIGHTNESS: 0

## 2021-10-19 ASSESSMENT — MIFFLIN-ST. JEOR: SCORE: 1299.91

## 2021-10-19 NOTE — H&P
"Psychiatric Eval/H&P    Patient Name: Kym Perea   YOB: 1970  Age: 51 year old  [id number]    Primary Physician: Darrin Stephen   Completed by RUTH Roy   n/a  David/marylin  Primary psychiatrist/NP [facility]  Therapist unknown  Family contact: mother           CC: \" its no longer called a nervous breakdown, its called an emotional breakdown\".     HPI   Kym Perea is a 51 year old  female who was walking along side the road acting bizzaree and a passerby was concerned she needed help as she appeared very confused. When she arrived to the ER she was grossly disorgargnized and confused. Sh edid not know where she was and only knew her name. Psychomotor restlessness/agitation present upon arrival. Nursing notes indicate she was unsteady on her feet and restless upon arrival.        She rpeorted to the DEC  that she could hear her mother speaking about a lease though her mother was clearly not present.     Her drug screen was positive for thc. No alcohol present.     Her drug screen was negative for benzodiazepines and she is prescribed xanax 2 mg tid, gets 90 for the month and also is prescribed adderall 30 mg #60 for the month and niether were present in her urine drug screen.       She has never had psychosis or any disorganization in the past. She states she has no hisotyr of substance abuse. She is a limited historian as she doesn't answer questions appropriately and gets off topic very quickly. Her tremor is present even when she is laying down. She doesn't appear to be having auditory or visual hallucinations as she was yesterday.       Past Psychiatric History:      no previous inpatient admissions. Has been diagnosed with panic disorder      Social History:       Lives in her own apartment in [place]. Has been in a \"on and off\" relationship with a man she was dated since high school.       Chemical Use History: none known   " "  Family Psychiatric History: states her mother has ptsd. States maternal grandfather was \"[facility] [place]\".      Medical History and ROS    Prior to Admission medications    Medication Sig Start Date End Date Taking? Authorizing Provider   albuterol (PROAIR HFA/PROVENTIL HFA/VENTOLIN HFA) 108 (90 Base) MCG/ACT inhaler Inhale 2 puffs into the lungs every 4 hours as needed for shortness of breath / dyspnea or wheezing 2/11/21   Darrin Stephen MD   ALPRAZolam (XANAX) 2 MG tablet Take 2 mg by mouth 3 times daily as needed  1/18/18   Reported, Patient   amphetamine-dextroamphetamine (ADDERALL) 30 MG per tablet Take 30 mg by mouth 2 times daily  4/20/17   Reported, Patient   atenolol (TENORMIN) 100 MG tablet Take 1 tablet (100 mg) by mouth daily 8/4/20   Darrin Stephen MD   Cholecalciferol (VITAMIN D-3) 125 MCG (5000 UT) TABS Take 1 tablet by mouth daily 2/11/21   Darrin Stephen MD   divalproex sodium delayed-release (DEPAKOTE) 250 MG DR tablet TAKE 1 TABLET BY MOUTH TWICE A DAY AS NEEDED 10/30/19   Reported, Patient   fluticasone (FLONASE) 50 MCG/ACT spray Spray 2 sprays into both nostrils daily as needed     Reported, Patient   hydrOXYzine (VISTARIL) 25 MG capsule Take 1 capsule (25 mg) by mouth 3 times daily as needed for other (nausea) 12/16/20   Darrin Stephen MD   ibuprofen (ADVIL/MOTRIN) 800 MG tablet TAKE 1 TABLET (800 MG) BY MOUTH EVERY 8 HOURS AS NEEDED FOR MODERATE PAIN 9/21/21   Darrin Stephen MD   oxyCODONE IR (ROXICODONE) 10 MG tablet Take 1 tablet (10 mg) by mouth every 4 hours as needed for moderate to severe pain Maximum 6 tab per day 2/11/21   Darrin Stephen MD   tiZANidine (ZANAFLEX) 4 MG tablet Take 1 tablet (4 mg) by mouth 3 times daily as needed for muscle spasms 12/16/20   Darrin Stephen MD     Allergies   Allergen Reactions     Arthrotec GI Disturbance     Indomethacin      Other reaction(s): Dizziness     Meloxicam GI Disturbance     Moxifloxacin      Past Medical " History:   Diagnosis Date     Acquired absence of other organs (CODE)     9/2/2011     Chronic pain syndrome     No Comments Provided     Complex regional pain syndrome I     left ankle     Encounter for other administrative examinations     10/2012,Hydrocodone 10/325mg, #240/mo signed 10/3/12, updated 10/1/ 14     Encounter for other administrative examinations     10/1/2014,MS Contin 30 mg q 8 hours #90 per month.  Morphine sulfate IR 15 mg 2-3 tab tid prn #180 per month     Gastro-esophageal reflux disease without esophagitis     No Comments Provided     Injury of left ankle     1997,requiring surgery     Low back pain     No Comments Provided     Major depressive disorder, recurrent severe without psychotic features (H)     No Comments Provided     Migraine without status migrainosus, not intractable     No Comments Provided     Nicotine dependence, uncomplicated     No Comments Provided     Obesity     No Comments Provided     Overweight     11/25/2014     Pain in ankle     Chronic left ankle pain secondary to reflex sympathetic dystrophy.  Patient  has had 4 previous surgeries, the most recent one done by Dr. Noriega in  2007. On Narcotic contract.     Pain in thoracic spine     No Comments Provided     Panic disorder without agoraphobia     Dr Reynoso     Personal history of other diseases of the female genital tract     No Comments Provided     Personal history of other medical treatment (CODE)     G2, P1-0-1-1 with one vaginal delivery.     Supraventricular tachycardia (H)     5/24/2010     Past Surgical History:   Procedure Laterality Date     ANKLE SURGERY      1997, 2000,Left ankle surgery x 2     HYSTERECTOMY TOTAL ABDOMINAL      11/1999,secondary to endometriosis, no malignancy; patient reports no malignancy, but abnormal cells were present *     LAPAROSCOPIC CHOLECYSTECTOMY      No Comments Provided     LAPAROSCOPY DIAGNOSTIC (GENERAL)      1995     OTHER SURGICAL HISTORY       21299,CRANIO/MAXILLOFACIAL SURGERY,Jaw Surgery     OTHER SURGICAL HISTORY      207040,[facility] EMG,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  No other abnormalities noted.       Current Medications   Medications Prior to Admission   Medication Sig Dispense Refill Last Dose     albuterol (PROAIR HFA/PROVENTIL HFA/VENTOLIN HFA) 108 (90 Base) MCG/ACT inhaler Inhale 2 puffs into the lungs every 4 hours as needed for shortness of breath / dyspnea or wheezing 1 Inhaler 11 More than a month at Unknown time     ALPRAZolam (XANAX) 2 MG tablet Take 2 mg by mouth 3 times daily as needed    Past Week at Unknown time     amphetamine-dextroamphetamine (ADDERALL) 30 MG per tablet Take 30 mg by mouth 2 times daily    Past Week at Unknown time     atenolol (TENORMIN) 100 MG tablet Take 1 tablet (100 mg) by mouth daily 90 tablet 3 Past Week at Unknown time     Cholecalciferol (VITAMIN D-3) 125 MCG (5000 UT) TABS Take 1 tablet by mouth daily 90 tablet 11 Past Week at Unknown time     divalproex sodium delayed-release (DEPAKOTE) 250 MG DR tablet Take 250 mg by mouth 2 times daily   0 Past Week at Unknown time     fluticasone (FLONASE) 50 MCG/ACT spray Spray 2 sprays into both nostrils daily as needed    More than a month at Unknown time     hydrOXYzine (VISTARIL) 25 MG capsule Take 1 capsule (25 mg) by mouth 3 times daily as needed for other (nausea) 60 capsule 11 Unknown at Unknown time     ibuprofen (ADVIL/MOTRIN) 800 MG tablet TAKE 1 TABLET (800 MG) BY MOUTH EVERY 8 HOURS AS NEEDED FOR MODERATE PAIN 90 tablet 4 Unknown at Unknown time     oxyCODONE IR (ROXICODONE) 10 MG tablet Take 1 tablet (10 mg) by mouth every 4 hours as needed for moderate to severe pain Maximum 6 tab per day 168 tablet 0 More than a month at Unknown time     tiZANidine (ZANAFLEX) 4 MG tablet Take 1 tablet (4 mg) by mouth 3 times daily as needed for muscle spasms 60 tablet 11 Past Week at Unknown time          Past Psychiatric Medications Tried  "    Xanax  adderall      Physical Exam/ROS:     Please refer to the physical exam and review of systems completed by gurinder granger   on 10/20/21. No Further issues of concern noted           MSE/PSYCH  PSYCHIATRIC EXAM  MSE/PSYCH  PSYCHIATRIC EXAM  /65   Pulse 81   Temp 98.6  F (37  C) (Temporal)   Resp 16   Ht 1.753 m (5' 9\")   Wt 61.6 kg (135 lb 11.2 oz)   LMP  (LMP Unknown)   SpO2 98%   BMI 20.04 kg/m    -Appearance/Behavior: disheveled  -Motor: intact  -Gait: intact  -Abnormal involuntary movements: none  -Mood: labile  -Affect: expansive  -Speech: pressurred    -Thought process/associations: tangential  -Thought content:  hallucinations though likely present. Grandiose  -Perceptual disturbances: denies hallucinations.              -Suicidal/Homicidal Ideation: denies any   -Judgment: poor  -Insight: poor  *Orientation: time, place and person.  *Memory: difficult to assess due to emiliano  *Attention: poor  *Language: fluent, no aphasias, able to repeat phrases and name objects. Vocab intact.  *Fund of information: difficult to assess due to emiliano  *Cognitive functioning estimate: 0 - independent.       Labs:       Ammonia was ordered upon admission though unremarkable.     Initial creatinine was elevated 1.6 though improved in less than 24 hours. I suspect this was due to dehydration.     Potassium was low 3.1 likely though cmp unremarkable otherwise    Cbc unremarkable                 Assessment/Impression: This is a 51 year old yo female with a new onset of likely delirium. She is prescribed 2 mg of xanax tid and states \"I typically run out a week early\". She has never had a hospitalization in the past. Has never presented with psychosis in the past or any disorganization. She has a marked body tremor even after taking two doses of phenobarbital though I did increase the dose which will likely help her improve faster.     Educated regarding medication indications, risks, benefits, side effects, " contraindications and possible interactions. Verbally expressed understanding.     DX:        Unspecified psychosis  Delirium likely secondary to benzodiazapine withdrawal  R/o Brief psychotic disorder      Plan:     Labs Needed:      potassium    Med Changes:      Stop adderall and xanax    increase phenobarbital 64.8 tid . May need increased dose if delirium still present.     Stop depakote EC and  Start depakote  mg hs    DC Planning:      Will return home once stable.     Will not continue stimulants at this time    Anticipated length of stay  3 days

## 2021-10-19 NOTE — ED PROVIDER NOTES
"  History     Chief Complaint   Patient presents with     Fall     Altered Mental Status     HPI  Kym Perea is a 51 year old female who is brought into the emergency department by a bystander who saw her walking on the street, he was worried she was acting strangely like maybe she was \"on something\".  He thusly brought her to the emergency department.  Patient states she may have fallen she cannot really remember.  Patient reports some mild pain in her right shoulder but otherwise did not endorse any pain.    We do have some secondary history from the boyfriend that she has been \"taking too much Xanax \"so he locked her out of the house.    Reviewed nurses notes below, similar history is related to me.   Pt arrives to the ER today via private vehicle. A bystander found pt walking on the side of the road, acting unusual. He stopped to ask her if she needed help and she got into his vehicle. Per bystander, pt was \"acting like she was on something and wasn't making sense\". He bought her here.     Pt arrives alert, orientated to self. She is restless. She is unable to recall any of the events that brought her into the ER tonight. She doesn't know where she is. Her speech is tangential and she is not making any sense. When asked what she was doing today she said \"Grouse hunting\". When asked about drugs and alcohol she says no. Pt states she does not take medication as home because \"Dr. Barney will give her tylenol and it doesn't make any sense to her\". She is corporative with staff. Does not appear to be in any physical distress.  Pt is brought back to the ER for further evaluation.She is unsteady on her feet, tremulous.      Arrival Info    Allergies:  Allergies   Allergen Reactions     Arthrotec GI Disturbance     Indomethacin      Other reaction(s): Dizziness     Meloxicam GI Disturbance     Moxifloxacin        Problem List:    Patient Active Problem List    Diagnosis Date Noted     Medical cannabis use " 07/10/2018     Priority: Medium     Pain in joint, ankle and foot 01/29/2018     Priority: Medium     Overview:   Chronic left ankle pain secondary to reflex sympathetic dystrophy.  Patient   has had 4 previous surgeries, the most recent one done by Dr. Noriega in   2007. On Narcotic contract.       Chronic pain disorder 01/29/2018     Priority: Medium     Overview:   complex regional pain syndrome left ankle       Esophageal reflux 01/29/2018     Priority: Medium     Hyperlipidemia 01/29/2018     Priority: Medium     Insomnia 01/29/2018     Priority: Medium     Panic disorder 01/29/2018     Priority: Medium     Overview:   Previously patient of Dr. Reynoso since her teens. Patient is attempting slow taper down on her Xanax. She's been on Xanax 2 mg 4 times a day for years       Tobacco abuse 01/29/2018     Priority: Medium     Influenza A 01/11/2018     Priority: Medium     Pain medication agreement, 4/18/18, 4/3/19, 10/27/2020 04/21/2017     Priority: Medium     Overview:   4/20/17:  Oxycodone 10 mg #180/mo    Hydrocodone 10/325mg, #240/mo. Stopped and changed to morphine. Agreement signed 10/3/12, updated 10/1/ 14.  Complex regional pain syndrome (aka Reflex Sympathetic Dystrophy)  MS Contin 30 mg q 8 hours #90 per month.  Morphine sulfate IR 15 mg 2-3 tab tid prn #180 per month, decreased to #90/month.   query 4/9/16 as part of chart review. Seems appropriate. 6/2/16 tapered and off Morphine. [place] 5/325 #120/month.  query appropriate. ToxAssure appropriate.    August 2016 Consultation with [facility]. Recommended spinal cord stimulator trial, but she is reluctant to pursue that. Pain clinic stated that continuing opioids was OK.       Complex regional pain syndrome type 1 affecting left lower leg 04/15/2016     Priority: Medium     Overview:   Patient with a history of complex regional pain syndrome has seen Dr. Dino Skelton4/23/16        Migraine headache 11/25/2014     Priority:  Medium     Overweight (BMI 25.0-29.9) 11/25/2014     Priority: Medium     Lumbago 02/06/2012     Priority: Medium     Major depressive disorder, recurrent episode, severe (H) 07/13/2011     Priority: Medium     Paroxysmal supraventricular tachycardia (H) 05/24/2010     Priority: Medium        Past Medical History:    Past Medical History:   Diagnosis Date     Acquired absence of other organs (CODE)      Chronic pain syndrome      Complex regional pain syndrome I      Encounter for other administrative examinations      Encounter for other administrative examinations      Gastro-esophageal reflux disease without esophagitis      Injury of left ankle      Low back pain      Major depressive disorder, recurrent severe without psychotic features (H)      Migraine without status migrainosus, not intractable      Nicotine dependence, uncomplicated      Obesity      Overweight      Pain in ankle      Pain in thoracic spine      Panic disorder without agoraphobia      Personal history of other diseases of the female genital tract      Personal history of other medical treatment (CODE)      Supraventricular tachycardia (H)        Past Surgical History:    Past Surgical History:   Procedure Laterality Date     ANKLE SURGERY      1997, 2000,Left ankle surgery x 2     HYSTERECTOMY TOTAL ABDOMINAL      11/1999,secondary to endometriosis, no malignancy; patient reports no malignancy, but abnormal cells were present *     LAPAROSCOPIC CHOLECYSTECTOMY      No Comments Provided     LAPAROSCOPY DIAGNOSTIC (GENERAL)      1995     OTHER SURGICAL HISTORY      21299,CRANIO/MAXILLOFACIAL SURGERY,Jaw Surgery     OTHER SURGICAL HISTORY      207040,[facility] EMG,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  No other abnormalities noted.       Family History:    Family History   Problem Relation Age of Onset     Cancer Father         Cancer,Bladder     Other - See Comments Father         Mild hypercholesterolemia     Other - See Comments  Mother         scleroderma/lupus/Parkinson's syndrome     Family History Negative Brother         Good Health     Ariel-Danlos syndrome Daughter      Colon Cancer Maternal Grandmother 40        Cancer-colon,Followed by lung  with metastasis to the bone di*       Social History:  Marital Status:   [4]  Social History     Tobacco Use     Smoking status: Current Every Day Smoker     Packs/day: 0.50     Years: 30.00     Pack years: 15.00     Types: Cigarettes     Start date: 12/2/1986     Smokeless tobacco: Never Used     Tobacco comment: trying   Substance Use Topics     Alcohol use: Not Currently     Alcohol/week: 0.0 standard drinks     Comment: OCCASIONAL WINE     Drug use: No     Comment: medical cannabis        Medications:    albuterol (PROAIR HFA/PROVENTIL HFA/VENTOLIN HFA) 108 (90 Base) MCG/ACT inhaler  ALPRAZolam (XANAX) 2 MG tablet  amphetamine-dextroamphetamine (ADDERALL) 30 MG per tablet  atenolol (TENORMIN) 100 MG tablet  Cholecalciferol (VITAMIN D-3) 125 MCG (5000 UT) TABS  divalproex sodium delayed-release (DEPAKOTE) 250 MG DR tablet  fluticasone (FLONASE) 50 MCG/ACT spray  hydrOXYzine (VISTARIL) 25 MG capsule  ibuprofen (ADVIL/MOTRIN) 800 MG tablet  oxyCODONE IR (ROXICODONE) 10 MG tablet  oxyCODONE IR (ROXICODONE) 10 MG tablet  tiZANidine (ZANAFLEX) 2 MG tablet  tiZANidine (ZANAFLEX) 4 MG tablet  VITAMIN D, CHOLECALCIFEROL, PO          Review of Systems   HENT: Negative for congestion.    Respiratory: Negative for chest tightness.    Psychiatric/Behavioral: Positive for agitation and confusion. Negative for hallucinations and suicidal ideas. The patient is nervous/anxious.        Physical Exam   BP: 129/89  Pulse: 74  Temp: 97.5  F (36.4  C)  Resp: 16  Weight: 84.8 kg (187 lb)  SpO2: 99 %      Physical Exam  Vitals and nursing note reviewed.   Constitutional:       General: She is not in acute distress.     Appearance: She is not diaphoretic.   HENT:      Head: Atraumatic.      Mouth/Throat:       Pharynx: No oropharyngeal exudate.   Eyes:      General: No scleral icterus.     Pupils: Pupils are equal, round, and reactive to light.   Cardiovascular:      Heart sounds: Normal heart sounds.   Pulmonary:      Effort: No respiratory distress.      Breath sounds: Normal breath sounds.   Abdominal:      General: Bowel sounds are normal.      Palpations: Abdomen is soft.      Tenderness: There is no abdominal tenderness.   Musculoskeletal:         General: No tenderness.   Skin:     General: Skin is warm.      Findings: No rash.   Neurological:      GCS: GCS eye subscore is 4. GCS verbal subscore is 5. GCS motor subscore is 5.   Psychiatric:         Attention and Perception: She is inattentive.         Speech: Speech is tangential.         Behavior: Behavior is agitated.         Thought Content: Thought content is delusional. Thought content includes suicidal ideation. Thought content includes suicidal plan.         Cognition and Memory: She exhibits impaired recent memory.         Judgment: Judgment is inappropriate.         ED Course        Procedures      Results for orders placed or performed during the hospital encounter of 10/18/21 (from the past 24 hour(s))   CBC with platelets differential    Narrative    The following orders were created for panel order CBC with platelets differential.  Procedure                               Abnormality         Status                     ---------                               -----------         ------                     CBC with platelets and d...[[id number]]  Abnormal            Final result                 Please view results for these tests on the individual orders.   Comprehensive metabolic panel   Result Value Ref Range    Sodium 140 134 - 144 mmol/L    Potassium 4.0 3.5 - 5.1 mmol/L    Chloride 106 98 - 107 mmol/L    Carbon Dioxide (CO2) 21 21 - 31 mmol/L    Anion Gap 13 3 - 14 mmol/L    Urea Nitrogen 13 7 - 25 mg/dL    Creatinine 1.63 (H) 0.60 - 1.20 mg/dL     Calcium 9.9 8.6 - 10.3 mg/dL    Glucose 111 (H) 70 - 105 mg/dL    Alkaline Phosphatase 55 34 - 104 U/L    AST 24 13 - 39 U/L    ALT 20 7 - 52 U/L    Protein Total 7.2 6.4 - 8.9 g/dL    Albumin 4.7 3.5 - 5.7 g/dL    Bilirubin Total 0.4 0.3 - 1.0 mg/dL    GFR Estimate 36 (L) >60 mL/min/1.73m2   Troponin I   Result Value Ref Range    Troponin I 5.2 0.0 - 34.0 pg/mL   Ethanol GH   Result Value Ref Range    Alcohol ethyl <0.01 <=0.01 g/dL   CBC with platelets and differential   Result Value Ref Range    WBC Count 15.9 (H) 4.0 - 11.0 10e3/uL    RBC Count 4.18 3.80 - 5.20 10e6/uL    Hemoglobin 13.2 11.7 - 15.7 g/dL    Hematocrit 37.5 35.0 - 47.0 %    MCV 90 78 - 100 fL    MCH 31.6 26.5 - 33.0 pg    MCHC 35.2 31.5 - 36.5 g/dL    RDW 12.8 10.0 - 15.0 %    Platelet Count 233 150 - 450 10e3/uL    % Neutrophils 70 %    % Lymphocytes 23 %    % Monocytes 6 %    % Eosinophils 0 %    % Basophils 0 %    % Immature Granulocytes 1 %    NRBCs per 100 WBC 0 <1 /100    Absolute Neutrophils 11.1 (H) 1.6 - 8.3 10e3/uL    Absolute Lymphocytes 3.7 0.8 - 5.3 10e3/uL    Absolute Monocytes 0.9 0.0 - 1.3 10e3/uL    Absolute Eosinophils 0.0 0.0 - 0.7 10e3/uL    Absolute Basophils 0.1 0.0 - 0.2 10e3/uL    Absolute Immature Granulocytes 0.1 (H) <=0.0 10e3/uL    Absolute NRBCs 0.0 10e3/uL   CT Head w/o Contrast    Narrative    PROCEDURE: CT HEAD W/O CONTRAST     HISTORY: Mental status change, unknown cause.    COMPARISON: None.    TECHNIQUE:  Helical images of the head from the foramen magnum to the  vertex were obtained without contrast.    FINDINGS: The ventricles and sulci are normal in volume. No acute  intracranial hemorrhage, mass effect, midline shift, hydrocephalus or  basilar cystern effacement are present.    The grey-white matter interface is preserved.    The calvarium is intact. The mastoid air cells are clear.  The  visualized paranasal sinuses are clear.      Impression    IMPRESSION: Normal brain      DAISHA MONIQUE MD          SYSTEM ID:  [id number]   XR Shoulder Right 2 Views    Narrative    PROCEDURE:  XR SHOULDER 2 VIEW RIGHT    HISTORY: fall pain    COMPARISON:  None.    TECHNIQUE:  2 views of the right shoulder were obtained.    FINDINGS:  No fracture or dislocation is identified. The joint spaces  are preserved.        Impression    IMPRESSION: No acute fracture.      DAISHA MONIQUE MD         SYSTEM ID:  [id number]   Urine Drugs of Abuse Screen    Narrative    The following orders were created for panel order Urine Drugs of Abuse Screen.  Procedure                               Abnormality         Status                     ---------                               -----------         ------                     Urine Drugs of Abuse Scr...[[id number]]  Abnormal            Final result                 Please view results for these tests on the individual orders.   Urine Drugs of Abuse Screen Panel 13   Result Value Ref Range    Cannabinoids (02-huw-6-carboxy-9-THC) Presumptive positive-Unconfirmed result (A) Not Detected    Phencyclidine Not Detected Not Detected    Cocaine (Benzoylecgonine) Not Detected Not Detected    Methamphetamine (d-Methamphetamine) Not Detected Not Detected    Opiates (Morphine) Not Detected Not Detected    Amphetamine (d-Amphetamine) Not Detected Not Detected    Benzodiazepines (Nordiazepam) Not Detected Not Detected    Tricyclic Antidepressants (Desipramine) Not Detected Not Detected    Methadone Not Detected Not Detected    Barbiturates (Butalbital) Not Detected Not Detected    Oxycodone Not Detected Not Detected    Propoxyphene (Norpropoxyphene) Not Detected Not Detected    Buprenorphine Not Detected Not Detected   UA reflex to Microscopic   Result Value Ref Range    Color Urine Yellow Colorless, Straw, Light Yellow, Yellow    Appearance Urine Slightly Cloudy (A) Clear    Glucose Urine 50  (A) Negative mg/dL    Bilirubin Urine Negative Negative    Ketones Urine Trace (A) Negative mg/dL    Specific Gravity  Urine 1.016 1.000 - 1.030    Blood Urine Small (A) Negative    pH Urine 6.0 5.0 - 9.0    Protein Albumin Urine 50  (A) Negative mg/dL    Urobilinogen Urine Normal Normal, 2.0 mg/dL    Nitrite Urine Negative Negative    Leukocyte Esterase Urine Negative Negative    Bacteria Urine Few (A) None Seen /HPF    RBC Urine 4 (H) <=2 /HPF    WBC Urine 22 (H) <=5 /HPF    Squamous Epithelials Urine 23 (H) <=1 /HPF    Mucus Urine Present (A) None Seen /LPF    Hyaline Casts Urine 11 (H) <=2 /LPF    Cellular Casts Urine 11 (H) None Seen /LPF       Medications   0.9% sodium chloride BOLUS (0 mLs Intravenous Stopped 10/18/21 2226)     Followed by   sodium chloride 0.9% infusion ( Intravenous New Bag 10/18/21 2247)       Assessments & Plan (with Medical Decision Making)     I have reviewed the nursing notes.    I have reviewed the findings, diagnosis, plan and need for follow up with the patient.    New Prescriptions    No medications on file     Differential diagnosis is broad and includes trauma or medical related  acute delirium, withdrawal syndrome, acute intoxication, primary psychiatric issue, early onset dementia among others.  No suicidal or homicidal mediation.  Patient is quite tangential.  Possibly delusional.  I do not appreciate shantal hallucinations.  Will have patient evaluated by the [facility]  to see if that is any assistance in placement.  The father did come to give us some insight into her baseline and he states that he be comfortable taking her home however she definitely is not acting at baseline.  I have identified no medical emergency or contraindication inpatient psychiatric treatment if that is what is required.  Urinalysis is abnormal but looks frankly contaminated.  Drug screen positive only for cannabis.  Head imaging reassuring against significant trauma.  Slight increase in creatinine, IV fluids administered here in the emergency department.  She did report some shoulder pain so I did x-ray that  shoulder which is reassuring and I have low clinical suspicion for occult fracture.    6:14 AM--nurse is currently speaking with the [facility]  giving them background.  Anticipate having full assessment shortly.  I will need to sign the patient out to Dr. Nunez for day shift, please see his note for final disposition.    6:50 AM--pt just finished her deck assessment. Keiko from the [facility] called me after the evaluation and is recommending inpatient psychiatric treatment. Patient was having shantal hallucinations during their assessment. I certainly agree with this and given the fact that she was found walking down the shoulder of a busy road I believe that she would be an imminent danger to self if we released her. She is thusly put on a hold.  Final diagnoses:   Abnormal behavior       10/18/2021   [facility] AND \[facility] [place] [facility]\""     Aleksandr Bundy MD  10/19/21 0615       Aleksandr Bundy MD  10/19/21 0616       Aleksandr Bundy MD  10/19/21 0651

## 2021-10-19 NOTE — PHARMACY-ADMISSION MEDICATION HISTORY
Pharmacy -- Admission Medication Reconciliation    Prior to admission (PTA) medications were reviewed and the patient's PTA medication list was updated.    Sources Consulted: patient interview, jaya orr, [place] , chart review    The reliability of this Medication Reconciliation is: Reliability: Borderline reliable    The following significant changes were made:    Updated adderall directions    Added fluticasone directions    Removed tizanidine 2 mg rx    Removed vitamin d 1000 unit rx    **patient states no doses at home since last Friday    **multiple times during interview, patient would talk about something unrelated when trying to answer a medication question.  Had to be re-directed, therefore this interview is borderline reliable.    In addition, the patient's allergies were reviewed with the patient and updated as follows:   Allergies: Arthrotec, Indomethacin, Meloxicam, and Moxifloxacin    The pharmacist has reviewed with the patient that all personal medications should be removed from the building or locked in the belongings safe.  Patient shall only take medications ordered by the physician and administered by the nursing staff.       Medication barriers identified: unable to assess   Medication adherence concerns: unable to assess   Understanding of emergency medications: unable to assess    Alma Kay RPH, 10/19/2021,  8:28 AM

## 2021-10-19 NOTE — SAFE
"Kym Perea  October 19, 2021  SAFE Note    Critical Safety Issues: Pt may be experiencing hallucinations at this time, is tangential in thought and unable to identify why she is in the ED, how she got here and what the date is today. Pt seems agreeable to IP MH placement at this time, if she were to change her mind, a hold would be placed on pt at that time.       Current Suicidal Ideation/Self-Injurious Concerns/Methods: Other Pt is unable to fully participate in an assessment to determine whether she has suicdal thoguhts or plans to harm herself. Pt reports she had recent \"attempts\" but was unable to provide detail.      Current or Historical Inappropriate Sexual Behavior: Unknown      Current or Historical Aggression/Homicidal Ideation: Unknown      Triggers: Unknown. Pt is pleasant and redirectable.    Updated care team: Yes: Dr Bundy and Saadia from [facility] given clinical update.    For additional details see full [facility] assessment.       Clarisse Rodriguez, [facility] HospitalSW        "
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
## 2021-10-19 NOTE — TELEPHONE ENCOUNTER
"Patient cleared and ready for behavioral bed placement: Yes   S: 0656, DEC call, 51/F, [facility] [place] ED, psychosis    B: Hx of panic attacks. BIB a bystander after pt was found walking and acting usual on the side of the road. Pt presents as restless, tangential and confused in the ED. Pt does not know where she is and when asked what she was doing PTA, pt responded, \"grouse hunting.\" Pt denies drug/etoh use. Pt's father expressed concerns that pt may be taking too much of her prescribed xanax. No known prior Inpt MH admissions. Pt denies HI and aggression. Pt is calm and cooperative.    A: Voluntary, but holdable  No acute medical concerns. Ambulates independently  Covid test needs to be ordered/collected - Called ED @ 0703  UDS: cannabinoids    R: Pt placed on work list until and intake assessing appropriate placement options        "

## 2021-10-19 NOTE — ED NOTES
"Pt c/o Right shoulder pain from a fall. She is unable to recall the events of this fall. When asked pt said \"It was when I left here\" and \"It was at the house. I don't know\". She does not know when, how, and if she hit her head.  "

## 2021-10-19 NOTE — ED NOTES
Pt up to the commode with staff. She is unable to void, states she is too nervous. She has tremors and is slightly unsteady on her feet.     When asked if she can tell the nurse where she think she is pt started to talk about [address] and started to name off various people she knew.   Pt was talking to the nurse about an interview, and she was unsure if she was going to hear about the job. She then started talking about the helicopter service in [facility] and asked if there was a problem with how it was ran with the hospital.

## 2021-10-19 NOTE — ED TRIAGE NOTES
"Pt arrives to the ER today via private vehicle. A bystander found pt walking on the side of the road, acting unusual. He stopped to ask her if she needed help and she got into his vehicle. Per bystander, pt was \"acting like she was on something and wasn't making sense\". He bought her here.    Pt arrives alert, orientated to self. She is restless. She is unable to recall any of the events that brought her into the ER tonight. She doesn't know where she is. Her speech is tangential and she is not making any sense. When asked what she was doing today she said \"Grouse hunting\". When asked about drugs and alcohol she says no. Pt states she does not take medication as home because \"Dr. Barney will give her tylenol and it doesn't make any sense to her\". She is corporative with staff. Does not appear to be in any physical distress.  Pt is brought back to the ER for further evaluation.She is unsteady on her feet, tremulous.   "
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
## 2021-10-19 NOTE — TELEPHONE ENCOUNTER
R:  HUI Segal/Rebman    Covid Test negative.  Looked for available beds    2:00 PM Called [facility] ED and confirmed that [place] is acceptable to pt.      2:22 Called [facility] provider [facility] and discussed Pt.  Provider noticed  UTI and renal function issues.  Provider asked for clarification on renal by a new CMP since she's been on fluids.     2:32 Called [facility] ED and asked for new CMP and follow up on UTI issues.  ED will put in orders.    4:13 Called [facility] Provider and discussed new CMP and the GFR scores are better.  [facility] accepted for [facility].    Updated Worklist and Admit Board    4:20 PM Updated [facility] and emailed [facility]    4:26 PM Update [facility] ED./

## 2021-10-19 NOTE — ED PROVIDER NOTES
Continuation of care note  ED Course as of Oct 19 1639   Tue Oct 19, 2021   0707 I assumed care of patient from Dr. Tuttle at shift change; in brief this is a 51-year-old woman found by passerby walking down the side of the road, found to be pleasantly confused and actively hallucinating on arrival (states she sees her mother on the computer in the ER room). Work-up notable for positive THC, CT head negative; patient had reported possible fall with shoulder pain, x-ray negative. Very poor urine specimen with 22 WBC's but 23 squams; negative nitrites and leuk est, low suspicion for UTI. CBC with mild leukocytosis, likely stress demargination. No fever, low suspicion for occult infection. Creatinine elevated consistent with acute kidney injury, suspected hypovolemia, got fluid bolus. DEC evaluated patient and recommended inpatient behavioral health. Patient on 72-hour hold, has been cooperative here      0818 Rapid COVID negative      1005 Patient attempted to get up and leave, not combatted but aggressive behavior, refusing cares; will give 10 mg IM zyprexa given high risk of escalation and continue to monitor      1636 Repeat creatinine improving, patient accepted to [facility] in [place], [place] for inpatient behavioral health, will arrange transfer        Clinical impression:  1. Abnormal behavior  2. Hallucinations  3. Acute kidney injury       Andrez Mccauley MD  10/19/21 4889

## 2021-10-19 NOTE — ED NOTES
"Pt found standing up in room,  When approached patient stated she had to leave.  This writer asked what pt had to leave for and patient was stating \" I have to get to my kids, you took them from me.  I need to get to them.\"  Pt was told she cannot leave and that she was on a hold.  Pt did not seem to understand instruction and continued to say she had to leave and became agitated with nurse, was gripping onto door frame, and yelling at nurse.  Pt then sat at edge of bed but would not lay back, staff put pt back in the bed, IM medication given.  Sitter now with patient.  Room darkened and patient resting with eyes closed.   "

## 2021-10-20 LAB — POTASSIUM BLD-SCNC: 3.3 MMOL/L (ref 3.4–5.3)

## 2021-10-20 PROCEDURE — 250N000013 HC RX MED GY IP 250 OP 250 PS 637: Performed by: NURSE PRACTITIONER

## 2021-10-20 PROCEDURE — 99222 1ST HOSP IP/OBS MODERATE 55: CPT | Performed by: NURSE PRACTITIONER

## 2021-10-20 PROCEDURE — 99223 1ST HOSP IP/OBS HIGH 75: CPT | Performed by: NURSE PRACTITIONER

## 2021-10-20 PROCEDURE — 36415 COLL VENOUS BLD VENIPUNCTURE: CPT | Performed by: NURSE PRACTITIONER

## 2021-10-20 PROCEDURE — 84132 ASSAY OF SERUM POTASSIUM: CPT | Performed by: NURSE PRACTITIONER

## 2021-10-20 PROCEDURE — 124N000001 HC R&B MH

## 2021-10-20 RX ORDER — PANTOPRAZOLE SODIUM 20 MG/1
20 TABLET, DELAYED RELEASE ORAL
Status: DISCONTINUED | OUTPATIENT
Start: 2021-10-21 | End: 2021-10-22 | Stop reason: HOSPADM

## 2021-10-20 RX ORDER — PHENOBARBITAL 32.4 MG/1
64.8 TABLET ORAL 3 TIMES DAILY
Status: DISCONTINUED | OUTPATIENT
Start: 2021-10-20 | End: 2021-10-22 | Stop reason: HOSPADM

## 2021-10-20 RX ORDER — IBUPROFEN 400 MG/1
400 TABLET, FILM COATED ORAL EVERY 6 HOURS PRN
Status: DISCONTINUED | OUTPATIENT
Start: 2021-10-20 | End: 2021-10-22 | Stop reason: HOSPADM

## 2021-10-20 RX ORDER — PANTOPRAZOLE SODIUM 40 MG/1
40 TABLET, DELAYED RELEASE ORAL
Status: DISCONTINUED | OUTPATIENT
Start: 2021-10-21 | End: 2021-10-20

## 2021-10-20 RX ORDER — POTASSIUM CHLORIDE 1500 MG/1
20 TABLET, EXTENDED RELEASE ORAL ONCE
Status: COMPLETED | OUTPATIENT
Start: 2021-10-20 | End: 2021-10-20

## 2021-10-20 RX ORDER — ALBUTEROL SULFATE 90 UG/1
2 AEROSOL, METERED RESPIRATORY (INHALATION) EVERY 4 HOURS PRN
Status: DISCONTINUED | OUTPATIENT
Start: 2021-10-20 | End: 2021-10-22 | Stop reason: HOSPADM

## 2021-10-20 RX ADMIN — DIVALPROEX SODIUM 750 MG: 500 TABLET, FILM COATED, EXTENDED RELEASE ORAL at 20:14

## 2021-10-20 RX ADMIN — PHENOBARBITAL 64.8 MG: 32.4 TABLET ORAL at 13:16

## 2021-10-20 RX ADMIN — PHENOBARBITAL 64.8 MG: 32.4 TABLET ORAL at 20:14

## 2021-10-20 RX ADMIN — PHENOBARBITAL 32.4 MG: 32.4 TABLET ORAL at 08:22

## 2021-10-20 RX ADMIN — Medication 3 MG: at 00:37

## 2021-10-20 RX ADMIN — OLANZAPINE 5 MG: 5 TABLET, ORALLY DISINTEGRATING ORAL at 00:37

## 2021-10-20 RX ADMIN — POTASSIUM CHLORIDE 20 MEQ: 1500 TABLET, EXTENDED RELEASE ORAL at 12:31

## 2021-10-20 RX ADMIN — Medication 3 MG: at 20:14

## 2021-10-20 ASSESSMENT — ACTIVITIES OF DAILY LIVING (ADL)
DRESS: SCRUBS (BEHAVIORAL HEALTH);INDEPENDENT
HYGIENE/GROOMING: INDEPENDENT
ORAL_HYGIENE: INDEPENDENT
LAUNDRY: UNABLE TO COMPLETE
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
## 2021-10-20 NOTE — PLAN OF CARE
"Face to face start of shift report received from RN.  Patient in bed at this time.   Patient requests shower supplies.  Patient eats 25% of dinner.   Patient requesting medication.  \"I would like some medication to help me feel less dizzy so I don't fall over in the shower\"  Looked over patient's medications, reported to patient there is no medication's such as she is requesting that can be given at this time.  Patient talks of her Xanax and Percocet she was once prescribed and how she should be receiving the medications that are prescribed to her.      Assured patient her medications are taken into consideration and she is taking medications currently to help alleviate her symptoms.    Patients speech is rapid and pressured.  Patient pauses for a moment and states \"I need to slow down so I don't stutter\"     Patient would like to watch tv, brought back remote turned on tv and patient states \"no thank you I want quiet now\"  Patient asks how long she's been on the unit.  When informed of this, patient responds \"oh I must just be confused I thought I had been here a few days, it must just be when the staff is changing\"    Patient easily distracted in conversation.     Patient makes multiple complaints of dry lips.  Patient given chap stick.  Encouraged fluid intake.       PRN:  Melatonin 3 mg given @ 2014 per patient request, for sleep.     Face to face end of shift report communicated to oncoming shift.     Daniela Aguilera RN  10/20/2021  11:07 PM            Problem: Behavioral Health Plan of Care  Goal: Patient-Specific Goal (Individualization)  Description: Pt. Will:  Take medications as prescribed  Cooperate with treatment team recommendations  Attend at least 50% of group therapy sessions  Eat at least 50% of meals  Provide ADL's independently daily  10/19/21: Room in [facility]ICU due to possibility of unpredictable behaviors and confusion. No wean at this time. Treatment team to reassess daily.   Outcome: No " Change

## 2021-10-20 NOTE — PLAN OF CARE
"Problem: Thought Process Alteration  Goal: Optimal Thought Clarity  Outcome: Improving    Pt was very irritable, anxious, paranoid, and confused this morning.  After receiving Phenobarbital Pt's cleared a great deal.     Problem: Behavioral Health Plan of Care  Goal: Patient-Specific Goal (Individualization)  Description: Pt. Will:  Take medications as prescribed  Cooperate with treatment team recommendations  Attend at least 50% of group therapy sessions  Eat at least 50% of meals  Provide ADL's independently daily  10/19/21: Room in [facility]ICU due to possibility of unpredictable behaviors and confusion. No wean at this time. Treatment team to reassess daily.   Outcome: Improving  Note: Shift Summery:      Patient's Stated Goal for Shift:  \"Pt did not state a goal\"    Goal Status:  In Process    0730 Face to face rounding complete.  PT introduced to nursing for the shift.    Pt was struggling this morning with severe anxiety, full body tremor, and very irritable.  She was very reluctant to take the Phenobarbital this morning until I gave her teaching on Benzo withdrawal.   She had a significant benefit from this dose of Phenobarbital though with her thinking clearing but still had some tremors.  Pt declined to shower this morning.  She is struggling to eat some foods because of not having her upper and lower dentures.  Pt was asked if her mother could bring them in for her.  She cried and did not respond.  Later she told me that she had lost her lower denture and thought her uppers were in her purse.  Staff checked her purse and could not find it.  Pt has eaten about 50% of her meals and filled out her menu for dinner with staff help.    1500 Face to face end of shift report communicated to Evening shift RN's along with Pt's fall risk.     Ralph Jones RN  10/20/2021       "

## 2021-10-20 NOTE — PROGRESS NOTES
"   10/19/21 6327   Patient Belongings   Did you bring any home meds/supplements to the hospital?  Yes   Disposition of meds  Sent to security/pharmacy per site process   Patient Belongings remains with patient;locker;sent to security per site process   Patient Belongings Remaining with Patient ring;glasses   Patient Belongings Put in Hospital Secure Location (Security or Locker, etc.) cell phone/electronics;clothing;jewelry;keys;money (see comment);purse/wallet;shoes;wallet  (misc change)   Belongings Search Yes   Clothing Search Yes   Second Staff Angeli CLEANING   Comment red lilly charm in white box, misc papers/receipts, [place] 100s reds (10 left), gold pipe, black pen x2, hand creme, black mascara, foldable blue toothbrush, small colgate toothpaste, wet n wild lipstick x2, denture adhesive cream x2, gold dust vaseline, brown makeup pencil, makeup brush, large nail file, tide to go stick, small wite box with single bead, gold bracelet in broken package, empty clear case, small tylenol bottle, book of matches with few missing, aquarelle wet wipes, light blue lighter, liquid lipstick, shea buttter lip balm, small grey flashlight, cheetah print sunglasses, Kapil Bahama perfume, red burse with brown straps, red wallet with straps, misc change, \"Finally you\" soft cover book, black burkinstocks, deoderant, empty pill bottles, grey sweatshirt, dark blue tank top, 2 bras (1 white, 1 white and pink striped), unicorn pajama shorts, white jeans, grey  socks, tan underwear, blue sack, 2 stiff gold-axel bracelets     List items sent to safe: 2 key fobs on key chain with 2 keys, unopened mail from [facility], death certificate application (2 pgs), [facility] [employer] checkbook register with 3 envelopes, check # 6080 with check conversion receipt for $2.84, check # 6047 with check conversion receipt for $12.00 and checkbook with blank checks (#6014-5000), light blue cricket phone in gold/blue splatter case (minimal " scratches, no cracks) with misc papers/cards inside case, smiley take ten punch card x3, vanity VIP card x2, voided check #45607 with check conversion receipt, voided check #53297, check #44752 for $200.00 (non-endorsed), medicare advantage card, [employer] store credit card, MNDL    Contraband: Giant Safety Pin, Black pepper spray in pink keychain case, black pencil sharpener, yellow/orange pocket knife    All other belongings put in assigned cubby in belongings room.     I have reviewed my belongings list on admission and verify that it is correct.     Patient signature_______________________________    Second staff witness (if patient unable to sign) ______________________________     I have received all my belongings at discharge.    Patient signature________________________________    Alpa ORTEGA  10/19/2021  10:53 PM

## 2021-10-20 NOTE — PLAN OF CARE
"Face to face report received from Saadia MARTINEZ. Pt. Observed.    Problem: Behavioral Health Plan of Care  Goal: Patient-Specific Goal (Individualization)  Description: Pt. Will:  Take medications as prescribed  Cooperate with treatment team recommendations  Attend at least 50% of group therapy sessions  Eat at least 50% of meals  Provide ADL's independently daily  10/19/21: Room in [facility]ICU due to possibility of unpredictable behaviors and confusion. No wean at this time. Treatment team to reassess daily.   Outcome: No Change     Pt has been in bed with eyes closed and regular respirations for a total of 0.5 hours this noc shift. Pt. Awake in bed most of noc shift. Pt. Has visible tremor when stretching out arm, VSS as noted in vitals. 15 minute and PRN checks all night. Will continue to monitor.    Face to face end of shift report to be communicated to oncoming RN.     Jeanette Garcia RN  10/20/2021  Pt. Administered PRN Melatonin 3 mg po and zydis 5 mg for increasing agitation. Pt. In room reporting \"I can't get out. I keep hearing all these voices. I don't know where I am.\" Pt. Oriented to place and time. Pt. Reporting she did not know how she got here and was concerned where her medications are.    "

## 2021-10-20 NOTE — PROGRESS NOTES
"Pt referred with malnutrition screen score of 2 - unsure weight loss.    51 yof admitted with suicidal behavior.  Hx: chronic pain, depression, reflux, hyperlipidemia.      Ht-69\", Wt-136#.  IBWR is 131-160#.  BMI is 20.    Review of weight records notes weight is down 10# x since 10/2020, 2/2021.      Regular diet ordered.  No intake recorded thus far.      Labs/meds reviewed.      Current weight is within ideal weight range.  No nutrition interventions indicated at this time.  RD will follow intake/weights during stay and make nutrition recommendations if indicated.    "

## 2021-10-20 NOTE — PLAN OF CARE
Problem: Behavioral Health Plan of Care  Goal: Patient-Specific Goal (Individualization)  Description: Pt. Will:  Take medications as prescribed  Cooperate with treatment team recommendations  Attend at least 50% of group therapy sessions  Eat at least 50% of meals  Provide ADL's independently daily  Outcome: No Change  Note:       ADMISSION NOTE    Reason for admission: Walking along side of road acting bizarre. Disorganized and confused.  Safety concerns risk for self harm due to confusion. Recent fall per ER.  Risk for or history of violence pt denies.   Full skin assessment: No skin issues observed at time of admission    Patient arrived on unit from [facility] ER accompanied by security personnel and EMS personnel from ambulance bay to unit on 10/19/2021  2124 PM.   Status on arrival: irritable at times but mostly cooperative  Patient given tour of unit and Welcome to  unit papers given to patient, wanding completed, belongings inventoried, and admission assessment completed.   Patient's legal status on arrival is 72 hour hold. Appropriate legal rights discussed with and copy given to patient. Patient Bill of Rights discussed with and copy given to patient.   Patient denies SI, HI, and thoughts of self harm and contracts for safety while on unit.      Saadia HARGROVE RN  10/19/2021  10:30 PM    Arrived on unit at 2124 accompanied by security and EMS personnel. Pt irritable but cooperative. Changed into hospital scrubs with multiple prompts and reminders to keep on track. No skin issues observed at time of admission. Pt presents as confused. When shown to bathroom pt walked into shower looking for toilet even though writer had pointed out toilet to pt. Pt tremulous on admission. Gait slow but steady. When questioned if this was normal for her she reported no. When asked questions pt responds with answers that do not pertain to question asked. Example, when asked reason for admission pt informed writer which  "members of her family have gotten COVID. Pt reports that she has not taken her medications for three days. When questioned what medications pt reports \"my valium and oxys.\" When questioned why she had not taken her medications for these days pt reported that she was visiting her daughter in [place], that she had gotten a call from work to come in, and \"never made it back to [place].\" Denies history of MH and/or CD treatment. Denies ETOH and drug use. When asked home pharmacy pt reports \"the one in [employer]'s parking lot\" but was unable to provide name of Pharmacy. Writer questioned if this was [facility], pt stated \"yes\". Writer unable to contact pharmacy due to them being closed. Pt arrived on 72 hour hold. April, CNP updated on pt's presentation and VS. Luminal 32.4 MG TID ordered, to receive first dose when verified.   While staff was going through belongings a capped syringe was found in pt's pants pocket.       Problem: Thought Process Alteration  Goal: Optimal Thought Clarity  Outcome: No Change             Face to face end of shift report to be communicated to oncoming RN.     "

## 2021-10-20 NOTE — PLAN OF CARE
"Social Service Psychosocial Assessment  Presenting Problem: Pt presented to the ED by a bystander who had seen pt wandering the streets and acting strange.     Marital Status:  Was previously .    Spouse / Children: Pt states they have a daughter who is 26 and two grandchildren. Pt states they are close with their daughter and grandchildren.      Psychiatric TX HX: Pt denies any previous inpatient hospitalizations. This is the patients first time on this unit.      Suicide Risk Assessment: Pt denies SI on admit. \"Things just really build up\". Pt denies a hx of suicide attempts or SI. Pt denies SI today.    Access to Lethal Means (explain): Pt denies    Family Psych HX: Pt states their grandpa has schizophrenia.     A & Ox: x4   Medication Adherence: Unknown    Medical Issues: See H&P   Visual -Motor Functioning: Ok, pt has tremble throughout upper body.     Communication Skills /Needs: Ok, pt has dentures.     Ethnicity: White    Spirituality/Buddhism Affiliation: Sikh       Clergy Request: No    History: Denies   Living Situation: Pt states they are currently living in [facility].   ADL s: Indedpendent   Education: Pt reports they graduated high school and attend some college at [employer] [facility] [employer] for nursing and plans to go back.    Financial Situation: Receives social security disability     Occupation: unemployed but is actively looking for a job  Leisure & Recreation: Unknown   Childhood History: Unknown   Trauma Abuse HX: Unknown    Relationship / Sexuality: Single    Substance Use/ Abuse: Pt denies any substance abuse. Utox presumptive positive for cannabinoids.      Chemical Dependency Treatment HX: Pt denies    Legal Issues: Pt denies    Significant Life Events: Unknown     Strengths: In a safe environment, has supportive family   Challenges /Limitation: Poor coping skills, lack of insight       Patient Support Contact (Include name, relationship, number, and summary of conversation): Pt " "currently has no JAVIER's signed at this time.    Interventions:      Community-Based Programs- Would benefit    Medical/Dental Care- Darrin Butcherle @ [facility] Evaluation/Rule 25/Aftercare-    Medication Management- Would benefit     Individual Therapy- Pt states they see Phuong Gipson Request- No    Case Management- Would benefit    Insurance Coverage- [facility]/ MEDICARE ADVANTAGE.    Financial Assistance- Receives social security disability      Suicide Risk Assessment- Pt denies SI on admit. \"Things just really build up\". Pt denies a hx of suicide attempts or SI. Pt denies SI today.     High Risk Safety Plan- Talk to supports; Call crisis lines; Go to local ER if feeling suicidal.  ROSA MORAN  10/20/2021  8:19 AM      "

## 2021-10-20 NOTE — PLAN OF CARE
Prior to Admission Medication Reconciliation:     Medications added:   [x] None  [] As listed below:    Medications deleted:   [x] None  [] As listed below:    Medications marked for review/removal by attending:  [x] None  [] As listed below:    Changes made to existing medications:   [] None  [] Updated strengths and frequencies to most current  [x] As listed below:    Xanax- prescribed TID- pt reports taking TID PRN    depakote- prescribed BID PRN- pt reports her psychiatrist told her to take it scheduled and she reports compliancy    Last times/dates taken verified with patient:  [x] Yes- completed myself: pt reports last taking her maintenance medications on Friday   [] Prepared PTA medlist for review only. (will not be available to review personally)  [] Did not review with patient. Rx verification only. Review completed by nursing.    [] Nurse completed no changes made (double checked entries)  [] Unable to review with patient at this time:  [] Nurse completed/changes made:         Allergies listed at another location:  []Allergies match allergies listed in Epic  []Other allergies listed:    Allergy review:    [x]Did not review: reviewed by nursing  []Did not review: pt unable at this time  []Patient/MAR verified NKDA  []Patient/MAR verified current existing allergies: no changes made  []Patient confirmed current existing allergies and new allergies added:    Medication reconciliation sources:   [x]Patient  []Patient family member/emergency contact: **  [x][facility] Report Review: 2007  []Epic Chart Review  []Care Everywhere review  []Pharmacy med list: **  []Pharmacy phone call  [x]Outside meds dispense report: see below  []Nursing home or Assisted Living MAR:  []Other: **    Pharmacy desired at discharge: [facility]    Is patient on coumadin?  []No  []Yes:     INR result:    Range:    INR date:    Indication:    Next INR date:    Verified by facility:    Coumadin strength/instructions:    Requests for  consultation by provider or pharmacist:   [x] Patient understands why all of their meds were prescribed and how to take them. No questions.   [] Managing party has no questions.   [] Patient/ managing party has questions about the following:  [] Did not review with patient. Cannot assess.     Fill dates and reported compliancy:  [x] Fill dates coincide with reported compliancy for all/most maintenance meds.   [] Fill dates do not coincide with compliancy with maintenance meds. See notes in PTA medlist and in comments.    [x] Fill dates do not coincide with the following medications but pt reports compliancy: atenolol  [] Did not review with patient. Cannot assess.     Historian accuracy:  [] Excellent- alert and oriented, understands why meds were prescribed and how to take, able to answer specifics  [x] Good- alert and oriented, understands why meds were prescribed and how to take, some confusion   [] Fair- alert and oriented, doesn't know medications without list, cannot answer specifics about medications, but has a decent process for which to take at home  [] Poor- does not know medications, may not have a process to take at home, may be cognitively unable to review at this time  []Medication management done by family member or facility, no concerns about historian accuracy.   [] Did not review with patient. Cannot assess.     Medication Management:  [x] Manages meds independently  [] Family member/ other party manages meds/assists:  [] Meds managed by staff at facility  [] Meds set up by home care, family/other party helps administer  [] Meds set up by home care, self administers  [] Did not review with patient. Cannot assess.     Other medications aside from PTA:  [x] Denies taking any medications aside from those listed in PTA meds  [] Reports taking another medication(s) but cannot recall the name(s)  [] Refuses to say.  [] Did not review with patient. Cannot assess.     Comments: atenolol fill dates do not  reflect compliancy but pt reports compliancy.  Pt is out of oxycodone.     Hannah Bosch on 10/20/2021 at 7:49 AM       Discrepancies: [x] No []Not Applicable [x]Yes: listed below    Issues completing PTA medication reconciliation:  [] On hold for a long time  [] Waited for a call back  [] Fax didn't come through  [] Fax took a long time  [] Other:    Notifying appropriate party of changes/additions/discrepancies:  []No pertinent changes made, notification not necessary.   [] Notified attending provider via text page/phone call  [] Notified attending provider in person  [] Notified pharmacy  [] Notified nurse  [x] Medications have not been reconciled by a provider yet, notification not necessary  [] Pt is not admitted to floor yet, PTA meds completed before admission.     Medications Prior to Admission   Medication Sig Dispense Refill Last Dose     albuterol (PROAIR HFA/PROVENTIL HFA/VENTOLIN HFA) 108 (90 Base) MCG/ACT inhaler Inhale 2 puffs into the lungs every 4 hours as needed for shortness of breath / dyspnea or wheezing 1 Inhaler 11 More than a month at Unknown time     ALPRAZolam (XANAX) 2 MG tablet Take 2 mg by mouth 3 times daily as needed    Past Week at Unknown time     amphetamine-dextroamphetamine (ADDERALL) 30 MG per tablet Take 30 mg by mouth 2 times daily    Past Week at Unknown time     atenolol (TENORMIN) 100 MG tablet Take 1 tablet (100 mg) by mouth daily 90 tablet 3 Past Week at Unknown time     Cholecalciferol (VITAMIN D-3) 125 MCG (5000 UT) TABS Take 1 tablet by mouth daily 90 tablet 11 Past Week at Unknown time     divalproex sodium delayed-release (DEPAKOTE) 250 MG DR tablet Take 250 mg by mouth 2 times daily   0 Past Week at Unknown time     fluticasone (FLONASE) 50 MCG/ACT spray Spray 2 sprays into both nostrils daily as needed    More than a month at Unknown time     hydrOXYzine (VISTARIL) 25 MG capsule Take 1 capsule (25 mg) by mouth 3 times daily as needed for other (nausea) 60 capsule 11  Unknown at Unknown time     ibuprofen (ADVIL/MOTRIN) 800 MG tablet TAKE 1 TABLET (800 MG) BY MOUTH EVERY 8 HOURS AS NEEDED FOR MODERATE PAIN 90 tablet 4 Unknown at Unknown time     oxyCODONE IR (ROXICODONE) 10 MG tablet Take 1 tablet (10 mg) by mouth every 4 hours as needed for moderate to severe pain Maximum 6 tab per day 168 tablet 0 More than a month at Unknown time     tiZANidine (ZANAFLEX) 4 MG tablet Take 1 tablet (4 mg) by mouth 3 times daily as needed for muscle spasms 60 tablet 11 Past Week at Unknown time               Medication Dispense History (from 10/20/2020 to 10/19/2021)  Expand All  Collapse All    ALPRAZolam     Dispensed Days Supply Quantity Provider Pharmacy   ALPRAZOLAM 2MG TABLET 09/18/2021 30 90 Units WING,[facility]RIA [facility]   ALPRAZOLAM 2MG TABLET 08/17/2021 30 [facility]   ALPRAZOLAM 2MG TABLET 07/21/2021 30 [facility]   ALPRAZOLAM 2MG TABLET 06/24/2021 30 90 Units [facility]RI[facility]   ALPRAZOLAM 2MG TABLET 05/29/2021 30 90 Units WING,BRI[facility]   ALPRAZOLAM 2MG TABLET 05/01/2021 30 90 Units WING,[facility]RI[facility]   ALPRAZOLAM 2MG TABLET 04/07/2021 30 90 Units [facility]ING,[facility]RI[facility]   ALPRAZOLAM 2MG TABLET 03/11/2021 30 90 Units [facility]   ALPRAZOLAM 2MG TABLET 02/09/2021 30 90 Units [facility]ING,[facility]RI[facility]   ALPRAZOLAM 2MG TABLET 12/23/2020 30 90 Units [facility]ING,[facility]RI[facility]   ALPRAZOLAM 2MG TABLET 11/25/2020 30 90 Units [facility]   ALPRAZOLAM 2MG TABLET 10/27/2020 30 [facility]           Albuterol Sulfate     Dispensed Days Supply Quantity Provider Pharmacy   ALBUTEROL HFA 90MCG/ACT INH 02/11/2021 17 8.5 g [facility]...           Amphetamine-Dextroamphetamine     Dispensed Days Supply Quantity  Provider Pharmacy   AMPHETAMINE SALTS 30MG TABLET [facility]07[facility]2021 [facility]   AMPHETAMINE SALTS 30MG TABLET 09/07/2021 30 60 Units [facility]...   AMPHETAMINE SALTS 30MG TABLET 08/09/2021 30 60 Units [facility]...   AMPHETAMINE SALTS 30MG TABLET 06/24/2021 30 [facility]   AMPHETAMINE SALTS 30MG TABLET 05/26/2021 [facility]   AMPHETAMINE SALTS 30MG TABLET 04/21/2021 [facility]   AMPHETAMINE SALTS 30MG TABLET 03/24/2021 30 60 Units [facility]...   AMPHETAMINE SALTS 30MG TABLET 02/12/2021 30 60 Units [facility]...   AMPHETAMINE SALTS 30MG TABLET 02/11/2021 30 60 Units [facility]...   AMPHETAMINE SALTS 30MG TABLET 12/24/2020 [facility]   AMPHETAMINE SALTS 30MG TABLET 11/25/2020 30 [facility]   AMPHETAMINE SALTS 30MG TABLET 10/26/2020 30 60 Units [facility]...           Atenolol     Dispensed Days Supply Quantity Provider Pharmacy   ATENOLOL 100MG TABLET 03/30/2021 90 90 Units RAKEL MANN #61 - Fa...   ATENOLOL 100MG TABLET 01/07/2021 90 90 Units RAKEL MANN Pharmacy...   ATENOLOL 100MG TABLET 12/13/2020 90 90 Units RAKEL MANN #61 - Fa...   ATENOLOL 100MG TABLET 12/08/2020 90 90 Units RAKEL MANN Pharmacy...           Cholecalciferol     Dispensed Days Supply Quantity Provider Pharmacy   N B VIT D3 TAB 5000IU 100 02/12/2021 90 90 Units RAKEL MANN Pharm[facility]...           Divalproex Sodium     Dispensed Days Supply Quantity Provider Pharmacy   DIVALPROEX SODIUM 250MG DR TAB 09/18/2021 30 60 Units [facility]...   DIVALPROEX SODIUM 250MG DR TAB [facility]2021 [facility]   DIVALPROEX SODIUM 250MG DR TAB 07/21/2021 [facility]   DIVALPROEX SODIUM 250MG DR TAB 06/24/2021 [facility]   DIVALPROEX SODIUM 250MG DR TAB 05/26/2021 30 60 Units [facility]ING[facility]...   DIVALPROEX SODIUM 250MG DR TAB 05/01/2021 [facility]   DIVALPROEX SODIUM 250MG DR TAB 04/07/2021 30 60 Units WING,[facility]...   DIVALPROEX SODIUM 250MG DR TAB 03/04/2021 30 60 Units WING,[facility]...   DIVALPROEX SODIUM 250MG DR TAB 01/29/2021 [facility]   DIVALPROEX SODIUM 250MG DR TAB 12/23/2020 [facility]   DIVALPROEX SODIUM 250MG DR TAB 11/25/2020 30 60 Units WING,[facility]...   DIVALPROEX SODIUM 250MG DR TAB 10/26/2020 [facility]           Ibuprofen     Dispensed Days Supply Quantity Provider Pharmacy   IBUPROFEN 800MG TABLET [facility]2021 [facility]   IBUPROFEN 800MG TABLET 06/24/2021 30 90 Units RAKEL MANN [facility]...   IBUPROFEN 800MG TABLET 05/26/2021 30 90 Units RAKEL MANN [facility]...   IBUPROFEN 800MG TABLET 05/01/2021 30 90 Units RAKEL MANN [facility]...   IBUPROFEN 800MG TABLET 04/07/2021 30 90 Units [facility]AKEL MANN [facility]   IBUPROFEN 800MG TABLET 02/26/2021 30 90 Units RAKEL MANN [facility]...   IBUPROFEN 800MG TABLET 01/29/2021 30 90 Units RAKEL MANN Pharmacy...   IBUPROFEN 800MG TABLET 11/25/2020 30 90 Units RAKEL MANN Pharmacy...   IBUPROFEN 800MG TABLET 10/27/2020 30 90 Units RAKEL MANN Pharmacy...           Nitrofurantoin Monohyd Macro     Dispensed Days Supply Quantity  Provider Pharmacy   NITROFURANTOIN M/M 100MG CAP 10/27/2020 7 14 Units [facility]           hydrOXYzine Pamoate     Dispensed Days Supply Quantity Provider Pharmacy   HYDROXYZINE PAMOATE 25MG CAP 08/17/2021 20 60 Units RAKEL MANN Pharmacy...   HYDROXYZINE PAMOATE 25MG CAP 07/21/2021 20 60 Units RAK[facility] MANN Pharmacy...   HYDROXYZINE PAMOATE 25MG CAP 06/24/2021 20 60 Units [facility]   HYDROXYZINE PAMOATE 25MG CAP 05/26/2021 20 60 Units [facility]   HYDROXYZINE PAMOATE 25MG CAP 05/01/2021 20 60 Units [facility]   HYDROXYZINE PAMOATE 25MG CAP 04/07/2021 20 60 Units [facility]   HYDROXYZINE PAMOATE 25MG CAP 03/12/2021 20 60 Units [facility]   HYDROXYZINE PAMOATE 25MG CAP 01/29/2021 20 [facility]   HYDROXYZINE PAMOATE 25MG CAP 12/16/2020 20 60 Units [facility]           oxyCODONE HCl     Dispensed Days Supply Quantity Provider Pharmacy   OXYCODONE 10MG TABLET 02/18/2021 28 168 Units RAKEL MANN Pharmacy...   OXYCODONE 10MG TABLET 01/20/2021 28 168 Units [facility]   OXYCODONE 10MG TABLET 12/23/2020 28 168 Units [facility]   OXYCODONE 10MG TABLET 11/25/2020 28 168 Units RAKEL MANN Pharmacy...   OXYCODONE 10MG TABLET 10/27/2020 28 168 Units RAKEL MANN Pharmacy...           tiZANidine HCl     Dispensed Days Supply Quantity Provider Pharmacy   TIZANIDINE 4MG TABLET 09/18/2021 20 60 Units RAKEL MANN Pharmacy...   TIZANIDINE 4MG TABLET 08/17/2021 20 60 Units RAKEL MANN Pharmacy...   TIZANIDINE 4MG TABLET 07/21/2021 20 60 Units RAKEL MANN Pharmacy...    TIZANIDINE 4MG TABLET 06/24/2021 20 60 [facility]   TIZANIDINE 4MG TABLET 05/26/2021 20 60 Units [facility]   TIZANIDINE 4MG TABLET 05/01/2021 20 60 Units [facility]   TIZANIDINE 4MG TABLET 04/07/2021 20 60 Units [facility]   TIZANIDINE 4MG TABLET 02/26/2021 20 60 Units MACKENZIE,RAKEL [facility]. [facility]   TIZANIDINE 4MG TABLET 01/29/2021 20 60 Units [facility]   TIZANIDINE 4MG TABLET 12/16/2020 20 60 Units MACKENZIE,[facility]KEL [facility]   TIZANIDINE 2MG TABLET 12/08/2020 5 30 Units MACKENZIE,[facility]EL [facility]. [facility]   TIZANIDINE 2MG TABLET 11/25/2020 5 30 Units MACKENZIE,RAKEL V. [facility]   TIZANIDINE 2MG TABLET 10/27/2020 5 30 Units [facility]           Disclaimer    Certain dispenses may not be available or accurate in this report, including over-the-counter medications, low cost prescriptions, prescriptions paid for by the patient or non-participating sources, or errors in insurance claims information. The provider should independently verify medication history with the patient.     External Sources

## 2021-10-21 VITALS
OXYGEN SATURATION: 98 % | HEART RATE: 65 BPM | TEMPERATURE: 98.7 F | SYSTOLIC BLOOD PRESSURE: 117 MMHG | RESPIRATION RATE: 12 BRPM | WEIGHT: 135.7 LBS | BODY MASS INDEX: 20.1 KG/M2 | DIASTOLIC BLOOD PRESSURE: 64 MMHG | HEIGHT: 69 IN

## 2021-10-21 LAB
POTASSIUM BLD-SCNC: 3 MMOL/L (ref 3.4–5.3)
TROPONIN I SERPL-MCNC: <0.015 UG/L (ref 0–0.04)

## 2021-10-21 PROCEDURE — 93005 ELECTROCARDIOGRAM TRACING: CPT

## 2021-10-21 PROCEDURE — 250N000013 HC RX MED GY IP 250 OP 250 PS 637: Performed by: NURSE PRACTITIONER

## 2021-10-21 PROCEDURE — 36415 COLL VENOUS BLD VENIPUNCTURE: CPT | Performed by: NURSE PRACTITIONER

## 2021-10-21 PROCEDURE — 124N000001 HC R&B MH

## 2021-10-21 PROCEDURE — 93010 ELECTROCARDIOGRAM REPORT: CPT | Performed by: INTERNAL MEDICINE

## 2021-10-21 PROCEDURE — 99232 SBSQ HOSP IP/OBS MODERATE 35: CPT | Performed by: NURSE PRACTITIONER

## 2021-10-21 PROCEDURE — 84484 ASSAY OF TROPONIN QUANT: CPT | Performed by: NURSE PRACTITIONER

## 2021-10-21 PROCEDURE — 99233 SBSQ HOSP IP/OBS HIGH 50: CPT | Performed by: NURSE PRACTITIONER

## 2021-10-21 PROCEDURE — 84132 ASSAY OF SERUM POTASSIUM: CPT | Performed by: NURSE PRACTITIONER

## 2021-10-21 RX ORDER — POTASSIUM CHLORIDE 1500 MG/1
40 TABLET, EXTENDED RELEASE ORAL ONCE
Status: COMPLETED | OUTPATIENT
Start: 2021-10-21 | End: 2021-10-21

## 2021-10-21 RX ORDER — POTASSIUM CHLORIDE 1500 MG/1
20 TABLET, EXTENDED RELEASE ORAL ONCE
Status: COMPLETED | OUTPATIENT
Start: 2021-10-21 | End: 2021-10-21

## 2021-10-21 RX ORDER — DIVALPROEX SODIUM 500 MG/1
1000 TABLET, EXTENDED RELEASE ORAL AT BEDTIME
Status: DISCONTINUED | OUTPATIENT
Start: 2021-10-21 | End: 2021-10-22 | Stop reason: HOSPADM

## 2021-10-21 RX ADMIN — POTASSIUM CHLORIDE 20 MEQ: 1500 TABLET, EXTENDED RELEASE ORAL at 12:41

## 2021-10-21 RX ADMIN — PHENOBARBITAL 64.8 MG: 32.4 TABLET ORAL at 13:32

## 2021-10-21 RX ADMIN — DIVALPROEX SODIUM 1000 MG: 500 TABLET, FILM COATED, EXTENDED RELEASE ORAL at 20:10

## 2021-10-21 RX ADMIN — PHENOBARBITAL 64.8 MG: 32.4 TABLET ORAL at 20:10

## 2021-10-21 RX ADMIN — PANTOPRAZOLE SODIUM 20 MG: 20 TABLET, DELAYED RELEASE ORAL at 06:34

## 2021-10-21 RX ADMIN — Medication 3 MG: at 20:14

## 2021-10-21 RX ADMIN — IBUPROFEN 400 MG: 400 TABLET ORAL at 19:38

## 2021-10-21 RX ADMIN — IBUPROFEN 400 MG: 400 TABLET ORAL at 08:22

## 2021-10-21 RX ADMIN — POTASSIUM CHLORIDE 40 MEQ: 1500 TABLET, EXTENDED RELEASE ORAL at 10:15

## 2021-10-21 RX ADMIN — PHENOBARBITAL 64.8 MG: 32.4 TABLET ORAL at 08:23

## 2021-10-21 ASSESSMENT — ACTIVITIES OF DAILY LIVING (ADL)
DRESS: SCRUBS (BEHAVIORAL HEALTH)
HYGIENE/GROOMING: INDEPENDENT
LAUNDRY: UNABLE TO COMPLETE
ORAL_HYGIENE: INDEPENDENT
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
## 2021-10-21 NOTE — PROGRESS NOTES
"[facility]  Psychiatric Progress Note      Impression:   This is a 51 year old yo female with a new onset of likely delirium. She is prescribed 2 mg of xanax tid and states \"I typically run out a week early\". She has never had a hospitalization in the past. Has never presented with psychosis in the past or any disorganization. She has a marked body tremor even after taking two doses of phenobarbital though I did increase the dose which will likely help her improve faster.          Interim History:     She only slept 2 hours last night.  She continues to remain a bit pressured though her speech is a bit slower than yesterday.  Her moods are less labile than yesterday.  Her tremor appears to have improved.  She states she is wanting to be discharged home.  She is less disorganized than yesterday and less pressured than yesterday.  She states she is upset that she is not getting any of her medications that she is supposed to be on including Xanax Percocet and stimulant medication.  I explained the rationale for why I have not restarted them.  Her that I will discharge her when her 71 tell her hold is up.   Outpatient provider as I do feel that keeping her on the Xanax is high risk and she did come in significant withdrawal symptoms  Educated regarding medication indications, risks, benefits, side effects, contraindications and possible interactions. Verbally expressed understanding.        Diagnoses:        Unspecified psychosis  Delirium likely secondary to benzodiazapine withdrawal  R/o Brief psychotic disorder   Rule out bipolar disorder type I most recent episode manic            Attestation:  Patient has been seen and evaluated by me,  Fouzia Upton NP      The patient's care was discussed with the treatment team and chart notes were reviewed.            Medications:       divalproex sodium extended-release  750 mg Oral At Bedtime     nicotine  1 patch Transdermal Daily     nicotine   " Transdermal Q8H     pantoprazole  20 mg Oral QAM AC     PHENobarbital  64.8 mg Oral TID     potassium chloride ER  20 mEq Oral Once       Prescription Medications as of 10/21/2021       Rx Number Disp Refills Start End Last Dispensed Date Next Fill Date Owning Pharmacy    albuterol (PROAIR HFA/PROVENTIL HFA/VENTOLIN HFA) 108 (90 Base) MCG/ACT inhaler  1 Inhaler 11 2/11/2021    [facility] [address] [place] [address], [place] - [address]    Sig: Inhale 2 puffs into the lungs every 4 hours as needed for shortness of breath / dyspnea or wheezing    Class: E-Prescribe    Notes to Pharmacy: Pharmacy may dispense brand covered by insurance (Proair, or proventil or ventolin or generic albuterol inhaler)    Route: Inhalation    ALPRAZolam (XANAX) 2 MG tablet    1/18/2018        Sig: Take 2 mg by mouth 3 times daily as needed     Class: Historical    Route: Oral    amphetamine-dextroamphetamine (ADDERALL) 30 MG per tablet    4/20/2017        Sig: Take 30 mg by mouth 2 times daily     Class: Historical    Earliest Fill Date: 4/20/2017    Route: Oral    atenolol (TENORMIN) 100 MG tablet  90 tablet 3 8/4/2020    [facility] [address] [place] [address], [place] - [address]    Sig: Take 1 tablet (100 mg) by mouth daily    Class: E-Prescribe    Route: Oral    Cholecalciferol (VITAMIN D-3) 125 MCG (5000 UT) TABS  90 tablet 11 2/11/2021    [facility] [address] [place] [address] [place], [place] - [address]    Sig: Take 1 tablet by mouth daily    Class: E-Prescribe    Route: Oral    divalproex sodium delayed-release (DEPAKOTE) 250 MG DR tablet   0 10/30/2019        Sig: Take 250 mg by mouth 2 times daily     Class: Historical    Route: Oral    fluticasone (FLONASE) 50 MCG/ACT spray            Sig: Spray 2 sprays into both nostrils daily as needed     Class: Historical    Route: Both Nostrils    hydrOXYzine (VISTARIL) 25 MG capsule  60 capsule 11 12/16/2020    [id number] - [place], [place] - [address]    Sig: Take 1 capsule (25 mg) by mouth 3 times daily as needed for other (nausea)    Class: E-Prescribe    Route: Oral    ibuprofen (ADVIL/MOTRIN) 800 MG tablet  90 tablet 4 9/21/2021    [facility] [address] [place] [address], [place] - [address]    Sig: TAKE 1 TABLET (800 MG) BY MOUTH EVERY 8 HOURS AS NEEDED FOR MODERATE PAIN    Class: E-Prescribe    Route: Oral    oxyCODONE IR (ROXICODONE) 10 MG tablet  168 tablet 0 2/11/2021    [facility] [id number] [facility], [place] - [address]    Sig: Take 1 tablet (10 mg) by mouth every 4 hours as needed for moderate to severe pain Maximum 6 tab per day    Class: E-Prescribe    Earliest Fill Date: 2/11/2021    Notes to Pharmacy: Fill on March 16, 2021    Route: Oral    tiZANidine (ZANAFLEX) 4 MG tablet  60 tablet 11 12/16/2020    [facility] [address] [place] [address], [place] - [address]    Sig: Take 1 tablet (4 mg) by mouth 3 times daily as needed for muscle spasms    Class: E-Prescribe    Route: Oral      Hospital Medications as of 10/21/2021       Dose Frequency Start End    acetaminophen (TYLENOL) tablet 650 mg 650 mg EVERY 4 HOURS PRN 10/19/2021     Admin Instructions: Do not use if the patient has significant liver disease. MAX acetaminophen = 4000 mg/24 hrs.  MAX acetaminophen LESS than 3000 mg/24 hrs for patients GREATER than or EQUAL to 65 years old.<BR>Maximum acetaminophen dose from all sources = 75 mg/kg/day not to exceed 4 grams/day.    Class: E-Prescribe    Route: Oral    albuterol (PROAIR HFA/PROVENTIL HFA/VENTOLIN HFA) 108 (90 Base) MCG/ACT inhaler 2 puff 2 puff EVERY 4 HOURS PRN 10/20/2021     Admin Instructions: Check the dose counter on the inhaler to ensure there are doses remaining before administering.    Class: E-Prescribe    Route: Inhalation    alum & mag hydroxide-simethicone (MAALOX) suspension 30 mL 30 mL EVERY 4 HOURS PRN 10/19/2021     Admin Instructions: Shake well.    Class: E-Prescribe    Route:  "Oral    divalproex sodium extended-release (DEPAKOTE ER) 24 hr tablet 750 mg 750 mg AT BEDTIME 10/20/2021     Admin Instructions: DO NOT CRUSH.    Class: E-Prescribe    Route: Oral    ibuprofen (ADVIL/MOTRIN) tablet 400 mg 400 mg EVERY 6 HOURS PRN 10/20/2021     Admin Instructions: Give 1st for pain<BR>Give with food.    Class: E-Prescribe    Route: Oral    melatonin tablet 3 mg 3 mg AT BEDTIME PRN 10/19/2021     Class: E-Prescribe    Route: Oral    nicotine (NICODERM CQ) 21 MG/24HR 24 hr patch 1 patch 1 patch DAILY 10/20/2021     Admin Instructions: Reminder: Remove previous patch before applying new patch.    Class: E-Prescribe    Route: Transdermal    nicotine (NICORETTE) gum 2 mg 2 mg EVERY 1 HOUR PRN 10/19/2021     Admin Instructions: Gum should be chewed slowly until it tingles, then placed between cheek and gum:  when tingle gone, repeat process until tingle gone (about 30 minutes).    Class: E-Prescribe    Route: Buccal    nicotine Patch in Place  EVERY 8 HOURS 10/19/2021     Admin Instructions: Chart every shift, confirming that patch is still in place on patient (no barcode scan needed). See patch order for dose information.    Class: E-Prescribe    Route: Transdermal    OLANZapine (zyPREXA) injection 5 mg 5 mg 2 TIMES DAILY PRN 10/19/2021     Admin Instructions: Dissolve the contents of the 10 mg vial using 2.1 mL of Sterile Water for Injection to provide a solution containing 5 mg/mL of olanzapine. Withdraw the ordered dose from vial. Use immediately (within 1 hour) after reconstitution.  Discard any unused portion.    Class: E-Prescribe    Route: Intramuscular    Linked Group 1: \"Or\" Linked Group Details        OLANZapine zydis (zyPREXA) ODT tab 5 mg 5 mg 2 TIMES DAILY PRN 10/19/2021     Admin Instructions: Combined IM and PO doses may significantly increase the risk of orthostatic hypotension at 30 mg per day or higher.<BR>With dry hands, peel back foil backing and gently remove tablet. Do not push " "oral disintegrating tablet through foil backing. Administer immediately on tongue and oral disintegrating tablet dissolves in seconds, then swallow with saliva. Liquid not required.    Class: E-Prescribe    Route: Oral    Linked Group 1: \"Or\" Linked Group Details        pantoprazole (PROTONIX) EC tablet 20 mg 20 mg EVERY MORNING BEFORE BREAKFAST 10/21/2021     Admin Instructions: DO NOT CRUSH.    Class: E-Prescribe    Route: Oral    PHENobarbital (LUMINAL) tablet 64.8 mg 64.8 mg 3 TIMES DAILY 10/20/2021     Class: E-Prescribe    Route: Oral    potassium chloride ER (KLOR-CON M) CR tablet 20 mEq 20 mEq ONCE 10/21/2021     Admin Instructions: DO NOT CRUSH    Class: E-Prescribe    Route: Oral    potassium chloride ER (KLOR-CON M) CR tablet 20 mEq (Completed) 20 mEq ONCE 10/20/2021 10/20/2021    Admin Instructions: DO NOT CRUSH    Class: E-Prescribe    Route: Oral    potassium chloride ER (KLOR-CON M) CR tablet 40 mEq (Completed) 40 mEq ONCE 10/21/2021 10/21/2021    Admin Instructions: DO NOT CRUSH    Class: E-Prescribe    Route: Oral    senna-docusate (SENOKOT-S/PERICOLACE) 8.6-50 MG per tablet 1 tablet 1 tablet 2 TIMES DAILY PRN 10/19/2021     Admin Instructions: Hold for loose stools.    Class: E-Prescribe    Route: Oral               10 point ROS negative see medical note       Allergies:     Allergies   Allergen Reactions     Arthrotec GI Disturbance     Indomethacin      Other reaction(s): Dizziness     Meloxicam GI Disturbance     Moxifloxacin             Psychiatric Examination:   /85 (BP Location: Right arm)   Pulse 77   Temp 99.3  F (37.4  C) (Temporal)   Resp 14   Ht 1.753 m (5' 9\")   Wt 61.6 kg (135 lb 11.2 oz)   LMP  (LMP Unknown)   SpO2 98%   BMI 20.04 kg/m    Weight is 135 lbs 11.2 oz  Body mass index is 20.04 kg/m .    MSE/PSYCH  PSYCHIATRIC EXAM  /85 (BP Location: Right arm)   Pulse 77   Temp 99.3  F (37.4  C) (Temporal)   Resp 14   Ht 1.753 m (5' 9\")   Wt 61.6 kg (135 lb 11.2 " oz)   LMP  (LMP Unknown)   SpO2 98%   BMI 20.04 kg/m    -Appearance/Behavior: Hygiene improved  -Motor: intact  -Gait: intact   -Abnormal involuntary movements: None  -Mood: Much less labile  -Affect: expansive  -Speech: Mildly pressurred    -Thought process/associations: Circumstantial  -Thought content: Denies hallucinations.  Does not appear to be preoccupied  -Perceptual disturbances: denies hallucinations..              -Suicidal/Homicidal Ideation: denies any   -Judgment: Limited  -Insight:   *Orientation: time, place and person.  *Memory: Difficult to assess at this time due to mild disorganization  *Attention: Improving  *Language: fluent, no aphasias, able to repeat phrases and name objects. Vocab intact.  *Fund of information: difficult to assess due to emiliano  *Cognitive functioning estimate: 0 - independent.               Labs:     Results for orders placed or performed during the hospital encounter of 10/19/21 (from the past 24 hour(s))   Potassium   Result Value Ref Range    Potassium 3.0 (L) 3.4 - 5.3 mmol/L   Troponin I   Result Value Ref Range    Troponin I <0.015 0.000 - 0.045 ug/L     No labs today    BEHAVIORAL TEAM DISCUSSION    Progress: Less labile and less pressured  Continued Stay Criteria/Rationale: Continuing to increase Depakote  Medical/Physical: See medical note  Precautions: None  Falls precaution?: No  Behavioral Orders   Procedures     Code 1 - Restrict to Unit     Routine Programming     As clinically indicated     Status 15     Every 15 minutes.           Plan:   Continue phenobarbital    Increase Depakote ER to 1000 mg    Discharge when 72-hour hold is up.  Will try to contact her outpatient provider regarding concerns with Xanax    Rationale for change in precautions or plan: Continues to be mildly pressured and has mild tremor.      Participants: CHRISTY Aguayo, CNP, Dr. Tamayo, SW, OT, Nursing

## 2021-10-21 NOTE — PLAN OF CARE
Spoke to pt this afternoon. Pt was found laying in their bed. Pt reports feeling good today, but is concerned of why no one has found their teeth yet. Assured pt that if they come up someone would let them know. Pt states they are ready for discharge and asks this writer when that would be. Let pt know they were still on a 72 hr hold and that this writer would discuss it with the team.     Scheduled appointment for 11/2 @ 1:30 PM at [facility].
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
## 2021-10-21 NOTE — PROGRESS NOTES
Chart reviewed. Labs reviewed. Negative troponin. EKG showed NSR with low voltage QRS. Pt denies chest pain, sob currently. NAD. Nursing to continue to monitor and consult provider if warranted.

## 2021-10-21 NOTE — DISCHARGE INSTRUCTIONS
Behavioral Discharge Planning and Instructions    Summary: Pt presented to the ED by a bystander who had seen pt wandering the streets and acting strange.    Main Diagnosis: Unspecified psychosis  Delirium likely secondary to benzodiazapine withdrawal  R/o Brief psychotic disorder    Health Care Follow-up    [facility]  Therapy- Phuong Magallanes: Tuesday November 2nd @ 1:30 PM   [address] [place] [address]  Phone: [phone]  Fax: [phone]    Attend all scheduled appointments with your outpatient providers. Call at least 24 hours in advance if you need to reschedule an appointment to ensure continued access to your outpatient providers.     Major Treatments, Procedures and Findings:  You were provided with: a psychiatric assessment, assessed for medical stability, medication evaluation and/or management, group therapy, family therapy, individual therapy, CD evaluation/assessment, milieu management and medical interventions    Symptoms to Report: feeling more aggressive, increased confusion, losing more sleep, mood getting worse or thoughts of suicide    Early warning signs can include: increased depression or anxiety sleep disturbances increased thoughts or behaviors of suicide or self-harm  increased unusual thinking, such as paranoia or hearing voices    Safety and Wellness:  Take all medicines as directed.  Make no changes unless your doctor suggests them.      Follow treatment recommendations.  Refrain from alcohol and non-prescribed drugs.  Ask your support system to help you reduce your access to items that could harm yourself or others. Items could include:  Firearms  Medicines (both prescribed and over-the-counter)  Knives and other sharp objects  Ropes and like materials  Car keys  If there is a concern for safety, call 911. If there is a concern for safety, call 911.    Resources:   Crisis Intervention: 505.519.8774 or 053-862-4106 (TTY: 821.941.9545).  Call anytime  "for help.  [place] on [place]ness (www.[place].magdalena.org): 544.745.3156 or 799-329-9690.  [place] Association for Children's Mental Health (www.mac.org): 679.119.4454.  Alcoholics Anonymous (www.alcoholics-anonymous.org): Check your phone book for your local chapter.  Suicide Awareness Voices of Education (SAVE) (www.save.org): 345-045-SEQP (0811)  National Suicide Prevention Line (www.mentalhealthmn.org): 555-054-JJVJ (3374)  Mental Health Consumer/Survivor Network of [place] (www.mhcsn.net): 711.552.4323 or 550-088-1991  Mental Health Association of [place] (www.mentalhealth.org): 692.731.4174 or 299-912-7581  Self- Management and Recovery Training., SMART-- Toll free: 370.677.4874  www.Newspepper  Text 4 Life: txt \"LIFE\" to 09869 for immediate support and crisis intervention  Crisis text line: Text \"[place]\" to 400605. Free, confidential, 24/7.  Crisis Intervention: 735.675.9800 or 971-866-3898. Call anytime for help.     General Medication Instructions:   See your medication sheet(s) for instructions.   Take all medicines as directed.  Make no changes unless your doctor suggests them.   Go to all your doctor visits.  Be sure to have all your required lab tests. This way, your medicines can be refilled on time.  Do not use any drugs not prescribed by your doctor.  Avoid alcohol.    Advance Directives:   Scanned document on file with [facility]? No scanned doc  Is document scanned? Pt states no documents  Honoring Choices Your Rights Handout: Informed and given  Was more information offered? Pt declined    The Treatment team has appreciated the opportunity to work with you. If you have any questions or concerns about your recent admission, you can contact the unit which can receive your call 24 hours a day, 7 days a week. They will be able to get in touch with a Provider if needed. The unit number is [phone] .  "

## 2021-10-21 NOTE — PLAN OF CARE
Problem: Behavioral Health Plan of Care  Goal: Patient-Specific Goal (Individualization)  Description: Pt. Will:  Take medications as prescribed  Cooperate with treatment team recommendations  Attend at least 50% of group therapy sessions  Eat at least 50% of meals  Provide ADL's independently daily  10/19/21: Room in [facility]ICU due to possibility of unpredictable behaviors and confusion. No wean at this time. Treatment team to reassess daily.   Outcome: Improving  Note: Pt has no concerns with sleep or appetite. Pt ate breakfast in room. Pain in left ankle 4/10 PRN ibuprofen 400mg given at 0822. Pt rates anxiety 8/10. Pt denies depression, voices, SI/HI. Pt is using appropriate behavior with staff and peers.        Problem: Thought Process Alteration  Goal: Optimal Thought Clarity  Outcome: Improving     Problem: Suicidal Behavior  Goal: Suicidal Behavior is Absent or Managed  Outcome: Improving   Face to face report will be communicated to oncoming RN.    Heather Vang RN  10/21/2021

## 2021-10-22 LAB — POTASSIUM BLD-SCNC: 4 MMOL/L (ref 3.4–5.3)

## 2021-10-22 PROCEDURE — 250N000013 HC RX MED GY IP 250 OP 250 PS 637: Performed by: NURSE PRACTITIONER

## 2021-10-22 PROCEDURE — 99239 HOSP IP/OBS DSCHRG MGMT >30: CPT | Performed by: NURSE PRACTITIONER

## 2021-10-22 PROCEDURE — 84132 ASSAY OF SERUM POTASSIUM: CPT | Performed by: NURSE PRACTITIONER

## 2021-10-22 PROCEDURE — 36415 COLL VENOUS BLD VENIPUNCTURE: CPT

## 2021-10-22 RX ORDER — PANTOPRAZOLE SODIUM 20 MG/1
20 TABLET, DELAYED RELEASE ORAL
Qty: 30 TABLET | Refills: 0 | Status: SHIPPED | OUTPATIENT
Start: 2021-10-23 | End: 2022-06-27

## 2021-10-22 RX ORDER — DIVALPROEX SODIUM 500 MG/1
1000 TABLET, EXTENDED RELEASE ORAL AT BEDTIME
Qty: 60 TABLET | Refills: 0 | Status: SHIPPED | OUTPATIENT
Start: 2021-10-22 | End: 2021-11-01

## 2021-10-22 RX ORDER — PHENOBARBITAL 64.8 MG/1
TABLET ORAL
Qty: 22 TABLET | Refills: 0 | Status: SHIPPED | OUTPATIENT
Start: 2021-10-22 | End: 2021-10-22

## 2021-10-22 RX ORDER — ALPRAZOLAM 0.5 MG
1.5 TABLET ORAL
Qty: 270 TABLET | Refills: 0 | Status: SHIPPED | OUTPATIENT
Start: 2021-10-22 | End: 2022-11-29

## 2021-10-22 RX ADMIN — PANTOPRAZOLE SODIUM 20 MG: 20 TABLET, DELAYED RELEASE ORAL at 09:00

## 2021-10-22 RX ADMIN — PHENOBARBITAL 64.8 MG: 32.4 TABLET ORAL at 09:00

## 2021-10-22 ASSESSMENT — ACTIVITIES OF DAILY LIVING (ADL)
LAUNDRY: UNABLE TO COMPLETE
DRESS: SCRUBS (BEHAVIORAL HEALTH);INDEPENDENT
HYGIENE/GROOMING: INDEPENDENT
ORAL_HYGIENE: INDEPENDENT

## 2021-10-22 NOTE — PLAN OF CARE
"  Problem: Behavioral Health Plan of Care  Goal: Patient-Specific Goal (Individualization)  Description: Pt. Will:  Take medications as prescribed  Cooperate with treatment team recommendations  Attend at least 50% of group therapy sessions  Eat at least 50% of meals  Provide ADL's independently daily  10/19/21: Room in [facility] due to possibility of unpredictable behaviors and confusion. No wean at this time. Treatment team to reassess daily.   Outcome: No Change  Note: Shift Summery:      Face to face start of shift report received from RN.  Patient in bed at this time.     Patient withdrawn and isolative to room this shift.  Patient polite and cooperative with staff.  Patient does not attend groups.  Patient up on unit to make phone call and returns to room.   Patient verbalizes relief she gets to leave tomorrow.  \"or at least that's when my hold expires, so I know I can go\"     Patient requesting medication for right sided jaw pain.  \"It's really bothering me, maybe you have some Zanaflex, I guess Ibuprofen or Tylenol is ok if that's all I have\"    PRN:  Ibuprofen 400 mg given @ 1938  Patient verbalizes decrease in pain.  PRN:  Melatonin 3 mg given @ 2014 to aid in sleep.      Face to face end of shift report communicated to oncoming shift.     Daniela Aguilera RN  10/21/2021  11:09 PM             "

## 2021-10-22 NOTE — PLAN OF CARE
Face to face report received from Daniela MARTINEZ. Pt. Observed.     Problem: Behavioral Health Plan of Care  Goal: Patient-Specific Goal (Individualization)  Description: Pt. Will:  Take medications as prescribed  Cooperate with treatment team recommendations  Attend at least 50% of group therapy sessions  Eat at least 50% of meals  Provide ADL's independently daily  10/19/21: Room in [facility] due to possibility of unpredictable behaviors and confusion. No wean at this time. Treatment team to reassess daily.   Outcome: Improving  Note: Pt has been in bed with eyes closed and regular respirations x 7 hours this noc shift. 15 minute and PRN checks all night. No complaints offered. Will continue to monitor.         Face to face end of shift report to be communicated to oncoming RN.     Jeanette Garcia RN  10/22/2021

## 2021-10-22 NOTE — PLAN OF CARE
Problem: Behavioral Health Plan of Care  Goal: Patient-Specific Goal (Individualization)  Description: Pt. Will:  Take medications as prescribed  Cooperate with treatment team recommendations  Attend at least 50% of group therapy sessions  Eat at least 50% of meals  Provide ADL's independently daily  10/19/21: Room in [facility] due to possibility of unpredictable behaviors and confusion. No wean at this time. Treatment team to reassess daily.   Outcome: Adequate for Discharge     Pt in room at the start of the shift, pt reports pain at 10/10, pt reports anxiety at 8/10. Pt doesn't want to come out to breakfast due to not having her teeth. Pt denies SI, HI and hallucinations. Pt's parents came to the hospital this morning to pick her up at 0700. Pt called them last night and told them that she is being released this morning.     Pt called father and he will be coming back to pick her up.         Problem: Thought Process Alteration  Goal: Optimal Thought Clarity  Outcome: Adequate for Discharge     Problem: Suicidal Behavior  Goal: Suicidal Behavior is Absent or Managed  Outcome: Adequate for Discharge       Discharge Note    Patient Discharged to home on 10/22/2021 12:32 PM via Private Car accompanied by [facility] staff.     Patient informed of discharge instructions in AVS. patient verbalizes understanding and denies having any questions pertaining to AVS. Patient stable at time of discharge. Patient denies SI, HI, and thoughts of self harm at time of discharge. All personal belongings returned to patient. Discharge prescriptions sent to [facility] in [place] via electronic communication. AVS, and discharge summary sent with pt.  Hillary Knott RN  10/22/2021  11:58 PM

## 2021-10-22 NOTE — PLAN OF CARE
Pt is discharging AMA. Pt is discharging to home transported by mom and dad. Pt denies having any thoughts of hurting themself or anyone else. Pt denies anxiety or depression. Pt has follow up with Phuong Magallanes at [facility]. Discharge instructions, including; demographic sheet, psychiatric evaluation, discharge summary, and AVS were faxed to these next level of care providers.     211.6

## 2021-10-22 NOTE — DISCHARGE SUMMARY
Psychiatric Discharge Summary    Kym Perea MRN# [id number]   Age: 51 year old YOB: 1970     Date of Admission:  10/19/2021  Date of Discharge:  10/22/2021  Admitting Physician:  Stanford Obregon MD  Discharge Provider:  Fouzia Upton NP          Event Leading to Hospitalization and Hospital Stay   Kym Perea is a 51 year old  female who was walking along side the road acting bizzaree and a passerby was concerned she needed help as she appeared very confused. When she arrived to the ER she was grossly disorgargnized and confused. Sh edid not know where she was and only knew her name. Psychomotor restlessness/agitation present upon arrival. Nursing notes indicate she was unsteady on her feet and restless upon arrival.         She rpeorted to the DEC  that she could hear her mother speaking about a lease though her mother was clearly not present.      Her drug screen was positive for thc. No alcohol present.      Her drug screen was negative for benzodiazepines and she is prescribed xanax 2 mg tid, gets 90 for the month and also is prescribed adderall 30 mg #60 for the month and niether were present in her urine drug screen.         She has never had psychosis or any disorganization in the past. She states she has no hisotyr of substance abuse. She is a limited historian as she doesn't answer questions appropriately and gets off topic very quickly. Her tremor is present even when she is laying down. She doesn't appear to be having auditory or visual hallucinations as she was yesterday.          Stay:  Admitted to unit 5 [facility].     This is [facility] first hospitalization. She presented initially with delirium which likely is secondary to xanax withdrawal. She presented with a marked body tremor and confusion. No benzodiazapines were present in her drug screen. Her vitals were normal despite her marked tremor. she was started on phenobarbital 32.4 tid and her tremor and  "confusion were still significant. Phenobarbital was increased to 64.8 mg tid and her confusion improved and tremor subsided. She tolerated it well. She still slept very poor and her speech was pressured and she was quite tangential after the initial delirium cleared. She was recently prescribed depakote EC on an outpatient basis . She states that she was prescribed this for \"anxiety and poor sleep\" though I suspect her outpatient provider suspected bipolar disorder. There is a  significant maternal  family history of bipolar disorder in her mother and maternal grandfather. Her maternal grandfather had quite a long stay at [facility] per her report. She denies suicidal ideation through her stay. She did have some paranoia regarding other patients. depakote EC was chaged to depakote ER and was increased to 1000 mg/ She refuses to stay longer and still is sleeping poor and slightly disorganized though not a risk to herself or others. I did tell her I would prefer she stay until we further increased depakote and taper phenobarbital. She refused to do so. Her parents came to pick her up this morning.     I did try to contact Danae Magallanes, her outpatient provider to let her know concern regarding delirium presentation upon admission being secondary to benzodiazapine wihtdrawal. She stated she planned on resuming xanax in the future and I did call her outpatient pharmacy and discontinued danae magallanes previous order of 2 mg tid and lowered the xanax to 1.5 tid. I would recommend a xanax taper on an outpatient basis or changing to phenobarbital taper      Provided a safe environment and therapeutic milieu. Encouraged participation in unit activities.       Treatment Team Progress Note:   The patient's care was discussed with the treatment team and chart notes were reviewed.    Plan:  Patient is discharging AMA  At time of discharge, there is no evidence that patient is in immediate danger of self or others though could " still benefit from further increase in depakote and tapering off of phenobarbital in controlled setting.        Diagnoses:   Delirium likely secondary to benzodiazapine withdrawal  R/o bipolar disorder type 1                Labs:     Results for orders placed or performed during the hospital encounter of 10/19/21   Ammonia     Status: Normal   Result Value Ref Range    Ammonia 11 10 - 50 umol/L   Potassium     Status: Abnormal   Result Value Ref Range    Potassium 3.3 (L) 3.4 - 5.3 mmol/L   Potassium     Status: Abnormal   Result Value Ref Range    Potassium 3.0 (L) 3.4 - 5.3 mmol/L   Troponin I     Status: Normal   Result Value Ref Range    Troponin I <0.015 0.000 - 0.045 ug/L   Potassium     Status: Normal   Result Value Ref Range    Potassium 4.0 3.4 - 5.3 mmol/L                  Discharge Medications:     Current Discharge Medication List      START taking these medications    Details   divalproex sodium extended-release (DEPAKOTE ER) 500 MG 24 hr tablet Take 2 tablets (1,000 mg) by mouth At Bedtime  Qty: 60 tablet, Refills: 0    Associated Diagnoses: Bipolar I disorder (H)      pantoprazole (PROTONIX) 20 MG EC tablet Take 1 tablet (20 mg) by mouth every morning (before breakfast)  Qty: 30 tablet, Refills: 0    Associated Diagnoses: Bipolar I disorder (H)         CONTINUE these medications which have NOT CHANGED    Details   albuterol (PROAIR HFA/PROVENTIL HFA/VENTOLIN HFA) 108 (90 Base) MCG/ACT inhaler Inhale 2 puffs into the lungs every 4 hours as needed for shortness of breath / dyspnea or wheezing  Qty: 1 Inhaler, Refills: 11    Comments: Pharmacy may dispense brand covered by insurance (Proair, or proventil or ventolin or generic albuterol inhaler)  Associated Diagnoses: Mild intermittent asthma without complication      Cholecalciferol (VITAMIN D-3) 125 MCG (5000 UT) TABS Take 1 tablet by mouth daily  Qty: 90 tablet, Refills: 11    Associated Diagnoses: Vitamin D deficiency      fluticasone (FLONASE) 50  "MCG/ACT spray Spray 2 sprays into both nostrils daily as needed       ibuprofen (ADVIL/MOTRIN) 800 MG tablet TAKE 1 TABLET (800 MG) BY MOUTH EVERY 8 HOURS AS NEEDED FOR MODERATE PAIN  Qty: 90 tablet, Refills: 4    Associated Diagnoses: Pain of joint of left ankle and foot         STOP taking these medications       ALPRAZolam (XANAX) 2 MG tablet Comments:   Reason for Stopping:         amphetamine-dextroamphetamine (ADDERALL) 30 MG per tablet Comments:   Reason for Stopping:         atenolol (TENORMIN) 100 MG tablet Comments:   Reason for Stopping:         divalproex sodium delayed-release (DEPAKOTE) 250 MG DR tablet Comments:   Reason for Stopping:         hydrOXYzine (VISTARIL) 25 MG capsule Comments:   Reason for Stopping:         oxyCODONE IR (ROXICODONE) 10 MG tablet Comments:   Reason for Stopping:         tiZANidine (ZANAFLEX) 4 MG tablet Comments:   Reason for Stopping:                    Psychiatric Examination:       MSE/PSYCH  PSYCHIATRIC EXAM  /64   Pulse 65   Temp 98.7  F (37.1  C) (Temporal)   Resp 12   Ht 1.753 m (5' 9\")   Wt 61.6 kg (135 lb 11.2 oz)   LMP  (LMP Unknown)   SpO2 98%   BMI 20.04 kg/m    -Appearance/Behavior: normal and improved  -Motor: intact  -Gait: intact  -Abnormal involuntary movements: none  -Mood: improved. Less labile.   -Affect: expansive  -Speech: still mildly pressurred    -Thought process/associations: less tangential  -Thought content:  Some paranoid thoughts though no hallucinations  -Perceptual disturbances: denies hallucinations..              -Suicidal/Homicidal Ideation: denies any   -Judgment: limited  -Insight:   *Orientation: time, place and person.  *Memory: difficult to assess due to emiliano  *Attention: poor  *Language: fluent, no aphasias, able to repeat phrases and name objects. Vocab intact.  *Fund of information: difficult to assess due to emiliano  *Cognitive functioning estimate: 0 - independent.           Discharge Plan:       Is discharging " against medical advice    Xanax was lowered to 1.5 mg tid and previous order from danae dominguez was discontinued. Pharmacy was called.     Follow up with danae dale as soon as possible.     I did contact [facility] to try to contact danae dale about concerns with delirium being secondary to xanax withdrawal.             Attestation:  The patient has been seen and evaluated by me,  April Snehal Upton NP         Discharge Services Provided:    45   minutes spent on discharge services, including:  Final examination of patient.  Review and discussion of Hospital stay.  Instructions for continued outpatient care/goals.  Preparation of discharge records.  Preparation of medications refills and new prescriptions.  Preparation of Applicable referral forms.

## 2021-10-29 ENCOUNTER — OFFICE VISIT (OUTPATIENT)
Dept: FAMILY MEDICINE | Facility: OTHER | Age: 51
End: 2021-10-29
Attending: FAMILY MEDICINE
Payer: COMMERCIAL

## 2021-10-29 VITALS
DIASTOLIC BLOOD PRESSURE: 80 MMHG | OXYGEN SATURATION: 98 % | TEMPERATURE: 98.1 F | RESPIRATION RATE: 16 BRPM | HEART RATE: 70 BPM | SYSTOLIC BLOOD PRESSURE: 124 MMHG

## 2021-10-29 DIAGNOSIS — M26.621 ARTHRALGIA OF RIGHT TEMPOROMANDIBULAR JOINT: Primary | ICD-10-CM

## 2021-10-29 DIAGNOSIS — M26.609 TMJ (TEMPOROMANDIBULAR JOINT SYNDROME): ICD-10-CM

## 2021-10-29 PROCEDURE — 99214 OFFICE O/P EST MOD 30 MIN: CPT | Performed by: FAMILY MEDICINE

## 2021-10-29 PROCEDURE — G0463 HOSPITAL OUTPT CLINIC VISIT: HCPCS

## 2021-10-29 RX ORDER — TRAMADOL HYDROCHLORIDE 50 MG/1
50 TABLET ORAL EVERY 12 HOURS PRN
Qty: 20 TABLET | Refills: 0 | Status: SHIPPED | OUTPATIENT
Start: 2021-10-29 | End: 2021-11-08

## 2021-10-29 ASSESSMENT — PAIN SCALES - GENERAL: PAINLEVEL: EXTREME PAIN (8)
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
## 2021-10-29 NOTE — NURSING NOTE
Chief Complaint   Patient presents with     Pain     dental and left ankle      Here today for dental pain and left ankle/leg pain.     Medication Reconciliation: complete    Saadia Aggarwal LPN

## 2021-11-01 ENCOUNTER — OFFICE VISIT (OUTPATIENT)
Dept: FAMILY MEDICINE | Facility: OTHER | Age: 51
End: 2021-11-01
Attending: FAMILY MEDICINE
Payer: COMMERCIAL

## 2021-11-01 VITALS
SYSTOLIC BLOOD PRESSURE: 104 MMHG | OXYGEN SATURATION: 98 % | HEART RATE: 78 BPM | TEMPERATURE: 97.5 F | DIASTOLIC BLOOD PRESSURE: 80 MMHG | RESPIRATION RATE: 16 BRPM

## 2021-11-01 DIAGNOSIS — G89.4 CHRONIC PAIN DISORDER: Primary | ICD-10-CM

## 2021-11-01 DIAGNOSIS — Z79.899 MEDICAL CANNABIS USE: ICD-10-CM

## 2021-11-01 DIAGNOSIS — F31.9 BIPOLAR I DISORDER (H): ICD-10-CM

## 2021-11-01 DIAGNOSIS — R11.0 NAUSEA: ICD-10-CM

## 2021-11-01 DIAGNOSIS — Z23 NEEDS FLU SHOT: ICD-10-CM

## 2021-11-01 DIAGNOSIS — Z72.0 TOBACCO ABUSE: ICD-10-CM

## 2021-11-01 DIAGNOSIS — G90.522 COMPLEX REGIONAL PAIN SYNDROME TYPE 1 AFFECTING LEFT LOWER LEG: ICD-10-CM

## 2021-11-01 DIAGNOSIS — M26.621 ARTHRALGIA OF RIGHT TEMPOROMANDIBULAR JOINT: ICD-10-CM

## 2021-11-01 PROBLEM — M26.609 TMJ (TEMPOROMANDIBULAR JOINT SYNDROME): Status: ACTIVE | Noted: 2021-11-01

## 2021-11-01 PROCEDURE — 90682 RIV4 VACC RECOMBINANT DNA IM: CPT

## 2021-11-01 PROCEDURE — G0463 HOSPITAL OUTPT CLINIC VISIT: HCPCS | Mod: 25

## 2021-11-01 PROCEDURE — G0463 HOSPITAL OUTPT CLINIC VISIT: HCPCS

## 2021-11-01 PROCEDURE — 99213 OFFICE O/P EST LOW 20 MIN: CPT | Performed by: FAMILY MEDICINE

## 2021-11-01 RX ORDER — HYDROXYZINE PAMOATE 25 MG/1
25 CAPSULE ORAL 3 TIMES DAILY PRN
Qty: 60 CAPSULE | Refills: 11 | Status: SHIPPED | OUTPATIENT
Start: 2021-11-01 | End: 2023-12-19

## 2021-11-01 RX ORDER — DIVALPROEX SODIUM 500 MG/1
500 TABLET, EXTENDED RELEASE ORAL 2 TIMES DAILY
Qty: 60 TABLET | Refills: 0 | COMMUNITY
Start: 2021-11-01 | End: 2023-03-02

## 2021-11-01 ASSESSMENT — PAIN SCALES - GENERAL: PAINLEVEL: EXTREME PAIN (8)

## 2021-11-01 NOTE — NURSING NOTE
"Chief Complaint   Patient presents with     RECHECK     from the ED       Initial /80   Pulse 78   Temp 97.5  F (36.4  C) (Temporal)   Resp 16   LMP  (LMP Unknown)   SpO2 98%   Breastfeeding No  Estimated body mass index is 20.04 kg/m  as calculated from the following:    Height as of 10/19/21: 1.753 m (5' 9\").    Weight as of 10/19/21: 61.6 kg (135 lb 11.2 oz).  Medication Reconciliation: complete    FOOD SECURITY SCREENING QUESTIONS  Hunger Vital Signs:  Within the past 12 months we worried whether our food would run out before we got money to buy more. Sometimes  Within the past 12 months the food we bought just didn't last and we didn't have money to get more. Sometimes    Petra Swanson LPN  "

## 2021-11-01 NOTE — PROGRESS NOTES
"Nursing Notes:   Petra Swanson LPN  11/1/2021 10:40 AM  Signed  Chief Complaint   Patient presents with     RECHECK     from the ED       Initial /80   Pulse 78   Temp 97.5  F (36.4  C) (Temporal)   Resp 16   LMP  (LMP Unknown)   SpO2 98%   Breastfeeding No  Estimated body mass index is 20.04 kg/m  as calculated from the following:    Height as of 10/19/21: 1.753 m (5' 9\").    Weight as of 10/19/21: 61.6 kg (135 lb 11.2 oz).  Medication Reconciliation: complete    FOOD SECURITY SCREENING QUESTIONS  Hunger Vital Signs:  Within the past 12 months we worried whether our food would run out before we got money to buy more. Sometimes  Within the past 12 months the food we bought just didn't last and we didn't have money to get more. Sometimes    Petra Swanson LPN      SUBJECTIVE:  Kym Perea  is a 51 year old female who comes in today for follow-up after being in the emergency room, and hospitalized at [facility] for suicidal behavior and delirium likely secondary to benzodiazepine withdrawal.  She was taking 2 mg 3 times daily of Xanax and they recommended that she drop it down to 1.5 and do a Xanax taper on an outpatient basis or change to a phenobarbital taper.  While hospitalized, they recommended that she stay a bit longer and do a taper of phenobarbital and kept her on Depakote.  She was still sleeping poorly and was slightly disorganized though was not a risk to herself or others and she refused to stay and left the hospital.  She was very disorganized and confused when she was at the hospital emergency room and upon arrival to the psychiatric unit.    She is back to to taking 250 mg bid on Depakote through [facility].  She kept her on the Xanax and restarted Adderall.     She saw Dr. Heard on Friday but that note is not done.  On PDMP review, she gave a prescription for 20 tramadol tablets.    I have not seen her since February when her ToxAssure was positive for methamphetamine " and I stopped prescribing any controlled substances for her.  We had a discussion in this regard and she assures me she is never used methamphetamine.  I told her that it was obviously in her system from somewhere and that I was not really comfortable or able to prescribe controlled substances for her.    She got her flu shot today.  She is overdue on her tetanus booster and she has not had COVID-19 vaccine.    She has an OFP on her boyfriend. She has been staying with friends and family.  They had been together for 3 months although she has known him since middle school and have dated off and on since age 14. He kicked her out of their house, despite the fact she is on the lease.  She goes to court tomorrow and she hopes to have the OFP upheld and she is able to get her things and move out. She is looking for a place to live.     She is having pain in her left ankle.  She had been to a pain clinic in the past and I told her I am happy to send her back there.    Past Medical, Family, and Social History reviewed and updated as noted below.   ROS is negative except as noted above       Allergies   Allergen Reactions     Arthrotec GI Disturbance     Indomethacin      Other reaction(s): Dizziness     Meloxicam GI Disturbance     Moxifloxacin    ,   Family History   Problem Relation Age of Onset     Cancer Father         Cancer,Bladder     Other - See Comments Father         Mild hypercholesterolemia     Other - See Comments Mother         scleroderma/lupus/Parkinson's syndrome     Family History Negative Brother         Good Health     Ariel-Danlos syndrome Daughter      Colon Cancer Maternal Grandmother 40        Cancer-colon,Followed by lung  with metastasis to the bone di*   ,   Current Outpatient Medications   Medication     albuterol (PROAIR HFA/PROVENTIL HFA/VENTOLIN HFA) 108 (90 Base) MCG/ACT inhaler     ALPRAZolam (XANAX) 0.5 MG tablet     Cholecalciferol (VITAMIN D-3) 125 MCG (5000 UT) TABS     divalproex  sodium extended-release (DEPAKOTE ER) 500 MG 24 hr tablet     fluticasone (FLONASE) 50 MCG/ACT spray     hydrOXYzine (VISTARIL) 25 MG capsule     ibuprofen (ADVIL/MOTRIN) 800 MG tablet     pantoprazole (PROTONIX) 20 MG EC tablet     tiZANidine (ZANAFLEX) 4 MG tablet     traMADol (ULTRAM) 50 MG tablet     No current facility-administered medications for this visit.   ,   Past Medical History:   Diagnosis Date     Acquired absence of other organs (CODE)     9/2/2011     Chronic pain syndrome     No Comments Provided     Complex regional pain syndrome I     left ankle     Encounter for other administrative examinations     10/2012,Hydrocodone 10/325mg, #240/mo signed 10/3/12, updated 10/1/ 14     Encounter for other administrative examinations     10/1/2014,MS Contin 30 mg q 8 hours #90 per month.  Morphine sulfate IR 15 mg 2-3 tab tid prn #180 per month     Gastro-esophageal reflux disease without esophagitis     No Comments Provided     Injury of left ankle     1997,requiring surgery     Low back pain     No Comments Provided     Major depressive disorder, recurrent severe without psychotic features (H)     No Comments Provided     Migraine without status migrainosus, not intractable     No Comments Provided     Nicotine dependence, uncomplicated     No Comments Provided     Obesity     No Comments Provided     Overweight     11/25/2014     Pain in ankle     Chronic left ankle pain secondary to reflex sympathetic dystrophy.  Patient  has had 4 previous surgeries, the most recent one done by Dr. Noriega in  2007. On Narcotic contract.     Pain in thoracic spine     No Comments Provided     Panic disorder without agoraphobia     Dr Reynoso     Personal history of other diseases of the female genital tract     No Comments Provided     Personal history of other medical treatment (CODE)     G2, P1-0-1-1 with one vaginal delivery.     Supraventricular tachycardia (H)     5/24/2010   ,   Patient Active Problem List     Diagnosis Date Noted     TMJ (temporomandibular joint syndrome) 11/01/2021     Priority: Medium     Arthralgia of right temporomandibular joint 11/01/2021     Priority: Medium     Suicidal behavior 10/19/2021     Priority: Medium     Medical cannabis use 07/10/2018     Priority: Medium     Chronic pain disorder 01/29/2018     Priority: Medium     Overview:   complex regional pain syndrome left ankle       Esophageal reflux 01/29/2018     Priority: Medium     Hyperlipidemia 01/29/2018     Priority: Medium     Insomnia 01/29/2018     Priority: Medium     Panic disorder 01/29/2018     Priority: Medium     Overview:   Previously patient of Dr. Reynoso since her teens. Patient is attempting slow taper down on her Xanax. She's been on Xanax 2 mg 4 times a day for years       Tobacco abuse 01/29/2018     Priority: Medium     Influenza A 01/11/2018     Priority: Medium     Pain medication agreement, 4/18/18, 4/3/19, 10/27/2020 04/21/2017     Priority: Medium     Overview:   4/20/17:  Oxycodone 10 mg #180/mo    Hydrocodone 10/325mg, #240/mo. Stopped and changed to morphine. Agreement signed 10/3/12, updated 10/1/ 14.  Complex regional pain syndrome (aka Reflex Sympathetic Dystrophy)  MS Contin 30 mg q 8 hours #90 per month.  Morphine sulfate IR 15 mg 2-3 tab tid prn #180 per month, decreased to #90/month.   query 4/9/16 as part of chart review. Seems appropriate. 6/2/16 tapered and off Morphine. [place] 5/325 #120/month.  query appropriate. ToxAssure appropriate.    August 2016 Consultation with [facility]. Recommended spinal cord stimulator trial, but she is reluctant to pursue that. Pain clinic stated that continuing opioids was OK.       Complex regional pain syndrome type 1 affecting left lower leg 04/15/2016     Priority: Medium     Overview:   Patient with a history of complex regional pain syndrome has seen Dr. Dino Skelton4/23/16        Migraine headache 11/25/2014     Priority: Medium      Overweight (BMI 25.0-29.9) 11/25/2014     Priority: Medium     Lumbago 02/06/2012     Priority: Medium     Major depressive disorder, recurrent episode, severe (H) 07/13/2011     Priority: Medium     Paroxysmal supraventricular tachycardia (H) 05/24/2010     Priority: Medium     Mononeuritis 07/06/2006     Priority: Medium     IMO Update 10/11       Ankle and foot joint derangement 06/05/2006     Priority: Medium     Overview:   Chronic left ankle pain secondary to reflex sympathetic dystrophy.  Patient   has had 4 previous surgeries, the most recent one done by Dr. Noriega in   2007. On Narcotic contract.    Formatting of this note might be different from the original.  Chronic left ankle pain secondary to reflex sympathetic dystrophy.  Patient   has had 4 previous surgeries, the most recent one done by Dr. Noriega in   2007. On Narcotic contract.    IMO Update 10/11     ,   Past Surgical History:   Procedure Laterality Date     ANKLE SURGERY      1997, 2000,Left ankle surgery x 2     HYSTERECTOMY TOTAL ABDOMINAL      11/1999,secondary to endometriosis, no malignancy; patient reports no malignancy, but abnormal cells were present *     LAPAROSCOPIC CHOLECYSTECTOMY      No Comments Provided     LAPAROSCOPY DIAGNOSTIC (GENERAL)      1995     OTHER SURGICAL HISTORY      21299,CRANIO/MAXILLOFACIAL SURGERY,Jaw Surgery     OTHER SURGICAL HISTORY      207040,[facility],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  No other abnormalities noted.    and   Social History     Tobacco Use     Smoking status: Current Every Day Smoker     Packs/day: 0.50     Years: 30.00     Pack years: 15.00     Types: Cigarettes     Start date: 12/2/1986     Smokeless tobacco: Never Used     Tobacco comment: trying   Substance Use Topics     Alcohol use: Not Currently     Alcohol/week: 0.0 standard drinks     Comment: OCCASIONAL WINE     OBJECTIVE:  /80   Pulse 78   Temp 97.5  F (36.4  C) (Temporal)   Resp 16   LMP  (LMP  Unknown)   SpO2 98%   Breastfeeding No    EXAM:  Alert and cooperative, no distress.  Appears to be her normal self.  Affect is appropriate.  ASSESSMENT/Plan :    Kym was seen today for recheck.    Diagnoses and all orders for this visit:    Chronic pain disorder  -     Pain Management Referral; Future    Needs flu shot  -     FLU SHOT 50 - 64 YEARS (FLUBLOK)    Complex regional pain syndrome type 1 affecting left lower leg  -     Pain Management Referral; Future    Medical cannabis use  -     Pain Management Referral; Future    Bipolar I disorder (H)    Nausea  -     hydrOXYzine (VISTARIL) 25 MG capsule; Take 1 capsule (25 mg) by mouth 3 times daily as needed for other (nausea)    Arthralgia of right temporomandibular joint  -     tiZANidine (ZANAFLEX) 4 MG tablet; Take 1 tablet (4 mg) by mouth 3 times daily as needed for muscle spasms    Tobacco abuse      She will continue to follow-up with her psychiatrist.  Flu shot today.  Referred to the pain clinic.  Renewed tizanidine and Vistaril for her.  Support and encouragement offered with regard to her multiple psychosocial stressors.    A total of 27 minutes was spent with the patient, reviewing records, tests, ordering medications, tests or procedures and documenting clinical information in the EHR  Darrin Stephen MD

## 2021-11-26 DIAGNOSIS — I47.10 PAROXYSMAL SUPRAVENTRICULAR TACHYCARDIA (H): ICD-10-CM

## 2021-11-29 RX ORDER — ATENOLOL 100 MG/1
100 TABLET ORAL DAILY
Qty: 90 TABLET | Refills: 2 | Status: SHIPPED | OUTPATIENT
Start: 2021-11-29 | End: 2022-11-29

## 2021-11-29 NOTE — TELEPHONE ENCOUNTER
" Disp Refills Start End MERISSA   atenolol (TENORMIN) 100 MG tablet (Discontinued) 90 tablet 3 2020 10/22/2021 No   Sig - Route: Take 1 tablet (100 mg) by mouth daily - Oral     Reason for Discontinue: Stop at Discharge    This Order Has Been Discontinued    Order Status Reason By On   Discontinued Stop at Discharge Fouzia Upton NP 10/22/21 0931     Pharmacy note: \"PATIENT IS REQUESTING REFILLS PLEASE. SCRIPT \"      LOV: 2021  Future Office visit:  No future appointment scheduled at this time.     Routing refill request to provider for review/approval because:  Drug not active on patient's medication list    Requested Prescriptions   Pending Prescriptions Disp Refills     atenolol (TENORMIN) 100 MG tablet [Pharmacy Med Name: ATENOLOL 100MG TABLET] 90 tablet 2     Sig: TAKE 1 TABLET (100 MG) BY MOUTH DAILY       Beta-Blockers Protocol Failed - 2021  1:40 PM        Failed - Medication is active on med list        Passed - Blood pressure under 140/90 in past 12 months     BP Readings from Last 3 Encounters:   21 104/80   10/29/21 124/80   10/19/21 124/67                 Passed - Patient is age 6 or older        Passed - Recent (12 mo) or future (30 days) visit within the authorizing provider's specialty     Patient has had an office visit with the authorizing provider or a provider within the authorizing providers department within the previous 12 mos or has a future within next 30 days. See \"Patient Info\" tab in inbasket, or \"Choose Columns\" in Meds & Orders section of the refill encounter.               Unable to complete prescription refill per RN Medication Refill Policy.................... Massiel Gutierrez RN ....................  2021   9:12 AM        "

## 2022-01-27 DIAGNOSIS — M26.621 ARTHRALGIA OF RIGHT TEMPOROMANDIBULAR JOINT: ICD-10-CM

## 2022-01-27 NOTE — TELEPHONE ENCOUNTER
tiZANidine (ZANAFLEX) 4 MG tablet  Last Written Prescription Date: 11/01/2021  Last Fill Quantity: 30,   # refills: 4  Last Office Visit: 11/01/2021  Future Office visit:       Routing refill request to provider for review/approval because:  Drug not on the [facility], [facility] or [facility] refill protocol or controlled substance    Unable to complete prescription refill per RNMedication Refill Policy.................... Alysa Orenlas RN ....................  1/27/2022   3:46 PM

## 2022-02-04 ENCOUNTER — TRANSFERRED RECORDS (OUTPATIENT)
Dept: HEALTH INFORMATION MANAGEMENT | Facility: CLINIC | Age: 52
End: 2022-02-04
Payer: COMMERCIAL

## 2022-03-29 DIAGNOSIS — M25.572 PAIN OF JOINT OF LEFT ANKLE AND FOOT: ICD-10-CM

## 2022-03-31 RX ORDER — IBUPROFEN 800 MG/1
800 TABLET, FILM COATED ORAL EVERY 8 HOURS PRN
Qty: 90 TABLET | Refills: 4 | Status: SHIPPED | OUTPATIENT
Start: 2022-03-31 | End: 2022-11-14

## 2022-03-31 NOTE — TELEPHONE ENCOUNTER
" Disp Refills Start End MERISSA   ibuprofen (ADVIL/MOTRIN) 800 MG tablet 90 tablet 4 9/21/2021  No   Sig - Route: TAKE 1 TABLET (800 MG) BY MOUTH EVERY 8 HOURS AS NEEDED FOR MODERATE PAIN - Oral       LOV: 11/1/2021  Future Office visit: No future appointment scheduled at this time.     Routing refill request to provider for review/approval.    Requested Prescriptions   Pending Prescriptions Disp Refills     ibuprofen (ADVIL/MOTRIN) 800 MG tablet [Pharmacy Med Name: IBUPROFEN 800MG TABLET] 90 tablet 4     Sig: TAKE 1 TABLET (800 MG) BY MOUTH EVERY 8 HOURS AS NEEDED FOR MODERATE PAIN       NSAID Medications Failed - 3/29/2022  2:09 PM        Failed - Normal CBC on file in past 12 months     Recent Labs   Lab Test 10/18/21  2019 04/18/19  0820 01/12/18  0813   WBC 15.9*   < >  --    GICHWBC  --   --  7.3   RBC 4.18   < >  --    GICHRBC  --   --  4.06   HGB 13.2   < > 13.2   HCT 37.5   < > 38.7      < > 199    < > = values in this interval not displayed.                 Passed - Blood pressure under 140/90 in past 12 months     BP Readings from Last 3 Encounters:   11/01/21 104/80   10/29/21 124/80   10/19/21 124/67                 Passed - Normal ALT on file in past 12 months     Recent Labs   Lab Test 10/19/21  1440 03/06/21  2315 01/11/18  0841   ALT 15   < >  --    GICHALT  --   --  10    < > = values in this interval not displayed.             Passed - Normal AST on file in past 12 months     Recent Labs   Lab Test 10/19/21  1440 03/06/21  2315 01/11/18  0841   AST 19   < >  --    GICHAST  --   --  14    < > = values in this interval not displayed.             Passed - Recent (12 mo) or future (30 days) visit within the authorizing provider's specialty     Patient has had an office visit with the authorizing provider or a provider within the authorizing providers department within the previous 12 mos or has a future within next 30 days. See \"Patient Info\" tab in inbasket, or \"Choose Columns\" in Meds & Orders " section of the refill encounter.              Passed - Patient is age 6-64 years        Passed - Medication is active on med list        Passed - No active pregnancy on record        Passed - Normal serum creatinine on file in past 12 months     Recent Labs   Lab Test 10/19/21  1440   CR 0.91       Ok to refill medication if creatinine is low          Passed - No positive pregnancy test in past 12 months         Routed to provider for review and consideration. Massiel Gutierrez RN  ....................  3/31/2022   10:06 AM

## 2022-04-04 ENCOUNTER — OFFICE VISIT (OUTPATIENT)
Dept: FAMILY MEDICINE | Facility: OTHER | Age: 52
End: 2022-04-04
Attending: PHYSICIAN ASSISTANT
Payer: COMMERCIAL

## 2022-04-04 VITALS
RESPIRATION RATE: 16 BRPM | OXYGEN SATURATION: 97 % | HEART RATE: 65 BPM | DIASTOLIC BLOOD PRESSURE: 74 MMHG | SYSTOLIC BLOOD PRESSURE: 110 MMHG | BODY MASS INDEX: 20.53 KG/M2 | WEIGHT: 139 LBS | TEMPERATURE: 97.8 F

## 2022-04-04 DIAGNOSIS — N39.9 URINARY PROBLEM IN FEMALE: Primary | ICD-10-CM

## 2022-04-04 DIAGNOSIS — N10 PYELONEPHRITIS, ACUTE: ICD-10-CM

## 2022-04-04 LAB
ALBUMIN UR-MCNC: ABNORMAL G/DL
APPEARANCE UR: ABNORMAL
BACTERIA #/AREA URNS HPF: ABNORMAL /HPF
BILIRUB UR QL STRIP: ABNORMAL
COLOR UR AUTO: ABNORMAL
GLUCOSE UR STRIP-MCNC: ABNORMAL MG/DL
HYALINE CASTS: 10 /LPF
KETONES UR STRIP-MCNC: ABNORMAL MG/DL
LEUKOCYTE ESTERASE UR QL STRIP: ABNORMAL
MUCOUS THREADS #/AREA URNS LPF: PRESENT /LPF
NITRATE UR QL: ABNORMAL
PH UR STRIP: ABNORMAL [PH]
RBC URINE: >182 /HPF
RENAL TUB EPI: 2 /HPF
SP GR UR STRIP: 1.03 (ref 1–1.03)
SQUAMOUS EPITHELIAL: 2 /HPF
TRANSITIONAL EPI: 4 /HPF
UROBILINOGEN UR STRIP-MCNC: ABNORMAL MG/DL
WBC CLUMPS #/AREA URNS HPF: PRESENT /HPF
WBC URINE: >182 /HPF

## 2022-04-04 PROCEDURE — 87186 SC STD MICRODIL/AGAR DIL: CPT | Mod: ZL | Performed by: NURSE PRACTITIONER

## 2022-04-04 PROCEDURE — 250N000011 HC RX IP 250 OP 636: Performed by: NURSE PRACTITIONER

## 2022-04-04 PROCEDURE — 96372 THER/PROPH/DIAG INJ SC/IM: CPT | Performed by: NURSE PRACTITIONER

## 2022-04-04 PROCEDURE — G0463 HOSPITAL OUTPT CLINIC VISIT: HCPCS | Mod: 25

## 2022-04-04 PROCEDURE — 81001 URINALYSIS AUTO W/SCOPE: CPT | Mod: ZL | Performed by: NURSE PRACTITIONER

## 2022-04-04 PROCEDURE — 99214 OFFICE O/P EST MOD 30 MIN: CPT | Performed by: NURSE PRACTITIONER

## 2022-04-04 PROCEDURE — G0463 HOSPITAL OUTPT CLINIC VISIT: HCPCS

## 2022-04-04 RX ORDER — CLONAZEPAM 1 MG/1
1 TABLET ORAL 2 TIMES DAILY
COMMUNITY
Start: 2022-03-16 | End: 2022-11-29

## 2022-04-04 RX ORDER — CEFTRIAXONE SODIUM 1 G
1 VIAL (EA) INJECTION ONCE
Status: COMPLETED | OUTPATIENT
Start: 2022-04-04 | End: 2022-04-04

## 2022-04-04 RX ORDER — DEXTROAMPHETAMINE SACCHARATE, AMPHETAMINE ASPARTATE, DEXTROAMPHETAMINE SULFATE AND AMPHETAMINE SULFATE 5; 5; 5; 5 MG/1; MG/1; MG/1; MG/1
30 TABLET ORAL 3 TIMES DAILY
COMMUNITY
Start: 2022-03-11

## 2022-04-04 RX ORDER — DIVALPROEX SODIUM 250 MG/1
TABLET, EXTENDED RELEASE ORAL
COMMUNITY
Start: 2022-03-16 | End: 2022-11-29

## 2022-04-04 RX ADMIN — CEFTRIAXONE SODIUM 1 G: 1 INJECTION, POWDER, FOR SOLUTION INTRAMUSCULAR; INTRAVENOUS at 14:15

## 2022-04-04 ASSESSMENT — PAIN SCALES - GENERAL: PAINLEVEL: WORST PAIN (10)

## 2022-04-04 NOTE — PATIENT INSTRUCTIONS
Will watch for urine culture results and be in touch.     Rocephin administered in office today for pyelonephritis (right kidney).     Augmentin twice daily x 7 days.     Encourage water intake.

## 2022-04-04 NOTE — NURSING NOTE
"Chief Complaint   Patient presents with     UTI     urinary urgency, burning with urination     Patient presents to the clinic today for s/s stated above. Also experiencing some right side flank pain. Patient stated this has been going on for about 5 days.  Is using Azo.    Initial /74 (BP Location: Left arm, Patient Position: Sitting, Cuff Size: Adult Regular)   Pulse 65   Temp 97.8  F (36.6  C) (Tympanic)   Resp 16   Wt 63 kg (139 lb)   LMP  (LMP Unknown)   SpO2 97%   Breastfeeding No   BMI 20.53 kg/m   Estimated body mass index is 20.53 kg/m  as calculated from the following:    Height as of 10/19/21: 1.753 m (5' 9\").    Weight as of this encounter: 63 kg (139 lb).  Medication Reconciliation: Completed     Advanced Care Directive Reviewed    Ezra Hodge LPN  "

## 2022-04-04 NOTE — PROGRESS NOTES
ASSESSMENT/PLAN:    I have reviewed the nursing notes.  I have reviewed the findings, diagnosis, plan and need for follow up with the patient.    1. Urinary problem in female  2. Pyelonephritis, acute  - UA reflex to Microscopic and Culture  - Urine Culture  Urine culture is pending, discussed this process with patient will reach out with her results within about 2 days.  Urinalysis today is altered due to Azo medication on board, large bacteria, pyuria, hematuria.  Reviewed last CMP which showed normal kidney function.  Treated for Kwadwo with 1 g Rocephin and Augmentin.  Considered Cipro but moxifloxacin allergy as well as taking tizanidine which increases risk of hypotension.  Discussed the above plan and treatment with patient who is agreeable.  Recommend follow-up if she develops persistent high fevers, nausea with vomiting or worsening condition within the next 48 hours while the culture is in process.  - cefTRIAXone (ROCEPHIN) in lidocaine 1% (PF) injection 1 g  - amoxicillin-clavulanate (AUGMENTIN) 875-125 MG tablet; Take 1 tablet by mouth 2 times daily for 7 days  Dispense: 14 tablet; Refill: 0    Follow up if symptoms persist or worsen or concerns    I explained my diagnostic considerations and recommendations to the patient, who voiced understanding and agreement with the treatment plan. All questions were answered. We discussed potential side effects of any prescribed or recommended therapies, as well as expectations for response to treatments.    Saadia Portillo NP  4/4/2022  1:37 PM    HPI:  Kym Perea is a 52 year old female who presents to [facility] today for concerns of urinary urgency, burning with urination. Also is experiencing some right side flank pain. Symptoms started about 5 days ago; using OTC azo for symptom relief. Slight nausea, without vomiting. Had chills last night with fever up to 101, took tylenol. She has not experienced UTI symptoms to this severity before.   She  mentions that in ER, at one point provider mentioned possible adrenal hyperplasia; recommended f/u with Zafar who did not express concern with this or order further workup.     moxifloxacin allergy caused nausea and gi upset.       Past Medical History:   Diagnosis Date     Acquired absence of other organs (CODE)     9/2/2011     Chronic pain syndrome     No Comments Provided     Complex regional pain syndrome I     left ankle     Encounter for other administrative examinations     10/2012,Hydrocodone 10/325mg, #240/mo signed 10/3/12, updated 10/1/ 14     Encounter for other administrative examinations     10/1/2014,MS Contin 30 mg q 8 hours #90 per month.  Morphine sulfate IR 15 mg 2-3 tab tid prn #180 per month     Gastro-esophageal reflux disease without esophagitis     No Comments Provided     Injury of left ankle     1997,requiring surgery     Low back pain     No Comments Provided     Major depressive disorder, recurrent severe without psychotic features (H)     No Comments Provided     Migraine without status migrainosus, not intractable     No Comments Provided     Nicotine dependence, uncomplicated     No Comments Provided     Obesity     No Comments Provided     Overweight     11/25/2014     Pain in ankle     Chronic left ankle pain secondary to reflex sympathetic dystrophy.  Patient  has had 4 previous surgeries, the most recent one done by Dr. Noriega in  2007. On Narcotic contract.     Pain in thoracic spine     No Comments Provided     Panic disorder without agoraphobia     Dr Reynoso     Personal history of other diseases of the female genital tract     No Comments Provided     Personal history of other medical treatment (CODE)     G2, P1-0-1-1 with one vaginal delivery.     Supraventricular tachycardia (H)     5/24/2010     Past Surgical History:   Procedure Laterality Date     ANKLE SURGERY      1997, 2000,Left ankle surgery x 2     HYSTERECTOMY TOTAL ABDOMINAL      11/1999,secondary to  endometriosis, no malignancy; patient reports no malignancy, but abnormal cells were present *     LAPAROSCOPIC CHOLECYSTECTOMY      No Comments Provided     LAPAROSCOPY DIAGNOSTIC (GENERAL)      1995     OTHER SURGICAL HISTORY      21299,CRANIO/MAXILLOFACIAL SURGERY,Jaw Surgery     OTHER SURGICAL HISTORY      207040,[facility],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  No other abnormalities noted.     Social History     Tobacco Use     Smoking status: Current Every Day Smoker     Packs/day: 0.50     Years: 30.00     Pack years: 15.00     Types: Cigarettes     Start date: 12/2/1986     Smokeless tobacco: Never Used     Tobacco comment: trying   Substance Use Topics     Alcohol use: Not Currently     Alcohol/week: 0.0 standard drinks     Comment: OCCASIONAL WINE     Current Outpatient Medications   Medication Sig Dispense Refill     amoxicillin-clavulanate (AUGMENTIN) 875-125 MG tablet Take 1 tablet by mouth 2 times daily for 7 days 14 tablet 0     albuterol (PROAIR HFA/PROVENTIL HFA/VENTOLIN HFA) 108 (90 Base) MCG/ACT inhaler Inhale 2 puffs into the lungs every 4 hours as needed for shortness of breath / dyspnea or wheezing 1 Inhaler 11     ALPRAZolam (XANAX) 0.5 MG tablet Take 3 tablets (1.5 mg) by mouth 3 times daily 270 tablet 0     amitriptyline (ELAVIL) 25 MG tablet TAKE 1 TAB BY MOUTH ONCE DAILY AT BEDTIME       amphetamine-dextroamphetamine (ADDERALL) 30 MG tablet 3/11/22 TAKE 1 TABLET BY MOUTH TWICE DAILY.       atenolol (TENORMIN) 100 MG tablet TAKE 1 TABLET (100 MG) BY MOUTH DAILY 90 tablet 2     Cholecalciferol (VITAMIN D-3) 125 MCG (5000 UT) TABS Take 1 tablet by mouth daily 90 tablet 11     clonazePAM (KLONOPIN) 1 MG tablet ONE TAB BY MOUTH THREE TIMES DAILY       divalproex sodium extended-release (DEPAKOTE ER) 250 MG 24 hr tablet ONE TAB BY MOUTH THREE TIMES DAILY       divalproex sodium extended-release (DEPAKOTE ER) 500 MG 24 hr tablet Take 1 tablet (500 mg) by mouth 2 times daily 60  tablet 0     fluticasone (FLONASE) 50 MCG/ACT spray Spray 2 sprays into both nostrils daily as needed        hydrOXYzine (VISTARIL) 25 MG capsule Take 1 capsule (25 mg) by mouth 3 times daily as needed for other (nausea) 60 capsule 11     ibuprofen (ADVIL/MOTRIN) 800 MG tablet TAKE 1 TABLET (800 MG) BY MOUTH EVERY 8 HOURS AS NEEDED FOR MODERATE PAIN 90 tablet 4     pantoprazole (PROTONIX) 20 MG EC tablet Take 1 tablet (20 mg) by mouth every morning (before breakfast) 30 tablet 0     tiZANidine (ZANAFLEX) 4 MG tablet TAKE 1 TABLET (4 MG) BY MOUTH 3 TIMES DAILY AS NEEDED FOR MUSCLE SPASMS 30 tablet 3     Allergies   Allergen Reactions     Arthrotec GI Disturbance     Indomethacin      Other reaction(s): Dizziness     Meloxicam GI Disturbance     Moxifloxacin      Past medical history, past surgical history, current medications and allergies reviewed and accurate to the best of my knowledge.      ROS:  Refer to HPI    /74 (BP Location: Left arm, Patient Position: Sitting, Cuff Size: Adult Regular)   Pulse 65   Temp 97.8  F (36.6  C) (Tympanic)   Resp 16   Wt 63 kg (139 lb)   LMP  (LMP Unknown)   SpO2 97%   Breastfeeding No   BMI 20.53 kg/m      EXAM:  General Appearance: Well appearing 52 year old female, appropriate appearance for age. No acute distress   Respiratory: normal chest wall and respirations.  Normal effort.  Clear to auscultation bilaterally, no wheezing, crackles or rhonchi.  No increased work of breathing.  No cough appreciated.  Cardiac: RRR with no murmurs  Abdomen: soft, nontender, no rigidity, no rebound tenderness or guarding, normal bowel sounds present  :  + suprapubic tenderness to palpation.  Present CVA tenderness to palpation on the right. Left without tenderness.   Musculoskeletal:  Equal movement of bilateral upper extremities.  Equal movement of bilateral lower extremities.  Normal gait.    Psychological: normal affect, alert, oriented, and pleasant.     Results for orders  placed or performed in visit on 04/04/22   UA reflex to Microscopic and Culture     Status: Abnormal    Specimen: Urine, Midstream   Result Value Ref Range    Color Urine Dark Orange (A) Colorless, Straw, Light Yellow, Yellow    Appearance Urine Cloudy (A) Clear    Glucose Urine      Bilirubin Urine      Ketones Urine      Specific Gravity Urine 1.026 1.003 - 1.035    pH Urine      Protein Albumin Urine      Urobilinogen Urine      Nitrite Urine      Leukocyte Esterase Urine      Bacteria Urine Many (A) None Seen /HPF    WBC Clumps Urine Present (A) None Seen /HPF    Mucus Urine Present (A) None Seen /LPF    RBC Urine >182 (H) <=2 /HPF    WBC Urine >182 (H) <=5 /HPF    Squamous Epithelials Urine 2 (H) <=1 /HPF    Transitional Epithelials Urine 4 (H) <=1 /HPF    Renal Tubular Epithelials Urine 2 (H) None Seen /HPF    Hyaline Casts Urine 10 (H) <=2 /LPF    Narrative    Urine Culture ordered based on laboratory criteria

## 2022-04-06 LAB — BACTERIA UR CULT: ABNORMAL

## 2022-04-24 ENCOUNTER — HOSPITAL ENCOUNTER (EMERGENCY)
Facility: OTHER | Age: 52
Discharge: HOME | End: 2022-04-24
Attending: PHYSICIAN ASSISTANT | Admitting: PHYSICIAN ASSISTANT
Payer: COMMERCIAL

## 2022-04-24 VITALS
OXYGEN SATURATION: 99 % | SYSTOLIC BLOOD PRESSURE: 123 MMHG | TEMPERATURE: 97.8 F | DIASTOLIC BLOOD PRESSURE: 69 MMHG | BODY MASS INDEX: 20.53 KG/M2 | RESPIRATION RATE: 18 BRPM | WEIGHT: 139 LBS | HEART RATE: 58 BPM

## 2022-04-24 DIAGNOSIS — M54.16 LUMBAR BACK PAIN WITH RADICULOPATHY AFFECTING LEFT LOWER EXTREMITY: ICD-10-CM

## 2022-04-24 PROCEDURE — 99283 EMERGENCY DEPT VISIT LOW MDM: CPT | Performed by: PHYSICIAN ASSISTANT

## 2022-04-24 PROCEDURE — 250N000013 HC RX MED GY IP 250 OP 250 PS 637: Performed by: PHYSICIAN ASSISTANT

## 2022-04-24 PROCEDURE — 250N000011 HC RX IP 250 OP 636: Performed by: PHYSICIAN ASSISTANT

## 2022-04-24 RX ORDER — OXYCODONE HYDROCHLORIDE 5 MG/1
5 TABLET ORAL ONCE
Status: COMPLETED | OUTPATIENT
Start: 2022-04-24 | End: 2022-04-24

## 2022-04-24 RX ORDER — METHYLPREDNISOLONE 4 MG
TABLET, DOSE PACK ORAL
Qty: 21 TABLET | Refills: 0 | Status: SHIPPED | OUTPATIENT
Start: 2022-04-24 | End: 2022-06-27

## 2022-04-24 RX ORDER — OXYCODONE HYDROCHLORIDE 5 MG/1
5 TABLET ORAL EVERY 6 HOURS PRN
Qty: 6 TABLET | Refills: 0 | Status: SHIPPED | OUTPATIENT
Start: 2022-04-24 | End: 2022-04-27

## 2022-04-24 RX ORDER — ONDANSETRON 4 MG/1
4 TABLET, ORALLY DISINTEGRATING ORAL EVERY 8 HOURS PRN
Qty: 10 TABLET | Refills: 0 | Status: SHIPPED | OUTPATIENT
Start: 2022-04-24 | End: 2022-04-27

## 2022-04-24 RX ORDER — ONDANSETRON 4 MG/1
4 TABLET, ORALLY DISINTEGRATING ORAL ONCE
Status: COMPLETED | OUTPATIENT
Start: 2022-04-24 | End: 2022-04-24

## 2022-04-24 RX ADMIN — OXYCODONE HYDROCHLORIDE 5 MG: 5 TABLET ORAL at 15:38

## 2022-04-24 RX ADMIN — ONDANSETRON 4 MG: 4 TABLET, ORALLY DISINTEGRATING ORAL at 15:38

## 2022-04-24 ASSESSMENT — ENCOUNTER SYMPTOMS
FEVER: 0
DYSURIA: 0
DIFFICULTY URINATING: 0
VOMITING: 0
CONFUSION: 0
SHORTNESS OF BREATH: 0
ABDOMINAL PAIN: 0
BACK PAIN: 1
NAUSEA: 0

## 2022-04-24 NOTE — ED TRIAGE NOTES
ED Nursing Triage Note (General)   ________________________________    Kym Perea is a 52 year old Female that presents to triage via private vehicle with complaints of lower back pain and numbness in the legs.  Patient states she saw sports medicine 2 weeks ago and states they are attempting to refer her to a specialist and prescribed patient maloxicam, however patient states relief is minor.  Patient states she also takes tizanidine which was prescribed by her primary, however, she remains in pain.  Patient has not followed up since 2 weeks ago when she saw sports medicine, however,  states he brought her to the ER due to continued pain. Patient states hx of degenerative disc disease 2 years ago and states since dx she fell 2 years ago and states she did not follow up at that time, however, believes there is an old injury from the fall which is contributing to the pain. Patient denies loss of bowel or bladder function.   Significant symptoms had onset 3 weeks ago.  Patient appears alert behavior.  GCS-15  Airway: intact  Breathing noted as Normal  Action taken:4      PRE HOSPITAL PRIOR LIVING SITUATION-home

## 2022-04-24 NOTE — DISCHARGE INSTRUCTIONS
Get plenty of fluids and rest.  We will start you on a steroid Dosepak, I recommend you avoid any NSAIDs including meloxicam while taking steroid.  Take your pain medication only as needed.  Continue to use ice and heat and over-the-counter lidocaine patches.  Referrals placed for you to follow-up with PCP for reassessment.  You should return to the ED if you are having worse or concerning symptoms including numbness or tingling in your groin, loss of bowel or bladder function, difficulty moving your legs or the inability to walk or worsening or concerning symptoms.

## 2022-04-27 DIAGNOSIS — M26.621 ARTHRALGIA OF RIGHT TEMPOROMANDIBULAR JOINT: ICD-10-CM

## 2022-04-28 NOTE — TELEPHONE ENCOUNTER
Disp Refills Start End MERISSA   tiZANidine (ZANAFLEX) 4 MG tablet 30 tablet 3 1/27/2022  No   Sig - Route: TAKE 1 TABLET (4 MG) BY MOUTH 3 TIMES DAILY AS NEEDED FOR MUSCLE SPASMS - Oral       LOV: 11/01/2021  Future Office visit: No future appointment scheduled at this time.      Routing refill request to provider for review/approval.  Per Quality Report patient is overdue for medicare annual wellness visit, Hep C and HIV screening, mammo screening, colorectal cancer screening, asthma screening.    Requested Prescriptions   Pending Prescriptions Disp Refills     tiZANidine (ZANAFLEX) 4 MG tablet [Pharmacy Med Name: TIZANIDINE 4MG TABLET] 30 tablet 3     Sig: TAKE 1 TABLET (4 MG) BY MOUTH 3 TIMES DAILY AS NEEDED FOR MUSCLE SPASMS       There is no refill protocol information for this order        Routed to provider for review and consideration. Massiel Gutierrez RN  ....................  4/28/2022   12:35 PM

## 2022-05-03 NOTE — TELEPHONE ENCOUNTER
[facility]   Augusta Portillo on 5/3/2022 at 12:23 PM

## 2022-05-27 ENCOUNTER — TELEPHONE (OUTPATIENT)
Dept: FAMILY MEDICINE | Facility: OTHER | Age: 52
End: 2022-05-27
Payer: COMMERCIAL

## 2022-05-27 NOTE — TELEPHONE ENCOUNTER
Attempt # 1  Outcome: Left Message   Comment: LM for patient to return call to clinic to schedule establish care appointment with Dr. Hurley.  Per Dr. Hurley okay to use next available regular appointment spot, I let patient know this was in July.

## 2022-06-27 ENCOUNTER — HOSPITAL ENCOUNTER (EMERGENCY)
Facility: OTHER | Age: 52
Discharge: HOME | End: 2022-06-27
Attending: EMERGENCY MEDICINE | Admitting: EMERGENCY MEDICINE
Payer: COMMERCIAL

## 2022-06-27 VITALS
HEART RATE: 85 BPM | RESPIRATION RATE: 16 BRPM | SYSTOLIC BLOOD PRESSURE: 117 MMHG | DIASTOLIC BLOOD PRESSURE: 80 MMHG | OXYGEN SATURATION: 99 % | TEMPERATURE: 98.5 F

## 2022-06-27 DIAGNOSIS — G89.29 CHRONIC BILATERAL LOW BACK PAIN WITH RIGHT-SIDED SCIATICA: ICD-10-CM

## 2022-06-27 DIAGNOSIS — G89.29 CHRONIC BILATERAL LOW BACK PAIN WITH LEFT-SIDED SCIATICA: ICD-10-CM

## 2022-06-27 DIAGNOSIS — M54.42 CHRONIC BILATERAL LOW BACK PAIN WITH LEFT-SIDED SCIATICA: ICD-10-CM

## 2022-06-27 DIAGNOSIS — M54.41 CHRONIC BILATERAL LOW BACK PAIN WITH RIGHT-SIDED SCIATICA: ICD-10-CM

## 2022-06-27 PROCEDURE — 99283 EMERGENCY DEPT VISIT LOW MDM: CPT | Performed by: EMERGENCY MEDICINE

## 2022-06-27 PROCEDURE — 250N000013 HC RX MED GY IP 250 OP 250 PS 637: Performed by: EMERGENCY MEDICINE

## 2022-06-27 PROCEDURE — 250N000012 HC RX MED GY IP 250 OP 636 PS 637: Performed by: EMERGENCY MEDICINE

## 2022-06-27 RX ORDER — PREDNISONE 20 MG/1
40 TABLET ORAL ONCE
Status: COMPLETED | OUTPATIENT
Start: 2022-06-27 | End: 2022-06-27

## 2022-06-27 RX ORDER — PREDNISONE 10 MG/1
TABLET ORAL
Qty: 26 TABLET | Refills: 0 | Status: SHIPPED | OUTPATIENT
Start: 2022-06-27 | End: 2022-11-29

## 2022-06-27 RX ORDER — OXYCODONE HYDROCHLORIDE 5 MG/1
10 TABLET ORAL ONCE
Status: COMPLETED | OUTPATIENT
Start: 2022-06-27 | End: 2022-06-27

## 2022-06-27 RX ADMIN — PREDNISONE 40 MG: 20 TABLET ORAL at 01:12

## 2022-06-27 RX ADMIN — OXYCODONE HYDROCHLORIDE 10 MG: 5 TABLET ORAL at 01:12

## 2022-06-27 ASSESSMENT — ENCOUNTER SYMPTOMS
VOMITING: 0
BACK PAIN: 1
CHEST TIGHTNESS: 0
AGITATION: 0
FEVER: 0
CHILLS: 0
DYSURIA: 0
SHORTNESS OF BREATH: 0
NAUSEA: 0

## 2022-06-27 NOTE — ED PROVIDER NOTES
History     Chief Complaint   Patient presents with     Leg Pain     HPI  Kym Perea is a 52 year old female who has been suffering from low back pain with sciatica for some time now.  She has seen Fede Barton in [place] for this.  She has a history of Ariel Danlos in the family, and they are concerned it may be related to this.  She also has a history of degenerative disc disease.  She is here stating now that the pain is worse and she cannot sleep.  She has mid low back pain with sciatica running down the back of both legs all the way to her feet.  She feels more comfortable in something of a fetal position.  Whenever she straightens her legs it is worse.  She had her grandchildren over yesterday and thinks perhaps that she just was doing more bending and lifting yesterday than normal.  No change in bowel or bladder.  No recent trauma.    Allergies:  Allergies   Allergen Reactions     Arthrotec GI Disturbance     Indomethacin      Other reaction(s): Dizziness     Meloxicam GI Disturbance     Moxifloxacin        Problem List:    Patient Active Problem List    Diagnosis Date Noted     TMJ (temporomandibular joint syndrome) 11/01/2021     Priority: Medium     Arthralgia of right temporomandibular joint 11/01/2021     Priority: Medium     Suicidal behavior 10/19/2021     Priority: Medium     Medical cannabis use 07/10/2018     Priority: Medium     Chronic pain disorder 01/29/2018     Priority: Medium     Overview:   complex regional pain syndrome left ankle       Esophageal reflux 01/29/2018     Priority: Medium     Hyperlipidemia 01/29/2018     Priority: Medium     Insomnia 01/29/2018     Priority: Medium     Panic disorder 01/29/2018     Priority: Medium     Overview:   Previously patient of Dr. Reynoso since her teens. Patient is attempting slow taper down on her Xanax. She's been on Xanax 2 mg 4 times a day for years       Tobacco abuse 01/29/2018     Priority: Medium     Influenza A 01/11/2018      Priority: Medium     Pain medication agreement, 4/18/18, 4/3/19, 10/27/2020 04/21/2017     Priority: Medium     Overview:   4/20/17:  Oxycodone 10 mg #180/mo    Hydrocodone 10/325mg, #240/mo. Stopped and changed to morphine. Agreement signed 10/3/12, updated 10/1/ 14.  Complex regional pain syndrome (aka Reflex Sympathetic Dystrophy)  MS Contin 30 mg q 8 hours #90 per month.  Morphine sulfate IR 15 mg 2-3 tab tid prn #180 per month, decreased to #90/month.   query 4/9/16 as part of chart review. Seems appropriate. 6/2/16 tapered and off Morphine. [place] 5/325 #120/month.  query appropriate. ToxAssure appropriate.    August 2016 Consultation with [facility]. Recommended spinal cord stimulator trial, but she is reluctant to pursue that. Pain clinic stated that continuing opioids was OK.       Complex regional pain syndrome type 1 affecting left lower leg 04/15/2016     Priority: Medium     Overview:   Patient with a history of complex regional pain syndrome has seen Dr. Dino Skelton4/23/16        Migraine headache 11/25/2014     Priority: Medium     Overweight (BMI 25.0-29.9) 11/25/2014     Priority: Medium     Lumbago 02/06/2012     Priority: Medium     Major depressive disorder, recurrent episode, severe (H) 07/13/2011     Priority: Medium     Paroxysmal supraventricular tachycardia (H) 05/24/2010     Priority: Medium     Mononeuritis 07/06/2006     Priority: Medium     IMO Update 10/11       Ankle and foot joint derangement 06/05/2006     Priority: Medium     Overview:   Chronic left ankle pain secondary to reflex sympathetic dystrophy.  Patient   has had 4 previous surgeries, the most recent one done by Dr. Noriega in   2007. On Narcotic contract.    Formatting of this note might be different from the original.  Chronic left ankle pain secondary to reflex sympathetic dystrophy.  Patient   has had 4 previous surgeries, the most recent one done by Dr. Noriega in   2007. On Narcotic  contract.    IMO Update 10/11          Past Medical History:    Past Medical History:   Diagnosis Date     Acquired absence of other organs (CODE)      Chronic pain syndrome      Complex regional pain syndrome I      Encounter for other administrative examinations      Encounter for other administrative examinations      Gastro-esophageal reflux disease without esophagitis      Injury of left ankle      Low back pain      Major depressive disorder, recurrent severe without psychotic features (H)      Migraine without status migrainosus, not intractable      Nicotine dependence, uncomplicated      Obesity      Overweight      Pain in ankle      Pain in thoracic spine      Panic disorder without agoraphobia      Personal history of other diseases of the female genital tract      Personal history of other medical treatment (CODE)      Supraventricular tachycardia (H)        Past Surgical History:    Past Surgical History:   Procedure Laterality Date     ANKLE SURGERY      1997, 2000,Left ankle surgery x 2     HYSTERECTOMY TOTAL ABDOMINAL      11/1999,secondary to endometriosis, no malignancy; patient reports no malignancy, but abnormal cells were present *     LAPAROSCOPIC CHOLECYSTECTOMY      No Comments Provided     LAPAROSCOPY DIAGNOSTIC (GENERAL)      1995     OTHER SURGICAL HISTORY      21299,CRANIO/MAXILLOFACIAL SURGERY,Jaw Surgery     OTHER SURGICAL HISTORY      207040,[facility],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  No other abnormalities noted.       Family History:    Family History   Problem Relation Age of Onset     Cancer Father         Cancer,Bladder     Other - See Comments Father         Mild hypercholesterolemia     Other - See Comments Mother         scleroderma/lupus/Parkinson's syndrome     Family History Negative Brother         Good Health     Ariel-Danlos syndrome Daughter      Colon Cancer Maternal Grandmother 40        Cancer-colon,Followed by lung  with metastasis to the bone  di*       Social History:  Marital Status:   [4]  Social History     Tobacco Use     Smoking status: Current Every Day Smoker     Packs/day: 0.50     Years: 30.00     Pack years: 15.00     Types: Cigarettes     Start date: 12/2/1986     Smokeless tobacco: Never Used     Tobacco comment: trying   Vaping Use     Vaping Use: Every day     Substances: CBD     Devices: Disposable   Substance Use Topics     Alcohol use: Not Currently     Alcohol/week: 0.0 standard drinks     Comment: OCCASIONAL WINE     Drug use: No     Comment: medical cannabis        Medications:    albuterol (PROAIR HFA/PROVENTIL HFA/VENTOLIN HFA) 108 (90 Base) MCG/ACT inhaler  ALPRAZolam (XANAX) 0.5 MG tablet  amphetamine-dextroamphetamine (ADDERALL) 30 MG tablet  atenolol (TENORMIN) 100 MG tablet  Cholecalciferol (VITAMIN D-3) 125 MCG (5000 UT) TABS  clonazePAM (KLONOPIN) 1 MG tablet  divalproex sodium extended-release (DEPAKOTE ER) 250 MG 24 hr tablet  divalproex sodium extended-release (DEPAKOTE ER) 500 MG 24 hr tablet  fluticasone (FLONASE) 50 MCG/ACT spray  hydrOXYzine (VISTARIL) 25 MG capsule  ibuprofen (ADVIL/MOTRIN) 800 MG tablet  predniSONE (DELTASONE) 10 MG tablet  tiZANidine (ZANAFLEX) 4 MG tablet          Review of Systems   Constitutional: Negative for chills and fever.   HENT: Negative for congestion.    Eyes: Negative for visual disturbance.   Respiratory: Negative for chest tightness and shortness of breath.    Cardiovascular: Negative for chest pain.   Gastrointestinal: Negative for nausea and vomiting.   Genitourinary: Negative for dysuria.   Musculoskeletal: Positive for back pain.   Skin: Negative for rash.   Psychiatric/Behavioral: Negative for agitation.       Physical Exam   BP: 117/80  Pulse: 85  Temp: 98.5  F (36.9  C)  Resp: 16  SpO2: 99 %      Physical Exam  Vitals and nursing note reviewed.   Constitutional:       Appearance: Normal appearance.   HENT:      Head: Normocephalic and atraumatic.      Mouth/Throat:       Mouth: Mucous membranes are moist.   Eyes:      Conjunctiva/sclera: Conjunctivae normal.   Cardiovascular:      Rate and Rhythm: Normal rate.   Pulmonary:      Effort: Pulmonary effort is normal.   Skin:     General: Skin is warm and dry.   Neurological:      Mental Status: She is alert and oriented to person, place, and time.   Psychiatric:         Behavior: Behavior normal.         ED Course                 Procedures                No results found for this or any previous visit (from the past 24 hour(s)).    Medications   oxyCODONE (ROXICODONE) tablet 10 mg (has no administration in time range)   predniSONE (DELTASONE) tablet 40 mg (has no administration in time range)       Assessments & Plan (with Medical Decision Making)     I have reviewed the nursing notes.    I have reviewed the findings, diagnosis, plan and need for follow up with the patient.  She has known documented low back pain with sciatica, is following with Ortho for this.  Her symptoms here are the same as they have been previously.  There is nothing different.  No symptoms suggestive of cauda equina or anything else more ominous.  At this point we will just try to get her little bit of relief and have her follow-up with orthopedics as soon as able.  I will give her a 10 mg dose of oxycodone and start her on a prednisone taper as she did get some relief from steroids in April.  Return if worse.    New Prescriptions    PREDNISONE (DELTASONE) 10 MG TABLET    Take 4 tablets daily for 2 days, then 3 tablets daily for 3 days, then 2 tablets daily for 3 days, then 1 tablet daily for 3 days.       Final diagnoses:   Chronic bilateral low back pain with right-sided sciatica   Chronic bilateral low back pain with left-sided sciatica       6/27/2022   [facility]     Kal Mack MD  06/27/22 0114

## 2022-08-24 DIAGNOSIS — M26.621 ARTHRALGIA OF RIGHT TEMPOROMANDIBULAR JOINT: ICD-10-CM

## 2022-08-25 NOTE — TELEPHONE ENCOUNTER
[id number] sent Rx request for the following:      Requested Prescriptions   Pending Prescriptions Disp Refills     tiZANidine (ZANAFLEX) 4 MG tablet [Pharmacy Med Name: TIZANIDINE 4MG TABLET] 30 tablet 3     Sig: TAKE 1 TABLET (4 MG) BY MOUTH 3 TIMES DAILY AS NEEDED FOR MUSCLE SPASMS   Last Prescription Date:   4/28/22  Last Fill Qty/Refills:         30, R-3    Last Office Visit:              11/1/21   Future Office visit:           None    In clinical absence of patient's primary, Darrin Stephen, patient is requesting that this message be sent to the covering provider for consideration please.    Mary Shafer RN .............. 8/25/2022  10:30 AM

## 2022-09-03 ENCOUNTER — HOSPITAL ENCOUNTER (EMERGENCY)
Facility: OTHER | Age: 52
Discharge: HOME | End: 2022-09-03
Attending: FAMILY MEDICINE | Admitting: FAMILY MEDICINE
Payer: COMMERCIAL

## 2022-09-03 ENCOUNTER — NURSE TRIAGE (OUTPATIENT)
Dept: NURSING | Facility: CLINIC | Age: 52
End: 2022-09-03

## 2022-09-03 VITALS
SYSTOLIC BLOOD PRESSURE: 149 MMHG | BODY MASS INDEX: 20.53 KG/M2 | TEMPERATURE: 96.4 F | OXYGEN SATURATION: 100 % | DIASTOLIC BLOOD PRESSURE: 57 MMHG | RESPIRATION RATE: 16 BRPM | HEIGHT: 69 IN

## 2022-09-03 DIAGNOSIS — M54.42 MIDLINE LOW BACK PAIN WITH LEFT-SIDED SCIATICA, UNSPECIFIED CHRONICITY: ICD-10-CM

## 2022-09-03 PROCEDURE — 99283 EMERGENCY DEPT VISIT LOW MDM: CPT | Performed by: FAMILY MEDICINE

## 2022-09-03 PROCEDURE — 250N000009 HC RX 250: Performed by: FAMILY MEDICINE

## 2022-09-03 RX ORDER — HYDROCODONE BITARTRATE AND ACETAMINOPHEN 5; 325 MG/1; MG/1
1 TABLET ORAL EVERY 6 HOURS PRN
Qty: 6 TABLET | Refills: 0 | Status: SHIPPED | OUTPATIENT
Start: 2022-09-03 | End: 2022-11-29

## 2022-09-03 RX ORDER — DEXAMETHASONE SODIUM PHOSPHATE 4 MG/ML
10 VIAL (ML) INJECTION ONCE
Status: COMPLETED | OUTPATIENT
Start: 2022-09-03 | End: 2022-09-03

## 2022-09-03 RX ADMIN — DEXAMETHASONE SODIUM PHOSPHATE 10 MG: 4 INJECTION, SOLUTION INTRAMUSCULAR; INTRAVENOUS at 01:11

## 2022-09-03 ASSESSMENT — ENCOUNTER SYMPTOMS: BACK PAIN: 1

## 2022-09-03 ASSESSMENT — ACTIVITIES OF DAILY LIVING (ADL): ADLS_ACUITY_SCORE: 33

## 2022-09-03 NOTE — TELEPHONE ENCOUNTER
"In the ED, the doctor did a great job taking care of her, but he was going to write a note for work, but it was forgotten at the end. She needs to know what to do now    She does not have MyChart    Per AVS:     \"Thank you for choosing our Emergency Department for your care.  You may receive a phone call or letter for a survey about your care in our ED. Please complete this as this is how we  improve care for our patients.  If you have any questions after leaving the ED you can call or text me on my cell phone at [phone].  Sincerely,  Dr Isak Portillo M.D.\"    Advised patient that this is probably the most efficient way for her to get this information. Advised that she will likely have to go back to the ED to  note. Patient is agreeable and will either call or text him now.       Reason for Disposition    Caller has already spoken to PCP or another triager    [1] Caller requesting NON-URGENT health information AND [2] PCP's office is the best resource    Protocols used: INFORMATION ONLY CALL - NO TRIAGE-A-, INFORMATION ONLY CALL-A-AH    Sherlyn Knox RN on 9/3/2022 at 2:03 AM    "

## 2022-09-03 NOTE — ED TRIAGE NOTES
Pt comes into the ER today ambulatory. She reports bilateral leg pain that has been going on for a long time, reports it got worse on Friday. She takes ibuprofen for pain, which takes the edge off. Last around 1400. She reports history of EDS, which she thinks may be flaring up.     Triage Assessment     Row Name 09/03/22 0042       Triage Assessment (Adult)    Airway WDL WDL       Respiratory WDL    Respiratory WDL WDL       Skin Circulation/Temperature WDL    Skin Circulation/Temperature WDL WDL       Cardiac WDL    Cardiac WDL WDL       Peripheral/Neurovascular WDL    Peripheral Neurovascular WDL WDL       Cognitive/Neuro/Behavioral WDL    Cognitive/Neuro/Behavioral WDL WDL

## 2022-09-03 NOTE — ED PROVIDER NOTES
History   No chief complaint on file.    The history is provided by the patient.     Kym Perea is a 52 year old female here with low back pain with radiation down the left leg. 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 No fall or trauma.  She has been taking Tizanidine and ibuprofen.     From the [place] Prescription Drug Monitoring website: it has been almost five months since she has had a narcotics Rx.    Allergies:  Allergies   Allergen Reactions     Arthrotec GI Disturbance     Indomethacin      Other reaction(s): Dizziness     Meloxicam GI Disturbance     Moxifloxacin        Problem List:    Patient Active Problem List    Diagnosis Date Noted     TMJ (temporomandibular joint syndrome) 11/01/2021     Priority: Medium     Arthralgia of right temporomandibular joint 11/01/2021     Priority: Medium     Suicidal behavior 10/19/2021     Priority: Medium     Medical cannabis use 07/10/2018     Priority: Medium     Chronic pain disorder 01/29/2018     Priority: Medium     Overview:   complex regional pain syndrome left ankle       Esophageal reflux 01/29/2018     Priority: Medium     Hyperlipidemia 01/29/2018     Priority: Medium     Insomnia 01/29/2018     Priority: Medium     Panic disorder 01/29/2018     Priority: Medium     Overview:   Previously patient of Dr. Reynoso since her teens. Patient is attempting slow taper down on her Xanax. She's been on Xanax 2 mg 4 times a day for years       Tobacco abuse 01/29/2018     Priority: Medium     Influenza A 01/11/2018     Priority: Medium     Pain medication agreement, 4/18/18, 4/3/19, 10/27/2020 04/21/2017     Priority: Medium     Overview:   4/20/17:  Oxycodone 10 mg #180/mo    Hydrocodone 10/325mg, #240/mo. Stopped and changed to morphine. Agreement signed 10/3/12, updated 10/1/ 14.  Complex regional pain syndrome (aka Reflex Sympathetic Dystrophy)  MS Contin 30 mg q 8 hours #90 per month.  Morphine sulfate IR 15 mg 2-3 tab tid prn  #180 per month, decreased to #90/month.   query 4/9/16 as part of chart review. Seems appropriate. 6/2/16 tapered and off Morphine. [place] 5/325 #120/month.  query appropriate. ToxAssure appropriate.    August 2016 Consultation with [facility]. Recommended spinal cord stimulator trial, but she is reluctant to pursue that. Pain clinic stated that continuing opioids was OK.       Complex regional pain syndrome type 1 affecting left lower leg 04/15/2016     Priority: Medium     Overview:   Patient with a history of complex regional pain syndrome has seen Dr. Dino Skelton4/23/16        Migraine headache 11/25/2014     Priority: Medium     Overweight (BMI 25.0-29.9) 11/25/2014     Priority: Medium     Lumbago 02/06/2012     Priority: Medium     Major depressive disorder, recurrent episode, severe (H) 07/13/2011     Priority: Medium     Paroxysmal supraventricular tachycardia (H) 05/24/2010     Priority: Medium     Mononeuritis 07/06/2006     Priority: Medium     IMO Update 10/11       Ankle and foot joint derangement 06/05/2006     Priority: Medium     Overview:   Chronic left ankle pain secondary to reflex sympathetic dystrophy.  Patient   has had 4 previous surgeries, the most recent one done by Dr. Noriega in   2007. On Narcotic contract.    Formatting of this note might be different from the original.  Chronic left ankle pain secondary to reflex sympathetic dystrophy.  Patient   has had 4 previous surgeries, the most recent one done by Dr. Noriega in   2007. On Narcotic contract.    IMO Update 10/11          Past Medical History:    Past Medical History:   Diagnosis Date     Acquired absence of other organs (CODE)      Chronic pain syndrome      Complex regional pain syndrome I      Encounter for other administrative examinations      Encounter for other administrative examinations      Gastro-esophageal reflux disease without esophagitis      Injury of left ankle      Low back pain      Major  depressive disorder, recurrent severe without psychotic features (H)      Migraine without status migrainosus, not intractable      Nicotine dependence, uncomplicated      Obesity      Overweight      Pain in ankle      Pain in thoracic spine      Panic disorder without agoraphobia      Personal history of other diseases of the female genital tract      Personal history of other medical treatment (CODE)      Supraventricular tachycardia (H)        Past Surgical History:    Past Surgical History:   Procedure Laterality Date     ANKLE SURGERY      1997, 2000,Left ankle surgery x 2     HYSTERECTOMY TOTAL ABDOMINAL      11/1999,secondary to endometriosis, no malignancy; patient reports no malignancy, but abnormal cells were present *     LAPAROSCOPIC CHOLECYSTECTOMY      No Comments Provided     LAPAROSCOPY DIAGNOSTIC (GENERAL)      1995     OTHER SURGICAL HISTORY      21299,CRANIO/MAXILLOFACIAL SURGERY,Jaw Surgery     OTHER SURGICAL HISTORY      207040,[facility] EMG,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  No other abnormalities noted.       Family History:    Family History   Problem Relation Age of Onset     Cancer Father         Cancer,Bladder     Other - See Comments Father         Mild hypercholesterolemia     Other - See Comments Mother         scleroderma/lupus/Parkinson's syndrome     Family History Negative Brother         Good Health     Ariel-Danlos syndrome Daughter      Colon Cancer Maternal Grandmother 40        Cancer-colon,Followed by lung  with metastasis to the bone di*       Social History:  Marital Status:   [4]  Social History     Tobacco Use     Smoking status: Current Every Day Smoker     Packs/day: 0.50     Years: 30.00     Pack years: 15.00     Types: Cigarettes     Start date: 12/2/1986     Smokeless tobacco: Never Used     Tobacco comment: trying   Vaping Use     Vaping Use: Every day     Substances: CBD     Devices: Disposable   Substance Use Topics     Alcohol use: Not  "Currently     Alcohol/week: 0.0 standard drinks     Comment: OCCASIONAL WINE     Drug use: No     Comment: medical cannabis        Medications:    HYDROcodone-acetaminophen (NORCO) 5-325 MG tablet  albuterol (PROAIR HFA/PROVENTIL HFA/VENTOLIN HFA) 108 (90 Base) MCG/ACT inhaler  ALPRAZolam (XANAX) 0.5 MG tablet  amphetamine-dextroamphetamine (ADDERALL) 30 MG tablet  atenolol (TENORMIN) 100 MG tablet  Cholecalciferol (VITAMIN D-3) 125 MCG (5000 UT) TABS  clonazePAM (KLONOPIN) 1 MG tablet  divalproex sodium extended-release (DEPAKOTE ER) 250 MG 24 hr tablet  divalproex sodium extended-release (DEPAKOTE ER) 500 MG 24 hr tablet  fluticasone (FLONASE) 50 MCG/ACT spray  hydrOXYzine (VISTARIL) 25 MG capsule  ibuprofen (ADVIL/MOTRIN) 800 MG tablet  predniSONE (DELTASONE) 10 MG tablet  tiZANidine (ZANAFLEX) 4 MG tablet          Review of Systems   Musculoskeletal: Positive for back pain.   All other systems reviewed and are negative.      Physical Exam   BP: (!) 149/57  Temp: (!) 96.4  F (35.8  C)  Resp: 16  Height: 175.3 cm (5' 9\")  SpO2: 100 %      Physical Exam  Vitals and nursing note reviewed.   Cardiovascular:      Rate and Rhythm: Normal rate.      Pulses: Normal pulses.   Pulmonary:      Effort: Pulmonary effort is normal. No respiratory distress.   Musculoskeletal:         General: Tenderness present. No swelling or deformity.      Comments: She has no midline tenderness. She has tenderness of the left low back, over the paraspinous muscles.         Medications   dexamethasone (DECADRON) injectable solution used ORALLY 10 mg (10 mg Oral Given 9/3/22 0111)       Assessments & Plan (with Medical Decision Making)  Kym Perea is a 52 year old female here with low back pain with radiation down the left leg. 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 No fall or trauma.  She has been taking Tizanidine and ibuprofen. From the [place] Prescription Drug Monitoring website: it has been almost five " "months since she has had a narcotics Rx. VS in the ED BP (!) 149/57   Temp (!) 96.4  F (35.8  C) (Tympanic)   Resp 16   Ht 1.753 m (5' 9\")   LMP  (LMP Unknown)   SpO2 100%   BMI 20.53 kg/m    She has left lower back pain. We gave her Decadron for inflammation and I did get her home with some Vicodin.      I have reviewed the nursing notes.    I have reviewed the findings, diagnosis, plan and need for follow up with the patient.       New Prescriptions    HYDROCODONE-ACETAMINOPHEN (NORCO) 5-325 MG TABLET    Take 1 tablet by mouth every 6 hours as needed for severe pain       Final diagnoses:   Midline low back pain with left-sided sciatica, unspecified chronicity       9/3/2022   [facility], Hector Blanc MD  09/03/22 0253    "

## 2022-11-11 DIAGNOSIS — M25.572 PAIN OF JOINT OF LEFT ANKLE AND FOOT: ICD-10-CM

## 2022-11-11 DIAGNOSIS — M26.621 ARTHRALGIA OF RIGHT TEMPOROMANDIBULAR JOINT: ICD-10-CM

## 2022-11-11 NOTE — TELEPHONE ENCOUNTER
Disp Refills Start End MERISSA   ibuprofen (ADVIL/MOTRIN) 800 MG tablet 90 tablet 4 3/31/2022  No   Sig - Route: TAKE 1 TABLET (800 MG) BY MOUTH EVERY 8 HOURS AS NEEDED FOR MODERATE PAIN - Oral         LOV: 11/1/2021  Future Office visit: No future appointment scheduled at this time.      Routing refill request to provider for review/approval.  Per Quality report patient is due for: Nicotine/Tobacco cessation counseling, Advance care planning, mammo screening, Immunizations and vaccines, Colorectal cancer screening, HIV &C screening, Medicare Annual wellness visit, ACT, lung cancer screening, lab work, Urine Drug screen    Requested Prescriptions   Pending Prescriptions Disp Refills     ibuprofen (ADVIL/MOTRIN) 800 MG tablet [Pharmacy Med Name: IBUPROFEN 800MG TABLET] 90 tablet 4     Sig: TAKE 1 TABLET (800 MG) BY MOUTH EVERY 8 HOURS AS NEEDED FOR MODERATE PAIN       NSAID Medications Failed - 11/11/2022  2:16 PM        Failed - Blood pressure under 140/90 in past 12 months     BP Readings from Last 3 Encounters:   09/03/22 (!) 149/57   06/27/22 117/80   04/24/22 123/69                 Failed - Normal ALT on file in past 12 months     Recent Labs   Lab Test 10/19/21  1440 03/06/21  2315 01/11/18  0841   ALT 15   < >  --    GICHALT  --   --  10    < > = values in this interval not displayed.             Failed - Normal AST on file in past 12 months     Recent Labs   Lab Test 10/19/21  1440 03/06/21  2315 01/11/18  0841   AST 19   < >  --    GICHAST  --   --  14    < > = values in this interval not displayed.             Failed - Normal CBC on file in past 12 months     Recent Labs   Lab Test 10/18/21  2019 04/18/19  0820 01/12/18  0813   WBC 15.9*   < >  --    GICHWBC  --   --  7.3   RBC 4.18   < >  --    GICHRBC  --   --  4.06   HGB 13.2   < > 13.2   HCT 37.5   < > 38.7      < > 199    < > = values in this interval not displayed.                 Failed - Normal serum creatinine on file in past 12 months      "Recent Labs   Lab Test 10/19/21  1440   CR 0.91       Ok to refill medication if creatinine is low          Passed - Recent (12 mo) or future (30 days) visit within the authorizing provider's specialty     Patient has had an office visit with the authorizing provider or a provider within the authorizing providers department within the previous 12 mos or has a future within next 30 days. See \"Patient Info\" tab in inbasket, or \"Choose Columns\" in Meds & Orders section of the refill encounter.              Passed - Patient is age 6-64 years        Passed - Medication is active on med list        Passed - No active pregnancy on record        Passed - No positive pregnancy test in past 12 months           Routed to provider for review and consideration. Massiel Gutierrez RN  ....................  11/11/2022   3:02 PM      "

## 2022-11-11 NOTE — TELEPHONE ENCOUNTER
Disp Refills Start End MERISSA   tiZANidine (ZANAFLEX) 4 MG tablet 30 tablet 2 8/25/2022  No   Sig - Route: TAKE 1 TABLET (4 MG) BY MOUTH 3 TIMES DAILY AS NEEDED FOR MUSCLE SPASMS - Oral         LOV: 11/1/2021    Future Office visit: No future appointment scheduled at this time.       Routing refill request to provider for review/approval.  Per Quality report patient is due for: Nicotine/Tobacco cessation counseling, Advance care planning, mammo screening, Immunizations and vaccines, Colorectal cancer screening, HIV &C screening, Medicare Annual wellness visit, ACT, lung cancer screening, lab work, Urine Drug screen    Requested Prescriptions   Pending Prescriptions Disp Refills     tiZANidine (ZANAFLEX) 4 MG tablet [Pharmacy Med Name: TIZANIDINE 4MG TABLET] 30 tablet 2     Sig: TAKE 1 TABLET (4 MG) BY MOUTH 3 TIMES DAILY AS NEEDED FOR MUSCLE SPASMS       There is no refill protocol information for this order        Routed to provider for review and consideration. Massiel Gutierrez RN  ....................  11/11/2022   3:23 PM

## 2022-11-14 RX ORDER — IBUPROFEN 800 MG/1
800 TABLET, FILM COATED ORAL EVERY 8 HOURS PRN
Qty: 90 TABLET | Refills: 4 | Status: SHIPPED | OUTPATIENT
Start: 2022-11-14 | End: 2022-11-29 | Stop reason: ALTCHOICE

## 2022-11-16 NOTE — TELEPHONE ENCOUNTER
Pt scheduled for establish care visit with Eryn Hurley 11/29/22.    La Mckeon on 11/16/2022 at 1:01 PM

## 2022-11-23 ENCOUNTER — TRANSFERRED RECORDS (OUTPATIENT)
Dept: HEALTH INFORMATION MANAGEMENT | Facility: CLINIC | Age: 52
End: 2022-11-23

## 2022-11-29 ENCOUNTER — OFFICE VISIT (OUTPATIENT)
Dept: FAMILY MEDICINE | Facility: OTHER | Age: 52
End: 2022-11-29
Attending: FAMILY MEDICINE
Payer: COMMERCIAL

## 2022-11-29 ENCOUNTER — TELEPHONE (OUTPATIENT)
Dept: FAMILY MEDICINE | Facility: OTHER | Age: 52
End: 2022-11-29

## 2022-11-29 VITALS
RESPIRATION RATE: 18 BRPM | DIASTOLIC BLOOD PRESSURE: 76 MMHG | TEMPERATURE: 98.1 F | WEIGHT: 174.13 LBS | OXYGEN SATURATION: 96 % | SYSTOLIC BLOOD PRESSURE: 112 MMHG | BODY MASS INDEX: 25.71 KG/M2 | HEART RATE: 99 BPM

## 2022-11-29 DIAGNOSIS — M54.42 CHRONIC BILATERAL LOW BACK PAIN WITH BILATERAL SCIATICA: ICD-10-CM

## 2022-11-29 DIAGNOSIS — Z23 NEED FOR INFLUENZA VACCINATION: ICD-10-CM

## 2022-11-29 DIAGNOSIS — E78.5 HYPERLIPIDEMIA, UNSPECIFIED HYPERLIPIDEMIA TYPE: ICD-10-CM

## 2022-11-29 DIAGNOSIS — M54.41 CHRONIC BILATERAL LOW BACK PAIN WITH BILATERAL SCIATICA: ICD-10-CM

## 2022-11-29 DIAGNOSIS — Z76.89 ENCOUNTER TO ESTABLISH CARE: Primary | ICD-10-CM

## 2022-11-29 DIAGNOSIS — F33.2 SEVERE EPISODE OF RECURRENT MAJOR DEPRESSIVE DISORDER, WITHOUT PSYCHOTIC FEATURES (H): ICD-10-CM

## 2022-11-29 DIAGNOSIS — M25.20 JOINT LAXITY: ICD-10-CM

## 2022-11-29 DIAGNOSIS — F41.0 PANIC DISORDER: ICD-10-CM

## 2022-11-29 DIAGNOSIS — G89.4 CHRONIC PAIN DISORDER: ICD-10-CM

## 2022-11-29 DIAGNOSIS — G90.522 COMPLEX REGIONAL PAIN SYNDROME TYPE 1 AFFECTING LEFT LOWER LEG: ICD-10-CM

## 2022-11-29 DIAGNOSIS — M26.621 ARTHRALGIA OF RIGHT TEMPOROMANDIBULAR JOINT: ICD-10-CM

## 2022-11-29 DIAGNOSIS — Z00.00 ENCOUNTER FOR ROUTINE ADULT HEALTH EXAMINATION WITHOUT ABNORMAL FINDINGS: ICD-10-CM

## 2022-11-29 DIAGNOSIS — I47.10 PAROXYSMAL SUPRAVENTRICULAR TACHYCARDIA (H): ICD-10-CM

## 2022-11-29 DIAGNOSIS — G89.29 CHRONIC BILATERAL LOW BACK PAIN WITH BILATERAL SCIATICA: ICD-10-CM

## 2022-11-29 PROBLEM — J10.1 INFLUENZA A: Status: RESOLVED | Noted: 2018-01-11 | Resolved: 2022-11-29

## 2022-11-29 LAB
ALBUMIN SERPL BCG-MCNC: 4.4 G/DL (ref 3.5–5.2)
ALP SERPL-CCNC: 108 U/L (ref 35–104)
ALT SERPL W P-5'-P-CCNC: 12 U/L (ref 10–35)
ANION GAP SERPL CALCULATED.3IONS-SCNC: 10 MMOL/L (ref 7–15)
AST SERPL W P-5'-P-CCNC: 19 U/L (ref 10–35)
BILIRUB SERPL-MCNC: <0.2 MG/DL
BUN SERPL-MCNC: 12.2 MG/DL (ref 6–20)
CALCIUM SERPL-MCNC: 9.5 MG/DL (ref 8.6–10)
CHLORIDE SERPL-SCNC: 102 MMOL/L (ref 98–107)
CHOLEST SERPL-MCNC: 219 MG/DL
CREAT SERPL-MCNC: 0.94 MG/DL (ref 0.51–0.95)
DEPRECATED HCO3 PLAS-SCNC: 24 MMOL/L (ref 22–29)
ERYTHROCYTE [DISTWIDTH] IN BLOOD BY AUTOMATED COUNT: 12.8 % (ref 10–15)
GFR SERPL CREATININE-BSD FRML MDRD: 73 ML/MIN/1.73M2
GLUCOSE SERPL-MCNC: 112 MG/DL (ref 70–99)
HCT VFR BLD AUTO: 41.9 % (ref 35–47)
HDLC SERPL-MCNC: 44 MG/DL
HGB BLD-MCNC: 14 G/DL (ref 11.7–15.7)
LDLC SERPL CALC-MCNC: 129 MG/DL
MCH RBC QN AUTO: 31.1 PG (ref 26.5–33)
MCHC RBC AUTO-ENTMCNC: 33.4 G/DL (ref 31.5–36.5)
MCV RBC AUTO: 93 FL (ref 78–100)
NONHDLC SERPL-MCNC: 175 MG/DL
PLATELET # BLD AUTO: 281 10E3/UL (ref 150–450)
POTASSIUM SERPL-SCNC: 3.5 MMOL/L (ref 3.4–5.3)
PROT SERPL-MCNC: 7.9 G/DL (ref 6.4–8.3)
RBC # BLD AUTO: 4.5 10E6/UL (ref 3.8–5.2)
SODIUM SERPL-SCNC: 136 MMOL/L (ref 136–145)
TRIGL SERPL-MCNC: 231 MG/DL
WBC # BLD AUTO: 9.3 10E3/UL (ref 4–11)

## 2022-11-29 PROCEDURE — G0463 HOSPITAL OUTPT CLINIC VISIT: HCPCS | Mod: 25

## 2022-11-29 PROCEDURE — 90682 RIV4 VACC RECOMBINANT DNA IM: CPT

## 2022-11-29 PROCEDURE — 82040 ASSAY OF SERUM ALBUMIN: CPT | Mod: ZL | Performed by: FAMILY MEDICINE

## 2022-11-29 PROCEDURE — 85014 HEMATOCRIT: CPT | Mod: ZL | Performed by: FAMILY MEDICINE

## 2022-11-29 PROCEDURE — 80053 COMPREHEN METABOLIC PANEL: CPT | Mod: ZL | Performed by: FAMILY MEDICINE

## 2022-11-29 PROCEDURE — 80061 LIPID PANEL: CPT | Mod: ZL | Performed by: FAMILY MEDICINE

## 2022-11-29 PROCEDURE — 99215 OFFICE O/P EST HI 40 MIN: CPT | Performed by: FAMILY MEDICINE

## 2022-11-29 PROCEDURE — 36415 COLL VENOUS BLD VENIPUNCTURE: CPT | Mod: ZL | Performed by: FAMILY MEDICINE

## 2022-11-29 RX ORDER — CLONAZEPAM 2 MG/1
2 TABLET ORAL 2 TIMES DAILY
Start: 2022-11-29

## 2022-11-29 RX ORDER — MELOXICAM 7.5 MG/1
7.5 TABLET ORAL DAILY
Qty: 30 TABLET | Refills: 0 | Status: SHIPPED | OUTPATIENT
Start: 2022-11-29 | End: 2023-01-05 | Stop reason: ALTCHOICE

## 2022-11-29 RX ORDER — ATENOLOL 100 MG/1
100 TABLET ORAL DAILY
Qty: 90 TABLET | Refills: 2 | Status: SHIPPED | OUTPATIENT
Start: 2022-11-29 | End: 2024-07-11

## 2022-11-29 ASSESSMENT — ENCOUNTER SYMPTOMS
ARTHRALGIAS: 1
BACK PAIN: 1
SHORTNESS OF BREATH: 0
FEVER: 0
NERVOUS/ANXIOUS: 1
ABDOMINAL PAIN: 0
DYSPHORIC MOOD: 1
CHILLS: 0
PALPITATIONS: 0

## 2022-11-29 ASSESSMENT — ASTHMA QUESTIONNAIRES
ACT_TOTALSCORE: 20
QUESTION_1 LAST FOUR WEEKS HOW MUCH OF THE TIME DID YOUR ASTHMA KEEP YOU FROM GETTING AS MUCH DONE AT WORK, SCHOOL OR AT HOME: NONE OF THE TIME
ACT_TOTALSCORE: 20
QUESTION_2 LAST FOUR WEEKS HOW OFTEN HAVE YOU HAD SHORTNESS OF BREATH: ONCE A DAY
QUESTION_5 LAST FOUR WEEKS HOW WOULD YOU RATE YOUR ASTHMA CONTROL: WELL CONTROLLED
QUESTION_4 LAST FOUR WEEKS HOW OFTEN HAVE YOU USED YOUR RESCUE INHALER OR NEBULIZER MEDICATION (SUCH AS ALBUTEROL): ONCE A WEEK OR LESS
QUESTION_3 LAST FOUR WEEKS HOW OFTEN DID YOUR ASTHMA SYMPTOMS (WHEEZING, COUGHING, SHORTNESS OF BREATH, CHEST TIGHTNESS OR PAIN) WAKE YOU UP AT NIGHT OR EARLIER THAN USUAL IN THE MORNING: NOT AT ALL

## 2022-11-29 ASSESSMENT — PAIN SCALES - GENERAL: PAINLEVEL: EXTREME PAIN (8)

## 2022-11-29 NOTE — TELEPHONE ENCOUNTER
Patient was seen today by Dr Hurley.  Patient is thinking that she was supposed to get Mobic.  States the tizanidine is for her jaw pain.  Patient states that she was told to stop the ibuprofen but she states she is not going to stop it if she doesn't get the Mobic.    Patient is upset because she came to [place] from [place] and didn't get what she was looking for.  Please clarify with her before we leave tonight.

## 2022-11-29 NOTE — PATIENT INSTRUCTIONS
We put in an order for an ultrasound  of your heart    We will call you with your lab results.     Stop your ibuprofen

## 2022-11-29 NOTE — PROGRESS NOTES
Assessment & Plan     Encounter to establish care  Follows with Dr. Stephen with last visit  11/1/2021    Severe episode of recurrent major depressive disorder, without psychotic features (H) / Panic disorder  Follows with Phu[facility] Magall[facility] PDMP reviewed and shows clonazepam 2mg bid, medication list updated.   - clonazePAM (KLONOPIN) 2 MG tablet; Take 1 tablet (2 mg) by mouth 2 times daily    Arthralgia of right temporomandibular joint  - tiZANidine (ZANAFLEX) 4 MG tablet; Take 1 tablet (4 mg) by mouth 3 times daily as needed for muscle spasms    Paroxysmal supraventricular tachycardia (H)  D/t possible EDS, will update echo  - atenolol (TENORMIN) 100 MG tablet; Take 1 tablet (100 mg) by mouth daily  - Echocardiogram Complete; Future    Hyperlipidemia, unspecified hyperlipidemia type  ASCVD risk of 4.2 %  - no medications    Joint laxity  Daughter with EDS, unclear which type  - Echocardiogram Complete; Future  - Comprehensive metabolic panel (BMP + Alb, Alk Phos, ALT, AST, Total. Bili, TP)    Chronic pain disorder / Complex regional pain syndrome type 1 affecting left lower leg / Chronic bilateral low back pain with bilateral sciatica  Intolerant of gabapentin or lyrica  Cr wnl  - discontinue ibuprofen   - start meloxciam 7.5mg every day, check BMP in two weeks, lab order placed.  - if Cr wnl and additional pain relief is desired okay to increase to 15mg and recheck labs in two weeks.   - Rx sent  - consideration for PMR d/t possible h/o EDS    Encounter for routine adult health examination without abnormal findings  - CBC with platelets  - Lipid Profile (Chol, Trig, HDL, LDL calc)    Need for influenza vaccination  - INFLUENZA VACCINE 50-64 OR 18-64 W/EGG ALLERGY (FLUBLOK)    40 minutes spent on the date of the encounter doing chart review, review of test results, interpretation of tests, patient visit, documentation      See Patient Instructions    Return in about 6 weeks (around 1/10/2023) for chronic  pain .    Eryn Hurley MD  [facility] [place]    Subjective   Gemini is a 52 year old, presenting for the following health issues:  Establish Care, Depression, and Anxiety      HPI     Follows with Dr. Stephen with last visit  11/1/2021    Depression and Anxiety Follow-Up    How are you doing with your depression since your last visit? No change    How are you doing with your anxiety since your last visit?  Worsened     Are you having other symptoms that might be associated with depression or anxiety? Yes:  panic attacks, mouth and hands go numb, feels like she is numb    Have you had a significant life event? No     Do you have any concerns with your use of alcohol or other drugs? No    - h/o SI and delirium d/t benzodiazepine (xanax) withdrawal which resulted in hospitalization   - h/o UDS positive for meth  - follows with Phuong Magallanes [place] who is managing clonazepam and other mental health medications       Social History     Tobacco Use     Smoking status: Every Day     Packs/day: 0.50     Years: 30.00     Pack years: 15.00     Types: Cigarettes     Start date: 12/2/1986     Smokeless tobacco: Never     Tobacco comments:     trying   Vaping Use     Vaping Use: Every day     Substances: CBD     Devices: Disposable   Substance Use Topics     Alcohol use: Not Currently     Alcohol/week: 0.0 standard drinks     Comment: OCCASIONAL WINE     Drug use: No     Comment: medical cannabis     PHQ 7/18/2019 2/28/2020 8/4/2020   PHQ-9 Total Score 16 16 15   Q9: Thoughts of better off dead/self-harm past 2 weeks Not at all Not at all Not at all     LAINE-7 SCORE 7/5/2019 7/18/2019 2/28/2020   Total Score 21 21 14     Last PHQ-9 12/16/2020   1.  Little interest or pleasure in doing things (No Data)   2.  Feeling down, depressed, or hopeless -   3.  Trouble falling or staying asleep, or sleeping too much -   4.  Feeling tired or having little energy -   5.  Poor appetite or overeating -   6.  Feeling  "bad about yourself -   7.  Trouble concentrating -   8.  Moving slowly or restless -   Q9: Thoughts of better off dead/self-harm past 2 weeks -   PHQ-9 Total Score -   Difficulty at work, home, or with people -     LAINE-7  12/16/2020   1. Feeling nervous, anxious, or on edge (No Data)   2. Not being able to stop or control worrying -   3. Worrying too much about different things -   4. Trouble relaxing -   5. Being so restless that it is hard to sit still -   6. Becoming easily annoyed or irritable -   7. Feeling afraid, as if something awful might happen -   LAINE-7 Total Score -   If you checked any problems, how difficult have they made it for you to do your work, take care of things at home, or get along with other people? -       # Back pain / legs: would like better pain relief   - tizanidine 4mg tid   - issues with ambulation   - trialed: gabapentin and lyrica (issues with memory), cymbalta (felt like a \"lump\")  - ibuprofen helps take the edge off  - left hip pain, knees down  - when she walks, feels like she ran a marathon  - fell with [facility]  - last visit with Fede was 3 months ago  - h/o been on opioid for 20 years  - [facility]o [facility] results - sounds similar to muscle trigger points  - RSD of left leg      # TMJ  - dentures do not fit     # EDS: does not have official dx   - daughter with joint issues - with official dx  - granddaughter with EDS  - follows with Fede [facility]    - echo done in 5/52010      # Labs: lipids, cmp, cbc    Review of Systems   Constitutional: Negative for chills and fever.   HENT: Negative for congestion.         Jaw   Respiratory: Negative for shortness of breath.    Cardiovascular: Negative for chest pain and palpitations.   Gastrointestinal: Negative for abdominal pain.   Musculoskeletal: Positive for arthralgias and back pain.   Psychiatric/Behavioral: Positive for dysphoric mood. The patient is nervous/anxious.           Objective    BP " 112/76   Pulse 99   Temp 98.1  F (36.7  C) (Tympanic)   Resp 18   Wt 79 kg (174 lb 2 oz)   LMP  (LMP Unknown)   SpO2 96%   BMI 25.71 kg/m    Body mass index is 25.71 kg/m .  Physical Exam  Constitutional:       General: She is not in acute distress.     Appearance: She is not ill-appearing.   Cardiovascular:      Rate and Rhythm: Normal rate and regular rhythm.      Heart sounds: No murmur heard.  Pulmonary:      Effort: Pulmonary effort is normal. No respiratory distress.      Breath sounds: No wheezing or rales.   Abdominal:      General: Bowel sounds are normal.      Tenderness: There is no abdominal tenderness.   Musculoskeletal:      Comments: Issues with ambulation. Antalgic gait   Neurological:      Mental Status: She is alert.          Results for orders placed or performed in visit on 11/29/22 (from the past 24 hour(s))   Comprehensive metabolic panel (BMP + Alb, Alk Phos, ALT, AST, Total. Bili, TP)   Result Value Ref Range    Sodium 136 136 - 145 mmol/L    Potassium 3.5 3.4 - 5.3 mmol/L    Chloride 102 98 - 107 mmol/L    Carbon Dioxide (CO2) 24 22 - 29 mmol/L    Anion Gap 10 7 - 15 mmol/L    Urea Nitrogen 12.2 6.0 - 20.0 mg/dL    Creatinine 0.94 0.51 - 0.95 mg/dL    Calcium 9.5 8.6 - 10.0 mg/dL    Glucose 112 (H) 70 - 99 mg/dL    Alkaline Phosphatase 108 (H) 35 - 104 U/L    AST 19 10 - 35 U/L    ALT 12 10 - 35 U/L    Protein Total 7.9 6.4 - 8.3 g/dL    Albumin 4.4 3.5 - 5.2 g/dL    Bilirubin Total <0.2 <=1.2 mg/dL    GFR Estimate 73 >60 mL/min/1.73m2   CBC with platelets   Result Value Ref Range    WBC Count 9.3 4.0 - 11.0 10e3/uL    RBC Count 4.50 3.80 - 5.20 10e6/uL    Hemoglobin 14.0 11.7 - 15.7 g/dL    Hematocrit 41.9 35.0 - 47.0 %    MCV 93 78 - 100 fL    MCH 31.1 26.5 - 33.0 pg    MCHC 33.4 31.5 - 36.5 g/dL    RDW 12.8 10.0 - 15.0 %    Platelet Count 281 150 - 450 10e3/uL   Lipid Profile (Chol, Trig, HDL, LDL calc)   Result Value Ref Range    Cholesterol 219 (H) <200 mg/dL    Triglycerides 231  (H) <150 mg/dL    Direct Measure HDL 44 (L) >=50 mg/dL    LDL Cholesterol Calculated 129 (H) <=100 mg/dL    Non HDL Cholesterol 175 (H) <130 mg/dL    Narrative    Cholesterol  Desirable:  <200 mg/dL    Triglycerides  Normal:  Less than 150 mg/dL  Borderline High:  150-199 mg/dL  High:  200-499 mg/dL  Very High:  Greater than or equal to 500 mg/dL    Direct Measure HDL  Female:  Greater than or equal to 50 mg/dL   Male:  Greater than or equal to 40 mg/dL    LDL Cholesterol  Desirable:  <100mg/dL  Above Desirable:  100-129 mg/dL   Borderline High:  130-159 mg/dL   High:  160-189 mg/dL   Very High:  >= 190 mg/dL    Non HDL Cholesterol  Desirable:  130 mg/dL  Above Desirable:  130-159 mg/dL  Borderline High:  160-189 mg/dL  High:  190-219 mg/dL  Very High:  Greater than or equal to 220 mg/dL       The 10-year ASCVD risk score (Khushi AVENDAÑO, et al., 2019) is: 4.2%    Values used to calculate the score:      Age: 52 years      Sex: Female      Is Non- : No      Diabetic: No      Tobacco smoker: Yes      Systolic Blood Pressure: 112 mmHg      Is BP treated: No      HDL Cholesterol: 44 mg/dL      Total Cholesterol: 219 mg/dL

## 2022-11-30 NOTE — TELEPHONE ENCOUNTER
Called and left message to call back.  Please let her know the Mobic is at [facility] in [place]

## 2022-11-30 NOTE — TELEPHONE ENCOUNTER
Labs needed to be resulted before Rx was sent.   Discontinue ibuprofen  Start mobic 7.5mg every day. Recheck labs in 2 weeks. Order  placed.  Eryn Hurley MD

## 2022-12-02 DIAGNOSIS — R11.0 NAUSEA: ICD-10-CM

## 2022-12-02 RX ORDER — HYDROXYZINE PAMOATE 25 MG/1
25 CAPSULE ORAL 3 TIMES DAILY PRN
Qty: 60 CAPSULE | Refills: 11 | OUTPATIENT
Start: 2022-12-02

## 2022-12-02 NOTE — TELEPHONE ENCOUNTER
[id number] sent Rx request for the following:      Requested Prescriptions   Pending Prescriptions Disp Refills     hydrOXYzine (VISTARIL) 25 MG capsule [Pharmacy Med Name: HYDROXYZINE PAMOATE 25MG CAP] 60 capsule 11     Sig: TAKE 1 CAPSULE (25 MG) BY MOUTH 3 TIMES DAILY AS NEEDED FOR OTHER (NAUSEA)   Last Prescription Date:   11/1/21  Last Fill Qty/Refills:         60, R-11      Patient established care with PCP: Eryn Hurley of [facility] on 11/29/22. Refused, with note to pharmacy. Mray Shafer RN .............. 12/2/2022  3:25 PM

## 2022-12-07 DIAGNOSIS — G90.522 COMPLEX REGIONAL PAIN SYNDROME TYPE 1 AFFECTING LEFT LOWER LEG: Primary | ICD-10-CM

## 2022-12-12 RX ORDER — TRAMADOL HYDROCHLORIDE 50 MG/1
TABLET ORAL
Qty: 40 TABLET | Refills: 0 | OUTPATIENT
Start: 2022-12-12

## 2022-12-12 NOTE — TELEPHONE ENCOUNTER
Discussed at last clinic visit trial of non opioid pain mgt.   Request for tramadol declined.  Last Rx for tramadol by Dr. Varela (ortho) on 11/23/2022    Eryn Hurley MD    
Ultram - never been filled by Dr. Hurley      Last Written Prescription Date:  11.23.22  Last Fill Quantity: #40,   # refills: 0  Last Office Visit: 11.29.22  Future Office visit:    Next 5 appointments (look out 90 days)    Jan 16, 2023  2:30 PM  (Arrive by 2:15 PM)  SHORT with Eryn Hurley MD  [facility] [place] ([facility] [place] ) [address] [place] [address] [facility] [address]  [phone]           Routing refill request to provider for review/approval because:  Drug not on the [facility], UMP or  Health refill protocol or controlled substance    
0 = swallows foods/liquids without difficulty

## 2023-01-05 ENCOUNTER — TELEPHONE (OUTPATIENT)
Dept: FAMILY MEDICINE | Facility: OTHER | Age: 53
End: 2023-01-05

## 2023-01-05 DIAGNOSIS — M54.42 CHRONIC BILATERAL LOW BACK PAIN WITH BILATERAL SCIATICA: Primary | ICD-10-CM

## 2023-01-05 DIAGNOSIS — G89.29 CHRONIC BILATERAL LOW BACK PAIN WITH BILATERAL SCIATICA: Primary | ICD-10-CM

## 2023-01-05 DIAGNOSIS — G89.4 CHRONIC PAIN DISORDER: ICD-10-CM

## 2023-01-05 DIAGNOSIS — M54.41 CHRONIC BILATERAL LOW BACK PAIN WITH BILATERAL SCIATICA: Primary | ICD-10-CM

## 2023-01-05 RX ORDER — IBUPROFEN 800 MG/1
800 TABLET, FILM COATED ORAL EVERY 8 HOURS PRN
Start: 2023-01-05 | End: 2023-08-15

## 2023-01-05 NOTE — TELEPHONE ENCOUNTER
Patient calling and reports Meloxicam not helping with pain.     Patient requesting to discontinue Meloxicam and continue with ibuprofen due to better pain relief.     Ok to discontinue Meloxicam and start ibuprofen?    Patient has follow up with PCP on 1/16.  Please advise, thank you.     Next 5 appointments (look out 90 days)    Jan 16, 2023  2:30 PM  (Arrive by 2:15 PM)  SHORT with Eryn Hurley MD  [facility] [place] ([facility] [place] ) [address]  [place] [address]  [phone]

## 2023-01-09 NOTE — TELEPHONE ENCOUNTER
Patient notified ok to discontinue meloxicam and restart ibuprofen. Patient verbalized understanding.

## 2023-01-11 NOTE — PROGRESS NOTES
Assessment & Plan     Chronic pain disorder / Complex regional pain syndrome type 1 affecting left lower leg  Intolerant of low dose gabapentin. H/o lyrica not being effective, but dosage unknown. Will start slow titration of lyrica. PM&R for consideration of EDS  [place] PDMP reviewed and appropriate    - pregabalin (LYRICA) 25 MG capsule; Take 1 capsule (25 mg) by mouth 2 times daily for 7 days, THEN 1 capsule (25 mg) 3 times daily for 7 days.  - if tolerating after the two weeks, okay to continue to increase. Keep with TID dosing   - Adult Physical Medicine and Rehab  Referral; Future  - c/w ibu (failed trial of meloxicam, but low dose)  - consideration for etodolac, or other NSAID     Paroxysmal supraventricular tachycardia (H)  Reason for atenolol  If ketamine is being considered, repeat ziopatch and possible decrease of atenolol     Encounter for screening mammogram for breast cancer  - [facility] SCREENING DIGITAL BILATERAL ([place]); Future    See Patient Instructions    Return in about 6 weeks (around 2/27/2023) for chronic pain .    Eryn Hurley MD  [facility] [place]    Subjective   Gemini is a 52 year old, presenting for the following health issues:  Pain      HPI     Pain History:  When did you first notice your pain? - Chronic Pain   Have you seen this provider for your pain in the past?   Yes   Where in your body do you have pain? Left hip joint down to her big toe  Are you seeing anyone else for your pain? No        PEG Score 1/16/2023   PEG Total Score 7       Chronic Pain Follow Up:    Location of pain: left hip to big toe  Analgesia/pain control:    - Recent changes:  No changes    - Overall control: Tolerable with discomfort    - Current treatments: ibu, tizanidine, amitriptyline   Adherence:     - Do you ever take more pain medicine than prescribed?     - When did you take your last dose of pain medicine?     Adverse effects:      - on clonazepam  - agreeable to meet with  PM&R for consideration of EDS    - meloxicam - not effective  - gabapentin even at low doses resulted amenisia effects   - lyrica not effective     - Phuong Magallanes with [facility]       PDMP Review       Value Time User    State PDMP site checked  Yes 11/29/2022  3:12 PM Eryn Hurley MD        Last CSA Agreement:   CSA -- Patient Level:    CSA: None found at the patient level.       Last UDS: 10/19/2021    # HTN / PSVT  - has been on atenolol 100mg for 10 years   - takes atenolol in the morning    BP Readings from Last 6 Encounters:   01/16/23 94/64   11/29/22 112/76   09/03/22 (!) 149/57   06/27/22 117/80   04/24/22 123/69   04/04/22 110/74           Review of Systems   Constitutional: Negative for chills and fever.   HENT: Negative for congestion.    Respiratory: Negative for shortness of breath.    Cardiovascular: Negative for chest pain and palpitations.   Gastrointestinal: Negative for abdominal pain.   Musculoskeletal: Positive for arthralgias.   Neurological: Positive for paresthesias.          Objective    BP 94/64 (BP Location: Left arm, Patient Position: Chair, Cuff Size: Adult Regular)   Pulse 63   Temp 99.1  F (37.3  C) (Tympanic)   Resp 18   LMP  (LMP Unknown)   SpO2 96%   There is no height or weight on file to calculate BMI.  Physical Exam  Constitutional:       General: She is not in acute distress.     Appearance: She is not ill-appearing.   Cardiovascular:      Rate and Rhythm: Normal rate and regular rhythm.      Heart sounds: No murmur heard.  Pulmonary:      Effort: Pulmonary effort is normal. No respiratory distress.      Breath sounds: No wheezing or rales.   Musculoskeletal:      Right lower leg: No edema.      Left lower leg: No edema.      Comments: Antalgic gait   Neurological:      Mental Status: She is alert.   Psychiatric:         Mood and Affect: Mood normal.        Labs reviewed in Epic

## 2023-01-16 ENCOUNTER — OFFICE VISIT (OUTPATIENT)
Dept: FAMILY MEDICINE | Facility: OTHER | Age: 53
End: 2023-01-16
Attending: FAMILY MEDICINE
Payer: COMMERCIAL

## 2023-01-16 VITALS
OXYGEN SATURATION: 96 % | HEART RATE: 63 BPM | TEMPERATURE: 99.1 F | SYSTOLIC BLOOD PRESSURE: 94 MMHG | DIASTOLIC BLOOD PRESSURE: 64 MMHG | RESPIRATION RATE: 18 BRPM

## 2023-01-16 DIAGNOSIS — G89.4 CHRONIC PAIN DISORDER: Primary | ICD-10-CM

## 2023-01-16 DIAGNOSIS — G90.522 COMPLEX REGIONAL PAIN SYNDROME TYPE 1 AFFECTING LEFT LOWER LEG: ICD-10-CM

## 2023-01-16 DIAGNOSIS — I47.10 PAROXYSMAL SUPRAVENTRICULAR TACHYCARDIA (H): ICD-10-CM

## 2023-01-16 DIAGNOSIS — Z12.31 ENCOUNTER FOR SCREENING MAMMOGRAM FOR BREAST CANCER: ICD-10-CM

## 2023-01-16 PROCEDURE — G0463 HOSPITAL OUTPT CLINIC VISIT: HCPCS

## 2023-01-16 PROCEDURE — G0463 HOSPITAL OUTPT CLINIC VISIT: HCPCS | Mod: 25

## 2023-01-16 PROCEDURE — 99214 OFFICE O/P EST MOD 30 MIN: CPT | Performed by: FAMILY MEDICINE

## 2023-01-16 RX ORDER — AMITRIPTYLINE HYDROCHLORIDE 50 MG/1
TABLET ORAL
Status: ON HOLD | COMMUNITY
Start: 2023-01-05 | End: 2023-06-02

## 2023-01-16 RX ORDER — PREGABALIN 25 MG/1
CAPSULE ORAL
Qty: 35 CAPSULE | Refills: 0 | Status: SHIPPED | OUTPATIENT
Start: 2023-01-16 | End: 2023-03-03 | Stop reason: SINTOL

## 2023-01-16 ASSESSMENT — ENCOUNTER SYMPTOMS
PALPITATIONS: 0
FEVER: 0
ABDOMINAL PAIN: 0
CHILLS: 0
ARTHRALGIAS: 1
PARESTHESIAS: 1
SHORTNESS OF BREATH: 0

## 2023-01-16 ASSESSMENT — PAIN SCALES - GENERAL: PAINLEVEL: EXTREME PAIN (8)

## 2023-01-16 NOTE — PATIENT INSTRUCTIONS
Start lyrica  - 25mg twice per day for one week,  - then increase to 25mg three times per day for one week  - then call me and let me know how you are doing     We put in a referral to physical medicine and rehab     We put in an order for mammogram

## 2023-02-05 ENCOUNTER — HOSPITAL ENCOUNTER (EMERGENCY)
Facility: OTHER | Age: 53
Discharge: HOME | End: 2023-02-05
Attending: PHYSICIAN ASSISTANT | Admitting: PHYSICIAN ASSISTANT
Payer: COMMERCIAL

## 2023-02-05 VITALS
BODY MASS INDEX: 22.36 KG/M2 | TEMPERATURE: 97.1 F | WEIGHT: 151 LBS | DIASTOLIC BLOOD PRESSURE: 55 MMHG | HEART RATE: 58 BPM | HEIGHT: 69 IN | OXYGEN SATURATION: 99 % | SYSTOLIC BLOOD PRESSURE: 97 MMHG | RESPIRATION RATE: 18 BRPM

## 2023-02-05 DIAGNOSIS — R51.9 HEADACHE: ICD-10-CM

## 2023-02-05 LAB
HOLD SPECIMEN: NORMAL

## 2023-02-05 PROCEDURE — 99284 EMERGENCY DEPT VISIT MOD MDM: CPT | Mod: 25 | Performed by: PHYSICIAN ASSISTANT

## 2023-02-05 PROCEDURE — 96361 HYDRATE IV INFUSION ADD-ON: CPT | Performed by: PHYSICIAN ASSISTANT

## 2023-02-05 PROCEDURE — 96375 TX/PRO/DX INJ NEW DRUG ADDON: CPT | Performed by: PHYSICIAN ASSISTANT

## 2023-02-05 PROCEDURE — 96374 THER/PROPH/DIAG INJ IV PUSH: CPT | Performed by: PHYSICIAN ASSISTANT

## 2023-02-05 PROCEDURE — 258N000003 HC RX IP 258 OP 636: Performed by: PHYSICIAN ASSISTANT

## 2023-02-05 PROCEDURE — 250N000011 HC RX IP 250 OP 636: Performed by: PHYSICIAN ASSISTANT

## 2023-02-05 RX ORDER — DEXAMETHASONE SODIUM PHOSPHATE 10 MG/ML
10 INJECTION, SOLUTION INTRAMUSCULAR; INTRAVENOUS ONCE
Status: COMPLETED | OUTPATIENT
Start: 2023-02-05 | End: 2023-02-05

## 2023-02-05 RX ORDER — KETOROLAC TROMETHAMINE 15 MG/ML
15 INJECTION, SOLUTION INTRAMUSCULAR; INTRAVENOUS ONCE
Status: COMPLETED | OUTPATIENT
Start: 2023-02-05 | End: 2023-02-05

## 2023-02-05 RX ORDER — METOCLOPRAMIDE HYDROCHLORIDE 5 MG/ML
10 INJECTION INTRAMUSCULAR; INTRAVENOUS ONCE
Status: COMPLETED | OUTPATIENT
Start: 2023-02-05 | End: 2023-02-05

## 2023-02-05 RX ORDER — DIPHENHYDRAMINE HYDROCHLORIDE 50 MG/ML
25 INJECTION INTRAMUSCULAR; INTRAVENOUS ONCE
Status: COMPLETED | OUTPATIENT
Start: 2023-02-05 | End: 2023-02-05

## 2023-02-05 RX ADMIN — METOCLOPRAMIDE 10 MG: 5 INJECTION, SOLUTION INTRAMUSCULAR; INTRAVENOUS at 19:30

## 2023-02-05 RX ADMIN — DIPHENHYDRAMINE HYDROCHLORIDE 25 MG: 50 INJECTION INTRAMUSCULAR; INTRAVENOUS at 19:30

## 2023-02-05 RX ADMIN — SODIUM CHLORIDE 1000 ML: 9 INJECTION, SOLUTION INTRAVENOUS at 19:32

## 2023-02-05 RX ADMIN — KETOROLAC TROMETHAMINE 15 MG: 15 INJECTION, SOLUTION INTRAMUSCULAR; INTRAVENOUS at 20:32

## 2023-02-05 RX ADMIN — DEXAMETHASONE SODIUM PHOSPHATE 10 MG: 10 INJECTION, SOLUTION INTRAMUSCULAR; INTRAVENOUS at 19:30

## 2023-02-05 ASSESSMENT — ENCOUNTER SYMPTOMS
VOMITING: 0
HEADACHES: 1
CONFUSION: 0
NAUSEA: 0
DYSURIA: 0
ABDOMINAL PAIN: 0
SHORTNESS OF BREATH: 0
FEVER: 0

## 2023-02-05 ASSESSMENT — ACTIVITIES OF DAILY LIVING (ADL): ADLS_ACUITY_SCORE: 35

## 2023-02-06 NOTE — ED TRIAGE NOTES
Patient here with a migraine headache that started this morning.  Pain mostly on the left side and around left ear.  Patient is light sensitive and has N/V one time today.     Triage Assessment     Row Name 02/05/23 1909       Triage Assessment (Adult)    Airway WDL WDL       Respiratory WDL    Respiratory WDL WDL       Skin Circulation/Temperature WDL    Skin Circulation/Temperature WDL WDL       Cardiac WDL    Cardiac WDL WDL       Peripheral/Neurovascular WDL    Peripheral Neurovascular WDL WDL       Cognitive/Neuro/Behavioral WDL    Cognitive/Neuro/Behavioral WDL X  headache

## 2023-02-06 NOTE — ED PROVIDER NOTES
"  History     Chief Complaint   Patient presents with     Headache     Nausea & Vomiting     HPI  Kym Perea is a 52 year old female who presents to the ED for evaluation of HA, n/v. Patient here with a migraine headache that started this morning.  Pain mostly on the left side and around left ear.  Patient is light sensitive and has N/V one time today.  She reports that she gets migraines from time to time due to grinding her teeth.  She says that her headache began gradually this morning, is comparable to past headaches.  She says \"this is not the worst headache of my life\".  Usually she takes tizanidine which she is out of today.  She denies any fevers or neck pain.    Allergies:  Allergies   Allergen Reactions     Arthrotec GI Disturbance     Indomethacin      Other reaction(s): Dizziness     Meloxicam GI Disturbance     Moxifloxacin        Problem List:    Patient Active Problem List    Diagnosis Date Noted     TMJ (temporomandibular joint syndrome) 11/01/2021     Priority: Medium     Arthralgia of right temporomandibular joint 11/01/2021     Priority: Medium     Suicidal behavior 10/19/2021     Priority: Medium     Medical cannabis use 07/10/2018     Priority: Medium     Chronic pain disorder 01/29/2018     Priority: Medium     Overview:   complex regional pain syndrome left ankle       Esophageal reflux 01/29/2018     Priority: Medium     Hyperlipidemia 01/29/2018     Priority: Medium     Insomnia 01/29/2018     Priority: Medium     Panic disorder 01/29/2018     Priority: Medium     Overview:   Previously patient of Dr. Reynoso since her teens. Patient is attempting slow taper down on her Xanax. She's been on Xanax 2 mg 4 times a day for years       Tobacco abuse 01/29/2018     Priority: Medium     Pain medication agreement, 4/18/18, 4/3/19, 10/27/2020 04/21/2017     Priority: Medium     Overview:   4/20/17:  Oxycodone 10 mg #180/mo    Hydrocodone 10/325mg, #240/mo. Stopped and changed to morphine. " Agreement signed 10/3/12, updated 10/1/ 14.  Complex regional pain syndrome (aka Reflex Sympathetic Dystrophy)  MS Contin 30 mg q 8 hours #90 per month.  Morphine sulfate IR 15 mg 2-3 tab tid prn #180 per month, decreased to #90/month.   query 4/9/16 as part of chart review. Seems appropriate. 6/2/16 tapered and off Morphine. [place] 5/325 #120/month.  query appropriate. ToxAssure appropriate.    August 2016 Consultation with [facility]. Recommended spinal cord stimulator trial, but she is reluctant to pursue that. Pain clinic stated that continuing opioids was OK.       Complex regional pain syndrome type 1 affecting left lower leg 04/15/2016     Priority: Medium     Overview:   Patient with a history of complex regional pain syndrome has seen Dr. Dino Skelton4/23/16        Migraine headache 11/25/2014     Priority: Medium     Overweight (BMI 25.0-29.9) 11/25/2014     Priority: Medium     Lumbago 02/06/2012     Priority: Medium     Major depressive disorder, recurrent episode, severe (H) 07/13/2011     Priority: Medium     Paroxysmal supraventricular tachycardia (H) 05/24/2010     Priority: Medium     Mononeuritis 07/06/2006     Priority: Medium     IMO Update 10/11       Ankle and foot joint derangement 06/05/2006     Priority: Medium     Overview:   Chronic left ankle pain secondary to reflex sympathetic dystrophy.  Patient   has had 4 previous surgeries, the most recent one done by Dr. Noriega in   2007. On Narcotic contract.    Formatting of this note might be different from the original.  Chronic left ankle pain secondary to reflex sympathetic dystrophy.  Patient   has had 4 previous surgeries, the most recent one done by Dr. Noriega in   2007. On Narcotic contract.    IMO Update 10/11          Past Medical History:    Past Medical History:   Diagnosis Date     Acquired absence of other organs (CODE)      Chronic pain syndrome      Complex regional pain syndrome I      Encounter for other  administrative examinations      Encounter for other administrative examinations      Gastro-esophageal reflux disease without esophagitis      Injury of left ankle      Low back pain      Major depressive disorder, recurrent severe without psychotic features (H)      Migraine without status migrainosus, not intractable      Nicotine dependence, uncomplicated      Obesity      Overweight      Pain in ankle      Pain in thoracic spine      Panic disorder without agoraphobia      Personal history of other diseases of the female genital tract      Personal history of other medical treatment (CODE)      Supraventricular tachycardia (H)        Past Surgical History:    Past Surgical History:   Procedure Laterality Date     ANKLE SURGERY      1997, 2000,Left ankle surgery x 2     HYSTERECTOMY TOTAL ABDOMINAL      11/1999,secondary to endometriosis, no malignancy; patient reports no malignancy, but abnormal cells were present *     LAPAROSCOPIC CHOLECYSTECTOMY      No Comments Provided     LAPAROSCOPY DIAGNOSTIC (GENERAL)      1995     OTHER SURGICAL HISTORY      21299,CRANIO/MAXILLOFACIAL SURGERY,Jaw Surgery     OTHER SURGICAL HISTORY      207040,[facility] EMG,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  No other abnormalities noted.       Family History:    Family History   Problem Relation Age of Onset     Cancer Father         Cancer,Bladder     Other - See Comments Father         Mild hypercholesterolemia     Other - See Comments Mother         scleroderma/lupus/Parkinson's syndrome     Family History Negative Brother         Good Health     Ariel-Danlos syndrome Daughter      Colon Cancer Maternal Grandmother 40        Cancer-colon,Followed by lung  with metastasis to the bone di*       Social History:  Marital Status:   [4]  Social History     Tobacco Use     Smoking status: Every Day     Packs/day: 0.50     Years: 30.00     Pack years: 15.00     Types: Cigarettes     Start date: 12/2/1986     Smokeless  "tobacco: Never     Tobacco comments:     trying   Vaping Use     Vaping Use: Every day     Substances: CBD     Devices: Disposable   Substance Use Topics     Alcohol use: Not Currently     Alcohol/week: 0.0 standard drinks     Comment: OCCASIONAL WINE     Drug use: No     Comment: medical cannabis        Medications:    albuterol (PROAIR HFA/PROVENTIL HFA/VENTOLIN HFA) 108 (90 Base) MCG/ACT inhaler  amitriptyline (ELAVIL) 50 MG tablet  amphetamine-dextroamphetamine (ADDERALL) 30 MG tablet  atenolol (TENORMIN) 100 MG tablet  Cholecalciferol (VITAMIN D-3) 125 MCG (5000 UT) TABS  clonazePAM (KLONOPIN) 2 MG tablet  divalproex sodium extended-release (DEPAKOTE ER) 500 MG 24 hr tablet  fluticasone (FLONASE) 50 MCG/ACT spray  hydrOXYzine (VISTARIL) 25 MG capsule  ibuprofen (ADVIL/MOTRIN) 800 MG tablet  pregabalin (LYRICA) 25 MG capsule  tiZANidine (ZANAFLEX) 4 MG tablet          Review of Systems   Constitutional: Negative for fever.   HENT: Negative for congestion.    Eyes: Negative for visual disturbance.   Respiratory: Negative for shortness of breath.    Cardiovascular: Negative for chest pain.   Gastrointestinal: Negative for abdominal pain, nausea and vomiting.   Genitourinary: Negative for dysuria.   Neurological: Positive for headaches.   Psychiatric/Behavioral: Negative for confusion.       Physical Exam   BP: 122/88  Pulse: 82  Temp: 97.1  F (36.2  C)  Resp: 18  Height: 175.3 cm (5' 9\")  Weight: 68.5 kg (151 lb)  SpO2: 100 %      Physical Exam  Constitutional:       General: She is not in acute distress.     Appearance: She is well-developed. She is not diaphoretic.   HENT:      Head: Normocephalic and atraumatic.   Eyes:      General: No scleral icterus.     Extraocular Movements: Extraocular movements intact.      Conjunctiva/sclera: Conjunctivae normal.      Pupils: Pupils are equal, round, and reactive to light.   Cardiovascular:      Rate and Rhythm: Normal rate and regular rhythm.   Pulmonary:      Effort: " Pulmonary effort is normal.      Breath sounds: Normal breath sounds.   Abdominal:      Palpations: Abdomen is soft.      Tenderness: There is no abdominal tenderness.   Musculoskeletal:         General: No deformity.      Cervical back: Neck supple.   Lymphadenopathy:      Cervical: No cervical adenopathy.   Skin:     General: Skin is warm and dry.      Coloration: Skin is not jaundiced.      Findings: No rash.   Neurological:      General: No focal deficit present.      Mental Status: She is alert and oriented to person, place, and time. Mental status is at baseline.      Cranial Nerves: No cranial nerve deficit.      Sensory: No sensory deficit.      Motor: No weakness.      Coordination: Coordination normal.   Psychiatric:         Mood and Affect: Mood normal.         Behavior: Behavior normal.         Thought Content: Thought content normal.         Judgment: Judgment normal.         ED Course                 Procedures              Critical Care time:  none               Results for orders placed or performed during the hospital encounter of 02/05/23 (from the past 24 hour(s))   Extra Tube    Narrative    The following orders were created for panel order Extra Tube.  Procedure                               Abnormality         Status                     ---------                               -----------         ------                     Extra Blue Top Tube[[id number]]                              Final result               Extra Red Top Tube[[id number]]                               Final result               Extra Green Top (Lithium...[[id number]]                      Final result               Extra Purple Top Tube[[id number]]                            Final result               Extra Green Top (Lithium...[[id number]]                      Final result                 Please view results for these tests on the individual orders.   Extra Blue Top Tube   Result Value Ref Range    Hold Specimen JIC    Extra Red Top  Tube   Result Value Ref Range    Hold Specimen JIC    Extra Green Top (Lithium Heparin) Tube   Result Value Ref Range    Hold Specimen JIC    Extra Purple Top Tube   Result Value Ref Range    Hold Specimen JIC    Extra Green Top (Lithium Heparin) ON ICE   Result Value Ref Range    Hold Specimen JIC        Medications   metoclopramide (REGLAN) injection 10 mg (10 mg Intravenous Given 2/5/23 1930)   dexamethasone PF (DECADRON) injection 10 mg (10 mg Intravenous Given 2/5/23 1930)   diphenhydrAMINE (BENADRYL) injection 25 mg (25 mg Intravenous Given 2/5/23 1930)   0.9% sodium chloride BOLUS (0 mLs Intravenous Stopped 2/5/23 2135)   ketorolac (TORADOL) injection 15 mg (15 mg Intravenous Given 2/5/23 2032)       Assessments & Plan (with Medical Decision Making)   The patient presents to the ED for evaluation of headache. The patient arrives still in active pain. At present, given the patient's history and my examination, my suspicion is that the headache is either due to a migraine or tension headache. I do not think the patient has a subarachnoid hemorrhage - it was not sudden and maximal at onset, is not described as the worst headache of the patient's life, and there has been no associated syncope or trauma. I do not think the patient has bacterial meningitis - they are afebrile, without meningismus, and are non-toxic appearing. I have not found any concerning findings to suspect patient's headache is due to acute angle glaucoma, CO poisoning, idiopathic intracranial hypertension, or hypertensive emergency. Lastly, I do not think the symptoms are concerning for a tumor or mass given the normal neurologic exam and absence of an insidious course.    Evaluation and management will include: IVF, reglan, benadryl, dexamethasone and toradol.     Upon reassessment she reports feeling much better and improved since arriving.  She would like to go home at this time.  I encouraged her to continue with conservative treatment at  home.    Strict return precautions are given to the pt, they will return if symptoms are worsening or concerning. The pt understands and agrees with the plan and they are discharged.     Chalino Mendoza PA-C          I have reviewed the nursing notes.    I have reviewed the findings, diagnosis, plan and need for follow up with the patient.               Discharge Medication List as of 2/5/2023  9:35 PM          Final diagnoses:   Headache       2/5/2023   [facility]     Chalino Mendoza PA  02/05/23 2144

## 2023-02-06 NOTE — DISCHARGE INSTRUCTIONS
Get plenty of fluids and rest.  As discussed, your presentation seems consistent with previous headaches.  Continue with home headache pain medication regimen.  If you are having worsening or concerning symptoms please return for further evaluation, otherwise, follow-up with PCP as needed.

## 2023-03-02 RX ORDER — DIVALPROEX SODIUM 250 MG/1
1 TABLET, EXTENDED RELEASE ORAL
COMMUNITY
Start: 2023-02-02

## 2023-03-02 NOTE — PROGRESS NOTES
Assessment & Plan     Severe episode of recurrent major depressive disorder, without psychotic features (H)  Follows with [facility] Magallanes [place]    Chronic pain disorder / Complex regional pain syndrome type 1 affecting left lower leg /   Pain in both lower legs  Encourage to establish with PM&R d/t dx of EDS  AINSLEY, CRP, lyme, RF (4/2/2015, [facility]) negative  - consideration for trial of alternate NSAIDs      External hemorrhoids /Encounter for screening colonoscopy  - Adult General Surg Referral    30 minutes spent on the date of the encounter doing chart review, review of test results, interpretation of tests, patient visit, documentation       See Patient Instructions    Return in about 4 weeks (around 3/31/2023).    Eryn Hurley MD  [facility] [place]    Subjective   Gemini is a 52 year old, presenting for the following health issues:  Pain and Depression      HPI     Depression Followup    How are you doing with your depression since your last visit? Worsened     Are you having other symptoms that might be associated with depression? No    Have you had a significant life event?  No     Are you feeling anxious or having panic attacks?   Yes:  .    Do you have any concerns with your use of alcohol or other drugs? No    Social History     Tobacco Use     Smoking status: Every Day     Packs/day: 0.50     Years: 30.00     Pack years: 15.00     Types: Cigarettes     Start date: 12/2/1986     Smokeless tobacco: Never     Tobacco comments:     trying   Vaping Use     Vaping Use: Every day     Substances: CBD     Devices: Disposable   Substance Use Topics     Alcohol use: Not Currently     Alcohol/week: 0.0 standard drinks     Comment: OCCASIONAL WINE     Drug use: No     Comment: medical cannabis     PHQ 2/28/2020 8/4/2020 3/3/2023   PHQ-9 Total Score 16 15 9   Q9: Thoughts of better off dead/self-harm past 2 weeks Not at all Not at all Not at all     LAINE-7 SCORE 7/5/2019 7/18/2019 2/28/2020    Total Score 21 21 14     Last PHQ-9 3/3/2023   1.  Little interest or pleasure in doing things 1   2.  Feeling down, depressed, or hopeless 1   3.  Trouble falling or staying asleep, or sleeping too much 1   4.  Feeling tired or having little energy 1   5.  Poor appetite or overeating 1   6.  Feeling bad about yourself 2   7.  Trouble concentrating 1   8.  Moving slowly or restless 1   Q9: Thoughts of better off dead/self-harm past 2 weeks 0   PHQ-9 Total Score 9   Difficulty at work, home, or with people -         956}  Pain History:  When did you first notice your pain? - Chronic Pain   Have you seen this provider for your pain in the past?   Yes   Where in your body do you have pain? Legs  Are you seeing anyone else for your pain? No        PEG Score 1/16/2023 3/3/2023   PEG Total Score 7 8       Chronic Pain Follow Up:    Location of pain: Legs  Analgesia/pain control:    - Recent changes:  No    - Overall control: Tolerable with discomfort    - Current treatments: Medical cannabis    Adherence:     - Do you ever take more pain medicine than prescribed? No    - When did you take your last dose of pain medicine?  Current   Adverse effects: No     - started lyrica - memory issues   - referral to PM&R - did not call    - gets feel weak and are heavy, comes and goes   - dx with EDS     - ibuprofen tid, every day w/ some benefit  - acupuncture did not help  - steroids w/o benefit     - s/p rheumatologic work up 20 years ago     - trialed: meloxicam (not effective), lyrica (memory issues), gabapentin (memory issues), steroids (not effective)      PDMP Review       Value Time User    State PDMP site checked  Yes 1/16/2023  2:52 PM Eryn Hurley MD        Last CSA Agreement:   CSA -- Patient Level:    CSA: None found at the patient level.       Last UDS: 10/19/2021    PEG Pain Assessment 3/3/2023   What number best describes your pain on average in the past week? 8   What number best describes how, during the  "past week, pain has interfered with your enjoyment of life? 8   What number best describes how, during the past week, pain has interfered with your general activity? 8   PEG Total Score 8     # Wellness: **  - Pap:   - Mammogram:  - order placed but not scheduled   - STDs:  - Contraception:  - Immunizations:  - Lipids:   - Dexa scan:  - Colon cancer screening: referral placed to general sx  - Lung cancer screening:  - AAA screening:       Review of Systems   Constitutional: Negative for chills and fever.   HENT: Negative for congestion.    Respiratory: Negative for shortness of breath.    Cardiovascular: Negative for chest pain and palpitations.   Gastrointestinal: Negative for abdominal pain.   Musculoskeletal: Positive for myalgias (legs). Negative for back pain.   Psychiatric/Behavioral: Positive for dysphoric mood (d/t mood).          Objective    /58 (BP Location: Right arm, Patient Position: Sitting, Cuff Size: Adult Regular)   Pulse 67   Temp 98.7  F (37.1  C) (Tympanic)   Resp 16   Ht 1.753 m (5' 9\")   LMP  (LMP Unknown)   SpO2 97%   BMI 22.30 kg/m    Body mass index is 22.3 kg/m .  Physical Exam  Constitutional:       General: She is not in acute distress.     Appearance: She is not ill-appearing.   Cardiovascular:      Rate and Rhythm: Normal rate and regular rhythm.      Heart sounds: No murmur heard.  Pulmonary:      Effort: Pulmonary effort is normal. No respiratory distress.      Breath sounds: No wheezing or rales.   Musculoskeletal:      Comments: Easy fatigue of shoulder and hip girdle    Neurological:      Mental Status: She is alert.                      "

## 2023-03-03 ENCOUNTER — OFFICE VISIT (OUTPATIENT)
Dept: FAMILY MEDICINE | Facility: OTHER | Age: 53
End: 2023-03-03
Attending: FAMILY MEDICINE
Payer: COMMERCIAL

## 2023-03-03 VITALS
SYSTOLIC BLOOD PRESSURE: 100 MMHG | HEIGHT: 69 IN | OXYGEN SATURATION: 97 % | DIASTOLIC BLOOD PRESSURE: 58 MMHG | BODY MASS INDEX: 22.3 KG/M2 | HEART RATE: 67 BPM | RESPIRATION RATE: 16 BRPM | TEMPERATURE: 98.7 F

## 2023-03-03 DIAGNOSIS — M79.661 PAIN IN BOTH LOWER LEGS: ICD-10-CM

## 2023-03-03 DIAGNOSIS — M79.662 PAIN IN BOTH LOWER LEGS: ICD-10-CM

## 2023-03-03 DIAGNOSIS — Z12.11 ENCOUNTER FOR SCREENING COLONOSCOPY: ICD-10-CM

## 2023-03-03 DIAGNOSIS — G89.4 CHRONIC PAIN DISORDER: ICD-10-CM

## 2023-03-03 DIAGNOSIS — G90.522 COMPLEX REGIONAL PAIN SYNDROME TYPE 1 AFFECTING LEFT LOWER LEG: ICD-10-CM

## 2023-03-03 DIAGNOSIS — F33.2 SEVERE EPISODE OF RECURRENT MAJOR DEPRESSIVE DISORDER, WITHOUT PSYCHOTIC FEATURES (H): Primary | ICD-10-CM

## 2023-03-03 DIAGNOSIS — K64.4 EXTERNAL HEMORRHOIDS: ICD-10-CM

## 2023-03-03 PROCEDURE — 99214 OFFICE O/P EST MOD 30 MIN: CPT | Performed by: FAMILY MEDICINE

## 2023-03-03 ASSESSMENT — ENCOUNTER SYMPTOMS
ABDOMINAL PAIN: 0
BACK PAIN: 0
DYSPHORIC MOOD: 1
FEVER: 0
CHILLS: 0
SHORTNESS OF BREATH: 0
PALPITATIONS: 0
MYALGIAS: 1

## 2023-03-03 ASSESSMENT — PATIENT HEALTH QUESTIONNAIRE - PHQ9: SUM OF ALL RESPONSES TO PHQ QUESTIONS 1-9: 9

## 2023-03-03 ASSESSMENT — PAIN SCALES - GENERAL: PAINLEVEL: EXTREME PAIN (8)

## 2023-03-03 NOTE — PATIENT INSTRUCTIONS
Please give [facility] PM&R [phone]    Please give [facility] a call to schedule mammogram [phone]    We put in a referral to general surgery for colonoscopy and hemorrhoids

## 2023-03-03 NOTE — LETTER
My Asthma Action Plan    Name: Kym Perea   YOB: 1970  Date: 3/3/2023   My doctor: Eryn Hurley MD   [facility]        My Rescue Medicine:   Albuterol inhaler (Proair/Ventolin/Proventil HFA)  2-4 puffs EVERY 4 HOURS as needed. Use a spacer if recommended by your provider.   My Asthma Severity:   Mild Persistent  Know your asthma triggers: pollens             GREEN ZONE   Good Control    I feel good    No cough or wheeze    Can work, sleep and play without asthma symptoms       Take your asthma control medicine every day.     1. If exercise triggers your asthma, take your rescue medication    15 minutes before exercise or sports, and    During exercise if you have asthma symptoms  2. Spacer to use with inhaler: If you have a spacer, make sure to use it with your inhaler             YELLOW ZONE Getting Worse  I have ANY of these:    I do not feel good    Cough or wheeze    Chest feels tight    Wake up at night   1. Keep taking your Green Zone medications  2. Start taking your rescue medicine:    every 20 minutes for up to 1 hour. Then every 4 hours for 24-48 hours.  3. If you stay in the Yellow Zone for more than 12-24 hours, contact your doctor.  4. If you do not return to the Green Zone in 12-24 hours or you get worse, start taking your oral steroid medicine if prescribed by your provider.           RED ZONE Medical Alert - Get Help  I have ANY of these:    I feel awful    Medicine is not helping    Breathing getting harder    Trouble walking or talking    Nose opens wide to breathe       1. Take your rescue medicine NOW  2. If your provider has prescribed an oral steroid medicine, start taking it NOW  3. Call your doctor NOW  4. If you are still in the Red Zone after 20 minutes and you have not reached your doctor:    Take your rescue medicine again and    Call 911 or go to the emergency room right away    See your regular doctor within 2 weeks of  an Emergency Room or Urgent Care visit for follow-up treatment.          Annual Reminders:  Meet with Asthma Educator,  Flu Shot in the Fall, consider Pneumonia Vaccination for patients with asthma (aged 19 and older).    Pharmacy:    [id number] - [place], [place] - [address]  [id number] - [place], [place] - [address]  [facility]    Electronically signed by Eryn Hurley MD   Date: 03/03/23                    Asthma Triggers  How To Control Things That Make Your Asthma Worse    Triggers are things that make your asthma worse.  Look at the list below to help you find your triggers and   what you can do about them. You can help prevent asthma flare-ups by staying away from your triggers.      Trigger                                                          What you can do   Cigarette Smoke  Tobacco smoke can make asthma worse. Do not allow smoking in your home, car or around you.  Be sure no one smokes at a child s day care or school.  If you smoke, ask your health care provider for ways to help you quit.  Ask family members to quit too.  Ask your health care provider for a referral to Quit Plan to help you quit smoking, or call 9-239-986-PLAN.     Colds, Flu, Bronchitis  These are common triggers of asthma. Wash your hands often.  Don t touch your eyes, nose or mouth.  Get a flu shot every year.     Dust Mites  These are tiny bugs that live in cloth or carpet. They are too small to see. Wash sheets and blankets in hot water every week.   Encase pillows and mattress in dust mite proof covers.  Avoid having carpet if you can. If you have carpet, vacuum weekly.   Use a dust mask and HEPA vacuum.   Pollen and Outdoor Mold  Some people are allergic to trees, grass, or weed pollen, or molds. Try to keep your windows closed.  Limit time out doors when pollen count is high.   Ask you health care provider about  taking medicine during allergy season.     Animal Dander  Some people are allergic to skin flakes, urine or saliva from pets with fur or feathers. Keep pets with fur or feathers out of your home.    If you can t keep the pet outdoors, then keep the pet out of your bedroom.  Keep the bedroom door closed.  Keep pets off cloth furniture and away from stuffed toys.     Mice, Rats, and Cockroaches  Some people are allergic to the waste from these pests.   Cover food and garbage.  Clean up spills and food crumbs.  Store grease in the refrigerator.   Keep food out of the bedroom.   Indoor Mold  This can be a trigger if your home has high moisture. Fix leaking faucets, pipes, or other sources of water.   Clean moldy surfaces.  Dehumidify basement if it is damp and smelly.   Smoke, Strong Odors, and Sprays  These can reduce air quality. Stay away from strong odors and sprays, such as perfume, powder, hair spray, paints, smoke incense, paint, cleaning products, candles and new carpet.   Exercise or Sports  Some people with asthma have this trigger. Be active!  Ask your doctor about taking medicine before sports or exercise to prevent symptoms.    Warm up for 5-10 minutes before and after sports or exercise.     Other Triggers of Asthma  Cold air:  Cover your nose and mouth with a scarf.  Sometimes laughing or crying can be a trigger.  Some medicines and food can trigger asthma.

## 2023-04-26 NOTE — ED TRIAGE NOTES
Patient states she has low back pain that radiates down both of her legs causing pain and numbness.  States she feels like her legs weigh a thousand pounds.  States it has been going on for months.     Triage Assessment     Row Name 06/27/22 0013       Triage Assessment (Adult)    Airway WDL WDL       Respiratory WDL    Respiratory WDL WDL       Skin Circulation/Temperature WDL    Skin Circulation/Temperature WDL WDL       Cardiac WDL    Cardiac WDL WDL       Peripheral/Neurovascular WDL    Peripheral Neurovascular WDL WDL       Cognitive/Neuro/Behavioral WDL    Cognitive/Neuro/Behavioral WDL WDL               Chemical Peel: VI Peel

## 2023-05-09 ENCOUNTER — LAB (OUTPATIENT)
Dept: LAB | Facility: OTHER | Age: 53
End: 2023-05-09
Attending: NURSE PRACTITIONER
Payer: COMMERCIAL

## 2023-05-09 DIAGNOSIS — G89.4 CHRONIC PAIN DISORDER: Primary | ICD-10-CM

## 2023-05-09 LAB
ALBUMIN SERPL BCG-MCNC: 5.1 G/DL (ref 3.5–5.2)
ALP SERPL-CCNC: 112 U/L (ref 35–104)
ALT SERPL W P-5'-P-CCNC: 13 U/L (ref 10–35)
ANION GAP SERPL CALCULATED.3IONS-SCNC: 13 MMOL/L (ref 7–15)
AST SERPL W P-5'-P-CCNC: 21 U/L (ref 10–35)
BILIRUB SERPL-MCNC: 0.2 MG/DL
BUN SERPL-MCNC: 7.4 MG/DL (ref 6–20)
CALCIUM SERPL-MCNC: 9.6 MG/DL (ref 8.6–10)
CHLORIDE SERPL-SCNC: 95 MMOL/L (ref 98–107)
CREAT SERPL-MCNC: 0.8 MG/DL (ref 0.51–0.95)
CREAT UR-MCNC: 103 MG/DL
DEPRECATED HCO3 PLAS-SCNC: 25 MMOL/L (ref 22–29)
GFR SERPL CREATININE-BSD FRML MDRD: 88 ML/MIN/1.73M2
GLUCOSE SERPL-MCNC: 91 MG/DL (ref 70–99)
POTASSIUM SERPL-SCNC: 3.4 MMOL/L (ref 3.4–5.3)
PROT SERPL-MCNC: 7.7 G/DL (ref 6.4–8.3)
SODIUM SERPL-SCNC: 133 MMOL/L (ref 136–145)

## 2023-05-09 PROCEDURE — 80355 GABAPENTIN NON-BLOOD: CPT | Mod: ZL

## 2023-05-09 PROCEDURE — 36415 COLL VENOUS BLD VENIPUNCTURE: CPT | Mod: ZL

## 2023-05-09 PROCEDURE — 80363 OPIOIDS & OPIATE ANALOGS 3/4: CPT | Mod: ZL

## 2023-05-09 PROCEDURE — 80053 COMPREHEN METABOLIC PANEL: CPT | Mod: ZL

## 2023-05-12 LAB — 7AMINOCLONAZEPAM UR QL CFM: PRESENT

## 2023-06-01 ENCOUNTER — ANESTHESIA EVENT (OUTPATIENT)
Dept: SURGERY | Facility: OTHER | Age: 53
DRG: 746 | End: 2023-06-01
Payer: COMMERCIAL

## 2023-06-01 ENCOUNTER — OFFICE VISIT (OUTPATIENT)
Dept: FAMILY MEDICINE | Facility: OTHER | Age: 53
DRG: 746 | End: 2023-06-01
Payer: COMMERCIAL

## 2023-06-01 ENCOUNTER — ANESTHESIA (OUTPATIENT)
Dept: SURGERY | Facility: OTHER | Age: 53
DRG: 746 | End: 2023-06-01
Payer: COMMERCIAL

## 2023-06-01 ENCOUNTER — HOSPITAL ENCOUNTER (INPATIENT)
Facility: OTHER | Age: 53
LOS: 4 days | Discharge: HOME | DRG: 746 | End: 2023-06-05
Attending: PEDIATRICS | Admitting: INTERNAL MEDICINE
Payer: COMMERCIAL

## 2023-06-01 VITALS
DIASTOLIC BLOOD PRESSURE: 66 MMHG | SYSTOLIC BLOOD PRESSURE: 98 MMHG | HEART RATE: 88 BPM | HEIGHT: 69 IN | BODY MASS INDEX: 24.29 KG/M2 | WEIGHT: 164 LBS | RESPIRATION RATE: 16 BRPM | TEMPERATURE: 98.4 F | OXYGEN SATURATION: 100 %

## 2023-06-01 DIAGNOSIS — I96: Primary | ICD-10-CM

## 2023-06-01 DIAGNOSIS — N49.3 FOURNIER'S GANGRENE (H): ICD-10-CM

## 2023-06-01 DIAGNOSIS — Z72.0 TOBACCO ABUSE: ICD-10-CM

## 2023-06-01 DIAGNOSIS — N76.82: ICD-10-CM

## 2023-06-01 DIAGNOSIS — L02.31 ABSCESS OF BUTTOCK, RIGHT: ICD-10-CM

## 2023-06-01 LAB
ALBUMIN SERPL BCG-MCNC: 3.6 G/DL (ref 3.5–5.2)
ALP SERPL-CCNC: 190 U/L (ref 35–104)
ALT SERPL W P-5'-P-CCNC: 13 U/L (ref 10–35)
ANION GAP SERPL CALCULATED.3IONS-SCNC: 14 MMOL/L (ref 7–15)
AST SERPL W P-5'-P-CCNC: 19 U/L (ref 10–35)
BASOPHILS # BLD AUTO: 0.1 10E3/UL (ref 0–0.2)
BASOPHILS NFR BLD AUTO: 0 %
BILIRUB SERPL-MCNC: 0.5 MG/DL
BUN SERPL-MCNC: 9.3 MG/DL (ref 6–20)
CALCIUM SERPL-MCNC: 9.2 MG/DL (ref 8.6–10)
CHLORIDE SERPL-SCNC: 96 MMOL/L (ref 98–107)
CK SERPL-CCNC: 48 U/L (ref 26–192)
CREAT SERPL-MCNC: 1.04 MG/DL (ref 0.51–0.95)
CREAT SERPL-MCNC: 1.06 MG/DL (ref 0.51–0.95)
CRP SERPL-MCNC: 300.36 MG/L
DEPRECATED HCO3 PLAS-SCNC: 22 MMOL/L (ref 22–29)
EOSINOPHIL # BLD AUTO: 0.1 10E3/UL (ref 0–0.7)
EOSINOPHIL NFR BLD AUTO: 1 %
ERYTHROCYTE [DISTWIDTH] IN BLOOD BY AUTOMATED COUNT: 13.1 % (ref 10–15)
GFR SERPL CREATININE-BSD FRML MDRD: 63 ML/MIN/1.73M2
GFR SERPL CREATININE-BSD FRML MDRD: 64 ML/MIN/1.73M2
GLUCOSE SERPL-MCNC: 91 MG/DL (ref 70–99)
HCT VFR BLD AUTO: 33.6 % (ref 35–47)
HGB BLD-MCNC: 11.4 G/DL (ref 11.7–15.7)
HOLD SPECIMEN: NORMAL
IMM GRANULOCYTES # BLD: 0.4 10E3/UL
IMM GRANULOCYTES NFR BLD: 3 %
LACTATE SERPL-SCNC: 1 MMOL/L (ref 0.7–2)
LACTATE SERPL-SCNC: 2.3 MMOL/L (ref 0.7–2)
LYMPHOCYTES # BLD AUTO: 1.2 10E3/UL (ref 0.8–5.3)
LYMPHOCYTES NFR BLD AUTO: 7 %
MCH RBC QN AUTO: 31.1 PG (ref 26.5–33)
MCHC RBC AUTO-ENTMCNC: 33.9 G/DL (ref 31.5–36.5)
MCV RBC AUTO: 92 FL (ref 78–100)
MONOCYTES # BLD AUTO: 1.3 10E3/UL (ref 0–1.3)
MONOCYTES NFR BLD AUTO: 7 %
NEUTROPHILS # BLD AUTO: 14.4 10E3/UL (ref 1.6–8.3)
NEUTROPHILS NFR BLD AUTO: 82 %
NRBC # BLD AUTO: 0 10E3/UL
NRBC BLD AUTO-RTO: 0 /100
PLATELET # BLD AUTO: 182 10E3/UL (ref 150–450)
POTASSIUM SERPL-SCNC: 4.5 MMOL/L (ref 3.4–5.3)
PROCALCITONIN SERPL IA-MCNC: 1.26 NG/ML
PROT SERPL-MCNC: 6.8 G/DL (ref 6.4–8.3)
RBC # BLD AUTO: 3.67 10E6/UL (ref 3.8–5.2)
SODIUM SERPL-SCNC: 132 MMOL/L (ref 136–145)
WBC # BLD AUTO: 17.5 10E3/UL (ref 4–11)

## 2023-06-01 PROCEDURE — 250N000013 HC RX MED GY IP 250 OP 250 PS 637: Performed by: SURGERY

## 2023-06-01 PROCEDURE — P9045 ALBUMIN (HUMAN), 5%, 250 ML: HCPCS | Performed by: ANESTHESIOLOGY

## 2023-06-01 PROCEDURE — 87205 SMEAR GRAM STAIN: CPT | Mod: ZL

## 2023-06-01 PROCEDURE — 82550 ASSAY OF CK (CPK): CPT | Mod: ZL

## 2023-06-01 PROCEDURE — 36415 COLL VENOUS BLD VENIPUNCTURE: CPT | Performed by: ANESTHESIOLOGY

## 2023-06-01 PROCEDURE — 258N000003 HC RX IP 258 OP 636: Performed by: NURSE ANESTHETIST, CERTIFIED REGISTERED

## 2023-06-01 PROCEDURE — 86140 C-REACTIVE PROTEIN: CPT | Mod: ZL

## 2023-06-01 PROCEDURE — 370N000017 HC ANESTHESIA TECHNICAL FEE, PER MIN: Performed by: SURGERY

## 2023-06-01 PROCEDURE — 250N000009 HC RX 250: Performed by: ANESTHESIOLOGY

## 2023-06-01 PROCEDURE — 250N000011 HC RX IP 250 OP 636: Performed by: INTERNAL MEDICINE

## 2023-06-01 PROCEDURE — 200N000001 HC R&B ICU

## 2023-06-01 PROCEDURE — G0463 HOSPITAL OUTPT CLINIC VISIT: HCPCS

## 2023-06-01 PROCEDURE — 99223 1ST HOSP IP/OBS HIGH 75: CPT | Mod: 25 | Performed by: INTERNAL MEDICINE

## 2023-06-01 PROCEDURE — 250N000009 HC RX 250: Performed by: NURSE ANESTHETIST, CERTIFIED REGISTERED

## 2023-06-01 PROCEDURE — 99223 1ST HOSP IP/OBS HIGH 75: CPT | Mod: 25 | Performed by: SURGERY

## 2023-06-01 PROCEDURE — 82435 ASSAY OF BLOOD CHLORIDE: CPT | Mod: ZL

## 2023-06-01 PROCEDURE — 82565 ASSAY OF CREATININE: CPT | Performed by: INTERNAL MEDICINE

## 2023-06-01 PROCEDURE — 99140 ANES COMP EMERGENCY COND: CPT | Performed by: NURSE ANESTHETIST, CERTIFIED REGISTERED

## 2023-06-01 PROCEDURE — 83605 ASSAY OF LACTIC ACID: CPT | Performed by: ANESTHESIOLOGY

## 2023-06-01 PROCEDURE — 11004 DBRDMT SKIN XTRNL GENT&PER: CPT | Performed by: NURSE ANESTHETIST, CERTIFIED REGISTERED

## 2023-06-01 PROCEDURE — 250N000025 HC SEVOFLURANE, PER MIN: Performed by: SURGERY

## 2023-06-01 PROCEDURE — 87070 CULTURE OTHR SPECIMN AEROBIC: CPT | Mod: ZL

## 2023-06-01 PROCEDURE — 272N000001 HC OR GENERAL SUPPLY STERILE: Performed by: SURGERY

## 2023-06-01 PROCEDURE — 87070 CULTURE OTHR SPECIMN AEROBIC: CPT | Performed by: SURGERY

## 2023-06-01 PROCEDURE — 88304 TISSUE EXAM BY PATHOLOGIST: CPT

## 2023-06-01 PROCEDURE — 87077 CULTURE AEROBIC IDENTIFY: CPT | Mod: ZL

## 2023-06-01 PROCEDURE — 258N000003 HC RX IP 258 OP 636: Performed by: SURGERY

## 2023-06-01 PROCEDURE — 250N000011 HC RX IP 250 OP 636: Performed by: NURSE ANESTHETIST, CERTIFIED REGISTERED

## 2023-06-01 PROCEDURE — 272N000002 HC OR SUPPLY OTHER OPNP: Performed by: SURGERY

## 2023-06-01 PROCEDURE — 36415 COLL VENOUS BLD VENIPUNCTURE: CPT | Performed by: INTERNAL MEDICINE

## 2023-06-01 PROCEDURE — 84145 PROCALCITONIN (PCT): CPT | Mod: ZL

## 2023-06-01 PROCEDURE — 258N000003 HC RX IP 258 OP 636: Performed by: INTERNAL MEDICINE

## 2023-06-01 PROCEDURE — 360N000075 HC SURGERY LEVEL 2, PER MIN: Performed by: SURGERY

## 2023-06-01 PROCEDURE — 36415 COLL VENOUS BLD VENIPUNCTURE: CPT | Mod: ZL

## 2023-06-01 PROCEDURE — 250N000009 HC RX 250: Performed by: SURGERY

## 2023-06-01 PROCEDURE — 87040 BLOOD CULTURE FOR BACTERIA: CPT | Performed by: INTERNAL MEDICINE

## 2023-06-01 PROCEDURE — 250N000013 HC RX MED GY IP 250 OP 250 PS 637: Performed by: INTERNAL MEDICINE

## 2023-06-01 PROCEDURE — 85025 COMPLETE CBC W/AUTO DIFF WBC: CPT | Mod: ZL

## 2023-06-01 PROCEDURE — 99215 OFFICE O/P EST HI 40 MIN: CPT

## 2023-06-01 PROCEDURE — 11004 DBRDMT SKIN XTRNL GENT&PER: CPT | Performed by: SURGERY

## 2023-06-01 PROCEDURE — 0JBB0ZZ EXCISION OF PERINEUM SUBCUTANEOUS TISSUE AND FASCIA, OPEN APPROACH: ICD-10-PCS | Performed by: SURGERY

## 2023-06-01 PROCEDURE — 83605 ASSAY OF LACTIC ACID: CPT | Mod: ZL

## 2023-06-01 PROCEDURE — 250N000011 HC RX IP 250 OP 636: Performed by: ANESTHESIOLOGY

## 2023-06-01 PROCEDURE — 87075 CULTR BACTERIA EXCEPT BLOOD: CPT | Performed by: SURGERY

## 2023-06-01 RX ORDER — OXYCODONE HYDROCHLORIDE 5 MG/1
5 TABLET ORAL
Status: DISCONTINUED | OUTPATIENT
Start: 2023-06-01 | End: 2023-06-01

## 2023-06-01 RX ORDER — CLONAZEPAM 1 MG/1
2 TABLET ORAL 2 TIMES DAILY
Status: DISCONTINUED | OUTPATIENT
Start: 2023-06-01 | End: 2023-06-05 | Stop reason: HOSPADM

## 2023-06-01 RX ORDER — SODIUM CHLORIDE, SODIUM LACTATE, POTASSIUM CHLORIDE, CALCIUM CHLORIDE 600; 310; 30; 20 MG/100ML; MG/100ML; MG/100ML; MG/100ML
INJECTION, SOLUTION INTRAVENOUS CONTINUOUS PRN
Status: DISCONTINUED | OUTPATIENT
Start: 2023-06-01 | End: 2023-06-01

## 2023-06-01 RX ORDER — NALOXONE HYDROCHLORIDE 0.4 MG/ML
0.4 INJECTION, SOLUTION INTRAMUSCULAR; INTRAVENOUS; SUBCUTANEOUS
Status: DISCONTINUED | OUTPATIENT
Start: 2023-06-01 | End: 2023-06-05 | Stop reason: HOSPADM

## 2023-06-01 RX ORDER — ONDANSETRON 4 MG/1
4 TABLET, ORALLY DISINTEGRATING ORAL EVERY 30 MIN PRN
Status: DISCONTINUED | OUTPATIENT
Start: 2023-06-01 | End: 2023-06-01

## 2023-06-01 RX ORDER — TIZANIDINE 2 MG/1
4 TABLET ORAL EVERY 8 HOURS PRN
Status: DISCONTINUED | OUTPATIENT
Start: 2023-06-01 | End: 2023-06-05 | Stop reason: HOSPADM

## 2023-06-01 RX ORDER — HYDROMORPHONE HCL IN WATER/PF 6 MG/30 ML
0.2 PATIENT CONTROLLED ANALGESIA SYRINGE INTRAVENOUS EVERY 5 MIN PRN
Status: DISCONTINUED | OUTPATIENT
Start: 2023-06-01 | End: 2023-06-01

## 2023-06-01 RX ORDER — PROPOFOL 10 MG/ML
INJECTION, EMULSION INTRAVENOUS PRN
Status: DISCONTINUED | OUTPATIENT
Start: 2023-06-01 | End: 2023-06-01

## 2023-06-01 RX ORDER — HYDROMORPHONE HYDROCHLORIDE 1 MG/ML
.3-.5 INJECTION, SOLUTION INTRAMUSCULAR; INTRAVENOUS; SUBCUTANEOUS
Status: DISCONTINUED | OUTPATIENT
Start: 2023-06-01 | End: 2023-06-01 | Stop reason: DRUGHIGH

## 2023-06-01 RX ORDER — ROPIVACAINE IN 0.9% SOD CHL/PF 0.1 %
.03-.125 PLASTIC BAG, INJECTION (ML) EPIDURAL CONTINUOUS
Status: DISCONTINUED | OUTPATIENT
Start: 2023-06-01 | End: 2023-06-03 | Stop reason: ALTCHOICE

## 2023-06-01 RX ORDER — FAMOTIDINE 20 MG/1
20 TABLET, FILM COATED ORAL 2 TIMES DAILY
Status: DISCONTINUED | OUTPATIENT
Start: 2023-06-01 | End: 2023-06-05 | Stop reason: HOSPADM

## 2023-06-01 RX ORDER — ONDANSETRON 4 MG/1
4 TABLET, ORALLY DISINTEGRATING ORAL EVERY 6 HOURS PRN
Status: DISCONTINUED | OUTPATIENT
Start: 2023-06-01 | End: 2023-06-05 | Stop reason: HOSPADM

## 2023-06-01 RX ORDER — DEXTROSE MONOHYDRATE 25 G/50ML
25-50 INJECTION, SOLUTION INTRAVENOUS
Status: DISCONTINUED | OUTPATIENT
Start: 2023-06-01 | End: 2023-06-05 | Stop reason: HOSPADM

## 2023-06-01 RX ORDER — FENTANYL CITRATE 50 UG/ML
INJECTION, SOLUTION INTRAMUSCULAR; INTRAVENOUS PRN
Status: DISCONTINUED | OUTPATIENT
Start: 2023-06-01 | End: 2023-06-01

## 2023-06-01 RX ORDER — ROPIVACAINE IN 0.9% SOD CHL/PF 0.1 %
.03-.125 PLASTIC BAG, INJECTION (ML) EPIDURAL CONTINUOUS PRN
Status: DISCONTINUED | OUTPATIENT
Start: 2023-06-01 | End: 2023-06-01

## 2023-06-01 RX ORDER — ONDANSETRON 2 MG/ML
4 INJECTION INTRAMUSCULAR; INTRAVENOUS EVERY 30 MIN PRN
Status: DISCONTINUED | OUTPATIENT
Start: 2023-06-01 | End: 2023-06-01

## 2023-06-01 RX ORDER — SODIUM CHLORIDE, SODIUM LACTATE, POTASSIUM CHLORIDE, CALCIUM CHLORIDE 600; 310; 30; 20 MG/100ML; MG/100ML; MG/100ML; MG/100ML
INJECTION, SOLUTION INTRAVENOUS CONTINUOUS
Status: DISCONTINUED | OUTPATIENT
Start: 2023-06-01 | End: 2023-06-04 | Stop reason: ALTCHOICE

## 2023-06-01 RX ORDER — OXYCODONE HYDROCHLORIDE 5 MG/1
5-10 TABLET ORAL EVERY 4 HOURS PRN
Status: DISCONTINUED | OUTPATIENT
Start: 2023-06-01 | End: 2023-06-05 | Stop reason: HOSPADM

## 2023-06-01 RX ORDER — FENTANYL CITRATE 50 UG/ML
50 INJECTION, SOLUTION INTRAMUSCULAR; INTRAVENOUS EVERY 5 MIN PRN
Status: DISCONTINUED | OUTPATIENT
Start: 2023-06-01 | End: 2023-06-01

## 2023-06-01 RX ORDER — CEFAZOLIN SODIUM 2 G/100ML
2 INJECTION, SOLUTION INTRAVENOUS SEE ADMIN INSTRUCTIONS
Status: DISCONTINUED | OUTPATIENT
Start: 2023-06-01 | End: 2023-06-01 | Stop reason: ALTCHOICE

## 2023-06-01 RX ORDER — CEFAZOLIN SODIUM 1 G/50ML
2000 SOLUTION INTRAVENOUS ONCE
Status: COMPLETED | OUTPATIENT
Start: 2023-06-01 | End: 2023-06-01

## 2023-06-01 RX ORDER — PROCHLORPERAZINE MALEATE 10 MG
10 TABLET ORAL EVERY 6 HOURS PRN
Status: DISCONTINUED | OUTPATIENT
Start: 2023-06-01 | End: 2023-06-05 | Stop reason: HOSPADM

## 2023-06-01 RX ORDER — DEXAMETHASONE SODIUM PHOSPHATE 4 MG/ML
INJECTION, SOLUTION INTRA-ARTICULAR; INTRALESIONAL; INTRAMUSCULAR; INTRAVENOUS; SOFT TISSUE PRN
Status: DISCONTINUED | OUTPATIENT
Start: 2023-06-01 | End: 2023-06-01

## 2023-06-01 RX ORDER — ENOXAPARIN SODIUM 100 MG/ML
40 INJECTION SUBCUTANEOUS EVERY 24 HOURS
Status: DISCONTINUED | OUTPATIENT
Start: 2023-06-01 | End: 2023-06-05 | Stop reason: HOSPADM

## 2023-06-01 RX ORDER — LIDOCAINE 40 MG/G
CREAM TOPICAL
Status: DISCONTINUED | OUTPATIENT
Start: 2023-06-01 | End: 2023-06-01 | Stop reason: HOSPADM

## 2023-06-01 RX ORDER — FENTANYL CITRATE 50 UG/ML
25 INJECTION, SOLUTION INTRAMUSCULAR; INTRAVENOUS EVERY 5 MIN PRN
Status: DISCONTINUED | OUTPATIENT
Start: 2023-06-01 | End: 2023-06-01

## 2023-06-01 RX ORDER — CEFAZOLIN SODIUM 2 G/100ML
2 INJECTION, SOLUTION INTRAVENOUS
Status: DISCONTINUED | OUTPATIENT
Start: 2023-06-01 | End: 2023-06-01

## 2023-06-01 RX ORDER — DIVALPROEX SODIUM 250 MG/1
250 TABLET, EXTENDED RELEASE ORAL
Status: DISCONTINUED | OUTPATIENT
Start: 2023-06-01 | End: 2023-06-05 | Stop reason: HOSPADM

## 2023-06-01 RX ORDER — SODIUM CHLORIDE 9 MG/ML
INJECTION, SOLUTION INTRAVENOUS CONTINUOUS PRN
Status: DISCONTINUED | OUTPATIENT
Start: 2023-06-01 | End: 2023-06-01

## 2023-06-01 RX ORDER — NICOTINE POLACRILEX 4 MG
15-30 LOZENGE BUCCAL
Status: DISCONTINUED | OUTPATIENT
Start: 2023-06-01 | End: 2023-06-05 | Stop reason: HOSPADM

## 2023-06-01 RX ORDER — SODIUM CHLORIDE 9 MG/ML
INJECTION, SOLUTION INTRAVENOUS CONTINUOUS
Status: DISCONTINUED | OUTPATIENT
Start: 2023-06-01 | End: 2023-06-01 | Stop reason: ALTCHOICE

## 2023-06-01 RX ORDER — HYDROMORPHONE HCL IN WATER/PF 6 MG/30 ML
0.4 PATIENT CONTROLLED ANALGESIA SYRINGE INTRAVENOUS EVERY 5 MIN PRN
Status: DISCONTINUED | OUTPATIENT
Start: 2023-06-01 | End: 2023-06-01

## 2023-06-01 RX ORDER — ONDANSETRON 2 MG/ML
4 INJECTION INTRAMUSCULAR; INTRAVENOUS EVERY 6 HOURS PRN
Status: DISCONTINUED | OUTPATIENT
Start: 2023-06-01 | End: 2023-06-05 | Stop reason: HOSPADM

## 2023-06-01 RX ORDER — LIDOCAINE HYDROCHLORIDE 20 MG/ML
INJECTION, SOLUTION INFILTRATION; PERINEURAL PRN
Status: DISCONTINUED | OUTPATIENT
Start: 2023-06-01 | End: 2023-06-01

## 2023-06-01 RX ORDER — SODIUM CHLORIDE, SODIUM LACTATE, POTASSIUM CHLORIDE, CALCIUM CHLORIDE 600; 310; 30; 20 MG/100ML; MG/100ML; MG/100ML; MG/100ML
INJECTION, SOLUTION INTRAVENOUS CONTINUOUS
Status: DISCONTINUED | OUTPATIENT
Start: 2023-06-01 | End: 2023-06-01 | Stop reason: HOSPADM

## 2023-06-01 RX ORDER — ACETAMINOPHEN 10 MG/ML
INJECTION, SOLUTION INTRAVENOUS PRN
Status: DISCONTINUED | OUTPATIENT
Start: 2023-06-01 | End: 2023-06-01

## 2023-06-01 RX ORDER — ATENOLOL 50 MG/1
100 TABLET ORAL DAILY
Status: DISCONTINUED | OUTPATIENT
Start: 2023-06-01 | End: 2023-06-01

## 2023-06-01 RX ORDER — NALOXONE HYDROCHLORIDE 0.4 MG/ML
0.2 INJECTION, SOLUTION INTRAMUSCULAR; INTRAVENOUS; SUBCUTANEOUS
Status: DISCONTINUED | OUTPATIENT
Start: 2023-06-01 | End: 2023-06-05 | Stop reason: HOSPADM

## 2023-06-01 RX ORDER — FENTANYL CITRATE 50 UG/ML
25 INJECTION, SOLUTION INTRAMUSCULAR; INTRAVENOUS
Status: DISCONTINUED | OUTPATIENT
Start: 2023-06-01 | End: 2023-06-01

## 2023-06-01 RX ORDER — ONDANSETRON 4 MG/1
4 TABLET, ORALLY DISINTEGRATING ORAL EVERY 8 HOURS PRN
COMMUNITY
Start: 2023-05-30 | End: 2023-12-19

## 2023-06-01 RX ORDER — SODIUM CHLORIDE, SODIUM LACTATE, POTASSIUM CHLORIDE, CALCIUM CHLORIDE 600; 310; 30; 20 MG/100ML; MG/100ML; MG/100ML; MG/100ML
INJECTION, SOLUTION INTRAVENOUS CONTINUOUS
Status: DISCONTINUED | OUTPATIENT
Start: 2023-06-01 | End: 2023-06-01

## 2023-06-01 RX ORDER — OXYCODONE HYDROCHLORIDE 5 MG/1
10 TABLET ORAL
Status: DISCONTINUED | OUTPATIENT
Start: 2023-06-01 | End: 2023-06-01

## 2023-06-01 RX ORDER — HYDROXYZINE PAMOATE 25 MG/1
25 CAPSULE ORAL 3 TIMES DAILY PRN
Status: DISCONTINUED | OUTPATIENT
Start: 2023-06-01 | End: 2023-06-05 | Stop reason: HOSPADM

## 2023-06-01 RX ORDER — ONDANSETRON 2 MG/ML
INJECTION INTRAMUSCULAR; INTRAVENOUS PRN
Status: DISCONTINUED | OUTPATIENT
Start: 2023-06-01 | End: 2023-06-01

## 2023-06-01 RX ORDER — LIDOCAINE 40 MG/G
CREAM TOPICAL
Status: DISCONTINUED | OUTPATIENT
Start: 2023-06-01 | End: 2023-06-05 | Stop reason: HOSPADM

## 2023-06-01 RX ORDER — PROCHLORPERAZINE 25 MG
25 SUPPOSITORY, RECTAL RECTAL EVERY 12 HOURS PRN
Status: DISCONTINUED | OUTPATIENT
Start: 2023-06-01 | End: 2023-06-05 | Stop reason: HOSPADM

## 2023-06-01 RX ORDER — CLINDAMYCIN PHOSPHATE 900 MG/50ML
900 INJECTION, SOLUTION INTRAVENOUS EVERY 8 HOURS
Status: DISCONTINUED | OUTPATIENT
Start: 2023-06-01 | End: 2023-06-04

## 2023-06-01 RX ADMIN — TAZOBACTAM SODIUM AND PIPERACILLIN SODIUM 4.5 G: 500; 4 INJECTION, SOLUTION INTRAVENOUS at 22:22

## 2023-06-01 RX ADMIN — CLINDAMYCIN PHOSPHATE 900 MG: 900 INJECTION, SOLUTION INTRAVENOUS at 16:06

## 2023-06-01 RX ADMIN — SODIUM CHLORIDE, SODIUM LACTATE, POTASSIUM CHLORIDE, AND CALCIUM CHLORIDE: 600; 310; 30; 20 INJECTION, SOLUTION INTRAVENOUS at 16:29

## 2023-06-01 RX ADMIN — PHENYLEPHRINE HYDROCHLORIDE 100 MCG: 10 INJECTION INTRAVENOUS at 16:44

## 2023-06-01 RX ADMIN — TAZOBACTAM SODIUM AND PIPERACILLIN SODIUM 4.5 G: 500; 4 INJECTION, SOLUTION INTRAVENOUS at 16:03

## 2023-06-01 RX ADMIN — PHENYLEPHRINE HYDROCHLORIDE 100 MCG: 10 INJECTION INTRAVENOUS at 16:50

## 2023-06-01 RX ADMIN — ACETAMINOPHEN 1000 MG: 10 INJECTION, SOLUTION INTRAVENOUS at 17:03

## 2023-06-01 RX ADMIN — PROPOFOL 120 MG: 10 INJECTION, EMULSION INTRAVENOUS at 16:33

## 2023-06-01 RX ADMIN — ONDANSETRON HYDROCHLORIDE 4 MG: 2 SOLUTION INTRAMUSCULAR; INTRAVENOUS at 16:33

## 2023-06-01 RX ADMIN — PHENYLEPHRINE HYDROCHLORIDE 100 MCG: 10 INJECTION INTRAVENOUS at 16:47

## 2023-06-01 RX ADMIN — DEXAMETHASONE SODIUM PHOSPHATE 4 MG: 4 INJECTION, SOLUTION INTRA-ARTICULAR; INTRALESIONAL; INTRAMUSCULAR; INTRAVENOUS; SOFT TISSUE at 16:39

## 2023-06-01 RX ADMIN — PHENYLEPHRINE HYDROCHLORIDE 100 MCG: 10 INJECTION INTRAVENOUS at 17:04

## 2023-06-01 RX ADMIN — LIDOCAINE HYDROCHLORIDE 40 MG: 20 INJECTION, SOLUTION INFILTRATION; PERINEURAL at 16:33

## 2023-06-01 RX ADMIN — PHENYLEPHRINE HYDROCHLORIDE 100 MCG: 10 INJECTION INTRAVENOUS at 16:33

## 2023-06-01 RX ADMIN — ENOXAPARIN SODIUM 40 MG: 40 INJECTION SUBCUTANEOUS at 16:09

## 2023-06-01 RX ADMIN — ALBUMIN HUMAN 12.5 G: 0.05 INJECTION, SOLUTION INTRAVENOUS at 22:23

## 2023-06-01 RX ADMIN — SODIUM CHLORIDE, POTASSIUM CHLORIDE, SODIUM LACTATE AND CALCIUM CHLORIDE 1000 ML: 600; 310; 30; 20 INJECTION, SOLUTION INTRAVENOUS at 15:58

## 2023-06-01 RX ADMIN — SUGAMMADEX 200 MG: 100 INJECTION, SOLUTION INTRAVENOUS at 18:02

## 2023-06-01 RX ADMIN — ROCURONIUM BROMIDE 10 MG: 50 INJECTION, SOLUTION INTRAVENOUS at 17:23

## 2023-06-01 RX ADMIN — FAMOTIDINE 20 MG: 20 TABLET, FILM COATED ORAL at 23:11

## 2023-06-01 RX ADMIN — PHENYLEPHRINE HYDROCHLORIDE 200 MCG: 10 INJECTION INTRAVENOUS at 16:55

## 2023-06-01 RX ADMIN — ROCURONIUM BROMIDE 10 MG: 50 INJECTION, SOLUTION INTRAVENOUS at 17:05

## 2023-06-01 RX ADMIN — FENTANYL CITRATE 25 MCG: 50 INJECTION, SOLUTION INTRAMUSCULAR; INTRAVENOUS at 16:31

## 2023-06-01 RX ADMIN — SODIUM CHLORIDE: 0.9 INJECTION, SOLUTION INTRAVENOUS at 18:15

## 2023-06-01 RX ADMIN — SODIUM CHLORIDE: 0.9 INJECTION, SOLUTION INTRAVENOUS at 17:20

## 2023-06-01 RX ADMIN — ALBUMIN HUMAN 12.5 G: 0.05 INJECTION, SOLUTION INTRAVENOUS at 20:43

## 2023-06-01 RX ADMIN — VANCOMYCIN HYDROCHLORIDE 2000 MG: 10 INJECTION, POWDER, LYOPHILIZED, FOR SOLUTION INTRAVENOUS at 16:00

## 2023-06-01 RX ADMIN — Medication 0.03 MCG/KG/MIN: at 23:09

## 2023-06-01 RX ADMIN — SODIUM CHLORIDE, POTASSIUM CHLORIDE, SODIUM LACTATE AND CALCIUM CHLORIDE: 600; 310; 30; 20 INJECTION, SOLUTION INTRAVENOUS at 18:37

## 2023-06-01 RX ADMIN — ROCURONIUM BROMIDE 30 MG: 50 INJECTION, SOLUTION INTRAVENOUS at 16:33

## 2023-06-01 RX ADMIN — PHENYLEPHRINE HYDROCHLORIDE 100 MCG: 10 INJECTION INTRAVENOUS at 16:39

## 2023-06-01 RX ADMIN — CLONAZEPAM 2 MG: 1 TABLET ORAL at 23:11

## 2023-06-01 RX ADMIN — SODIUM CHLORIDE, SODIUM LACTATE, POTASSIUM CHLORIDE, AND CALCIUM CHLORIDE: 600; 310; 30; 20 INJECTION, SOLUTION INTRAVENOUS at 17:09

## 2023-06-01 RX ADMIN — PHENYLEPHRINE HYDROCHLORIDE 200 MCG: 10 INJECTION INTRAVENOUS at 17:58

## 2023-06-01 ASSESSMENT — ACTIVITIES OF DAILY LIVING (ADL)
ADLS_ACUITY_SCORE: 18
WALKING_OR_CLIMBING_STAIRS_DIFFICULTY: NO
FALL_HISTORY_WITHIN_LAST_SIX_MONTHS: NO
TOILETING_ISSUES: NO
WEAR_GLASSES_OR_BLIND: NO
DOING_ERRANDS_INDEPENDENTLY_DIFFICULTY: NO
ADLS_ACUITY_SCORE: 20
CHANGE_IN_FUNCTIONAL_STATUS_SINCE_ONSET_OF_CURRENT_ILLNESS/INJURY: NO
CONCENTRATING,_REMEMBERING_OR_MAKING_DECISIONS_DIFFICULTY: NO
DRESSING/BATHING_DIFFICULTY: NO
ADLS_ACUITY_SCORE: 20
DIFFICULTY_EATING/SWALLOWING: NO
ADLS_ACUITY_SCORE: 20

## 2023-06-01 ASSESSMENT — ASTHMA QUESTIONNAIRES
QUESTION_5 LAST FOUR WEEKS HOW WOULD YOU RATE YOUR ASTHMA CONTROL: SOMEWHAT CONTROLLED
ACT_TOTALSCORE: 19
QUESTION_4 LAST FOUR WEEKS HOW OFTEN HAVE YOU USED YOUR RESCUE INHALER OR NEBULIZER MEDICATION (SUCH AS ALBUTEROL): NOT AT ALL
QUESTION_1 LAST FOUR WEEKS HOW MUCH OF THE TIME DID YOUR ASTHMA KEEP YOU FROM GETTING AS MUCH DONE AT WORK, SCHOOL OR AT HOME: A LITTLE OF THE TIME
ACT_TOTALSCORE: 19
QUESTION_3 LAST FOUR WEEKS HOW OFTEN DID YOUR ASTHMA SYMPTOMS (WHEEZING, COUGHING, SHORTNESS OF BREATH, CHEST TIGHTNESS OR PAIN) WAKE YOU UP AT NIGHT OR EARLIER THAN USUAL IN THE MORNING: NOT AT ALL
QUESTION_2 LAST FOUR WEEKS HOW OFTEN HAVE YOU HAD SHORTNESS OF BREATH: ONCE A DAY

## 2023-06-01 ASSESSMENT — PATIENT HEALTH QUESTIONNAIRE - PHQ9
10. IF YOU CHECKED OFF ANY PROBLEMS, HOW DIFFICULT HAVE THESE PROBLEMS MADE IT FOR YOU TO DO YOUR WORK, TAKE CARE OF THINGS AT HOME, OR GET ALONG WITH OTHER PEOPLE: NOT DIFFICULT AT ALL
SUM OF ALL RESPONSES TO PHQ QUESTIONS 1-9: 5
SUM OF ALL RESPONSES TO PHQ QUESTIONS 1-9: 5

## 2023-06-01 ASSESSMENT — PAIN SCALES - GENERAL: PAINLEVEL: WORST PAIN (10)

## 2023-06-01 ASSESSMENT — LIFESTYLE VARIABLES: TOBACCO_USE: 1

## 2023-06-01 NOTE — NURSING NOTE
"Pt presents to  for swelling and infection draining from two spots in roger-anal area on the R side. Pt states it appeared two days ago, and when she pushed on them, there was yellow/green drainage from two spots. Pt does not want to sit on R side due to pain.    Chief Complaint   Patient presents with     Infection     Roger-anal       FOOD SECURITY SCREENING QUESTIONS  Hunger Vital Signs:  Within the past 12 months we worried whether our food would run out before we got money to buy more. Often  Within the past 12 months the food we bought just didn't last and we didn't have money to get more. Never   Pt gets food from food shelf. I did give pt  card, and a food bag.  Mary Spring 6/1/2023 1:58 PM      Initial BP 98/66 (BP Location: Right arm, Patient Position: Sitting, Cuff Size: Adult Regular)   Pulse 88   Temp 98.4  F (36.9  C) (Tympanic)   Resp 16   Ht 1.753 m (5' 9\")   Wt 74.4 kg (164 lb)   LMP  (LMP Unknown)   SpO2 100%   BMI 24.22 kg/m   Estimated body mass index is 24.22 kg/m  as calculated from the following:    Height as of this encounter: 1.753 m (5' 9\").    Weight as of this encounter: 74.4 kg (164 lb).  Medication Reconciliation: complete    Mary Spring  "

## 2023-06-01 NOTE — PROGRESS NOTES
Over from OR via bed.  Report received from Ericka MARTINEZ and Phyllis FREDERICK.  Weepy but pt states cries after anesthesia.  Denies pain.  VSS.

## 2023-06-01 NOTE — PHARMACY-VANCOMYCIN DOSING SERVICE
"Pharmacy Vancomycin Initial Note  Date of Service 2023  Patient's  1970  53 year old, female    Indication: Abscess, Sepsis and Skin and Soft Tissue Infection    Current estimated CrCl = Estimated Creatinine Clearance: 74.3 mL/min (A) (based on SCr of 1.04 mg/dL (H)).    Creatinine for last 3 days  2023:  2:42 PM Creatinine 1.06 mg/dL;  3:51 PM Creatinine 1.04 mg/dL    Recent Vancomycin Level(s) for last 3 days  No results found for requested labs within last 3 days.      Vancomycin IV Administrations (past 72 hours)                   vancomycin (VANCOCIN) 2,000 mg in sodium chloride 0.9 % 500 mL intermittent infusion (mg) 2,000 mg New Bag 23 1600                Nephrotoxins and other renal medications (From now, onward)    Start     Dose/Rate Route Frequency Ordered Stop    23 1600  vancomycin (VANCOCIN) 1,500 mg in sodium chloride 0.9 % 500 mL intermittent infusion         1,500 mg  over 90 Minutes Intravenous EVERY 24 HOURS 23 1700      23 1601  norepinephrine (LEVOPHED) 4 mg in  mL PERIPHERAL infusion         0.03-0.125 mcg/kg/min × 74.4 kg  8.4-34.9 mL/hr  Intravenous CONTINUOUS PRN 23 1601      23 1600  [Auto Hold]  piperacillin-tazobactam (ZOSYN) intermittent infusion 4.5 g        (Auto Hold since Thu 2023 at 1622.Hold Reason: Transfer to a procedural area)   Note to Pharmacy: For [facility], [facility] and [facility]: For Zosyn-naive patients, use the \"Zosyn initial dose + extended infusion\" order panel.    4.5 g  200 mL/hr over 30 Minutes Intravenous EVERY 6 HOURS 23 1537      23 1600  vancomycin (VANCOCIN) 2,000 mg in sodium chloride 0.9 % 500 mL intermittent infusion         2,000 mg  over 2 Hours Intravenous ONCE 23 1546            Contrast Orders - past 72 hours (72h ago, onward)    None          InsightRX Prediction of Planned Initial Vancomycin Regimen  Loading dose: N/A  Regimen: 1500 mg IV every 24 hours.  Start time: 16:00 on " 06/02/2023  Exposure target: AUC24 (range)400-600 mg/L.hr   AUC24,ss: 488 mg/L.hr  Probability of AUC24 > 400: 71 %  Ctrough,ss: 13.4 mg/L  Probability of Ctrough,ss > 20: 20 %  Probability of nephrotoxicity (Lodise IVELISSE 2009): 9 %          Plan:  1. Vancomycin 2,000 mg x1 (loading dose, given at 1600), followed by vancomycin  1,500 mg IV q24h.   2. Vancomycin monitoring method: AUC  3. Vancomycin therapeutic monitoring goal: 400-600 mg*h/L  4. Pharmacy will check vancomycin levels as appropriate in 1-3 Days.    5. Serum creatinine levels will be ordered daily for the first week of therapy and at least twice weekly for subsequent weeks.      Sammy Redmond RPH     Terbinafine Counseling: Patient counseling regarding adverse effects of terbinafine including but not limited to headache, diarrhea, rash, upset stomach, liver function test abnormalities, itching, taste/smell disturbance, nausea, abdominal pain, and flatulence.  There is a rare possibility of liver failure that can occur when taking terbinafine.  The patient understands that a baseline LFT and kidney function test may be required. The patient verbalized understanding of the proper use and possible adverse effects of terbinafine.  All of the patient's questions and concerns were addressed.

## 2023-06-01 NOTE — ANESTHESIA PREPROCEDURE EVALUATION
Anesthesia Pre-Procedure Evaluation    Patient: Kym Perea   MRN: [id number] : 1970        Procedure : Procedure(s):  IRRIGATION AND DEBRIDEMENT, PERINEAL, LABIAL and GLUTEAL INFECTION          Past Medical History:   Diagnosis Date     Acquired absence of other organs (CODE)     2011     Chronic pain syndrome     No Comments Provided     Complex regional pain syndrome I     left ankle     Encounter for other administrative examinations     10/2012,Hydrocodone 10/325mg, #240/mo signed 10/3/12, updated 10/1/ 14     Encounter for other administrative examinations     10/1/2014,MS Contin 30 mg q 8 hours #90 per month.  Morphine sulfate IR 15 mg 2-3 tab tid prn #180 per month     Gastro-esophageal reflux disease without esophagitis     No Comments Provided     Injury of left ankle     ,requiring surgery     Low back pain     No Comments Provided     Major depressive disorder, recurrent severe without psychotic features (H)     No Comments Provided     Migraine without status migrainosus, not intractable     No Comments Provided     Nicotine dependence, uncomplicated     No Comments Provided     Obesity     No Comments Provided     Overweight     2014     Pain in ankle     Chronic left ankle pain secondary to reflex sympathetic dystrophy.  Patient  has had 4 previous surgeries, the most recent one done by Dr. Noriega in  . On Narcotic contract.     Pain in thoracic spine     No Comments Provided     Panic disorder without agoraphobia     Dr Reynoso     Personal history of other diseases of the female genital tract     No Comments Provided     Personal history of other medical treatment (CODE)     G2, P1-0-1-1 with one vaginal delivery.     Supraventricular tachycardia (H)     2010      Past Surgical History:   Procedure Laterality Date     ANKLE SURGERY      , ,Left ankle surgery x 2     HYSTERECTOMY TOTAL ABDOMINAL      1999,secondary to endometriosis, no malignancy;  patient reports no malignancy, but abnormal cells were present *     LAPAROSCOPIC CHOLECYSTECTOMY      No Comments Provided     LAPAROSCOPY DIAGNOSTIC (GENERAL)      1995     OTHER SURGICAL HISTORY      21299,CRANIO/MAXILLOFACIAL SURGERY,Jaw Surgery     OTHER SURGICAL HISTORY      207040,[facility] EMG,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  No other abnormalities noted.      Allergies   Allergen Reactions     Diclofenac-Misoprostol GI Disturbance     Indomethacin      Other reaction(s): Dizziness     Meloxicam GI Disturbance     Moxifloxacin       Social History     Tobacco Use     Smoking status: Every Day     Packs/day: 0.50     Years: 30.00     Pack years: 15.00     Types: Cigarettes     Start date: 12/2/1986     Smokeless tobacco: Never     Tobacco comments:     trying   Vaping Use     Vaping status: Every Day     Substances: Nicotine, Flavoring     Devices: Disposable   Substance Use Topics     Alcohol use: Yes     Comment: OCCASIONAL WINE      Wt Readings from Last 1 Encounters:   06/01/23 74.4 kg (164 lb)        Anesthesia Evaluation   Pt has had prior anesthetic.     No history of anesthetic complications       ROS/MED HX  ENT/Pulmonary:     (+) tobacco use, Current use,     Neurologic:  - neg neurologic ROS     Cardiovascular:     (+) -----Irregular Heartbeat/Palpitations,     METS/Exercise Tolerance: >4 METS    Hematologic:  - neg hematologic  ROS     Musculoskeletal:       GI/Hepatic:     (+) GERD,     Renal/Genitourinary:  - neg Renal ROS     Endo:  - neg endo ROS     Psychiatric/Substance Use:     (+) anxiety and depression     Infectious Disease:     (+) Recent Fever,     Malignancy:  - neg malignancy ROS     Other:  - neg other ROS          Physical Exam    Airway        Mallampati: II   TM distance: > 3 FB   Neck ROM: full   Mouth opening: > 3 cm    Respiratory Devices and Support         Dental       (+) Edentulous      Cardiovascular   cardiovascular exam normal       Rhythm and rate:  regular and tachycardia     Pulmonary   pulmonary exam normal        breath sounds clear to auscultation           OUTSIDE LABS:  CBC:   Lab Results   Component Value Date    WBC 17.5 (H) 06/01/2023    WBC 9.3 11/29/2022    HGB 11.4 (L) 06/01/2023    HGB 14.0 11/29/2022    HCT 33.6 (L) 06/01/2023    HCT 41.9 11/29/2022     06/01/2023     11/29/2022     BMP:   Lab Results   Component Value Date     (L) 05/09/2023     11/29/2022    POTASSIUM 3.4 05/09/2023    POTASSIUM 3.5 11/29/2022    CHLORIDE 95 (L) 05/09/2023    CHLORIDE 102 11/29/2022    CO2 25 05/09/2023    CO2 24 11/29/2022    BUN 7.4 05/09/2023    BUN 12.2 11/29/2022    CR 0.80 05/09/2023    CR 0.94 11/29/2022    GLC 91 05/09/2023     (H) 11/29/2022     COAGS:   Lab Results   Component Value Date    PTT 29 09/05/2014    INR 1.0 09/05/2014     POC: No results found for: BGM, HCG, HCGS  HEPATIC:   Lab Results   Component Value Date    ALBUMIN 5.1 05/09/2023    PROTTOTAL 7.7 05/09/2023    ALT 13 05/09/2023    AST 21 05/09/2023    ALKPHOS 112 (H) 05/09/2023    BILITOTAL 0.2 05/09/2023    BILIDIRECT 0.06 01/11/2018    FREDERICK 11 10/19/2021     OTHER:   Lab Results   Component Value Date    PH 7.37 11/25/2014    LACT 2.3 (H) 06/01/2023    NORMA 9.6 05/09/2023    MAG 1.9 01/12/2018    LIPASE 12 03/06/2021    SED 9 04/02/2015       Anesthesia Plan    ASA Status:  3, emergent    NPO Status:  NPO Appropriate    Anesthesia Type: General.     - Airway: ETT   Induction: Propofol.   Maintenance: Balanced.        Consents    Anesthesia Plan(s) and associated risks, benefits, and realistic alternatives discussed. Questions answered and patient/representative(s) expressed understanding.     - Discussed: Risks, Benefits and Alternatives for BOTH SEDATION and the PROCEDURE were discussed     - Discussed with:  Patient      - Extended Intubation/Ventilatory Support Discussed: No.      - Patient is DNR/DNI Status: No    Use of blood products discussed:  Yes.     - Discussed with: Patient.     - Consented: consented to blood products            Reason for refusal: other.     Postoperative Care    Pain management: IV analgesics, Multi-modal analgesia.   PONV prophylaxis: Ondansetron (or other 5HT-3), Dexamethasone or Solumedrol     Comments:                CHRISTY SAENZ CRNA

## 2023-06-01 NOTE — ANESTHESIA CARE TRANSFER NOTE
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    Anesthesia Type:   General     Note:    Oropharynx: oropharynx clear of all foreign objects  Level of Consciousness: awake  Oxygen Supplementation: face mask  Level of Supplemental Oxygen (L/min / FiO2): 8  Independent Airway: airway patency satisfactory and stable  Dentition: dentition unchanged  Vital Signs Stable: post-procedure vital signs reviewed and stable  Report to RN Given: handoff report given  Patient transferred to: ICU    ICU Handoff: Call for PAUSE to initiate/utilize ICU HANDOFF, Identified Patient, Identified Responsible Provider, Reviewed the Pertinent Medical History, Discussed Surgical Course, Reviewed Intra-OP Anesthesia Management and Issues during Anesthesia, Set Expectations for Post Procedure Period and Allowed Opportunity for Questions and Acknowledgement of Understanding      Vitals:  Vitals Value Taken Time   /72 06/01/23 1825   Temp 98.2  F (36.8  C) 06/01/23 1825   Pulse 89 06/01/23 1830   Resp 19 06/01/23 1830   SpO2 93 % 06/01/23 1830   Vitals shown include unvalidated device data.    Electronically Signed By: CHRISTY SAENZ CRNA  June 1, 2023  6:31 PM

## 2023-06-01 NOTE — ANESTHESIA POSTPROCEDURE EVALUATION
Patient: Kym Perea    Procedure: Procedure(s):  IRRIGATION AND DEBRIDEMENT, PERINEAL, LABIAL and GLUTEAL INFECTION       Anesthesia Type:  General    Note:  Disposition: ICU            ICU Sign Out: Anesthesiologist/ICU physician sign out WAS performed   Postop Pain Control: Uneventful            Sign Out: Well controlled pain   PONV: No   Neuro/Psych: Uneventful            Sign Out: Acceptable/Baseline neuro status   Airway/Respiratory: Uneventful            Sign Out: Acceptable/Baseline resp. status   CV/Hemodynamics: Uneventful            Sign Out: Acceptable CV status; No obvious hypovolemia; No obvious fluid overload   Other NRE: NONE   DID A NON-ROUTINE EVENT OCCUR? No           Last vitals:  Vitals:    06/01/23 1826 06/01/23 1830 06/01/23 1834   BP: 107/72 99/60    Pulse: 88 88    Resp:  (!) 8    Temp:   97.3  F (36.3  C)   SpO2: 97% 97%        Electronically Signed By: CHRISTY SAENZ CRNA  June 1, 2023  6:46 PM

## 2023-06-01 NOTE — BRIEF OP NOTE
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]  Post-operative diagnosis Same as pre-operative diagnosis    Procedure: Procedure(s):  IRRIGATION AND DEBRIDEMENT, PERINEAL, LABIAL and GLUTEAL INFECTION  Surgeon: Surgeon(s) and Role:     * Anthony Nolan MD - Primary  Anesthesia: General   Estimated Blood Loss: 200 ml    Drains: None  Specimens:   ID Type Source Tests Collected by Time Destination   1 : RIGHT LABIAL PERINEIL TISSUE Tissue Perineum SURGICAL PATHOLOGY EXAM Anthony Nolan MD 6/1/2023  5:50 PM    A :  Wound Perineum ANAEROBIC BACTERIAL CULTURE ROUTINE, AEROBIC BACTERIAL CULTURE ROUTINE Anthony Nolan MD 6/1/2023  5:00 PM    B :  Wound Perineum ANAEROBIC BACTERIAL CULTURE ROUTINE Anthony Nolan MD 6/1/2023  5:01 PM    C : specimen #2 Wound Perineum ANAEROBIC BACTERIAL CULTURE ROUTINE, AEROBIC BACTERIAL CULTURE ROUTINE Anthony Nolan MD 6/1/2023  5:03 PM    D : specimen #2 Wound Perineum ANAEROBIC BACTERIAL CULTURE ROUTINE Anthony Nolan MD 6/1/2023  5:06 PM      Findings:   None.  Complications: None.  Implants: * No implants in log *

## 2023-06-01 NOTE — H&P
[facility]    History and Physical - Hospitalist Service       Date of Admission:  6/1/2023    Assessment & Plan      Kym Perea is a 53 year old female admitted on 6/1/2023. She presents to the [facility] clinic on June 1 with 2 days of progressively worsening pain in her perineum exquisite tenderness on her right lower buttock, chills and rigors.  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    Principal Problem:    Julio's gangrene  Assessment: present on admission. Leukocytosis, fever, elevated lactate, acute kidney injury,  procalcitonin and CRP. Exam consistent with abscess and extension into the perineum.   Plan: Admit to ICU  Emergent surgery consult for I&D  Empiric clinda, zosyn and vancomycin     Hyponatremia  Assessment: present on admission  Plan: monitor    Acute kidney injury   Assessment: present on admission   Plan: intravenous fluids      Diet: NPO per Anesthesia Guidelines for Procedure/Surgery Except for: Meds    DVT Prophylaxis: Enoxaparin (Lovenox) SQ  Leonardo Catheter: Not present  Lines: None     Cardiac Monitoring: ACTIVE order. Indication: ICU  Code Status: Full Code      Clinically Significant Risk Factors Present on Admission                                Disposition Plan      Expected Discharge Date: 06/03/2023                  Navneet Siegel MD  Hospitalist Service  [facility]  Securely message with [employer] (more info)  Text page via [facility] Paging/Directory     ______________________________________________________________________    Chief Complaint   Groin/buttock pain    History is obtained from the patient    History of Present Illness   Kym Perea is a 53 year old female who presents to the urgent care with 2 days of progressively worsening pain in her right buttock extending to her right labia, along with fevers chills and rigors.  She had no obvious injury to this region.  She is not diabetic.  She has redness that is  extended and rapidly progressed over 48 hours.    I was contacted by urgent care staff and examined the patient.  She has 2 1 cm black eschar at the base of her right buttock with erythema and swelling extending into the right labia.      Past Medical History    Past Medical History:   Diagnosis Date     Acquired absence of other organs (CODE)     9/2/2011     Chronic pain syndrome     No Comments Provided     Complex regional pain syndrome I     left ankle     Encounter for other administrative examinations     10/2012,Hydrocodone 10/325mg, #240/mo signed 10/3/12, updated 10/1/ 14     Encounter for other administrative examinations     10/1/2014,MS Contin 30 mg q 8 hours #90 per month.  Morphine sulfate IR 15 mg 2-3 tab tid prn #180 per month     Gastro-esophageal reflux disease without esophagitis     No Comments Provided     Injury of left ankle     1997,requiring surgery     Low back pain     No Comments Provided     Major depressive disorder, recurrent severe without psychotic features (H)     No Comments Provided     Migraine without status migrainosus, not intractable     No Comments Provided     Nicotine dependence, uncomplicated     No Comments Provided     Obesity     No Comments Provided     Overweight     11/25/2014     Pain in ankle     Chronic left ankle pain secondary to reflex sympathetic dystrophy.  Patient  has had 4 previous surgeries, the most recent one done by Dr. Noriega in  2007. On Narcotic contract.     Pain in thoracic spine     No Comments Provided     Panic disorder without agoraphobia     Dr Reynoso     Personal history of other diseases of the female genital tract     No Comments Provided     Personal history of other medical treatment (CODE)     G2, P1-0-1-1 with one vaginal delivery.     Supraventricular tachycardia (H)     5/24/2010       Past Surgical History   Past Surgical History:   Procedure Laterality Date     ANKLE SURGERY      1997, 2000,Left ankle surgery x 2      HYSTERECTOMY TOTAL ABDOMINAL      11/1999,secondary to endometriosis, no malignancy; patient reports no malignancy, but abnormal cells were present *     LAPAROSCOPIC CHOLECYSTECTOMY      No Comments Provided     LAPAROSCOPY DIAGNOSTIC (GENERAL)      1995     OTHER SURGICAL HISTORY      21299,CRANIO/MAXILLOFACIAL SURGERY,Jaw Surgery     OTHER SURGICAL HISTORY      207040,[facility] EMG,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  No other abnormalities noted.       Prior to Admission Medications   Prior to Admission Medications   Prescriptions Last Dose Informant Patient Reported? Taking?   Cholecalciferol (VITAMIN D-3) 125 MCG (5000 UT) TABS   No No   Sig: Take 1 tablet by mouth daily   albuterol (PROAIR HFA/PROVENTIL HFA/VENTOLIN HFA) 108 (90 Base) MCG/ACT inhaler   No No   Sig: Inhale 2 puffs into the lungs every 4 hours as needed for shortness of breath / dyspnea or wheezing   amitriptyline (ELAVIL) 50 MG tablet   Yes No   Sig: TAKE 1 TAB BY MOUTH ONCE DAILY AT BEDTIME   Patient not taking: Reported on 6/1/2023   amphetamine-dextroamphetamine (ADDERALL) 30 MG tablet   Yes No   Sig: 3/11/22 TAKE 1 TABLET BY MOUTH TWICE DAILY.   atenolol (TENORMIN) 100 MG tablet   No No   Sig: Take 1 tablet (100 mg) by mouth daily   clonazePAM (KLONOPIN) 2 MG tablet   No No   Sig: Take 1 tablet (2 mg) by mouth 2 times daily   divalproex sodium extended-release (DEPAKOTE ER) 250 MG 24 hr tablet   Yes No   Sig: Take 1 tablet by mouth 3 times daily   fluticasone (FLONASE) 50 MCG/ACT spray   Yes No   Sig: Spray 2 sprays into both nostrils daily as needed    Patient not taking: Reported on 6/1/2023   hydrOXYzine (VISTARIL) 25 MG capsule   No No   Sig: Take 1 capsule (25 mg) by mouth 3 times daily as needed for other (nausea)   ibuprofen (ADVIL/MOTRIN) 800 MG tablet   No No   Sig: Take 1 tablet (800 mg) by mouth every 8 hours as needed for moderate pain (4-6)   ondansetron (ZOFRAN ODT) 4 MG ODT tab   Yes No   Sig: DISSOLVE 1 TABLET  ON THE TONGUE AS NEEDED FOR NAUSEA   Patient not taking: Reported on 6/1/2023   tiZANidine (ZANAFLEX) 4 MG tablet   No No   Sig: TAKE 1 TABLET BY MOUTH THREE TIMES DAILY AS NEEDED MUSCLE SPASMS      Facility-Administered Medications: None        Review of Systems    CONSTITUTIONAL:POSITIVE  for chills and fever   INTEGUMENTARY/SKIN: POSITIVE for reness of the buttock and perineum  EYES: NEGATIVE for vision changes or irritation  ENT/MOUTH: NEGATIVE for ear, mouth and throat problems  RESP: NEGATIVE for significant cough or SOB  BREAST: NEGATIVE for masses, tenderness or discharge  CV: NEGATIVE for chest pain, palpitations or peripheral edema  GI: NEGATIVE for nausea, abdominal pain, heartburn, or change in bowel habits   female: swelling of the labia with pain  MUSCULOSKELETAL: NEGATIVE for significant arthralgias or myalgia  NEURO: NEGATIVE for weakness, dizziness or paresthesias  ENDOCRINE: NEGATIVE for temperature intolerance, skin/hair changes  HEME: NEGATIVE for bleeding problems  PSYCHIATRIC: NEGATIVE for changes in mood or affect    Social History   I have reviewed this patient's social history and updated it with pertinent information if needed.  Social History     Tobacco Use     Smoking status: Every Day     Packs/day: 0.50     Years: 30.00     Pack years: 15.00     Types: Cigarettes     Start date: 12/2/1986     Smokeless tobacco: Never     Tobacco comments:     trying   Vaping Use     Vaping status: Every Day     Substances: Nicotine, Flavoring     Devices: Disposable   Substance Use Topics     Alcohol use: Yes     Comment: OCCASIONAL WINE     Drug use: No     Comment: medical cannabis       Family History   I have reviewed this patient's family history and updated it with pertinent information if needed.  Family History   Problem Relation Age of Onset     Cancer Father         Cancer,Bladder     Other - See Comments Father         Mild hypercholesterolemia     Other - See Comments Mother          scleroderma/lupus/Parkinson's syndrome     Family History Negative Brother         Good Health     Ariel-Danlos syndrome Daughter      Colon Cancer Maternal Grandmother 40        Cancer-colon,Followed by lung  with metastasis to the bone di*       Allergies   Allergies   Allergen Reactions     Diclofenac-Misoprostol GI Disturbance     Indomethacin      Other reaction(s): Dizziness     Meloxicam GI Disturbance     Moxifloxacin         Physical Exam   Vital Signs:                    Weight: 0 lbs 0 oz    Constitutional: In no apparent distress  Eyes: pupils reactive, extraocular movements intact. Anicteric sclera.   HEENT: Oropharynx nonerythematous. Neck supple, no JVD.  Respiratory: no crackles noted, no wheezes.  Cardiovascular: regular, no murmur. no lower extremity edema.  GI: soft, non-tender, bowel sounds present.  Lymph/Hematologic: no cervical or supraclavicular LAD.  Genitourinary: 2 1 cm black eschar with erythem and tenderness extending to the right labia  Skin: as above  Musculoskeletal: no joint erythema or swelling  Neurologic: cranial nerves symmetric. Neuro exam nonfocal  Psychiatric: alert and oriented x3. Interactive.       Medical Decision Making     90 MINUTES SPENT BY ME on the date of service doing chart review, history, exam, documentation & further activities per the note.      Data     I have personally reviewed the following data over the past 24 hrs:    17.5 (H)  \   11.4 (L)   / 182     132 (L) 96 (L) 9.3 /  91   4.5 22 1.06 (H) \       ALT: 13 AST: 19 AP: 190 (H) TBILI: 0.5   ALB: 3.6 TOT PROTEIN: 6.8 LIPASE: N/A       Procal: 1.26 (H) CRP: 300.36 (H) Lactic Acid: 2.3 (H)

## 2023-06-01 NOTE — OR NURSING
The patient was unable or refused to remove jewelry prior to their surgical procedure. The patient has been instructed on the risk of injury and possible infection. In the event jewelry or piercings are obstructing the surgical process or impeding circulation, the jewelry or piercings may need to be cut off. The surgeon and anesthesia provider have been notified that an item cannot be removed, or that the patient refuses to remove the jewelry or piercing. The surgeon and anesthesia provider will determine if it is in the patient's best interest to proceed with the procedure with the jewelry or piercings remaining in contact with the patient's body.     Ring on right hand was taped

## 2023-06-01 NOTE — PROGRESS NOTES
[facility] from [facility].  Settled in bed.  Dr Nolan and Phyllis GRIFFITH CRNA here to see pt.  Started IV's.  See Flowsheets.  Up to void.  SCD placed.

## 2023-06-01 NOTE — OP NOTE
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    PROCEDURE:  Debridement, irrigation, and drainage of gluteal, perineal, and labial necrotizing soft tissue infection.    SURGEON:  Anthony Nolan MD.    ASSISTANT:  DARVIN Amaya.    ANESTHESIA:  General.    ESTIMATED BLOOD LOSS:  Approximately 200 mL.    SPECIMENS:  Include the perineal tissue, as well as the peroneal cultures from the boil itself, as well as deeper tissue.    DESCRIPTION OF PROCEDURE:  The patient was taken to the OR and positioned in lithotomy.  We prepped and draped her perineum, vulva, and buttocks.  We started by probing from the draining wounds in the perianal area.  There was no obvious perirectal extension here.  It did extend cephalad towards the area of cellulitis.  We serially radially debrided until we got to viable tissue. This was done with scalpel and electrocautery.   This was performed and we were able to preserve a flap of tissue that was involving the right labia.  We also had a nice preserved vascularized tissue around the anal sphincter and the perianal area.  We debrided down to the level of the pelvic and perineal musculature. There was no muscular debridement required.  Here, we used suture ligation for control of vascular structures.  There was an area of green and brown tissue in the vulva itself.  This could be Bartholin gland.  This was submitted with the specimen.  We then irrigated with approximately 2 liters of irrigation.  We then touched up any areas of bleeding with cautery for hemostasis.  We then packed the resultant defect with saline moistened gauze.  Our defect measures 25 cm x 6 cm x 3 cm deep, and ABD and mesh panties were  applied over. The patient was taken to ICU in satisfactory condition.    Anthony Nolan MD        D: 2023   T: 2023   MT: francois    Name:     KELLY ROJAS  MRN:      [id number]        Account:        [id number]   :      1970           Procedure Date: 2023     Document: [id number]

## 2023-06-01 NOTE — ANESTHESIA PROCEDURE NOTES
Airway       Patient location during procedure: OR       Procedure Start/Stop Times: 6/1/2023 4:36 PM  Staff -        CRNA: Phyllis Katz APRN CRNA       Performed By: CRNA  Consent for Airway        Urgency: elective  Indications and Patient Condition       Indications for airway management: roger-procedural       Induction type:intravenous       Mask difficulty assessment: 1 - vent by mask    Final Airway Details       Final airway type: endotracheal airway       Successful airway: ETT - single and Oral  Endotracheal Airway Details        ETT size (mm): 7.0       Cuffed: yes       Successful intubation technique: direct laryngoscopy       DL Blade Type: [place] 2       Grade View of Cords: 1       Position: Center       Measured from: gums/teeth       Secured at (cm): 21       Bite block used: None    Post intubation assessment        Placement verified by: capnometry, equal breath sounds and chest rise        Number of attempts at approach: 1       Number of other approaches attempted: 0       Secured with: silk tape       Ease of procedure: easy       Dentition: Intact and Unchanged    Medication(s) Administered   Medication Administration Time: 6/1/2023 4:36 PM

## 2023-06-01 NOTE — PROGRESS NOTES
ASSESSMENT/PLAN:    I have reviewed the nursing notes.  I have reviewed the findings, diagnosis, plan and need for follow up with the patient.    1. Gangrene of buttock (H)  2. Abscess of buttock, right  - Abscess Aerobic Bacterial Culture Routine with Gram Stain  - Procalcitonin  - Lactic acid whole blood  - CRP inflammation  - Comprehensive Metabolic Panel  - CBC and Differential  - CK Total    Patient presents with two abscesses on her right inner buttock that she noticed yesterday.  Physical exam indicates to gangrenous abscesses on right inner buttock with extensive erythema and edema of the right buttock spreading to her right labia.  Patient has a visible chills and low-grade fever.  WBC count is elevated at 17.5, CRP is elevated at 300.36, procalcitonin is elevated at 1.26, and lactic acid is slightly elevated at 2.3.  Discussed findings with on-call general surgeon who recommended that patient be admitted for further evaluation, IV antibiotics, and possible surgical irrigation and debridement.  Also discussed patient with hospitalist who came to [facility] and evaluated patient.  It was recommended that patient be admitted to the hospital for further evaluation and treatment.  Discussed this with patient and she agrees with plan.     40 minutes was spent obtaining patient history, performing physical exam, ordering and reviewing labs, coordinating care and treatment, charting on patient, and discussing treatment plan with patient.     Santy Moses, CHRISTY CNP  6/1/2023  2:01 PM    HPI:    Kym Perea is a 53 year old female  who [facility] for concerns of abscess on right buttock    Patient states that she was on the toilet yesterday and was wiping her buttock when she felt pain.  She states she felt her right inner buttock and could feel to very tender bumps.  She states that she noticed purulent drainage that was very foul-smelling.  She states that she is unable to sit on  her right buttock due to the pain.  Patient said she had a fever of 101 F yesterday along with chills, nausea, and vomiting.  She denies any prior abscesses on around her buttocks.  She denies any history of diabetes or other autoimmune disorders.  She denies any recent infections.  She states that she took ibuprofen this morning for the pain and fever.    Past Medical History:   Diagnosis Date     Acquired absence of other organs (CODE)     9/2/2011     Chronic pain syndrome     No Comments Provided     Complex regional pain syndrome I     left ankle     Encounter for other administrative examinations     10/2012,Hydrocodone 10/325mg, #240/mo signed 10/3/12, updated 10/1/ 14     Encounter for other administrative examinations     10/1/2014,MS Contin 30 mg q 8 hours #90 per month.  Morphine sulfate IR 15 mg 2-3 tab tid prn #180 per month     Gastro-esophageal reflux disease without esophagitis     No Comments Provided     Injury of left ankle     1997,requiring surgery     Low back pain     No Comments Provided     Major depressive disorder, recurrent severe without psychotic features (H)     No Comments Provided     Migraine without status migrainosus, not intractable     No Comments Provided     Nicotine dependence, uncomplicated     No Comments Provided     Obesity     No Comments Provided     Overweight     11/25/2014     Pain in ankle     Chronic left ankle pain secondary to reflex sympathetic dystrophy.  Patient  has had 4 previous surgeries, the most recent one done by Dr. Noriega in  2007. On Narcotic contract.     Pain in thoracic spine     No Comments Provided     Panic disorder without agoraphobia     Dr Reynoso     Personal history of other diseases of the female genital tract     No Comments Provided     Personal history of other medical treatment (CODE)     G2, P1-0-1-1 with one vaginal delivery.     Supraventricular tachycardia (H)     5/24/2010     Past Surgical History:   Procedure Laterality  Date     ANKLE SURGERY      1997, 2000,Left ankle surgery x 2     HYSTERECTOMY TOTAL ABDOMINAL      11/1999,secondary to endometriosis, no malignancy; patient reports no malignancy, but abnormal cells were present *     LAPAROSCOPIC CHOLECYSTECTOMY      No Comments Provided     LAPAROSCOPY DIAGNOSTIC (GENERAL)      1995     OTHER SURGICAL HISTORY      21299,CRANIO/MAXILLOFACIAL SURGERY,Jaw Surgery     OTHER SURGICAL HISTORY      207040,[facility] EMG,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  No other abnormalities noted.     Social History     Tobacco Use     Smoking status: Every Day     Packs/day: 0.50     Years: 30.00     Pack years: 15.00     Types: Cigarettes     Start date: 12/2/1986     Smokeless tobacco: Never     Tobacco comments:     trying   Vaping Use     Vaping status: Every Day     Substances: Nicotine, Flavoring     Devices: Disposable   Substance Use Topics     Alcohol use: Yes     Comment: OCCASIONAL WINE     Current Outpatient Medications   Medication Sig Dispense Refill     albuterol (PROAIR HFA/PROVENTIL HFA/VENTOLIN HFA) 108 (90 Base) MCG/ACT inhaler Inhale 2 puffs into the lungs every 4 hours as needed for shortness of breath / dyspnea or wheezing 1 Inhaler 11     amphetamine-dextroamphetamine (ADDERALL) 30 MG tablet 3/11/22 TAKE 1 TABLET BY MOUTH TWICE DAILY.       atenolol (TENORMIN) 100 MG tablet Take 1 tablet (100 mg) by mouth daily 90 tablet 2     Cholecalciferol (VITAMIN D-3) 125 MCG (5000 UT) TABS Take 1 tablet by mouth daily 90 tablet 11     clonazePAM (KLONOPIN) 2 MG tablet Take 1 tablet (2 mg) by mouth 2 times daily       divalproex sodium extended-release (DEPAKOTE ER) 250 MG 24 hr tablet Take 1 tablet by mouth 3 times daily       hydrOXYzine (VISTARIL) 25 MG capsule Take 1 capsule (25 mg) by mouth 3 times daily as needed for other (nausea) 60 capsule 11     ibuprofen (ADVIL/MOTRIN) 800 MG tablet Take 1 tablet (800 mg) by mouth every 8 hours as needed for moderate pain (4-6)    "    tiZANidine (ZANAFLEX) 4 MG tablet TAKE 1 TABLET BY MOUTH THREE TIMES DAILY AS NEEDED MUSCLE SPASMS 30 tablet 2     amitriptyline (ELAVIL) 50 MG tablet TAKE 1 TAB BY MOUTH ONCE DAILY AT BEDTIME (Patient not taking: Reported on 6/1/2023)       fluticasone (FLONASE) 50 MCG/ACT spray Spray 2 sprays into both nostrils daily as needed  (Patient not taking: Reported on 6/1/2023)       ondansetron (ZOFRAN ODT) 4 MG ODT tab DISSOLVE 1 TABLET ON THE TONGUE AS NEEDED FOR NAUSEA (Patient not taking: Reported on 6/1/2023)       Allergies   Allergen Reactions     Diclofenac-Misoprostol GI Disturbance     Indomethacin      Other reaction(s): Dizziness     Meloxicam GI Disturbance     Moxifloxacin      Past medical history, past surgical history, current medications and allergies reviewed and accurate to the best of my knowledge.      ROS:  Refer to HPI    BP 98/66 (BP Location: Right arm, Patient Position: Sitting, Cuff Size: Adult Regular)   Pulse 88   Temp 98.4  F (36.9  C) (Tympanic)   Resp 16   Ht 1.753 m (5' 9\")   Wt 74.4 kg (164 lb)   LMP  (LMP Unknown)   SpO2 100%   BMI 24.22 kg/m      EXAM:  General Appearance: Well appearing 53 year old female, appropriate appearance for age. No acute distress but visible chills  Nose:  Bilateral nares: no erythema, no edema, no drainage or congestion   Neck: supple without adenopathy  Respiratory: normal chest wall and respirations.  Normal effort.  Clear to auscultation bilaterally, no wheezing, crackles or rhonchi.  No increased work of breathing.  No cough appreciated.  Cardiac: RRR with no murmurs  Musculoskeletal:  Equal movement of bilateral upper extremities.  Equal movement of bilateral lower extremities.  Normal gait.    Dermatological: two 1 cm x 1 cm abscesses on inner right buttock, upper abscess is open and actively draining purulent foul-smelling drainage, there is some black necrotic tissue surrounding each abscess, there is erythema and edema of the right " buttock that spreads down to the right labia, moderate to severe tenderness with palpation, warmth noted with palpation  Neuro: Alert and oriented to person, place, and time. Psychological: normal affect, alert, oriented, and pleasant.     Labs:  Results for orders placed or performed during the hospital encounter of 06/01/23   Creatinine     Status: Abnormal   Result Value Ref Range    Creatinine 1.04 (H) 0.51 - 0.95 mg/dL    GFR Estimate 64 >60 mL/min/1.73m2   Extra Tube     Status: None    Narrative    The following orders were created for panel order Extra Tube.  Procedure                               Abnormality         Status                     ---------                               -----------         ------                     Extra Purple Top Tube[[id number]]                            Final result                 Please view results for these tests on the individual orders.   Extra Purple Top Tube     Status: None   Result Value Ref Range    Hold Specimen JIC    Results for orders placed or performed in visit on 06/01/23   Procalcitonin     Status: Abnormal   Result Value Ref Range    Procalcitonin 1.26 (H) <0.05 ng/mL   Lactic acid whole blood     Status: Abnormal   Result Value Ref Range    Lactic Acid 2.3 (H) 0.7 - 2.0 mmol/L   CRP inflammation     Status: Abnormal   Result Value Ref Range    CRP Inflammation 300.36 (H) <5.00 mg/L   Comprehensive Metabolic Panel     Status: Abnormal   Result Value Ref Range    Sodium 132 (L) 136 - 145 mmol/L    Potassium 4.5 3.4 - 5.3 mmol/L    Chloride 96 (L) 98 - 107 mmol/L    Carbon Dioxide (CO2) 22 22 - 29 mmol/L    Anion Gap 14 7 - 15 mmol/L    Urea Nitrogen 9.3 6.0 - 20.0 mg/dL    Creatinine 1.06 (H) 0.51 - 0.95 mg/dL    Calcium 9.2 8.6 - 10.0 mg/dL    Glucose 91 70 - 99 mg/dL    Alkaline Phosphatase 190 (H) 35 - 104 U/L    AST 19 10 - 35 U/L    ALT 13 10 - 35 U/L    Protein Total 6.8 6.4 - 8.3 g/dL    Albumin 3.6 3.5 - 5.2 g/dL    Bilirubin Total 0.5 <=1.2  mg/dL    GFR Estimate 63 >60 mL/min/1.73m2   CBC with platelets and differential     Status: Abnormal   Result Value Ref Range    WBC Count 17.5 (H) 4.0 - 11.0 10e3/uL    RBC Count 3.67 (L) 3.80 - 5.20 10e6/uL    Hemoglobin 11.4 (L) 11.7 - 15.7 g/dL    Hematocrit 33.6 (L) 35.0 - 47.0 %    MCV 92 78 - 100 fL    MCH 31.1 26.5 - 33.0 pg    MCHC 33.9 31.5 - 36.5 g/dL    RDW 13.1 10.0 - 15.0 %    Platelet Count 182 150 - 450 10e3/uL    % Neutrophils 82 %    % Lymphocytes 7 %    % Monocytes 7 %    % Eosinophils 1 %    % Basophils 0 %    % Immature Granulocytes 3 %    NRBCs per 100 WBC 0 <1 /100    Absolute Neutrophils 14.4 (H) 1.6 - 8.3 10e3/uL    Absolute Lymphocytes 1.2 0.8 - 5.3 10e3/uL    Absolute Monocytes 1.3 0.0 - 1.3 10e3/uL    Absolute Eosinophils 0.1 0.0 - 0.7 10e3/uL    Absolute Basophils 0.1 0.0 - 0.2 10e3/uL    Absolute Immature Granulocytes 0.4 <=0.4 10e3/uL    Absolute NRBCs 0.0 10e3/uL   CK Total     Status: Normal   Result Value Ref Range    CK 48 26 - 192 U/L   CBC and Differential     Status: Abnormal    Narrative    The following orders were created for panel order CBC and Differential.  Procedure                               Abnormality         Status                     ---------                               -----------         ------                     CBC with platelets and d...[[id number]]  Abnormal            Final result                 Please view results for these tests on the individual orders.

## 2023-06-01 NOTE — CONSULTS
GENERAL SURGERY CONSULTATION NOTE    Kym Perea   [address] [place] [address]  53 year old  female  Admission Date/Time: 6/1/2023  3:09 PM  Primary Care Provider:  Eryn Hurley    HPI: Kym Xiong [facility] with 2 days history of pain in and swelling on the lower buttock.  Once she was evaluated she was found to be febrile.  There was black eschar over the top of 2 wounds 1 of which was draining.  There was associated cellulitis tracking towards the labia.  I was asked to evaluate this.  Given the sound of it being it forming gangrene I advised her to get transferred to the hospital or ER for treatment.    Patient notes no previous episodes.  Not diabetic.  Not on immunosuppressive drugs.        REVIEW OF SYSTEMS:    GENERAL: No fevers or chills. Denies fatigue, recent weight loss.  HEENT: No sinus drainage. No changes with vision or hearing. No difficulty swallowing.   LYMPHATICS:  Noswollen nodes in axilla, neck or groin.  CARDIOVASCULAR: Denies chest pain, palpitations and dyspnea on exertion.  PULMONARY: No shortness of breath or cough. No increase in sputum production.  GI: Denies melena,bright red blood in stools. No hematemesis. No constipation or diarrhea.  : No dysuria or hematuria.  SKIN: No recent rashes or ulcers.   HEMATOLOGY:  No history of easy bruising or bleeding.  ENDOCRINE:  Nohistory of diabetes or thyroid problems.  NEUROLOGY:  No history of seizures or headaches. No motor or sensory changes.    Patient Active Problem List   Diagnosis     Ankle and foot joint derangement     Lumbago     Chronic pain disorder     Complex regional pain syndrome type 1 affecting left lower leg     Major depressive disorder, recurrent episode, severe (H)     Esophageal reflux     Hyperlipidemia     Insomnia     Migraine headache     Overweight (BMI 25.0-29.9)     Pain medication agreement, 4/18/18, 4/3/19, 10/27/2020     Panic disorder     Paroxysmal  supraventricular tachycardia (H)     Tobacco abuse     Medical cannabis use     Suicidal behavior     Mononeuritis     TMJ (temporomandibular joint syndrome)     Arthralgia of right temporomandibular joint        Past Medical History:   Diagnosis Date     Acquired absence of other organs (CODE)     9/2/2011     Chronic pain syndrome     No Comments Provided     Complex regional pain syndrome I     left ankle     Encounter for other administrative examinations     10/2012,Hydrocodone 10/325mg, #240/mo signed 10/3/12, updated 10/1/ 14     Encounter for other administrative examinations     10/1/2014,MS Contin 30 mg q 8 hours #90 per month.  Morphine sulfate IR 15 mg 2-3 tab tid prn #180 per month     Gastro-esophageal reflux disease without esophagitis     No Comments Provided     Injury of left ankle     1997,requiring surgery     Low back pain     No Comments Provided     Major depressive disorder, recurrent severe without psychotic features (H)     No Comments Provided     Migraine without status migrainosus, not intractable     No Comments Provided     Nicotine dependence, uncomplicated     No Comments Provided     Obesity     No Comments Provided     Overweight     11/25/2014     Pain in ankle     Chronic left ankle pain secondary to reflex sympathetic dystrophy.  Patient  has had 4 previous surgeries, the most recent one done by Dr. Noriega in  2007. On Narcotic contract.     Pain in thoracic spine     No Comments Provided     Panic disorder without agoraphobia     Dr Reynoso     Personal history of other diseases of the female genital tract     No Comments Provided     Personal history of other medical treatment (CODE)     G2, P1-0-1-1 with one vaginal delivery.     Supraventricular tachycardia (H)     5/24/2010       Past Surgical History:   Procedure Laterality Date     ANKLE SURGERY      1997, 2000,Left ankle surgery x 2     HYSTERECTOMY TOTAL ABDOMINAL      11/1999,secondary to endometriosis, no  malignancy; patient reports no malignancy, but abnormal cells were present *     LAPAROSCOPIC CHOLECYSTECTOMY      No Comments Provided     LAPAROSCOPY DIAGNOSTIC (GENERAL)      1995     OTHER SURGICAL HISTORY      21299,CRANIO/MAXILLOFACIAL SURGERY,Jaw Surgery     OTHER SURGICAL HISTORY      207040,[facility] EMG,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  No other abnormalities noted.        Family History   Problem Relation Age of Onset     Cancer Father         Cancer,Bladder     Other - See Comments Father         Mild hypercholesterolemia     Other - See Comments Mother         scleroderma/lupus/Parkinson's syndrome     Family History Negative Brother         Good Health     Ariel-Danlos syndrome Daughter      Colon Cancer Maternal Grandmother 40        Cancer-colon,Followed by lung  with metastasis to the bone di*        Social History     Socioeconomic History     Marital status:      Spouse name: Not on file     Number of children: Not on file     Years of education: Not on file     Highest education level: Not on file   Occupational History     Not on file   Tobacco Use     Smoking status: Every Day     Packs/day: 0.50     Years: 30.00     Pack years: 15.00     Types: Cigarettes     Start date: 12/2/1986     Smokeless tobacco: Never     Tobacco comments:     trying   Vaping Use     Vaping status: Every Day     Substances: Nicotine, Flavoring     Devices: Disposable   Substance and Sexual Activity     Alcohol use: Yes     Comment: OCCASIONAL WINE     Drug use: No     Comment: medical cannabis     Sexual activity: Not Currently     Partners: Male   Other Topics Concern     Parent/sibling w/ CABG, MI or angioplasty before 65F 55M? 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 She has a daughter who is now 20, lives in [place]     Social Determinants of Health     Financial Resource Strain: Not on file   Food  Insecurity: Not on file   Transportation Needs: Not on file   Physical Activity: Not on file   Stress: Not on file   Social Connections: Not on file   Intimate Partner Violence: Not on file   Housing Stability: Not on file         No current facility-administered medications on file prior to encounter.  albuterol (PROAIR HFA/PROVENTIL HFA/VENTOLIN HFA) 108 (90 Base) MCG/ACT inhaler, Inhale 2 puffs into the lungs every 4 hours as needed for shortness of breath / dyspnea or wheezing  amitriptyline (ELAVIL) 50 MG tablet, TAKE 1 TAB BY MOUTH ONCE DAILY AT BEDTIME (Patient not taking: Reported on 6/1/2023)  amphetamine-dextroamphetamine (ADDERALL) 30 MG tablet, 3/11/22 TAKE 1 TABLET BY MOUTH TWICE DAILY.  atenolol (TENORMIN) 100 MG tablet, Take 1 tablet (100 mg) by mouth daily  Cholecalciferol (VITAMIN D-3) 125 MCG (5000 UT) TABS, Take 1 tablet by mouth daily  clonazePAM (KLONOPIN) 2 MG tablet, Take 1 tablet (2 mg) by mouth 2 times daily  divalproex sodium extended-release (DEPAKOTE ER) 250 MG 24 hr tablet, Take 1 tablet by mouth 3 times daily  fluticasone (FLONASE) 50 MCG/ACT spray, Spray 2 sprays into both nostrils daily as needed  (Patient not taking: Reported on 6/1/2023)  hydrOXYzine (VISTARIL) 25 MG capsule, Take 1 capsule (25 mg) by mouth 3 times daily as needed for other (nausea)  ibuprofen (ADVIL/MOTRIN) 800 MG tablet, Take 1 tablet (800 mg) by mouth every 8 hours as needed for moderate pain (4-6)  ondansetron (ZOFRAN ODT) 4 MG ODT tab, DISSOLVE 1 TABLET ON THE TONGUE AS NEEDED FOR NAUSEA (Patient not taking: Reported on 6/1/2023)  tiZANidine (ZANAFLEX) 4 MG tablet, TAKE 1 TABLET BY MOUTH THREE TIMES DAILY AS NEEDED MUSCLE SPASMS          ALLERGIES/SENSITIVITIES:   Allergies   Allergen Reactions     Diclofenac-Misoprostol GI Disturbance     Indomethacin      Other reaction(s): Dizziness     Meloxicam GI Disturbance     Moxifloxacin        PHYSICAL EXAM:     LMP  (LMP Unknown)     General Appearance:   Mild  distress  Heart & CV:  RRR no murmur.  Intact distal pulses, good cap refill.  LUNGS:  CTA B/L, no wheezing or crackles.  Abd: Nontender and no cellulitis.  Buttocks: At the lower gluteal fold there is a punctate area of black eschar and drainage.  This extends anteriorly towards the labia on the right.  There is minimal gluteal tenderness.  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  Currently not septic.  Likely will need postoperative ICU cares for possible postoperative hypotension and acute management.  Likely needs serial debridements versus dressing changes for the next several days.  Possible wound VAC placement.  Possible central line placement.    We will move quickly to get debridement done and preserve what ever tissue we can.    Anthony Nolan MD on 6/1/2023 at 3:36 PM

## 2023-06-02 ENCOUNTER — ANESTHESIA EVENT (OUTPATIENT)
Dept: SURGERY | Facility: OTHER | Age: 53
DRG: 746 | End: 2023-06-02
Payer: COMMERCIAL

## 2023-06-02 LAB
ANION GAP SERPL CALCULATED.3IONS-SCNC: 11 MMOL/L (ref 7–15)
BUN SERPL-MCNC: 8.2 MG/DL (ref 6–20)
CALCIUM SERPL-MCNC: 8.1 MG/DL (ref 8.6–10)
CHLORIDE SERPL-SCNC: 103 MMOL/L (ref 98–107)
CREAT SERPL-MCNC: 0.75 MG/DL (ref 0.51–0.95)
DEPRECATED HCO3 PLAS-SCNC: 18 MMOL/L (ref 22–29)
ERYTHROCYTE [DISTWIDTH] IN BLOOD BY AUTOMATED COUNT: 13.2 % (ref 10–15)
GFR SERPL CREATININE-BSD FRML MDRD: >90 ML/MIN/1.73M2
GLUCOSE BLDC GLUCOMTR-MCNC: 109 MG/DL (ref 70–99)
GLUCOSE SERPL-MCNC: 119 MG/DL (ref 70–99)
GRAM STAIN RESULT: ABNORMAL
HCT VFR BLD AUTO: 27.8 % (ref 35–47)
HGB BLD-MCNC: 8.4 G/DL (ref 11.7–15.7)
HGB BLD-MCNC: 9.3 G/DL (ref 11.7–15.7)
MCH RBC QN AUTO: 30.4 PG (ref 26.5–33)
MCHC RBC AUTO-ENTMCNC: 33.5 G/DL (ref 31.5–36.5)
MCV RBC AUTO: 91 FL (ref 78–100)
PLATELET # BLD AUTO: 117 10E3/UL (ref 150–450)
POTASSIUM SERPL-SCNC: 3.8 MMOL/L (ref 3.4–5.3)
RBC # BLD AUTO: 3.06 10E6/UL (ref 3.8–5.2)
SODIUM SERPL-SCNC: 132 MMOL/L (ref 136–145)
WBC # BLD AUTO: 10.1 10E3/UL (ref 4–11)

## 2023-06-02 PROCEDURE — 250N000013 HC RX MED GY IP 250 OP 250 PS 637: Performed by: INTERNAL MEDICINE

## 2023-06-02 PROCEDURE — 258N000003 HC RX IP 258 OP 636: Performed by: SURGERY

## 2023-06-02 PROCEDURE — 82310 ASSAY OF CALCIUM: CPT | Performed by: SURGERY

## 2023-06-02 PROCEDURE — 99232 SBSQ HOSP IP/OBS MODERATE 35: CPT | Mod: 25 | Performed by: SURGERY

## 2023-06-02 PROCEDURE — 85018 HEMOGLOBIN: CPT | Performed by: ANESTHESIOLOGY

## 2023-06-02 PROCEDURE — 200N000001 HC R&B ICU

## 2023-06-02 PROCEDURE — 85014 HEMATOCRIT: CPT | Performed by: SURGERY

## 2023-06-02 PROCEDURE — 250N000013 HC RX MED GY IP 250 OP 250 PS 637: Performed by: SURGERY

## 2023-06-02 PROCEDURE — 99232 SBSQ HOSP IP/OBS MODERATE 35: CPT | Performed by: INTERNAL MEDICINE

## 2023-06-02 PROCEDURE — 36416 COLLJ CAPILLARY BLOOD SPEC: CPT | Performed by: SURGERY

## 2023-06-02 PROCEDURE — 36416 COLLJ CAPILLARY BLOOD SPEC: CPT | Performed by: ANESTHESIOLOGY

## 2023-06-02 PROCEDURE — 250N000011 HC RX IP 250 OP 636: Performed by: INTERNAL MEDICINE

## 2023-06-02 RX ORDER — VANCOMYCIN HYDROCHLORIDE 1 G/200ML
1000 INJECTION, SOLUTION INTRAVENOUS EVERY 12 HOURS
Status: DISCONTINUED | OUTPATIENT
Start: 2023-06-02 | End: 2023-06-04

## 2023-06-02 RX ADMIN — FAMOTIDINE 20 MG: 20 TABLET, FILM COATED ORAL at 21:29

## 2023-06-02 RX ADMIN — CLONAZEPAM 2 MG: 1 TABLET ORAL at 10:51

## 2023-06-02 RX ADMIN — OXYCODONE HYDROCHLORIDE 5 MG: 5 TABLET ORAL at 05:58

## 2023-06-02 RX ADMIN — HYDROXYZINE PAMOATE 25 MG: 25 CAPSULE ORAL at 21:29

## 2023-06-02 RX ADMIN — CLONAZEPAM 2 MG: 1 TABLET ORAL at 21:29

## 2023-06-02 RX ADMIN — DIVALPROEX SODIUM 250 MG: 250 TABLET, FILM COATED, EXTENDED RELEASE ORAL at 06:00

## 2023-06-02 RX ADMIN — VANCOMYCIN HYDROCHLORIDE 1000 MG: 1 INJECTION, SOLUTION INTRAVENOUS at 20:14

## 2023-06-02 RX ADMIN — OXYCODONE HYDROCHLORIDE 10 MG: 5 TABLET ORAL at 17:05

## 2023-06-02 RX ADMIN — SODIUM CHLORIDE, POTASSIUM CHLORIDE, SODIUM LACTATE AND CALCIUM CHLORIDE: 600; 310; 30; 20 INJECTION, SOLUTION INTRAVENOUS at 09:32

## 2023-06-02 RX ADMIN — TAZOBACTAM SODIUM AND PIPERACILLIN SODIUM 4.5 G: 500; 4 INJECTION, SOLUTION INTRAVENOUS at 10:51

## 2023-06-02 RX ADMIN — TAZOBACTAM SODIUM AND PIPERACILLIN SODIUM 4.5 G: 500; 4 INJECTION, SOLUTION INTRAVENOUS at 18:36

## 2023-06-02 RX ADMIN — ENOXAPARIN SODIUM 40 MG: 40 INJECTION SUBCUTANEOUS at 18:36

## 2023-06-02 RX ADMIN — OXYCODONE HYDROCHLORIDE 10 MG: 5 TABLET ORAL at 21:29

## 2023-06-02 RX ADMIN — DIVALPROEX SODIUM 250 MG: 250 TABLET, FILM COATED, EXTENDED RELEASE ORAL at 14:13

## 2023-06-02 RX ADMIN — OXYCODONE HYDROCHLORIDE 10 MG: 5 TABLET ORAL at 11:16

## 2023-06-02 RX ADMIN — SODIUM CHLORIDE, POTASSIUM CHLORIDE, SODIUM LACTATE AND CALCIUM CHLORIDE: 600; 310; 30; 20 INJECTION, SOLUTION INTRAVENOUS at 18:36

## 2023-06-02 RX ADMIN — CLINDAMYCIN PHOSPHATE 900 MG: 900 INJECTION, SOLUTION INTRAVENOUS at 19:19

## 2023-06-02 RX ADMIN — TAZOBACTAM SODIUM AND PIPERACILLIN SODIUM 4.5 G: 500; 4 INJECTION, SOLUTION INTRAVENOUS at 23:42

## 2023-06-02 RX ADMIN — FAMOTIDINE 20 MG: 20 TABLET, FILM COATED ORAL at 10:51

## 2023-06-02 RX ADMIN — SODIUM CHLORIDE, POTASSIUM CHLORIDE, SODIUM LACTATE AND CALCIUM CHLORIDE: 600; 310; 30; 20 INJECTION, SOLUTION INTRAVENOUS at 02:38

## 2023-06-02 RX ADMIN — SODIUM CHLORIDE, POTASSIUM CHLORIDE, SODIUM LACTATE AND CALCIUM CHLORIDE: 600; 310; 30; 20 INJECTION, SOLUTION INTRAVENOUS at 23:43

## 2023-06-02 RX ADMIN — OXYCODONE HYDROCHLORIDE 5 MG: 5 TABLET ORAL at 00:41

## 2023-06-02 RX ADMIN — DIVALPROEX SODIUM 250 MG: 250 TABLET, FILM COATED, EXTENDED RELEASE ORAL at 21:29

## 2023-06-02 RX ADMIN — CLINDAMYCIN PHOSPHATE 900 MG: 900 INJECTION, SOLUTION INTRAVENOUS at 00:41

## 2023-06-02 RX ADMIN — CLINDAMYCIN PHOSPHATE 900 MG: 900 INJECTION, SOLUTION INTRAVENOUS at 08:49

## 2023-06-02 RX ADMIN — VANCOMYCIN HYDROCHLORIDE 1000 MG: 1 INJECTION, SOLUTION INTRAVENOUS at 09:32

## 2023-06-02 RX ADMIN — TAZOBACTAM SODIUM AND PIPERACILLIN SODIUM 4.5 G: 500; 4 INJECTION, SOLUTION INTRAVENOUS at 04:23

## 2023-06-02 ASSESSMENT — ACTIVITIES OF DAILY LIVING (ADL)
ADLS_ACUITY_SCORE: 20
ADLS_ACUITY_SCORE: 24
ADLS_ACUITY_SCORE: 20

## 2023-06-02 NOTE — PHARMACY-VANCOMYCIN DOSING SERVICE
"Pharmacy Vancomycin Note  Date of Service 2023  Patient's  1970   53 year old, female    Indication: Sepsis and Necrotizing soft tissue (Julio's disease)   Day of Therapy: 2  Current vancomycin regimen:  1500 mg IV q24h  Current vancomycin monitoring method: AUC  Current vancomycin therapeutic monitoring goal: 400-600 mg*h/L    [employer]ight[employer] Prediction of Current Vancomycin Regimen  Loading dose: 2000 mg IV x 1 dose  Regimen: 1500 mg IV every 24 hours.  Start time: 07:27 on 2023  Exposure target: AUC24 (range)400-600 mg/L.hr   AUC24,ss: 340 mg/L.hr  Probability of AUC24 > 400: 34 %  Ctrough,ss: 8.1 mg/L  Probability of Ctrough,ss > 20: 5 %  Probability of nephrotoxicity (Lodise IVELISSE ): 5 %      Current estimated CrCl = Estimated Creatinine Clearance: 102.2 mL/min (based on SCr of 0.75 mg/dL).    Creatinine for last 3 days  2023:  2:42 PM Creatinine 1.06 mg/dL;  3:51 PM Creatinine 1.04 mg/dL  2023:  5:35 AM Creatinine 0.75 mg/dL    Recent Vancomycin Levels (past 3 days)  No results found for requested labs within last 3 days.    Vancomycin IV Administrations (past 72 hours)                   vancomycin (VANCOCIN) 2,000 mg in sodium chloride 0.9 % 500 mL intermittent infusion (mg) 2,000 mg New Bag 23 1600                Nephrotoxins and other renal medications (From now, onward)    Start     Dose/Rate Route Frequency Ordered Stop    23 0800  vancomycin (VANCOCIN) 1,000 mg in NaCl 0.9% 200 mL intermittent infusion         1,000 mg  over 60 Minutes Intravenous EVERY 12 HOURS 23 0733      23 2230  norepinephrine (LEVOPHED) 4 mg in  mL PERIPHERAL infusion         0.03-0.125 mcg/kg/min × 74.4 kg  8.4-34.9 mL/hr  Intravenous CONTINUOUS 233      23 1600  piperacillin-tazobactam (ZOSYN) intermittent infusion 4.5 g        Note to Pharmacy: For [facility], [facility] and [facility]: For Zosyn-naive patients, use the \"Zosyn initial dose + extended infusion\" order panel.    " 4.5 g  200 mL/hr over 30 Minutes Intravenous EVERY 6 HOURS 06/01/23 1537               Contrast Orders - past 72 hours (72h ago, onward)    None          Interpretation of levels and current regimen:  Vancomycin level is reflective of -600    Has serum creatinine changed greater than 50% in last 72 hours: No    Urine output:  good urine output    Renal Function: Improving    [employer] Prediction of Planned New Vancomycin Regimen  Regimen: 1000 mg IV every 12 hours.  Start time: 07:27 on 06/02/2023  Exposure target: AUC24 (range)400-600 mg/L.hr   AUC24,ss: 448 mg/L.hr  Probability of AUC24 > 400: 62 %  Ctrough,ss: 13.8 mg/L  Probability of Ctrough,ss > 20: 22 %  Probability of nephrotoxicity (Lodise IVELISSE 2009): 9 %      Plan:  1. Increase Dose to 1000 mg IV Q12H  2. Vancomycin monitoring method: AUC  3. Vancomycin therapeutic monitoring goal: 400-600 mg*h/L  4. Pharmacy will check vancomycin levels as appropriate in 1-3 Days.  5. Serum creatinine levels will be ordered daily for the first week of therapy and at least twice weekly for subsequent weeks.    Sienna Ann [facility]

## 2023-06-02 NOTE — PROGRESS NOTES
PROGRESS NOTE    cc: POD#1 status post wide local debridement of right labia perineal and perianal necrotizing soft tissue infection    HPI: Patient had stable but decreased blood pressures throughout the night.  She has remained with an 10-20 points of her baseline systolic blood pressure.  She remained asymptomatic.  I encouraged the nursing staff to not bolus or treat with alpha agonist for any MAP below 60.  She remained stable.    Pain is intolerable.  She recently got oxycodone and that has worked okay for pain control.    Does have PTSD relating healthcare and complains of generalized anxiety as well.  Not tearful today.    EXAM:  Date 23 0700 - 23 0659   Shift 0314-2328 7125-7888 2064-1470 24 Hour Total   INTAKE   I.V. 1895   1895   Shift Total(mL/kg) 1895(22.22)   1895(22.22)   OUTPUT   Urine 875   875   Shift Total(mL/kg) 875(10.26)   875(10.26)   Weight (kg) 85.3 85.3 85.3 85.3        Temp: 97.9  F (36.6  C) Temp  Min: 96.8  F (36  C)  Max: 99.1  F (37.3  C)  Resp: 16 Resp  Min: 3  Max: 23  SpO2: 95 % SpO2  Min: 91 %  Max: 100 %  Pulse: 60 Pulse  Min: 41  Max: 93    No data recorded  BP: 100/65 Systolic (24hrs), Av , Min:76 , Max:118   Diastolic (24hrs), Av, Min:41, Max:88        GENERAL: alert, NAD  CV: RRR, nomurmurs  RESPIRATORY: no dyspnea, clear bilat  ABDOMEN: non-distended, +BS, soft, appropriately tender, incision c/d/i, no signs of infection  Perineum: Nurse present there is clean healthy fat throughout the base of the lateral margins of the right labial defect and gluteal/perineal defect.  There is dusky skin to the flap that is medial and adjacent to the labia/vaginal opening.  There is no order.  There is no tunneling or purulence.  EXTREMITY: no edema, neg Vahid's  SKIN: warm and dry, no jaundice norashes  NEURO: cranial nerves II-XII grossly intact, motor exam intact    LABS  Recent Labs   Lab Test 23  0535 23  0117 23  1442 19  0820  01/12/18  0813 01/11/18  0820   WBC 10.1  --  17.5*   < >  --   --    RBC 3.06*  --  3.67*   < >  --   --    HGB 9.3* 8.4* 11.4*   < > 13.2 12.5   HCT 27.8*  --  33.6*   < > 38.7 35.8   MCV 91  --  92   < > 95 94   MCH 30.4  --  31.1   < > 32.5 32.6   MCHC 33.5  --  33.9   < > 34.1 34.9   *  --  182   < > 199 184   MPV  --   --   --   --  9.8 9.8    < > = values in this interval not displayed.       Recent Labs   Lab Test 06/02/23  0535 06/01/23  1442   POTASSIUM 3.8 4.5   CHLORIDE 103 96*   BUN 8.2 9.3       Recent Labs   Lab Test 06/01/23  1442 05/09/23  1439 10/19/21  1440 10/18/21  2357 10/18/21  2019 03/06/21  2342   PROTEIN  --   --   --  50*  --  Negative   BILITOTAL 0.5 0.2   < >  --    < >  --    AST 19 21   < >  --    < >  --    ALT 13 13   < >  --    < >  --     < > = values in this interval not displayed.       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  Regardless early debridement has been obtained.    PLAN  Continue broad-spectrum antibiotics  Clinda can likely drop off soon    Wet-to-dry dressing changes twice daily    Plan for return to the OR for additional debridement 6/7/2023    Anthony Nolan MD on 6/2/2023 at 12:51 PM

## 2023-06-02 NOTE — PHARMACY-ADMISSION MEDICATION HISTORY
Pharmacy Intern Admission Medication History    Admission medication history is complete. The information provided in this note is only as accurate as the sources available at the time of the update.    Medication reconciliation/reorder completed by provider prior to medication history? Yes    Information Source(s): Patient via in-person, , SureScripts    Pertinent Information:  -Zubsolv- Indication: Chronic pain. Patient is currently using one-QUARTER tablet once daily, testing efficacy for chronic pain and dosing is managed by primary doctor.    Changes made to PTA medication list:    Added: Zubsolv- as above    Deleted: Amitriptyline- patient discontinued per MD 3 weeks ago    Changed: None    Medication Affordability: No issues stated at this time.    Allergies reviewed with patient and updates made in EHR: yes    Medication History Completed By: Susan Cobian RPH 6/2/2023 10:39 AM      Prior to Admission medications    Medication Sig Last Dose Taking? Auth Provider Long Term End Date   albuterol (PROAIR HFA/PROVENTIL HFA/VENTOLIN HFA) 108 (90 Base) MCG/ACT inhaler Inhale 2 puffs into the lungs every 4 hours as needed for shortness of breath / dyspnea or wheezing More than a month at PRN Yes Darrin Stephen MD Yes    amphetamine-dextroamphetamine (ADDERALL) 30 MG tablet Take 30 mg by mouth 2 times daily 6/1/2023 at AM Yes Reported, Patient No    atenolol (TENORMIN) 100 MG tablet Take 1 tablet (100 mg) by mouth daily 6/1/2023 at AM Yes Eryn Hurley MD Yes    buprenorphine-naloxone (ZUBSOLV) 5.7-1.4 MG sublingual tablet Place 0.25 tablets under the tongue daily 5/31/2023 at PM Yes Unknown, Entered By History     Cholecalciferol (VITAMIN D-3) 125 MCG (5000 UT) TABS Take 1 tablet by mouth daily 6/1/2023 at AM Yes Darrin Stephen MD     clonazePAM (KLONOPIN) 2 MG tablet Take 1 tablet (2 mg) by mouth 2 times daily 6/1/2023 at AM Yes Eryn Hurley MD Yes    divalproex sodium  extended-release (DEPAKOTE ER) 250 MG 24 hr tablet Take 1 tablet by mouth 3 times daily 6/1/2023 at 1600 Yes Reported, Patient Yes    fluticasone (FLONASE) 50 MCG/ACT spray Spray 2 sprays into both nostrils daily as needed More than a month at PRN Yes Reported, Patient     hydrOXYzine (VISTARIL) 25 MG capsule Take 1 capsule (25 mg) by mouth 3 times daily as needed for other (nausea) Past Week at PRN Yes Darrin Stephen MD No    ibuprofen (ADVIL/MOTRIN) 800 MG tablet Take 1 tablet (800 mg) by mouth every 8 hours as needed for moderate pain (4-6) 6/1/2023 at AM Yes Eryn Hurley MD     ondansetron (ZOFRAN ODT) 4 MG ODT tab Take 4 mg by mouth every 8 hours as needed More than a month at PRN Yes Reported, Patient     tiZANidine (ZANAFLEX) 4 MG tablet TAKE 1 TABLET BY MOUTH THREE TIMES DAILY AS NEEDED MUSCLE SPASMS Past Week Yes Eryn Hurley MD No

## 2023-06-02 NOTE — PROGRESS NOTES
[facility]    Medicine Progress Note - Hospitalist Service    Date of Admission:  6/1/2023    Assessment & Plan      Kym Perea is a 53 year old female admitted on 6/1/2023. She presents to the rapid clinic on June 1 with 2 days of progressively worsening pain in her perineum exquisite tenderness on her right lower buttock, chills and rigors.  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    Principal Problem:    Julio's gangrene  Assessment: present on admission. Leukocytosis, fever, elevated lactate, acute kidney injury,  procalcitonin and CRP. Exam consistent with abscess and extension into the perineum. Postoperative day # 1 local debridement of right labia perineal and perianal necrotizing soft tissue infection. Discussed with surgery, back to OR tomorrow for more debridement.  Plan:   Remain in ICU  Monitor intake and output, vital signs closely  Empiric clinda, zosyn and vancomycin     Hyponatremia  Assessment: present on admission, stable  Plan: monitor    Acute kidney injury   Assessment: present on admission, improved. Good urine output.   Plan: intravenous fluids        Diet: Advance Diet as Tolerated: Regular Diet Adult    DVT Prophylaxis: Enoxaparin (Lovenox) SQ  Leonardo Catheter: PRESENT, indication: Wound Healing  Lines: None     Cardiac Monitoring: None  Code Status: Full Code        Disposition Plan             Navneet Siegel MD  Hospitalist Service  [facility]  Securely message with CloudApps (more info)  Text page via [facility] Paging/Directory   ______________________________________________________________________    Interval History   Complains of pelvic pain. No fevers, chills.     Physical Exam   Vital Signs: Temp: 97.9  F (36.6  C) Temp src: Tympanic BP: 100/65 Pulse: 60   Resp: 16 SpO2: 95 % O2 Device: None (Room air)    Weight: 188 lbs .84 oz    GENERAL: Comfortable, no apparent distress.  CARDIOVASCULAR: regular rate and rhythm, no murmur. No  lower extremity edema   RESPIRATORY: Clear to auscultation bilaterally, no wheezes or crackles.  GI: non-tender, non-distended, normal bowel sounds.   SKIN: warm periphery, no rashes      Medical Decision Making   35 MINUTES SPENT BY ME on the date of service doing chart review, history, exam, documentation & further activities per the note.      Data     I have personally reviewed the following data over the past 24 hrs:    10.1  \   9.3 (L)   / 117 (L)     132 (L) 103 8.2 /  109 (H)   3.8 18 (L) 0.75 \       ALT: 13 AST: 19 AP: 190 (H) TBILI: 0.5   ALB: 3.6 TOT PROTEIN: 6.8 LIPASE: N/A       Procal: 1.26 (H) CRP: 300.36 (H) Lactic Acid: 1.0

## 2023-06-02 NOTE — PROGRESS NOTES
Alerted tele-icu of bradycardia w/ HR currently at 43. Patient asymptomatic at this time. Will continue to monitor.

## 2023-06-02 NOTE — PROGRESS NOTES
Called about CVC    Pt pt and daughter pt routinely has low BP as a outpatient and is not symptomatic. Routinely SBP 90's.      - Reassess needs in AM    OK with MAP of 60,  MD to call to discuss further if needed ([phone])       - Anthony Nolan MD on 6/2/2023 at 1:35 AM

## 2023-06-02 NOTE — TELECONSULT
Patient continues with soft blood pressures.  We will bolus with albumin at this time and order peripheral norepinephrine if mean arterial pressure drops below 65.  Lactic acid pending.    Herb Stack MD  Critical Care Medicine

## 2023-06-02 NOTE — PHARMACY
Pharmacy - Transfer Medication Reconciliation     The patient's transfer medication orders have been compared to the medication administration record and to the Prior to Admissions Medications list - any noted discrepancies were resolved with the MD. Patient transferred from OR back to ICU.    Thank you. Pharmacy will continue to monitor.     Sammy Redmond [facility] ....................  6/1/2023   7:04 PM

## 2023-06-02 NOTE — TELECONSULT
Continue soft blood pressure with a lactate within normal limits.  We will attempt 1 more fluid bolus but concerned this patient is developing septic shock.  Patient currently on a peripheral norepinephrine.  We will continue peripheral norepinephrine to maintain mean arterial pressure greater than 65.  Would recommend either PICC line or central line to be placed.  We will check hemoglobin.    Herb Stack MD  Critical Care Medicine  Ordered central line and arterial, line to be placed   Follow up in our office to see what we need to do  Also with neuro

## 2023-06-02 NOTE — TELECONSULT
Asked to evaluate for hypotension.  53-year-old female status post debridement of gangrene of buttock and Julio's disease of vagina and vulva.  Her blood pressures are 70s over 40s at this time.  We will continue with fluid resuscitation at this time. Albumin bolus ordered.  We will check a lactate in order to trend fluid resuscitation.  If patient does not respond to fluid resuscitation would consider starting peripheral norepinephrine.    Herb Stack MD  Critical Care Medicine

## 2023-06-02 NOTE — PLAN OF CARE
Goal Outcome Evaluation:    A&Ox4. Calm and cooperative. Calls appropriately. Levo titrated off throughout night. BP's and MAP's remain stable at this time off of pressor. Still remains bradycardic but per patient took home atenolol prior to admission yesterday.

## 2023-06-03 ENCOUNTER — ANESTHESIA (OUTPATIENT)
Dept: SURGERY | Facility: OTHER | Age: 53
DRG: 746 | End: 2023-06-03
Payer: COMMERCIAL

## 2023-06-03 LAB
ANION GAP SERPL CALCULATED.3IONS-SCNC: 11 MMOL/L (ref 7–15)
BUN SERPL-MCNC: 11.8 MG/DL (ref 6–20)
CALCIUM SERPL-MCNC: 8.3 MG/DL (ref 8.6–10)
CHLORIDE SERPL-SCNC: 104 MMOL/L (ref 98–107)
CREAT SERPL-MCNC: 0.82 MG/DL (ref 0.51–0.95)
DEPRECATED HCO3 PLAS-SCNC: 20 MMOL/L (ref 22–29)
ERYTHROCYTE [DISTWIDTH] IN BLOOD BY AUTOMATED COUNT: 13.6 % (ref 10–15)
GFR SERPL CREATININE-BSD FRML MDRD: 86 ML/MIN/1.73M2
GLUCOSE SERPL-MCNC: 81 MG/DL (ref 70–99)
HCT VFR BLD AUTO: 25 % (ref 35–47)
HGB BLD-MCNC: 8.3 G/DL (ref 11.7–15.7)
HOLD SPECIMEN: NORMAL
MAGNESIUM SERPL-MCNC: 1.9 MG/DL (ref 1.7–2.3)
MCH RBC QN AUTO: 30.5 PG (ref 26.5–33)
MCHC RBC AUTO-ENTMCNC: 33.2 G/DL (ref 31.5–36.5)
MCV RBC AUTO: 92 FL (ref 78–100)
PLATELET # BLD AUTO: 169 10E3/UL (ref 150–450)
POTASSIUM SERPL-SCNC: 3.3 MMOL/L (ref 3.4–5.3)
POTASSIUM SERPL-SCNC: 4.2 MMOL/L (ref 3.4–5.3)
RBC # BLD AUTO: 2.72 10E6/UL (ref 3.8–5.2)
SODIUM SERPL-SCNC: 135 MMOL/L (ref 136–145)
VANCOMYCIN SERPL-MCNC: 14.5 UG/ML
WBC # BLD AUTO: 8.3 10E3/UL (ref 4–11)

## 2023-06-03 PROCEDURE — 11004 DBRDMT SKIN XTRNL GENT&PER: CPT | Performed by: SURGERY

## 2023-06-03 PROCEDURE — 83735 ASSAY OF MAGNESIUM: CPT | Performed by: INTERNAL MEDICINE

## 2023-06-03 PROCEDURE — 250N000011 HC RX IP 250 OP 636: Performed by: INTERNAL MEDICINE

## 2023-06-03 PROCEDURE — 80048 BASIC METABOLIC PNL TOTAL CA: CPT | Performed by: SURGERY

## 2023-06-03 PROCEDURE — 250N000011 HC RX IP 250 OP 636: Performed by: SURGERY

## 2023-06-03 PROCEDURE — 120N000001 HC R&B MED SURG/OB

## 2023-06-03 PROCEDURE — 250N000013 HC RX MED GY IP 250 OP 250 PS 637: Performed by: SURGERY

## 2023-06-03 PROCEDURE — 250N000009 HC RX 250: Performed by: NURSE ANESTHETIST, CERTIFIED REGISTERED

## 2023-06-03 PROCEDURE — 11004 DBRDMT SKIN XTRNL GENT&PER: CPT | Performed by: NURSE ANESTHETIST, CERTIFIED REGISTERED

## 2023-06-03 PROCEDURE — 99233 SBSQ HOSP IP/OBS HIGH 50: CPT | Performed by: INTERNAL MEDICINE

## 2023-06-03 PROCEDURE — 36415 COLL VENOUS BLD VENIPUNCTURE: CPT | Performed by: SURGERY

## 2023-06-03 PROCEDURE — 250N000011 HC RX IP 250 OP 636: Performed by: NURSE ANESTHETIST, CERTIFIED REGISTERED

## 2023-06-03 PROCEDURE — 370N000017 HC ANESTHESIA TECHNICAL FEE, PER MIN: Performed by: SURGERY

## 2023-06-03 PROCEDURE — 0HBHXZZ EXCISION OF RIGHT UPPER LEG SKIN, EXTERNAL APPROACH: ICD-10-PCS | Performed by: SURGERY

## 2023-06-03 PROCEDURE — 710N000010 HC RECOVERY PHASE 1, LEVEL 2, PER MIN: Performed by: SURGERY

## 2023-06-03 PROCEDURE — 272N000002 HC OR SUPPLY OTHER OPNP: Performed by: SURGERY

## 2023-06-03 PROCEDURE — 250N000013 HC RX MED GY IP 250 OP 250 PS 637: Performed by: INTERNAL MEDICINE

## 2023-06-03 PROCEDURE — 258N000003 HC RX IP 258 OP 636: Performed by: INTERNAL MEDICINE

## 2023-06-03 PROCEDURE — 80202 ASSAY OF VANCOMYCIN: CPT | Performed by: INTERNAL MEDICINE

## 2023-06-03 PROCEDURE — 258N000003 HC RX IP 258 OP 636: Performed by: SURGERY

## 2023-06-03 PROCEDURE — 360N000075 HC SURGERY LEVEL 2, PER MIN: Performed by: SURGERY

## 2023-06-03 PROCEDURE — 0HB9XZZ EXCISION OF PERINEUM SKIN, EXTERNAL APPROACH: ICD-10-PCS | Performed by: SURGERY

## 2023-06-03 PROCEDURE — 0UBMXZZ EXCISION OF VULVA, EXTERNAL APPROACH: ICD-10-PCS | Performed by: SURGERY

## 2023-06-03 PROCEDURE — 85027 COMPLETE CBC AUTOMATED: CPT | Performed by: SURGERY

## 2023-06-03 PROCEDURE — 84132 ASSAY OF SERUM POTASSIUM: CPT | Performed by: INTERNAL MEDICINE

## 2023-06-03 PROCEDURE — 250N000009 HC RX 250: Performed by: SURGERY

## 2023-06-03 PROCEDURE — 36415 COLL VENOUS BLD VENIPUNCTURE: CPT | Performed by: INTERNAL MEDICINE

## 2023-06-03 PROCEDURE — 272N000001 HC OR GENERAL SUPPLY STERILE: Performed by: SURGERY

## 2023-06-03 RX ORDER — POTASSIUM CHLORIDE 1500 MG/1
40 TABLET, EXTENDED RELEASE ORAL ONCE
Status: COMPLETED | OUTPATIENT
Start: 2023-06-03 | End: 2023-06-03

## 2023-06-03 RX ORDER — LANOLIN ALCOHOL/MO/W.PET/CERES
3 CREAM (GRAM) TOPICAL
Status: DISCONTINUED | OUTPATIENT
Start: 2023-06-03 | End: 2023-06-05 | Stop reason: HOSPADM

## 2023-06-03 RX ORDER — DEXAMETHASONE SODIUM PHOSPHATE 4 MG/ML
INJECTION, SOLUTION INTRA-ARTICULAR; INTRALESIONAL; INTRAMUSCULAR; INTRAVENOUS; SOFT TISSUE PRN
Status: DISCONTINUED | OUTPATIENT
Start: 2023-06-03 | End: 2023-06-03

## 2023-06-03 RX ORDER — KETAMINE HYDROCHLORIDE 10 MG/ML
INJECTION INTRAMUSCULAR; INTRAVENOUS PRN
Status: DISCONTINUED | OUTPATIENT
Start: 2023-06-03 | End: 2023-06-03

## 2023-06-03 RX ORDER — ZOLPIDEM TARTRATE 5 MG/1
5 TABLET ORAL
Status: DISCONTINUED | OUTPATIENT
Start: 2023-06-03 | End: 2023-06-05 | Stop reason: HOSPADM

## 2023-06-03 RX ORDER — ONDANSETRON 2 MG/ML
INJECTION INTRAMUSCULAR; INTRAVENOUS PRN
Status: DISCONTINUED | OUTPATIENT
Start: 2023-06-03 | End: 2023-06-03

## 2023-06-03 RX ORDER — NICOTINE 21 MG/24HR
1 PATCH, TRANSDERMAL 24 HOURS TRANSDERMAL EVERY 24 HOURS
Qty: 42 PATCH | Refills: 0 | Status: SHIPPED | OUTPATIENT
Start: 2023-06-03 | End: 2023-07-15

## 2023-06-03 RX ORDER — PROPOFOL 10 MG/ML
INJECTION, EMULSION INTRAVENOUS CONTINUOUS PRN
Status: DISCONTINUED | OUTPATIENT
Start: 2023-06-03 | End: 2023-06-03

## 2023-06-03 RX ORDER — NICOTINE 21 MG/24HR
1 PATCH, TRANSDERMAL 24 HOURS TRANSDERMAL DAILY
Status: DISCONTINUED | OUTPATIENT
Start: 2023-06-03 | End: 2023-06-05 | Stop reason: HOSPADM

## 2023-06-03 RX ORDER — ACETAMINOPHEN 10 MG/ML
INJECTION, SOLUTION INTRAVENOUS PRN
Status: DISCONTINUED | OUTPATIENT
Start: 2023-06-03 | End: 2023-06-03

## 2023-06-03 RX ORDER — PROPOFOL 10 MG/ML
INJECTION, EMULSION INTRAVENOUS PRN
Status: DISCONTINUED | OUTPATIENT
Start: 2023-06-03 | End: 2023-06-03

## 2023-06-03 RX ADMIN — TAZOBACTAM SODIUM AND PIPERACILLIN SODIUM 4.5 G: 500; 4 INJECTION, SOLUTION INTRAVENOUS at 17:52

## 2023-06-03 RX ADMIN — TAZOBACTAM SODIUM AND PIPERACILLIN SODIUM 4.5 G: 500; 4 INJECTION, SOLUTION INTRAVENOUS at 07:50

## 2023-06-03 RX ADMIN — ACETAMINOPHEN 1000 MG: 10 INJECTION, SOLUTION INTRAVENOUS at 10:47

## 2023-06-03 RX ADMIN — SODIUM CHLORIDE, POTASSIUM CHLORIDE, SODIUM LACTATE AND CALCIUM CHLORIDE: 600; 310; 30; 20 INJECTION, SOLUTION INTRAVENOUS at 08:30

## 2023-06-03 RX ADMIN — Medication 10 MG: at 10:48

## 2023-06-03 RX ADMIN — CLONAZEPAM 2 MG: 1 TABLET ORAL at 22:39

## 2023-06-03 RX ADMIN — DIVALPROEX SODIUM 250 MG: 250 TABLET, FILM COATED, EXTENDED RELEASE ORAL at 14:12

## 2023-06-03 RX ADMIN — POTASSIUM CHLORIDE 40 MEQ: 1500 TABLET, EXTENDED RELEASE ORAL at 12:11

## 2023-06-03 RX ADMIN — CLINDAMYCIN PHOSPHATE 900 MG: 900 INJECTION, SOLUTION INTRAVENOUS at 10:07

## 2023-06-03 RX ADMIN — OXYCODONE HYDROCHLORIDE 5 MG: 5 TABLET ORAL at 06:17

## 2023-06-03 RX ADMIN — Medication 10 MG: at 10:39

## 2023-06-03 RX ADMIN — CLINDAMYCIN PHOSPHATE 900 MG: 900 INJECTION, SOLUTION INTRAVENOUS at 01:58

## 2023-06-03 RX ADMIN — VANCOMYCIN HYDROCHLORIDE 1000 MG: 1 INJECTION, SOLUTION INTRAVENOUS at 08:30

## 2023-06-03 RX ADMIN — DIVALPROEX SODIUM 250 MG: 250 TABLET, FILM COATED, EXTENDED RELEASE ORAL at 22:40

## 2023-06-03 RX ADMIN — SODIUM CHLORIDE, POTASSIUM CHLORIDE, SODIUM LACTATE AND CALCIUM CHLORIDE: 600; 310; 30; 20 INJECTION, SOLUTION INTRAVENOUS at 20:55

## 2023-06-03 RX ADMIN — FAMOTIDINE 20 MG: 20 TABLET, FILM COATED ORAL at 22:39

## 2023-06-03 RX ADMIN — OXYCODONE HYDROCHLORIDE 5 MG: 5 TABLET ORAL at 12:43

## 2023-06-03 RX ADMIN — ENOXAPARIN SODIUM 40 MG: 40 INJECTION SUBCUTANEOUS at 15:47

## 2023-06-03 RX ADMIN — SODIUM CHLORIDE, POTASSIUM CHLORIDE, SODIUM LACTATE AND CALCIUM CHLORIDE: 600; 310; 30; 20 INJECTION, SOLUTION INTRAVENOUS at 14:11

## 2023-06-03 RX ADMIN — TAZOBACTAM SODIUM AND PIPERACILLIN SODIUM 4.5 G: 500; 4 INJECTION, SOLUTION INTRAVENOUS at 23:38

## 2023-06-03 RX ADMIN — ONDANSETRON HYDROCHLORIDE 4 MG: 2 SOLUTION INTRAMUSCULAR; INTRAVENOUS at 10:40

## 2023-06-03 RX ADMIN — PROPOFOL 80 MG: 10 INJECTION, EMULSION INTRAVENOUS at 10:41

## 2023-06-03 RX ADMIN — SODIUM CHLORIDE 1000 ML: 9 INJECTION, SOLUTION INTRAVENOUS at 12:11

## 2023-06-03 RX ADMIN — NICOTINE POLACRILEX 2 MG: 2 GUM, CHEWING BUCCAL at 15:48

## 2023-06-03 RX ADMIN — TAZOBACTAM SODIUM AND PIPERACILLIN SODIUM 4.5 G: 500; 4 INJECTION, SOLUTION INTRAVENOUS at 12:12

## 2023-06-03 RX ADMIN — VANCOMYCIN HYDROCHLORIDE 1000 MG: 1 INJECTION, SOLUTION INTRAVENOUS at 20:11

## 2023-06-03 RX ADMIN — NICOTINE POLACRILEX 2 MG: 2 GUM, CHEWING BUCCAL at 20:11

## 2023-06-03 RX ADMIN — DIVALPROEX SODIUM 250 MG: 250 TABLET, FILM COATED, EXTENDED RELEASE ORAL at 06:17

## 2023-06-03 RX ADMIN — NICOTINE 1 PATCH: 14 PATCH, EXTENDED RELEASE TRANSDERMAL at 15:47

## 2023-06-03 RX ADMIN — CLINDAMYCIN PHOSPHATE 900 MG: 900 INJECTION, SOLUTION INTRAVENOUS at 18:34

## 2023-06-03 RX ADMIN — FAMOTIDINE 20 MG: 10 INJECTION INTRAVENOUS at 10:07

## 2023-06-03 RX ADMIN — OXYCODONE HYDROCHLORIDE 5 MG: 5 TABLET ORAL at 01:59

## 2023-06-03 RX ADMIN — MIDAZOLAM HYDROCHLORIDE 2 MG: 1 INJECTION, SOLUTION INTRAMUSCULAR; INTRAVENOUS at 10:38

## 2023-06-03 RX ADMIN — PROPOFOL 50 MCG/KG/MIN: 10 INJECTION, EMULSION INTRAVENOUS at 10:44

## 2023-06-03 RX ADMIN — DEXAMETHASONE SODIUM PHOSPHATE 4 MG: 4 INJECTION, SOLUTION INTRA-ARTICULAR; INTRALESIONAL; INTRAMUSCULAR; INTRAVENOUS; SOFT TISSUE at 10:46

## 2023-06-03 ASSESSMENT — ACTIVITIES OF DAILY LIVING (ADL)
ADLS_ACUITY_SCORE: 22
ADLS_ACUITY_SCORE: 24
ADLS_ACUITY_SCORE: 22
ADLS_ACUITY_SCORE: 24
ADLS_ACUITY_SCORE: 22
ADLS_ACUITY_SCORE: 22
ADLS_ACUITY_SCORE: 24
ADLS_ACUITY_SCORE: 22
ADLS_ACUITY_SCORE: 22
ADLS_ACUITY_SCORE: 24
ADLS_ACUITY_SCORE: 22
ADLS_ACUITY_SCORE: 22

## 2023-06-03 ASSESSMENT — LIFESTYLE VARIABLES: TOBACCO_USE: 1

## 2023-06-03 NOTE — ANESTHESIA POSTPROCEDURE EVALUATION
Patient: Kym Perea    Procedure: Procedure(s):  Debridement and washout of vulvar and perineal wounds       Anesthesia Type:  MAC    Note:  Disposition: Inpatient   Postop Pain Control: Uneventful            Sign Out: Well controlled pain   PONV: No   Neuro/Psych: Uneventful            Sign Out: Acceptable/Baseline neuro status   Airway/Respiratory: Uneventful            Sign Out: Acceptable/Baseline resp. status   CV/Hemodynamics: Uneventful            Sign Out: Acceptable CV status; No obvious hypovolemia; No obvious fluid overload   Other NRE: NONE   DID A NON-ROUTINE EVENT OCCUR? No           Last vitals:  Vitals Value Taken Time   /67 06/03/23 1125   Temp 96.3  F (35.7  C) 06/03/23 1119   Pulse 71 06/03/23 1125   Resp 16 06/03/23 1109   SpO2 97 % 06/03/23 1125   Vitals shown include unvalidated device data.    Electronically Signed By: Tiffani Nolan  Ani 3, 2023  11:29 AM

## 2023-06-03 NOTE — PHARMACY-CONSULT NOTE
Pharmacy - Transfer Medication Reconciliation     The patient's transfer medication orders have been compared to the medication administration record and to the Prior to Admissions Medications list - any noted discrepancies were resolved with the MD.     Thank you. Pharmacy will continue to monitor.     Alma Kay BAYRON ....................  6/3/2023   11:54 AM

## 2023-06-03 NOTE — PHARMACY-VANCOMYCIN DOSING SERVICE
"Pharmacy Vancomycin Note  Date of Service Ani 3, 2023  Patient's  1970  53 year old, female    Indication: Sepsis and Necrotizing soft tissue (Julio's disease)    Current estimated CrCl = Estimated Creatinine Clearance: 93.4 mL/min (based on SCr of 0.82 mg/dL).    Creatinine for last 3 days  2023:  2:42 PM Creatinine 1.06 mg/dL;  3:51 PM Creatinine 1.04 mg/dL  2023:  5:35 AM Creatinine 0.75 mg/dL  6/3/2023:  5:21 AM Creatinine 0.82 mg/dL    Recent Vancomycin Level(s) for last 3 days  6/3/2023:  5:21 AM Vancomycin 14.5 ug/mL      Vancomycin IV Administrations (past 72 hours)                   vancomycin (VANCOCIN) 1,000 mg in NaCl 0.9% 200 mL intermittent infusion (mg) 1,000 mg Given 23     1,000 mg Given  09    vancomycin (VANCOCIN) 2,000 mg in sodium chloride 0.9 % 500 mL intermittent infusion (mg) 2,000 mg New Bag 23 1600                Nephrotoxins and other renal medications (From now, onward)    Start     Dose/Rate Route Frequency Ordered Stop    23 0800  vancomycin (VANCOCIN) 1,000 mg in NaCl 0.9% 200 mL intermittent infusion         1,000 mg  over 60 Minutes Intravenous EVERY 12 HOURS 23 0733      23 2230  norepinephrine (LEVOPHED) 4 mg in  mL PERIPHERAL infusion         0.03-0.125 mcg/kg/min × 74.4 kg  8.4-34.9 mL/hr  Intravenous CONTINUOUS 23 2203      23 1600  piperacillin-tazobactam (ZOSYN) intermittent infusion 4.5 g        Note to Pharmacy: For [facility], [facility] and [facility]: For Zosyn-naive patients, use the \"Zosyn initial dose + extended infusion\" order panel.    4.5 g  200 mL/hr over 30 Minutes Intravenous EVERY 6 HOURS 23 1537            Contrast Orders - past 72 hours (72h ago, onward)    None          Insight[employer] Prediction of Planned Current Vancomycin Regimen  Regimen: 1000 mg IV every 12 hours.  Start time: 08:14 on 2023  Exposure target: AUC24 (range)400-600 mg/L.hr   AUC24,ss: 476 mg/L.hr  Probability of AUC24 > 400: 82 " %  Ctrough,ss: 14.9 mg/L  Probability of Ctrough,ss > 20: 13 %  Probability of nephrotoxicity (Lodise IVELISSE 2009): 10 %          Plan:  1. Continue vancomycin  1000 mg IV q12h.   2. Vancomycin monitoring method: AUC  3. Vancomycin therapeutic monitoring goal: 400-600 mg*h/L  4. Pharmacy will check vancomycin levels as appropriate in 1-3 Days.    5. Serum creatinine levels will be ordered daily for the first week of therapy and at least twice weekly for subsequent weeks.      Cordell Chowdary, KellyD

## 2023-06-03 NOTE — PROGRESS NOTES
Pt transferred over to room 312 on [facility] at 11:50, low b/p's, MD Robles made aware. TORB for 1 liter bolus NS over 2 hours.      BP (!) 82/45   Pulse 76   Temp 97.9  F (36.6  C) (Tympanic)   Resp 20   Wt 84.9 kg (187 lb 2.7 oz)   LMP  (LMP Unknown)   SpO2 97%   BMI 27.64 kg/m

## 2023-06-03 NOTE — ANESTHESIA PREPROCEDURE EVALUATION
Anesthesia Pre-Procedure Evaluation    Patient: Kym Perea   MRN: [id number] : 1970        Procedure : Procedure(s):  IRRIGATION AND DEBRIDEMENT, PERINEAL, LABIAL and GLUTEAL INFECTION          Past Medical History:   Diagnosis Date     Acquired absence of other organs (CODE)     2011     Chronic pain syndrome     No Comments Provided     Complex regional pain syndrome I     left ankle     Encounter for other administrative examinations     10/2012,Hydrocodone 10/325mg, #240/mo signed 10/3/12, updated 10/1/ 14     Encounter for other administrative examinations     10/1/2014,MS Contin 30 mg q 8 hours #90 per month.  Morphine sulfate IR 15 mg 2-3 tab tid prn #180 per month     Gastro-esophageal reflux disease without esophagitis     No Comments Provided     Injury of left ankle     ,requiring surgery     Low back pain     No Comments Provided     Major depressive disorder, recurrent severe without psychotic features (H)     No Comments Provided     Migraine without status migrainosus, not intractable     No Comments Provided     Nicotine dependence, uncomplicated     No Comments Provided     Obesity     No Comments Provided     Overweight     2014     Pain in ankle     Chronic left ankle pain secondary to reflex sympathetic dystrophy.  Patient  has had 4 previous surgeries, the most recent one done by Dr. Noriega in  . On Narcotic contract.     Pain in thoracic spine     No Comments Provided     Panic disorder without agoraphobia     Dr Reynoso     Personal history of other diseases of the female genital tract     No Comments Provided     Personal history of other medical treatment (CODE)     G2, P1-0-1-1 with one vaginal delivery.     Supraventricular tachycardia (H)     2010      Past Surgical History:   Procedure Laterality Date     ANKLE SURGERY      , ,Left ankle surgery x 2     HYSTERECTOMY TOTAL ABDOMINAL      1999,secondary to endometriosis, no malignancy;  patient reports no malignancy, but abnormal cells were present *     IRRIGATION AND DEBRIDEMENT DECUBITUS WOUND, COMBINED N/A 6/1/2023    Procedure: IRRIGATION AND DEBRIDEMENT, PERINEAL, LABIAL and GLUTEAL INFECTION;  Surgeon: Anthony Nolan MD;  Location: [facility] OR     LAPAROSCOPIC CHOLECYSTECTOMY      No Comments Provided     LAPAROSCOPY DIAGNOSTIC (GENERAL)      1995     OTHER SURGICAL HISTORY      21299,CRANIO/MAXILLOFACIAL SURGERY,Jaw Surgery     OTHER SURGICAL HISTORY      207040,[facility] EMG,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  No other abnormalities noted.      Allergies   Allergen Reactions     Diclofenac-Misoprostol GI Disturbance     Indomethacin      Other reaction(s): Dizziness     Meloxicam GI Disturbance     Moxifloxacin       Social History     Tobacco Use     Smoking status: Every Day     Packs/day: 0.50     Years: 30.00     Pack years: 15.00     Types: Cigarettes     Start date: 12/2/1986     Smokeless tobacco: Never     Tobacco comments:     trying   Vaping Use     Vaping status: Every Day     Substances: Nicotine, Flavoring     Devices: Disposable   Substance Use Topics     Alcohol use: Yes     Comment: OCCASIONAL WINE      Wt Readings from Last 1 Encounters:   06/03/23 84.9 kg (187 lb 2.7 oz)        Anesthesia Evaluation   Pt has had prior anesthetic. Type: General.    No history of anesthetic complications       ROS/MED HX  ENT/Pulmonary:     (+) tobacco use, Current use,     Neurologic:  - neg neurologic ROS     Cardiovascular:     (+) -----Irregular Heartbeat/Palpitations,     METS/Exercise Tolerance: >4 METS    Hematologic:  - neg hematologic  ROS     Musculoskeletal:       GI/Hepatic:     (+) GERD,     Renal/Genitourinary:  - neg Renal ROS     Endo:  - neg endo ROS     Psychiatric/Substance Use:     (+) psychiatric history anxiety and depression     Infectious Disease: Comment: Afebrile and not on any pressors since yesterday at 5am.  Blood pressure stable, labs improving    (+)  Recent Fever,     Malignancy:  - neg malignancy ROS     Other:  - neg other ROS          Physical Exam    Airway        Mallampati: II   TM distance: > 3 FB   Neck ROM: full   Mouth opening: > 3 cm    Respiratory Devices and Support         Dental       (+) Edentulous      Cardiovascular   cardiovascular exam normal       Rhythm and rate: regular and normal     Pulmonary   pulmonary exam normal        breath sounds clear to auscultation           OUTSIDE LABS:  CBC:   Lab Results   Component Value Date    WBC 8.3 06/03/2023    WBC 10.1 06/02/2023    HGB 8.3 (L) 06/03/2023    HGB 9.3 (L) 06/02/2023    HCT 25.0 (L) 06/03/2023    HCT 27.8 (L) 06/02/2023     06/03/2023     (L) 06/02/2023     BMP:   Lab Results   Component Value Date     (L) 06/03/2023     (L) 06/02/2023    POTASSIUM 3.3 (L) 06/03/2023    POTASSIUM 3.8 06/02/2023    CHLORIDE 104 06/03/2023    CHLORIDE 103 06/02/2023    CO2 20 (L) 06/03/2023    CO2 18 (L) 06/02/2023    BUN 11.8 06/03/2023    BUN 8.2 06/02/2023    CR 0.82 06/03/2023    CR 0.75 06/02/2023    GLC 81 06/03/2023     (H) 06/02/2023     COAGS:   Lab Results   Component Value Date    PTT 29 09/05/2014    INR 1.0 09/05/2014     POC: No results found for: BGM, HCG, HCGS  HEPATIC:   Lab Results   Component Value Date    ALBUMIN 3.6 06/01/2023    PROTTOTAL 6.8 06/01/2023    ALT 13 06/01/2023    AST 19 06/01/2023    ALKPHOS 190 (H) 06/01/2023    BILITOTAL 0.5 06/01/2023    BILIDIRECT 0.06 01/11/2018    FREDERICK 11 10/19/2021     OTHER:   Lab Results   Component Value Date    PH 7.37 11/25/2014    LACT 1.0 06/01/2023    NORMA 8.3 (L) 06/03/2023    MAG 1.9 06/03/2023    LIPASE 12 03/06/2021    SED 9 04/02/2015       Anesthesia Plan    ASA Status:  3   NPO Status:  NPO Appropriate    Anesthesia Type: MAC.     - Reason for MAC: chronic cardiopulmonary disease, straight local not clinically adequate   Induction: Propofol.   Maintenance: Balanced.        Consents    Anesthesia  Plan(s) and associated risks, benefits, and realistic alternatives discussed. Questions answered and patient/representative(s) expressed understanding.     - Discussed: Risks, Benefits and Alternatives for BOTH SEDATION and the PROCEDURE were discussed     - Discussed with:  Patient      - Extended Intubation/Ventilatory Support Discussed: No.      - Patient is DNR/DNI Status: No    Use of blood products discussed: Yes.     - Discussed with: Patient.     - Consented: consented to blood products            Reason for refusal: other.     Postoperative Care    Pain management: IV analgesics, Multi-modal analgesia.   PONV prophylaxis: Ondansetron (or other 5HT-3), Dexamethasone or Solumedrol     Comments:                    CHRISTY BOUCHER CRNA

## 2023-06-03 NOTE — PLAN OF CARE
Alert, intermittent confusion, easily redirected. Low b/p's at transfer,after bolus b/ps soft but wdl. UTV wound to groin,dressing has small serosanguinous drainage, wound debrided in OR this morning. Leonardo patent with good urine output. Pain rated 5/10, ZUBSOLV restarted and oxycodone given. Ambulates with SBA and walker.    /65 (BP Location: Left arm)   Pulse 67   Temp 97.3  F (36.3  C) (Tympanic)   Resp 18   Wt 84.9 kg (187 lb 2.7 oz)   LMP  (LMP Unknown)   SpO2 96%   BMI 27.64 kg/m        Goal Outcome Evaluation:      Plan of Care Reviewed With: patient    Overall Patient Progress: no changeOverall Patient Progress: no change       Problem: Plan of Care - These are the overarching goals to be used throughout the patient stay.    Goal: Patient-Specific Goal (Individualized)    6/3/2023 1801 by Delfina Vyas, RN  Outcome: Progressing  Flowsheets (Taken 6/3/2023 1801)  Individualized Care Needs: wound to groin  Anxieties, Fears or Concerns: generalized anxiety  Patient/Family-Specific Goals (Include Timeframe): monitor vitals and pain  6/3/2023 1801 by Delfina Vyas, RN  Outcome: Progressing  Flowsheets (Taken 6/3/2023 1801)  Individualized Care Needs: wound to groin  Anxieties, Fears or Concerns: generalized anxiety  Patient/Family-Specific Goals (Include Timeframe): monitor vitals and pain     Problem: Plan of Care - These are the overarching goals to be used throughout the patient stay.    Goal: Absence of Hospital-Acquired Illness or Injury  Intervention: Identify and Manage Fall Risk  Recent Flowsheet Documentation  Taken 6/3/2023 1300 by Delfina Vyas, RN  Safety Promotion/Fall Prevention:    activity supervised    clutter free environment maintained    nonskid shoes/slippers when out of bed    safety round/check completed     Problem: Plan of Care - These are the overarching goals to be used throughout the patient stay.    Goal: Absence of Hospital-Acquired Illness or  Injury  Intervention: Prevent and Manage VTE (Venous Thromboembolism) Risk  Recent Flowsheet Documentation  Taken 6/3/2023 1300 by Delfina Vyas, RN  VTE Prevention/Management:    SCDs (sequential compression devices) off    patient refused intervention     Problem: Plan of Care - These are the overarching goals to be used throughout the patient stay.    Goal: Optimal Comfort and Wellbeing  Intervention: Provide Person-Centered Care  Recent Flowsheet Documentation  Taken 6/3/2023 1300 by Delfina Vyas, RN  Trust Relationship/Rapport:    care explained    choices provided     Problem: Pain Acute  Goal: Optimal Pain Control and Function  Intervention: Develop Pain Management Plan  Recent Flowsheet Documentation  Taken 6/3/2023 1235 by Delfina Vyas, RN  Pain Management Interventions: medication (see MAR)

## 2023-06-03 NOTE — PROGRESS NOTES
Pt up and out of bed a little confused needing to go to the bathroom. Pt tried to have a BM and her dressing fell out. New wet to dressing applied along with ABD. Pt now tearful in room.

## 2023-06-03 NOTE — BRIEF OP NOTE
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  Complications: None.  Implants: * No implants in log *

## 2023-06-03 NOTE — OP NOTE
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    PROCEDURE:  Excisional debridement of ischemic skin flaps of the vulva and perianal areas.    SURGEON:  Anthony Nolan M.D.    ASSISTANT:  DARVIN Mckeon.    ANESTHESIA:  MAC with local.    ESTIMATED BLOOD LOSS:  Minimal.    DESCRIPTION OF PROCEDURE:  The patient was taken on a hospital cart and positioned in the frogleg position.  We prepped her perineum and wounds with Betadine.  We then began by doing a local block.  We then used sharp dissection and cautery to remove the ischemic flaps, to some extent on the lateral/leg portion of this right labial defect as well as the lower enteritis/perineal areas between the anus and the vaginal opening.  The skin edges, where we stopped, bled nicely.  We packed this back with moist saline.  The additional debridement measured 6 cm x 1 cm in 1 area and 3 cm x 0.5 cm in the other area.  Total depth and wound aspect have remained unchanged.  The patient will be taken to the PACU and then to the floor in satisfactory condition.    Anthony Nolan MD        D: 2023   T: 2023   MT: pantera    Name:     KELLY ROJAS  MRN:      [id number]        Account:        [id number]   :      1970           Procedure Date: 2023     Document: [id number]

## 2023-06-03 NOTE — ANESTHESIA CARE TRANSFER NOTE
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    Anesthesia Type:   MAC     Note:    Oropharynx: oropharynx clear of all foreign objects and nasal gatric tube in place  Level of Consciousness: awake  Oxygen Supplementation: room air    Independent Airway: airway patency satisfactory and stable  Dentition: dentition unchanged  Vital Signs Stable: post-procedure vital signs reviewed and stable  Report to RN Given: handoff report given  Patient transferred to: PACU    Handoff Report: Identifed the Patient, Identified the Reponsible Provider, Reviewed the pertinent medical history, Discussed the surgical course, Reviewed Intra-OP anesthesia mangement and issues during anesthesia, Set expectations for post-procedure period and Allowed opportunity for questions and acknowledgement of understanding      Vitals:  Vitals Value Taken Time   /73 06/03/23 1109   Temp     Pulse 75 06/03/23 1109   Resp     SpO2 93 % 06/03/23 1111   Vitals shown include unvalidated device data.    Electronically Signed By: CHRISTY BOUCHER CRNA  Ani 3, 2023  11:12 AM

## 2023-06-03 NOTE — PROGRESS NOTES
Patient upset about plan for the CHG bath tonight and the dressing change. Wanted to be notified earlier in the day about this. Pt agreeable to 2100 bath, dressing change and meds.

## 2023-06-03 NOTE — PROGRESS NOTES
PROGRESS NOTE    cc: POD#2 status post wide local debridement of right labia perineal and perianal necrotizing soft tissue infection    HPI: BP to baseline.  Wants nicotine replacement.  Would like to discharge ASAP.    Does have PTSD relating healthcare and complains of generalized anxiety as well.  Not tearful today.    EXAM:  Date 23 0700 - 23 0659   Shift 7867-0165 4902-3351 6624-6967 24 Hour Total   INTAKE   I.V. 1895   1895   Shift Total(mL/kg) 1895(22.22)   1895(22.22)   OUTPUT   Urine 875   875   Shift Total(mL/kg) 875(10.26)   875(10.26)   Weight (kg) 85.3 85.3 85.3 85.3        Temp: 97.1  F (36.2  C) Temp  Min: 97.1  F (36.2  C)  Max: 97.9  F (36.6  C)  Resp: 10 Resp  Min: 0  Max: 34  SpO2: 96 % SpO2  Min: 86 %  Max: 100 %  Pulse: 65 Pulse  Min: 51  Max: 93    No data recorded  BP: 107/49 Systolic (24hrs), Av , Min:71 , Max:132   Diastolic (24hrs), Av, Min:46, Max:80        GENERAL: alert, NAD  CV: RRR, nomurmurs  RESPIRATORY: no dyspnea, clear bilat  ABDOMEN: non-distended, +BS, soft, appropriately tender, incision c/d/i, no signs of infection  Perineum: Nurse present there is clean healthy fat throughout the base of the lateral margins of the right labial defect and gluteal/perineal defect.  There is dusky skin to the flap that is medial and adjacent to the labia/vaginal opening.  There is no order.  There is no tunneling or purulence.  EXTREMITY: no edema, neg Vahid's  SKIN: warm and dry, no jaundice norashes  NEURO: cranial nerves II-XII grossly intact, motor exam intact    LABS  Recent Labs   Lab Test 23  0521 23  0535 19  0820 18  0813 18  0820   WBC 8.3 10.1   < >  --   --    RBC 2.72* 3.06*   < >  --   --    HGB 8.3* 9.3*   < > 13.2 12.5   HCT 25.0* 27.8*   < > 38.7 35.8   MCV 92 91   < > 95 94   MCH 30.5 30.4   < > 32.5 32.6   MCHC 33.2 33.5   < > 34.1 34.9    117*   < > 199 184   MPV  --   --   --  9.8 9.8    < > = values in this interval  not displayed.       Recent Labs   Lab Test 06/03/23  0521 06/02/23  0535   POTASSIUM 3.3* 3.8   CHLORIDE 104 103   BUN 11.8 8.2       Recent Labs   Lab Test 06/01/23  1442 05/09/23  1439 10/19/21  1440 10/18/21  2357 10/18/21  2019 03/06/21  2342   PROTEIN  --   --   --  50*  --  Negative   BILITOTAL 0.5 0.2   < >  --    < >  --    AST 19 21   < >  --    < >  --    ALT 13 13   < >  --    < >  --     < > = values in this interval not displayed.       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  Regardless early debridement has been obtained.    PLAN  Continue broad-spectrum antibiotics  Clinda can likely drop off soon    Wet-to-dry dressing changes twice daily    - Transfer to [facility]    Anthony Nolan MD on 6/3/2023 at 10:40 AM

## 2023-06-03 NOTE — OR NURSING
PACU Transfer Note    Kym Perea was transferred to [facility] via bed.  Equipment used for transport:  none.  Accompanied by:  Nurse x 2  Prescriptions were: none    PACU Respiratory Event Documentation     1) Episodes of Apnea greater than or equal to 10 seconds: 0    2) Bradypnea - less than 8 breaths per minute: 0    3) Pain score on 0 to 10 scale: 0    4) Pain-sedation mismatch (yes or no): no    5) Repeated 02 desaturation less than 90% (yes or no): no    Anesthesia notified? (yes or no): no    Any of the above events occuring repeatedly in separate 30 minute intervals may be considered recurrent PACU respiratory events.    Patient stable and meets phase 1 discharge criteria for transport from PACU.    Report to Delfina MARTINEZ

## 2023-06-03 NOTE — PROGRESS NOTES
[facility]    Medicine Progress Note - Hospitalist Service    Date of Admission:  6/1/2023    Assessment & Plan   Kym Perea is a 53 year old female admitted on 6/1/2023. She presents to the [facility] clinic on June 1 with 2 days of progressively worsening pain in her perineum exquisite tenderness on her right lower buttock, chills and rigors.  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    Principal Problem:    Julio's gangrene  Assessment: present on admission. Leukocytosis, fever, elevated lactate, acute kidney injury,  procalcitonin and CRP. Exam consistent with abscess and extension into the perineum. Postoperative day # 2 local debridement of right labia perineal and perianal necrotizing soft tissue infection. Discussed with surgery, OR today for further debridement.  Plan:   Remain in ICU  Monitor intake and output, vital signs closely  Empiric clinda, zosyn and vancomycin     Hyponatremia  Assessment: present on admission, stable  Plan: monitor    Acute kidney injury   Assessment: present on admission, improved. Good urine output.   Plan: intravenous fluids     Acute anemia  Assessment: present on admission, postop  Plan: follow    Hypokalemia  Assessment: not present on admission  Plan: replace     Diet: NPO per Anesthesia Guidelines for Procedure/Surgery Except for: Meds    DVT Prophylaxis: Pneumatic Compression Devices  Leonardo Catheter: PRESENT, indication: Wound Healing  Lines: None     Cardiac Monitoring: None  Code Status: Full Code      Disposition Plan             Navneet Siegel MD  Hospitalist Service  [facility]  Securely message with [email or web address] (more info)  Text page via [employer] Paging/Directory   ______________________________________________________________________    Interval History   Pain controlled. Poor sleep    Physical Exam   Vital Signs: Temp: 97.1  F (36.2  C) Temp src: Temporal BP: 114/63 Pulse: 59   Resp: 13 SpO2: 95 % O2 Device: None  (Room air)    Weight: 187 lbs 2.73 oz    GENERAL: Comfortable, talkative, in no apparent distress.  HEENT: Anicteric, non-injected sclera, mouth moist.   NECK: No JVD.  CARDIOVASCULAR: regular rate and rhythm, no murmur. No lower extremity edema   RESPIRATORY: Clear to auscultation bilaterally, no wheezes, no crackles.  GI: Non-distended, normal bowel sounds, soft, non-tender.  NEUROLOGY: Alert and oriented x3, follows commands, speech and language normal.       Medical Decision Making     50 MINUTES SPENT BY ME on the date of service doing chart review, history, exam, documentation & further activities per the note.      Data     I have personally reviewed the following data over the past 24 hrs:    8.3  \   8.3 (L)   / 169     135 (L) 104 11.8 /  81   3.3 (L) 20 (L) 0.82 \

## 2023-06-03 NOTE — PROGRESS NOTES
Report given to Delfina MARTINEZ. Patient will be transferred to [facility] after surgery. Patient's belongings and medications sent to medical as well.

## 2023-06-03 NOTE — PLAN OF CARE
Goal Outcome Evaluation:  Pt had good pain control all night long with oral oxycodone. Pt a little confused overnight and this morning.

## 2023-06-03 NOTE — PROVIDER NOTIFICATION
06/03/23 1233   Valuables   Patient Belongings remains with patient;sent to security per site process   Patient Belongings Remaining with Patient clothing;cell phone/electronics;purse/wallet   Patient Belongings Put in Hospital Secure Location (Security or Locker, etc.) medication(s)   Did you bring any home meds/supplements to the hospital?  Yes   Disposition of meds  Pharmacy approved for patient use (see comment)     A               Admission:  I am responsible for any personal items that are not sent to the safe or pharmacy.  [facility] is not responsible for loss, theft or damage of any property in my possession.    Signature:  _________________________________ Date: _______  Time: _____                                              Staff Signature:  ____________________________ Date: ________  Time: _____      2nd Staff person, if patient is unable/unwilling to sign:    Signature: ________________________________ Date: ________  Time: _____     Discharge:  [facility] has returned all of my personal belongings:    Signature: _________________________________ Date: ________  Time: _____                                          Staff Signature:  ____________________________ Date: ________  Time: _____

## 2023-06-04 ENCOUNTER — HEALTH MAINTENANCE LETTER (OUTPATIENT)
Age: 53
End: 2023-06-04

## 2023-06-04 LAB
ANION GAP SERPL CALCULATED.3IONS-SCNC: 10 MMOL/L (ref 7–15)
BACTERIA WND CULT: ABNORMAL
BACTERIA WND CULT: ABNORMAL
BACTERIA WND CULT: NO GROWTH
BUN SERPL-MCNC: 8.2 MG/DL (ref 6–20)
CALCIUM SERPL-MCNC: 8.4 MG/DL (ref 8.6–10)
CHLORIDE SERPL-SCNC: 106 MMOL/L (ref 98–107)
CREAT SERPL-MCNC: 0.76 MG/DL (ref 0.51–0.95)
DEPRECATED HCO3 PLAS-SCNC: 23 MMOL/L (ref 22–29)
ERYTHROCYTE [DISTWIDTH] IN BLOOD BY AUTOMATED COUNT: 14.3 % (ref 10–15)
GFR SERPL CREATININE-BSD FRML MDRD: >90 ML/MIN/1.73M2
GLUCOSE BLDC GLUCOMTR-MCNC: 88 MG/DL (ref 70–99)
GLUCOSE BLDC GLUCOMTR-MCNC: 91 MG/DL (ref 70–99)
GLUCOSE SERPL-MCNC: 92 MG/DL (ref 70–99)
HCT VFR BLD AUTO: 25.6 % (ref 35–47)
HGB BLD-MCNC: 8.4 G/DL (ref 11.7–15.7)
HOLD SPECIMEN: NORMAL
HOLD SPECIMEN: NORMAL
MCH RBC QN AUTO: 30.4 PG (ref 26.5–33)
MCHC RBC AUTO-ENTMCNC: 32.8 G/DL (ref 31.5–36.5)
MCV RBC AUTO: 93 FL (ref 78–100)
PLATELET # BLD AUTO: 179 10E3/UL (ref 150–450)
POTASSIUM SERPL-SCNC: 3.8 MMOL/L (ref 3.4–5.3)
RBC # BLD AUTO: 2.76 10E6/UL (ref 3.8–5.2)
SODIUM SERPL-SCNC: 139 MMOL/L (ref 136–145)
WBC # BLD AUTO: 6.1 10E3/UL (ref 4–11)

## 2023-06-04 PROCEDURE — 250N000013 HC RX MED GY IP 250 OP 250 PS 637: Performed by: SURGERY

## 2023-06-04 PROCEDURE — 250N000011 HC RX IP 250 OP 636: Performed by: SURGERY

## 2023-06-04 PROCEDURE — 120N000001 HC R&B MED SURG/OB

## 2023-06-04 PROCEDURE — 250N000013 HC RX MED GY IP 250 OP 250 PS 637: Performed by: INTERNAL MEDICINE

## 2023-06-04 PROCEDURE — 99232 SBSQ HOSP IP/OBS MODERATE 35: CPT | Performed by: INTERNAL MEDICINE

## 2023-06-04 PROCEDURE — 99232 SBSQ HOSP IP/OBS MODERATE 35: CPT | Mod: 25 | Performed by: SURGERY

## 2023-06-04 PROCEDURE — 85027 COMPLETE CBC AUTOMATED: CPT | Performed by: SURGERY

## 2023-06-04 PROCEDURE — 36415 COLL VENOUS BLD VENIPUNCTURE: CPT | Performed by: SURGERY

## 2023-06-04 PROCEDURE — 82310 ASSAY OF CALCIUM: CPT | Performed by: SURGERY

## 2023-06-04 RX ORDER — POLYETHYLENE GLYCOL 3350 17 G/17G
17 POWDER, FOR SOLUTION ORAL DAILY
Status: DISCONTINUED | OUTPATIENT
Start: 2023-06-04 | End: 2023-06-05 | Stop reason: HOSPADM

## 2023-06-04 RX ADMIN — POLYETHYLENE GLYCOL 3350 17 G: 17 POWDER, FOR SOLUTION ORAL at 11:56

## 2023-06-04 RX ADMIN — CLINDAMYCIN PHOSPHATE 900 MG: 900 INJECTION, SOLUTION INTRAVENOUS at 10:06

## 2023-06-04 RX ADMIN — CLONAZEPAM 2 MG: 1 TABLET ORAL at 21:58

## 2023-06-04 RX ADMIN — OXYCODONE HYDROCHLORIDE 10 MG: 5 TABLET ORAL at 20:11

## 2023-06-04 RX ADMIN — FAMOTIDINE 20 MG: 20 TABLET, FILM COATED ORAL at 10:07

## 2023-06-04 RX ADMIN — DIVALPROEX SODIUM 250 MG: 250 TABLET, FILM COATED, EXTENDED RELEASE ORAL at 14:01

## 2023-06-04 RX ADMIN — DIVALPROEX SODIUM 250 MG: 250 TABLET, FILM COATED, EXTENDED RELEASE ORAL at 21:58

## 2023-06-04 RX ADMIN — NICOTINE 1 PATCH: 14 PATCH, EXTENDED RELEASE TRANSDERMAL at 10:03

## 2023-06-04 RX ADMIN — TAZOBACTAM SODIUM AND PIPERACILLIN SODIUM 4.5 G: 500; 4 INJECTION, SOLUTION INTRAVENOUS at 05:49

## 2023-06-04 RX ADMIN — DIVALPROEX SODIUM 250 MG: 250 TABLET, FILM COATED, EXTENDED RELEASE ORAL at 05:49

## 2023-06-04 RX ADMIN — VANCOMYCIN HYDROCHLORIDE 1000 MG: 1 INJECTION, SOLUTION INTRAVENOUS at 07:25

## 2023-06-04 RX ADMIN — FAMOTIDINE 20 MG: 20 TABLET, FILM COATED ORAL at 21:58

## 2023-06-04 RX ADMIN — OXYCODONE HYDROCHLORIDE 5 MG: 5 TABLET ORAL at 10:52

## 2023-06-04 RX ADMIN — AMOXICILLIN AND CLAVULANATE POTASSIUM 1 TABLET: 875; 125 TABLET, FILM COATED ORAL at 21:58

## 2023-06-04 RX ADMIN — AMOXICILLIN AND CLAVULANATE POTASSIUM 1 TABLET: 875; 125 TABLET, FILM COATED ORAL at 13:55

## 2023-06-04 RX ADMIN — ENOXAPARIN SODIUM 40 MG: 40 INJECTION SUBCUTANEOUS at 17:43

## 2023-06-04 RX ADMIN — ONDANSETRON 4 MG: 4 TABLET, ORALLY DISINTEGRATING ORAL at 10:03

## 2023-06-04 RX ADMIN — HYDROMORPHONE HYDROCHLORIDE 1 MG: 1 INJECTION, SOLUTION INTRAMUSCULAR; INTRAVENOUS; SUBCUTANEOUS at 02:30

## 2023-06-04 RX ADMIN — CLINDAMYCIN PHOSPHATE 900 MG: 900 INJECTION, SOLUTION INTRAVENOUS at 02:30

## 2023-06-04 RX ADMIN — CLONAZEPAM 2 MG: 1 TABLET ORAL at 10:07

## 2023-06-04 ASSESSMENT — ACTIVITIES OF DAILY LIVING (ADL)
ADLS_ACUITY_SCORE: 22
ADLS_ACUITY_SCORE: 23
ADLS_ACUITY_SCORE: 22
ADLS_ACUITY_SCORE: 23
ADLS_ACUITY_SCORE: 23
ADLS_ACUITY_SCORE: 22
ADLS_ACUITY_SCORE: 23
ADLS_ACUITY_SCORE: 22
ADLS_ACUITY_SCORE: 23

## 2023-06-04 NOTE — PROGRESS NOTES
[facility]    Medicine Progress Note - Hospitalist Service    Date of Admission:  6/1/2023    Assessment & Plan   Kym Perea is a 53 year old female admitted on 6/1/2023. She presents to the rapid clinic on June 1 with 2 days of progressively worsening pain in her perineum exquisite tenderness on her right lower buttock, chills and rigors.  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    Principal Problem:    Julio's gangrene  Assessment: present on admission. Leukocytosis, fever, elevated lactate, acute kidney injury,  procalcitonin and CRP. Exam consistent with abscess and extension into the perineum. Postoperative day # 3 and 1 local debridement of right labia perineal and perianal necrotizing soft tissue infection. Discussed with surgery, OR today for further debridement. Wound culture shows E coli that is pansensitive.   Plan:   Monitor intake and output, vital signs closely  Empiric clinda, zosyn and vancomycin switched to Augmentin    Hyponatremia  Assessment: present on admission, stable  Plan: monitor    Acute kidney injury   Assessment: present on admission, improved. Good urine output.   Plan: intravenous fluids     Acute anemia  Assessment: present on admission, postop  Plan: follow    Hypokalemia  Assessment: not present on admission  Plan: replace       Diet: Regular Diet Adult    DVT Prophylaxis: Enoxaparin (Lovenox) SQ  Leonardo Catheter: Not present  Lines: None     Cardiac Monitoring: None  Code Status: Full Code        Navneet Siegel MD  Hospitalist Service  [facility]  Securely message with [employer] (more info)  Text page via [employer] Paging/Directory   ______________________________________________________________________    Interval History   Having pain but controlled.    Physical Exam   Vital Signs: Temp: 97.6  F (36.4  C) Temp src: Tympanic BP: (!) 142/83 Pulse: 78   Resp: 16 SpO2: 95 % O2 Device: None (Room air)    Weight: 191 lbs 12.8  oz    GENERAL: Comfortable, no apparent distress.  CARDIOVASCULAR: regular rate and rhythm, no murmur. No lower extremity edema   RESPIRATORY: Clear to auscultation bilaterally, no wheezes or crackles.  GI: non-tender, non-distended, normal bowel sounds.   SKIN: warm periphery, no rashes      Medical Decision Making   35 MINUTES SPENT BY ME on the date of service doing chart review, history, exam, documentation & further activities per the note.      Data     I have personally reviewed the following data over the past 24 hrs:    6.1  \   8.4 (L)   / 179     139 106 8.2 /  91   3.8 23 0.76 \       Imaging results reviewed over the past 24 hrs:   No results found for this or any previous visit (from the past 24 hour(s)).

## 2023-06-04 NOTE — PLAN OF CARE
"Goal Outcome Evaluation:      Plan of Care Reviewed With: patient    Overall Patient Progress: no change       Assumed care of Patient at 2300. Patient was very confused, disoriented to situation and where she was. Vitally stable, lungs are clear. Denied anything for pain at that time. Wound packing/ wet to dry dressing done at 2345. Patient tolerated well. Overnight patient became very agitated insisting to leave and that \"no one told me I had to stay here\". Patient was re-oriented and educated on why she needed to stay overnight. After much encouragement patient got back into bed and rested the remainder of the night.   "

## 2023-06-04 NOTE — PROGRESS NOTES
PROGRESS NOTE    cc: POD# 3 status post wide local debridement of right labia perineal and perianal necrotizing soft tissue infection    HPI: Motivated to discharge.  Daughter will help with home dressing changes.     Does have PTSD relating healthcare and complains of generalized anxiety as well.  Not tearful today.    EXAM:  Date 23 0700 - 23 0659   Shift 6056-4705 7306-1675 3139-7697 24 Hour Total   INTAKE   I.V. 1895   1895   Shift Total(mL/kg) 1895(22.22)   1895(22.22)   OUTPUT   Urine 875   875   Shift Total(mL/kg) 875(10.26)   875(10.26)   Weight (kg) 85.3 85.3 85.3 85.3        Temp: 97.6  F (36.4  C) Temp  Min: 96.8  F (36  C)  Max: 98.2  F (36.8  C)  Resp: 16 Resp  Min: 16  Max: 20  SpO2: 95 % SpO2  Min: 95 %  Max: 99 %  Pulse: 78 Pulse  Min: 61  Max: 78    No data recorded  BP: (!) 142/83 Systolic (24hrs), Av , Min:93 , Max:142   Diastolic (24hrs), Av, Min:52, Max:83        GENERAL: alert, NAD  CV: RRR, nomurmurs  RESPIRATORY: no dyspnea, clear bilat  ABDOMEN: non-distended, +BS, soft, appropriately tender, incision c/d/i, no signs of infection  Perineum: Dressing changed by RN. There is no order.  There is no tunneling or purulence.  EXTREMITY: no edema, neg Vahid's  SKIN: warm and dry, no jaundice norashes  NEURO: cranial nerves II-XII grossly intact, motor exam intact    LABS  Recent Labs   Lab Test 23  0549 23  0521 19  0820 18  0813 18  0820   WBC 6.1 8.3   < >  --   --    RBC 2.76* 2.72*   < >  --   --    HGB 8.4* 8.3*   < > 13.2 12.5   HCT 25.6* 25.0*   < > 38.7 35.8   MCV 93 92   < > 95 94   MCH 30.4 30.5   < > 32.5 32.6   MCHC 32.8 33.2   < > 34.1 34.9    169   < > 199 184   MPV  --   --   --  9.8 9.8    < > = values in this interval not displayed.       Recent Labs   Lab Test 23  0549 23  1647 23  0521   POTASSIUM 3.8 4.2 3.3*   CHLORIDE 106  --  104   BUN 8.2  --  11.8       Recent Labs   Lab Test 23  1442  05/09/23  1439 10/19/21  1440 10/18/21  2357 10/18/21  2019 03/06/21  2342   PROTEIN  --   --   --  50*  --  Negative   BILITOTAL 0.5 0.2   < >  --    < >  --    AST 19 21   < >  --    < >  --    ALT 13 13   < >  --    < >  --     < > = values in this interval not displayed.       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  Regardless early debridement has been obtained.    PLAN  Transition to oral abx    Wet-to-dry dressing changes twice daily  Discharge home in 1-2 days       Anthony Nolan MD

## 2023-06-04 NOTE — PLAN OF CARE
Afebrile,vss. Alert, intermittent confusion that seems to be improving. Lungs clear, remains room air. X 1 BM this shift. Leonardo removed, voids spontaneously now.    Wound right side of labia down to right buttock,wdl, small specks of black present from cauterization, MD Nolan aware, wet to dry dressing placed, pain rated 5-8/10, PRN oxycodone and scheduled zubsolv given. Loss of IV access, switched to oral antibiotics.   Pt ambulates in room independently, SBA in halls.    /71   Pulse 68   Temp 97.2  F (36.2  C) (Tympanic)   Resp 20   Wt 87 kg (191 lb 12.8 oz)   LMP  (LMP Unknown)   SpO2 97%   BMI 28.32 kg/m        Goal Outcome Evaluation:      Plan of Care Reviewed With: patient    Overall Patient Progress: improvingOverall Patient Progress: improving       Problem: Plan of Care - These are the overarching goals to be used throughout the patient stay.    Goal: Patient-Specific Goal (Individualized)  Outcome: Progressing  Flowsheets (Taken 6/4/2023 1412)  Individualized Care Needs: wound to groin/buttocks  Anxieties, Fears or Concerns: generalized anxiety  Patient/Family-Specific Goals (Include Timeframe): monitor vitals and pain       Problem: Plan of Care - These are the overarching goals to be used throughout the patient stay.    Goal: Absence of Hospital-Acquired Illness or Injury  Intervention: Identify and Manage Fall Risk  Recent Flowsheet Documentation  Taken 6/4/2023 1408 by Delifna Vyas, RN  Safety Promotion/Fall Prevention:   activity supervised   clutter free environment maintained   nonskid shoes/slippers when out of bed   safety round/check completed  Taken 6/4/2023 1000 by Delfina Vyas, RN  Safety Promotion/Fall Prevention:   activity supervised   clutter free environment maintained   nonskid shoes/slippers when out of bed   safety round/check completed       Problem: Plan of Care - These are the overarching goals to be used throughout the patient stay.    Goal: Absence of  Hospital-Acquired Illness or Injury  Intervention: Prevent and Manage VTE (Venous Thromboembolism) Risk  Recent Flowsheet Documentation  Taken 6/4/2023 1408 by Delfina Vyas RN  VTE Prevention/Management:   SCDs (sequential compression devices) off   patient refused intervention  Taken 6/4/2023 1000 by Delfina Vyas RN  VTE Prevention/Management:   SCDs (sequential compression devices) off   patient refused intervention     Problem: Plan of Care - These are the overarching goals to be used throughout the patient stay.    Goal: Optimal Comfort and Wellbeing  Intervention: Monitor Pain and Promote Comfort  Recent Flowsheet Documentation  Taken 6/4/2023 1401 by Delfina Vyas, RN  Pain Management Interventions: medication (see MAR)  Taken 6/4/2023 0724 by Delfina Vyas RN  Pain Management Interventions: medication (see MAR)

## 2023-06-04 NOTE — PROGRESS NOTES
"Pt reports missing jewelery since admission, none recorded before med/surg transfer. Pt does have small cup that was given to her in surgery containing some body jewelry but \"not all of it. I had 2 [employer] bags with me that had all my jewelry\". Please note pt also is intermittently confused. ICU called to ask if it was in pt's room before transfer room, none found.  "

## 2023-06-05 VITALS
TEMPERATURE: 98.3 F | BODY MASS INDEX: 28.32 KG/M2 | OXYGEN SATURATION: 94 % | HEART RATE: 84 BPM | RESPIRATION RATE: 18 BRPM | SYSTOLIC BLOOD PRESSURE: 101 MMHG | WEIGHT: 191.8 LBS | DIASTOLIC BLOOD PRESSURE: 61 MMHG

## 2023-06-05 LAB
ANION GAP SERPL CALCULATED.3IONS-SCNC: 10 MMOL/L (ref 7–15)
BUN SERPL-MCNC: 5.6 MG/DL (ref 6–20)
CALCIUM SERPL-MCNC: 8 MG/DL (ref 8.6–10)
CHLORIDE SERPL-SCNC: 107 MMOL/L (ref 98–107)
CREAT SERPL-MCNC: 0.7 MG/DL (ref 0.51–0.95)
DEPRECATED HCO3 PLAS-SCNC: 24 MMOL/L (ref 22–29)
ERYTHROCYTE [DISTWIDTH] IN BLOOD BY AUTOMATED COUNT: 14.3 % (ref 10–15)
GFR SERPL CREATININE-BSD FRML MDRD: >90 ML/MIN/1.73M2
GLUCOSE SERPL-MCNC: 88 MG/DL (ref 70–99)
HCT VFR BLD AUTO: 27.5 % (ref 35–47)
HGB BLD-MCNC: 8.9 G/DL (ref 11.7–15.7)
MCH RBC QN AUTO: 29.9 PG (ref 26.5–33)
MCHC RBC AUTO-ENTMCNC: 32.4 G/DL (ref 31.5–36.5)
MCV RBC AUTO: 92 FL (ref 78–100)
PLATELET # BLD AUTO: 260 10E3/UL (ref 150–450)
POTASSIUM SERPL-SCNC: 3.7 MMOL/L (ref 3.4–5.3)
RBC # BLD AUTO: 2.98 10E6/UL (ref 3.8–5.2)
SODIUM SERPL-SCNC: 141 MMOL/L (ref 136–145)
WBC # BLD AUTO: 8.4 10E3/UL (ref 4–11)

## 2023-06-05 PROCEDURE — 250N000013 HC RX MED GY IP 250 OP 250 PS 637: Performed by: SURGERY

## 2023-06-05 PROCEDURE — 82310 ASSAY OF CALCIUM: CPT | Performed by: SURGERY

## 2023-06-05 PROCEDURE — 250N000013 HC RX MED GY IP 250 OP 250 PS 637: Performed by: INTERNAL MEDICINE

## 2023-06-05 PROCEDURE — 99239 HOSP IP/OBS DSCHRG MGMT >30: CPT | Performed by: INTERNAL MEDICINE

## 2023-06-05 PROCEDURE — 99232 SBSQ HOSP IP/OBS MODERATE 35: CPT | Mod: 25 | Performed by: SURGERY

## 2023-06-05 PROCEDURE — 36415 COLL VENOUS BLD VENIPUNCTURE: CPT | Performed by: SURGERY

## 2023-06-05 PROCEDURE — 250N000011 HC RX IP 250 OP 636: Performed by: SURGERY

## 2023-06-05 PROCEDURE — 85027 COMPLETE CBC AUTOMATED: CPT | Performed by: SURGERY

## 2023-06-05 RX ORDER — SACCHAROMYCES BOULARDII 250 MG
250 CAPSULE ORAL 2 TIMES DAILY
Qty: 30 CAPSULE | Refills: 0 | Status: SHIPPED | OUTPATIENT
Start: 2023-06-05 | End: 2023-12-19

## 2023-06-05 RX ORDER — OXYCODONE HYDROCHLORIDE 5 MG/1
5-10 TABLET ORAL EVERY 4 HOURS PRN
Qty: 8 TABLET | Refills: 0 | Status: SHIPPED | OUTPATIENT
Start: 2023-06-05 | End: 2023-06-14

## 2023-06-05 RX ADMIN — FAMOTIDINE 20 MG: 20 TABLET, FILM COATED ORAL at 10:17

## 2023-06-05 RX ADMIN — NICOTINE 1 PATCH: 14 PATCH, EXTENDED RELEASE TRANSDERMAL at 10:20

## 2023-06-05 RX ADMIN — OXYCODONE HYDROCHLORIDE 10 MG: 5 TABLET ORAL at 00:08

## 2023-06-05 RX ADMIN — CLONAZEPAM 2 MG: 1 TABLET ORAL at 10:18

## 2023-06-05 RX ADMIN — DIVALPROEX SODIUM 250 MG: 250 TABLET, FILM COATED, EXTENDED RELEASE ORAL at 05:38

## 2023-06-05 RX ADMIN — AMOXICILLIN AND CLAVULANATE POTASSIUM 1 TABLET: 875; 125 TABLET, FILM COATED ORAL at 10:18

## 2023-06-05 RX ADMIN — OXYCODONE HYDROCHLORIDE 10 MG: 5 TABLET ORAL at 10:54

## 2023-06-05 RX ADMIN — OXYCODONE HYDROCHLORIDE 10 MG: 5 TABLET ORAL at 04:08

## 2023-06-05 RX ADMIN — ONDANSETRON 4 MG: 4 TABLET, ORALLY DISINTEGRATING ORAL at 04:08

## 2023-06-05 ASSESSMENT — ACTIVITIES OF DAILY LIVING (ADL)
ADLS_ACUITY_SCORE: 23
ADLS_ACUITY_SCORE: 20
ADLS_ACUITY_SCORE: 20
ADLS_ACUITY_SCORE: 23

## 2023-06-05 NOTE — PROGRESS NOTES
:     Patient will need daily dressing changes. Reached out to [facility] and [facility].     ROSA Barahona on 6/5/2023 at 9:21 AM    Patient will need to be homebound per patient's insurance. Spoke with patient and she is agreeable to being homebound. Patient gave updated address as [address] [place] [address]. Updated [facility] of her updated address.     ROSA Barahona on 6/5/2023 at 10:38 AM    [facility] will be able to start daily dressing changes at patient's [place] address.     ROSA Barahona on 6/5/2023 at 10:54 AM    Faxed orders to [facility]. Patient is anticipated to discharge today. No further needs from .     ROSA Barahona on 6/5/2023 at 11:56 AM

## 2023-06-05 NOTE — PHARMACY - DISCHARGE MEDICATION RECONCILIATION AND EDUCATION
Pharmacy:  Discharge Counseling and Medication Reconciliation    Kym Perea  [address]   [place] [address]  [phone] (home)   53 year old female  PCP: Eryn Hurley    Allergies: Diclofenac-misoprostol, Indomethacin, Meloxicam, and Moxifloxacin    Discharge Counseling:    Pharmacist met with patient (and/or family) today to review the medication portion of the After Visit Summary (with an emphasis on NEW medications) and to address patient's questions/concerns.    Summary of Education: met with patient to discuss administration, duration and side effects of new medications.  Patient consulted on taking oxycodone and zubsolv together.  All questions answered.    Materials Provided:  MedCounselor sheets printed from Clinical Pharmacology on: augmentin, nicotine patches, florastor, oxycodone    Discharge Medication Reconciliation:    It has been determined that the patient has an adequate supply of medications available or which can be obtained from the patient's preferred pharmacy, which HE/SHE has confirmed as: [facility]     Thank you for the consult.    Alma Kay RPH........June 5, 2023 10:27 AM

## 2023-06-05 NOTE — PLAN OF CARE
Goal Outcome Evaluation  Pt has been alert and orientated. VSS. Afebrile. Pain controlled with PRN Oxycodone. Education on dressing changes provided. Ambulating Ind. Urine output adequate. Appetite appropriate.     Problem: Plan of Care - These are the overarching goals to be used throughout the patient stay.    Goal: Plan of Care Review  Description: The Plan of Care Review/Shift note should be completed every shift.  The Outcome Evaluation is a brief statement about your assessment that the patient is improving, declining, or no change.  This information will be displayed automatically on your shift note.  Outcome: Adequate for Care Transition  Goal: Patient-Specific Goal (Individualized)  Outcome: Adequate for Care Transition  Goal: Absence of Hospital-Acquired Illness or Injury  Outcome: Adequate for Care Transition  Intervention: Identify and Manage Fall Risk  Recent Flowsheet Documentation  Taken 6/5/2023 0720 by Effie Casas RN  Safety Promotion/Fall Prevention:   clutter free environment maintained   nonskid shoes/slippers when out of bed   safety round/check completed  Intervention: Prevent and Manage VTE (Venous Thromboembolism) Risk  Recent Flowsheet Documentation  Taken 6/5/2023 0720 by Effie Casas RN  VTE Prevention/Management:   SCDs (sequential compression devices) off   patient refused intervention  Goal: Optimal Comfort and Wellbeing  Outcome: Adequate for Care Transition  Intervention: Monitor Pain and Promote Comfort  Recent Flowsheet Documentation  Taken 6/5/2023 1139 by Effie Casas RN  Pain Management Interventions: medication (see MAR)  Taken 6/5/2023 0720 by Effie Casas, RN  Pain Management Interventions: rest

## 2023-06-05 NOTE — PROGRESS NOTES
Education/instruction completed on pts wound care. Pt was able to effectively cleanse and pack wound independently. Pts questions were answered. Pt stated she feels competent completing dressing changes independently at home once daily/as needed after discharge.   Effie Casas RN on 6/5/2023 at 11:39 AM

## 2023-06-05 NOTE — PROGRESS NOTES
"Pt reported missing a \"claires bag full of earrings, a bottle of perfume and 303$ in cash.\"   Previous note found in chart about pt reporting missing earrings but none were found when RN spoke to ICU RN. No mention of cash, jewelry or perfume found in pts belongings list since admission to [facility].   Pts home medications removed from lock box and returned to pt at discharge.  Pt has been intermittently confused since admission but has been alert and orientated X4 today.   This writer reported situation house supervisor, Dana MARTINEZ, and to infection preventionist, JUAN Gutierrez, due to Aleksandr Gastelum being unavailable.   Effie Casas RN on 6/5/2023 at 12:49 PM    "

## 2023-06-05 NOTE — PROGRESS NOTES
PROGRESS NOTE    cc: POD# 4 status post wide local debridement of right labia perineal and perianal necrotizing soft tissue infection    HPI: Patient notes some confusion after taking medications last night.  Discussing logistics of home wound care.    Does have PTSD relating healthcare and complains of generalized anxiety as well.  Not tearful today.    EXAM:  Date 23 0700 - 23 0659   Shift 5963-0913 1626-0359 8404-4676 24 Hour Total   INTAKE   I.V. 1895   1895   Shift Total(mL/kg) 1895(22.22)   1895(22.22)   OUTPUT   Urine 875   875   Shift Total(mL/kg) 875(10.26)   875(10.26)   Weight (kg) 85.3 85.3 85.3 85.3        Temp: 98.3  F (36.8  C) Temp  Min: 97.2  F (36.2  C)  Max: 100.1  F (37.8  C)  Resp: 18 Resp  Min: 16  Max: 20  SpO2: 94 % SpO2  Min: 94 %  Max: 97 %  Pulse: 84 Pulse  Min: 68  Max: 96    No data recorded  BP: 101/61 Systolic (24hrs), Av , Min:101 , Max:116   Diastolic (24hrs), Av, Min:61, Max:75        GENERAL: alert, NAD  CV: RRR, nomurmurs  RESPIRATORY: no dyspnea, clear bilat  ABDOMEN: non-distended, +BS, soft, appropriately tender, incision c/d/i, no signs of infection  Perineum: Dressing changed by RN. There is no order.  There is no tunneling or purulence.  EXTREMITY: no edema, neg Vahid's  SKIN: warm and dry, no jaundice norashes  NEURO: cranial nerves II-XII grossly intact, motor exam intact    LABS  Recent Labs   Lab Test 23  0538 23  0549 19  0820 18  0813 18  0820   WBC 8.4 6.1   < >  --   --    RBC 2.98* 2.76*   < >  --   --    HGB 8.9* 8.4*   < > 13.2 12.5   HCT 27.5* 25.6*   < > 38.7 35.8   MCV 92 93   < > 95 94   MCH 29.9 30.4   < > 32.5 32.6   MCHC 32.4 32.8   < > 34.1 34.9    179   < > 199 184   MPV  --   --   --  9.8 9.8    < > = values in this interval not displayed.       Recent Labs   Lab Test 23  0538 23  0549   POTASSIUM 3.7 3.8   CHLORIDE 107 106   BUN 5.6* 8.2       Recent Labs   Lab Test 23  1442  05/09/23  1439 10/19/21  1440 10/18/21  2357 10/18/21  2019 03/06/21  2342   PROTEIN  --   --   --  50*  --  Negative   BILITOTAL 0.5 0.2   < >  --    < >  --    AST 19 21   < >  --    < >  --    ALT 13 13   < >  --    < >  --     < > = values in this interval not displayed.       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  Regardless early debridement has been obtained.    PLAN  Discharge home    Plan for delayed primary closure in 2 to 3 weeks.    Anthony Nolan MD on 6/5/2023 at 10:00 AM

## 2023-06-05 NOTE — DISCHARGE SUMMARY
"[facility]  Hospitalist Discharge Summary      Date of Admission:  6/1/2023  Date of Discharge:  6/5/2023  Discharging Provider: Navneet Siegel MD  Discharge Service: Hospitalist Service    Discharge Diagnoses   Principal Problem:    Julio's gangrene  hyponatremia  Acute kidney injury  Acute anemia, postop  Hypokalemia     Clinically Significant Risk Factors     # Overweight: Estimated body mass index is 28.32 kg/m  as calculated from the following:    Height as of an earlier encounter on 6/1/23: 1.753 m (5' 9\").    Weight as of this encounter: 87 kg (191 lb 12.8 oz).       Follow-ups Needed After Discharge   Follow-up Appointments     Follow-up and recommended labs and tests       Post op apt on 6/14 @ 8:30am with Dr. Nolan..             Discharge Disposition   Discharged to home  Condition at discharge: Stable    Hospital Course   Kym Perea is a 53 year old female admitted on 6/1/2023. She presents to the rapid clinic on June 1 with 2 days of progressively worsening pain in her perineum exquisite tenderness on her right lower buttock, chills and rigors.  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    Principal Problem:    Julio's gangrene  Assessment: present on admission. Leukocytosis, fever, elevated lactate, acute kidney injury,  procalcitonin and CRP. Exam consistent with abscess and extension into the perineum. Postoperative day # 3 and 1 local debridement of right labia perineal and perianal necrotizing soft tissue infection. Discussed with surgery, OR today for further debridement. Wound culture shows E coli that is pansensitive.   Empiric clinda, zosyn and vancomycin switched to Augmentin for discharge. Planned delayed closure with Dr. Nolan in 2-3 weeks.   Wound care dressing changes BID.     Hyponatremia  Assessment: present on admission, stable, resolved    Acute kidney injury   Assessment: present on admission, improved. Good urine output.     Acute " anemia  Assessment: present on admission, postop. Stable    Hypokalemia  Assessment: not present on admission, resolved    Consultations This Hospital Stay   PHARMACY TO [facility] Advisor Client Match IP CONSULT    Code Status   Full Code    Time Spent on this Encounter   I, Navneet Siegel MD, personally saw the patient today and spent greater than 30 minutes discharging this patient.       Navneet Siegel MD  [facility]  [address]  [place] [address]  Phone: [phone]  Fax: [phone]  ______________________________________________________________________    Physical Exam   Vital Signs: Temp: 98.3  F (36.8  C) Temp src: Tympanic BP: 101/61 Pulse: 84   Resp: 18 SpO2: 94 % O2 Device: None (Room air)    Weight: 191 lbs 12.8 oz  GENERAL: Comfortable, no apparent distress.  CARDIOVASCULAR: regular rate and rhythm, no murmur. No lower extremity edema   RESPIRATORY: Clear to auscultation bilaterally, no wheezes or crackles.  GI: non-tender, non-distended, normal bowel sounds.   SKIN: warm periphery, no rashes         Primary Care Physician   Eryn Hurley    Discharge Orders      [place] [facility] Referral      Home Care Referral      Reason for your hospital stay    Julio's gangrene     Follow-up and recommended labs and tests     Post op apt on 6/14 @ 8:30am with Dr. Nolan..     Activity    Your activity upon discharge: activity as tolerated     Wound care and dressings    Instructions to care for your wound at home: wet to dry dressing changes twice daily.     Diet    Follow this diet upon discharge: Orders Placed This Encounter      Regular Diet Adult       Significant Results and Procedures   Most Recent 3 CBC's:Recent Labs   Lab Test 06/05/23  0538 06/04/23  0549 06/03/23  0521   WBC 8.4 6.1 8.3   HGB 8.9* 8.4* 8.3*   MCV 92 93 92    179 169     Most Recent 3 BMP's:Recent Labs   Lab Test 06/05/23  0538 06/04/23  0734 06/04/23  0549 06/04/23  0542  06/03/23  1647 06/03/23  0521     --  139  --   --  135*   POTASSIUM 3.7  --  3.8  --  4.2 3.3*   CHLORIDE 107  --  106  --   --  104   CO2 24 --  23  --   --  20*   BUN 5.6*  --  8.2  --   --  11.8   CR 0.70  --  0.76  --   --  0.82   ANIONGAP 10  --  10  --   --  11   NORMA 8.0*  --  8.4*  --   --  8.3*   GLC 88 91 92   < >  --  81    < > = values in this interval not displayed.     Most Recent 2 LFT's:Recent Labs   Lab Test 06/01/23  1442 05/09/23  1439   AST 19 21   ALT 13 13   ALKPHOS 190* 112*   BILITOTAL 0.5 0.2   ,   Results for orders placed or performed during the hospital encounter of 10/18/21   CT Head w/o Contrast    Narrative    PROCEDURE: CT HEAD W/O CONTRAST     HISTORY: Mental status change, unknown cause.    COMPARISON: None.    TECHNIQUE:  Helical images of the head from the foramen magnum to the  vertex were obtained without contrast.    FINDINGS: The ventricles and sulci are normal in volume. No acute  intracranial hemorrhage, mass effect, midline shift, hydrocephalus or  basilar cystern effacement are present.    The grey-white matter interface is preserved.    The calvarium is intact. The mastoid air cells are clear.  The  visualized paranasal sinuses are clear.      Impression    IMPRESSION: Normal brain      DAISHA MONIQUE MD         SYSTEM ID:  [id number]   XR Shoulder Right 2 Views    Narrative    PROCEDURE:  XR SHOULDER 2 VIEW RIGHT    HISTORY: fall pain    COMPARISON:  None.    TECHNIQUE:  2 views of the right shoulder were obtained.    FINDINGS:  No fracture or dislocation is identified. The joint spaces  are preserved.        Impression    IMPRESSION: No acute fracture.      DAISHA MONIQUE MD         SYSTEM ID:  [id number]       Discharge Medications   Current Discharge Medication List      START taking these medications    Details   amoxicillin-clavulanate (AUGMENTIN) 875-125 MG tablet Take 1 tablet by mouth every 12 hours for 10 days  Qty: 20 tablet, Refills: 0    Associated  Diagnoses: Julio's gangrene      nicotine (NICODERM CQ) 14 MG/24HR 24 hr patch Place 1 patch onto the skin every 24 hours for 42 days  Qty: 42 patch, Refills: 0    Associated Diagnoses: Gangrene of buttock (H); Tobacco abuse      nicotine (NICODERM CQ) 7 MG/24HR 24 hr patch Place 1 patch onto the skin every 24 hours for 14 days  Qty: 14 patch, Refills: 0    Associated Diagnoses: Gangrene of buttock (H); Tobacco abuse      oxyCODONE (ROXICODONE) 5 MG tablet Take 1-2 tablets (5-10 mg) by mouth every 4 hours as needed for moderate pain or severe pain  Qty: 8 tablet, Refills: 0    Associated Diagnoses: Julio's gangrene      saccharomyces boulardii (FLORASTOR) 250 MG capsule Take 1 capsule (250 mg) by mouth 2 times daily  Qty: 30 capsule, Refills: 0    Associated Diagnoses: Julio's gangrene         CONTINUE these medications which have NOT CHANGED    Details   albuterol (PROAIR HFA/PROVENTIL HFA/VENTOLIN HFA) 108 (90 Base) MCG/ACT inhaler Inhale 2 puffs into the lungs every 4 hours as needed for shortness of breath / dyspnea or wheezing  Qty: 1 Inhaler, Refills: 11    Comments: Pharmacy may dispense brand covered by insurance (Proair, or proventil or ventolin or generic albuterol inhaler)  Associated Diagnoses: Mild intermittent asthma without complication      amphetamine-dextroamphetamine (ADDERALL) 30 MG tablet Take 30 mg by mouth 2 times daily      atenolol (TENORMIN) 100 MG tablet Take 1 tablet (100 mg) by mouth daily  Qty: 90 tablet, Refills: 2    Associated Diagnoses: Paroxysmal supraventricular tachycardia (H)      buprenorphine-naloxone (ZUBSOLV) 5.7-1.4 MG sublingual tablet Place 0.25 tablets under the tongue daily      Cholecalciferol (VITAMIN D-3) 125 MCG (5000 UT) TABS Take 1 tablet by mouth daily  Qty: 90 tablet, Refills: 11    Associated Diagnoses: Vitamin D deficiency      clonazePAM (KLONOPIN) 2 MG tablet Take 1 tablet (2 mg) by mouth 2 times daily    Associated Diagnoses: Panic disorder       divalproex sodium extended-release (DEPAKOTE ER) 250 MG 24 hr tablet Take 1 tablet by mouth 3 times daily      fluticasone (FLONASE) 50 MCG/ACT spray Spray 2 sprays into both nostrils daily as needed      hydrOXYzine (VISTARIL) 25 MG capsule Take 1 capsule (25 mg) by mouth 3 times daily as needed for other (nausea)  Qty: 60 capsule, Refills: 11    Associated Diagnoses: Nausea      ibuprofen (ADVIL/MOTRIN) 800 MG tablet Take 1 tablet (800 mg) by mouth every 8 hours as needed for moderate pain (4-6)    Associated Diagnoses: Chronic bilateral low back pain with bilateral sciatica; Chronic pain disorder      ondansetron (ZOFRAN ODT) 4 MG ODT tab Take 4 mg by mouth every 8 hours as needed      tiZANidine (ZANAFLEX) 4 MG tablet TAKE 1 TABLET BY MOUTH THREE TIMES DAILY AS NEEDED MUSCLE SPASMS  Qty: 30 tablet, Refills: 2    Comments: PATIENT IS REQUESTING REFILLS PLEASE  Associated Diagnoses: Arthralgia of right temporomandibular joint           Allergies   Allergies   Allergen Reactions     Diclofenac-Misoprostol GI Disturbance     Indomethacin      Other reaction(s): Dizziness     Meloxicam GI Disturbance     Moxifloxacin

## 2023-06-05 NOTE — PLAN OF CARE
[facility] DISCHARGE NOTE    Patient discharged to home at 12:48 PM via wheel chair. Accompanied by other: Friend  and staff. Discharge instructions reviewed with patient, opportunity offered to ask questions. Prescriptions sent to patients preferred pharmacy. All belongings sent with patient.    Effie Casas RN

## 2023-06-05 NOTE — PLAN OF CARE
Pt is alert and oriented x4. VSS. Denies SOB. Patient had some nausea t/o shift. PRN zofran given and effective. C/o pain to buttock/roger area/low back. Repositioning and PRN oxy utilized. Pt is independent with transfers and ambulation. Pt did have some confusion and could not find the bathroom but she was redirectable and able to be reoriented.     Goal Outcome Evaluation:      Plan of Care Reviewed With: patient    Overall Patient Progress: improvingOverall Patient Progress: improving      Problem: Plan of Care - These are the overarching goals to be used throughout the patient stay.    Goal: Plan of Care Review  Description: The Plan of Care Review/Shift note should be completed every shift.  The Outcome Evaluation is a brief statement about your assessment that the patient is improving, declining, or no change.  This information will be displayed automatically on your shift note.  Outcome: Progressing  Flowsheets (Taken 6/5/2023 0427)  Plan of Care Reviewed With: patient  Overall Patient Progress: improving  Goal: Patient-Specific Goal (Individualized)  Outcome: Progressing  Flowsheets (Taken 6/5/2023 0427)  Individualized Care Needs: wound care, pain management  Goal: Absence of Hospital-Acquired Illness or Injury  Intervention: Identify and Manage Fall Risk  Recent Flowsheet Documentation  Taken 6/5/2023 0407 by Stevie Cordova, RN  Safety Promotion/Fall Prevention:   clutter free environment maintained   nonskid shoes/slippers when out of bed   safety round/check completed  Taken 6/4/2023 2008 by Stevie Cordoav, RN  Safety Promotion/Fall Prevention:   safety round/check completed   room organization consistent   room near nurse's station   nonskid shoes/slippers when out of bed   assistive device/personal items within reach   clutter free environment maintained   activity supervised  Intervention: Prevent and Manage VTE (Venous Thromboembolism) Risk  Recent Flowsheet Documentation  Taken 6/5/2023  0407 by Stevie Cordova RN  VTE Prevention/Management:   SCDs (sequential compression devices) off   patient refused intervention  Taken 6/4/2023 2008 by Stevie Cordova RN  VTE Prevention/Management:   SCDs (sequential compression devices) off   patient refused intervention  Goal: Optimal Comfort and Wellbeing  Intervention: Monitor Pain and Promote Comfort  Recent Flowsheet Documentation  Taken 6/5/2023 0407 by Stevie Cordova RN  Pain Management Interventions:   medication (see MAR)   repositioned  Taken 6/5/2023 0050 by Stevie Cordova RN  Pain Management Interventions:   medication (see MAR)   repositioned  Taken 6/4/2023 2240 by Stevie Cordova RN  Pain Management Interventions:   medication (see MAR)   repositioned  Taken 6/4/2023 2055 by Stevie Cordova RN  Pain Management Interventions:   medication (see MAR)   repositioned  Taken 6/4/2023 2008 by Stevie Cordova RN  Pain Management Interventions: medication (see MAR)  Goal: Readiness for Transition of Care  Outcome: Progressing  Flowsheets (Taken 6/5/2023 0427)  Anticipated Changes Related to Illness: none  Transportation Anticipated: family or friend will provide  Concerns to be Addressed: discharge planning  Intervention: Mutually Develop Transition Plan  Recent Flowsheet Documentation  Taken 6/5/2023 0427 by Stevie Cordova RN  Anticipated Changes Related to Illness: none  Transportation Anticipated: family or friend will provide  Concerns to be Addressed: discharge planning     Problem: Pain Acute  Goal: Optimal Pain Control and Function  Intervention: Develop Pain Management Plan  Recent Flowsheet Documentation  Taken 6/5/2023 0407 by Stevie Cordova RN  Pain Management Interventions:   medication (see MAR)   repositioned  Taken 6/5/2023 0050 by Stevie Cordova RN  Pain Management Interventions:   medication (see MAR)   repositioned  Taken 6/4/2023 2240 by Stevie Cordova RN  Pain  Management Interventions:   medication (see MAR)   repositioned  Taken 6/4/2023 2055 by Stevie Cordova, RN  Pain Management Interventions:   medication (see MAR)   repositioned  Taken 6/4/2023 2008 by Stevie Cordova, RN  Pain Management Interventions: medication (see MAR)  Intervention: Prevent or Manage Pain  Recent Flowsheet Documentation  Taken 6/5/2023 0407 by Stevie Cordova, RN  Medication Review/Management: medications reviewed  Taken 6/4/2023 2008 by Stevie Cordova, RN  Medication Review/Management: medications reviewed

## 2023-06-06 ENCOUNTER — PATIENT OUTREACH (OUTPATIENT)
Dept: FAMILY MEDICINE | Facility: OTHER | Age: 53
End: 2023-06-06

## 2023-06-06 ENCOUNTER — TELEPHONE (OUTPATIENT)
Dept: FAMILY MEDICINE | Facility: OTHER | Age: 53
End: 2023-06-06

## 2023-06-06 ENCOUNTER — PATIENT OUTREACH (OUTPATIENT)
Dept: CARE COORDINATION | Facility: OTHER | Age: 53
End: 2023-06-06

## 2023-06-06 DIAGNOSIS — Z53.9 PERSONS ENCOUNTERING HEALTH SERVICES FOR SPECIFIC PROCEDURES, NOT CARRIED OUT: Primary | ICD-10-CM

## 2023-06-06 LAB
BACTERIA BLD CULT: NO GROWTH
BACTERIA BLD CULT: NO GROWTH
BACTERIA WND CULT: ABNORMAL
PATH REPORT.COMMENTS IMP SPEC: NORMAL
PATH REPORT.FINAL DX SPEC: NORMAL
PATH REPORT.RELEVANT HX SPEC: NORMAL
PHOTO IMAGE: NORMAL

## 2023-06-06 PROCEDURE — G0180 MD CERTIFICATION HHA PATIENT: HCPCS | Performed by: FAMILY MEDICINE

## 2023-06-06 NOTE — TELEPHONE ENCOUNTER
Situation: Requesting ongoing SNV for wound care following surgery to remove gangrenous areas. Medication compliance concerns found on admit.      Background:  Client was hospitalized from 6/1/23 until 6/5/23 and had an I&D of perineal, labial and gluteal area to remove gangrene infection. She has a large wound that requires 2 x daily wound care of saline moistened gauze packing and cover with ABD.     Assessment:   She had a few Klonopin tabs, Atenolol, Albuterol inhaler and Zubsolv but none of her regular prescription meds and had not picked up meds that were ordered on hospital discharge. She reports she was out of several of the meds prior to being admitted to the hospital. She was weepy during visit and stated she does not have money for the copay on the meds and was in severe pain. She scored 3 on the PHQ-2 depression screen and 12 on the PHQ-9. Nicoderm patches were ordered on discharge but she is refusing them and asked the pharmacy to not fill them.  Did reach out to care coordinator Susy  at the clinic for assistance with resources for client to be able to obtain her medications.Was able to speak with daughter after admission and daughter stated she will be able to get the needed meds to client but was not sure if that would be today.        Recommendations:  Okay for SNV daily for wound care/dressing changes, med teaching and monitoring for compliance and pain management.       Provider response/orders as follows:

## 2023-06-06 NOTE — PROGRESS NOTES
Clinic Care Coordination Contact  Care Team Conversations    RN CC received call from [facility] regarding patient.  They state just taking patient on for wound care.  State that patient not able to pay for medications.  When discussing states medications she doesn't have is Depakote, Clonopin, Adderall and Ibuprofen.   We discussed and she states patient lives at [address] [place] [address] [address] [facility] in [place]. She states patient doesn't work and on disability. States that she doesn't have any minutes on her phone and only able to text.   We discussed unsure who patient is following with.  She is going there tomorrow and will check with patient. We will look to get patient scheduled with Dr. Hurley if wishing to continue to follow here as I do see follow up for surgery at [facility].  RN CC gave patient financial phone number [phone] and if patient looking to follow here they will attempt to call from Erin's phone.  They will look to call RN CC when HC is there as well.  Note to PCP to see if she has any openings to present for follow up. Waiting on reply.  Kyleigh Hurtado RN  Care Coordination

## 2023-06-06 NOTE — Clinical Note
Just looking to see if there is a follow up option for this patient. Thank you Kyleigh Hurtado RN CC

## 2023-06-06 NOTE — TELEPHONE ENCOUNTER
Surgical. Patient discharged with surgical follow-up only. No TCM call required per policy.   Sherlyn Ash RN on 6/6/2023 at 7:46 AM

## 2023-06-07 ENCOUNTER — TELEPHONE (OUTPATIENT)
Dept: FAMILY MEDICINE | Facility: OTHER | Age: 53
End: 2023-06-07

## 2023-06-07 NOTE — TELEPHONE ENCOUNTER
1) Oxycodone and Buprenorphine  - Maninder Parekh to manage buprenorphine prescription as needed   2) Clonazepam and Buprenorphine - noted, continue to monitor  3) Divalproex and Buprenorphine - noted, continue to monitor  4) Hydroxyzine and Buprenorphine - noted, continue to monitor  5) Atenolol and Tizanidine - noted, continue to monitor    Eryn Hurley MD

## 2023-06-07 NOTE — TELEPHONE ENCOUNTER
"Home Care regulation mandates that you are notified about drug discrepancies, interactions & contraindications. Response within a 24 hour timeframe is established by CMS as \"best practice\" for the delivery of home health care. Home Care is required to report if the 24 hour timeframe was met. The home health clinician will contact you again if this timeframe is not met or if the response does not address all concerns.      Situation:  You are being contacted for clarifying orders related to issues found during medication reconciliation.     Background:  The patient was admitted to [facility]. Upon admission, a med reconciliation was completed to identify any drug discrepancies, interactions or contraindications. Home Care's drug regime review has revealed significant medication issues.     Assessment:     Severe interactions noted between:    1) Oxycodone and Buprenorphine  2) Clonazepam and Buprenorphine  3) Divalproex and Buprenorphine  4) Hydroxyzine and Buprenorphine  5) Atenolol and Tizanidine      Recommendations:    Please evaluate this information and indicate below whether or not changes are required. A full copy of the patient's drug interaction/contraindications report is available upon request.      Provider response/orders as follows:    "

## 2023-06-09 ENCOUNTER — TELEPHONE (OUTPATIENT)
Dept: SURGERY | Facility: OTHER | Age: 53
End: 2023-06-09
Payer: COMMERCIAL

## 2023-06-09 ENCOUNTER — TELEPHONE (OUTPATIENT)
Dept: FAMILY MEDICINE | Facility: OTHER | Age: 53
End: 2023-06-09

## 2023-06-09 NOTE — TELEPHONE ENCOUNTER
Homecare called and patient is having a lot of pain.  It is at 11. She wants to get some pain meds.      Yuli Sierra on 6/9/2023 at 12:23 PM

## 2023-06-09 NOTE — TELEPHONE ENCOUNTER
I see the request for pain meds has been sent but the person I spoke with said Kym does not have a phone at this time and to reach out to Kelle at [phone] with Home Care.    Alicia Spain on 6/9/2023 at 4:25 PM

## 2023-06-12 ENCOUNTER — TELEPHONE (OUTPATIENT)
Dept: SURGERY | Facility: OTHER | Age: 53
End: 2023-06-12

## 2023-06-12 ENCOUNTER — TELEPHONE (OUTPATIENT)
Dept: SURGERY | Facility: OTHER | Age: 53
End: 2023-06-12
Payer: COMMERCIAL

## 2023-06-12 DIAGNOSIS — N49.3: Primary | ICD-10-CM

## 2023-06-12 RX ORDER — OXYCODONE HYDROCHLORIDE 5 MG/1
10 TABLET ORAL 2 TIMES DAILY
Qty: 24 TABLET | Refills: 0 | Status: SHIPPED | OUTPATIENT
Start: 2023-06-12 | End: 2023-06-14

## 2023-06-12 NOTE — TELEPHONE ENCOUNTER
Situation:  Patient is having severe pain, has some redness to inner right buttocks on opposite side of surgical wound and she will need more Normal Saline    Background: Home care client on services for wound care/dressing changes and pain management     Assessment:  I saw client over the weekend and she is still having severe pain in her surgical area. She received 8 Oxycodone and took the last one Saturday morning so there is no concern of her taking too many. She is stating she would even take Tramodol if you would prescribe it for her. She is having some anxiety over the right perineal/buttocks area as she feels another area may be developing there. I did not see or feel any cyst like areas but her skin is red on that side and was not red at yesterday's visit. She has a f/u visit with you on Wed and will also need more normal saline for dressing changes.         Recommendations: .Please provide NS when she comes to her visit on 6/14. Please address pain issue.     Thank you       Provider response/orders as follows:      Erin Vasquez RN on 6/12/2023 at 2:13 PM

## 2023-06-12 NOTE — TELEPHONE ENCOUNTER
Routed medication request to provider. Waiting on providers response. Massiel Gutierrez RN  ....................  6/12/2023   9:26 AM

## 2023-06-12 NOTE — TELEPHONE ENCOUNTER
Please call the patient.  After her surgery she has a lot of pain and cant sleep.       Do Gaspar on 6/12/2023 at 1:57 PM

## 2023-06-13 DIAGNOSIS — N76.82 FOURNIER'S GANGRENE IN FEMALE (H): Primary | ICD-10-CM

## 2023-06-13 RX ORDER — TRAMADOL HYDROCHLORIDE 50 MG/1
50 TABLET ORAL EVERY 6 HOURS PRN
Qty: 25 TABLET | Refills: 0 | Status: SHIPPED | OUTPATIENT
Start: 2023-06-13 | End: 2023-06-20

## 2023-06-13 NOTE — TELEPHONE ENCOUNTER
Phone lines were down so was unable to call patient. Home care nurse did send message to provider after seeing patient. Pain medication was sent to pharmacy yesterday for Oxycodone.      Disp Refills Start End MERISSA   oxyCODONE (ROXICODONE) 5 MG tablet 24 tablet 0 6/12/2023 6/18/2023 No   Sig - Route: Take 2 tablets (10 mg) by mouth 2 times daily for 6 days - Oral   Sent to pharmacy as: oxyCODONE HCl 5 MG Oral Tablet (ROXICODONE)   Class: E-Prescribe   Earliest Fill Date: 6/12/2023   Order: [id number]   E-Prescribing Status: Receipt confirmed by pharmacy (6/12/2023  9:54 AM CDT)     Massiel Gutierrez RN  ....................  6/13/2023   9:22 AM

## 2023-06-14 ENCOUNTER — OFFICE VISIT (OUTPATIENT)
Dept: SURGERY | Facility: OTHER | Age: 53
End: 2023-06-14
Attending: SURGERY
Payer: COMMERCIAL

## 2023-06-14 VITALS
HEART RATE: 80 BPM | WEIGHT: 173.4 LBS | BODY MASS INDEX: 25.61 KG/M2 | RESPIRATION RATE: 17 BRPM | SYSTOLIC BLOOD PRESSURE: 99 MMHG | OXYGEN SATURATION: 96 % | TEMPERATURE: 98.1 F | DIASTOLIC BLOOD PRESSURE: 60 MMHG

## 2023-06-14 DIAGNOSIS — N76.82 FOURNIER'S GANGRENE IN FEMALE (H): Primary | ICD-10-CM

## 2023-06-14 PROCEDURE — 99212 OFFICE O/P EST SF 10 MIN: CPT | Performed by: SURGERY

## 2023-06-14 PROCEDURE — G0463 HOSPITAL OUTPT CLINIC VISIT: HCPCS

## 2023-06-14 RX ORDER — CEFAZOLIN SODIUM 2 G/100ML
2 INJECTION, SOLUTION INTRAVENOUS
Status: CANCELLED | OUTPATIENT
Start: 2023-06-14

## 2023-06-14 RX ORDER — CEFAZOLIN SODIUM 2 G/100ML
2 INJECTION, SOLUTION INTRAVENOUS SEE ADMIN INSTRUCTIONS
Status: CANCELLED | OUTPATIENT
Start: 2023-06-14

## 2023-06-14 RX ORDER — ZINC OXIDE
OINTMENT (GRAM) TOPICAL PRN
Qty: 56 G | Refills: 1 | Status: SHIPPED | OUTPATIENT
Start: 2023-06-14 | End: 2023-12-19

## 2023-06-14 ASSESSMENT — PAIN SCALES - GENERAL: PAINLEVEL: EXTREME PAIN (9)

## 2023-06-14 NOTE — NURSING NOTE
"Chief Complaint   Patient presents with     Surgical Followup       Initial BP 99/60 (BP Location: Right arm, Patient Position: Sitting, Cuff Size: Adult Large)   Pulse 80   Temp 98.1  F (36.7  C) (Tympanic)   Resp 17   Wt 78.7 kg (173 lb 6.4 oz)   LMP  (LMP Unknown)   SpO2 96%   BMI 25.61 kg/m   Estimated body mass index is 25.61 kg/m  as calculated from the following:    Height as of 6/1/23: 1.753 m (5' 9\").    Weight as of this encounter: 78.7 kg (173 lb 6.4 oz).  Meds Reconciled: complete    Massiel Gutierrez, RN      "

## 2023-06-14 NOTE — H&P (VIEW-ONLY)
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  She has been using Ultram for pain.  Home care has been assisting in wound care.  There was concerns of redness along the left buttock.    BP 99/60 (BP Location: Right arm, Patient Position: Sitting, Cuff Size: Adult Large)   Pulse 80   Temp 98.1  F (36.7  C) (Tympanic)   Resp 17   Wt 78.7 kg (173 lb 6.4 oz)   LMP  (LMP Unknown)   SpO2 96%   BMI 25.61 kg/m      General: NAD, pleasant and cooperative with exam and interview.  Resp: CTAB  CV: RRR  Perineum : Nurse present.  There is granulation tissue in the debridement defect.  There is no sign of infection.  There is excoriation to the buttocks due to moisture.    Psychiatry: awake, alert and oriented. Appropriate affect.    Assessment/Plan:    53 year old female healing well after fourniers debridement    - Continue wet-to-dry bid    - OR for closure 6/19/23    We discussed risks benefits and alternatives to a delayed primary closure while she was in the hospital as well as today.  To review those include infection being the bleeding flap necrosis needing for ongoing wound care despite closure.  Caring for drains.  I believe that pain will be basically the same as she is having now.  We will plan on a extended stay type observation with the possibility going home the same day depending on pain control.    Anthony Nolan MD on 6/14/2023 at 11:05 AM

## 2023-06-16 ENCOUNTER — ANESTHESIA EVENT (OUTPATIENT)
Dept: SURGERY | Facility: OTHER | Age: 53
End: 2023-06-16
Payer: COMMERCIAL

## 2023-06-16 RX ORDER — ONDANSETRON 4 MG/1
4 TABLET, ORALLY DISINTEGRATING ORAL EVERY 30 MIN PRN
Status: CANCELLED | OUTPATIENT
Start: 2023-06-16

## 2023-06-16 RX ORDER — OXYCODONE HYDROCHLORIDE 5 MG/1
10 TABLET ORAL
Status: CANCELLED | OUTPATIENT
Start: 2023-06-16

## 2023-06-16 RX ORDER — ONDANSETRON 2 MG/ML
4 INJECTION INTRAMUSCULAR; INTRAVENOUS EVERY 30 MIN PRN
Status: CANCELLED | OUTPATIENT
Start: 2023-06-16

## 2023-06-16 RX ORDER — OXYCODONE HYDROCHLORIDE 5 MG/1
5 TABLET ORAL
Status: CANCELLED | OUTPATIENT
Start: 2023-06-16

## 2023-06-16 RX ORDER — FENTANYL CITRATE 50 UG/ML
25 INJECTION, SOLUTION INTRAMUSCULAR; INTRAVENOUS
Status: CANCELLED | OUTPATIENT
Start: 2023-06-16

## 2023-06-19 ENCOUNTER — HOSPITAL ENCOUNTER (OUTPATIENT)
Facility: OTHER | Age: 53
Discharge: HOME | End: 2023-06-19
Attending: SURGERY | Admitting: SURGERY
Payer: COMMERCIAL

## 2023-06-19 ENCOUNTER — ANESTHESIA (OUTPATIENT)
Dept: SURGERY | Facility: OTHER | Age: 53
End: 2023-06-19
Payer: COMMERCIAL

## 2023-06-19 VITALS
BODY MASS INDEX: 25.55 KG/M2 | RESPIRATION RATE: 16 BRPM | TEMPERATURE: 99.1 F | SYSTOLIC BLOOD PRESSURE: 108 MMHG | HEART RATE: 66 BPM | DIASTOLIC BLOOD PRESSURE: 72 MMHG | OXYGEN SATURATION: 92 % | WEIGHT: 173 LBS

## 2023-06-19 DIAGNOSIS — N49.3 FOURNIER'S GANGRENE (H): Primary | ICD-10-CM

## 2023-06-19 DIAGNOSIS — N76.82 FOURNIER'S GANGRENE IN FEMALE (H): ICD-10-CM

## 2023-06-19 PROCEDURE — 258N000003 HC RX IP 258 OP 636: Performed by: NURSE ANESTHETIST, CERTIFIED REGISTERED

## 2023-06-19 PROCEDURE — 360N000075 HC SURGERY LEVEL 2, PER MIN: Performed by: SURGERY

## 2023-06-19 PROCEDURE — 710N000010 HC RECOVERY PHASE 1, LEVEL 2, PER MIN: Performed by: SURGERY

## 2023-06-19 PROCEDURE — 250N000025 HC SEVOFLURANE, PER MIN: Performed by: SURGERY

## 2023-06-19 PROCEDURE — 250N000011 HC RX IP 250 OP 636: Performed by: NURSE ANESTHETIST, CERTIFIED REGISTERED

## 2023-06-19 PROCEDURE — 272N000001 HC OR GENERAL SUPPLY STERILE: Performed by: SURGERY

## 2023-06-19 PROCEDURE — 250N000011 HC RX IP 250 OP 636

## 2023-06-19 PROCEDURE — 999N000141 HC STATISTIC PRE-PROCEDURE NURSING ASSESSMENT: Performed by: SURGERY

## 2023-06-19 PROCEDURE — 258N000003 HC RX IP 258 OP 636

## 2023-06-19 PROCEDURE — 13160 SEC CLSR SURG WND/DEHSN XTN: CPT | Performed by: SURGERY

## 2023-06-19 PROCEDURE — 370N000017 HC ANESTHESIA TECHNICAL FEE, PER MIN: Performed by: SURGERY

## 2023-06-19 PROCEDURE — 250N000013 HC RX MED GY IP 250 OP 250 PS 637: Performed by: SURGERY

## 2023-06-19 PROCEDURE — 13160 SEC CLSR SURG WND/DEHSN XTN: CPT

## 2023-06-19 PROCEDURE — 250N000009 HC RX 250

## 2023-06-19 PROCEDURE — 250N000011 HC RX IP 250 OP 636: Performed by: SURGERY

## 2023-06-19 RX ORDER — NALOXONE HYDROCHLORIDE 0.4 MG/ML
0.4 INJECTION, SOLUTION INTRAMUSCULAR; INTRAVENOUS; SUBCUTANEOUS
Status: DISCONTINUED | OUTPATIENT
Start: 2023-06-19 | End: 2023-06-19

## 2023-06-19 RX ORDER — NALOXONE HYDROCHLORIDE 0.4 MG/ML
0.2 INJECTION, SOLUTION INTRAMUSCULAR; INTRAVENOUS; SUBCUTANEOUS
Status: DISCONTINUED | OUTPATIENT
Start: 2023-06-19 | End: 2023-06-19

## 2023-06-19 RX ORDER — NALOXONE HYDROCHLORIDE 0.4 MG/ML
0.4 INJECTION, SOLUTION INTRAMUSCULAR; INTRAVENOUS; SUBCUTANEOUS
Status: DISCONTINUED | OUTPATIENT
Start: 2023-06-19 | End: 2023-06-19 | Stop reason: HOSPADM

## 2023-06-19 RX ORDER — SODIUM CHLORIDE, SODIUM LACTATE, POTASSIUM CHLORIDE, CALCIUM CHLORIDE 600; 310; 30; 20 MG/100ML; MG/100ML; MG/100ML; MG/100ML
INJECTION, SOLUTION INTRAVENOUS CONTINUOUS
Status: DISCONTINUED | OUTPATIENT
Start: 2023-06-19 | End: 2023-06-19 | Stop reason: HOSPADM

## 2023-06-19 RX ORDER — ONDANSETRON 2 MG/ML
INJECTION INTRAMUSCULAR; INTRAVENOUS PRN
Status: DISCONTINUED | OUTPATIENT
Start: 2023-06-19 | End: 2023-06-19

## 2023-06-19 RX ORDER — NALOXONE HYDROCHLORIDE 0.4 MG/ML
0.2 INJECTION, SOLUTION INTRAMUSCULAR; INTRAVENOUS; SUBCUTANEOUS
Status: DISCONTINUED | OUTPATIENT
Start: 2023-06-19 | End: 2023-06-19 | Stop reason: HOSPADM

## 2023-06-19 RX ORDER — PROCHLORPERAZINE MALEATE 10 MG
10 TABLET ORAL EVERY 6 HOURS PRN
Status: DISCONTINUED | OUTPATIENT
Start: 2023-06-19 | End: 2023-06-19

## 2023-06-19 RX ORDER — CEFAZOLIN SODIUM/WATER 2 G/20 ML
2 SYRINGE (ML) INTRAVENOUS
Status: COMPLETED | OUTPATIENT
Start: 2023-06-19 | End: 2023-06-19

## 2023-06-19 RX ORDER — AMOXICILLIN 250 MG
1-2 CAPSULE ORAL 2 TIMES DAILY
Qty: 30 TABLET | Refills: 0 | Status: SHIPPED | OUTPATIENT
Start: 2023-06-19 | End: 2023-12-19

## 2023-06-19 RX ORDER — HYDROMORPHONE HCL IN WATER/PF 6 MG/30 ML
0.2 PATIENT CONTROLLED ANALGESIA SYRINGE INTRAVENOUS
Status: DISCONTINUED | OUTPATIENT
Start: 2023-06-19 | End: 2023-06-19 | Stop reason: HOSPADM

## 2023-06-19 RX ORDER — ACETAMINOPHEN 325 MG/1
650 TABLET ORAL EVERY 4 HOURS PRN
Qty: 100 TABLET | Refills: 0 | Status: SHIPPED | OUTPATIENT
Start: 2023-06-19

## 2023-06-19 RX ORDER — FENTANYL CITRATE 50 UG/ML
50 INJECTION, SOLUTION INTRAMUSCULAR; INTRAVENOUS EVERY 5 MIN PRN
Status: DISCONTINUED | OUTPATIENT
Start: 2023-06-19 | End: 2023-06-19 | Stop reason: HOSPADM

## 2023-06-19 RX ORDER — PROPOFOL 10 MG/ML
INJECTION, EMULSION INTRAVENOUS PRN
Status: DISCONTINUED | OUTPATIENT
Start: 2023-06-19 | End: 2023-06-19

## 2023-06-19 RX ORDER — PROCHLORPERAZINE MALEATE 10 MG
10 TABLET ORAL EVERY 6 HOURS PRN
Status: DISCONTINUED | OUTPATIENT
Start: 2023-06-19 | End: 2023-06-19 | Stop reason: HOSPADM

## 2023-06-19 RX ORDER — ONDANSETRON 4 MG/1
4 TABLET, ORALLY DISINTEGRATING ORAL EVERY 6 HOURS PRN
Status: DISCONTINUED | OUTPATIENT
Start: 2023-06-19 | End: 2023-06-19 | Stop reason: HOSPADM

## 2023-06-19 RX ORDER — OXYCODONE HYDROCHLORIDE 5 MG/1
5 TABLET ORAL EVERY 4 HOURS PRN
Status: DISCONTINUED | OUTPATIENT
Start: 2023-06-19 | End: 2023-06-19 | Stop reason: HOSPADM

## 2023-06-19 RX ORDER — LIDOCAINE 40 MG/G
CREAM TOPICAL
Status: DISCONTINUED | OUTPATIENT
Start: 2023-06-19 | End: 2023-06-19 | Stop reason: HOSPADM

## 2023-06-19 RX ORDER — HYDROMORPHONE HCL IN WATER/PF 6 MG/30 ML
0.2 PATIENT CONTROLLED ANALGESIA SYRINGE INTRAVENOUS EVERY 5 MIN PRN
Status: DISCONTINUED | OUTPATIENT
Start: 2023-06-19 | End: 2023-06-19 | Stop reason: HOSPADM

## 2023-06-19 RX ORDER — HYDROMORPHONE HYDROCHLORIDE 1 MG/ML
0.5 INJECTION, SOLUTION INTRAMUSCULAR; INTRAVENOUS; SUBCUTANEOUS
Status: DISCONTINUED | OUTPATIENT
Start: 2023-06-19 | End: 2023-06-19 | Stop reason: HOSPADM

## 2023-06-19 RX ORDER — OXYCODONE HYDROCHLORIDE 5 MG/1
5-10 TABLET ORAL
Qty: 30 TABLET | Refills: 0 | Status: SHIPPED | OUTPATIENT
Start: 2023-06-19 | End: 2023-12-19

## 2023-06-19 RX ORDER — HYDROMORPHONE HCL IN WATER/PF 6 MG/30 ML
0.4 PATIENT CONTROLLED ANALGESIA SYRINGE INTRAVENOUS EVERY 5 MIN PRN
Status: DISCONTINUED | OUTPATIENT
Start: 2023-06-19 | End: 2023-06-19 | Stop reason: HOSPADM

## 2023-06-19 RX ORDER — KETAMINE HYDROCHLORIDE 10 MG/ML
INJECTION INTRAMUSCULAR; INTRAVENOUS PRN
Status: DISCONTINUED | OUTPATIENT
Start: 2023-06-19 | End: 2023-06-19

## 2023-06-19 RX ORDER — DEXAMETHASONE SODIUM PHOSPHATE 4 MG/ML
INJECTION, SOLUTION INTRA-ARTICULAR; INTRALESIONAL; INTRAMUSCULAR; INTRAVENOUS; SOFT TISSUE PRN
Status: DISCONTINUED | OUTPATIENT
Start: 2023-06-19 | End: 2023-06-19

## 2023-06-19 RX ORDER — ONDANSETRON 4 MG/1
4 TABLET, ORALLY DISINTEGRATING ORAL EVERY 6 HOURS PRN
Status: DISCONTINUED | OUTPATIENT
Start: 2023-06-19 | End: 2023-06-19

## 2023-06-19 RX ORDER — CEFAZOLIN SODIUM/WATER 2 G/20 ML
2 SYRINGE (ML) INTRAVENOUS SEE ADMIN INSTRUCTIONS
Status: DISCONTINUED | OUTPATIENT
Start: 2023-06-19 | End: 2023-06-19 | Stop reason: HOSPADM

## 2023-06-19 RX ORDER — ONDANSETRON 2 MG/ML
4 INJECTION INTRAMUSCULAR; INTRAVENOUS EVERY 6 HOURS PRN
Status: DISCONTINUED | OUTPATIENT
Start: 2023-06-19 | End: 2023-06-19 | Stop reason: HOSPADM

## 2023-06-19 RX ORDER — BUPIVACAINE HYDROCHLORIDE 2.5 MG/ML
INJECTION, SOLUTION INFILTRATION; PERINEURAL PRN
Status: DISCONTINUED | OUTPATIENT
Start: 2023-06-19 | End: 2023-06-19 | Stop reason: HOSPADM

## 2023-06-19 RX ORDER — ONDANSETRON 2 MG/ML
4 INJECTION INTRAMUSCULAR; INTRAVENOUS EVERY 6 HOURS PRN
Status: DISCONTINUED | OUTPATIENT
Start: 2023-06-19 | End: 2023-06-19

## 2023-06-19 RX ORDER — LIDOCAINE HYDROCHLORIDE 20 MG/ML
INJECTION, SOLUTION INFILTRATION; PERINEURAL PRN
Status: DISCONTINUED | OUTPATIENT
Start: 2023-06-19 | End: 2023-06-19

## 2023-06-19 RX ORDER — HYDROXYZINE PAMOATE 25 MG/1
25 CAPSULE ORAL EVERY 6 HOURS PRN
Status: DISCONTINUED | OUTPATIENT
Start: 2023-06-19 | End: 2023-06-19 | Stop reason: HOSPADM

## 2023-06-19 RX ORDER — IBUPROFEN 600 MG/1
600 TABLET, FILM COATED ORAL EVERY 6 HOURS PRN
Status: DISCONTINUED | OUTPATIENT
Start: 2023-06-19 | End: 2023-06-19 | Stop reason: HOSPADM

## 2023-06-19 RX ORDER — ONDANSETRON 4 MG/1
4 TABLET, ORALLY DISINTEGRATING ORAL EVERY 30 MIN PRN
Status: DISCONTINUED | OUTPATIENT
Start: 2023-06-19 | End: 2023-06-19 | Stop reason: HOSPADM

## 2023-06-19 RX ORDER — FENTANYL CITRATE 50 UG/ML
INJECTION, SOLUTION INTRAMUSCULAR; INTRAVENOUS PRN
Status: DISCONTINUED | OUTPATIENT
Start: 2023-06-19 | End: 2023-06-19

## 2023-06-19 RX ORDER — FENTANYL CITRATE 50 UG/ML
25 INJECTION, SOLUTION INTRAMUSCULAR; INTRAVENOUS EVERY 5 MIN PRN
Status: DISCONTINUED | OUTPATIENT
Start: 2023-06-19 | End: 2023-06-19 | Stop reason: HOSPADM

## 2023-06-19 RX ORDER — FLUMAZENIL 0.1 MG/ML
0.2 INJECTION, SOLUTION INTRAVENOUS
Status: DISCONTINUED | OUTPATIENT
Start: 2023-06-19 | End: 2023-06-19 | Stop reason: HOSPADM

## 2023-06-19 RX ORDER — EPHEDRINE SULFATE 50 MG/ML
INJECTION, SOLUTION INTRAMUSCULAR; INTRAVENOUS; SUBCUTANEOUS PRN
Status: DISCONTINUED | OUTPATIENT
Start: 2023-06-19 | End: 2023-06-19

## 2023-06-19 RX ORDER — GLYCOPYRROLATE 0.2 MG/ML
INJECTION, SOLUTION INTRAMUSCULAR; INTRAVENOUS PRN
Status: DISCONTINUED | OUTPATIENT
Start: 2023-06-19 | End: 2023-06-19

## 2023-06-19 RX ORDER — ONDANSETRON 2 MG/ML
4 INJECTION INTRAMUSCULAR; INTRAVENOUS EVERY 30 MIN PRN
Status: DISCONTINUED | OUTPATIENT
Start: 2023-06-19 | End: 2023-06-19 | Stop reason: HOSPADM

## 2023-06-19 RX ORDER — OXYCODONE HYDROCHLORIDE 5 MG/1
10 TABLET ORAL EVERY 4 HOURS PRN
Status: DISCONTINUED | OUTPATIENT
Start: 2023-06-19 | End: 2023-06-19 | Stop reason: HOSPADM

## 2023-06-19 RX ADMIN — FENTANYL CITRATE 25 MCG: 50 INJECTION, SOLUTION INTRAMUSCULAR; INTRAVENOUS at 12:45

## 2023-06-19 RX ADMIN — Medication 5 MG: at 12:10

## 2023-06-19 RX ADMIN — PROPOFOL 80 MG: 10 INJECTION, EMULSION INTRAVENOUS at 12:10

## 2023-06-19 RX ADMIN — PROPOFOL 30 MG: 10 INJECTION, EMULSION INTRAVENOUS at 13:23

## 2023-06-19 RX ADMIN — PHENYLEPHRINE HYDROCHLORIDE 50 MCG: 10 INJECTION INTRAVENOUS at 12:40

## 2023-06-19 RX ADMIN — SODIUM CHLORIDE, SODIUM LACTATE, POTASSIUM CHLORIDE, AND CALCIUM CHLORIDE: 600; 310; 30; 20 INJECTION, SOLUTION INTRAVENOUS at 11:43

## 2023-06-19 RX ADMIN — Medication 2.5 MG: at 12:16

## 2023-06-19 RX ADMIN — Medication 5 MG: at 12:17

## 2023-06-19 RX ADMIN — Medication 2 G: at 11:44

## 2023-06-19 RX ADMIN — OXYCODONE HYDROCHLORIDE 5 MG: 5 TABLET ORAL at 14:57

## 2023-06-19 RX ADMIN — FENTANYL CITRATE 25 MCG: 50 INJECTION, SOLUTION INTRAMUSCULAR; INTRAVENOUS at 12:20

## 2023-06-19 RX ADMIN — SODIUM CHLORIDE, SODIUM LACTATE, POTASSIUM CHLORIDE, AND CALCIUM CHLORIDE: 600; 310; 30; 20 INJECTION, SOLUTION INTRAVENOUS at 12:49

## 2023-06-19 RX ADMIN — FENTANYL CITRATE 50 MCG: 50 INJECTION, SOLUTION INTRAMUSCULAR; INTRAVENOUS at 14:10

## 2023-06-19 RX ADMIN — LIDOCAINE HYDROCHLORIDE 100 MG: 20 INJECTION, SOLUTION INFILTRATION; PERINEURAL at 12:10

## 2023-06-19 RX ADMIN — GLYCOPYRROLATE 0.4 MG: 0.2 INJECTION, SOLUTION INTRAMUSCULAR; INTRAVENOUS at 12:03

## 2023-06-19 RX ADMIN — PROPOFOL 20 MG: 10 INJECTION, EMULSION INTRAVENOUS at 13:03

## 2023-06-19 RX ADMIN — ONDANSETRON HYDROCHLORIDE 4 MG: 2 SOLUTION INTRAMUSCULAR; INTRAVENOUS at 12:06

## 2023-06-19 RX ADMIN — FENTANYL CITRATE 25 MCG: 50 INJECTION, SOLUTION INTRAMUSCULAR; INTRAVENOUS at 12:21

## 2023-06-19 RX ADMIN — MIDAZOLAM 2 MG: 1 INJECTION INTRAMUSCULAR; INTRAVENOUS at 09:56

## 2023-06-19 RX ADMIN — PHENYLEPHRINE HYDROCHLORIDE 100 MCG: 10 INJECTION INTRAVENOUS at 12:21

## 2023-06-19 RX ADMIN — FENTANYL CITRATE 25 MCG: 50 INJECTION, SOLUTION INTRAMUSCULAR; INTRAVENOUS at 12:27

## 2023-06-19 RX ADMIN — FENTANYL CITRATE 50 MCG: 50 INJECTION, SOLUTION INTRAMUSCULAR; INTRAVENOUS at 14:00

## 2023-06-19 RX ADMIN — DEXAMETHASONE SODIUM PHOSPHATE 4 MG: 4 INJECTION, SOLUTION INTRA-ARTICULAR; INTRALESIONAL; INTRAMUSCULAR; INTRAVENOUS; SOFT TISSUE at 12:14

## 2023-06-19 RX ADMIN — SODIUM CHLORIDE, SODIUM LACTATE, POTASSIUM CHLORIDE, AND CALCIUM CHLORIDE: 600; 310; 30; 20 INJECTION, SOLUTION INTRAVENOUS at 09:55

## 2023-06-19 RX ADMIN — Medication 10 MG: at 12:20

## 2023-06-19 RX ADMIN — PHENYLEPHRINE HYDROCHLORIDE 50 MCG: 10 INJECTION INTRAVENOUS at 12:44

## 2023-06-19 RX ADMIN — PROPOFOL 20 MG: 10 INJECTION, EMULSION INTRAVENOUS at 12:27

## 2023-06-19 RX ADMIN — Medication 15 MG: at 12:10

## 2023-06-19 ASSESSMENT — ACTIVITIES OF DAILY LIVING (ADL)
ADLS_ACUITY_SCORE: 35
ADLS_ACUITY_SCORE: 35
ADLS_ACUITY_SCORE: 33
ADLS_ACUITY_SCORE: 35
ADLS_ACUITY_SCORE: 33

## 2023-06-19 ASSESSMENT — LIFESTYLE VARIABLES: TOBACCO_USE: 1

## 2023-06-19 ASSESSMENT — ENCOUNTER SYMPTOMS: DYSRHYTHMIAS: 1

## 2023-06-19 NOTE — OR NURSING
PACU Transfer Note    Kym Perea was transferred to [facility] via bed.  Equipment used for transport:  none.  Accompanied by:  Brooke  Prescriptions were: n/a    PACU Respiratory Event Documentation     1) Episodes of Apnea greater than or equal to 10 seconds: 0    2) Bradypnea - less than 8 breaths per minute: 0    3) Pain score on 0 to 10 scale: 5    4) Pain-sedation mismatch (yes or no): no    5) Repeated 02 desaturation less than 90% (yes or no): no    Anesthesia notified? (yes or no): n/a    Any of the above events occuring repeatedly in separate 30 minute intervals may be considered recurrent PACU respiratory events.    Patient stable and meets phase 1 discharge criteria for transport from PACU.    Report to Shree MARTINEZ

## 2023-06-19 NOTE — PHARMACY-ADMISSION MEDICATION HISTORY
Pharmacist Admission Medication History    Admission medication history is complete. The information provided in this note is only as accurate as the sources available at the time of the update.    Medication reconciliation/reorder completed by provider prior to medication history? No    Information Source(s): Patient via in-person interview  -Elyssa  -6/1 prior med rec note      Pertinent Information:   -Zubsolv: pt reports note currently taking, as she has had multiple pain medications since last admission.    -Tramadol: reports this has worked better for her pain control than Oxycodone.      Changes made to PTA medication list:    Added: None    Deleted: None    Changed: Adderall tablet strength (pt has been taking 1.5 tabs BID)        Medication Affordability: no concerns noted       Allergies reviewed with patient and updates made in EHR: yes- all allergies updated to LOW severity- pt has experienced GI issues only w/Meloxicam, Moxifloxacin; and Indomethacin caused dizziness. She cannot remember Diclofenac-misoprostol allergy, but this was previously listed under GI disturbance.      Medication History Completed By: Sammy Redmond [facility] 6/19/2023 3:25 PM    Prior to Admission medications    Medication Sig Last Dose Taking? Auth Provider Long Term End Date   acetaminophen (TYLENOL) 325 MG tablet Take 2 tablets (650 mg) by mouth every 4 hours as needed for other (mild pain)  Yes Anthony Nolan MD     albuterol (PROAIR HFA/PROVENTIL HFA/VENTOLIN HFA) 108 (90 Base) MCG/ACT inhaler Inhale 2 puffs into the lungs every 4 hours as needed for shortness of breath / dyspnea or wheezing More than a month Yes Darrin Stephen MD Yes    amphetamine-dextroamphetamine (ADDERALL) 20 MG tablet Take 30 mg by mouth 2 times daily 6/19/2023 at 0530 Yes Reported, Patient No    atenolol (TENORMIN) 100 MG tablet Take 1 tablet (100 mg) by mouth daily 6/19/2023 at 0530 Yes Eryn Hurley MD Yes    buprenorphine-naloxone  (ZUBSOLV) 5.7-1.4 MG sublingual tablet Place 0.25 tablets under the tongue daily Past Month Yes Unknown, Entered By History     Cholecalciferol (VITAMIN D-3) 125 MCG (5000 UT) TABS Take 1 tablet by mouth daily 6/19/2023 at 0530 Yes Darrin Stephen MD     clonazePAM (KLONOPIN) 2 MG tablet Take 1 tablet (2 mg) by mouth 2 times daily 6/19/2023 at 0530 Yes Eryn Hurley MD Yes    divalproex sodium extended-release (DEPAKOTE ER) 250 MG 24 hr tablet Take 1 tablet by mouth 3 times daily 6/19/2023 at 0530 Yes Reported, Patient Yes    fluticasone (FLONASE) 50 MCG/ACT nasal spray Spray 2 sprays into both nostrils daily as needed for rhinitis or allergies Past Month Yes Eryn Hurley MD     hydrOXYzine (VISTARIL) 25 MG capsule Take 1 capsule (25 mg) by mouth 3 times daily as needed for other (nausea) 6/18/2023 at PM Yes Darrin Stephen MD No    ibuprofen (ADVIL/MOTRIN) 800 MG tablet Take 1 tablet (800 mg) by mouth every 8 hours as needed for moderate pain (4-6) 6/19/2023 at 0530 Yes Eryn Hurley MD     naloxone (NARCAN) 4 MG/0.1ML nasal spray Spray 1 spray (4 mg) into one nostril alternating nostrils as needed for opioid reversal every 2-3 minutes until assistance arrives has not picked up at has not picked up Yes Anthony Nolan MD Yes    nicotine (NICODERM CQ) 14 MG/24HR 24 hr patch Place 1 patch onto the skin every 24 hours for 42 days has not started at has not started Yes Anthony Nolan MD  7/15/23   nicotine (NICODERM CQ) 7 MG/24HR 24 hr patch Place 1 patch onto the skin every 24 hours for 14 days has not started at has not started Yes Anthony Nolan MD  7/29/23   ondansetron (ZOFRAN ODT) 4 MG ODT tab Take 4 mg by mouth every 8 hours as needed Unknown Yes Reported, Patient     oxyCODONE (ROXICODONE) 5 MG tablet Take 1-2 tablets (5-10 mg) by mouth every 3 hours as needed for pain (Moderate to Severe)  Yes Anthony Nolan MD No    saccharomyces boulardii (FLORASTOR) 250 MG capsule Take  1 capsule (250 mg) by mouth 2 times daily 6/19/2023 at 0530 Yes Navneet Siegel MD     senna-docusate (SENOKOT-S/PERICOLACE) 8.6-50 MG tablet Take 1-2 tablets by mouth 2 times daily Take while on oral narcotics to prevent or treat constipation.  Yes Anthony Nolan MD     tiZANidine (ZANAFLEX) 4 MG tablet TAKE 1 TABLET BY MOUTH THREE TIMES DAILY AS NEEDED MUSCLE SPASMS 6/18/2023 at PM Yes Eryn Hurley MD No    traMADol (ULTRAM) 50 MG tablet Take 1 tablet (50 mg) by mouth every 6 hours as needed for severe pain 6/19/2023 at 0530 Yes Anthony Nolan MD  6/20/23   zinc oxide (DESITIN) 40 % external ointment Apply topically as needed for irritation or skin protection 6/18/2023 at Unknown time Yes Anthony Nolan MD

## 2023-06-19 NOTE — ANESTHESIA CARE TRANSFER NOTE
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    Anesthesia Type:   General     Note:    Oropharynx: spontaneously breathing and oropharynx clear of all foreign objects  Level of Consciousness: awake  Oxygen Supplementation: face mask  Level of Supplemental Oxygen (L/min / FiO2): 6  Independent Airway: airway patency satisfactory and stable  Dentition: dentition unchanged  Vital Signs Stable: post-procedure vital signs reviewed and stable  Report to RN Given: handoff report given  Patient transferred to: PACU    Handoff Report: Identifed the Patient, Identified the Reponsible Provider, Reviewed the pertinent medical history, Discussed the surgical course, Reviewed Intra-OP anesthesia mangement and issues during anesthesia, Set expectations for post-procedure period and Allowed opportunity for questions and acknowledgement of understanding      Vitals:  Vitals Value Taken Time   /80 06/19/23 1335   Temp 98.7  F (37.1  C) 06/19/23 1339   Pulse 81 06/19/23 1335   Resp 30 06/19/23 1337   SpO2 100 % 06/19/23 1339   Vitals shown include unvalidated device data.    Electronically Signed By: Tiffani Nolan  June 19, 2023  1:40 PM

## 2023-06-19 NOTE — OP NOTE
Procedure Date: 2023    PREOPERATIVE DIAGNOSIS:  Extensive tissue defects following debridement for Julio's gangrene of the vulva, perineum and perianal areas.    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    PROCEDURE:  Delayed primary closure with flap advancement of the right labia, perineum and perianal areas.    DETAILS OF PROCEDURE:  The patient was taken to the OR and positioned supine on the operating table.  We then positioned her in lithotomy.  We prepped her perineum.  We then began by incising subcutaneous flaps down to the level of the pelvic and perineal floor.  These were elevated.  We then checked for ability to close.  We had good ability to close.  There was a dog ear created in the right gluteal area.  Once we had good hemostasis with cautery, we placed a 10-Luxembourger drain in the mons area.  We then closed the subcutaneous tissues with 2-0 Vicryl.  The defect prior to closure was 27 x 4 cm.  We then proceeded to continue to close and place an additional 15-Luxembourger round ZAHRA in the right gluteal area.  We then proceeded to close the subcutaneous/deep dermal layer with 3-0 Vicryl.  We then ran a 4-0 Monocryl in a running subcuticular throughout the incision.  Additional 3-0 chromic sutures were used for tissue approximation in the area between the anus and the vaginal opening.  The patient tolerated the procedure well.  The patient will be taken to the PACU in satisfactory condition.    Anthony Nolan MD        D: 2023   T: 2023   MT: mark    Name:     KELLY ROJAS  MRN:      [id number]        Account:        [id number]   :      1970           Procedure Date: 2023     Document: [id number]

## 2023-06-19 NOTE — INTERVAL H&P NOTE
"I have reviewed the surgical (or preoperative) H&P that is linked to this encounter, and examined the patient. There are no significant changes    Clinical Conditions Present on Arrival:  Clinically Significant Risk Factors Present on Admission        # Hypokalemia: Lowest K = 3.3 mmol/L in last 30 days, will replace as needed  # Hyponatremia: Lowest Na = 132 mmol/L in last 30 days, will monitor as appropriate   # Hypocalcemia: Lowest Ca = 8 mg/dL in last 30 days, will monitor and replace as appropriate       # Thrombocytopenia: Lowest platelets = 117 in last 30 days, will monitor for bleeding  # Overweight: Estimated body mass index is 25.55 kg/m  as calculated from the following:    Height as of 6/1/23: 1.753 m (5' 9\").    Weight as of this encounter: 78.5 kg (173 lb).       "

## 2023-06-19 NOTE — ANESTHESIA PREPROCEDURE EVALUATION
Anesthesia Pre-Procedure Evaluation    Patient: Kym Perea   MRN: [id number] : 1970        Procedure : Procedure(s):  Delayed closure of vulva and perineal defectes with drain placment          Past Medical History:   Diagnosis Date     Acquired absence of other organs (CODE)     2011     Chronic pain syndrome     No Comments Provided     Complex regional pain syndrome I     left ankle     Encounter for other administrative examinations     10/2012,Hydrocodone 10/325mg, #240/mo signed 10/3/12, updated 10/1/ 14     Encounter for other administrative examinations     10/1/2014,MS Contin 30 mg q 8 hours #90 per month.  Morphine sulfate IR 15 mg 2-3 tab tid prn #180 per month     Gastro-esophageal reflux disease without esophagitis     No Comments Provided     Injury of left ankle     ,requiring surgery     Low back pain     No Comments Provided     Major depressive disorder, recurrent severe without psychotic features (H)     No Comments Provided     Migraine without status migrainosus, not intractable     No Comments Provided     Nicotine dependence, uncomplicated     No Comments Provided     Obesity     No Comments Provided     Overweight     2014     Pain in ankle     Chronic left ankle pain secondary to reflex sympathetic dystrophy.  Patient  has had 4 previous surgeries, the most recent one done by Dr. Noriega in  . On Narcotic contract.     Pain in thoracic spine     No Comments Provided     Panic disorder without agoraphobia     Dr Reynoso     Personal history of other diseases of the female genital tract     No Comments Provided     Personal history of other medical treatment (CODE)     G2, P1-0-1-1 with one vaginal delivery.     Supraventricular tachycardia (H)     2010      Past Surgical History:   Procedure Laterality Date     ANKLE SURGERY      , ,Left ankle surgery x 2     HYSTERECTOMY TOTAL ABDOMINAL      1999,secondary to endometriosis, no malignancy;  patient reports no malignancy, but abnormal cells were present *     IRRIGATION AND DEBRIDEMENT DECUBITUS WOUND, COMBINED N/A 6/1/2023    Procedure: IRRIGATION AND DEBRIDEMENT, PERINEAL, LABIAL and GLUTEAL INFECTION;  Surgeon: Anthony Nolan MD;  Location: [facility] OR     IRRIGATION AND DEBRIDEMENT DECUBITUS WOUND, COMBINED N/A 6/3/2023    Procedure: Debridement and washout of vulvar and perineal wounds;  Surgeon: Anthony Nolan MD;  Location:  OR     LAPAROSCOPIC CHOLECYSTECTOMY      No Comments Provided     LAPAROSCOPY DIAGNOSTIC (GENERAL)      1995     OTHER SURGICAL HISTORY      21299,CRANIO/MAXILLOFACIAL SURGERY,Jaw Surgery     OTHER SURGICAL HISTORY      207040,[facility] EMG,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  No other abnormalities noted.      Allergies   Allergen Reactions     Diclofenac-Misoprostol GI Disturbance     Indomethacin      Other reaction(s): Dizziness     Meloxicam GI Disturbance     Moxifloxacin       Social History     Tobacco Use     Smoking status: Every Day     Packs/day: 0.50     Years: 30.00     Pack years: 15.00     Types: Cigarettes     Start date: 12/2/1986     Smokeless tobacco: Never     Tobacco comments:     trying   Vaping Use     Vaping status: Every Day     Substances: Nicotine, Flavoring     Devices: Disposable   Substance Use Topics     Alcohol use: Yes     Comment: OCCASIONAL WINE      Wt Readings from Last 1 Encounters:   06/19/23 78.5 kg (173 lb)        Anesthesia Evaluation   Pt has had prior anesthetic.     No history of anesthetic complications       ROS/MED HX  ENT/Pulmonary:     (+) tobacco use, Current use,     Neurologic:     (+) peripheral neuropathy, migraines,     Cardiovascular: Comment: Hx SVT - last episode 6 weeks ago per patient    (+) Dyslipidemia -----dysrhythmias, Other, Irregular Heartbeat/Palpitations,     METS/Exercise Tolerance: >4 METS    Hematologic:  - neg hematologic  ROS     Musculoskeletal: Comment: Lower back pain  Chronic ankle  pain  Complex regional pain syndrome   TMJ  (+) arthritis,     GI/Hepatic:     (+) GERD, Asymptomatic on medication,     Renal/Genitourinary:  - neg Renal ROS     Endo:  - neg endo ROS     Psychiatric/Substance Use: Comment: Long history of chronic opioid use, off opioids now  Panic disorder    (+) psychiatric history anxiety and depression Recreational drug usage: Cannabis.    Infectious Disease: Comment: Open wound of vulva       Malignancy:  - neg malignancy ROS     Other:  - neg other ROS    (+) , H/O Chronic Pain,        Physical Exam    Airway        Mallampati: II   TM distance: > 3 FB   Neck ROM: full   Mouth opening: > 3 cm    Respiratory Devices and Support         Dental     Comment: Missing all but two lower teeth.  No loose teeth     (+) Multiple visibly decayed, broken teeth      Cardiovascular   cardiovascular exam normal       Rhythm and rate: regular and normal     Pulmonary   pulmonary exam normal        breath sounds clear to auscultation           OUTSIDE LABS:  CBC:   Lab Results   Component Value Date    WBC 8.4 06/05/2023    WBC 6.1 06/04/2023    HGB 8.9 (L) 06/05/2023    HGB 8.4 (L) 06/04/2023    HCT 27.5 (L) 06/05/2023    HCT 25.6 (L) 06/04/2023     06/05/2023     06/04/2023     BMP:   Lab Results   Component Value Date     06/05/2023     06/04/2023    POTASSIUM 3.7 06/05/2023    POTASSIUM 3.8 06/04/2023    CHLORIDE 107 06/05/2023    CHLORIDE 106 06/04/2023    CO2 24 06/05/2023    CO2 23 06/04/2023    BUN 5.6 (L) 06/05/2023    BUN 8.2 06/04/2023    CR 0.70 06/05/2023    CR 0.76 06/04/2023    GLC 88 06/05/2023    GLC 91 06/04/2023     COAGS:   Lab Results   Component Value Date    PTT 29 09/05/2014    INR 1.0 09/05/2014     POC: No results found for: BGM, HCG, HCGS  HEPATIC:   Lab Results   Component Value Date    ALBUMIN 3.6 06/01/2023    PROTTOTAL 6.8 06/01/2023    ALT 13 06/01/2023    AST 19 06/01/2023    ALKPHOS 190 (H) 06/01/2023    BILITOTAL 0.5 06/01/2023     BILIDIRECT 0.06 01/11/2018    FREDERICK 11 10/19/2021     OTHER:   Lab Results   Component Value Date    PH 7.37 11/25/2014    LACT 1.0 06/01/2023    NORMA 8.0 (L) 06/05/2023    MAG 1.9 06/03/2023    LIPASE 12 03/06/2021    SED 9 04/02/2015       Anesthesia Plan    ASA Status:  3   NPO Status:  NPO Appropriate    Anesthesia Type: General.     - Airway: LMA   Induction: Intravenous, Propofol.   Maintenance: Balanced.        Consents    Anesthesia Plan(s) and associated risks, benefits, and realistic alternatives discussed. Questions answered and patient/representative(s) expressed understanding.     - Discussed: Risks, Benefits and Alternatives for BOTH SEDATION and the PROCEDURE were discussed     - Discussed with:  Patient      - Extended Intubation/Ventilatory Support Discussed: No.      - Patient is DNR/DNI Status: No    Use of blood products discussed: No .     Postoperative Care    Pain management: IV analgesics.   PONV prophylaxis: Ondansetron (or other 5HT-3)     Comments:                CHRISTY Garza CRNA

## 2023-06-19 NOTE — BRIEF OP NOTE
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  Complications: None.  Implants: * No implants in log *

## 2023-06-19 NOTE — OR NURSING
Patient has been patiently resting in her room waiting for surgery.  She seems to be resting comfortably.  Patient given pillow for between her legs and warm blanket.  BP has slowly decreased and is now 68/39.  She has had approximately 700ml of fluids in thus far and I just started a small bolus for the hyptotension.  She is resting on her left side (BP cuff on right arm).  She has no c/o any at this time.  YOLY Kaba notified of low BP, no new orders given at this time.

## 2023-06-19 NOTE — PROGRESS NOTES
[facility] [place] ADMISSION NOTE    Patient admitted to room 314 at approximately 1436 via bed from surgery. Patient was accompanied by nurse.     Verbal SBAR report received from Brooke prior to patient arrival.     Patient was already in bed. Patient alert and oriented X 3. Pain is controlled with current analgesics.  Medication(s) being used: narcotic analgesics including fentanyl.  . Admission vital signs: Blood pressure 108/72, pulse 66, temperature 97.1  F (36.2  C), temperature source Temporal, resp. rate 15, weight 78.5 kg (173 lb), SpO2 92 %, not currently breastfeeding. Patient was oriented to plan of care, call light, bed controls, tv, telephone, bathroom and visiting hours.     Risk Assessment    The following safety risks were identified during admission: fall. Yellow risk band applied: YES.     Skin Initial Assessment    This writer admitted this patient and completed a full skin assessment and Musa score in the Adult PCS flowsheet. Appropriate interventions initiated as needed.     Musa Risk Assessment  Sensory Perception: 4-->no impairment  Moisture: 4-->rarely moist  Activity: 3-->walks occasionally  Mobility: 3-->slightly limited  Nutrition: 3-->adequate  Friction and Shear: 3-->no apparent problem  Musa Score: 20  Mattress: Standard gel/foam mattress (IsoFlex, Atmos Air, etc.)  Bed Frame: Standard width and length    Education    Patient has a [place] to Observation order: No  Observation education completed and documented: N/A      Shree Whittaker RN

## 2023-06-19 NOTE — PROVIDER NOTIFICATION
06/19/23 1437   Initial Information   Patient Belongings remains with patient   Patient Belongings Remaining with Patient clothing;dental appliance/dentures;glasses;cell phone/electronics;purse/wallet   Did you bring any home meds/supplements to the hospital?  No                    Admission:  I am responsible for any personal items that are not sent to the safe or pharmacy.  [facility] is not responsible for loss, theft or damage of any property in my possession.    Signature:  _________________________________ Date: _______  Time: _____                                              Staff Signature:  ____________________________ Date: ________  Time: _____      2nd Staff person, if patient is unable/unwilling to sign:    Signature: ________________________________ Date: ________  Time: _____     Discharge:  [facility] has returned all of my personal belongings:    Signature: _________________________________ Date: ________  Time: _____                                          Staff Signature:  ____________________________ Date: ________  Time: _____

## 2023-06-19 NOTE — PLAN OF CARE
"Patient is alert and orientated. Mild to moderate perianal pain, prn oral oxycodone provided adequate relief. Lungs clear throughout, denies SOB or WHITE, on room air. Heart RRR, denies chest  pain. CMS intact, no edema noted. Tolerated regular diet well, denies further nausea that she originally had from being hungry, abdomen soft and non tender throughout, active bowel sounds, passing flatus. Voiding without difficulty, just some burning with the first void. Surgical incision well approximated without concerning edema or erythema. Both drains in place with minimal output, 5mL from #2 and un measurable amount from #1. Moving well independently in room. Is eager to discharge home and recover in her own bed. VSS.    /72 (BP Location: Right arm, Patient Position: Semi-Sahni's, Cuff Size: Adult Regular)   Pulse 66   Temp 99.1  F (37.3  C) (Tympanic)   Resp 16   Wt 78.5 kg (173 lb)   LMP  (LMP Unknown)   SpO2 92%   BMI 25.55 kg/m        Problem: Plan of Care - These are the overarching goals to be used throughout the patient stay.    Goal: Plan of Care Review  Description: The Plan of Care Review/Shift note should be completed every shift.  The Outcome Evaluation is a brief statement about your assessment that the patient is improving, declining, or no change.  This information will be displayed automatically on your shift note.  Outcome: Progressing  Goal: Patient-Specific Goal (Individualized)  Description: You can add care plan individualizations to a care plan. Examples of Individualization might be:  \"Parent requests to be called daily at 9am for status\", \"I have a hard time hearing out of my right ear\", or \"Do not touch me to wake me up as it startles me\".  Outcome: Progressing  Goal: Absence of Hospital-Acquired Illness or Injury  Outcome: Progressing  Intervention: Identify and Manage Fall Risk  Recent Flowsheet Documentation  Taken 6/19/2023 1443 by Shree Whittaker RN  Safety Promotion/Fall " Prevention:    activity supervised    clutter free environment maintained    lighting adjusted    nonskid shoes/slippers when out of bed    patient and family education    room door open    room organization consistent    safety round/check completed    supervised activity    treat reversible contributory factors    treat underlying cause  Intervention: Prevent and Manage VTE (Venous Thromboembolism) Risk  Recent Flowsheet Documentation  Taken 6/19/2023 1445 by Shree Whittaker RN  VTE Prevention/Management: SCDs (sequential compression devices) on  Goal: Optimal Comfort and Wellbeing  Outcome: Progressing  Intervention: Provide Person-Centered Care  Recent Flowsheet Documentation  Taken 6/19/2023 1445 by Shree Whittaker RN  Trust Relationship/Rapport:    care explained    choices provided    questions answered    questions encouraged    reassurance provided    thoughts/feelings acknowledged  Goal: Readiness for Transition of Care  Outcome: Progressing  Intervention: Mutually Develop Transition Plan  Recent Flowsheet Documentation  Taken 6/19/2023 1437 by Shree Whittaker RN  Transportation Concerns: (unreliable vehicle) other (see comments)  Patient/Family Anticipates Transition to: home     Problem: Pain Acute  Goal: Optimal Pain Control and Function  Outcome: Progressing  Intervention: Prevent or Manage Pain  Recent Flowsheet Documentation  Taken 6/19/2023 1445 by Shree Whittaker RN  Medication Review/Management: medications reviewed   Goal Outcome Evaluation:

## 2023-06-19 NOTE — ANESTHESIA POSTPROCEDURE EVALUATION
Patient: Kym Perea    Procedure: Procedure(s):  Delayed closure of vulva and perineal defectes with drain placment       Anesthesia Type:  General    Note:  Disposition: Outpatient   Postop Pain Control: Uneventful            Sign Out: Well controlled pain   PONV: No   Neuro/Psych: Uneventful            Sign Out: Acceptable/Baseline neuro status   Airway/Respiratory: Uneventful            Sign Out: Acceptable/Baseline resp. status   CV/Hemodynamics: Uneventful            Sign Out: Acceptable CV status; No obvious hypovolemia; No obvious fluid overload   Other NRE: NONE   DID A NON-ROUTINE EVENT OCCUR? No           Last vitals:  Vitals Value Taken Time   /70 06/19/23 1415   Temp 97.1  F (36.2  C) 06/19/23 1400   Pulse 68 06/19/23 1415   Resp 14 06/19/23 1415   SpO2 91 % 06/19/23 1421   Vitals shown include unvalidated device data.    Electronically Signed By: CHRISTY AVERY CRNA  June 19, 2023  2:23 PM

## 2023-06-19 NOTE — PROVIDER NOTIFICATION
06/19/23 1437   Initial Information   Patient Belongings remains with patient;sent to security per site process   Patient Belongings Remaining with Patient clothing;dental appliance/dentures;glasses;cell phone/electronics   Patient Belongings Put in Hospital Secure Location (Security or Locker, etc.) purse/wallet                    Admission:  I am responsible for any personal items that are not sent to the safe or pharmacy.  [facility] is not responsible for loss, theft or damage of any property in my possession.    Signature:  _________________________________ Date: _______  Time: _____                                              Staff Signature:  ____________________________ Date: ________  Time: _____      2nd Staff person, if patient is unable/unwilling to sign:    Signature: ________________________________ Date: ________  Time: _____     Discharge:  [facility] has returned all of my personal belongings:    Signature: _________________________________ Date: ________  Time: _____                                          Staff Signature:  ____________________________ Date: ________  Time: _____

## 2023-06-19 NOTE — PROGRESS NOTES
[facility] DISCHARGE NOTE    Patient discharged to home at 5:07 PM via wheel chair. Accompanied by other:friend and staff. Discharge instructions reviewed with patient, opportunity offered to ask questions. Prescriptions sent to patients preferred pharmacy. All belongings sent with patient. ZAHRA drain and dressing education completed and supplies provided. Patient denied having any questions or concerns.     Shree Whittaker RN

## 2023-06-19 NOTE — PROGRESS NOTES
:     Patient has been receiving services from [facility].      following for discharge planning needs.     ROSA Barahona on 6/19/2023 at 3:40 PM

## 2023-06-19 NOTE — ANESTHESIA PROCEDURE NOTES
Airway       Patient location during procedure: OR       Procedure Start/Stop Times: 6/19/2023 12:11 PM  Staff -        CRNA: Josiah Hanna APRN CRNA       Other Anesthesia Staff: Tiffani Nolan       Performed By: CRNA and SRNA  Consent for Airway        Urgency: elective  Indications and Patient Condition       Indications for airway management: roger-procedural and airway protection         Mask difficulty assessment: 0 - not attempted    Final Airway Details       Final airway type: supraglottic airway    Supraglottic Airway Details        Type: LMA       Brand: I-Gel       LMA size: 4    Post intubation assessment        Placement verified by: capnometry, equal breath sounds and chest rise        Number of attempts at approach: 1       Secured with: silk tape       Ease of procedure: easy       Dentition: Intact

## 2023-06-20 ENCOUNTER — PATIENT OUTREACH (OUTPATIENT)
Dept: CARE COORDINATION | Facility: CLINIC | Age: 53
End: 2023-06-20
Payer: COMMERCIAL

## 2023-06-20 NOTE — PROGRESS NOTES
:    Informed [facility] that patient discharged home yesterday.     No further needs for .     ROSA Barahona on 6/20/2023 at 8:08 AM

## 2023-06-20 NOTE — PHARMACY - DISCHARGE MEDICATION RECONCILIATION
Pharmacy: Discharge Counseling and Medication Reconciliation    Kym Perea  [id number]   [place] [address]  [phone] (home)   53 year old female  PCP:Eryn Hurley    Allergies   Allergen Reactions     Diclofenac-Misoprostol GI Disturbance     Indomethacin      Other reaction(s): Dizziness     Meloxicam GI Disturbance     Moxifloxacin GI Disturbance       Discharge Counseling:    Pharmacist met with patient (and/or family) today to review the medication portion of the After Visit Summary (with an emphasis on NEW medications) and to address patient's questions/concerns.     Patient discharged prior to pharmacy education.     Discharge Medication Reconciliation:    Sienna Ann RPH has reviewed the patient's discharge medication orders and has compared them to the inpatient medication administration record and to what the patient was taking prior to admission- any discrepancies have been resolved.     It has been determined that the patient has an adequate supply of medications available or which can be obtained from the patient's preferred pharmacy, which has been confirmed as: [id number]    Thank you for the consult.     Sienna Ann RPH ....................  6/20/2023   12:33 PM

## 2023-06-20 NOTE — PROGRESS NOTES
Transitional Care Management Phone Call    Surgical follow-up only, and PCP is at [facility]. No TCM call per policy. Mary Anthony RN  6/20/2023 11:56 AM

## 2023-06-21 ENCOUNTER — TELEPHONE (OUTPATIENT)
Dept: SURGERY | Facility: OTHER | Age: 53
End: 2023-06-21
Payer: COMMERCIAL

## 2023-06-21 NOTE — TELEPHONE ENCOUNTER
Dressing Clarification:     Do you have specific dressing instructions regarding the split sponges around the ZAHRA drain's? Or okay to leave without? Had scant amount of dried bloody drainage on old dressing.      Hermelinda Gaspar RN on 6/21/2023 at 1:42 PM

## 2023-06-22 ENCOUNTER — MEDICAL CORRESPONDENCE (OUTPATIENT)
Dept: HEALTH INFORMATION MANAGEMENT | Facility: HOSPITAL | Age: 53
End: 2023-06-22

## 2023-06-22 ENCOUNTER — TELEPHONE (OUTPATIENT)
Dept: SURGERY | Facility: OTHER | Age: 53
End: 2023-06-22
Payer: COMMERCIAL

## 2023-06-22 DIAGNOSIS — S30.810A EXCORIATION OF BUTTOCK, INITIAL ENCOUNTER: Primary | ICD-10-CM

## 2023-06-22 RX ORDER — ZINC OXIDE
OINTMENT (GRAM) TOPICAL PRN
Qty: 113 G | Refills: 4 | Status: SHIPPED | OUTPATIENT
Start: 2023-06-22 | End: 2023-12-19

## 2023-06-22 NOTE — TELEPHONE ENCOUNTER
Received fax from pharmacy:    Desitin 40% Topical Ointment  Apply topically as needed for irritation or skin protection.      Pharmacy is requesting to know how often to use to bill insurance for this medication.     Disp Refills Start End MERISSA   zinc oxide (DESITIN) 40 % external ointment 56 g 1 6/14/2023  --   Sig - Route: Apply topically as needed for irritation or skin protection - Topical     Pharmacy    [id number] - [place], [place] - [address]     Will route message to provider to review and advise. Massiel Gutierrez RN  ....................  6/22/2023   12:12 PM

## 2023-06-22 NOTE — PROGRESS NOTES
2 Mg Versed IV given at 1631 on 6/1/23 Intraoperatively by myself.    MAURY THURSTON, APRN CRNA on 6/22/2023 at 8:40 AM

## 2023-06-23 NOTE — TELEPHONE ENCOUNTER
"Received a response from Dr. Nolan.     \"Ok to omit a drain dressing if this has been dry. No real need for a drain bandage. \"      Home care was updated yesterday.    Massiel MARTINEZ  "

## 2023-06-24 ENCOUNTER — ANESTHESIA (OUTPATIENT)
Dept: EMERGENCY MEDICINE | Facility: OTHER | Age: 53
End: 2023-06-24
Payer: COMMERCIAL

## 2023-06-24 ENCOUNTER — ANESTHESIA EVENT (OUTPATIENT)
Dept: EMERGENCY MEDICINE | Facility: OTHER | Age: 53
End: 2023-06-24
Payer: COMMERCIAL

## 2023-06-24 ENCOUNTER — HOSPITAL ENCOUNTER (OUTPATIENT)
Facility: OTHER | Age: 53
Setting detail: OBSERVATION
Discharge: HOME | End: 2023-06-25
Attending: INTERNAL MEDICINE | Admitting: INTERNAL MEDICINE
Payer: COMMERCIAL

## 2023-06-24 DIAGNOSIS — Z87.2 PERSONAL HISTORY OF DISEASE OF SKIN AND SUBCUTANEOUS TISSUE: ICD-10-CM

## 2023-06-24 DIAGNOSIS — Z98.890 POSTOPERATIVE STATE: Primary | ICD-10-CM

## 2023-06-24 DIAGNOSIS — R10.2 PERINEAL PAIN IN FEMALE: ICD-10-CM

## 2023-06-24 DIAGNOSIS — N76.82 FOURNIER'S GANGRENE IN FEMALE (H): ICD-10-CM

## 2023-06-24 DIAGNOSIS — R10.2 PERINEAL NEURALGIA, UNSPECIFIED LATERALITY: ICD-10-CM

## 2023-06-24 DIAGNOSIS — Z87.438 HISTORY OF FOURNIER'S GANGRENE: ICD-10-CM

## 2023-06-24 DIAGNOSIS — G89.18 POSTOPERATIVE PAIN: ICD-10-CM

## 2023-06-24 LAB
ALBUMIN SERPL BCG-MCNC: 3.8 G/DL (ref 3.5–5.2)
ALP SERPL-CCNC: 117 U/L (ref 35–104)
ALT SERPL W P-5'-P-CCNC: 7 U/L (ref 0–50)
ANION GAP SERPL CALCULATED.3IONS-SCNC: 11 MMOL/L (ref 7–15)
AST SERPL W P-5'-P-CCNC: 16 U/L (ref 0–45)
BASOPHILS # BLD AUTO: 0 10E3/UL (ref 0–0.2)
BASOPHILS NFR BLD AUTO: 0 %
BILIRUB SERPL-MCNC: 0.2 MG/DL
BUN SERPL-MCNC: 11.8 MG/DL (ref 6–20)
CALCIUM SERPL-MCNC: 9 MG/DL (ref 8.6–10)
CHLORIDE SERPL-SCNC: 101 MMOL/L (ref 98–107)
CREAT SERPL-MCNC: 0.69 MG/DL (ref 0.51–0.95)
CRP SERPL-MCNC: 23.8 MG/L
DEPRECATED HCO3 PLAS-SCNC: 24 MMOL/L (ref 22–29)
EOSINOPHIL # BLD AUTO: 0.5 10E3/UL (ref 0–0.7)
EOSINOPHIL NFR BLD AUTO: 6 %
ERYTHROCYTE [DISTWIDTH] IN BLOOD BY AUTOMATED COUNT: 13.3 % (ref 10–15)
GFR SERPL CREATININE-BSD FRML MDRD: >90 ML/MIN/1.73M2
GLUCOSE SERPL-MCNC: 103 MG/DL (ref 70–99)
HCT VFR BLD AUTO: 33.4 % (ref 35–47)
HGB BLD-MCNC: 10.8 G/DL (ref 11.7–15.7)
HOLD SPECIMEN: NORMAL
IMM GRANULOCYTES # BLD: 0 10E3/UL
IMM GRANULOCYTES NFR BLD: 0 %
LYMPHOCYTES # BLD AUTO: 2.3 10E3/UL (ref 0.8–5.3)
LYMPHOCYTES NFR BLD AUTO: 25 %
MCH RBC QN AUTO: 29.6 PG (ref 26.5–33)
MCHC RBC AUTO-ENTMCNC: 32.3 G/DL (ref 31.5–36.5)
MCV RBC AUTO: 92 FL (ref 78–100)
MONOCYTES # BLD AUTO: 0.7 10E3/UL (ref 0–1.3)
MONOCYTES NFR BLD AUTO: 8 %
NEUTROPHILS # BLD AUTO: 5.6 10E3/UL (ref 1.6–8.3)
NEUTROPHILS NFR BLD AUTO: 61 %
NRBC # BLD AUTO: 0 10E3/UL
NRBC BLD AUTO-RTO: 0 /100
PLATELET # BLD AUTO: 211 10E3/UL (ref 150–450)
POTASSIUM SERPL-SCNC: 4 MMOL/L (ref 3.4–5.3)
PROT SERPL-MCNC: 6.9 G/DL (ref 6.4–8.3)
RBC # BLD AUTO: 3.65 10E6/UL (ref 3.8–5.2)
SODIUM SERPL-SCNC: 136 MMOL/L (ref 136–145)
WBC # BLD AUTO: 9.2 10E3/UL (ref 4–11)

## 2023-06-24 PROCEDURE — 87077 CULTURE AEROBIC IDENTIFY: CPT | Performed by: SURGERY

## 2023-06-24 PROCEDURE — 87040 BLOOD CULTURE FOR BACTERIA: CPT | Performed by: INTERNAL MEDICINE

## 2023-06-24 PROCEDURE — 99222 1ST HOSP IP/OBS MODERATE 55: CPT | Performed by: SURGERY

## 2023-06-24 PROCEDURE — 86140 C-REACTIVE PROTEIN: CPT | Performed by: INTERNAL MEDICINE

## 2023-06-24 PROCEDURE — 36415 COLL VENOUS BLD VENIPUNCTURE: CPT | Performed by: INTERNAL MEDICINE

## 2023-06-24 PROCEDURE — 87077 CULTURE AEROBIC IDENTIFY: CPT | Performed by: INTERNAL MEDICINE

## 2023-06-24 PROCEDURE — 80053 COMPREHEN METABOLIC PANEL: CPT | Performed by: INTERNAL MEDICINE

## 2023-06-24 PROCEDURE — 87070 CULTURE OTHR SPECIMN AEROBIC: CPT | Performed by: SURGERY

## 2023-06-24 PROCEDURE — 87070 CULTURE OTHR SPECIMN AEROBIC: CPT | Mod: 91 | Performed by: INTERNAL MEDICINE

## 2023-06-24 PROCEDURE — 250N000013 HC RX MED GY IP 250 OP 250 PS 637: Performed by: INTERNAL MEDICINE

## 2023-06-24 PROCEDURE — 250N000009 HC RX 250: Performed by: NURSE ANESTHETIST, CERTIFIED REGISTERED

## 2023-06-24 PROCEDURE — 87205 SMEAR GRAM STAIN: CPT | Mod: 91 | Performed by: SURGERY

## 2023-06-24 PROCEDURE — 87075 CULTR BACTERIA EXCEPT BLOOD: CPT | Performed by: INTERNAL MEDICINE

## 2023-06-24 PROCEDURE — 99284 EMERGENCY DEPT VISIT MOD MDM: CPT | Performed by: INTERNAL MEDICINE

## 2023-06-24 PROCEDURE — 85025 COMPLETE CBC W/AUTO DIFF WBC: CPT | Performed by: INTERNAL MEDICINE

## 2023-06-24 PROCEDURE — 36410 VNPNXR 3YR/> PHY/QHP DX/THER: CPT | Performed by: NURSE ANESTHETIST, CERTIFIED REGISTERED

## 2023-06-24 PROCEDURE — 87075 CULTR BACTERIA EXCEPT BLOOD: CPT | Mod: XU | Performed by: SURGERY

## 2023-06-24 PROCEDURE — 99285 EMERGENCY DEPT VISIT HI MDM: CPT | Mod: 25 | Performed by: INTERNAL MEDICINE

## 2023-06-24 RX ORDER — LORAZEPAM 2 MG/ML
1 INJECTION INTRAMUSCULAR ONCE
Status: DISCONTINUED | OUTPATIENT
Start: 2023-06-24 | End: 2023-06-24

## 2023-06-24 RX ORDER — OXYCODONE HYDROCHLORIDE 5 MG/1
10 TABLET ORAL ONCE
Status: COMPLETED | OUTPATIENT
Start: 2023-06-24 | End: 2023-06-24

## 2023-06-24 RX ORDER — LIDOCAINE HYDROCHLORIDE 10 MG/ML
INJECTION, SOLUTION INFILTRATION; PERINEURAL PRN
Status: DISCONTINUED | OUTPATIENT
Start: 2023-06-24 | End: 2023-06-24

## 2023-06-24 RX ORDER — LORAZEPAM 1 MG/1
2 TABLET ORAL ONCE
Status: COMPLETED | OUTPATIENT
Start: 2023-06-24 | End: 2023-06-24

## 2023-06-24 RX ADMIN — LIDOCAINE HYDROCHLORIDE 0.2 MG: 10 INJECTION, SOLUTION INFILTRATION; PERINEURAL at 22:12

## 2023-06-24 RX ADMIN — LORAZEPAM 2 MG: 1 TABLET ORAL at 21:06

## 2023-06-24 RX ADMIN — OXYCODONE HYDROCHLORIDE 10 MG: 5 TABLET ORAL at 21:27

## 2023-06-24 ASSESSMENT — ACTIVITIES OF DAILY LIVING (ADL)
ADLS_ACUITY_SCORE: 35
ADLS_ACUITY_SCORE: 35

## 2023-06-25 VITALS
TEMPERATURE: 96.4 F | HEIGHT: 69 IN | OXYGEN SATURATION: 100 % | RESPIRATION RATE: 18 BRPM | HEART RATE: 60 BPM | WEIGHT: 169 LBS | BODY MASS INDEX: 25.03 KG/M2 | SYSTOLIC BLOOD PRESSURE: 93 MMHG | DIASTOLIC BLOOD PRESSURE: 60 MMHG

## 2023-06-25 LAB
ERYTHROCYTE [DISTWIDTH] IN BLOOD BY AUTOMATED COUNT: 13.4 % (ref 10–15)
GRAM STAIN RESULT: ABNORMAL
GRAM STAIN RESULT: ABNORMAL
HCT VFR BLD AUTO: 31.5 % (ref 35–47)
HGB BLD-MCNC: 10.6 G/DL (ref 11.7–15.7)
HOLD SPECIMEN: NORMAL
MCH RBC QN AUTO: 30.2 PG (ref 26.5–33)
MCHC RBC AUTO-ENTMCNC: 33.7 G/DL (ref 31.5–36.5)
MCV RBC AUTO: 90 FL (ref 78–100)
PLATELET # BLD AUTO: 190 10E3/UL (ref 150–450)
RBC # BLD AUTO: 3.51 10E6/UL (ref 3.8–5.2)
WBC # BLD AUTO: 9.4 10E3/UL (ref 4–11)

## 2023-06-25 PROCEDURE — 36416 COLLJ CAPILLARY BLOOD SPEC: CPT | Performed by: SURGERY

## 2023-06-25 PROCEDURE — G0378 HOSPITAL OBSERVATION PER HR: HCPCS

## 2023-06-25 PROCEDURE — 85027 COMPLETE CBC AUTOMATED: CPT | Performed by: SURGERY

## 2023-06-25 PROCEDURE — 258N000003 HC RX IP 258 OP 636: Performed by: SURGERY

## 2023-06-25 PROCEDURE — 99238 HOSP IP/OBS DSCHRG MGMT 30/<: CPT | Performed by: SURGERY

## 2023-06-25 PROCEDURE — 250N000011 HC RX IP 250 OP 636: Performed by: SURGERY

## 2023-06-25 PROCEDURE — 250N000013 HC RX MED GY IP 250 OP 250 PS 637: Performed by: SURGERY

## 2023-06-25 RX ORDER — ALBUTEROL SULFATE 90 UG/1
2 AEROSOL, METERED RESPIRATORY (INHALATION) EVERY 4 HOURS PRN
Status: DISCONTINUED | OUTPATIENT
Start: 2023-06-25 | End: 2023-06-25 | Stop reason: HOSPADM

## 2023-06-25 RX ORDER — SACCHAROMYCES BOULARDII 250 MG
250 CAPSULE ORAL 2 TIMES DAILY
Status: DISCONTINUED | OUTPATIENT
Start: 2023-06-25 | End: 2023-06-25 | Stop reason: HOSPADM

## 2023-06-25 RX ORDER — OXYCODONE HYDROCHLORIDE 5 MG/1
10 TABLET ORAL EVERY 4 HOURS PRN
Status: DISCONTINUED | OUTPATIENT
Start: 2023-06-25 | End: 2023-06-25 | Stop reason: HOSPADM

## 2023-06-25 RX ORDER — ZINC OXIDE
OINTMENT (GRAM) TOPICAL 2 TIMES DAILY PRN
Status: DISCONTINUED | OUTPATIENT
Start: 2023-06-25 | End: 2023-06-25 | Stop reason: HOSPADM

## 2023-06-25 RX ORDER — AMOXICILLIN 250 MG
1 CAPSULE ORAL 2 TIMES DAILY
Status: DISCONTINUED | OUTPATIENT
Start: 2023-06-25 | End: 2023-06-25 | Stop reason: HOSPADM

## 2023-06-25 RX ORDER — ACETAMINOPHEN 325 MG/1
975 TABLET ORAL EVERY 8 HOURS
Status: DISCONTINUED | OUTPATIENT
Start: 2023-06-25 | End: 2023-06-25 | Stop reason: HOSPADM

## 2023-06-25 RX ORDER — NALOXONE HYDROCHLORIDE 0.4 MG/ML
0.4 INJECTION, SOLUTION INTRAMUSCULAR; INTRAVENOUS; SUBCUTANEOUS
Status: DISCONTINUED | OUTPATIENT
Start: 2023-06-25 | End: 2023-06-25 | Stop reason: HOSPADM

## 2023-06-25 RX ORDER — TIZANIDINE 2 MG/1
4 TABLET ORAL EVERY 8 HOURS PRN
Status: DISCONTINUED | OUTPATIENT
Start: 2023-06-25 | End: 2023-06-25 | Stop reason: HOSPADM

## 2023-06-25 RX ORDER — CLONAZEPAM 1 MG/1
2 TABLET ORAL 2 TIMES DAILY
Status: DISCONTINUED | OUTPATIENT
Start: 2023-06-25 | End: 2023-06-25 | Stop reason: HOSPADM

## 2023-06-25 RX ORDER — SODIUM CHLORIDE 9 MG/ML
INJECTION, SOLUTION INTRAVENOUS CONTINUOUS
Status: DISCONTINUED | OUTPATIENT
Start: 2023-06-25 | End: 2023-06-25 | Stop reason: HOSPADM

## 2023-06-25 RX ORDER — DIVALPROEX SODIUM 250 MG/1
250 TABLET, EXTENDED RELEASE ORAL
Status: DISCONTINUED | OUTPATIENT
Start: 2023-06-25 | End: 2023-06-25 | Stop reason: HOSPADM

## 2023-06-25 RX ORDER — DEXTROAMPHETAMINE SACCHARATE, AMPHETAMINE ASPARTATE, DEXTROAMPHETAMINE SULFATE AND AMPHETAMINE SULFATE 5; 5; 5; 5 MG/1; MG/1; MG/1; MG/1
20 TABLET ORAL 2 TIMES DAILY
Status: DISCONTINUED | OUTPATIENT
Start: 2023-06-25 | End: 2023-06-25 | Stop reason: HOSPADM

## 2023-06-25 RX ORDER — NALOXONE HYDROCHLORIDE 0.4 MG/ML
0.2 INJECTION, SOLUTION INTRAMUSCULAR; INTRAVENOUS; SUBCUTANEOUS
Status: DISCONTINUED | OUTPATIENT
Start: 2023-06-25 | End: 2023-06-25 | Stop reason: HOSPADM

## 2023-06-25 RX ORDER — OXYCODONE HYDROCHLORIDE 5 MG/1
5 TABLET ORAL EVERY 4 HOURS PRN
Status: DISCONTINUED | OUTPATIENT
Start: 2023-06-25 | End: 2023-06-25 | Stop reason: HOSPADM

## 2023-06-25 RX ORDER — ATENOLOL 50 MG/1
100 TABLET ORAL DAILY
Status: DISCONTINUED | OUTPATIENT
Start: 2023-06-25 | End: 2023-06-25 | Stop reason: HOSPADM

## 2023-06-25 RX ORDER — ONDANSETRON 2 MG/ML
4 INJECTION INTRAMUSCULAR; INTRAVENOUS EVERY 6 HOURS PRN
Status: DISCONTINUED | OUTPATIENT
Start: 2023-06-25 | End: 2023-06-25 | Stop reason: HOSPADM

## 2023-06-25 RX ORDER — ONDANSETRON 4 MG/1
4 TABLET, ORALLY DISINTEGRATING ORAL EVERY 6 HOURS PRN
Status: DISCONTINUED | OUTPATIENT
Start: 2023-06-25 | End: 2023-06-25 | Stop reason: HOSPADM

## 2023-06-25 RX ORDER — HYDROMORPHONE HYDROCHLORIDE 1 MG/ML
0.5 INJECTION, SOLUTION INTRAMUSCULAR; INTRAVENOUS; SUBCUTANEOUS
Status: DISCONTINUED | OUTPATIENT
Start: 2023-06-25 | End: 2023-06-25 | Stop reason: HOSPADM

## 2023-06-25 RX ORDER — AMOXICILLIN 250 MG
2 CAPSULE ORAL 2 TIMES DAILY
Status: DISCONTINUED | OUTPATIENT
Start: 2023-06-25 | End: 2023-06-25 | Stop reason: HOSPADM

## 2023-06-25 RX ADMIN — ACETAMINOPHEN 975 MG: 325 TABLET ORAL at 00:43

## 2023-06-25 RX ADMIN — ACETAMINOPHEN 975 MG: 325 TABLET ORAL at 07:38

## 2023-06-25 RX ADMIN — DIVALPROEX SODIUM 250 MG: 250 TABLET, FILM COATED, EXTENDED RELEASE ORAL at 07:42

## 2023-06-25 RX ADMIN — CLONAZEPAM 2 MG: 1 TABLET ORAL at 08:01

## 2023-06-25 RX ADMIN — Medication 250 MG: at 00:43

## 2023-06-25 RX ADMIN — SENNOSIDES AND DOCUSATE SODIUM 1 TABLET: 50; 8.6 TABLET ORAL at 08:01

## 2023-06-25 RX ADMIN — SODIUM CHLORIDE: 9 INJECTION, SOLUTION INTRAVENOUS at 05:31

## 2023-06-25 RX ADMIN — HYDROMORPHONE HYDROCHLORIDE 0.5 MG: 1 INJECTION, SOLUTION INTRAMUSCULAR; INTRAVENOUS; SUBCUTANEOUS at 00:43

## 2023-06-25 RX ADMIN — OXYCODONE HYDROCHLORIDE 5 MG: 5 TABLET ORAL at 04:56

## 2023-06-25 RX ADMIN — CLONAZEPAM 2 MG: 1 TABLET ORAL at 00:43

## 2023-06-25 RX ADMIN — SODIUM CHLORIDE 1000 ML: 0.9 INJECTION, SOLUTION INTRAVENOUS at 04:53

## 2023-06-25 RX ADMIN — SODIUM CHLORIDE: 9 INJECTION, SOLUTION INTRAVENOUS at 00:27

## 2023-06-25 RX ADMIN — Medication 250 MG: at 08:01

## 2023-06-25 ASSESSMENT — ACTIVITIES OF DAILY LIVING (ADL)
ADLS_ACUITY_SCORE: 20
ADLS_ACUITY_SCORE: 21
ADLS_ACUITY_SCORE: 20

## 2023-06-25 NOTE — PROGRESS NOTES
GENERAL SURGERY PROGRESS NOTE  6/25/2023      Interval history:   Had slightly low blood pressure earlier this morning, small bolus given.  Pressure improved.  Also held a beta-blocker.  Pain is somewhat improved.  Denies problems with the drains or the incision.  Anxiety has improved.  Denies fevers or chills.  Tolerating normal diet, ambulating about the room and voiding per self.    Physical Exam:   Low blood pressure early this a.m., afebrile    UOP over past 24 hours is 600 mL    General: Sitting up in a chair, appears comfortable  Abdomen: Drains putting out scant amount of slightly opaque bloody fluid, appears more clear today.  No foul odor.  Incision is clean and intact  MSK: Normal bulk and tone  Neuro: Alert and oriented    Labs are reviewed and are significant for WBC 9.4    Gram stain showing PMNs and few gram-negative bacilli    No new imaging       Assessment / Plan:   Kym Perea is a 53 year old female postop day 5 from complex closure of a large perineal wound including advancement flap and drain placement.    Patient is hemodynamically stable with decreasing drain output, stable wound appearance and stable white blood cell count.  We will plan for discharge home.  Patient will follow-up with Dr. Anthony Nolan on 6/27/2023.  We will write for Augmentin as outpatient due to positive Gram stain, clinically little concern for wound infection.        VIPUL Morales MD   6/25/2023

## 2023-06-25 NOTE — PROGRESS NOTES
" [facility] ADMISSION NOTE    Patient admitted to room 314 at approximately 0020via wheel chair from emergency room. Patient was accompanied by transport tech.     Verbal SBAR report received from JUAN Plata prior to patient arrival.     Patient trasferred to bed via self. Patient alert and oriented X 3. Pain is controlled with current analgesics.  Medication(s) being used: narcotic analgesics including hydromorphone (Dilaudid).  . Admission vital signs: Blood pressure 99/62, pulse 71, temperature 98.5  F (36.9  C), temperature source Tympanic, resp. rate 20, height 1.753 m (5' 9\"), weight 76.7 kg (169 lb), SpO2 96 %, not currently breastfeeding. Patient was oriented to plan of care, call light, bed controls, tv, telephone, bathroom and visiting hours.     Risk Assessment    The following safety risks were identified during admission: none.         Education    Patient has a [place] to Observation order: Yes  Observation education completed and documented: Yes      Delfina Vyas RN    "

## 2023-06-25 NOTE — PROVIDER NOTIFICATION
"B/p low 82/57, MD Morales notified. TORB for 1 liter bolus NS over 1 hour and hold morning b/p medications.    BP (!) 82/57 (BP Location: Right arm)   Pulse 60   Temp 97.7  F (36.5  C) (Tympanic)   Resp 20   Ht 1.753 m (5' 9\")   Wt 76.7 kg (169 lb)   LMP  (LMP Unknown)   SpO2 98%   BMI 24.96 kg/m      "

## 2023-06-25 NOTE — PROVIDER NOTIFICATION
06/25/23 0053   Valuables   Patient Belongings remains with patient   Patient Belongings Remaining with Patient cell phone/electronics;purse/wallet;glasses;shoes   Did you bring any home meds/supplements to the hospital?  No     [facility] will make every effort per our policy to help keep your items safe while in the hospital.  If you choose to keep any items at the bedside, we cannot be held responsible for any items that are lost or broken.        I have reviewed my belongings list on admission and verify that it is correct.     Patient signature_______________________________  Date/Time_____________________    2nd Staff person if patient unable to sign __________________________  Date/Time ______________________      I have received all my belongings noted above at discharge.    Patient signature________________________________  Date/Time  __________________________

## 2023-06-25 NOTE — PLAN OF CARE
Goal Outcome Evaluation:      Plan of Care Reviewed With: patient    Overall Patient Progress: improving    Patient ZAHRA drain sites have small amount redness around tubing, no drainage.  ZAHRA drain on buttocks had opaque/pink tinge drainage in bulb with 10 mL emptied before discharge.  Groin bulb had such small amount serosanguineous drainage it was unable to be emptied.  Patient declined needing any PRN medications for pain this morning.  Had some anxiety about situation and given scheduled klonopin which was effective for relief per patient.  Patient requesting to be discharged as soon as possible.  Was sent home with prescription for antibiotics and follow up appointment with Dr. Nolan in a couple days.  Patient has had stable vitals and per patient her blood pressures are always on the lower side, denies any symptoms with lower blood pressures.  Has not had any fevers while admitted.  Patient verbalizes discharge instructions and signs of infection to come back in to be seen for before next appointment if needed.  Patient has no further questions and anxious to be discharged to home.

## 2023-06-25 NOTE — H&P
GENERAL SURGERY CONSULTATION NOTE    Kym Perea   [address] [place] [address]  53 year old  female    Primary Care Provider:  Eryn Hurley      HPI: Kym Perea presents to the emergency department with perineal pain and change in drainage.  Patient underwent delayed primary closure with flap advancement of right labia, perineum and perineal areas on 6/19/2023.  Patient woke this morning with increased pulling type pain in her perineum in addition to change of drainage.  Patient has 2 ZAHRA bulbs from her recent surgery.  Patient states that initially the drainage was red and bloody, now the drainage is less bloody but somewhat opaque.  Patient denies problems the incision such as dehiscence and drainage from the incision.  Patient is unable to evaluate the entire area herself.  She denies fevers or chills.  Tolerating oral intake.     REVIEW OF SYSTEMS:    GENERAL: No fevers or chills. Denies fatigue, recent weight loss.  HEENT: No sinus drainage. No changes with vision or hearing. No difficulty swallowing.   LYMPHATICS:  Noswollen nodes in axilla, neck or groin.  CARDIOVASCULAR: Denies chest pain, palpitations and dyspnea on exertion.  PULMONARY: No shortness of breath or cough. No increase in sputum production.  GI: Denies melena,bright red blood in stools. No hematemesis. No constipation or diarrhea.  : No dysuria or hematuria.  SKIN: No recent rashes or ulcers.   HEMATOLOGY:  No history of easy bruising or bleeding.  ENDOCRINE:  No history of diabetes or thyroid problems.  NEUROLOGY:  No history of seizures or headaches. No motor or sensory changes.        Patient Active Problem List   Diagnosis     Ankle and foot joint derangement     Lumbago     Chronic pain disorder     Complex regional pain syndrome type 1 affecting left lower leg     Major depressive disorder, recurrent episode, severe (H)     Esophageal reflux     Hyperlipidemia     Insomnia     Migraine headache      Overweight (BMI 25.0-29.9)     Pain medication agreement, 4/18/18, 4/3/19, 10/27/2020     Panic disorder     Paroxysmal supraventricular tachycardia (H)     Tobacco abuse     Medical cannabis use     Suicidal behavior     Mononeuritis     TMJ (temporomandibular joint syndrome)     Arthralgia of right temporomandibular joint     Julio's gangrene     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  Morphine sulfate IR 15 mg 2-3 tab tid prn #180 per month     Gastro-esophageal reflux disease without esophagitis     No Comments Provided     Injury of left ankle     1997,requiring surgery     Low back pain     No Comments Provided     Major depressive disorder, recurrent severe without psychotic features (H)     No Comments Provided     Migraine without status migrainosus, not intractable     No Comments Provided     Nicotine dependence, uncomplicated     No Comments Provided     Obesity     No Comments Provided     Overweight     11/25/2014     Pain in ankle     Chronic left ankle pain secondary to reflex sympathetic dystrophy.  Patient  has had 4 previous surgeries, the most recent one done by Dr. Noriega in  2007. On Narcotic contract.     Pain in thoracic spine     No Comments Provided     Panic disorder without agoraphobia     Dr Reynoso     Personal history of other diseases of the female genital tract     No Comments Provided     Personal history of other medical treatment (CODE)     G2, P1-0-1-1 with one  vaginal delivery.     Supraventricular tachycardia (H)     5/24/2010       Past Surgical History:   Procedure Laterality Date     ANKLE SURGERY      1997, 2000,Left ankle surgery x 2     CYSTECTOMY PILONIDAL N/A 6/19/2023    Procedure: Delayed closure of vulva and perineal defectes with drain placment;  Surgeon: Anthony Nolan MD;  Location: [facility] OR     HYSTERECTOMY TOTAL ABDOMINAL      11/1999,secondary to endometriosis, no malignancy; patient reports no malignancy, but abnormal cells were present *     IRRIGATION AND DEBRIDEMENT DECUBITUS WOUND, COMBINED N/A 6/1/2023    Procedure: IRRIGATION AND DEBRIDEMENT, PERINEAL, LABIAL and GLUTEAL INFECTION;  Surgeon: Anthony Nolan MD;  Location: [facility] OR     IRRIGATION AND DEBRIDEMENT DECUBITUS WOUND, COMBINED N/A 6/3/2023    Procedure: Debridement and washout of vulvar and perineal wounds;  Surgeon: Anthony Nolan MD;  Location: [facility] OR     LAPAROSCOPIC CHOLECYSTECTOMY      No Comments Provided     LAPAROSCOPY DIAGNOSTIC (GENERAL)      1995     OTHER SURGICAL HISTORY      21299,CRANIO/MAXILLOFACIAL SURGERY,Jaw Surgery     OTHER SURGICAL HISTORY      207040,[facility] EMG,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  No other abnormalities noted.       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 She has a daughter who is now 20, lives in [place]       Social History     Socioeconomic History     Marital status:      Spouse name: Not on  file     Number of children: Not on file     Years of education: Not on file     Highest education level: Not on file   Occupational History     Not on file   Tobacco Use     Smoking status: Every Day     Packs/day: 0.50     Years: 30.00     Pack years: 15.00     Types: Cigarettes, Vaping Device     Start date: 12/2/1986     Smokeless tobacco: Never     Tobacco comments:     trying   Vaping Use     Vaping Use: Every day     Substances: Nicotine, Flavoring     Devices: Disposable   Substance and Sexual Activity     Alcohol use: Yes     Comment: OCCASIONAL WINE     Drug use: Yes     Types: Marijuana     Comment: medical cannabis; suboxone     Sexual activity: Not Currently     Partners: Male   Other Topics Concern     Parent/sibling w/ CABG, MI or angioplasty before 65F 55M? 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 She has a daughter who is now 20, lives in [place]     Social Determinants of Health     Financial Resource Strain: Not on file   Food Insecurity: Not on file   Transportation Needs: Not on file   Physical Activity: Not on file   Stress: Not on file   Social Connections: Not on file   Intimate Partner Violence: Not on file   Housing Stability: Not on file       No current facility-administered medications on file prior to encounter.  acetaminophen (TYLENOL) 325 MG tablet, Take 2 tablets (650 mg) by mouth every 4 hours as needed for other (mild pain)  albuterol (PROAIR HFA/PROVENTIL HFA/VENTOLIN HFA) 108 (90 Base) MCG/ACT inhaler, Inhale 2 puffs into the lungs every 4 hours as needed for shortness of breath / dyspnea or wheezing  amphetamine-dextroamphetamine (ADDERALL) 20 MG tablet, Take 30 mg by mouth 2 times daily  atenolol (TENORMIN) 100 MG tablet, Take 1 tablet (100 mg) by mouth daily  buprenorphine-naloxone (ZUBSOLV) 5.7-1.4 MG sublingual tablet, Place 0.25 tablets under the tongue daily  Cholecalciferol (VITAMIN  D-3) 125 MCG (5000 UT) TABS, Take 1 tablet by mouth daily  clonazePAM (KLONOPIN) 2 MG tablet, Take 1 tablet (2 mg) by mouth 2 times daily  divalproex sodium extended-release (DEPAKOTE ER) 250 MG 24 hr tablet, Take 1 tablet by mouth 3 times daily  fluticasone (FLONASE) 50 MCG/ACT nasal spray, Spray 2 sprays into both nostrils daily as needed for rhinitis or allergies  hydrOXYzine (VISTARIL) 25 MG capsule, Take 1 capsule (25 mg) by mouth 3 times daily as needed for other (nausea)  ibuprofen (ADVIL/MOTRIN) 800 MG tablet, Take 1 tablet (800 mg) by mouth every 8 hours as needed for moderate pain (4-6)  naloxone (NARCAN) 4 MG/0.1ML nasal spray, Spray 1 spray (4 mg) into one nostril alternating nostrils as needed for opioid reversal every 2-3 minutes until assistance arrives  nicotine (NICODERM CQ) 14 MG/24HR 24 hr patch, Place 1 patch onto the skin every 24 hours for 42 days  [START ON 7/15/2023] nicotine (NICODERM CQ) 7 MG/24HR 24 hr patch, Place 1 patch onto the skin every 24 hours for 14 days  ondansetron (ZOFRAN ODT) 4 MG ODT tab, Take 4 mg by mouth every 8 hours as needed  oxyCODONE (ROXICODONE) 5 MG tablet, Take 1-2 tablets (5-10 mg) by mouth every 3 hours as needed for pain (Moderate to Severe)  saccharomyces boulardii (FLORASTOR) 250 MG capsule, Take 1 capsule (250 mg) by mouth 2 times daily  senna-docusate (SENOKOT-S/PERICOLACE) 8.6-50 MG tablet, Take 1-2 tablets by mouth 2 times daily Take while on oral narcotics to prevent or treat constipation.  tiZANidine (ZANAFLEX) 4 MG tablet, TAKE 1 TABLET BY MOUTH THREE TIMES DAILY AS NEEDED MUSCLE SPASMS  zinc oxide (DESITIN) 40 % external ointment, Apply topically as needed for dry skin, irritation or skin protection (daily PRN)  zinc oxide (DESITIN) 40 % external ointment, Apply topically as needed for irritation or skin protection          ALLERGIES/SENSITIVITIES:   Allergies   Allergen Reactions     Diclofenac-Misoprostol GI Disturbance     Indomethacin      Other  "reaction(s): Dizziness     Meloxicam GI Disturbance     Moxifloxacin GI Disturbance       PHYSICAL EXAM:     /77   Pulse 89   Temp 96.9  F (36.1  C) (Tympanic)   Resp 16   Ht 1.753 m (5' 9\")   Wt 74.8 kg (165 lb)   LMP  (LMP Unknown)   SpO2 98%   BMI 24.37 kg/m      General Appearance:   Laying in bed, mild distress  HEENT: Pupils are equal and reactive, no scleral icterus,   Heart & CV:  RRR no murmur.  Intact distal pulses, good cap refill.  LUNGS: No increased work of breathing. Lungs are CTA B/L, no wheezing or crackles.  Abd: Soft, nondistended, nontender  Ext: Perineal incision appears clean and intact.  Possible mild erythema at the very posterior segment of the incision.  No areas of dehiscence, no apparent drainage from the incision itself.  Both anterior and posterior drains are draining a slightly opaque bloody fluid, no foul odor.  Neuro: Alert and oriented, normal speech and mentation    Labs are reviewed and are significant for WBC 9.2, hemoglobin 10.8, platelet 211, absolute neutrophils 5.6    Electrolyte panel including CRP pending      CONSULTATION ASSESSMENT AND PLAN:    53 year old female postop day 5 from complex closure of a large perineal wound including advancement flap and drain placement.  The drainage has a slightly worrisome appearance but the patient is afebrile with normal white count and the incision itself looks pretty good.  I do not appreciate any undrained fluid collections on exam.  However, this is a long incision from a complex surgery following a very dangerous infection.  We will plan to observe the patient overnight and reevaluate the wound and the drainage in the morning.  Plan for light IV fluid resuscitation, repeat labs in the morning.  We will forego further imaging due to normal white count and will not start any antibiotics so, if infection is present, can present itself and make clinical determination whether or not reexploration, wound opening or further " imaging is necessary.    Armando Morales MD on 6/24/2023 at 9:17 PM

## 2023-06-25 NOTE — PROGRESS NOTES
[facility] DISCHARGE NOTE    Patient discharged to home at 10:03 AM via ambulation. Accompanied by staff. Discharge instructions reviewed with patient, opportunity offered to ask questions. Prescriptions sent to patients preferred pharmacy. All belongings sent with patient.    Osiris Pierre RN

## 2023-06-25 NOTE — ED TRIAGE NOTES
Patient had surgery on 6/1/23 with two drains placed.  Drainage was initially bright red, but it has since slowed down and as shown at this time, is more milky red in appearance.  She is here because the pain is far worse and she is concerned of complications.  Afebrile at home.     Triage Assessment     Row Name 06/24/23 1926       Triage Assessment (Adult)    Airway WDL WDL       Respiratory WDL    Respiratory WDL WDL       Skin Circulation/Temperature WDL    Skin Circulation/Temperature WDL X       Cardiac WDL    Cardiac WDL WDL       Peripheral/Neurovascular WDL    Peripheral Neurovascular WDL WDL       Cognitive/Neuro/Behavioral WDL    Cognitive/Neuro/Behavioral WDL WDL

## 2023-06-25 NOTE — PLAN OF CARE
"A& O x4,afebrile, b/p 82/57 systolic, MD Morales notified, bolus now infusing. ZAHRA in right groin and right buttock have erythema surrounding, drainage from ZAHRA on right buttock milky with reddish/pink tinged, 10 mls output this shift. Drainage from ZAHRA on right groin serosanguinous, 2 mls output this shift.Pain rated 7/10, PRN oxycodone and dilaudid given,see MAR. Pt ind in room.  BP (!) 82/57 (BP Location: Right arm)   Pulse 60   Temp 97.7  F (36.5  C) (Tympanic)   Resp 20   Ht 1.753 m (5' 9\")   Wt 76.7 kg (169 lb)   LMP  (LMP Unknown)   SpO2 98%   BMI 24.96 kg/m    PRIMARY DIAGNOSIS: Hx Julio's gangrene  OUTPATIENT/OBSERVATION GOALS TO BE MET BEFORE DISCHARGE:  Vitals sign stable or return to baseline: No    Pain status: Improved but still requiring IV narcotics.    Return to near baseline physical activity: Yes    Discharge Planner Nurse   Safe discharge environment identified: Yes  Barriers to discharge: Yes       Entered by: Delfina Vyas RN 06/25/2023 5:28 AM           "

## 2023-06-25 NOTE — ED PROVIDER NOTES
Emergency Department Provider Note  : 1970 Age: 53 year old Sex: female MRN: [id number]    Chief Complaint   Patient presents with     Post-op Problem       Medical Decision Making / Assessment / Plan   53 year old female presenting with postoperative pain following gangrene surgery, perineal pain    ED Course as of 23   Sat  Patient evaluated.  Had surgery for Julio's gangrene, delayed closure.  Surgery just completed on .  Initial foreign years gangrene surgery on .    Initially with red drainage from ZAHRA drains, now drainage is milky colored.  Having some increased pain in the right frontal pubic area.  States that she finished her antibiotics up on Tuesday or Wednesday this past week after surgery.  She is not currently on antibiotics.  Proximal denies fevers or chills.  Increasing pain and changing ZAHRA drain drainage appearance and presented for evaluation.    Check lab work, 1 set of blood cultures.  Significant anxiety reported.  IV lorazepam ordered.    Have not been able to place IV.  IV lorazepam canceled.  Changed to oral lorazepam 2 mg.    Surgery to bring patient into hospital for observation.         New Prescriptions    No medications on file       Final diagnoses:   Perineal pain in female   Postoperative state   History of Julio's gangrene       Jon Herrera MD  2023   Emergency Department    Subjective   Kym is a 53 year old female who presents at  7:31 PM with increasing perineal/pelvic pain following large reconstructive surgery.  Noted changes in the ZAHRA drainage from bloody to milky.  No reported fevers reports area in the right pubic area that is more painful recently.  Has 2 ZAHRA drains in place.    Denies chest pain, heaviness.  Denies shortness of breath.    I have reviewed the Medications, Allergies, Past Medical and Surgical History, and Social History in the [employer] [facility] and with family.    Review of  "Systems:  Please see Subjective / HPI for pertinent positives and negatives. All other systems reviewed and found to be negative.      Objective     Patient Vitals for the past 24 hrs:   BP Temp Temp src Pulse Resp SpO2 Height Weight   06/24/23 1927 123/77 96.9  F (36.1  C) Tympanic 89 16 98 % 1.753 m (5' 9\") 74.8 kg (165 lb)       Physical Exam:     General: Awake, alert, in no acute respiratory distress.  Head: Normocephalic, atraumatic.  Eyes: Conjugate gaze.  ENT: Moist membranes, external ear appears normal.   Chest/Respiratory: Equal chest rise, clear bilaterally.  Cardiovascular: Peripheral pulses present, regular rate and rhythm.  Abdominal: Soft, non-distended.  Extremities: No obvious deformity.  Neurological: GCS 15, moving all extremities without gross deficit.  Skin: Warm, large surgical incision with 2 ZAHRA drains.  Psychiatric: Appropriate affect.     Procedures / Critical Care   Procedures    Aggregate Critical Care Time: None.     Orders Placed This Encounter   Procedures     Comprehensive metabolic panel     CRP inflammation     Extra Tube     Extra Blue Top Tube     Extra Red Top Tube     Extra Green Top (Lithium Heparin) ON ICE     CBC with platelets and differential     Extra Tube     Extra Blood Culture Bottle     Extra Tube     Extra Blood Culture Bottle     Peripheral IV catheter     [place] to Observation     CBC with platelets differential       RESULTS: As noted above.          Medical/Surgical History:  Past Medical History:   Diagnosis Date     Acquired absence of other organs (CODE)     9/2/2011     Chronic pain syndrome     No Comments Provided     Complex regional pain syndrome I     left ankle     Encounter for other administrative examinations     10/2012,Hydrocodone 10/325mg, #240/mo signed 10/3/12, updated 10/1/ 14     Encounter for other administrative examinations     10/1/2014,MS Contin 30 mg q 8 hours #90 per month.  Morphine sulfate IR 15 mg 2-3 tab tid prn #180 per month     " Gastro-esophageal reflux disease without esophagitis     No Comments Provided     Injury of left ankle     1997,requiring surgery     Low back pain     No Comments Provided     Major depressive disorder, recurrent severe without psychotic features (H)     No Comments Provided     Migraine without status migrainosus, not intractable     No Comments Provided     Nicotine dependence, uncomplicated     No Comments Provided     Obesity     No Comments Provided     Overweight     11/25/2014     Pain in ankle     Chronic left ankle pain secondary to reflex sympathetic dystrophy.  Patient  has had 4 previous surgeries, the most recent one done by Dr. Noriega in  2007. On Narcotic contract.     Pain in thoracic spine     No Comments Provided     Panic disorder without agoraphobia     Dr Reynoso     Personal history of other diseases of the female genital tract     No Comments Provided     Personal history of other medical treatment (CODE)     G2, P1-0-1-1 with one vaginal delivery.     Supraventricular tachycardia (H)     5/24/2010     Past Surgical History:   Procedure Laterality Date     ANKLE SURGERY      1997, 2000,Left ankle surgery x 2     CYSTECTOMY PILONIDAL N/A 6/19/2023    Procedure: Delayed closure of vulva and perineal defectes with drain placment;  Surgeon: Anthony Nolan MD;  Location:  OR     HYSTERECTOMY TOTAL ABDOMINAL      11/1999,secondary to endometriosis, no malignancy; patient reports no malignancy, but abnormal cells were present *     IRRIGATION AND DEBRIDEMENT DECUBITUS WOUND, COMBINED N/A 6/1/2023    Procedure: IRRIGATION AND DEBRIDEMENT, PERINEAL, LABIAL and GLUTEAL INFECTION;  Surgeon: Anthony Nolan MD;  Location:  OR     IRRIGATION AND DEBRIDEMENT DECUBITUS WOUND, COMBINED N/A 6/3/2023    Procedure: Debridement and washout of vulvar and perineal wounds;  Surgeon: Anthony Nolan MD;  Location:  OR     LAPAROSCOPIC CHOLECYSTECTOMY      No Comments Provided     LAPAROSCOPY DIAGNOSTIC  (GENERAL)      1995     OTHER SURGICAL HISTORY      21299,CRANIO/MAXILLOFACIAL SURGERY,Jaw Surgery     OTHER SURGICAL HISTORY      207040,[facility] EMG,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  No other abnormalities noted.       Medications:  No current facility-administered medications for this encounter.     Current Outpatient Medications   Medication     acetaminophen (TYLENOL) 325 MG tablet     albuterol (PROAIR HFA/PROVENTIL HFA/VENTOLIN HFA) 108 (90 Base) MCG/ACT inhaler     amphetamine-dextroamphetamine (ADDERALL) 20 MG tablet     atenolol (TENORMIN) 100 MG tablet     buprenorphine-naloxone (ZUBSOLV) 5.7-1.4 MG sublingual tablet     Cholecalciferol (VITAMIN D-3) 125 MCG (5000 UT) TABS     clonazePAM (KLONOPIN) 2 MG tablet     divalproex sodium extended-release (DEPAKOTE ER) 250 MG 24 hr tablet     fluticasone (FLONASE) 50 MCG/ACT nasal spray     hydrOXYzine (VISTARIL) 25 MG capsule     ibuprofen (ADVIL/MOTRIN) 800 MG tablet     naloxone (NARCAN) 4 MG/0.1ML nasal spray     nicotine (NICODERM CQ) 14 MG/24HR 24 hr patch     [START ON 7/15/2023] nicotine (NICODERM CQ) 7 MG/24HR 24 hr patch     ondansetron (ZOFRAN ODT) 4 MG ODT tab     oxyCODONE (ROXICODONE) 5 MG tablet     saccharomyces boulardii (FLORASTOR) 250 MG capsule     senna-docusate (SENOKOT-S/PERICOLACE) 8.6-50 MG tablet     tiZANidine (ZANAFLEX) 4 MG tablet     zinc oxide (DESITIN) 40 % external ointment     zinc oxide (DESITIN) 40 % external ointment       Allergies:  Diclofenac-misoprostol, Indomethacin, Meloxicam, and Moxifloxacin    Relevant labs, images, EKGs, Epic and outside hospital (if applicable) charts were reviewed. The findings, diagnosis, plan, and need for follow up were discussed with the patient/family. Nursing notes were reviewed.      Jon Herrera MD  06/24/23 2668

## 2023-06-25 NOTE — PHARMACY - DISCHARGE MEDICATION RECONCILIATION AND EDUCATION
Pharmacy:  Discharge Counseling and Medication Reconciliation    Kym Perea  [address] [place] [address]  [phone] (home)   53 year old female  PCP: Eryn Hurley    Allergies: Diclofenac-misoprostol, Indomethacin, Meloxicam, and Moxifloxacin    Discharge Counseling:    Pharmacist met with patient today to review the medication portion of the After Visit Summary (with an emphasis on NEW medications) and to address patient's questions/concerns.    Summary of Education: Counseled patient on new medication of Augmentin, including indication, administration, and possible common side effects. Counseled patient to  medication and start this morning.    Materials Provided:  MedCounselor sheets printed from Clinical Pharmacology on: Augmentin    Discharge Medication Reconciliation:    It has been determined that the patient has an adequate supply of medications available or which can be obtained from the patient's preferred pharmacy, which she has confirmed as: [facility] [place]. Rx was originally sent to [facility], but they are now closed on Sundays for the foreseeable future. Patient wanted Rx sent to [facility] [place], as they are open from 10-2 today.    Thank you for the consult.    Sammy Redmond [facility]........June 25, 2023 9:58 AM

## 2023-06-26 ENCOUNTER — PATIENT OUTREACH (OUTPATIENT)
Dept: FAMILY MEDICINE | Facility: OTHER | Age: 53
End: 2023-06-26

## 2023-06-26 NOTE — DISCHARGE SUMMARY
[facility] Discharge Summary    Kym Perea MRN# [id number]   Age: 53 year old YOB: 1970     Date of Admission:  6/24/2023  Date of Discharge::  6/25/2023 10:03 AM  Admitting Physician:  No admitting provider for patient encounter.  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             Discharge Medications:     Discharge Medication List as of 6/25/2023  9:51 AM      START taking these medications    Details   amoxicillin-clavulanate (AUGMENTIN) 875-125 MG tablet Take 1 tablet by mouth 2 times daily for 7 days, Disp-14 tablet, R-0, E-Prescribe         CONTINUE these medications which have NOT CHANGED    Details   acetaminophen (TYLENOL) 325 MG tablet Take 2 tablets (650 mg) by mouth every 4 hours as needed for other (mild pain), Disp-100 tablet, R-0, E-Prescribe      albuterol (PROAIR HFA/PROVENTIL HFA/VENTOLIN HFA) 108 (90 Base) MCG/ACT inhaler Inhale 2 puffs into the lungs every 4 hours as needed for shortness of breath / dyspnea or wheezing, Disp-1 Inhaler, R-11, E-PrescribePharmacy may dispense brand covered by insurance (Proair, or proventil or ventolin or generic albuterol inhaler)      amphetamine-dextroamphetamine (ADDERALL) 20 MG tablet Take 30 mg by mouth 2 times daily, Historical      atenolol (TENORMIN) 100 MG tablet Take 1 tablet (100 mg) by mouth daily, Disp-90 tablet, R-2, E-Prescribe      buprenorphine-naloxone (ZUBSOLV) 5.7-1.4 MG sublingual tablet Place 0.25 tablets under the tongue daily, Historical      Cholecalciferol (VITAMIN D-3) 125 MCG (5000 UT) TABS Take 1 tablet by mouth daily, Disp-90  tablet, R-11, E-Prescribe      clonazePAM (KLONOPIN) 2 MG tablet Take 1 tablet (2 mg) by mouth 2 times daily, No Print Out      divalproex sodium extended-release (DEPAKOTE ER) 250 MG 24 hr tablet Take 1 tablet by mouth 3 times daily, Historical      fluticasone (FLONASE) 50 MCG/ACT nasal spray Spray 2 sprays into both nostrils daily as needed for rhinitis or allergies, Disp-16 g, R-2, E-PrescribePrescription not previously filled at [facility]. Please authorize a new RX for this patient. Thank you.      hydrOXYzine (VISTARIL) 25 MG capsule Take 1 capsule (25 mg) by mouth 3 times daily as needed for other (nausea), Disp-60 capsule, R-11, E-Prescribe      ibuprofen (ADVIL/MOTRIN) 800 MG tablet Take 1 tablet (800 mg) by mouth every 8 hours as needed for moderate pain (4-6), No Print Out      naloxone (NARCAN) 4 MG/0.1ML nasal spray Spray 1 spray (4 mg) into one nostril alternating nostrils as needed for opioid reversal every 2-3 minutes until assistance arrives, Disp-0.2 mL, R-0, E-Prescribe      nicotine (NICODERM CQ) 14 MG/24HR 24 hr patch Place 1 patch onto the skin every 24 hours for 42 days, Disp-42 patch, R-0, E-Prescribe      nicotine (NICODERM CQ) 7 MG/24HR 24 hr patch Place 1 patch onto the skin every 24 hours for 14 days, Disp-14 patch, R-0, E-Prescribe      ondansetron (ZOFRAN ODT) 4 MG ODT tab Take 4 mg by mouth every 8 hours as needed, Historical      oxyCODONE (ROXICODONE) 5 MG tablet Take 1-2 tablets (5-10 mg) by mouth every 3 hours as needed for pain (Moderate to Severe), Disp-30 tablet, R-0, E-Prescribe      saccharomyces boulardii (FLORASTOR) 250 MG capsule Take 1 capsule (250 mg) by mouth 2 times daily, Disp-30 capsule, R-0, E-Prescribe      senna-docusate (SENOKOT-S/PERICOLACE) 8.6-50 MG tablet Take 1-2 tablets by mouth 2 times daily Take while on oral narcotics to prevent or treat constipation., Disp-30 tablet, R-0, E-PrescribeWhile taking narcotics      tiZANidine (ZANAFLEX) 4 MG tablet TAKE 1  TABLET BY MOUTH THREE TIMES DAILY AS NEEDED MUSCLE SPASMS, Disp-30 tablet, R-2, E-PrescribePATIENT IS REQUESTING REFILLS PLEASE      !! zinc oxide (DESITIN) 40 % external ointment Apply topically as needed for dry skin, irritation or skin protection (daily PRN)Disp-113 g, [id number]      !! zinc oxide (DESITIN) 40 % external ointment Apply topically as needed for irritation or skin protectionDisp-56 g, [id number]       !! - Potential duplicate medications found. Please discuss with provider.                Consultations:   No consultations were requested during this admission          Brief History of Illness:                        Hospital Course:   The patient was seen in the emergency department and wound was evaluated, patient is postop day 5 from complicated, delayed primary closure with drain placement.  States she is having increased pain and the character of the drainage is changed.  As this is my first time seeing the wound and the patient is quite anxious about recurrent infection and drain management decision was made to observe the patient overnight with repeat labs and exam in the morning.  In the morning patient had been completely afebrile, pain was improved and the drainage seems to have slowed.  White count was stable and normal.  She felt well enough and comfortable to go home.  Gram stain showed scant gram-positive bacilli.  Of note, since the writing of the discharge summary there has been no growth in the formal cultures of the drains.  She was discharged home in good condition on hospital day 2.  Plans for follow-up with Dr. Anthony Nolan on 6/27/2023          Discharge Instructions and Follow-Up:     Discharge diet: Regular   Discharge activity: Activity as tolerated   Discharge follow-up:  follow-up with Dr. Anthony Nolan on 6/27/2023   Wound care: Keep wound clean and dry           Discharge Disposition:     Discharged to home      Attestation:  I have reviewed today's vital signs,  notes, medications, labs and imaging.  Amount of time performed on this discharge summary: 15 minutes.    Armando Morales MD

## 2023-06-26 NOTE — TELEPHONE ENCOUNTER
Surgical. Patient discharged with surgical follow-up only. No TCM call required per policy.   Bibi Santamaria RN on 6/26/2023 at 9:08 AM

## 2023-06-27 ENCOUNTER — OFFICE VISIT (OUTPATIENT)
Dept: SURGERY | Facility: OTHER | Age: 53
End: 2023-06-27
Attending: SPECIALIST
Payer: COMMERCIAL

## 2023-06-27 VITALS
BODY MASS INDEX: 25.55 KG/M2 | TEMPERATURE: 97.5 F | HEART RATE: 60 BPM | WEIGHT: 173 LBS | DIASTOLIC BLOOD PRESSURE: 62 MMHG | SYSTOLIC BLOOD PRESSURE: 99 MMHG | OXYGEN SATURATION: 99 % | RESPIRATION RATE: 16 BRPM

## 2023-06-27 DIAGNOSIS — N76.82 FOURNIER'S GANGRENE IN FEMALE (H): Primary | ICD-10-CM

## 2023-06-27 PROCEDURE — G0463 HOSPITAL OUTPT CLINIC VISIT: HCPCS

## 2023-06-27 PROCEDURE — 99024 POSTOP FOLLOW-UP VISIT: CPT | Performed by: SURGERY

## 2023-06-27 PROCEDURE — 87070 CULTURE OTHR SPECIMN AEROBIC: CPT | Mod: ZL | Performed by: SURGERY

## 2023-06-27 RX ORDER — SULFAMETHOXAZOLE/TRIMETHOPRIM 800-160 MG
1 TABLET ORAL 2 TIMES DAILY
Qty: 20 TABLET | Refills: 0 | Status: SHIPPED | OUTPATIENT
Start: 2023-06-27 | End: 2023-07-07

## 2023-06-27 ASSESSMENT — PAIN SCALES - GENERAL: PAINLEVEL: SEVERE PAIN (7)

## 2023-06-27 NOTE — NURSING NOTE
"Chief Complaint   Patient presents with     Surgical Followup       Initial BP 99/62 (BP Location: Right arm, Patient Position: Sitting, Cuff Size: Adult Large)   Pulse 60   Temp 97.5  F (36.4  C) (Tympanic)   Resp 16   Wt 78.5 kg (173 lb)   LMP  (LMP Unknown)   SpO2 99%   BMI 25.55 kg/m   Estimated body mass index is 25.55 kg/m  as calculated from the following:    Height as of 6/25/23: 1.753 m (5' 9\").    Weight as of this encounter: 78.5 kg (173 lb).  Meds Reconciled: complete      Massiel Gutierrez, RN      "

## 2023-06-27 NOTE — PROGRESS NOTES
Patient presents for post surgical visit after grade primary closure of right labial and perineal defects on 6/19.  Patient had presented with pain in and change in drainage to the ER.  She was hospitalized overnight.  There was a gram positives on Gram stain.    BP 99/62 (BP Location: Right arm, Patient Position: Sitting, Cuff Size: Adult Large)   Pulse 60   Temp 97.5  F (36.4  C) (Tympanic)   Resp 16   Wt 78.5 kg (173 lb)   LMP  (LMP Unknown)   SpO2 99%   BMI 25.55 kg/m      General: NAD, pleasant and cooperative with exam and interview.  Perineum: healing incisions. No sign of infection. No pain with palpation.  There is minimal epidermal dehiscence in the perirectal area.  Nurse present.  Bacitracin applied.  The front drain is serosanguineous.  The posterior drain is seropurulent.  Psychiatry: awake, alert and oriented. Appropriate affect.    Assessment/Plan:  53-year-old female status post Julio's with extensive debridements.  Now with tissue closure.  Suspicion of infected seroma.  Continue drain.    May not have good coverage with Augmentin given the seropurulent nature.      Anthony Nolan MD on 6/27/2023 at 10:07 AM

## 2023-06-28 LAB
BACTERIA FLD CULT: ABNORMAL
BACTERIA FLD CULT: ABNORMAL
GRAM STAIN RESULT: ABNORMAL
GRAM STAIN RESULT: ABNORMAL

## 2023-06-29 LAB
BACTERIA ASPIRATE CULT: ABNORMAL
BACTERIA FLD CULT: NORMAL
BACTERIA FLD CULT: NORMAL
GRAM STAIN RESULT: ABNORMAL

## 2023-07-01 LAB — BACTERIA BLD CULT: NO GROWTH

## 2023-07-03 ENCOUNTER — TELEPHONE (OUTPATIENT)
Dept: FAMILY MEDICINE | Facility: OTHER | Age: 53
End: 2023-07-03
Payer: COMMERCIAL

## 2023-07-03 ENCOUNTER — HOSPITAL ENCOUNTER (EMERGENCY)
Facility: OTHER | Age: 53
Discharge: HOME | End: 2023-07-03
Attending: EMERGENCY MEDICINE | Admitting: EMERGENCY MEDICINE
Payer: COMMERCIAL

## 2023-07-03 ENCOUNTER — TELEPHONE (OUTPATIENT)
Dept: SURGERY | Facility: OTHER | Age: 53
End: 2023-07-03
Payer: COMMERCIAL

## 2023-07-03 VITALS
HEIGHT: 69 IN | TEMPERATURE: 97.2 F | WEIGHT: 165 LBS | BODY MASS INDEX: 24.44 KG/M2 | RESPIRATION RATE: 16 BRPM | HEART RATE: 70 BPM | DIASTOLIC BLOOD PRESSURE: 78 MMHG | OXYGEN SATURATION: 96 % | SYSTOLIC BLOOD PRESSURE: 118 MMHG

## 2023-07-03 DIAGNOSIS — Z51.89 VISIT FOR WOUND CHECK: ICD-10-CM

## 2023-07-03 PROCEDURE — 99282 EMERGENCY DEPT VISIT SF MDM: CPT | Performed by: EMERGENCY MEDICINE

## 2023-07-03 ASSESSMENT — ENCOUNTER SYMPTOMS
NAUSEA: 0
VOMITING: 0
FEVER: 0
WOUND: 1
SHORTNESS OF BREATH: 0
CHILLS: 0
ARTHRALGIAS: 0

## 2023-07-03 ASSESSMENT — ACTIVITIES OF DAILY LIVING (ADL): ADLS_ACUITY_SCORE: 33

## 2023-07-03 NOTE — TELEPHONE ENCOUNTER
In patient's chart as she presented to  with concerns of not being able to afford her medications; I wanted to see if she was seen here recently.     She was not. Contacting  for care coordination.     Saadia Portlilo NP on 7/3/2023 at 4:02 PM

## 2023-07-03 NOTE — TELEPHONE ENCOUNTER
Patient called wondering what to do regarding their ZAHRA drain starting to come out of their incision. Called Dr. Anthony Nolan, states that patient is okay to wait till Wednesday to be seen for drain removal. Patient got disconnected before relaying this message to them. Attempted to call back x2, left a voicemail.    Amanda Spain RN on 7/3/2023 at 5:05 PM

## 2023-07-03 NOTE — TELEPHONE ENCOUNTER
Patient called and stated the topher morris drain that was left in is hanging down her leg. Patient stated SPO had left it the last time she saw him. Patient stated the drain isn't draining or connected to her.

## 2023-07-03 NOTE — ED TRIAGE NOTES
Pt comes into the ER today today independently from home. Pt reports she was in the rapid clinic today, noticed that the bulb came off the end of her ZAHRA drain, which is draining from a surgical site in buttock/inner thigh. This occurred about 45 minutes ago. Pt was told to come in she says from the nurse on line. Has an appointment with Dr. Nolan Wednesday. She reports it wasn't draining much.     Triage Assessment     Row Name 07/03/23 [id number]       Triage Assessment (Adult)    Airway WDL WDL       Respiratory WDL    Respiratory WDL WDL       Skin Circulation/Temperature WDL    Skin Circulation/Temperature WDL WDL       Cardiac WDL    Cardiac WDL WDL       Peripheral/Neurovascular WDL    Peripheral Neurovascular WDL WDL       Cognitive/Neuro/Behavioral WDL    Cognitive/Neuro/Behavioral WDL WDL

## 2023-07-04 ENCOUNTER — TELEPHONE (OUTPATIENT)
Dept: SURGERY | Facility: OTHER | Age: 53
End: 2023-07-04
Payer: COMMERCIAL

## 2023-07-04 NOTE — TELEPHONE ENCOUNTER
After verifying patient name and date of birth, confirmed patient questions regarding phone call.    Patient states she did not know who called her and was calling  because she was here yesterday.  Patient was not seen in , but reached out to , Celine HARPER for assistance with rx. When she presented to  yesterday; she let us know she could not afford her rx at [facility], but did not have any acute concerns requiring a provider visit.    Patient was upset as she did not hear back from nurse regarding provider instruction. Previous RN note was re-read to patient.    Patient was seen in ER the evening of 7/3 and drain was removed.    Patient instructed by ER provider to follow up in clinic and has appt scheduled with Dr. Nolan at 930 am 7/5/23.  Reiterated instructions from ER provider and to return to ER if symptoms worsen before appt tomorrow morning.    Ani Spring LPN LPN....................  7/4/2023   5:21 PM

## 2023-07-04 NOTE — ED PROVIDER NOTES
History     Chief Complaint   Patient presents with     ZAHRA Drain Problem     Bulb came off     HPI  Kym Perea is a 53 year old female who came in today because her ZAHRA drain pulled partly out.  She called the clinic,  she was told she could wait until her appointment on Wednesday and have it removed at that point.  She said it was very uncomfortable and hanging also that she just was not going to wait that long.  When nurses saw her in triage looks like the drain was pretty much all the way out in the finished removing it and cleaned and bandaged her up.  She said there has hardly been any drainage from there recently.  She is having some pain but otherwise feeling okay.    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     Priority: Medium     Julio's gangrene 06/01/2023     Priority: Medium     TMJ (temporomandibular joint syndrome) 11/01/2021     Priority: Medium     Arthralgia of right temporomandibular joint 11/01/2021     Priority: Medium     Suicidal behavior 10/19/2021     Priority: Medium     Medical cannabis use 07/10/2018     Priority: Medium     Chronic pain disorder 01/29/2018     Priority: Medium     Overview:   complex regional pain syndrome left ankle       Esophageal reflux 01/29/2018     Priority: Medium     Hyperlipidemia 01/29/2018     Priority: Medium     Insomnia 01/29/2018     Priority: Medium     Panic disorder 01/29/2018     Priority: Medium     Overview:   Previously patient of Dr. Reynoso since her teens. Patient is attempting  slow taper down on her Xanax. She's been on Xanax 2 mg 4 times a day for years       Tobacco abuse 01/29/2018     Priority: Medium     Pain medication agreement, 4/18/18, 4/3/19, 10/27/2020 04/21/2017     Priority: Medium     Overview:   4/20/17:  Oxycodone 10 mg #180/mo    Hydrocodone 10/325mg, #240/mo. Stopped and changed to morphine. Agreement signed 10/3/12, updated 10/1/ 14.  Complex regional pain syndrome (aka Reflex Sympathetic Dystrophy)  MS Contin 30 mg q 8 hours #90 per month.  Morphine sulfate IR 15 mg 2-3 tab tid prn #180 per month, decreased to #90/month.   query 4/9/16 as part of chart review. Seems appropriate. 6/2/16 tapered and off Morphine. [place] 5/325 #120/month.  query appropriate. ToxAssure appropriate.    August 2016 Consultation with [facility]. Recommended spinal cord stimulator trial, but she is reluctant to pursue that. Pain clinic stated that continuing opioids was OK.       Complex regional pain syndrome type 1 affecting left lower leg 04/15/2016     Priority: Medium     Overview:   Patient with a history of complex regional pain syndrome has seen Dr. Dino Skelton4/23/16        Migraine headache 11/25/2014     Priority: Medium     Overweight (BMI 25.0-29.9) 11/25/2014     Priority: Medium     Lumbago 02/06/2012     Priority: Medium     Major depressive disorder, recurrent episode, severe (H) 07/13/2011     Priority: Medium     Paroxysmal supraventricular tachycardia (H) 05/24/2010     Priority: Medium     Mononeuritis 07/06/2006     Priority: Medium     IMO Update 10/11       Ankle and foot joint derangement 06/05/2006     Priority: Medium     Overview:   Chronic left ankle pain secondary to reflex sympathetic dystrophy.  Patient   has had 4 previous surgeries, the most recent one done by Dr. Noriega in   2007. On Narcotic contract.    Formatting of this note might be different from the original.  Chronic left ankle pain secondary to reflex sympathetic dystrophy.   Patient   has had 4 previous surgeries, the most recent one done by Dr. Noriega in   2007. On Narcotic contract.    IMO Update 10/11          Past Medical History:    Past Medical History:   Diagnosis Date     Acquired absence of other organs (CODE)      Chronic pain syndrome      Complex regional pain syndrome I      Encounter for other administrative examinations      Encounter for other administrative examinations      Gastro-esophageal reflux disease without esophagitis      Injury of left ankle      Low back pain      Major depressive disorder, recurrent severe without psychotic features (H)      Migraine without status migrainosus, not intractable      Nicotine dependence, uncomplicated      Obesity      Overweight      Pain in ankle      Pain in thoracic spine      Panic disorder without agoraphobia      Personal history of other diseases of the female genital tract      Personal history of other medical treatment (CODE)      Supraventricular tachycardia (H)        Past Surgical History:    Past Surgical History:   Procedure Laterality Date     ANKLE SURGERY      1997, 2000,Left ankle surgery x 2     CYSTECTOMY PILONIDAL N/A 6/19/2023    Procedure: Delayed closure of vulva and perineal defectes with drain placment;  Surgeon: Anthony Nolan MD;  Location:  OR     HYSTERECTOMY TOTAL ABDOMINAL      11/1999,secondary to endometriosis, no malignancy; patient reports no malignancy, but abnormal cells were present *     IRRIGATION AND DEBRIDEMENT DECUBITUS WOUND, COMBINED N/A 6/1/2023    Procedure: IRRIGATION AND DEBRIDEMENT, PERINEAL, LABIAL and GLUTEAL INFECTION;  Surgeon: Anthony Nolan MD;  Location: [facility] OR     IRRIGATION AND DEBRIDEMENT DECUBITUS WOUND, COMBINED N/A 6/3/2023    Procedure: Debridement and washout of vulvar and perineal wounds;  Surgeon: Anthony Nolan MD;  Location:  OR     LAPAROSCOPIC CHOLECYSTECTOMY      No Comments Provided     LAPAROSCOPY DIAGNOSTIC (GENERAL)      1995     OTHER  SURGICAL HISTORY      21299,CRANIO/MAXILLOFACIAL SURGERY,Jaw Surgery     OTHER SURGICAL HISTORY      207040,[facility] EMG,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  No other abnormalities noted.       Family History:    Family History   Problem Relation Age of Onset     Cancer Father         Cancer,Bladder     Other - See Comments Father         Mild hypercholesterolemia     Other - See Comments Mother         scleroderma/lupus/Parkinson's syndrome     Family History Negative Brother         Good Health     Ariel-Danlos syndrome Daughter      Colon Cancer Maternal Grandmother 40        Cancer-colon,Followed by lung  with metastasis to the bone di*       Social History:  Marital Status:   [4]  Social History     Tobacco Use     Smoking status: Every Day     Packs/day: 0.50     Years: 30.00     Pack years: 15.00     Types: Cigarettes, Vaping Device     Start date: 12/2/1986     Smokeless tobacco: Never     Tobacco comments:     trying   Vaping Use     Vaping Use: Every day     Substances: Nicotine, Flavoring     Devices: Disposable   Substance Use Topics     Alcohol use: Yes     Comment: OCCASIONAL WINE     Drug use: Yes     Types: Marijuana     Comment: medical cannabis; suboxone        Medications:    acetaminophen (TYLENOL) 325 MG tablet  albuterol (PROAIR HFA/PROVENTIL HFA/VENTOLIN HFA) 108 (90 Base) MCG/ACT inhaler  amoxicillin-clavulanate (AUGMENTIN) 875-125 MG tablet  amphetamine-dextroamphetamine (ADDERALL) 20 MG tablet  atenolol (TENORMIN) 100 MG tablet  buprenorphine-naloxone (ZUBSOLV) 5.7-1.4 MG sublingual tablet  Cholecalciferol (VITAMIN D-3) 125 MCG (5000 UT) TABS  clonazePAM (KLONOPIN) 2 MG tablet  divalproex sodium extended-release (DEPAKOTE ER) 250 MG 24 hr tablet  fluticasone (FLONASE) 50 MCG/ACT nasal spray  hydrOXYzine (VISTARIL) 25 MG capsule  ibuprofen (ADVIL/MOTRIN) 800 MG tablet  naloxone (NARCAN) 4 MG/0.1ML nasal spray  nicotine (NICODERM CQ) 14 MG/24HR 24 hr patch  [START ON  "7/15/2023] nicotine (NICODERM CQ) 7 MG/24HR 24 hr patch  ondansetron (ZOFRAN ODT) 4 MG ODT tab  oxyCODONE (ROXICODONE) 5 MG tablet  saccharomyces boulardii (FLORASTOR) 250 MG capsule  senna-docusate (SENOKOT-S/PERICOLACE) 8.6-50 MG tablet  sulfamethoxazole-trimethoprim (BACTRIM DS) 800-160 MG tablet  tiZANidine (ZANAFLEX) 4 MG tablet  zinc oxide (DESITIN) 40 % external ointment  zinc oxide (DESITIN) 40 % external ointment          Review of Systems   Constitutional: Negative for chills and fever.   HENT: Negative for congestion.    Eyes: Negative for visual disturbance.   Respiratory: Negative for shortness of breath.    Cardiovascular: Negative for chest pain.   Gastrointestinal: Negative for nausea and vomiting.   Musculoskeletal: Negative for arthralgias.   Skin: Positive for wound.       Physical Exam   BP: 117/77  Pulse: 94  Temp: 97.4  F (36.3  C)  Resp: 18  Height: 175.3 cm (5' 9\")  Weight: 74.8 kg (165 lb)  SpO2: 97 %      Physical Exam  Vitals and nursing note reviewed.   Constitutional:       Appearance: Normal appearance.   HENT:      Head: Normocephalic and atraumatic.      Mouth/Throat:      Mouth: Mucous membranes are moist.   Eyes:      Conjunctiva/sclera: Conjunctivae normal.   Cardiovascular:      Rate and Rhythm: Normal rate.   Pulmonary:      Effort: Pulmonary effort is normal.   Abdominal:      General: Abdomen is flat.   Skin:     General: Skin is warm and dry.      Comments: Drain sites are clean and dry at this time.  No drainage.  No erythema.   Neurological:      Mental Status: She is alert and oriented to person, place, and time.   Psychiatric:         Behavior: Behavior normal.         ED Course                 Procedures                No results found for this or any previous visit (from the past 24 hour(s)).    Medications - No data to display    Assessments & Plan (with Medical Decision Making)     I have reviewed the nursing notes.    I have reviewed the findings, diagnosis, plan and " need for follow up with the patient.    Patient's ZAHRA drain has come out on its own.  Wound looks good.  Will be discharged to home at this time.  No interventions here tonight.  Follow-up in clinic as planned.    New Prescriptions    No medications on file       Final diagnoses:   Visit for wound check       7/3/2023   [facility]     Kal Mack MD  07/03/23 [id number]

## 2023-07-04 NOTE — ED NOTES
Observed patient's bulb drain, which has been completely pulled from body.  Bulb was intact on the end of the drainage tube.  Will update MD.

## 2023-07-05 ENCOUNTER — PATIENT OUTREACH (OUTPATIENT)
Dept: CARE COORDINATION | Facility: CLINIC | Age: 53
End: 2023-07-05
Payer: COMMERCIAL

## 2023-07-05 ENCOUNTER — OFFICE VISIT (OUTPATIENT)
Dept: SURGERY | Facility: OTHER | Age: 53
End: 2023-07-05
Attending: SURGERY
Payer: COMMERCIAL

## 2023-07-05 VITALS
HEART RATE: 70 BPM | SYSTOLIC BLOOD PRESSURE: 100 MMHG | DIASTOLIC BLOOD PRESSURE: 66 MMHG | RESPIRATION RATE: 16 BRPM | BODY MASS INDEX: 23.7 KG/M2 | WEIGHT: 160 LBS | TEMPERATURE: 97.5 F | HEIGHT: 69 IN | OXYGEN SATURATION: 98 %

## 2023-07-05 DIAGNOSIS — N76.82 FOURNIER'S GANGRENE IN FEMALE (H): Primary | ICD-10-CM

## 2023-07-05 PROCEDURE — G0463 HOSPITAL OUTPT CLINIC VISIT: HCPCS

## 2023-07-05 PROCEDURE — 99024 POSTOP FOLLOW-UP VISIT: CPT | Performed by: SURGERY

## 2023-07-05 RX ORDER — DEXTROAMPHETAMINE SACCHARATE, AMPHETAMINE ASPARTATE, DEXTROAMPHETAMINE SULFATE AND AMPHETAMINE SULFATE 7.5; 7.5; 7.5; 7.5 MG/1; MG/1; MG/1; MG/1
30 TABLET ORAL 2 TIMES DAILY
COMMUNITY
Start: 2023-04-04 | End: 2023-12-19

## 2023-07-05 ASSESSMENT — PAIN SCALES - GENERAL: PAINLEVEL: SEVERE PAIN (7)

## 2023-07-05 NOTE — PROGRESS NOTES
"Patient presents for post surgical visit after grade primary closure of right labial and perineal defects on 6/19.  Patient had her drain fall out spontaneously over the weekend.    /66   Pulse 70   Temp 97.5  F (36.4  C) (Tympanic)   Resp 16   Ht 1.753 m (5' 9\")   Wt 72.6 kg (160 lb)   LMP  (LMP Unknown)   SpO2 98%   BMI 23.63 kg/m      General: NAD, pleasant and cooperative with exam and interview.  Perineum: healing incisions. No sign of infection. No pain with palpation.  There is scar tissue at the drain sites.  Both drain sites are healing well.  The incision is healing well.  Psychiatry: awake, alert and oriented. Appropriate affect.    Assessment/Plan:  53-year-old female status post Julio's with extensive debridements.  Infected seroma with resolution.    Follow-up as needed    Anthony Nolan MD on 7/5/2023 at 10:50 AM    "

## 2023-07-05 NOTE — TELEPHONE ENCOUNTER
Clinic Care Coordination Contact  Care Team Conversations    Met with her at [facility]. She was concerned that she wasn't able to purchase her medication. She reports that she would have money in the next few days but needed her Klonopine as she was having a panic attack. Met her at [facility] and purchased two medications for her.    Also provided her with gas card fro holiday.    ROSA Ventura on 7/3/2023 at 4:15 PM

## 2023-07-05 NOTE — NURSING NOTE
"Chief Complaint   Patient presents with     Post-Op - General Surgery     Fourniers gangrene        Initial /66   Pulse 70   Temp 97.5  F (36.4  C) (Tympanic)   Resp 16   Ht 1.753 m (5' 9\")   Wt 72.6 kg (160 lb)   LMP  (LMP Unknown)   SpO2 98%   BMI 23.63 kg/m   Estimated body mass index is 23.63 kg/m  as calculated from the following:    Height as of this encounter: 1.753 m (5' 9\").    Weight as of this encounter: 72.6 kg (160 lb).  Medication Reconciliation: complete    FOOD SECURITY SCREENING QUESTIONS  Hunger Vital Signs:  Within the past 12 months we worried whether our food would run out before we got money to buy more. Never  Within the past 12 months the food we bought just didn't last and we didn't have money to get more. Never  Do Giron LPN 7/5/2023 10:01 AM        "

## 2023-07-10 ENCOUNTER — OFFICE VISIT (OUTPATIENT)
Dept: FAMILY MEDICINE | Facility: OTHER | Age: 53
End: 2023-07-10
Payer: COMMERCIAL

## 2023-07-10 VITALS
BODY MASS INDEX: 23.7 KG/M2 | RESPIRATION RATE: 16 BRPM | HEIGHT: 69 IN | SYSTOLIC BLOOD PRESSURE: 100 MMHG | HEART RATE: 51 BPM | OXYGEN SATURATION: 100 % | WEIGHT: 160 LBS | TEMPERATURE: 96.9 F | DIASTOLIC BLOOD PRESSURE: 58 MMHG

## 2023-07-10 DIAGNOSIS — B36.9 FUNGAL INFECTION OF SKIN: ICD-10-CM

## 2023-07-10 DIAGNOSIS — Z51.89 VISIT FOR WOUND CHECK: Primary | ICD-10-CM

## 2023-07-10 PROCEDURE — G0463 HOSPITAL OUTPT CLINIC VISIT: HCPCS

## 2023-07-10 PROCEDURE — 99213 OFFICE O/P EST LOW 20 MIN: CPT | Mod: 24 | Performed by: NURSE PRACTITIONER

## 2023-07-10 RX ORDER — NYSTATIN 100000 U/G
CREAM TOPICAL 2 TIMES DAILY
Qty: 15 G | Refills: 0 | Status: SHIPPED | OUTPATIENT
Start: 2023-07-10 | End: 2023-07-17

## 2023-07-10 ASSESSMENT — PAIN SCALES - GENERAL: PAINLEVEL: SEVERE PAIN (7)

## 2023-07-10 NOTE — PROGRESS NOTES
ASSESSMENT/PLAN:    I have reviewed the nursing notes.  I have reviewed the findings, diagnosis, plan and need for follow up with the patient.    1. 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  She recently completed a course of Bactrim 3 days ago.  I provided reassurance today that there is no evidence of bacterial infection at the site and it appears to be healing well.  The firm area that she is feeling is most consistent with scar tissue that is forming along the incision line.  She does have a slight maculopapular rash approximately 2.5 cm proximal to the healing incision site consistent with yeast/fungal infection the skin.  I have prescribed nystatin cream for her to apply to this area only along with Telfa pads.  She verbalized understanding of this.  In the event that she feels unwell in any way, develops a fever, pain or any similar symptoms to her presentation on 6/1-2023 she is to follow-up emergently.    2. Fungal infection of skin  - nystatin (MYCOSTATIN) 841244 UNIT/GM external cream; Apply topically 2 times daily for 7 days  Dispense: 15 g; Refill: 0    Discussed warning signs/symptoms indicative of need to f/u    Follow up if symptoms persist or worsen or concerns    I explained my diagnostic considerations and recommendations to the patient, who voiced understanding and agreement with the treatment plan. All questions were answered. We discussed potential side effects of any prescribed or recommended therapies, as well as expectations for response to treatments.    Saadia Portillo NP  7/10/2023  5:59 PM    HPI:  Kym Perea is a 53 year old female who presents to [facility] today for concerns of concerns of infected stitches on the back of her right leg.  She presents today as she feels like he may be infected.  She had drain tubes  that were taken out about 5 days ago. On atenolol for heart rate so not uncommon to have low heart rate. She also commonly has BP around 100/60. She feels well. No fevers. She was concerned about her wound area. There is very slight tender and firmness without drainage.  Is concerned today as she cannot see the site well on her own and thought that there was an area of firmness.  There is slight tenderness present along the incisional line.  She denies any fevers or feeling unwell in any way.  She feels overall quite well.  She tells me that she has been having some anxiety following what she has been through in the last month and a half following her gangrene diagnosis and treatment.     ROS otherwise negative.     Past Medical History:   Diagnosis Date     Acquired absence of other organs (CODE)     9/2/2011     Chronic pain syndrome     No Comments Provided     Complex regional pain syndrome I     left ankle     Encounter for other administrative examinations     10/2012,Hydrocodone 10/325mg, #240/mo signed 10/3/12, updated 10/1/ 14     Encounter for other administrative examinations     10/1/2014,MS Contin 30 mg q 8 hours #90 per month.  Morphine sulfate IR 15 mg 2-3 tab tid prn #180 per month     Gastro-esophageal reflux disease without esophagitis     No Comments Provided     Injury of left ankle     1997,requiring surgery     Low back pain     No Comments Provided     Major depressive disorder, recurrent severe without psychotic features (H)     No Comments Provided     Migraine without status migrainosus, not intractable     No Comments Provided     Nicotine dependence, uncomplicated     No Comments Provided     Obesity     No Comments Provided     Overweight     11/25/2014     Pain in ankle     Chronic left ankle pain secondary to reflex sympathetic dystrophy.  Patient  has had 4 previous surgeries, the most recent one done by Dr. Noriega in  2007. On Narcotic contract.     Pain in thoracic spine     No  Comments Provided     Panic disorder without agoraphobia     Dr Reynoso     Personal history of other diseases of the female genital tract     No Comments Provided     Personal history of other medical treatment (CODE)     G2, P1-0-1-1 with one vaginal delivery.     Supraventricular tachycardia (H)     5/24/2010     Past Surgical History:   Procedure Laterality Date     ANKLE SURGERY      1997, 2000,Left ankle surgery x 2     CYSTECTOMY PILONIDAL N/A 6/19/2023    Procedure: Delayed closure of vulva and perineal defectes with drain placment;  Surgeon: Anthony Nolan MD;  Location:  OR     HYSTERECTOMY TOTAL ABDOMINAL      11/1999,secondary to endometriosis, no malignancy; patient reports no malignancy, but abnormal cells were present *     IRRIGATION AND DEBRIDEMENT DECUBITUS WOUND, COMBINED N/A 6/1/2023    Procedure: IRRIGATION AND DEBRIDEMENT, PERINEAL, LABIAL and GLUTEAL INFECTION;  Surgeon: Anthony Nolan MD;  Location: [facility] OR     IRRIGATION AND DEBRIDEMENT DECUBITUS WOUND, COMBINED N/A 6/3/2023    Procedure: Debridement and washout of vulvar and perineal wounds;  Surgeon: Anthony Nolan MD;  Location:  OR     LAPAROSCOPIC CHOLECYSTECTOMY      No Comments Provided     LAPAROSCOPY DIAGNOSTIC (GENERAL)      1995     OTHER SURGICAL HISTORY      21299,CRANIO/MAXILLOFACIAL SURGERY,Jaw Surgery     OTHER SURGICAL HISTORY      207040,[facility] EMG,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  No other abnormalities noted.     Social History     Tobacco Use     Smoking status: Every Day     Packs/day: 0.50     Years: 30.00     Pack years: 15.00     Types: Cigarettes, Vaping Device     Start date: 12/2/1986     Smokeless tobacco: Never     Tobacco comments:     trying   Substance Use Topics     Alcohol use: Yes     Comment: OCCASIONAL WINE     Current Outpatient Medications   Medication Sig Dispense Refill     acetaminophen (TYLENOL) 325 MG tablet Take 2 tablets (650 mg) by mouth every 4 hours as needed for  other (mild pain) 100 tablet 0     albuterol (PROAIR HFA/PROVENTIL HFA/VENTOLIN HFA) 108 (90 Base) MCG/ACT inhaler Inhale 2 puffs into the lungs every 4 hours as needed for shortness of breath / dyspnea or wheezing 1 Inhaler 11     amoxicillin-clavulanate (AUGMENTIN) 875-125 MG tablet Take 1 tablet by mouth 2 times daily 14 tablet 0     amphetamine-dextroamphetamine (ADDERALL) 20 MG tablet Take 30 mg by mouth 2 times daily       amphetamine-dextroamphetamine (ADDERALL) 30 MG tablet Take 30 mg by mouth 2 times daily       atenolol (TENORMIN) 100 MG tablet Take 1 tablet (100 mg) by mouth daily 90 tablet 2     buprenorphine-naloxone (ZUBSOLV) 5.7-1.4 MG sublingual tablet Place 0.25 tablets under the tongue daily       Cholecalciferol (VITAMIN D-3) 125 MCG (5000 UT) TABS Take 1 tablet by mouth daily 90 tablet 11     clonazePAM (KLONOPIN) 2 MG tablet Take 1 tablet (2 mg) by mouth 2 times daily       divalproex sodium extended-release (DEPAKOTE ER) 250 MG 24 hr tablet Take 1 tablet by mouth 3 times daily       fluticasone (FLONASE) 50 MCG/ACT nasal spray Spray 2 sprays into both nostrils daily as needed for rhinitis or allergies 16 g 2     hydrOXYzine (VISTARIL) 25 MG capsule Take 1 capsule (25 mg) by mouth 3 times daily as needed for other (nausea) 60 capsule 11     ibuprofen (ADVIL/MOTRIN) 800 MG tablet Take 1 tablet (800 mg) by mouth every 8 hours as needed for moderate pain (4-6)       naloxone (NARCAN) 4 MG/0.1ML nasal spray Spray 1 spray (4 mg) into one nostril alternating nostrils as needed for opioid reversal every 2-3 minutes until assistance arrives 0.2 mL 0     nicotine (NICODERM CQ) 14 MG/24HR 24 hr patch Place 1 patch onto the skin every 24 hours for 42 days 42 patch 0     [START ON 7/15/2023] nicotine (NICODERM CQ) 7 MG/24HR 24 hr patch Place 1 patch onto the skin every 24 hours for 14 days 14 patch 0     nystatin (MYCOSTATIN) 444575 UNIT/GM external cream Apply topically 2 times daily for 7 days 15 g 0      "ondansetron (ZOFRAN ODT) 4 MG ODT tab Take 4 mg by mouth every 8 hours as needed       oxyCODONE (ROXICODONE) 5 MG tablet Take 1-2 tablets (5-10 mg) by mouth every 3 hours as needed for pain (Moderate to Severe) 30 tablet 0     saccharomyces boulardii (FLORASTOR) 250 MG capsule Take 1 capsule (250 mg) by mouth 2 times daily 30 capsule 0     senna-docusate (SENOKOT-S/PERICOLACE) 8.6-50 MG tablet Take 1-2 tablets by mouth 2 times daily Take while on oral narcotics to prevent or treat constipation. 30 tablet 0     tiZANidine (ZANAFLEX) 4 MG tablet TAKE 1 TABLET BY MOUTH THREE TIMES DAILY AS NEEDEDFOR MUSCLE SPASMS 30 tablet 2     zinc oxide (DESITIN) 40 % external ointment Apply topically as needed for dry skin, irritation or skin protection (daily PRN) 113 g 4     zinc oxide (DESITIN) 40 % external ointment Apply topically as needed for irritation or skin protection 56 g 1     Allergies   Allergen Reactions     Diclofenac-Misoprostol GI Disturbance     Indomethacin      Other reaction(s): Dizziness     Meloxicam GI Disturbance     Moxifloxacin GI Disturbance     Past medical history, past surgical history, current medications and allergies reviewed and accurate to the best of my knowledge.      ROS:  Refer to HPI    /58   Pulse 51   Temp 96.9  F (36.1  C) (Tympanic)   Resp 16   Ht 1.753 m (5' 9\")   Wt 72.6 kg (160 lb)   LMP 11/01/1999 (Exact Date)   SpO2 100%   BMI 23.63 kg/m      EXAM:  General Appearance: Well appearing 53 year old female, appropriate appearance for age. No acute distress   Respiratory: normal chest wall and respirations.  Normal effort.  Clear to auscultation bilaterally, no wheezing, crackles or rhonchi.  No increased work of breathing.  No cough appreciated.  Cardiac: RRR with no murmurs  :  + Visualization of the incisional, surgical site in the right groin region without evidence of infection including erythema, foul drainage, or warmth, or tenderness. There is firmness along " the healing incisional site consistent with scar tissue formation. It is well approximated.   Psychological: normal affect, alert, oriented, and pleasant.

## 2023-07-10 NOTE — NURSING NOTE
"Chief Complaint   Patient presents with     Infection     Infected stitches on back of right leg   patient presents today as she feels like her stitches in the back of her right leg may be infected. She had drainage tubes that were taken out 5 days ago.   Initial /58   Pulse 51   Temp 96.9  F (36.1  C) (Tympanic)   Resp 16   Ht 1.753 m (5' 9\")   Wt 72.6 kg (160 lb)   LMP 11/01/1999 (Exact Date)   SpO2 100%   BMI 23.63 kg/m   Estimated body mass index is 23.63 kg/m  as calculated from the following:    Height as of this encounter: 1.753 m (5' 9\").    Weight as of this encounter: 72.6 kg (160 lb).  Medication Reconciliation: complete    Parul Harden LPN     [facility] Directive reviewed.     "

## 2023-07-27 ENCOUNTER — OFFICE VISIT (OUTPATIENT)
Dept: FAMILY MEDICINE | Facility: OTHER | Age: 53
End: 2023-07-27
Attending: STUDENT IN AN ORGANIZED HEALTH CARE EDUCATION/TRAINING PROGRAM
Payer: COMMERCIAL

## 2023-07-27 VITALS
SYSTOLIC BLOOD PRESSURE: 96 MMHG | HEART RATE: 68 BPM | OXYGEN SATURATION: 100 % | RESPIRATION RATE: 16 BRPM | DIASTOLIC BLOOD PRESSURE: 70 MMHG | TEMPERATURE: 96.9 F

## 2023-07-27 DIAGNOSIS — Z51.89 VISIT FOR WOUND CHECK: Primary | ICD-10-CM

## 2023-07-27 PROCEDURE — G0463 HOSPITAL OUTPT CLINIC VISIT: HCPCS

## 2023-07-27 PROCEDURE — 99213 OFFICE O/P EST LOW 20 MIN: CPT | Performed by: NURSE PRACTITIONER

## 2023-07-27 ASSESSMENT — PAIN SCALES - GENERAL: PAINLEVEL: SEVERE PAIN (7)

## 2023-07-27 NOTE — PROGRESS NOTES
"ASSESSMENT/PLAN:    I have reviewed the nursing notes.  I have reviewed the findings, diagnosis, plan and need for follow up with the patient.    1. Visit for wound check  Recent hospital admission 6/1/2023 and surgery for johanne's gangrene. She verbalizes being very concerned about this reoccurring given the severity of her recent illness. I thoroughly evaluated the healing wound in the genital region which is well healing and without any evidence of abscess or infection. I provided her reassurance. I did offer that I would be willing to ultrasound the area of firmness which I believe to be scar tissue or internal sutures to rule out abscess certainly, which she ultimately declined given her vitals are stable and there is no erythema, warmth or swelling to the area. She has come in one other time for similar concern on 7/10/2023 here in , thus I recommend following up with general surgeon if she continues to be concerned about the firm region over the scar for their opinion which she is agreeable to.     Discussed warning signs/symptoms indicative of need to f/u    Follow up if symptoms persist or worsen or concerns    I explained my diagnostic considerations and recommendations to the patient, who voiced understanding and agreement with the treatment plan. All questions were answered. We discussed potential side effects of any prescribed or recommended therapies, as well as expectations for response to treatments.    Saadia Portillo NP  7/27/2023  6:11 PM    HPI:  Kym Perea is a 53 year old female  who presents to [facility] today for concerns of lump on each side of her perineum for past 2 days that is firm with some tenderness. She cannot easily visualize the area herself. Up further on the right of her labia, she feels some \"poking\" sensations occasionally.  She feels well. No fevers. No nausea or vomiting. She verbalizes being \"paranoid\" about the infection reoccurring given the severity of her " recent illness.     Past Medical History:   Diagnosis Date    Acquired absence of other organs (CODE)     9/2/2011    Chronic pain syndrome     No Comments Provided    Complex regional pain syndrome I     left ankle    Encounter for other administrative examinations     10/2012,Hydrocodone 10/325mg, #240/mo signed 10/3/12, updated 10/1/ 14    Encounter for other administrative examinations     10/1/2014,MS Contin 30 mg q 8 hours #90 per month.  Morphine sulfate IR 15 mg 2-3 tab tid prn #180 per month    Gastro-esophageal reflux disease without esophagitis     No Comments Provided    Injury of left ankle     1997,requiring surgery    Low back pain     No Comments Provided    Major depressive disorder, recurrent severe without psychotic features (H)     No Comments Provided    Migraine without status migrainosus, not intractable     No Comments Provided    Nicotine dependence, uncomplicated     No Comments Provided    Obesity     No Comments Provided    Overweight     11/25/2014    Pain in ankle     Chronic left ankle pain secondary to reflex sympathetic dystrophy.  Patient  has had 4 previous surgeries, the most recent one done by Dr. Noriega in  2007. On Narcotic contract.    Pain in thoracic spine     No Comments Provided    Panic disorder without agoraphobia     Dr Reynoso    Personal history of other diseases of the female genital tract     No Comments Provided    Personal history of other medical treatment (CODE)     G2, P1-0-1-1 with one vaginal delivery.    Supraventricular tachycardia (H)     5/24/2010     Past Surgical History:   Procedure Laterality Date    ANKLE SURGERY      1997, 2000,Left ankle surgery x 2    CYSTECTOMY PILONIDAL N/A 6/19/2023    Procedure: Delayed closure of vulva and perineal defectes with drain placment;  Surgeon: Anthony Nolan MD;  Location: [facility] OR    HYSTERECTOMY TOTAL ABDOMINAL      11/1999,secondary to endometriosis, no malignancy; patient reports no malignancy, but abnormal  cells were present *    IRRIGATION AND DEBRIDEMENT DECUBITUS WOUND, COMBINED N/A 6/1/2023    Procedure: IRRIGATION AND DEBRIDEMENT, PERINEAL, LABIAL and GLUTEAL INFECTION;  Surgeon: Anthony Nolan MD;  Location: [facility] OR    IRRIGATION AND DEBRIDEMENT DECUBITUS WOUND, COMBINED N/A 6/3/2023    Procedure: Debridement and washout of vulvar and perineal wounds;  Surgeon: Anthony Nolan MD;  Location: [facility] OR    LAPAROSCOPIC CHOLECYSTECTOMY      No Comments Provided    LAPAROSCOPY DIAGNOSTIC (GENERAL)      1995    OTHER SURGICAL HISTORY      21299,CRANIO/MAXILLOFACIAL SURGERY,Jaw Surgery    OTHER SURGICAL HISTORY      207040,[facility] EMG,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  No other abnormalities noted.     Social History     Tobacco Use    Smoking status: Every Day     Packs/day: 0.50     Years: 30.00     Pack years: 15.00     Types: Cigarettes, Vaping Device     Start date: 12/2/1986     Passive exposure: Current    Smokeless tobacco: Never    Tobacco comments:     trying   Substance Use Topics    Alcohol use: Yes     Comment: OCCASIONAL WINE     Current Outpatient Medications   Medication Sig Dispense Refill    albuterol (PROAIR HFA/PROVENTIL HFA/VENTOLIN HFA) 108 (90 Base) MCG/ACT inhaler Inhale 2 puffs into the lungs every 4 hours as needed for shortness of breath / dyspnea or wheezing 1 Inhaler 11    amphetamine-dextroamphetamine (ADDERALL) 20 MG tablet Take 30 mg by mouth 2 times daily      amphetamine-dextroamphetamine (ADDERALL) 30 MG tablet Take 30 mg by mouth 2 times daily      atenolol (TENORMIN) 100 MG tablet Take 1 tablet (100 mg) by mouth daily 90 tablet 2    buprenorphine-naloxone (ZUBSOLV) 5.7-1.4 MG sublingual tablet Place 0.25 tablets under the tongue daily      Cholecalciferol (VITAMIN D-3) 125 MCG (5000 UT) TABS Take 1 tablet by mouth daily 90 tablet 11    clonazePAM (KLONOPIN) 2 MG tablet Take 1 tablet (2 mg) by mouth 2 times daily      fluticasone (FLONASE) 50 MCG/ACT nasal spray Spray 2  sprays into both nostrils daily as needed for rhinitis or allergies 16 g 2    ibuprofen (ADVIL/MOTRIN) 800 MG tablet Take 1 tablet (800 mg) by mouth every 8 hours as needed for moderate pain (4-6)      ondansetron (ZOFRAN ODT) 4 MG ODT tab Take 4 mg by mouth every 8 hours as needed      senna-docusate (SENOKOT-S/PERICOLACE) 8.6-50 MG tablet Take 1-2 tablets by mouth 2 times daily Take while on oral narcotics to prevent or treat constipation. 30 tablet 0    tiZANidine (ZANAFLEX) 4 MG tablet TAKE 1 TABLET BY MOUTH THREE TIMES DAILY AS NEEDEDFOR MUSCLE SPASMS 30 tablet 2    acetaminophen (TYLENOL) 325 MG tablet Take 2 tablets (650 mg) by mouth every 4 hours as needed for other (mild pain) (Patient not taking: Reported on 7/27/2023) 100 tablet 0    amoxicillin-clavulanate (AUGMENTIN) 875-125 MG tablet Take 1 tablet by mouth 2 times daily (Patient not taking: Reported on 7/27/2023) 14 tablet 0    divalproex sodium extended-release (DEPAKOTE ER) 250 MG 24 hr tablet Take 1 tablet by mouth 3 times daily (Patient not taking: Reported on 7/27/2023)      hydrOXYzine (VISTARIL) 25 MG capsule Take 1 capsule (25 mg) by mouth 3 times daily as needed for other (nausea) (Patient not taking: Reported on 7/27/2023) 60 capsule 11    naloxone (NARCAN) 4 MG/0.1ML nasal spray Spray 1 spray (4 mg) into one nostril alternating nostrils as needed for opioid reversal every 2-3 minutes until assistance arrives (Patient not taking: Reported on 7/27/2023) 0.2 mL 0    nicotine (NICODERM CQ) 7 MG/24HR 24 hr patch Place 1 patch onto the skin every 24 hours for 14 days (Patient not taking: Reported on 7/27/2023) 14 patch 0    oxyCODONE (ROXICODONE) 5 MG tablet Take 1-2 tablets (5-10 mg) by mouth every 3 hours as needed for pain (Moderate to Severe) (Patient not taking: Reported on 7/27/2023) 30 tablet 0    saccharomyces boulardii (FLORASTOR) 250 MG capsule Take 1 capsule (250 mg) by mouth 2 times daily (Patient not taking: Reported on 7/27/2023) 30  capsule 0    zinc oxide (DESITIN) 40 % external ointment Apply topically as needed for dry skin, irritation or skin protection (daily PRN) (Patient not taking: Reported on 7/27/2023) 113 g 4    zinc oxide (DESITIN) 40 % external ointment Apply topically as needed for irritation or skin protection (Patient not taking: Reported on 7/27/2023) 56 g 1     Allergies   Allergen Reactions    Diclofenac-Misoprostol GI Disturbance    Indomethacin      Other reaction(s): Dizziness    Meloxicam GI Disturbance    Moxifloxacin GI Disturbance     Past medical history, past surgical history, current medications and allergies reviewed and accurate to the best of my knowledge.      ROS:  Refer to HPI    BP 96/70   Pulse 68   Temp 96.9  F (36.1  C) (Temporal)   Resp 16   LMP 11/01/1999 (Exact Date)   SpO2 100%   Breastfeeding No     EXAM:  General Appearance: Well appearing 53 year old female, appropriate appearance for age. No acute distress   Respiratory: normal chest wall and respirations.  Normal effort.  Clear to auscultation bilaterally, no wheezing, crackles or rhonchi.  No increased work of breathing.  No cough appreciated.  Cardiac: RRR with no murmurs  Musculoskeletal:  Equal movement of bilateral upper extremities.  Equal movement of bilateral lower extremities.  Normal gait.    Dermatological: + Visualization of the incisional, surgical site in the right groin region without evidence of infection including erythema, foul drainage, or warmth. There is firmness over the scar with some tenderness. It is well approximated   Psychological: normal affect, alert, oriented, and pleasant.

## 2023-07-27 NOTE — NURSING NOTE
"Chief Complaint   Patient presents with    Mass     Lump on each side of her perineum for 2 days    She has a lump in each side of her perineum.  Petra Swanson LPN..................7/27/2023   6:03 PM         Initial BP 96/70   Pulse 68   Temp 96.9  F (36.1  C) (Temporal)   Resp 16   LMP 11/01/1999 (Exact Date)   SpO2 100%   Breastfeeding No  Estimated body mass index is 23.63 kg/m  as calculated from the following:    Height as of 7/10/23: 1.753 m (5' 9\").    Weight as of 7/10/23: 72.6 kg (160 lb).  Medication Reconciliation: complete    FOOD SECURITY SCREENING QUESTIONS  Hunger Vital Signs:  Within the past 12 months we worried whether our food would run out before we got money to buy more. Never  Within the past 12 months the food we bought just didn't last and we didn't have money to get more. Never        Advance care directive on file? no  Advance care directive provided to patient? declined     Petra Swanson, SHARA    "

## 2023-08-04 ENCOUNTER — OFFICE VISIT (OUTPATIENT)
Dept: FAMILY MEDICINE | Facility: OTHER | Age: 53
End: 2023-08-04
Attending: STUDENT IN AN ORGANIZED HEALTH CARE EDUCATION/TRAINING PROGRAM
Payer: COMMERCIAL

## 2023-08-04 VITALS
DIASTOLIC BLOOD PRESSURE: 88 MMHG | SYSTOLIC BLOOD PRESSURE: 122 MMHG | TEMPERATURE: 97.2 F | OXYGEN SATURATION: 100 % | HEART RATE: 60 BPM | RESPIRATION RATE: 12 BRPM

## 2023-08-04 DIAGNOSIS — L90.5 SCAR TISSUE: ICD-10-CM

## 2023-08-04 DIAGNOSIS — N89.8 VAGINAL DISCHARGE: Primary | ICD-10-CM

## 2023-08-04 DIAGNOSIS — Z87.438 HISTORY OF FOURNIER'S GANGRENE: ICD-10-CM

## 2023-08-04 DIAGNOSIS — A59.01 TRICHOMONIASIS OF VAGINA: ICD-10-CM

## 2023-08-04 LAB
BACTERIAL VAGINOSIS VAG-IMP: NEGATIVE
CANDIDA DNA VAG QL NAA+PROBE: NOT DETECTED
CANDIDA GLABRATA / CANDIDA KRUSEI DNA: NOT DETECTED
T VAGINALIS DNA VAG QL NAA+PROBE: DETECTED

## 2023-08-04 PROCEDURE — G0463 HOSPITAL OUTPT CLINIC VISIT: HCPCS

## 2023-08-04 PROCEDURE — 99213 OFFICE O/P EST LOW 20 MIN: CPT | Mod: 24 | Performed by: STUDENT IN AN ORGANIZED HEALTH CARE EDUCATION/TRAINING PROGRAM

## 2023-08-04 PROCEDURE — 0352U MULTIPLEX VAGINAL PANEL BY PCR: CPT | Mod: ZL | Performed by: STUDENT IN AN ORGANIZED HEALTH CARE EDUCATION/TRAINING PROGRAM

## 2023-08-04 RX ORDER — SULFAMETHOXAZOLE/TRIMETHOPRIM 800-160 MG
1 TABLET ORAL 2 TIMES DAILY
Qty: 10 TABLET | Refills: 0 | Status: SHIPPED | OUTPATIENT
Start: 2023-08-04 | End: 2023-08-09

## 2023-08-04 ASSESSMENT — PAIN SCALES - GENERAL: PAINLEVEL: EXTREME PAIN (8)

## 2023-08-05 ENCOUNTER — TELEPHONE (OUTPATIENT)
Dept: FAMILY MEDICINE | Facility: OTHER | Age: 53
End: 2023-08-05
Payer: COMMERCIAL

## 2023-08-05 RX ORDER — METRONIDAZOLE 500 MG/1
500 TABLET ORAL 2 TIMES DAILY
Qty: 14 TABLET | Refills: 0 | Status: SHIPPED | OUTPATIENT
Start: 2023-08-05 | End: 2023-08-06

## 2023-08-05 NOTE — TELEPHONE ENCOUNTER
Please let patient know I reviewed her message.  I have reviewed her prior surgical notes.  From her last follow-up 1 month ago with her surgical team on 7/5/2023, documentation explains that incision was healing well.  Does not appear that patient was prescribed any further pain medicine.    I understand she is having increased pain.  We do not prescribe narcotics/pain management in the [facility].  I am concerned that if she is having worsening pain she should be reevaluated by her surgeon.  I would recommend contacting her surgeon on Monday to set up follow-up.    I recommend she start the antibiotic medication I have sent to pharmacy for current trichomonas infection.    Patient may take Tylenol 1000 mg every 8 hours as needed for pain and also alternate ibuprofen 600 mg every 6 hours as needed for pain.  Sitz bath's, offloading pressure and ice are also beneficial.  If she feels like pain is continuing to persist or worsen I recommend further evaluation in the emergency department.

## 2023-08-05 NOTE — TELEPHONE ENCOUNTER
After verifying last name and date of birth, gave patient results and information per provider.  Patient understands that she should follow up with surgeon.  She will call the office on Monday to schedule an appt with Dr. Nolan.  Ani Spring LPN LPN....................  8/5/2023   4:24 PM

## 2023-08-05 NOTE — PROGRESS NOTES
Assessment & Plan     (N89.8) Vaginal discharge  (primary encounter diagnosis)    Comment: With vaginal discharge, Multiplex was obtained today.  Consider vaginal yeast infection.  Treat based on results.    Plan: Multiplex Vaginal Panel by PCR,         sulfamethoxazole-trimethoprim (BACTRIM DS)         800-160 MG tablet            (L90.5) Scar tissue  Comment: She does have scar tissue near her posterior vaginal area.  There is a pea-sized area that is tender.  It does not look infected at this time but rather abnormal scar tissue is developing.  It does not look like a recurrent Julio's at this time.  Unsure of how it has looked in the past.  Discussed that she should follow-up with general surgery for further evaluation of this area.  A referral was placed today.  Her vital signs are stable, she is afebrile without tachycardia.  There is no evidence of systemic infection.    Plan: sulfamethoxazole-trimethoprim (BACTRIM DS)         800-160 MG tablet, Adult General Surg Referral            With her concerns, reasonable to attempt treatment with Bactrim twice a day for 5 days.  She does need close follow-up with general surgery for more definitive evaluation and management.  If symptoms worsen or change in the meantime, instructed that she should return to the rapid clinic or go to the ER.    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  Plan: Adult General Surg Referral       Do Cam PA-C  [facility]    Subjective   Gemini is a 53 year old, presenting for the following health issues:  Musculoskeletal Problem    HPI     Patient presents today for concerns of perineal discomfort and pain.  States this has been off-and-on over the past couple of months after her surgery for Julio's gangrene.  Over the past couple of days she does endorse that the  posterior right perineal area has been more painful and bothersome.  She has not noted any drainage other than vaginal discharge.  She has noted some warmth, tender to the touch.  She has not seen any redness but has difficulty visualizing this area.  She has not had any fevers, chills, or sweats.      Review of Systems   Constitutional, HEENT, cardiovascular, pulmonary, gi and gu systems are negative, except as otherwise noted.      Objective    /88 (BP Location: Right arm, Patient Position: Sitting, Cuff Size: Adult Regular)   Pulse 60   Temp 97.2  F (36.2  C) (Tympanic)   Resp 12   LMP 11/01/1999 (Exact Date)   SpO2 100%   There is no height or weight on file to calculate BMI.    Physical Exam  Genitourinary:           GENERAL: healthy, alert and no distress  NECK: no adenopathy, no asymmetry, masses, or scars and thyroid normal to palpation  RESP: lungs clear to auscultation - no rales, rhonchi or wheezes  CV: regular rate and rhythm, normal S1 S2, no S3 or S4, no murmur, click or rub, no peripheral edema and peripheral pulses strong   (female): External genitalia light pink, posterior right vaginal area with scar, near the vaginal opening there is a pea-sized, slightly darker tender firm area that does not have any surrounding erythema or warmth, no drainage from this area, there is scant vaginal discharge  MS: no gross musculoskeletal defects noted, no edema

## 2023-08-05 NOTE — TELEPHONE ENCOUNTER
Returning patient's call regarding questions on medication.  Patient states she is unable to sit down and is requesting stronger medication besides the tylenol and ibuprofen.    She states she will follow up with the surgeon or pcp for sx related to surgery as directed by provider in  yesterday, but does not know how to make it through the weekend with this pain and discomfort.    Patient requested to speak to provider she talked with this morning about her vaginal sx.    Forwarded message to Emily Lopes NP.    Ani Spring LPN LPN....................  8/5/2023   3:41 PM

## 2023-08-05 NOTE — NURSING NOTE
Chief Complaint   Patient presents with    Musculoskeletal Problem     Patient presents to the clinic for leg pain on her right leg    Janay Caballero LPN       Food Insecurity: Not on file       FOOD SECURITY SCREENING QUESTIONS:    FOOD SECURITY SCREENING QUESTIONS:    The next two questions are to help us understand your food security.  If you are feeling you need any assistance in this area, we have resources available to support you today.    Hunger Vital Signs:  Within the past 12 months we worried whether our food would run out before we got money to buy more. often  Within the past 12 months the food we bought just didn't last and we didn't have money to get more. Often

## 2023-08-05 NOTE — PATIENT INSTRUCTIONS
History of Abscess of Buttock    Exam and vitals are reassuring.     Consider bactrim twice a day for five days.     Also consider this is scar tissue healing.    Recommend close follow up with surgery for further evaluation of area next week in the clinic.    Return if symptoms worsen before then .

## 2023-08-06 RX ORDER — METRONIDAZOLE 500 MG/1
500 TABLET ORAL 2 TIMES DAILY
Qty: 14 TABLET | Refills: 0 | Status: SHIPPED | OUTPATIENT
Start: 2023-08-06 | End: 2024-02-09

## 2023-08-09 ENCOUNTER — OFFICE VISIT (OUTPATIENT)
Dept: SURGERY | Facility: OTHER | Age: 53
End: 2023-08-09
Attending: SURGERY
Payer: COMMERCIAL

## 2023-08-09 VITALS
DIASTOLIC BLOOD PRESSURE: 60 MMHG | RESPIRATION RATE: 16 BRPM | OXYGEN SATURATION: 99 % | SYSTOLIC BLOOD PRESSURE: 112 MMHG | HEART RATE: 60 BPM

## 2023-08-09 DIAGNOSIS — N76.82 FOURNIER'S GANGRENE IN FEMALE (H): Primary | ICD-10-CM

## 2023-08-09 PROCEDURE — G0463 HOSPITAL OUTPT CLINIC VISIT: HCPCS

## 2023-08-09 PROCEDURE — 99024 POSTOP FOLLOW-UP VISIT: CPT | Performed by: SURGERY

## 2023-08-09 ASSESSMENT — PAIN SCALES - GENERAL: PAINLEVEL: MODERATE PAIN (5)

## 2023-08-09 NOTE — PROGRESS NOTES
Patient presents for post surgical visit after grade primary closure of right labial and perineal defects on 6/19.  Notes some residual numbness.  Felt some pulling sensation with movement a few days ago.  May have had some drainage that happened from a small pimple.    /60 (BP Location: Right arm, Patient Position: Sitting, Cuff Size: Adult Regular)   Pulse 60   Resp 16   LMP 11/01/1999 (Exact Date)   SpO2 99%     General: NAD, pleasant and cooperative with exam and interview.  Perineum: Completely healed.  Nurse present.  There is some deep crevices that could become excoriated.  No evidence of any active skin breakdown.  No evidence of any sinus tract or failure of wound healing.  Psychiatry: awake, alert and oriented. Appropriate affect.    Assessment/Plan:  53-year-old female status post Julio's with extensive debridements.  Doing well.    Follow-up as needed      Anthony Nolan MD on 8/9/2023 at 10:40 AM

## 2023-08-09 NOTE — NURSING NOTE
Chief Complaint   Patient presents with    RECHECK       Medication Reconciliation: complete      Nichelle Rodriguez LPN........................8/9/2023  9:51 AM

## 2023-08-11 DIAGNOSIS — G89.29 CHRONIC BILATERAL LOW BACK PAIN WITH BILATERAL SCIATICA: ICD-10-CM

## 2023-08-11 DIAGNOSIS — M54.41 CHRONIC BILATERAL LOW BACK PAIN WITH BILATERAL SCIATICA: ICD-10-CM

## 2023-08-11 DIAGNOSIS — M54.42 CHRONIC BILATERAL LOW BACK PAIN WITH BILATERAL SCIATICA: ICD-10-CM

## 2023-08-11 DIAGNOSIS — G89.4 CHRONIC PAIN DISORDER: ICD-10-CM

## 2023-08-15 RX ORDER — IBUPROFEN 800 MG/1
800 TABLET, FILM COATED ORAL EVERY 8 HOURS PRN
Qty: 90 TABLET | Refills: 4 | Status: SHIPPED | OUTPATIENT
Start: 2023-08-15 | End: 2024-05-30

## 2023-08-25 ENCOUNTER — HOSPITAL ENCOUNTER (EMERGENCY)
Facility: OTHER | Age: 53
Discharge: HOME | End: 2023-08-25
Attending: INTERNAL MEDICINE | Admitting: INTERNAL MEDICINE
Payer: COMMERCIAL

## 2023-08-25 VITALS
OXYGEN SATURATION: 98 % | RESPIRATION RATE: 20 BRPM | HEART RATE: 49 BPM | TEMPERATURE: 97.2 F | SYSTOLIC BLOOD PRESSURE: 115 MMHG | DIASTOLIC BLOOD PRESSURE: 70 MMHG | BODY MASS INDEX: 23.7 KG/M2 | HEIGHT: 69 IN | WEIGHT: 160 LBS

## 2023-08-25 DIAGNOSIS — Z98.890 POSTOPERATIVE STATE: ICD-10-CM

## 2023-08-25 DIAGNOSIS — F41.0 PANIC DISORDER: ICD-10-CM

## 2023-08-25 DIAGNOSIS — Z87.438 HISTORY OF FOURNIER'S GANGRENE: ICD-10-CM

## 2023-08-25 DIAGNOSIS — R10.2 PERINEAL PAIN IN FEMALE: ICD-10-CM

## 2023-08-25 DIAGNOSIS — G43.009 MIGRAINE WITHOUT AURA AND WITHOUT STATUS MIGRAINOSUS, NOT INTRACTABLE: ICD-10-CM

## 2023-08-25 LAB
ALBUMIN SERPL BCG-MCNC: 3.9 G/DL (ref 3.5–5.2)
ALP SERPL-CCNC: 121 U/L (ref 35–104)
ALT SERPL W P-5'-P-CCNC: 13 U/L (ref 0–50)
ANION GAP SERPL CALCULATED.3IONS-SCNC: 10 MMOL/L (ref 7–15)
AST SERPL W P-5'-P-CCNC: 19 U/L (ref 0–45)
BASOPHILS # BLD AUTO: 0 10E3/UL (ref 0–0.2)
BASOPHILS NFR BLD AUTO: 1 %
BILIRUB SERPL-MCNC: 0.2 MG/DL
BUN SERPL-MCNC: 8.2 MG/DL (ref 6–20)
CALCIUM SERPL-MCNC: 9.5 MG/DL (ref 8.6–10)
CHLORIDE SERPL-SCNC: 103 MMOL/L (ref 98–107)
CREAT SERPL-MCNC: 0.8 MG/DL (ref 0.51–0.95)
CRP SERPL-MCNC: <3 MG/L
DEPRECATED HCO3 PLAS-SCNC: 24 MMOL/L (ref 22–29)
EOSINOPHIL # BLD AUTO: 0.2 10E3/UL (ref 0–0.7)
EOSINOPHIL NFR BLD AUTO: 3 %
ERYTHROCYTE [DISTWIDTH] IN BLOOD BY AUTOMATED COUNT: 12.9 % (ref 10–15)
GFR SERPL CREATININE-BSD FRML MDRD: 88 ML/MIN/1.73M2
GLUCOSE SERPL-MCNC: 98 MG/DL (ref 70–99)
HCT VFR BLD AUTO: 37.9 % (ref 35–47)
HGB BLD-MCNC: 12.9 G/DL (ref 11.7–15.7)
HOLD SPECIMEN: NORMAL
IMM GRANULOCYTES # BLD: 0 10E3/UL
IMM GRANULOCYTES NFR BLD: 0 %
LYMPHOCYTES # BLD AUTO: 2.2 10E3/UL (ref 0.8–5.3)
LYMPHOCYTES NFR BLD AUTO: 33 %
MCH RBC QN AUTO: 29.2 PG (ref 26.5–33)
MCHC RBC AUTO-ENTMCNC: 34 G/DL (ref 31.5–36.5)
MCV RBC AUTO: 86 FL (ref 78–100)
MONOCYTES # BLD AUTO: 0.4 10E3/UL (ref 0–1.3)
MONOCYTES NFR BLD AUTO: 5 %
NEUTROPHILS # BLD AUTO: 3.9 10E3/UL (ref 1.6–8.3)
NEUTROPHILS NFR BLD AUTO: 58 %
NRBC # BLD AUTO: 0 10E3/UL
NRBC BLD AUTO-RTO: 0 /100
PLATELET # BLD AUTO: 184 10E3/UL (ref 150–450)
POTASSIUM SERPL-SCNC: 3.7 MMOL/L (ref 3.4–5.3)
PROT SERPL-MCNC: 6.9 G/DL (ref 6.4–8.3)
RBC # BLD AUTO: 4.42 10E6/UL (ref 3.8–5.2)
SODIUM SERPL-SCNC: 137 MMOL/L (ref 136–145)
WBC # BLD AUTO: 6.6 10E3/UL (ref 4–11)

## 2023-08-25 PROCEDURE — 80053 COMPREHEN METABOLIC PANEL: CPT | Performed by: INTERNAL MEDICINE

## 2023-08-25 PROCEDURE — 258N000003 HC RX IP 258 OP 636: Performed by: INTERNAL MEDICINE

## 2023-08-25 PROCEDURE — 36415 COLL VENOUS BLD VENIPUNCTURE: CPT | Performed by: INTERNAL MEDICINE

## 2023-08-25 PROCEDURE — 86140 C-REACTIVE PROTEIN: CPT | Performed by: INTERNAL MEDICINE

## 2023-08-25 PROCEDURE — 99284 EMERGENCY DEPT VISIT MOD MDM: CPT | Mod: 25 | Performed by: INTERNAL MEDICINE

## 2023-08-25 PROCEDURE — 96365 THER/PROPH/DIAG IV INF INIT: CPT | Performed by: INTERNAL MEDICINE

## 2023-08-25 PROCEDURE — 99284 EMERGENCY DEPT VISIT MOD MDM: CPT | Performed by: INTERNAL MEDICINE

## 2023-08-25 PROCEDURE — 250N000011 HC RX IP 250 OP 636: Mod: JZ | Performed by: INTERNAL MEDICINE

## 2023-08-25 PROCEDURE — 85025 COMPLETE CBC W/AUTO DIFF WBC: CPT | Performed by: INTERNAL MEDICINE

## 2023-08-25 PROCEDURE — 96366 THER/PROPH/DIAG IV INF ADDON: CPT | Performed by: INTERNAL MEDICINE

## 2023-08-25 PROCEDURE — 96375 TX/PRO/DX INJ NEW DRUG ADDON: CPT | Performed by: INTERNAL MEDICINE

## 2023-08-25 RX ORDER — MAGNESIUM SULFATE HEPTAHYDRATE 40 MG/ML
2 INJECTION, SOLUTION INTRAVENOUS ONCE
Status: COMPLETED | OUTPATIENT
Start: 2023-08-25 | End: 2023-08-25

## 2023-08-25 RX ORDER — KETOROLAC TROMETHAMINE 30 MG/ML
30 INJECTION, SOLUTION INTRAMUSCULAR; INTRAVENOUS ONCE
Status: COMPLETED | OUTPATIENT
Start: 2023-08-25 | End: 2023-08-25

## 2023-08-25 RX ORDER — DIPHENHYDRAMINE HYDROCHLORIDE 50 MG/ML
50 INJECTION INTRAMUSCULAR; INTRAVENOUS ONCE
Status: COMPLETED | OUTPATIENT
Start: 2023-08-25 | End: 2023-08-25

## 2023-08-25 RX ADMIN — KETOROLAC TROMETHAMINE 30 MG: 30 INJECTION, SOLUTION INTRAMUSCULAR at 08:18

## 2023-08-25 RX ADMIN — DIPHENHYDRAMINE HYDROCHLORIDE 50 MG: 50 INJECTION, SOLUTION INTRAMUSCULAR; INTRAVENOUS at 08:20

## 2023-08-25 RX ADMIN — MAGNESIUM SULFATE HEPTAHYDRATE 2 G: 40 INJECTION, SOLUTION INTRAVENOUS at 08:23

## 2023-08-25 RX ADMIN — SODIUM CHLORIDE 1000 ML: 9 INJECTION, SOLUTION INTRAVENOUS at 08:04

## 2023-08-25 RX ADMIN — PROCHLORPERAZINE EDISYLATE 10 MG: 5 INJECTION INTRAMUSCULAR; INTRAVENOUS at 08:23

## 2023-08-25 ASSESSMENT — ACTIVITIES OF DAILY LIVING (ADL)
ADLS_ACUITY_SCORE: 35
ADLS_ACUITY_SCORE: 33

## 2023-08-25 NOTE — ED TRIAGE NOTES
"Pt states that she has a migraine, a panic attack and RLQ pain (from a surgery in June.  Pt has been seen for this and it is \"being watched\").  Pt reports having migraines in the past but does not take any medications for this.  Pt reports taking ibuprofen at 0300 this morning.     Triage Assessment       Row Name 08/25/23 0635       Triage Assessment (Adult)    Airway WDL WDL       Respiratory WDL    Respiratory WDL WDL       Skin Circulation/Temperature WDL    Skin Circulation/Temperature WDL WDL       Cardiac WDL    Cardiac WDL WDL       Peripheral/Neurovascular WDL    Peripheral Neurovascular WDL WDL       Cognitive/Neuro/Behavioral WDL    Cognitive/Neuro/Behavioral WDL WDL                    "

## 2023-08-25 NOTE — DISCHARGE INSTRUCTIONS
Migraine headache -- Sensitive Brain - Doesn't Like Change.  New Drug = Change.     -- review the web site: www.managingmigraine.org    If getting 3 or more times per month --- Start For Migraine Prevention :   - Magnesium 400-800 mg Daily. (Consider Slo-Mag or Slow-Mag)  - Coenzyme Q10 (CO Q-10) 200 mg capsule; Take 1 capsule by mouth 2 times daily.   - Riboflavin (VITAMIN B2) 100 mg tablet; Take 2 tablets by mouth 2 times daily.   + consider Vitamin B12 - (can take in a B-complex)      Strong emphasis on Behavorial Intervention - Stress Management, Regular sleeping habits/schedules, etc.      Only 10 abortive medication treatments should be used per month -- to prevent worsening of your migraines.     If you develop a migraine -- at onset of MILD Migraine Headache Try:     -- Alkaseltzer 3 tablets -or- Maria R Advanced Asprin -or- Excedrine Migraine -or- Naproxen 500 mg.  and   -- Magnesium 500 mg -- has a prokinetic effect and can help with nausea.     Taking a nap in a dark room, can also help.     Other options include Tryptans (Also used at onset of Mild Migraine Pain) like:   - Sumitriptan --   - Naratriptan --  - Rizatriptan -- could schedule x days per month for menstrual migraine    Nausea medications can also be helpful to use when you start to develop a migraine -- including:   - Metoclopramide (if no sedation tolerance)  - Prochloroperazine (if some sedation is okay) - little more effective.         Daily prophylactic medication options -- should be considered if having 3 or more migraine attacks per month -- include:    Timolol, Propanolol, Valproate, Topamax.    Other approved option:   Botox injections    Typically start Topiramate (Topamax) at 12.5 mg daily x2 weeks, then increase to 25 mg for about 1 month, and then can increase to 37.5 mg daily if needed - For Migraine Prevention     Other helpful agents for migraine prevention:  -- 16 mg candesartan daily can also be considered -- is roughly equal  to propranolol in migraine prevention.     Membrane stabilizing agents: Valproate, Topiramate, Gabapentin.     No opiates for Migraines = Poison.  They are ineffective, sub-optimal, and cause migraine progression.     Neurological exam during a migraine is Normal.  No NEED for CT scan in the ER.    ER treatments if needed:  Most effective -- IV fluids (Saline) and Toradol.     ER treatments also sometimes used:  Droperidol (inapsine)  Diphenhydramine (premedicates prior to using neuroleptics)  Dihydroergotamine (DHE)  Prochlorperazine  Promethazine -  (Phenergan) - IM Shot  IV valproate - 1 gram IV push  IV Magnesium.

## 2023-08-25 NOTE — ED PROVIDER NOTES
Emergency Department Provider Note  : 1970 Age: 53 year old Sex: female MRN: [id number]    Chief Complaint   Patient presents with    Headache    Panic Attack    Abdominal Pain       Medical Decision Making / Assessment / Plan   53 year old female presenting with migraine headache, chronic anxiety, chronic pelvic pain    ED Course as of 23 1039   Fri Aug 25, 2023   0738 Patient evaluated.  Migraine headache.  Start IV fluids, IV Toradol, IV magnesium, Benadryl and Compazine.  -Check CBC, CMP, CRP.   0740 Reports she has not been sleeping well since having necrotizing fasciitis surgery in .  Having more migraines the last couple months, suspected because she has not been sleeping well.   0823 CBC is normal.    0844 CRP negative.  CMP shows mildly elevated alkaline phosphatase, otherwise normal.   0858 Patient reports migraine headache is starting to improve.   0948 IV fluids and IV magnesium have completed.  She is a bit sleepy after the IV Benadryl and Compazine.  Reports continued slow improvement in migraine headache pain.   1035 Patient took a nap.  She is feeling a bit better and would like to discharge home with a work note to stay home today.        A shared decision making model was used. Plan and all results were discussed  Time was taken to answer all questions. Patient and/or associated parties understood and were agreeable to treatment plan.  Strict return to Emergency Department precautions as well as appropriate follow up instructions were provided. The patient was discharged in stable condition.    New Prescriptions    No medications on file       Final diagnoses:   Migraine without aura and without status migrainosus, not intractable   Panic disorder   Postoperative state   Perineal pain in female   History of Julio's gangrene       Jon Herrera MD  2023   Emergency Department    Subjective   Kym is a 53 year old female who presents at  6:41 AM with migraine headache,  "anxiety/panic attack, right lower quadrant abdominal pain.  History of necrotizing fasciitis, status post surgery.  Has not been sleeping well since her surgery in June.  Had an inspection at her apartment scheduled for later this morning and was trying to get ready for that and feel like she might of stretched too much.  Now having some pain with walking in the right lower quadrant.  Felt tearing or pulling sensation along the whole surgical area.    Typical migraine, does not have any medications at home for this.  Has had more recently due to sleep difficulties.    Denies fevers, chills.  No chest pain, no bowel or bladder habit changes.  No respiratory symptoms.  No rash or sores.    I have reviewed the Medications, Allergies, Past Medical and Surgical History, and Social History in the [employer] [facility] and with family.    Review of Systems:  Please see Subjective / HPI for pertinent positives and negatives. All other systems reviewed and found to be negative.      Objective   Patient Vitals for the past 24 hrs:   BP Temp Temp src Pulse Resp SpO2 Height Weight   08/25/23 0930 118/75 -- -- (!) 48 -- 95 % -- --   08/25/23 0900 122/69 -- -- (!) 47 -- 94 % -- --   08/25/23 0830 103/69 -- -- 54 -- 92 % -- --   08/25/23 0818 -- -- -- -- -- 95 % -- --   08/25/23 0800 98/68 -- -- -- -- -- -- --   08/25/23 0640 105/74 97.2  F (36.2  C) Tympanic 86 18 97 % 1.753 m (5' 9\") 72.6 kg (160 lb)       Physical Exam:     General: Awake, alert, in no acute respiratory distress.  Laying in a dark room.  Head: Normocephalic, atraumatic.  Eyes: Conjugate gaze. PERRL, EOMI  ENT: Moist membranes, external ear appears normal.   Chest/Respiratory: Equal chest rise, clear bilaterally.  Cardiovascular: Peripheral pulses present, regular rate and rhythm.  Abdominal: Soft, non-distended, mild RLQ tenderness. No guarding.   Extremities: No obvious deformity.  Neurological: GCS 15, moving all extremities without gross deficit. + Photosensitivity, " photosensitivity  Skin: Warm, no rashes, lesions, or bruising.   Psychiatric: Appropriate affect.     Procedures / Critical Care   Procedures    Aggregate Critical Care Time: None.     Orders Placed This Encounter   Procedures    Comprehensive metabolic panel    CRP inflammation    CBC with platelets and differential    Extra Tube ([place] Draw)    Extra Blue Top Tube    Extra Red Top Tube    Extra Green Top (Lithium Heparin) Tube    Peripheral IV catheter    CBC with platelets differential       RESULTS: As noted above.            Medical/Surgical History:  Past Medical History:   Diagnosis Date    Acquired absence of other organs (CODE)     9/2/2011    Chronic pain syndrome     No Comments Provided    Complex regional pain syndrome I     left ankle    Encounter for other administrative examinations     10/2012,Hydrocodone 10/325mg, #240/mo signed 10/3/12, updated 10/1/ 14    Encounter for other administrative examinations     10/1/2014,MS Contin 30 mg q 8 hours #90 per month.  Morphine sulfate IR 15 mg 2-3 tab tid prn #180 per month    Gastro-esophageal reflux disease without esophagitis     No Comments Provided    Injury of left ankle     1997,requiring surgery    Low back pain     No Comments Provided    Major depressive disorder, recurrent severe without psychotic features (H)     No Comments Provided    Migraine without status migrainosus, not intractable     No Comments Provided    Nicotine dependence, uncomplicated     No Comments Provided    Obesity     No Comments Provided    Overweight     11/25/2014    Pain in ankle     Chronic left ankle pain secondary to reflex sympathetic dystrophy.  Patient  has had 4 previous surgeries, the most recent one done by Dr. Noriega in  2007. On Narcotic contract.    Pain in thoracic spine     No Comments Provided    Panic disorder without agoraphobia     Dr Reynoso    Personal history of other diseases of the female genital tract     No Comments Provided    Personal  history of other medical treatment (CODE)     G2, P1-0-1-1 with one vaginal delivery.    Supraventricular tachycardia (H)     5/24/2010     Past Surgical History:   Procedure Laterality Date    ANKLE SURGERY      1997, 2000,Left ankle surgery x 2    CYSTECTOMY PILONIDAL N/A 6/19/2023    Procedure: Delayed closure of vulva and perineal defectes with drain placment;  Surgeon: Anthony Nolan MD;  Location:  OR    HYSTERECTOMY TOTAL ABDOMINAL      11/1999,secondary to endometriosis, no malignancy; patient reports no malignancy, but abnormal cells were present *    IRRIGATION AND DEBRIDEMENT DECUBITUS WOUND, COMBINED N/A 6/1/2023    Procedure: IRRIGATION AND DEBRIDEMENT, PERINEAL, LABIAL and GLUTEAL INFECTION;  Surgeon: Anthony Nolan MD;  Location: [facility] OR    IRRIGATION AND DEBRIDEMENT DECUBITUS WOUND, COMBINED N/A 6/3/2023    Procedure: Debridement and washout of vulvar and perineal wounds;  Surgeon: Anthony Nolan MD;  Location:  OR    LAPAROSCOPIC CHOLECYSTECTOMY      No Comments Provided    LAPAROSCOPY DIAGNOSTIC (GENERAL)      1995    OTHER SURGICAL HISTORY      21299,CRANIO/MAXILLOFACIAL SURGERY,Jaw Surgery    OTHER SURGICAL HISTORY      207040,[facility] EMG,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  No other abnormalities noted.       Medications:  No current facility-administered medications for this encounter.     Current Outpatient Medications   Medication    acetaminophen (TYLENOL) 325 MG tablet    albuterol (PROAIR HFA/PROVENTIL HFA/VENTOLIN HFA) 108 (90 Base) MCG/ACT inhaler    amoxicillin-clavulanate (AUGMENTIN) 875-125 MG tablet    amphetamine-dextroamphetamine (ADDERALL) 20 MG tablet    amphetamine-dextroamphetamine (ADDERALL) 30 MG tablet    atenolol (TENORMIN) 100 MG tablet    buprenorphine-naloxone (ZUBSOLV) 5.7-1.4 MG sublingual tablet    Cholecalciferol (VITAMIN D-3) 125 MCG (5000 UT) TABS    clonazePAM (KLONOPIN) 2 MG tablet    divalproex sodium extended-release (DEPAKOTE ER) 250 MG  24 hr tablet    fluticasone (FLONASE) 50 MCG/ACT nasal spray    hydrOXYzine (VISTARIL) 25 MG capsule    ibuprofen (ADVIL/MOTRIN) 800 MG tablet    metroNIDAZOLE (FLAGYL) 500 MG tablet    naloxone (NARCAN) 4 MG/0.1ML nasal spray    ondansetron (ZOFRAN ODT) 4 MG ODT tab    oxyCODONE (ROXICODONE) 5 MG tablet    saccharomyces boulardii (FLORASTOR) 250 MG capsule    senna-docusate (SENOKOT-S/PERICOLACE) 8.6-50 MG tablet    tiZANidine (ZANAFLEX) 4 MG tablet    zinc oxide (DESITIN) 40 % external ointment    zinc oxide (DESITIN) 40 % external ointment       Allergies:  Diclofenac-misoprostol, Indomethacin, Meloxicam, and Moxifloxacin    Relevant labs, images, EKGs, Epic and outside hospital (if applicable) charts were reviewed. The findings, diagnosis, plan, and need for follow up were discussed with the patient/family. Nursing notes were reviewed.      Jon Herrera MD  08/25/23 8273

## 2023-08-25 NOTE — Clinical Note
Kym Perea was seen and treated in our emergency department on 8/25/2023.  She may return to work on 08/28/2023.       If you have any questions or concerns, please don't hesitate to call.      Jon Herrera MD

## 2023-08-25 NOTE — ED NOTES
Patient resting with lights dimmed.  No needs identified.  Medications given and fluid bolus infusing.

## 2023-08-27 ENCOUNTER — HOSPITAL ENCOUNTER (EMERGENCY)
Facility: OTHER | Age: 53
Discharge: HOME | End: 2023-08-27
Attending: PHYSICIAN ASSISTANT | Admitting: PHYSICIAN ASSISTANT
Payer: COMMERCIAL

## 2023-08-27 ENCOUNTER — APPOINTMENT (OUTPATIENT)
Dept: CT IMAGING | Facility: OTHER | Age: 53
End: 2023-08-27
Attending: PHYSICIAN ASSISTANT
Payer: COMMERCIAL

## 2023-08-27 VITALS
WEIGHT: 165 LBS | TEMPERATURE: 98.1 F | BODY MASS INDEX: 24.44 KG/M2 | HEIGHT: 69 IN | RESPIRATION RATE: 16 BRPM | DIASTOLIC BLOOD PRESSURE: 56 MMHG | HEART RATE: 52 BPM | SYSTOLIC BLOOD PRESSURE: 100 MMHG | OXYGEN SATURATION: 98 %

## 2023-08-27 DIAGNOSIS — R10.2 PELVIC PAIN IN FEMALE: ICD-10-CM

## 2023-08-27 LAB
ALBUMIN SERPL BCG-MCNC: 4.2 G/DL (ref 3.5–5.2)
ALP SERPL-CCNC: 124 U/L (ref 35–104)
ALT SERPL W P-5'-P-CCNC: 13 U/L (ref 0–50)
ANION GAP SERPL CALCULATED.3IONS-SCNC: 11 MMOL/L (ref 7–15)
AST SERPL W P-5'-P-CCNC: 27 U/L (ref 0–45)
BASOPHILS # BLD AUTO: 0 10E3/UL (ref 0–0.2)
BASOPHILS NFR BLD AUTO: 1 %
BILIRUB SERPL-MCNC: 0.2 MG/DL
BUN SERPL-MCNC: 8.1 MG/DL (ref 6–20)
CALCIUM SERPL-MCNC: 9.6 MG/DL (ref 8.6–10)
CHLORIDE SERPL-SCNC: 102 MMOL/L (ref 98–107)
CREAT SERPL-MCNC: 0.77 MG/DL (ref 0.51–0.95)
CRP SERPL-MCNC: <3 MG/L
DEPRECATED HCO3 PLAS-SCNC: 24 MMOL/L (ref 22–29)
EOSINOPHIL # BLD AUTO: 0.2 10E3/UL (ref 0–0.7)
EOSINOPHIL NFR BLD AUTO: 3 %
ERYTHROCYTE [DISTWIDTH] IN BLOOD BY AUTOMATED COUNT: 13 % (ref 10–15)
ERYTHROCYTE [SEDIMENTATION RATE] IN BLOOD BY WESTERGREN METHOD: 33 MM/HR (ref 0–30)
GFR SERPL CREATININE-BSD FRML MDRD: >90 ML/MIN/1.73M2
GLUCOSE SERPL-MCNC: 115 MG/DL (ref 70–99)
HCT VFR BLD AUTO: 40.6 % (ref 35–47)
HGB BLD-MCNC: 13.4 G/DL (ref 11.7–15.7)
HOLD SPECIMEN: NORMAL
IMM GRANULOCYTES # BLD: 0 10E3/UL
IMM GRANULOCYTES NFR BLD: 0 %
LACTATE SERPL-SCNC: 0.9 MMOL/L (ref 0.7–2)
LACTATE SERPL-SCNC: 2.3 MMOL/L (ref 0.7–2)
LYMPHOCYTES # BLD AUTO: 2.9 10E3/UL (ref 0.8–5.3)
LYMPHOCYTES NFR BLD AUTO: 35 %
MCH RBC QN AUTO: 28.8 PG (ref 26.5–33)
MCHC RBC AUTO-ENTMCNC: 33 G/DL (ref 31.5–36.5)
MCV RBC AUTO: 87 FL (ref 78–100)
MONOCYTES # BLD AUTO: 0.4 10E3/UL (ref 0–1.3)
MONOCYTES NFR BLD AUTO: 5 %
NEUTROPHILS # BLD AUTO: 4.7 10E3/UL (ref 1.6–8.3)
NEUTROPHILS NFR BLD AUTO: 56 %
NRBC # BLD AUTO: 0 10E3/UL
NRBC BLD AUTO-RTO: 0 /100
PLATELET # BLD AUTO: 190 10E3/UL (ref 150–450)
POTASSIUM SERPL-SCNC: 3.7 MMOL/L (ref 3.4–5.3)
PROT SERPL-MCNC: 7.4 G/DL (ref 6.4–8.3)
RBC # BLD AUTO: 4.65 10E6/UL (ref 3.8–5.2)
SODIUM SERPL-SCNC: 137 MMOL/L (ref 136–145)
WBC # BLD AUTO: 8.3 10E3/UL (ref 4–11)

## 2023-08-27 PROCEDURE — 250N000011 HC RX IP 250 OP 636: Performed by: PHYSICIAN ASSISTANT

## 2023-08-27 PROCEDURE — 96375 TX/PRO/DX INJ NEW DRUG ADDON: CPT | Mod: XU | Performed by: PHYSICIAN ASSISTANT

## 2023-08-27 PROCEDURE — 72193 CT PELVIS W/DYE: CPT

## 2023-08-27 PROCEDURE — 96374 THER/PROPH/DIAG INJ IV PUSH: CPT | Mod: XU | Performed by: PHYSICIAN ASSISTANT

## 2023-08-27 PROCEDURE — 83605 ASSAY OF LACTIC ACID: CPT | Performed by: PHYSICIAN ASSISTANT

## 2023-08-27 PROCEDURE — 258N000003 HC RX IP 258 OP 636: Performed by: PHYSICIAN ASSISTANT

## 2023-08-27 PROCEDURE — 99284 EMERGENCY DEPT VISIT MOD MDM: CPT | Performed by: PHYSICIAN ASSISTANT

## 2023-08-27 PROCEDURE — 96361 HYDRATE IV INFUSION ADD-ON: CPT | Performed by: PHYSICIAN ASSISTANT

## 2023-08-27 PROCEDURE — 99285 EMERGENCY DEPT VISIT HI MDM: CPT | Mod: 25 | Performed by: PHYSICIAN ASSISTANT

## 2023-08-27 PROCEDURE — 80053 COMPREHEN METABOLIC PANEL: CPT | Performed by: PHYSICIAN ASSISTANT

## 2023-08-27 PROCEDURE — 250N000013 HC RX MED GY IP 250 OP 250 PS 637: Performed by: PHYSICIAN ASSISTANT

## 2023-08-27 PROCEDURE — 87040 BLOOD CULTURE FOR BACTERIA: CPT | Performed by: PHYSICIAN ASSISTANT

## 2023-08-27 PROCEDURE — 36415 COLL VENOUS BLD VENIPUNCTURE: CPT | Performed by: PHYSICIAN ASSISTANT

## 2023-08-27 PROCEDURE — 85652 RBC SED RATE AUTOMATED: CPT | Performed by: PHYSICIAN ASSISTANT

## 2023-08-27 PROCEDURE — 86140 C-REACTIVE PROTEIN: CPT | Performed by: PHYSICIAN ASSISTANT

## 2023-08-27 PROCEDURE — 85025 COMPLETE CBC W/AUTO DIFF WBC: CPT | Performed by: PHYSICIAN ASSISTANT

## 2023-08-27 RX ORDER — CEPHALEXIN 500 MG/1
500 CAPSULE ORAL 4 TIMES DAILY
Qty: 28 CAPSULE | Refills: 0 | Status: SHIPPED | OUTPATIENT
Start: 2023-08-27 | End: 2023-08-27

## 2023-08-27 RX ORDER — CEPHALEXIN 500 MG/1
500 CAPSULE ORAL 4 TIMES DAILY
Qty: 28 CAPSULE | Refills: 0 | Status: SHIPPED | OUTPATIENT
Start: 2023-08-27 | End: 2023-09-03

## 2023-08-27 RX ORDER — KETOROLAC TROMETHAMINE 15 MG/ML
15 INJECTION, SOLUTION INTRAMUSCULAR; INTRAVENOUS ONCE
Status: COMPLETED | OUTPATIENT
Start: 2023-08-27 | End: 2023-08-27

## 2023-08-27 RX ORDER — CEFAZOLIN SODIUM/WATER 2 G/20 ML
2 SYRINGE (ML) INTRAVENOUS ONCE
Status: COMPLETED | OUTPATIENT
Start: 2023-08-27 | End: 2023-08-27

## 2023-08-27 RX ORDER — KETOROLAC TROMETHAMINE 10 MG/1
10 TABLET, FILM COATED ORAL EVERY 6 HOURS PRN
Qty: 20 TABLET | Refills: 0 | Status: SHIPPED | OUTPATIENT
Start: 2023-08-27 | End: 2023-10-17

## 2023-08-27 RX ORDER — HYDROXYZINE PAMOATE 25 MG/1
25 CAPSULE ORAL ONCE
Status: COMPLETED | OUTPATIENT
Start: 2023-08-27 | End: 2023-08-27

## 2023-08-27 RX ORDER — IOPAMIDOL 755 MG/ML
95 INJECTION, SOLUTION INTRAVASCULAR ONCE
Status: COMPLETED | OUTPATIENT
Start: 2023-08-27 | End: 2023-08-27

## 2023-08-27 RX ORDER — HYDROCODONE BITARTRATE AND ACETAMINOPHEN 5; 325 MG/1; MG/1
1 TABLET ORAL ONCE
Status: COMPLETED | OUTPATIENT
Start: 2023-08-27 | End: 2023-08-27

## 2023-08-27 RX ORDER — SODIUM CHLORIDE 9 MG/ML
INJECTION, SOLUTION INTRAVENOUS CONTINUOUS
Status: DISCONTINUED | OUTPATIENT
Start: 2023-08-27 | End: 2023-08-27 | Stop reason: HOSPADM

## 2023-08-27 RX ORDER — HYDROCODONE BITARTRATE AND ACETAMINOPHEN 5; 325 MG/1; MG/1
1 TABLET ORAL EVERY 6 HOURS PRN
Qty: 20 TABLET | Refills: 0 | Status: SHIPPED | OUTPATIENT
Start: 2023-08-27 | End: 2023-12-19

## 2023-08-27 RX ADMIN — SODIUM CHLORIDE 1986 ML: 9 INJECTION, SOLUTION INTRAVENOUS at 18:45

## 2023-08-27 RX ADMIN — KETOROLAC TROMETHAMINE 15 MG: 15 INJECTION, SOLUTION INTRAMUSCULAR; INTRAVENOUS at 17:38

## 2023-08-27 RX ADMIN — HYDROXYZINE PAMOATE 25 MG: 25 CAPSULE ORAL at 20:56

## 2023-08-27 RX ADMIN — Medication 2 G: at 19:50

## 2023-08-27 RX ADMIN — HYDROCODONE BITARTRATE AND ACETAMINOPHEN 1 TABLET: 5; 325 TABLET ORAL at 20:56

## 2023-08-27 RX ADMIN — HYDROMORPHONE HYDROCHLORIDE 1 MG: 1 INJECTION, SOLUTION INTRAMUSCULAR; INTRAVENOUS; SUBCUTANEOUS at 18:30

## 2023-08-27 RX ADMIN — IOPAMIDOL 95 ML: 755 INJECTION, SOLUTION INTRAVENOUS at 18:12

## 2023-08-27 ASSESSMENT — ACTIVITIES OF DAILY LIVING (ADL): ADLS_ACUITY_SCORE: 35

## 2023-08-27 NOTE — TELEPHONE ENCOUNTER
Writer notes that Rx request for Gabapentin has already been responded to with 5/12/18 refill encounter. Patient had requested at that time that Rx was removed from her med list, both at [facility] and at [facility] in [facility] pharmacy. Writer had cared for Rx request at that time, as well as had spoken to pharmacist at [facility]. Call again placed to [facility]. Spoke to Jeb pharmacist. Advised him of events as noted above, as well as patient's desire to no longer receive Rx as requested. Pharmacist is unaware of a refill request being generated, states he will again inactivate rx in their system to see if refill requests will stop for this rx. Writer will again refuse Rx request electronically as well at this time.    Unable to complete prescription refill per RN Medication Refill Policy. Lyle Patterson 6/4/2018 11:08 AM     81 y/o male w/ a PMHx of hypothyroid, CKD, Parkinson's, HTN, HLD, colostomy, and urinary presents to the ED via EMS for urinary cath complications. Pt accompanied by wife, pt has a hx of Parkinson's, has been having progressive weakness x2 weeks ago, was started on Levaquin for a UTI, subsequently developed COVID. Pt wife states pt lives at [facility], over the past few days has needed more assistance than usual, nurse also reports that pt was c/o testicular pain as well. Pt wife reports this morning she went to help pt change when she noticed blood around the penis so called EMS, states he seems weaker and tired than usual. Pt wife also reports pt has had decreased PO intake over the past 3 days, has a ostomy bag which has been having a decreased PO intake. 83 y/o male w/ a PMHx of hypothyroid, CKD, Parkinson's, HTN, HLD, colostomy presents to the ED via EMS for urinary cath complications. Pt accompanied by wife, pt has a hx of Parkinson's, has been having progressive weakness x2 weeks ago, was started on Levaquin for a UTI, subsequently developed COVID. Pt wife states pt lives at [facility], over the past few days has needed more assistance than usual, nurse also reports that pt was c/o testicular pain as well. Pt wife reports this morning she went to help pt change when she noticed blood around the penis so called EMS, states he seems weaker and more tired than usual. Pt wife also reports pt has had decreased PO intake over the past 3 days, has a ostomy bag which has been having a decreased output - she thinks this likely is related to decrease PO intake

## 2023-08-27 NOTE — ED TRIAGE NOTES
Pt presents to ED with c/o hip and groin pain. Pt reports the pain is stabbing. Per pt she had surgery for necrotizing fascitis in June.    Staci Small RN on 8/27/2023 at 5:02 PM       Triage Assessment       Row Name 08/27/23 1701       Cardiac WDL    Cardiac WDL WDL       Cognitive/Neuro/Behavioral WDL    Cognitive/Neuro/Behavioral WDL WDL

## 2023-08-28 NOTE — DISCHARGE INSTRUCTIONS
-Take Keflex 500 mg every 6 hours for 7 days. Start tomorrow morning  -Use Toradol and Norco for pain.  -Follow-up with your primary care doctor or general surgery for recheck, call tomorrow for appointment  -Return to the ER for any worsening of symptoms  -CT showed only minimal swelling in your groin right thigh area.  No signs of abscess or other concerning findings.

## 2023-09-01 LAB
BACTERIA BLD CULT: NO GROWTH
BACTERIA BLD CULT: NO GROWTH

## 2023-09-06 ENCOUNTER — OFFICE VISIT (OUTPATIENT)
Dept: SURGERY | Facility: OTHER | Age: 53
End: 2023-09-06
Attending: SURGERY
Payer: COMMERCIAL

## 2023-09-06 VITALS
WEIGHT: 163 LBS | TEMPERATURE: 97.2 F | HEART RATE: 58 BPM | HEIGHT: 69 IN | DIASTOLIC BLOOD PRESSURE: 58 MMHG | OXYGEN SATURATION: 98 % | BODY MASS INDEX: 24.14 KG/M2 | RESPIRATION RATE: 16 BRPM | SYSTOLIC BLOOD PRESSURE: 99 MMHG

## 2023-09-06 DIAGNOSIS — N76.82 FOURNIER'S GANGRENE IN FEMALE (H): Primary | ICD-10-CM

## 2023-09-06 PROCEDURE — G0463 HOSPITAL OUTPT CLINIC VISIT: HCPCS

## 2023-09-06 PROCEDURE — 99024 POSTOP FOLLOW-UP VISIT: CPT | Performed by: SURGERY

## 2023-09-06 ASSESSMENT — PAIN SCALES - GENERAL: PAINLEVEL: MILD PAIN (3)

## 2023-09-06 NOTE — PROGRESS NOTES
"Patient presents for post surgical visit after grade primary closure of right labial and perineal defects on 6/19.  Notes some residual numbness, tingling and pain.  Was cleaning for inspection and felt something tear. ER performed CT with scarring and healing changes.     BP 99/58 (BP Location: Right arm, Patient Position: Sitting, Cuff Size: Adult Large)   Pulse 58   Temp 97.2  F (36.2  C) (Tympanic)   Resp 16   Ht 1.753 m (5' 9\")   Wt 73.9 kg (163 lb)   LMP 11/01/1999 (Exact Date)   SpO2 98%   BMI 24.07 kg/m      General: NAD, pleasant and cooperative with exam and interview.  Perineum: Deferred  Psychiatry: awake, alert and oriented. Appropriate affect.    Assessment/Plan:  53-year-old female status post Julio's with extensive debridements. Has pain at scarring/contracture from flaps.      - Pt referral for scar US/ Heat/Massage     Anthony Nolan MD on 9/6/2023 at 9:31 AM    "

## 2023-09-06 NOTE — NURSING NOTE
"Chief Complaint   Patient presents with    Surgical Followup       Initial BP 99/58 (BP Location: Right arm, Patient Position: Sitting, Cuff Size: Adult Large)   Pulse 58   Temp 97.2  F (36.2  C) (Tympanic)   Resp 16   Ht 1.753 m (5' 9\")   Wt 73.9 kg (163 lb)   LMP 11/01/1999 (Exact Date)   SpO2 98%   BMI 24.07 kg/m   Estimated body mass index is 24.07 kg/m  as calculated from the following:    Height as of this encounter: 1.753 m (5' 9\").    Weight as of this encounter: 73.9 kg (163 lb).  Meds Reconciled: complete    Massiel Gutierrez RN     "

## 2023-09-08 NOTE — ED PROVIDER NOTES
"      EMERGENCY DEPARTMENT ENCOUNTER      NAME: Kym Perea  AGE: 53 year old female  YOB: 1970  MRN: [id number]  EVALUATION DATE & TIME: 8/27/2023  5:03 PM    PCP: Eryn Hurley    ED PROVIDER: Ranjeet Giron PA-C       CHIEF COMPLAINT:  Chief Complaint   Patient presents with    Leg Pain    Groin Pain       ED COURSE, MEDICAL DECISION MAKING, FINAL IMPRESSION AND PLAN:     The patient was interviewed and examined.  HPI and physical exam as below.  Differential diagnosis and MDM Key Documentation Elements as below.  Vitals and triage note were reviewed.  /56   Pulse 52   Temp 98.1  F (36.7  C) (Tympanic)   Resp 16   Ht 1.753 m (5' 9\")   Wt 74.8 kg (165 lb)   LMP 11/01/1999 (Exact Date)   SpO2 98%   BMI 24.37 kg/m      Kym Perea is a pleasant 53 year old female with history of Julio's gangrene who presents to the ER today with right lower pelvic pain.  Patient states that she had necrotizing fasciitis back in June.  Patient was brought to the OR by Dr. Nolan with a drain in place.  Drains were removed in the ER on 7/2000 2023.  Patient states that today she still having a pain in the right pelvic region and it feels like there is a stitch there.  Otherwise incision sites for the drains were at have otherwise healed up.  No erythema or warmth.  No fever or chills.  No abdominal pain.  Patient is also concerned about possible returning Julio's gangrene.    Differential includes but is not limited to Julio's gangrene, postoperative pain, cellulitis    Patient today was afebrile with otherwise normal vitals.  Patient is in no acute distress.  Physical examination of the abdomen and pelvic region revealed no specific swelling, erythema, warmth.  Some mild tenderness was present.  No signs of fluctuance suggest abscess.  No open wounds.    Mild elevation of lactic acid and ESR.  Normal CRP.  No leukocytosis.  CT of the abdomen/pelvis with IV contrast today " reveals some mild subcutaneous edema in the upper medial right thigh no signs of abscess or gas.  I discussed patient with on-call neurosurgeon, Dr. Nolan, who initially Pait ZAHRA drains and patient.  He recommended IV antibiotics here in the ER and may be discharged home on oral antibiotics with close follow-up in clinic.    Patient given IV Ancef 2 g here in the ER.  Patient given IV Toradol and Dilaudid initially for pain.  Patient was given oral Norco and oral Vistaril prior to discharge.    Assessment / Plan:  1. Pelvic pain in female    -Keflex 500 mg every 6 hours for 7 days  -Norco and Vistaril for pain  -Follow-up with general surgery in a few days as an outpatient  -Return to ER if worsening symptoms despite being on oral antibiotics        Pertinent Labs & Imaging studies reviewed. (See chart for details)  Results for orders placed or performed during the hospital encounter of 08/27/23   CT Pelvis Soft Tissue w Contrast    Impression    IMPRESSION: Mild subcutaneous edema in the upper medial right thigh.  There is no evidence of soft tissue abscess. No soft tissue gas is  present.    RANJITH POWELL MD         SYSTEM ID:  [id number]   Extra Blue Top Tube   Result Value Ref Range    Hold Specimen JIC    Extra Red Top Tube   Result Value Ref Range    Hold Specimen JIC    Extra Green Top (Lithium Heparin) Tube   Result Value Ref Range    Hold Specimen JIC    Extra Purple Top Tube   Result Value Ref Range    Hold Specimen JIC    Extra Green Top (Lithium Heparin) ON ICE   Result Value Ref Range    Hold Specimen JIC    Comprehensive metabolic panel   Result Value Ref Range    Sodium 137 136 - 145 mmol/L    Potassium 3.7 3.4 - 5.3 mmol/L    Chloride 102 98 - 107 mmol/L    Carbon Dioxide (CO2) 24 22 - 29 mmol/L    Anion Gap 11 7 - 15 mmol/L    Urea Nitrogen 8.1 6.0 - 20.0 mg/dL    Creatinine 0.77 0.51 - 0.95 mg/dL    Calcium 9.6 8.6 - 10.0 mg/dL    Glucose 115 (H) 70 - 99 mg/dL    Alkaline Phosphatase 124 (H) 35  - 104 U/L    AST 27 0 - 45 U/L    ALT 13 0 - 50 U/L    Protein Total 7.4 6.4 - 8.3 g/dL    Albumin 4.2 3.5 - 5.2 g/dL    Bilirubin Total 0.2 <=1.2 mg/dL    GFR Estimate >90 >60 mL/min/1.73m2   Lactic acid whole blood   Result Value Ref Range    Lactic Acid 2.3 (H) 0.7 - 2.0 mmol/L   Result Value Ref Range    CRP Inflammation <3.00 <5.00 mg/L   Erythrocyte sedimentation rate auto   Result Value Ref Range    Erythrocyte Sedimentation Rate 33 (H) 0 - 30 mm/hr   CBC with platelets and differential   Result Value Ref Range    WBC Count 8.3 4.0 - 11.0 10e3/uL    RBC Count 4.65 3.80 - 5.20 10e6/uL    Hemoglobin 13.4 11.7 - 15.7 g/dL    Hematocrit 40.6 35.0 - 47.0 %    MCV 87 78 - 100 fL    MCH 28.8 26.5 - 33.0 pg    MCHC 33.0 31.5 - 36.5 g/dL    RDW 13.0 10.0 - 15.0 %    Platelet Count 190 150 - 450 10e3/uL    % Neutrophils 56 %    % Lymphocytes 35 %    % Monocytes 5 %    % Eosinophils 3 %    % Basophils 1 %    % Immature Granulocytes 0 %    NRBCs per 100 WBC 0 <1 /100    Absolute Neutrophils 4.7 1.6 - 8.3 10e3/uL    Absolute Lymphocytes 2.9 0.8 - 5.3 10e3/uL    Absolute Monocytes 0.4 0.0 - 1.3 10e3/uL    Absolute Eosinophils 0.2 0.0 - 0.7 10e3/uL    Absolute Basophils 0.0 0.0 - 0.2 10e3/uL    Absolute Immature Granulocytes 0.0 <=0.4 10e3/uL    Absolute NRBCs 0.0 10e3/uL   Lactic acid whole blood   Result Value Ref Range    Lactic Acid 0.9 0.7 - 2.0 mmol/L   Blood Culture Peripheral Blood    Specimen: Peripheral Blood   Result Value Ref Range    Culture No Growth    Blood Culture Peripheral Blood    Specimen: Peripheral Blood   Result Value Ref Range    Culture No Growth      No results found for: ABORH    Medications given during today's ER visit:  Medications   ketorolac (TORADOL) injection 15 mg (15 mg Intravenous $Given 8/27/23 2669)   iopamidol (ISOVUE-370) solution 95 mL (95 mLs Intravenous $Given 8/27/23 1812)   HYDROmorphone (DILAUDID) injection 1 mg (1 mg Intravenous $Given 8/27/23 1830)   0.9% sodium chloride  BOLUS (0 mLs Intravenous Stopped 8/27/23 2051)   ceFAZolin Sodium (ANCEF) injection 2 g (2 g Intravenous $Given 8/27/23 1950)   HYDROcodone-acetaminophen (NORCO) 5-325 MG per tablet 1 tablet (1 tablet Oral $Given 8/27/23 2056)   hydrOXYzine (VISTARIL) capsule 25 mg (25 mg Oral $Given 8/27/23 2056)       New prescriptions started at today's ER visit  Discharge Medication List as of 8/27/2023  8:57 PM        START taking these medications    Details   cephALEXin (KEFLEX) 500 MG capsule Take 1 capsule (500 mg) by mouth 4 times daily for 7 days, Disp-28 capsule, R-0, E-Prescribe      HYDROcodone-acetaminophen (NORCO) 5-325 MG tablet Take 1 tablet by mouth every 6 hours as needed for severe pain, Disp-20 tablet, R-0, [facility]      ketorolac (TORADOL) 10 MG tablet Take 1 tablet (10 mg) by mouth every 6 hours as needed for moderate pain, Disp-20 tablet, R-0, [facility]           Reassessments, Medications, Interventions, & Response to Treatments:  Pain improved with use of interventions.  No adverse effects to IV Ancef.  Discussed treatment plan of follow-up.  Discussed laboratory results and imaging report.    Consultations:  Dr. Nolan-General surgery    Decision Rules, Medical Calculators, and Risk Stratification Tools:  None    MDM Key Documentation Elements for Patient's Evaluation:  Differential diagnosis to include high risk considerations: As above  Escalation to admission/observation considered: Admission/observation considered, but patient does not meet admission criteria  Discussions and management with other clinicians:    3a. Independent interpretation of testing performed by another health professional:  -No  3b. Discussion of management or test interpretation with another health professional: -Yes  Independent interpretation of tests:  Ordering and/or review of 3+ test(s)  Discussion of test interpretations with radiology:  No  History obtained from source other than patient or assessment requiring an  independent historian:  No  Review of non-ED/external records:  review of 3+ records  Diagnostic tests considered but not ultimately performed/deferred:  -Ultrasound pelvis  Prescription medications considered but not prescribed:  -Clindamycin  Chronic conditions affecting care:  -None  Care affected by social determinants of health:  -None    The patient's management involved:   - Laboratory studies  - Imaging studies  - Parenteral controlled substance  - Prescription drug management    A shared decision making model was used. Time was taken to answer all questions.  Patient and/or associated parties understood and were agreeable to treatment plan.  Plan and all results were discussed. Warning signs and close return precautions to return to the ED given. Copy of results given. Discharged in stable condition. Discharged with discharge instructions outlining plan for further care and follow up.      PPE worn during patient evaluation:  Mask: No  Eye Protection: No  Gown: No  Hair cover: No  Face Shield: No  Patient wearing a mask: No    =================================================================    HPI  Kym Perea is a pleasant 53 year old female with history of Julio's gangrene who presents to the ER today with right lower pelvic pain.  Patient states that she had necrotizing fasciitis back in June.  Patient was brought to the OR by Dr. Nolan with a drain in place.  Drains were removed in the ER on 7/2000 2023.  Patient states that today she still having a pain in the right pelvic region and it feels like there is a stitch there.  Otherwise incision sites for the drains were at have otherwise healed up.  No erythema or warmth.  No fever or chills.  No abdominal pain.  Patient is also concerned about possible returning Julio's gangrene.      REVIEW OF SYSTEMS   Review of Systems  As above, otherwise ROS is unremarkable.      PAST MEDICAL HISTORY:  Past Medical History:   Diagnosis Date    Acquired  absence of other organs (CODE)     9/2/2011    Chronic pain syndrome     No Comments Provided    Complex regional pain syndrome I     left ankle    Encounter for other administrative examinations     10/2012,Hydrocodone 10/325mg, #240/mo signed 10/3/12, updated 10/1/ 14    Encounter for other administrative examinations     10/1/2014,MS Contin 30 mg q 8 hours #90 per month.  Morphine sulfate IR 15 mg 2-3 tab tid prn #180 per month    Gastro-esophageal reflux disease without esophagitis     No Comments Provided    Injury of left ankle     1997,requiring surgery    Low back pain     No Comments Provided    Major depressive disorder, recurrent severe without psychotic features (H)     No Comments Provided    Migraine without status migrainosus, not intractable     No Comments Provided    Nicotine dependence, uncomplicated     No Comments Provided    Obesity     No Comments Provided    Overweight     11/25/2014    Pain in ankle     Chronic left ankle pain secondary to reflex sympathetic dystrophy.  Patient  has had 4 previous surgeries, the most recent one done by Dr. Noriega in  2007. On Narcotic contract.    Pain in thoracic spine     No Comments Provided    Panic disorder without agoraphobia     Dr Reynoso    Personal history of other diseases of the female genital tract     No Comments Provided    Personal history of other medical treatment (CODE)     G2, P1-0-1-1 with one vaginal delivery.    Supraventricular tachycardia (H)     5/24/2010       PAST SURGICAL HISTORY:  Past Surgical History:   Procedure Laterality Date    ANKLE SURGERY      1997, 2000,Left ankle surgery x 2    CYSTECTOMY PILONIDAL N/A 6/19/2023    Procedure: Delayed closure of vulva and perineal defectes with drain placment;  Surgeon: Anthony Nolan MD;  Location: [facility] OR    HYSTERECTOMY TOTAL ABDOMINAL      11/1999,secondary to endometriosis, no malignancy; patient reports no malignancy, but abnormal cells were present *    IRRIGATION AND  DEBRIDEMENT DECUBITUS WOUND, COMBINED N/A 6/1/2023    Procedure: IRRIGATION AND DEBRIDEMENT, PERINEAL, LABIAL and GLUTEAL INFECTION;  Surgeon: Anthony Nolan MD;  Location: [facility] OR    IRRIGATION AND DEBRIDEMENT DECUBITUS WOUND, COMBINED N/A 6/3/2023    Procedure: Debridement and washout of vulvar and perineal wounds;  Surgeon: Anthony Nolan MD;  Location: [facility] OR    LAPAROSCOPIC CHOLECYSTECTOMY      No Comments Provided    LAPAROSCOPY DIAGNOSTIC (GENERAL)      1995    OTHER SURGICAL HISTORY      21299,CRANIO/MAXILLOFACIAL SURGERY,Jaw Surgery    OTHER SURGICAL HISTORY      207040,[facility] EMG,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  No other abnormalities noted.       CURRENT MEDICATIONS:    Current Outpatient Medications   Medication Instructions    acetaminophen (TYLENOL) 650 mg, Oral, EVERY 4 HOURS PRN    albuterol (PROAIR HFA/PROVENTIL HFA/VENTOLIN HFA) 108 (90 Base) MCG/ACT inhaler 2 puffs, Inhalation, EVERY 4 HOURS PRN    amphetamine-dextroamphetamine (ADDERALL) 20 MG tablet 30 mg, Oral, 2 TIMES DAILY    amphetamine-dextroamphetamine (ADDERALL) 30 MG tablet 30 mg, Oral, 2 TIMES DAILY    atenolol (TENORMIN) 100 mg, Oral, DAILY    buprenorphine-naloxone (ZUBSOLV) 5.7-1.4 MG sublingual tablet 0.25 tablets, Sublingual, DAILY    Cholecalciferol (VITAMIN D-3) 125 MCG (5000 UT) TABS 1 tablet, Oral, DAILY    clonazePAM (KLONOPIN) 2 mg, Oral, 2 TIMES DAILY    divalproex sodium extended-release (DEPAKOTE ER) 250 MG 24 hr tablet 1 tablet, Oral, 3 TIMES DAILY (Diuretics and Nitrates)    fluticasone (FLONASE) 50 MCG/ACT nasal spray 2 sprays, Both Nostrils, DAILY PRN    HYDROcodone-acetaminophen (NORCO) 5-325 MG tablet 1 tablet, Oral, EVERY 6 HOURS PRN    hydrOXYzine (VISTARIL) 25 mg, Oral, 3 TIMES DAILY PRN    ibuprofen (ADVIL/MOTRIN) 800 mg, Oral, EVERY 8 HOURS PRN    ketorolac (TORADOL) 10 mg, Oral, EVERY 6 HOURS PRN    metroNIDAZOLE (FLAGYL) 500 mg, Oral, 2 TIMES DAILY    naloxone (NARCAN) 4 mg, Alternating  Nostrils, PRN, every 2-3 minutes until assistance arrives    ondansetron (ZOFRAN ODT) 4 mg, Oral, EVERY 8 HOURS PRN    oxyCODONE (ROXICODONE) 5-10 mg, Oral, EVERY 3 HOURS PRN    saccharomyces boulardii (FLORASTOR) 250 mg, Oral, 2 TIMES DAILY    senna-docusate (SENOKOT-S/PERICOLACE) 8.6-50 MG tablet 1-2 tablets, Oral, 2 TIMES DAILY, Take while on oral narcotics to prevent or treat constipation.    tiZANidine (ZANAFLEX) 4 MG tablet TAKE 1 TABLET BY MOUTH THREE TIMES DAILY AS NEEDEDFOR MUSCLE SPASMS    zinc oxide (DESITIN) 40 % external ointment Topical, PRN    zinc oxide (DESITIN) 40 % external ointment Topical, PRN       ALLERGIES:  Allergies   Allergen Reactions    Diclofenac-Misoprostol GI Disturbance    Indomethacin      Other reaction(s): Dizziness    Meloxicam GI Disturbance    Moxifloxacin GI Disturbance       FAMILY HISTORY:  Family History   Problem Relation Age of Onset    Cancer Father         Cancer,Bladder    Other - See Comments Father         Mild hypercholesterolemia    Other - See Comments Mother         scleroderma/lupus/Parkinson's syndrome    Family History Negative Brother         Good Health    Ariel-Danlos syndrome Daughter     Colon Cancer Maternal Grandmother 40        Cancer-colon,Followed by lung  with metastasis to the bone di*       SOCIAL HISTORY:   Social History     Socioeconomic History    Marital status:    Tobacco Use    Smoking status: Every Day     Packs/day: 0.50     Years: 30.00     Pack years: 15.00     Types: Cigarettes, Vaping Device     Start date: 12/2/1986     Passive exposure: Current    Smokeless tobacco: Never    Tobacco comments:     trying   Vaping Use    Vaping Use: Every day    Substances: Nicotine, Flavoring    Devices: Disposable   Substance and Sexual Activity    Alcohol use: Yes     Comment: OCCASIONAL WINE    Drug use: Yes     Types: Marijuana     Comment: medical cannabis; suboxone    Sexual activity: Not Currently     Partners: Male   Social History  "Narrative     ; currently unemployed. 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 She has a daughter who is now 20, lives in [place]       PHYSICAL EXAM    VITAL SIGNS: /56   Pulse 52   Temp 98.1  F (36.7  C) (Tympanic)   Resp 16   Ht 1.753 m (5' 9\")   Wt 74.8 kg (165 lb)   LMP 11/01/1999 (Exact Date)   SpO2 98%   BMI 24.37 kg/m      No data found.    Physical Exam  Vitals and nursing note reviewed. Exam conducted with a chaperone present.   Constitutional:       Appearance: Normal appearance. She is not ill-appearing or diaphoretic.   HENT:      Nose: Nose normal.      Mouth/Throat:      Mouth: Mucous membranes are moist.      Pharynx: Oropharynx is clear.   Eyes:      Conjunctiva/sclera: Conjunctivae normal.   Cardiovascular:      Rate and Rhythm: Normal rate and regular rhythm.      Pulses: Normal pulses.      Heart sounds: Normal heart sounds.   Pulmonary:      Effort: Pulmonary effort is normal.      Breath sounds: Normal breath sounds.   Abdominal:      General: Abdomen is flat. Bowel sounds are normal.      Palpations: Abdomen is soft.      Tenderness: There is no abdominal tenderness.   Genitourinary:     General: Normal vulva.      Comments: Perineum normal without signs of erythema, warmth, or cellulitis.  No tenderness.  Musculoskeletal:         General: Normal range of motion.      Cervical back: Normal range of motion.   Skin:     General: Skin is warm and dry.      Capillary Refill: Capillary refill takes less than 2 seconds.   Neurological:      General: No focal deficit present.      Mental Status: She is alert.   Psychiatric:         Mood and Affect: Mood normal.              LABS & RADIOLOGY:  All pertinent labs reviewed and interpreted. Reviewed all pertinent imaging. Please see official radiology report.  Results for orders placed or performed during the hospital encounter of 08/27/23   CT Pelvis Soft Tissue w Contrast    Impression    IMPRESSION: Mild " subcutaneous edema in the upper medial right thigh.  There is no evidence of soft tissue abscess. No soft tissue gas is  present.    RANJITH POWELL MD         SYSTEM ID:  [id number]   Extra Blue Top Tube   Result Value Ref Range    Hold Specimen JIC    Extra Red Top Tube   Result Value Ref Range    Hold Specimen JIC    Extra Green Top (Lithium Heparin) Tube   Result Value Ref Range    Hold Specimen JIC    Extra Purple Top Tube   Result Value Ref Range    Hold Specimen JIC    Extra Green Top (Lithium Heparin) ON ICE   Result Value Ref Range    Hold Specimen JIC    Comprehensive metabolic panel   Result Value Ref Range    Sodium 137 136 - 145 mmol/L    Potassium 3.7 3.4 - 5.3 mmol/L    Chloride 102 98 - 107 mmol/L    Carbon Dioxide (CO2) 24 22 - 29 mmol/L    Anion Gap 11 7 - 15 mmol/L    Urea Nitrogen 8.1 6.0 - 20.0 mg/dL    Creatinine 0.77 0.51 - 0.95 mg/dL    Calcium 9.6 8.6 - 10.0 mg/dL    Glucose 115 (H) 70 - 99 mg/dL    Alkaline Phosphatase 124 (H) 35 - 104 U/L    AST 27 0 - 45 U/L    ALT 13 0 - 50 U/L    Protein Total 7.4 6.4 - 8.3 g/dL    Albumin 4.2 3.5 - 5.2 g/dL    Bilirubin Total 0.2 <=1.2 mg/dL    GFR Estimate >90 >60 mL/min/1.73m2   Lactic acid whole blood   Result Value Ref Range    Lactic Acid 2.3 (H) 0.7 - 2.0 mmol/L   Result Value Ref Range    CRP Inflammation <3.00 <5.00 mg/L   Erythrocyte sedimentation rate auto   Result Value Ref Range    Erythrocyte Sedimentation Rate 33 (H) 0 - 30 mm/hr   CBC with platelets and differential   Result Value Ref Range    WBC Count 8.3 4.0 - 11.0 10e3/uL    RBC Count 4.65 3.80 - 5.20 10e6/uL    Hemoglobin 13.4 11.7 - 15.7 g/dL    Hematocrit 40.6 35.0 - 47.0 %    MCV 87 78 - 100 fL    MCH 28.8 26.5 - 33.0 pg    MCHC 33.0 31.5 - 36.5 g/dL    RDW 13.0 10.0 - 15.0 %    Platelet Count 190 150 - 450 10e3/uL    % Neutrophils 56 %    % Lymphocytes 35 %    % Monocytes 5 %    % Eosinophils 3 %    % Basophils 1 %    % Immature Granulocytes 0 %    NRBCs per 100 WBC 0 <1 /100     Absolute Neutrophils 4.7 1.6 - 8.3 10e3/uL    Absolute Lymphocytes 2.9 0.8 - 5.3 10e3/uL    Absolute Monocytes 0.4 0.0 - 1.3 10e3/uL    Absolute Eosinophils 0.2 0.0 - 0.7 10e3/uL    Absolute Basophils 0.0 0.0 - 0.2 10e3/uL    Absolute Immature Granulocytes 0.0 <=0.4 10e3/uL    Absolute NRBCs 0.0 10e3/uL   Lactic acid whole blood   Result Value Ref Range    Lactic Acid 0.9 0.7 - 2.0 mmol/L   Blood Culture Peripheral Blood    Specimen: Peripheral Blood   Result Value Ref Range    Culture No Growth    Blood Culture Peripheral Blood    Specimen: Peripheral Blood   Result Value Ref Range    Culture No Growth      CT Pelvis Soft Tissue w Contrast   Final Result   IMPRESSION: Mild subcutaneous edema in the upper medial right thigh.   There is no evidence of soft tissue abscess. No soft tissue gas is   present.      RANJITH POWELL MD            SYSTEM ID:  [id number]              I, Sanford Giron PA-C, personally performed the services described in this documentation, and it is both accurate and complete.       Ranjeet Giron PA-C  09/08/23 [id number]

## 2023-10-16 ENCOUNTER — HOSPITAL ENCOUNTER (EMERGENCY)
Facility: OTHER | Age: 53
Discharge: HOME | End: 2023-10-17
Attending: FAMILY MEDICINE | Admitting: FAMILY MEDICINE
Payer: COMMERCIAL

## 2023-10-16 ENCOUNTER — APPOINTMENT (OUTPATIENT)
Dept: CT IMAGING | Facility: OTHER | Age: 53
End: 2023-10-16
Attending: FAMILY MEDICINE
Payer: COMMERCIAL

## 2023-10-16 DIAGNOSIS — Z87.438 HISTORY OF FOURNIER'S GANGRENE: ICD-10-CM

## 2023-10-16 DIAGNOSIS — M79.604 RIGHT LEG PAIN: ICD-10-CM

## 2023-10-16 LAB
ALBUMIN SERPL BCG-MCNC: 4 G/DL (ref 3.5–5.2)
ALP SERPL-CCNC: 132 U/L (ref 35–104)
ALT SERPL W P-5'-P-CCNC: 16 U/L (ref 0–50)
ANION GAP SERPL CALCULATED.3IONS-SCNC: 11 MMOL/L (ref 7–15)
AST SERPL W P-5'-P-CCNC: 22 U/L (ref 0–45)
BASO+EOS+MONOS # BLD AUTO: NORMAL 10*3/UL
BASO+EOS+MONOS NFR BLD AUTO: NORMAL %
BASOPHILS # BLD AUTO: 0 10E3/UL (ref 0–0.2)
BASOPHILS NFR BLD AUTO: 0 %
BILIRUB SERPL-MCNC: <0.2 MG/DL
BUN SERPL-MCNC: 10.2 MG/DL (ref 6–20)
CALCIUM SERPL-MCNC: 9.3 MG/DL (ref 8.6–10)
CHLORIDE SERPL-SCNC: 102 MMOL/L (ref 98–107)
CREAT SERPL-MCNC: 0.81 MG/DL (ref 0.51–0.95)
CRP SERPL-MCNC: <3 MG/L
DEPRECATED HCO3 PLAS-SCNC: 24 MMOL/L (ref 22–29)
EGFRCR SERPLBLD CKD-EPI 2021: 87 ML/MIN/1.73M2
EOSINOPHIL # BLD AUTO: 0.2 10E3/UL (ref 0–0.7)
EOSINOPHIL NFR BLD AUTO: 3 %
ERYTHROCYTE [DISTWIDTH] IN BLOOD BY AUTOMATED COUNT: 13.6 % (ref 10–15)
GLUCOSE SERPL-MCNC: 109 MG/DL (ref 70–99)
HCT VFR BLD AUTO: 37 % (ref 35–47)
HGB BLD-MCNC: 12.6 G/DL (ref 11.7–15.7)
HOLD SPECIMEN: NORMAL
IMM GRANULOCYTES # BLD: 0 10E3/UL
IMM GRANULOCYTES NFR BLD: 0 %
LYMPHOCYTES # BLD AUTO: 2.8 10E3/UL (ref 0.8–5.3)
LYMPHOCYTES NFR BLD AUTO: 38 %
MCH RBC QN AUTO: 29.2 PG (ref 26.5–33)
MCHC RBC AUTO-ENTMCNC: 34.1 G/DL (ref 31.5–36.5)
MCV RBC AUTO: 86 FL (ref 78–100)
MONOCYTES # BLD AUTO: 0.5 10E3/UL (ref 0–1.3)
MONOCYTES NFR BLD AUTO: 6 %
NEUTROPHILS # BLD AUTO: 3.9 10E3/UL (ref 1.6–8.3)
NEUTROPHILS NFR BLD AUTO: 53 %
NRBC # BLD AUTO: 0 10E3/UL
NRBC BLD AUTO-RTO: 0 /100
PLATELET # BLD AUTO: 172 10E3/UL (ref 150–450)
POTASSIUM SERPL-SCNC: 3.2 MMOL/L (ref 3.4–5.3)
PROT SERPL-MCNC: 6.8 G/DL (ref 6.4–8.3)
RBC # BLD AUTO: 4.32 10E6/UL (ref 3.8–5.2)
SODIUM SERPL-SCNC: 137 MMOL/L (ref 135–145)
WBC # BLD AUTO: 7.4 10E3/UL (ref 4–11)

## 2023-10-16 PROCEDURE — 72193 CT PELVIS W/DYE: CPT | Mod: TC

## 2023-10-16 PROCEDURE — 96374 THER/PROPH/DIAG INJ IV PUSH: CPT | Performed by: FAMILY MEDICINE

## 2023-10-16 PROCEDURE — 99285 EMERGENCY DEPT VISIT HI MDM: CPT | Mod: 25 | Performed by: FAMILY MEDICINE

## 2023-10-16 PROCEDURE — 250N000011 HC RX IP 250 OP 636: Performed by: FAMILY MEDICINE

## 2023-10-16 PROCEDURE — 85025 COMPLETE CBC W/AUTO DIFF WBC: CPT | Performed by: FAMILY MEDICINE

## 2023-10-16 PROCEDURE — 86140 C-REACTIVE PROTEIN: CPT | Performed by: FAMILY MEDICINE

## 2023-10-16 PROCEDURE — 96375 TX/PRO/DX INJ NEW DRUG ADDON: CPT | Performed by: FAMILY MEDICINE

## 2023-10-16 PROCEDURE — 36415 COLL VENOUS BLD VENIPUNCTURE: CPT | Performed by: FAMILY MEDICINE

## 2023-10-16 PROCEDURE — 80053 COMPREHEN METABOLIC PANEL: CPT | Performed by: FAMILY MEDICINE

## 2023-10-16 PROCEDURE — 99284 EMERGENCY DEPT VISIT MOD MDM: CPT | Performed by: FAMILY MEDICINE

## 2023-10-16 RX ORDER — IOPAMIDOL 755 MG/ML
100 INJECTION, SOLUTION INTRAVASCULAR ONCE
Status: COMPLETED | OUTPATIENT
Start: 2023-10-16 | End: 2023-10-16

## 2023-10-16 RX ORDER — KETOROLAC TROMETHAMINE 30 MG/ML
30 INJECTION, SOLUTION INTRAMUSCULAR; INTRAVENOUS ONCE
Status: COMPLETED | OUTPATIENT
Start: 2023-10-16 | End: 2023-10-16

## 2023-10-16 RX ADMIN — IOPAMIDOL 100 ML: 755 INJECTION, SOLUTION INTRAVENOUS at 23:11

## 2023-10-16 RX ADMIN — KETOROLAC TROMETHAMINE 30 MG: 30 INJECTION INTRAMUSCULAR; INTRAVENOUS at 22:33

## 2023-10-16 ASSESSMENT — ENCOUNTER SYMPTOMS
ACTIVITY CHANGE: 0
SORE THROAT: 0
COUGH: 0
DIARRHEA: 0
FATIGUE: 0
DIZZINESS: 0
HEMATURIA: 0
RHINORRHEA: 0
ARTHRALGIAS: 1
VOMITING: 0
DYSURIA: 0
MYALGIAS: 0
FEVER: 0
SHORTNESS OF BREATH: 0
CHILLS: 0
APPETITE CHANGE: 0
NAUSEA: 0
ABDOMINAL PAIN: 0
HEADACHES: 0
NECK STIFFNESS: 0
EYE REDNESS: 0

## 2023-10-16 ASSESSMENT — ACTIVITIES OF DAILY LIVING (ADL): ADLS_ACUITY_SCORE: 36

## 2023-10-17 VITALS
TEMPERATURE: 96 F | SYSTOLIC BLOOD PRESSURE: 110 MMHG | OXYGEN SATURATION: 98 % | RESPIRATION RATE: 18 BRPM | HEART RATE: 52 BPM | DIASTOLIC BLOOD PRESSURE: 73 MMHG

## 2023-10-17 PROCEDURE — 250N000011 HC RX IP 250 OP 636: Performed by: FAMILY MEDICINE

## 2023-10-17 RX ORDER — MORPHINE SULFATE 4 MG/ML
4 INJECTION, SOLUTION INTRAMUSCULAR; INTRAVENOUS ONCE
Status: COMPLETED | OUTPATIENT
Start: 2023-10-17 | End: 2023-10-17

## 2023-10-17 RX ORDER — KETOROLAC TROMETHAMINE 10 MG/1
10 TABLET, FILM COATED ORAL EVERY 6 HOURS PRN
Qty: 20 TABLET | Refills: 0 | Status: SHIPPED | OUTPATIENT
Start: 2023-10-17 | End: 2024-02-09

## 2023-10-17 RX ORDER — ONDANSETRON 2 MG/ML
4 INJECTION INTRAMUSCULAR; INTRAVENOUS ONCE
Status: COMPLETED | OUTPATIENT
Start: 2023-10-17 | End: 2023-10-17

## 2023-10-17 RX ADMIN — ONDANSETRON 4 MG: 2 INJECTION INTRAMUSCULAR; INTRAVENOUS at 00:19

## 2023-10-17 RX ADMIN — MORPHINE SULFATE 4 MG: 4 INJECTION, SOLUTION INTRAMUSCULAR; INTRAVENOUS at 00:22

## 2023-10-17 ASSESSMENT — ACTIVITIES OF DAILY LIVING (ADL): ADLS_ACUITY_SCORE: 36

## 2023-10-17 NOTE — ED TRIAGE NOTES
Pt arrives to ER via private vehicle with c/o R upper thigh pain that started about 10 days ago. Pt reports that she had surgery for  necrotizing fascitis in June in the same area. Pt reports taking 400 mg Ibuprofen around 2:00 today with no relief.

## 2023-10-17 NOTE — ED NOTES
Patient C/O severe pain. MD notified. Patient was informed that she could not drive home if narcotic pain meds were prescribed. She states that she understands and would call a friend on discontinue.

## 2023-10-17 NOTE — ED PROVIDER NOTES
History     Chief Complaint   Patient presents with    Leg Pain     HPI  Kym Perea is a 53 year old female with history of johanne's gangrene, hyperlipidemia, GERD, depression, regional pain syndrome, SVT, migraine, panic attack, TMJ syndrome who presents to the emergency department with right upper thigh pain, slowly worsening over the last 10 days.  Pain radiates into the right lower buttock as well.  Similar to previous diagnosis of gangrene and fasciitis but no drainage or areas of swelling this time.  No fevers at home.    Reviewed nurses notes below, similar history is related to me.  Pt arrives to ER via private vehicle with c/o R upper thigh pain that started about 10 days ago. Pt reports that she had surgery for  necrotizing fascitis in June in the same area. Pt reports taking 400 mg Ibuprofen around 2:00 today with no relief.   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     Priority: Medium    Johanne's gangrene (H28) 06/01/2023     Priority: Medium    TMJ (temporomandibular joint syndrome) 11/01/2021     Priority: Medium    Arthralgia of right temporomandibular joint 11/01/2021     Priority: Medium    Suicidal behavior 10/19/2021     Priority: Medium    Medical cannabis use 07/10/2018     Priority: Medium    Chronic pain disorder 01/29/2018     Priority: Medium     Overview:   complex regional pain syndrome left ankle      Esophageal reflux 01/29/2018     Priority: Medium    Hyperlipidemia 01/29/2018     Priority: Medium    Insomnia 01/29/2018      Priority: Medium    Panic disorder 01/29/2018     Priority: Medium     Overview:   Previously patient of Dr. Reynoso since her teens. Patient is attempting slow taper down on her Xanax. She's been on Xanax 2 mg 4 times a day for years      Tobacco abuse 01/29/2018     Priority: Medium    Pain medication agreement, 4/18/18, 4/3/19, 10/27/2020 04/21/2017     Priority: Medium     Overview:   4/20/17:  Oxycodone 10 mg #180/mo    Hydrocodone 10/325mg, #240/mo. Stopped and changed to morphine. Agreement signed 10/3/12, updated 10/1/ 14.  Complex regional pain syndrome (aka Reflex Sympathetic Dystrophy)  MS Contin 30 mg q 8 hours #90 per month.  Morphine sulfate IR 15 mg 2-3 tab tid prn #180 per month, decreased to #90/month.   query 4/9/16 as part of chart review. Seems appropriate. 6/2/16 tapered and off Morphine. [place] 5/325 #120/month.  query appropriate. ToxAssure appropriate.    August 2016 Consultation with [facility]. Recommended spinal cord stimulator trial, but she is reluctant to pursue that. Pain clinic stated that continuing opioids was OK.      Complex regional pain syndrome type 1 affecting left lower leg 04/15/2016     Priority: Medium     Overview:   Patient with a history of complex regional pain syndrome has seen Dr. Dino Skelton4/23/16       Migraine headache 11/25/2014     Priority: Medium    Overweight (BMI 25.0-29.9) 11/25/2014     Priority: Medium    Lumbago 02/06/2012     Priority: Medium    Major depressive disorder, recurrent episode, severe (H) 07/13/2011     Priority: Medium    Paroxysmal supraventricular tachycardia 05/24/2010     Priority: Medium    Mononeuritis 07/06/2006     Priority: Medium     IMO Update 10/11      Ankle and foot joint derangement 06/05/2006     Priority: Medium     Overview:   Chronic left ankle pain secondary to reflex sympathetic dystrophy.  Patient   has had 4 previous surgeries, the most recent one done by Dr. Noriega in   2007. On Narcotic  contract.    Formatting of this note might be different from the original.  Chronic left ankle pain secondary to reflex sympathetic dystrophy.  Patient   has had 4 previous surgeries, the most recent one done by Dr. Noriega in   2007. On Narcotic contract.    IMO Update 10/11          Past Medical History:    Past Medical History:   Diagnosis Date    Acquired absence of other organs (CODE)     Chronic pain syndrome     Complex regional pain syndrome I     Encounter for other administrative examinations     Encounter for other administrative examinations     Gastro-esophageal reflux disease without esophagitis     Injury of left ankle     Low back pain     Major depressive disorder, recurrent severe without psychotic features (H)     Migraine without status migrainosus, not intractable     Nicotine dependence, uncomplicated     Obesity     Overweight     Pain in ankle     Pain in thoracic spine     Panic disorder without agoraphobia     Personal history of other diseases of the female genital tract     Personal history of other medical treatment (CODE)     Supraventricular tachycardia        Past Surgical History:    Past Surgical History:   Procedure Laterality Date    ANKLE SURGERY      1997, 2000,Left ankle surgery x 2    CYSTECTOMY PILONIDAL N/A 6/19/2023    Procedure: Delayed closure of vulva and perineal defectes with drain placment;  Surgeon: Anthony Nolan MD;  Location:  OR    HYSTERECTOMY TOTAL ABDOMINAL      11/1999,secondary to endometriosis, no malignancy; patient reports no malignancy, but abnormal cells were present *    IRRIGATION AND DEBRIDEMENT DECUBITUS WOUND, COMBINED N/A 6/1/2023    Procedure: IRRIGATION AND DEBRIDEMENT, PERINEAL, LABIAL and GLUTEAL INFECTION;  Surgeon: Anthony Nolan MD;  Location:  OR    IRRIGATION AND DEBRIDEMENT DECUBITUS WOUND, COMBINED N/A 6/3/2023    Procedure: Debridement and washout of vulvar and perineal wounds;  Surgeon: Anthony Nolan MD;  Location:  OR     LAPAROSCOPIC CHOLECYSTECTOMY      No Comments Provided    LAPAROSCOPY DIAGNOSTIC (GENERAL)      1995    OTHER SURGICAL HISTORY      21299,CRANIO/MAXILLOFACIAL SURGERY,Jaw Surgery    OTHER SURGICAL HISTORY      207040,[facility] EMG,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  No other abnormalities noted.       Family History:    Family History   Problem Relation Age of Onset    Cancer Father         Cancer,Bladder    Other - See Comments Father         Mild hypercholesterolemia    Other - See Comments Mother         scleroderma/lupus/Parkinson's syndrome    Family History Negative Brother         Good Health    Ariel-Danlos syndrome Daughter     Colon Cancer Maternal Grandmother 40        Cancer-colon,Followed by lung  with metastasis to the bone di*       Social History:  Marital Status:   [4]  Social History     Tobacco Use    Smoking status: Every Day     Packs/day: 0.50     Years: 30.00     Additional pack years: 0.00     Total pack years: 15.00     Types: Cigarettes, Vaping Device     Start date: 12/2/1986     Passive exposure: Current    Smokeless tobacco: Never    Tobacco comments:     trying   Vaping Use    Vaping Use: Every day    Substances: Nicotine, Flavoring    Devices: Disposable   Substance Use Topics    Alcohol use: Not Currently     Comment: OCCASIONAL WINE    Drug use: Yes     Types: Marijuana     Comment: medical cannabis; suboxone        Medications:    ketorolac (TORADOL) 10 MG tablet  acetaminophen (TYLENOL) 325 MG tablet  albuterol (PROAIR HFA/PROVENTIL HFA/VENTOLIN HFA) 108 (90 Base) MCG/ACT inhaler  amphetamine-dextroamphetamine (ADDERALL) 20 MG tablet  amphetamine-dextroamphetamine (ADDERALL) 30 MG tablet  atenolol (TENORMIN) 100 MG tablet  buprenorphine-naloxone (ZUBSOLV) 5.7-1.4 MG sublingual tablet  Cholecalciferol (VITAMIN D-3) 125 MCG (5000 UT) TABS  clonazePAM (KLONOPIN) 2 MG tablet  divalproex sodium extended-release (DEPAKOTE ER) 250 MG 24 hr tablet  fluticasone  (FLONASE) 50 MCG/ACT nasal spray  HYDROcodone-acetaminophen (NORCO) 5-325 MG tablet  hydrOXYzine (VISTARIL) 25 MG capsule  ibuprofen (ADVIL/MOTRIN) 800 MG tablet  metroNIDAZOLE (FLAGYL) 500 MG tablet  naloxone (NARCAN) 4 MG/0.1ML nasal spray  ondansetron (ZOFRAN ODT) 4 MG ODT tab  oxyCODONE (ROXICODONE) 5 MG tablet  saccharomyces boulardii (FLORASTOR) 250 MG capsule  senna-docusate (SENOKOT-S/PERICOLACE) 8.6-50 MG tablet  tiZANidine (ZANAFLEX) 4 MG tablet  zinc oxide (DESITIN) 40 % external ointment  zinc oxide (DESITIN) 40 % external ointment          Review of Systems   Constitutional:  Negative for activity change, appetite change, chills, fatigue and fever.   HENT:  Negative for congestion, rhinorrhea and sore throat.    Eyes:  Negative for redness.   Respiratory:  Negative for cough and shortness of breath.    Cardiovascular:  Negative for chest pain.   Gastrointestinal:  Negative for abdominal pain, diarrhea, nausea and vomiting.   Genitourinary:  Negative for dysuria and hematuria.   Musculoskeletal:  Positive for arthralgias. Negative for myalgias and neck stiffness.   Skin:  Negative for rash.   Neurological:  Negative for dizziness and headaches.       Physical Exam   BP: 108/72  Pulse: 68  Temp: 96.9  F (36.1  C)  Resp: 18  SpO2: 99 %      Physical Exam  Vitals and nursing note reviewed.   Constitutional:       General: She is not in acute distress.     Appearance: Normal appearance. She is not diaphoretic.   HENT:      Head: Atraumatic.      Mouth/Throat:      Mouth: Mucous membranes are moist.   Eyes:      General: No scleral icterus.     Conjunctiva/sclera: Conjunctivae normal.   Cardiovascular:      Rate and Rhythm: Normal rate.      Heart sounds: Normal heart sounds.   Pulmonary:      Effort: No respiratory distress.      Breath sounds: Normal breath sounds.   Abdominal:      General: Abdomen is flat.   Musculoskeletal:      Cervical back: Neck supple.   Skin:     General: Skin is warm.       Findings: No rash.   Neurological:      Mental Status: She is alert.     Sensitive areas examined with a chaperone with nurse.  No palpable fluctuance, induration, crepitus or areas of drainage.  Previous surgical scars are clean dry and intact without local inflammation.  Only mild discomfort is apparent with exam.  Serial assessments in the ED are reassuring and not consistent with development of toxic symptoms.    Results for orders placed or performed during the hospital encounter of 10/16/23 (from the past 24 hour(s))   CBC with platelets differential    Narrative    The following orders were created for panel order CBC with platelets differential.  Procedure                               Abnormality         Status                     ---------                               -----------         ------                     CBC with platelets and d...[[id number]]                      Final result                 Please view results for these tests on the individual orders.   Comprehensive metabolic panel   Result Value Ref Range    Sodium 137 135 - 145 mmol/L    Potassium 3.2 (L) 3.4 - 5.3 mmol/L    Carbon Dioxide (CO2) 24 22 - 29 mmol/L    Anion Gap 11 7 - 15 mmol/L    Urea Nitrogen 10.2 6.0 - 20.0 mg/dL    Creatinine 0.81 0.51 - 0.95 mg/dL    GFR Estimate 87 >60 mL/min/1.73m2    Calcium 9.3 8.6 - 10.0 mg/dL    Chloride 102 98 - 107 mmol/L    Glucose 109 (H) 70 - 99 mg/dL    Alkaline Phosphatase 132 (H) 35 - 104 U/L    AST 22 0 - 45 U/L    ALT 16 0 - 50 U/L    Protein Total 6.8 6.4 - 8.3 g/dL    Albumin 4.0 3.5 - 5.2 g/dL    Bilirubin Total <0.2 <=1.2 mg/dL   CRP inflammation   Result Value Ref Range    CRP Inflammation <3.00 <5.00 mg/L   Extra Tube ([facility] Draw)    Narrative    The following orders were created for panel order Extra Tube ([place] Draw).  Procedure                               Abnormality         Status                     ---------                               -----------         ------                      Extra Blue Top Tube[[id number]]                              Final result               Extra Red Top Tube[[id number]]                               Final result               Extra Heparinized Syringe[[id number]]                        Final result                 Please view results for these tests on the individual orders.   CBC with platelets and differential   Result Value Ref Range    WBC Count 7.4 4.0 - 11.0 10e3/uL    RBC Count 4.32 3.80 - 5.20 10e6/uL    Hemoglobin 12.6 11.7 - 15.7 g/dL    Hematocrit 37.0 35.0 - 47.0 %    MCV 86 78 - 100 fL    MCH 29.2 26.5 - 33.0 pg    MCHC 34.1 31.5 - 36.5 g/dL    RDW 13.6 10.0 - 15.0 %    Platelet Count 172 150 - 450 10e3/uL    % Neutrophils 53 %    % Lymphocytes 38 %    % Monocytes 6 %    Mids % (Monos, Eos, Basos)      % Eosinophils 3 %    % Basophils 0 %    % Immature Granulocytes 0 %    NRBCs per 100 WBC 0 <1 /100    Absolute Neutrophils 3.9 1.6 - 8.3 10e3/uL    Absolute Lymphocytes 2.8 0.8 - 5.3 10e3/uL    Absolute Monocytes 0.5 0.0 - 1.3 10e3/uL    Mids Abs (Monos, Eos, Basos)      Absolute Eosinophils 0.2 0.0 - 0.7 10e3/uL    Absolute Basophils 0.0 0.0 - 0.2 10e3/uL    Absolute Immature Granulocytes 0.0 <=0.4 10e3/uL    Absolute NRBCs 0.0 10e3/uL   Extra Blue Top Tube   Result Value Ref Range    Hold Specimen JIC    Extra Red Top Tube   Result Value Ref Range    Hold Specimen JIC    Extra Heparinized Syringe   Result Value Ref Range    Hold Specimen JIC    CT Pelvis Soft Tissue w Contrast    Narrative    PROCEDURE INFORMATION:   Exam: CT Pelvis With Contrast   Exam date and time: 10/16/2023 11:09 PM   Age: 53 years old   Clinical indication: Cellulitis; Right buttock/thigh; Hip pain; Right hip;   Prior surgery; Surgery date: 1-6 months; Surgery type: Procedure: Irrigation   and debridement, perineal, labial and gluteal infection; Additional info: H/O   abscess and fasciitis, now with recurrent similar pain.     TECHNIQUE:   Imaging protocol: Computed  tomography of the pelvis with contrast.   Radiation optimization: All CT scans at this facility use at least one of these   dose optimization techniques: automated exposure control; mA and/or kV   adjustment per patient size (includes targeted exams where dose is matched to   clinical indication); or iterative reconstruction.   Contrast material: ISOVUE 370; Contrast volume: 100 ml; Contrast route:   INTRAVENOUS (IV);      REPORTING DATA:   Count of CT and Cardiac NM exams in prior 12 months: This patient has received   1 known CT and 0 known cardiac nuclear medicine studies in the 12 months prior   to the current study.     COMPARISON:   CT PELVIS SOFT TISSUE W CONTRAST 8/27/2023 6:00 PM     FINDINGS:   Stomach and bowel: Visualized small bowel and colon are unremarkable.   Appendix: No evidence of appendicitis.   Intraperitoneal space: Unremarkable. No free air. No significant fluid   collection.   Lymph nodes: Unremarkable. No enlarged lymph nodes.   Urinary bladder: Normal. No mass.   Reproductive:  Hysterectomy.   Bones/joints: No acute fracture. No dislocation. Stable minimal degenerative   spondylolisthesis of L4 on L5.  Soft tissues: Minimal increased density in the fat adjacent to the right   posterior labia majora, right perineum and right gluteal region, most likely   due to prior history of infection and scarring.      Impression    IMPRESSION:   1. No acute abnormalities.  2. Minimal increased density in the fat adjacent to the right posterior labia   majora, right perineum and right gluteal region, most likely due to prior   history of infection and scarring.    THIS DOCUMENT HAS BEEN ELECTRONICALLY SIGNED BY MANAN DUBON,        Medications   ketorolac (TORADOL) injection 30 mg (30 mg Intravenous $Given 10/16/23 2233)   iopamidol (ISOVUE-370) solution 100 mL (100 mLs Intravenous $Given 10/16/23 2311)   morphine (PF) injection 4 mg (4 mg Intravenous $Given 10/17/23 0022)   ondansetron (ZOFRAN)  injection 4 mg (4 mg Intravenous $Given 10/17/23 0019)       Assessments & Plan (with Medical Decision Making)     I have reviewed the nursing notes.    I have reviewed the findings, diagnosis, plan and need for follow up with the patient.      Medical Decision Making  The patient's presentation was of moderate complexity (2 or more stable chronic illnesses).    The patient's evaluation involved:  history and exam without other MDM data elements    The patient's management necessitated high risk (a parenteral controlled substance).    No evidence of recurrent abscess or infection on CT.  Suspect acute exacerbation of chronic pain likely related to scar tissue.  Patient does have chronic pain in this area, oral Toradol prescribed for her at home.  Patient does have high morphine equivalent prescription already, I am not comfortable prescribing her any more opiates for home.  Return to ED with fevers chills worsening symptoms.  Patient verbalized understanding plans agreement left ED in improving condition.  Recommend close follow-up with her surgeon.    Discharge Medication List as of 10/17/2023  1:06 AM          Final diagnoses:   History of Julio's gangrene   Right leg pain       10/16/2023   [facility]  10/23/23 1920

## 2023-10-25 ENCOUNTER — TELEPHONE (OUTPATIENT)
Dept: SURGERY | Facility: OTHER | Age: 53
End: 2023-10-25
Payer: COMMERCIAL

## 2023-10-25 NOTE — TELEPHONE ENCOUNTER
Had surgery with you on 6/1/23 for necrotizing fascitis of the buttock/vagina and vulva.  She has been seen in the ED several times this past year for complaints of inner thigh pain worried that it may be related to her surgery.  See Ed notes.  Will have her follow up with Dr. Nolan next Tuesday.  Patient agrees.  Malia Thomas LPN..........10/25/2023  1:37 PM

## 2023-10-25 NOTE — TELEPHONE ENCOUNTER
Pt had surgery on 6/1 with [facility] for necrotizing fasciitis.  She is concerned with some issues she is having.  Will nurse please call.    Marianne Coleman on 10/25/2023 at 9:44 AM

## 2023-11-12 ENCOUNTER — HOSPITAL ENCOUNTER (EMERGENCY)
Facility: OTHER | Age: 53
Discharge: HOME | End: 2023-11-12
Attending: EMERGENCY MEDICINE | Admitting: EMERGENCY MEDICINE
Payer: COMMERCIAL

## 2023-11-12 VITALS
DIASTOLIC BLOOD PRESSURE: 71 MMHG | HEART RATE: 56 BPM | WEIGHT: 160 LBS | SYSTOLIC BLOOD PRESSURE: 105 MMHG | TEMPERATURE: 96.9 F | OXYGEN SATURATION: 97 % | HEIGHT: 69 IN | RESPIRATION RATE: 18 BRPM | BODY MASS INDEX: 23.7 KG/M2

## 2023-11-12 DIAGNOSIS — S30.814A ABRASION OF LABIA, INITIAL ENCOUNTER: ICD-10-CM

## 2023-11-12 LAB
ANION GAP SERPL CALCULATED.3IONS-SCNC: 9 MMOL/L (ref 7–15)
BASOPHILS # BLD AUTO: 0 10E3/UL (ref 0–0.2)
BASOPHILS NFR BLD AUTO: 0 %
BUN SERPL-MCNC: 12.3 MG/DL (ref 6–20)
CALCIUM SERPL-MCNC: 9.4 MG/DL (ref 8.6–10)
CHLORIDE SERPL-SCNC: 102 MMOL/L (ref 98–107)
CREAT SERPL-MCNC: 0.84 MG/DL (ref 0.51–0.95)
CRP SERPL-MCNC: <3 MG/L
DEPRECATED HCO3 PLAS-SCNC: 24 MMOL/L (ref 22–29)
EGFRCR SERPLBLD CKD-EPI 2021: 83 ML/MIN/1.73M2
EOSINOPHIL # BLD AUTO: 0.2 10E3/UL (ref 0–0.7)
EOSINOPHIL NFR BLD AUTO: 3 %
ERYTHROCYTE [DISTWIDTH] IN BLOOD BY AUTOMATED COUNT: 13.7 % (ref 10–15)
GLUCOSE SERPL-MCNC: 96 MG/DL (ref 70–99)
HCT VFR BLD AUTO: 40.1 % (ref 35–47)
HGB BLD-MCNC: 13.5 G/DL (ref 11.7–15.7)
HOLD SPECIMEN: NORMAL
IMM GRANULOCYTES # BLD: 0 10E3/UL
IMM GRANULOCYTES NFR BLD: 0 %
LYMPHOCYTES # BLD AUTO: 2.1 10E3/UL (ref 0.8–5.3)
LYMPHOCYTES NFR BLD AUTO: 31 %
MCH RBC QN AUTO: 29.5 PG (ref 26.5–33)
MCHC RBC AUTO-ENTMCNC: 33.7 G/DL (ref 31.5–36.5)
MCV RBC AUTO: 88 FL (ref 78–100)
MONOCYTES # BLD AUTO: 0.3 10E3/UL (ref 0–1.3)
MONOCYTES NFR BLD AUTO: 5 %
NEUTROPHILS # BLD AUTO: 4.1 10E3/UL (ref 1.6–8.3)
NEUTROPHILS NFR BLD AUTO: 61 %
NRBC # BLD AUTO: 0 10E3/UL
NRBC BLD AUTO-RTO: 0 /100
PLATELET # BLD AUTO: 188 10E3/UL (ref 150–450)
POTASSIUM SERPL-SCNC: 4 MMOL/L (ref 3.4–5.3)
RBC # BLD AUTO: 4.58 10E6/UL (ref 3.8–5.2)
SODIUM SERPL-SCNC: 135 MMOL/L (ref 135–145)
WBC # BLD AUTO: 6.7 10E3/UL (ref 4–11)

## 2023-11-12 PROCEDURE — 86140 C-REACTIVE PROTEIN: CPT | Performed by: EMERGENCY MEDICINE

## 2023-11-12 PROCEDURE — 85025 COMPLETE CBC W/AUTO DIFF WBC: CPT | Performed by: EMERGENCY MEDICINE

## 2023-11-12 PROCEDURE — 96372 THER/PROPH/DIAG INJ SC/IM: CPT | Performed by: EMERGENCY MEDICINE

## 2023-11-12 PROCEDURE — 36415 COLL VENOUS BLD VENIPUNCTURE: CPT | Performed by: EMERGENCY MEDICINE

## 2023-11-12 PROCEDURE — 250N000011 HC RX IP 250 OP 636: Performed by: EMERGENCY MEDICINE

## 2023-11-12 PROCEDURE — 99283 EMERGENCY DEPT VISIT LOW MDM: CPT | Performed by: EMERGENCY MEDICINE

## 2023-11-12 PROCEDURE — 99284 EMERGENCY DEPT VISIT MOD MDM: CPT | Performed by: EMERGENCY MEDICINE

## 2023-11-12 PROCEDURE — 80048 BASIC METABOLIC PNL TOTAL CA: CPT | Performed by: EMERGENCY MEDICINE

## 2023-11-12 RX ORDER — FENTANYL CITRATE 50 UG/ML
50 INJECTION, SOLUTION INTRAMUSCULAR; INTRAVENOUS ONCE
Status: COMPLETED | OUTPATIENT
Start: 2023-11-12 | End: 2023-11-12

## 2023-11-12 RX ORDER — KETOROLAC TROMETHAMINE 30 MG/ML
30 INJECTION, SOLUTION INTRAMUSCULAR; INTRAVENOUS ONCE
Status: COMPLETED | OUTPATIENT
Start: 2023-11-12 | End: 2023-11-12

## 2023-11-12 RX ADMIN — FENTANYL CITRATE 50 MCG: 50 INJECTION, SOLUTION INTRAMUSCULAR; INTRAVENOUS at 12:52

## 2023-11-12 RX ADMIN — KETOROLAC TROMETHAMINE 30 MG: 30 INJECTION INTRAMUSCULAR; INTRAVENOUS at 11:27

## 2023-11-12 ASSESSMENT — ENCOUNTER SYMPTOMS
NAUSEA: 1
CHEST TIGHTNESS: 0
CHILLS: 0
ABDOMINAL PAIN: 0
LIGHT-HEADEDNESS: 0
WOUND: 1
FEVER: 0
ARTHRALGIAS: 0
DYSURIA: 0

## 2023-11-12 ASSESSMENT — ACTIVITIES OF DAILY LIVING (ADL)
ADLS_ACUITY_SCORE: 35
ADLS_ACUITY_SCORE: 35

## 2023-11-12 NOTE — DISCHARGE INSTRUCTIONS
Keep a close eye on his little sore.  If it gets it always you should get rechecked.  Otherwise you might want to try to follow-up with your new primary provider to establish care.

## 2023-11-12 NOTE — ED PROVIDER NOTES
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  Since then she has had ongoing pain in that area.  She says she really cannot see down there very well so is hard for her to assess.  The last few days she has had more pain, now she says she has an open sore and has had a little bit of drainage there as well.  Denies fevers or chills.  She has been having some nausea vomiting, however she states that is not unusual and does not really seem worse than normal.  Although she does not know for sure she has not noticed any swelling or redness in the area.    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     Priority: Medium    Johanne's gangrene (H28) 06/01/2023     Priority: Medium    TMJ (temporomandibular joint syndrome) 11/01/2021     Priority: Medium    Arthralgia of right temporomandibular joint 11/01/2021     Priority: Medium    Suicidal behavior 10/19/2021     Priority: Medium    Medical cannabis use 07/10/2018     Priority: Medium    Chronic pain disorder 01/29/2018     Priority: Medium     Overview:   complex regional pain syndrome left ankle      Esophageal reflux 01/29/2018     Priority: Medium    Hyperlipidemia 01/29/2018     Priority: Medium    Insomnia 01/29/2018     Priority: Medium    Panic disorder 01/29/2018     Priority: Medium     Overview:   Previously patient of Dr. Reynoso since her teens. Patient is  attempting slow taper down on her Xanax. She's been on Xanax 2 mg 4 times a day for years      Tobacco abuse 01/29/2018     Priority: Medium    Pain medication agreement, 4/18/18, 4/3/19, 10/27/2020 04/21/2017     Priority: Medium     Overview:   4/20/17:  Oxycodone 10 mg #180/mo    Hydrocodone 10/325mg, #240/mo. Stopped and changed to morphine. Agreement signed 10/3/12, updated 10/1/ 14.  Complex regional pain syndrome (aka Reflex Sympathetic Dystrophy)  MS Contin 30 mg q 8 hours #90 per month.  Morphine sulfate IR 15 mg 2-3 tab tid prn #180 per month, decreased to #90/month.   query 4/9/16 as part of chart review. Seems appropriate. 6/2/16 tapered and off Morphine. Rhodes 5/325 #120/month.  query appropriate. ToxAssure appropriate.    August 2016 Consultation with [facility]. Recommended spinal cord stimulator trial, but she is reluctant to pursue that. Pain clinic stated that continuing opioids was OK.      Complex regional pain syndrome type 1 affecting left lower leg 04/15/2016     Priority: Medium     Overview:   Patient with a history of complex regional pain syndrome has seen Dr. Dino Skelton4/23/16       Migraine headache 11/25/2014     Priority: Medium    Overweight (BMI 25.0-29.9) 11/25/2014     Priority: Medium    Lumbago 02/06/2012     Priority: Medium    Major depressive disorder, recurrent episode, severe (H) 07/13/2011     Priority: Medium    Paroxysmal supraventricular tachycardia 05/24/2010     Priority: Medium    Mononeuritis 07/06/2006     Priority: Medium     IMO Update 10/11      Ankle and foot joint derangement 06/05/2006     Priority: Medium     Overview:   Chronic left ankle pain secondary to reflex sympathetic dystrophy.  Patient   has had 4 previous surgeries, the most recent one done by Dr. Noriega in   2007. On Narcotic contract.    Formatting of this note might be different from the original.  Chronic left ankle pain secondary to reflex sympathetic dystrophy.   Patient   has had 4 previous surgeries, the most recent one done by Dr. Noriega in   2007. On Narcotic contract.    IMO Update 10/11          Past Medical History:    Past Medical History:   Diagnosis Date    Acquired absence of other organs (CODE)     Chronic pain syndrome     Complex regional pain syndrome I     Encounter for other administrative examinations     Encounter for other administrative examinations     Gastro-esophageal reflux disease without esophagitis     Injury of left ankle     Low back pain     Major depressive disorder, recurrent severe without psychotic features (H)     Migraine without status migrainosus, not intractable     Nicotine dependence, uncomplicated     Obesity     Overweight     Pain in ankle     Pain in thoracic spine     Panic disorder without agoraphobia     Personal history of other diseases of the female genital tract     Personal history of other medical treatment (CODE)     Supraventricular tachycardia        Past Surgical History:    Past Surgical History:   Procedure Laterality Date    ANKLE SURGERY      1997, 2000,Left ankle surgery x 2    CYSTECTOMY PILONIDAL N/A 6/19/2023    Procedure: Delayed closure of vulva and perineal defectes with drain placment;  Surgeon: Anthony Nolan MD;  Location:  OR    HYSTERECTOMY TOTAL ABDOMINAL      11/1999,secondary to endometriosis, no malignancy; patient reports no malignancy, but abnormal cells were present *    IRRIGATION AND DEBRIDEMENT DECUBITUS WOUND, COMBINED N/A 6/1/2023    Procedure: IRRIGATION AND DEBRIDEMENT, PERINEAL, LABIAL and GLUTEAL INFECTION;  Surgeon: Anthony Nolan MD;  Location:  OR    IRRIGATION AND DEBRIDEMENT DECUBITUS WOUND, COMBINED N/A 6/3/2023    Procedure: Debridement and washout of vulvar and perineal wounds;  Surgeon: Anthony Nolan MD;  Location:  OR    LAPAROSCOPIC CHOLECYSTECTOMY      No Comments Provided    LAPAROSCOPY DIAGNOSTIC (GENERAL)      1995    OTHER SURGICAL HISTORY       21299,CRANIO/MAXILLOFACIAL SURGERY,Jaw Surgery    OTHER SURGICAL HISTORY      [id number],[facility],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  No other abnormalities noted.       Family History:    Family History   Problem Relation Age of Onset    Cancer Father         Cancer,Bladder    Other - See Comments Father         Mild hypercholesterolemia    Other - See Comments Mother         scleroderma/lupus/Parkinson's syndrome    Family History Negative Brother         Good Health    Ariel-Danlos syndrome Daughter     Colon Cancer Maternal Grandmother 40        Cancer-colon,Followed by lung  with metastasis to the bone di*       Social History:  Marital Status:   [4]  Social History     Tobacco Use    Smoking status: Every Day     Packs/day: 0.50     Years: 30.00     Additional pack years: 0.00     Total pack years: 15.00     Types: Cigarettes, Vaping Device     Start date: 12/2/1986     Passive exposure: Current    Smokeless tobacco: Never    Tobacco comments:     trying   Vaping Use    Vaping Use: Every day    Substances: Nicotine, Flavoring    Devices: Disposable   Substance Use Topics    Alcohol use: Not Currently     Comment: OCCASIONAL WINE    Drug use: Yes     Types: Marijuana     Comment: medical cannabis; suboxone        Medications:    acetaminophen (TYLENOL) 325 MG tablet  albuterol (PROAIR HFA/PROVENTIL HFA/VENTOLIN HFA) 108 (90 Base) MCG/ACT inhaler  amphetamine-dextroamphetamine (ADDERALL) 20 MG tablet  amphetamine-dextroamphetamine (ADDERALL) 30 MG tablet  atenolol (TENORMIN) 100 MG tablet  buprenorphine-naloxone (ZUBSOLV) 5.7-1.4 MG sublingual tablet  Cholecalciferol (VITAMIN D-3) 125 MCG (5000 UT) TABS  clonazePAM (KLONOPIN) 2 MG tablet  divalproex sodium extended-release (DEPAKOTE ER) 250 MG 24 hr tablet  fluticasone (FLONASE) 50 MCG/ACT nasal spray  HYDROcodone-acetaminophen (NORCO) 5-325 MG tablet  hydrOXYzine (VISTARIL) 25 MG capsule  ibuprofen (ADVIL/MOTRIN) 800 MG tablet  ketorolac  "(TORADOL) 10 MG tablet  metroNIDAZOLE (FLAGYL) 500 MG tablet  naloxone (NARCAN) 4 MG/0.1ML nasal spray  ondansetron (ZOFRAN ODT) 4 MG ODT tab  oxyCODONE (ROXICODONE) 5 MG tablet  saccharomyces boulardii (FLORASTOR) 250 MG capsule  senna-docusate (SENOKOT-S/PERICOLACE) 8.6-50 MG tablet  tiZANidine (ZANAFLEX) 4 MG tablet  zinc oxide (DESITIN) 40 % external ointment  zinc oxide (DESITIN) 40 % external ointment          Review of Systems   Constitutional:  Negative for chills and fever.   HENT:  Negative for congestion.    Eyes:  Negative for visual disturbance.   Respiratory:  Negative for chest tightness.    Cardiovascular:  Negative for chest pain.   Gastrointestinal:  Positive for nausea. Negative for abdominal pain.   Genitourinary:  Negative for dysuria.   Musculoskeletal:  Negative for arthralgias.   Skin:  Positive for wound.        Small lesion on labia   Neurological:  Negative for light-headedness.       Physical Exam   BP: 107/77  Pulse: 92  Temp: 96.9  F (36.1  C)  Resp: 18  Height: 175.3 cm (5' 9\")  Weight: 72.6 kg (160 lb)  SpO2: 98 %      Physical Exam  Vitals and nursing note reviewed. Exam conducted with a chaperone present.   Constitutional:       Appearance: Normal appearance.   HENT:      Head: Normocephalic and atraumatic.      Mouth/Throat:      Mouth: Mucous membranes are moist.   Eyes:      Conjunctiva/sclera: Conjunctivae normal.   Cardiovascular:      Rate and Rhythm: Normal rate and regular rhythm.      Heart sounds: Normal heart sounds.   Pulmonary:      Effort: Pulmonary effort is normal.      Breath sounds: Normal breath sounds.   Abdominal:      General: Abdomen is flat.   Genitourinary:     Comments: With nurse present examined the labia and inner thigh area.  There is a small lesion on the right labia.  This is circular with a very small amount of surrounding erythema.  Looks like it may have been an abrasion, possibly she had a small infected follicle there which started draining.  It " is not at all swollen.  No induration.  No surrounding erythema except for less than 1 cm around the lesion.  Diffusely tender around the genital area, also with some numbness postoperatively.  Skin:     General: Skin is warm and dry.   Neurological:      Mental Status: She is alert and oriented to person, place, and time.   Psychiatric:         Mood and Affect: Mood normal.         Behavior: Behavior normal.         ED Course                 Procedures                  Results for orders placed or performed during the hospital encounter of 11/12/23 (from the past 24 hour(s))   CBC with platelets differential    Narrative    The following orders were created for panel order CBC with platelets differential.  Procedure                               Abnormality         Status                     ---------                               -----------         ------                     CBC with platelets and d...[[id number]]                      Final result                 Please view results for these tests on the individual orders.   Basic metabolic panel   Result Value Ref Range    Sodium 135 135 - 145 mmol/L    Potassium 4.0 3.4 - 5.3 mmol/L    Chloride 102 98 - 107 mmol/L    Carbon Dioxide (CO2) 24 22 - 29 mmol/L    Anion Gap 9 7 - 15 mmol/L    Urea Nitrogen 12.3 6.0 - 20.0 mg/dL    Creatinine 0.84 0.51 - 0.95 mg/dL    GFR Estimate 83 >60 mL/min/1.73m2    Calcium 9.4 8.6 - 10.0 mg/dL    Glucose 96 70 - 99 mg/dL   [place] Draw    Narrative    The following orders were created for panel order [place] Draw.  Procedure                               Abnormality         Status                     ---------                               -----------         ------                     Extra Blue Top Tube[[id number]]                              In process                 Extra Red Top Tube[[id number]]                               Final result               Extra Green Top (Lithium...[[id number]]                      In process                    Please view results for these tests on the individual orders.   CBC with platelets and differential   Result Value Ref Range    WBC Count 6.7 4.0 - 11.0 10e3/uL    RBC Count 4.58 3.80 - 5.20 10e6/uL    Hemoglobin 13.5 11.7 - 15.7 g/dL    Hematocrit 40.1 35.0 - 47.0 %    MCV 88 78 - 100 fL    MCH 29.5 26.5 - 33.0 pg    MCHC 33.7 31.5 - 36.5 g/dL    RDW 13.7 10.0 - 15.0 %    Platelet Count 188 150 - 450 10e3/uL    % Neutrophils 61 %    % Lymphocytes 31 %    % Monocytes 5 %    % Eosinophils 3 %    % Basophils 0 %    % Immature Granulocytes 0 %    NRBCs per 100 WBC 0 <1 /100    Absolute Neutrophils 4.1 1.6 - 8.3 10e3/uL    Absolute Lymphocytes 2.1 0.8 - 5.3 10e3/uL    Absolute Monocytes 0.3 0.0 - 1.3 10e3/uL    Absolute Eosinophils 0.2 0.0 - 0.7 10e3/uL    Absolute Basophils 0.0 0.0 - 0.2 10e3/uL    Absolute Immature Granulocytes 0.0 <=0.4 10e3/uL    Absolute NRBCs 0.0 10e3/uL   Extra Red Top Tube   Result Value Ref Range    Hold Specimen JIC    CRP inflammation   Result Value Ref Range    CRP Inflammation <3.00 <5.00 mg/L       Medications   fentaNYL (PF) (SUBLIMAZE) injection 50 mcg (has no administration in time range)   ketorolac (TORADOL) injection 30 mg (30 mg Intramuscular $Given 11/12/23 3166)       Assessments & Plan (with Medical Decision Making)     I have reviewed the nursing notes.    I have reviewed the findings, diagnosis, plan and need for follow up with the patient.  Do not see any sign of infection or acute abnormality except for the small lesion on the labia.  I told her to keep a very close eye on this and keep it clean and dry.  If it gets at all worse she should get in again right away for recheck.  She is changing primary providers and has not seen her new provider yet, so I recommended making appointment to get in and see them.  I did agree to give her an injection of Toradol, and then when that did not help much, fentanyl.  No prescriptions given nor did she ask for  any.        New Prescriptions    No medications on file       Final diagnoses:   Abrasion of labia, initial encounter       11/12/2023   [facility]       Kal Mack MD  11/12/23 8216

## 2023-11-12 NOTE — ED TRIAGE NOTES
"Pt c/o Rt groin/leg pain that has been getting worse over the past 3 weeks. 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 Small open wound noted on Rt labia. Pt took ibuprofen at 0600 with no relief.  /77   Pulse 92   Temp 96.9  F (36.1  C) (Tympanic)   Resp 18   Ht 1.753 m (5' 9\")   Wt 72.6 kg (160 lb)   LMP 11/01/1999 (Exact Date)   SpO2 98%   BMI 23.63 kg/m         Triage Assessment (Adult)       Row Name 11/12/23 1059          Triage Assessment    Airway WDL WDL        Respiratory WDL    Respiratory WDL WDL        Skin Circulation/Temperature WDL    Skin Circulation/Temperature WDL X  wound on Rt labia        Cardiac WDL    Cardiac WDL WDL        Peripheral/Neurovascular WDL    Peripheral Neurovascular WDL WDL        Cognitive/Neuro/Behavioral WDL    Cognitive/Neuro/Behavioral WDL WDL                     "

## 2023-11-27 DIAGNOSIS — J45.20 MILD INTERMITTENT ASTHMA WITHOUT COMPLICATION: ICD-10-CM

## 2023-11-28 ENCOUNTER — TELEPHONE (OUTPATIENT)
Dept: SURGERY | Facility: OTHER | Age: 53
End: 2023-11-28
Payer: COMMERCIAL

## 2023-11-28 NOTE — TELEPHONE ENCOUNTER
Pt is not feeling good this morning and is wondering if she can come this afternoon with [facility]?    Marianne Coleman on 11/28/2023 at 9:00 AM

## 2023-11-28 NOTE — TELEPHONE ENCOUNTER
Will follow up with scheduling as she cancelled her AM appointment.  Massiel Gutierrez RN  ....................  11/28/2023   10:05 AM

## 2023-11-29 NOTE — TELEPHONE ENCOUNTER
Albuterol       Last Written Prescription Date:  2/11/2021  Last Fill Quantity: 1,   # refills: 11  Last Office Visit: 03/03/2023  Future Office visit:    Next 5 appointments (look out 90 days)      Dec 19, 2023  3:30 PM  Return Visit with Anthony Nolan MD  [facility] ([facility] ) [address]  [place] [address]  [phone]

## 2023-11-30 RX ORDER — ALBUTEROL SULFATE 90 UG/1
2 AEROSOL, METERED RESPIRATORY (INHALATION) EVERY 4 HOURS PRN
Qty: 8.5 G | Refills: 11 | Status: SHIPPED | OUTPATIENT
Start: 2023-11-30

## 2023-12-19 ENCOUNTER — OFFICE VISIT (OUTPATIENT)
Dept: SURGERY | Facility: OTHER | Age: 53
End: 2023-12-19
Attending: SURGERY
Payer: COMMERCIAL

## 2023-12-19 VITALS
OXYGEN SATURATION: 96 % | DIASTOLIC BLOOD PRESSURE: 58 MMHG | RESPIRATION RATE: 20 BRPM | BODY MASS INDEX: 23.18 KG/M2 | HEART RATE: 71 BPM | TEMPERATURE: 98.1 F | SYSTOLIC BLOOD PRESSURE: 108 MMHG | WEIGHT: 157 LBS

## 2023-12-19 DIAGNOSIS — N76.82 FOURNIER'S GANGRENE IN FEMALE (H): Primary | ICD-10-CM

## 2023-12-19 PROCEDURE — G0463 HOSPITAL OUTPT CLINIC VISIT: HCPCS

## 2023-12-19 PROCEDURE — 99212 OFFICE O/P EST SF 10 MIN: CPT | Performed by: SURGERY

## 2023-12-19 ASSESSMENT — PAIN SCALES - GENERAL: PAINLEVEL: MODERATE PAIN (4)

## 2023-12-19 NOTE — PROGRESS NOTES
Patient presents for post surgical visit after grade primary closure of right labial and perineal defects on 6/19.  Notes some residual numbness, tingling and pain.  Pt report frequent vomiting. Pt was in ER for open area 11/12/23    /58 (BP Location: Right arm, Patient Position: Sitting, Cuff Size: Adult Regular)   Pulse 71   Temp 98.1  F (36.7  C) (Temporal)   Resp 20   Wt 71.2 kg (157 lb)   LMP 11/01/1999 (Exact Date)   SpO2 96%   BMI 23.18 kg/m      General: NAD, pleasant and cooperative with exam and interview.  Perineum: Deferred  Psychiatry: awake, alert and oriented. Appropriate affect.    Assessment/Plan:  53-year-old female status post Julio's with extensive debridements. Has pain at scarring/contracture from flaps.      - Repeat PT referral US and Heat    - Plastic surgery consult for labial reconstruction.     Anthony Nolan MD on 12/19/2023 at 4:25 PM

## 2024-01-08 ENCOUNTER — OFFICE VISIT (OUTPATIENT)
Dept: FAMILY MEDICINE | Facility: OTHER | Age: 54
End: 2024-01-08
Attending: FAMILY MEDICINE
Payer: COMMERCIAL

## 2024-01-08 ENCOUNTER — HOSPITAL ENCOUNTER (OUTPATIENT)
Dept: GENERAL RADIOLOGY | Facility: OTHER | Age: 54
Discharge: HOME | End: 2024-01-08
Attending: FAMILY MEDICINE
Payer: COMMERCIAL

## 2024-01-08 VITALS
SYSTOLIC BLOOD PRESSURE: 112 MMHG | BODY MASS INDEX: 22.48 KG/M2 | OXYGEN SATURATION: 95 % | RESPIRATION RATE: 16 BRPM | HEIGHT: 69 IN | DIASTOLIC BLOOD PRESSURE: 64 MMHG | WEIGHT: 151.8 LBS | TEMPERATURE: 98.3 F | HEART RATE: 92 BPM

## 2024-01-08 DIAGNOSIS — M25.551 HIP PAIN, RIGHT: ICD-10-CM

## 2024-01-08 DIAGNOSIS — M25.551 HIP PAIN, RIGHT: Primary | ICD-10-CM

## 2024-01-08 PROCEDURE — 99213 OFFICE O/P EST LOW 20 MIN: CPT | Performed by: FAMILY MEDICINE

## 2024-01-08 PROCEDURE — G0463 HOSPITAL OUTPT CLINIC VISIT: HCPCS

## 2024-01-08 PROCEDURE — 73502 X-RAY EXAM HIP UNI 2-3 VIEWS: CPT

## 2024-01-08 ASSESSMENT — ASTHMA QUESTIONNAIRES
QUESTION_5 LAST FOUR WEEKS HOW WOULD YOU RATE YOUR ASTHMA CONTROL: SOMEWHAT CONTROLLED
QUESTION_1 LAST FOUR WEEKS HOW MUCH OF THE TIME DID YOUR ASTHMA KEEP YOU FROM GETTING AS MUCH DONE AT WORK, SCHOOL OR AT HOME: NONE OF THE TIME
ACT_TOTALSCORE: 13
QUESTION_3 LAST FOUR WEEKS HOW OFTEN DID YOUR ASTHMA SYMPTOMS (WHEEZING, COUGHING, SHORTNESS OF BREATH, CHEST TIGHTNESS OR PAIN) WAKE YOU UP AT NIGHT OR EARLIER THAN USUAL IN THE MORNING: FOUR OR MORE NIGHTS A WEEK
QUESTION_2 LAST FOUR WEEKS HOW OFTEN HAVE YOU HAD SHORTNESS OF BREATH: ONCE A DAY
QUESTION_4 LAST FOUR WEEKS HOW OFTEN HAVE YOU USED YOUR RESCUE INHALER OR NEBULIZER MEDICATION (SUCH AS ALBUTEROL): ONE OR TWO TIMES PER DAY
ACT_TOTALSCORE: 13

## 2024-01-08 ASSESSMENT — PAIN SCALES - GENERAL: PAINLEVEL: SEVERE PAIN (7)

## 2024-01-08 ASSESSMENT — PATIENT HEALTH QUESTIONNAIRE - PHQ9
10. IF YOU CHECKED OFF ANY PROBLEMS, HOW DIFFICULT HAVE THESE PROBLEMS MADE IT FOR YOU TO DO YOUR WORK, TAKE CARE OF THINGS AT HOME, OR GET ALONG WITH OTHER PEOPLE: VERY DIFFICULT
SUM OF ALL RESPONSES TO PHQ QUESTIONS 1-9: 22
SUM OF ALL RESPONSES TO PHQ QUESTIONS 1-9: 22

## 2024-01-08 NOTE — PROGRESS NOTES
"  Assessment & Plan     Hip pain, right  X-ray of her pelvis and right hip were performed, personally reviewed and shows no acute bony abnormality.  Her symptoms and history are consistent with trochanteric bursitis, IT band syndrome.  Recommend anti-inflammatories, ice, activity as tolerated.  This should improve over the next couple of weeks.  Consider chiropractic care.  - XR Pelvis w Hip Right G/E 2 Views; Future       Nicotine/Tobacco Cessation:  She reports that she has been smoking cigarettes and vaping device. She started smoking about 37 years ago. She has a 15 pack-year smoking history. She has been exposed to tobacco smoke. She has never used smokeless tobacco.  Nicotine/Tobacco Cessation Plan:   Information offered: Patient not interested at this time      No follow-ups on file.    Max Gomez MD  [facility]    Subjective   Gemini is a 53 year old, presenting for the following health issues:  Hip Problem (Right hip)      1/8/2024     1:47 PM   Additional Questions   Roomed by Suly SORIANO LPN   Accompanied by self     Approximate 2 weeks ago in her apartment after she tripped over her grandchild.  She is unsure what she hit as she was falling or how she landed and she believes she was \"knocked out\" for several seconds.  Nonetheless since this time she has had palpable tenderness along the right lateral hip and some pain along her left leg as well.  She has been using ice and heat as well as Tylenol and ibuprofen without any significant relief over the last 2 weeks.  Seems to be worse when she is putting pressure on her leg.    History of Present Illness       Reason for visit:  Hip pain  Symptom onset:  1-2 weeks ago  Symptoms include:  Right hip pain, left side pain  Symptom intensity:  Moderate  Symptom progression:  Worsening  Had these symptoms before:  No    She eats 0-1 servings of fruits and vegetables daily.She consumes 0 sweetened beverage(s) daily.She exercises with enough " "effort to increase her heart rate 9 or less minutes per day.  She exercises with enough effort to increase her heart rate 3 or less days per week. She is missing 1 dose(s) of medications per week.         Review of Systems         Objective    /64 (BP Location: Right arm, Patient Position: Sitting, Cuff Size: Adult Regular)   Pulse 92   Temp 98.3  F (36.8  C) (Tympanic)   Resp 16   Ht 1.753 m (5' 9\")   Wt 68.9 kg (151 lb 12.8 oz)   LMP 11/01/1999 (Exact Date)   SpO2 95%   BMI 22.42 kg/m    Body mass index is 22.42 kg/m .  Physical Exam   GENERAL: healthy, alert and no distress  MS: Right hip shows diffuse palpable tenderness along her greater trochanter and lateral hip.  She is able to have flexion 90 degrees, internal rotation of 15 degrees and external rotation of 45 degrees with pain in all directions.  Lateral pain with logrolling.                      "

## 2024-01-08 NOTE — NURSING NOTE
"Chief Complaint   Patient presents with    Hip Problem     Right hip     Patient presents for right hip pain x2 weeks after falling in apartment. Patient has tried ice, heat, and alternating ibuprofen and tylenol.    Initial /64 (BP Location: Right arm, Patient Position: Sitting, Cuff Size: Adult Regular)   Pulse 92   Temp 98.3  F (36.8  C) (Tympanic)   Resp 16   Ht 1.753 m (5' 9\")   Wt 68.9 kg (151 lb 12.8 oz)   LMP 11/01/1999 (Exact Date)   SpO2 95%   BMI 22.42 kg/m   Estimated body mass index is 22.42 kg/m  as calculated from the following:    Height as of this encounter: 1.753 m (5' 9\").    Weight as of this encounter: 68.9 kg (151 lb 12.8 oz).  Medication Review: complete    The next two questions are to help us understand your food security.  If you are feeling you need any assistance in this area, we have resources available to support you today.          1/8/2024   SDOH- Food Insecurity   Within the past 12 months, did you worry that your food would run out before you got money to buy more? N   Within the past 12 months, did the food you bought just not last and you didn t have money to get more? N         Health Care Directive:  Patient does not have a Health Care Directive or Living Will: Discussed advance care planning with patient; however, patient declined at this time.    Suly Kemp LPN      "
09-Jun-2023 18:01

## 2024-01-22 ENCOUNTER — TELEPHONE (OUTPATIENT)
Dept: SURGERY | Facility: OTHER | Age: 54
End: 2024-01-22
Payer: COMMERCIAL

## 2024-01-22 DIAGNOSIS — N94.819 VULVODYNIA: Primary | ICD-10-CM

## 2024-01-22 NOTE — TELEPHONE ENCOUNTER
Called pt she already has an appt scheduled 2/2/24 with [facility].  She is in a lot of pain and tylenol and ibuprofen are not working.  She is wondering if [facility] can do anything for her in the mean time.  Pt is getting really frustrated and no one will help her with the pan.  Pt also states [facility] doesn't know what [facility] expects them to do as where the pain is in silvia vagina.    Marianne Coleman on 1/22/2024 at 5:09 PM

## 2024-01-22 NOTE — TELEPHONE ENCOUNTER
Spoke with scheduling. Referral was faxed. Marianne in unit 4 registration will contact the patient with information. Marianne reviewed notations in the chart about referral being faxed. Dr. Nolan last seen the patient on 12/19/2023.     General: NAD, pleasant and cooperative with exam and interview.  Perineum: Deferred  Assessment/Plan:  53-year-old female status post Julio's with extensive debridements. Has pain at scarring/contracture from flaps.       - Repeat PT referral US and Heat     - Plastic surgery consult for labial reconstruction.     Massiel Gutierrez RN  ....................  1/22/2024   4:57 PM

## 2024-01-22 NOTE — TELEPHONE ENCOUNTER
Patient requests a call back regarding next steps. Patient stated she has yet to hear back from plastic surgery about getting in to [facility] wasn't sure what they could do for the patient.    Patient also stated she has recently has fallen and is having issues with the incision and pain.      Okay to leave detailed message.        Sparkle Escalera on 1/22/2024 at 3:54 PM

## 2024-01-23 ENCOUNTER — TELEPHONE (OUTPATIENT)
Dept: SURGERY | Facility: OTHER | Age: 54
End: 2024-01-23
Payer: COMMERCIAL

## 2024-01-23 NOTE — TELEPHONE ENCOUNTER
In previous encounter. Dr. Nolan reviewed her issues with the pain.     I will put a referral in for Gyn to see -Anthony Nolan MD on 1/23/2024 at 10:15 AM    Patient was upset on the phone that she was not being taken seriously and prescribed something for the pain. She reported that her primary is in [place] and they keep sending her back to [facility] since the surgery was done here. She noted that she does not think men understand its not like a sore on the fore arm. She was not thrilled that she was getting another referral.       Patient was last seen by Dr. Nolan on 12/19/2023. Perineum was deferred.  Plan of care was - Repeat PT referral US and Heat - Plastic surgery consult for labial reconstruction.  In chart does not appear PT has been initiated at this time.     Discussed the referral and discussed appointment options to follow up with this issue. Patient noted that she is going to present to the ED to be evaluated for this pain.       Massiel Gutierrez RN  ....................  1/23/2024   5:29 PM

## 2024-01-23 NOTE — TELEPHONE ENCOUNTER
Pt would like to get a call back about pain.  Please call.    Everardo Abraham on 1/23/2024 at 1:32 PM

## 2024-01-25 NOTE — TELEPHONE ENCOUNTER
"Patients pain and medications requests have been discussed between PCP and Dr. Nolan.     Eryn,    Pt has hx of opioid dependence and seems fixated on vaginal pain currently after having debridements over 6 months ago.  I will not fill any narcotics for her. I have offered referrals to GYN, PT, plastics and main management    Anthony Nolan MD on 1/24/2024 at 9:41 AM     Dr Nolan,    Thank you for the update. I agree with no opioids. We have a suboxone program at [facility], if Kaz is interested.    Eryn     Relayed Dr. Nolan's response to kaz to her via [employer] message.       \"Kaz,     Surgeons to do not dispense medications for chronic pain. You previously have taken buprenorphine / suboxone for chronic pain.  I can make referrals for pain management, but I am not a prescriber of long term pain mediations.      Anthony Nolan MD\"    Massiel Gutierrez RN  ....................  1/25/2024   10:09 AM      " Patient : Yancy Adan Age: 46 year old Sex: female   MRN: [id number] Encounter Date: 4/23/2017    E06/06    History     Chief Complaint   Patient presents with   • Flank Pain     HPI  4/23/2017  12:27 PM Yancy Adan is a 46 year old female who presents to the ED via private transportation c/o intermittent R sided back/flank pain that began last week. She describes her sx as a \"spasm\" sensation. Applying pressure to her R side exacerbates her sx but movement does not. Her associated sx include dysuria, vaginal itching in which she applied Monistat with minimal relief and a pressure sensation to her abd but she denies abd pain, vaginal discharge, malodor, N/V, fever, or any other pertinent sx. Pt also reports having an episode of diarrhea on Thursday and took imodium with relief of sx. She denies injury, change or strenuous activity that may have exacerbated her sx. Pt reports she became sexually active since February and reports using protection (condoms). She has been taking Ibuprofen with recurrent pain. Medications are up to date. She has an OBGYN appointment in May. There are no other alleviating or modifying factors at this time.       PCP: No Pcp    ALLERGIES:   Allergen Reactions   • Compazine [Prochlorperazine Maleate]      Pt states starts seeing things       Discharge Medication List as of 4/23/2017  1:44 PM      CONTINUE these medications which have NOT CHANGED    Details   HYDROcodone-acetaminophen (NORCO) 5-325 MG per tablet Take 1-2 tablets by mouth every 6 hours as needed for Pain. Do not drink, drive or work under influence of medicationNormal, Disp-15 tablet, R-0      venlafaxine XR (EFFEXOR XR) 37.5 MG 24 hr capsule Take 75 mg by mouth daily.Historical Med      ibuprofen (MOTRIN) 600 MG tablet Take 600 mg by mouth every 6 hours as needed for Pain.Historical Med      clonazePAM (KLONOPIN) 1 MG tablet Take 1 mg by mouth 3 times daily as needed.Historical Med      famotidine (PEPCID) 20 MG tablet Take 20  mg by mouth daily as needed.Historical Med      lamoTRIgine (LAMICTAL) 200 MG tablet Take 1 tablet by mouth nightly.Normal, 200 mg, Oral, NIGHTLY, Disp-30 tablet, R-0      vitamin - therapeutic multivitamins w/minerals (CENTRUM SILVER,THERA-M) TABS Take 1 tablet by mouth daily.Normal, 1 tablet, Oral, DAILY, Disp-30 tablet, R-1             Past Medical History:   Diagnosis Date   • Anxiety    • Asthma    • Drug abuse NEC, unspec    • Gastric reflux    • High cholesterol    • Sinusitis, chronic        No pertinent past surgical history.    Family History   Problem Relation Age of Onset   • Arthritis Mother    • Cancer Father      colon   • Heart disease Sister    • Substance abuse Brother    • Heart disease Maternal Grandmother    • Stroke Paternal Grandmother    • Cancer Paternal Grandfather      prostate   • Hypertension Brother        Social History   Substance Use Topics   • Smoking status: Current Every Day Smoker     Packs/day: 0.75     Years: 20.00     Types: Cigarettes   • Smokeless tobacco: None   • Alcohol use No      Comment:  hx alcohol abuse, sober for 5 yrs       Review of Systems   Constitutional: Negative for chills, diaphoresis, fatigue, fever and unexpected weight change.   HENT: Negative for ear pain and sore throat.    Eyes: Negative for pain and visual disturbance.   Respiratory: Negative for cough and shortness of breath.    Cardiovascular: Negative for chest pain.   Gastrointestinal: Negative for abdominal pain, constipation, diarrhea, nausea and vomiting.        + pressure to abd   Genitourinary: Positive for dysuria and flank pain. Negative for difficulty urinating and vaginal discharge.        + for vaginal itching  - malodor   Musculoskeletal: Positive for back pain. Negative for neck pain and neck stiffness.   Skin: Negative for color change and rash.   Neurological: Negative for dizziness, light-headedness and headaches.       Physical Exam       ED Triage Vitals   ED Triage Vitals Group       Temp 04/23/17 1224 97.9 °F (36.6 °C)      Pulse 04/23/17 1224 102      Resp 04/23/17 1224 17      BP 04/23/17 1224 134/81      SpO2 04/23/17 1224 99 %      EtCO2 mmHg --       Height 04/23/17 1224 5' 7\" (1.702 m)      Weight 04/23/17 1224 155 lb (70.3 kg)      Weight Scale Used 04/23/17 1224 ED Stated       Physical Exam   Constitutional: She is oriented to person, place, and time. Vital signs are normal. She appears well-developed and well-nourished.  Non-toxic appearance. She does not appear ill. No distress.   Pt is well appearing   HENT:   Head: Normocephalic and atraumatic.   Eyes: Conjunctivae and EOM are normal. Pupils are equal, round, and reactive to light. No scleral icterus.   Neck: Normal range of motion. Neck supple.   Cardiovascular: Normal rate, regular rhythm, S1 normal, S2 normal, normal heart sounds and intact distal pulses.    No murmur heard.  Pulmonary/Chest: Effort normal and breath sounds normal. No respiratory distress. She has no wheezes.   Abdominal: Soft. Normal appearance and bowel sounds are normal. She exhibits no distension and no mass. There is no tenderness. There is CVA tenderness (R). There is no rigidity, no rebound and no guarding.   Pt reports pressure upon palpation to SP and LLQ.    Genitourinary: Cervix exhibits no motion tenderness. Right adnexum displays no mass. Left adnexum displays no mass. Vaginal discharge (white chunky discharge adherent to vaginal wall) found.   Genitourinary Comments:      Musculoskeletal: Normal range of motion. She exhibits no edema.        Lumbar back: She exhibits tenderness (R low back ttp).   Neurological: She is alert and oriented to person, place, and time. She exhibits normal muscle tone.   Skin: Skin is warm and dry. No rash noted. She is not diaphoretic.   Psychiatric: She has a normal mood and affect. Her speech is normal and behavior is normal.   Nursing note and vitals reviewed.      ED Course     Procedures  MDM  Vitals  Vitals:     04/23/17 1224   BP: 134/81   Pulse: 102   Resp: 17   Temp: 97.9 °F (36.6 °C)   TempSrc: Oral   SpO2: 99%   Weight: 70.3 kg   Height: 5' 7\" (1.702 m)       Labs  Results for orders placed or performed during the hospital encounter of 04/23/17   CBC & Auto Differential   Result Value Ref Range    WBC 10.5 4.2 - 11.0 K/mcL    RBC 4.23 4.00 - 5.20 mil/mcL    HGB 12.8 12.0 - 15.5 g/dL    HCT 38.7 36.0 - 46.5 %    MCV 91.5 78.0 - 100.0 fl    MCH 30.3 26.0 - 34.0 pg    MCHC 33.1 32.0 - 36.5 g/dL    RDW-CV 12.9 11.0 - 15.0 %     140 - 450 K/mcL    DIFF TYPE AUTOMATED DIFFERENTIAL     Neutrophil 65 %    LYMPH 22 %    MONO 11 %    EOSIN 1 %    BASO 1 %    Absolute Neutrophil 6.9 1.8 - 7.7 K/mcL    Absolute Lymph 2.3 1.0 - 4.8 K/mcL    Absolute Mono 1.1 (H) 0.3 - 0.9 K/mcL    Absolute Eos 0.2 0.1 - 0.5 K/mcL    Absolute Baso 0.1 0.0 - 0.3 K/mcL   Comprehensive Metabolic Panel   Result Value Ref Range    Sodium 138 135 - 145 mmol/L    Potassium 3.7 3.4 - 5.1 mmol/L    Chloride 99 98 - 107 mmol/L    Carbon Dioxide 32 21 - 32 mmol/L    Anion Gap 11 10 - 20 mmol/L    Glucose 86 65 - 99 mg/dL    BUN 10 6 - 20 mg/dL    Creatinine 0.66 0.51 - 0.95 mg/dL    GFR Estimate,  >90     GFR Estimate, Non African American >90     BUN/Creatinine Ratio 15 7 - 25    CALCIUM 8.8 8.4 - 10.2 mg/dL    TOTAL BILIRUBIN 0.3 0.2 - 1.0 mg/dL    AST/SGOT 15 <38 Units/L    ALT/SGPT 23 <79 Units/L    ALK PHOSPHATASE 43 (L) 45 - 117 Units/L    TOTAL PROTEIN 8.0 6.4 - 8.2 g/dL    Albumin 4.1 3.6 - 5.1 g/dL    GLOBULIN 3.9 2.0 - 4.0 g/dL    A/G Ratio, Serum 1.1 1.0 - 2.4   Urinalysis & Reflex Micro with Culture if Indicated   Result Value Ref Range    COLOR YELLOW YELLOW    APPEARANCE CLEAR     GLUCOSE(URINE) NEGATIVE NEGATIVE mg/dL    BILIRUBIN NEGATIVE NEGATIVE    KETONES NEGATIVE NEGATIVE mg/dL    SPECIFIC GRAVITY <1.005 (L) 1.005 - 1.030    BLOOD LARGE (A) NEGATIVE    pH 6.0 5.0 - 7.0 Units    PROTEIN(URINE) NEGATIVE NEGATIVE  mg/dL    UROBILINOGEN 0.2 0.0 - 1.0 mg/dL    NITRITE NEGATIVE NEGATIVE    LEUKOCYTE ESTERASE LARGE (A) NEGATIVE    SPECIMEN TYPE URINE, CLEAN CATCH/MIDSTREAM    Wet Mount   Result Value Ref Range    YEAST PRESENT (A) NONE SEEN    Trichomonas NONE SEEN NONE SEEN    Clue Cells NONE SEEN NONE SEEN   Urinalysis Microscopic   Result Value Ref Range    Squamous EPI'S NONE SEEN 0 - 5 /hpf    RBC 6 to 10 0 - 3 /hpf    WBC 26 to 100 0 - 5 /hpf    BACTERIA FEW (A) NONE SEEN /hpf    Hyaline Casts NONE SEEN 0 - 5 /lpf   Urine pregnancy POC   Result Value Ref Range    URINE PREGNANCY,QUAL negative        ED Course    12:32 PM Initial Impression:  I performed the initial assessment and evaluation of the patient. Pt comes to the ED c/o urinary sx and flank pain. I am suspicious for a UTI, the plan is to evaluate with basic labs, UA and pelvic examination. The patient agrees with the plan and will be re-assessed shortly.     1:19 PM Pt recheck: I performed the pelvic exam, refer to PEx. Pt was updated on the unremarkable lab work but her UA does suggest urine infection, however, will wait for the wet mount. Her GC culture will come back in two days; if positive she will be contacted via telephone. Pt agrees and will be re-assessed shortly.    1:50 PM: I rechecked the pt who is resting in bed comfortably. We discussed wet mount results which shows evidence of yeast infection. She will be discharged home in stable condition with a script for Macrobid and Diflucan. If her urine culture is resistant or requires change, pt will contacted with the appropriate abx. She is to f/u with PCP or return to the ED if her sx worsen such as having high fever, increased pain, hematuria or any other concerns. Patient understands and agrees with the plan. All questions addressed.    [facility]    Critical Care time spent on this patient outside of billable procedures:  None  Discharge  Clinical Impression  The primary encounter diagnosis was Acute UTI. A  diagnosis of Vaginal yeast infection was also pertinent to this visit.    Follow Up:  Your PCP    Schedule an appointment as soon as possible for a visit  Call and schedule follow-up for re-evaluation, be seen sooner for new or worsening symptoms       The patient was provided with a recommendation to follow up with a primary care provider and obtain reassessment of his/her blood pressure within three months.    Discharge Medication List as of 4/23/2017  1:44 PM      START taking these medications    Details   fluconazole (DIFLUCAN) 150 MG tablet Take 1 tablet by mouth once for 1 dose.Normal, Disp-1 tablet, R-0      nitrofurantoin, macrocrystal-monohydrate, (MACROBID) 100 MG capsule Take 1 capsule by mouth 2 times daily for 5 days.Normal, Disp-10 capsule, R-0             Pt is discharged to home/self care in stable condition.         I have reviewed the information recorded by the scribe for accuracy and agree with its contents.    ____________________________________________________________________    Jasmin Perez acting as a scribe for Danii Painting PA-C.    Danii Painting PA-C  Dictation # [id number]  Scribe: Jasmin Perez    Attending Physician: Dr. Luan Whitt  Dictation # [id number]         Danii Painting PA-C  04/23/17 1453

## 2024-01-28 ENCOUNTER — HOSPITAL ENCOUNTER (EMERGENCY)
Facility: OTHER | Age: 54
Discharge: HOME | End: 2024-01-28
Attending: FAMILY MEDICINE | Admitting: FAMILY MEDICINE
Payer: COMMERCIAL

## 2024-01-28 VITALS
TEMPERATURE: 97.6 F | RESPIRATION RATE: 15 BRPM | DIASTOLIC BLOOD PRESSURE: 76 MMHG | HEART RATE: 84 BPM | OXYGEN SATURATION: 100 % | SYSTOLIC BLOOD PRESSURE: 112 MMHG

## 2024-01-28 DIAGNOSIS — G89.4 CHRONIC PAIN DISORDER: ICD-10-CM

## 2024-01-28 DIAGNOSIS — G25.81 RESTLESS LEG SYNDROME: ICD-10-CM

## 2024-01-28 DIAGNOSIS — Z87.438 HISTORY OF FOURNIER'S GANGRENE: ICD-10-CM

## 2024-01-28 PROCEDURE — 99285 EMERGENCY DEPT VISIT HI MDM: CPT | Performed by: FAMILY MEDICINE

## 2024-01-28 PROCEDURE — 99284 EMERGENCY DEPT VISIT MOD MDM: CPT | Performed by: FAMILY MEDICINE

## 2024-01-28 RX ORDER — PRAMIPEXOLE DIHYDROCHLORIDE 0.25 MG/1
0.25 TABLET ORAL AT BEDTIME
Qty: 15 TABLET | Refills: 0 | Status: SHIPPED | OUTPATIENT
Start: 2024-01-28 | End: 2024-08-29

## 2024-01-28 ASSESSMENT — ENCOUNTER SYMPTOMS
EYE REDNESS: 0
HEADACHES: 0
DYSURIA: 0
NAUSEA: 0
ABDOMINAL PAIN: 0
SHORTNESS OF BREATH: 0
APPETITE CHANGE: 0
FEVER: 0
NECK STIFFNESS: 0
ACTIVITY CHANGE: 0
FATIGUE: 0
VOMITING: 0
ARTHRALGIAS: 0
COUGH: 0
SORE THROAT: 0
RHINORRHEA: 0
CHILLS: 0
HEMATURIA: 0
DIZZINESS: 0
MYALGIAS: 0
DIARRHEA: 0

## 2024-01-28 ASSESSMENT — ACTIVITIES OF DAILY LIVING (ADL): ADLS_ACUITY_SCORE: 33

## 2024-01-29 ASSESSMENT — ENCOUNTER SYMPTOMS: BACK PAIN: 0

## 2024-01-29 NOTE — ED PROVIDER NOTES
History     Chief Complaint   Patient presents with    Groin Pain     HPI  Kym Perea is a 53 year old female with history of complex chronic pain syndrome, Julio's gangrene who presents with ongoing pain problems.  No worsening symptoms regarding fever or concern for increased drainage or swelling.  Just having her ongoing pain, she is expressing frustration about follow-up plan.  I have seen patient in the past for this issue in a more acute setting, I reviewed my note from that time as well as the CT scan ordered at that time.    Reviewed nurses notes below, similar history is related to me.  Pt reports via private vehicle. Hx of necrotizing fascitis with surgical intervention 8 months ago. Pt rates pain 8/10 constantly. Uses tylenol and ibuprofen daily with little relief. Pt states she is frustrated with pain, and waiting for appointments and referrals.           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     Priority: Medium    Julio's gangrene (H28) 06/01/2023     Priority: Medium    TMJ (temporomandibular joint syndrome) 11/01/2021     Priority: Medium    Arthralgia of right temporomandibular joint 11/01/2021     Priority: Medium    Suicidal behavior 10/19/2021     Priority: Medium    Medical cannabis use 07/10/2018     Priority: Medium    Chronic pain disorder 01/29/2018     Priority: Medium     Overview:   complex regional pain syndrome left ankle      Esophageal reflux 01/29/2018     Priority: Medium    Hyperlipidemia 01/29/2018     Priority:  Medium    Insomnia 01/29/2018     Priority: Medium    Panic disorder 01/29/2018     Priority: Medium     Overview:   Previously patient of Dr. Reynoso since her teens. Patient is attempting slow taper down on her Xanax. She's been on Xanax 2 mg 4 times a day for years      Tobacco abuse 01/29/2018     Priority: Medium    Pain medication agreement, 4/18/18, 4/3/19, 10/27/2020 04/21/2017     Priority: Medium     Overview:   4/20/17:  Oxycodone 10 mg #180/mo    Hydrocodone 10/325mg, #240/mo. Stopped and changed to morphine. Agreement signed 10/3/12, updated 10/1/ 14.  Complex regional pain syndrome (aka Reflex Sympathetic Dystrophy)  MS Contin 30 mg q 8 hours #90 per month.  Morphine sulfate IR 15 mg 2-3 tab tid prn #180 per month, decreased to #90/month.   query 4/9/16 as part of chart review. Seems appropriate. 6/2/16 tapered and off Morphine. Rancocas 5/325 #120/month.  query appropriate. ToxAssure appropriate.    August 2016 Consultation with [facility]. Recommended spinal cord stimulator trial, but she is reluctant to pursue that. Pain clinic stated that continuing opioids was OK.      Complex regional pain syndrome type 1 affecting left lower leg 04/15/2016     Priority: Medium     Overview:   Patient with a history of complex regional pain syndrome has seen Dr. Dino Skelton4/23/16       Migraine headache 11/25/2014     Priority: Medium    Overweight (BMI 25.0-29.9) 11/25/2014     Priority: Medium    Lumbago 02/06/2012     Priority: Medium    Major depressive disorder, recurrent episode, severe (H) 07/13/2011     Priority: Medium    Paroxysmal supraventricular tachycardia 05/24/2010     Priority: Medium    Mononeuritis 07/06/2006     Priority: Medium     IMO Update 10/11      Ankle and foot joint derangement 06/05/2006     Priority: Medium     Overview:   Chronic left ankle pain secondary to reflex sympathetic dystrophy.  Patient   has had 4 previous surgeries, the most recent one done by   Leann in   2007. On Narcotic contract.    Formatting of this note might be different from the original.  Chronic left ankle pain secondary to reflex sympathetic dystrophy.  Patient   has had 4 previous surgeries, the most recent one done by Dr. Noriega in   2007. On Narcotic contract.    IMO Update 10/11          Past Medical History:    Past Medical History:   Diagnosis Date    Acquired absence of other organs (CODE)     Chronic pain syndrome     Complex regional pain syndrome I     Encounter for other administrative examinations     Encounter for other administrative examinations     Gastro-esophageal reflux disease without esophagitis     Injury of left ankle     Low back pain     Major depressive disorder, recurrent severe without psychotic features (H)     Migraine without status migrainosus, not intractable     Nicotine dependence, uncomplicated     Obesity     Overweight     Pain in ankle     Pain in thoracic spine     Panic disorder without agoraphobia     Personal history of other diseases of the female genital tract     Personal history of other medical treatment (CODE)     Supraventricular tachycardia        Past Surgical History:    Past Surgical History:   Procedure Laterality Date    ANKLE SURGERY      1997, 2000,Left ankle surgery x 2    CYSTECTOMY PILONIDAL N/A 6/19/2023    Procedure: Delayed closure of vulva and perineal defectes with drain placment;  Surgeon: Anthony Nolan MD;  Location:  OR    HYSTERECTOMY TOTAL ABDOMINAL      11/1999,secondary to endometriosis, no malignancy; patient reports no malignancy, but abnormal cells were present *    IRRIGATION AND DEBRIDEMENT DECUBITUS WOUND, COMBINED N/A 6/1/2023    Procedure: IRRIGATION AND DEBRIDEMENT, PERINEAL, LABIAL and GLUTEAL INFECTION;  Surgeon: Anthony Nolan MD;  Location:  OR    IRRIGATION AND DEBRIDEMENT DECUBITUS WOUND, COMBINED N/A 6/3/2023    Procedure: Debridement and washout of vulvar and perineal wounds;  Surgeon: Ovidio  Anthony CRUZ MD;  Location: [facility] OR    LAPAROSCOPIC CHOLECYSTECTOMY      No Comments Provided    LAPAROSCOPY DIAGNOSTIC (GENERAL)      1995    OTHER SURGICAL HISTORY      21299,CRANIO/MAXILLOFACIAL SURGERY,Jaw Surgery    OTHER SURGICAL HISTORY      207040,[facility] EMG,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  No other abnormalities noted.       Family History:    Family History   Problem Relation Age of Onset    Cancer Father         Cancer,Bladder    Other - See Comments Father         Mild hypercholesterolemia    Other - See Comments Mother         scleroderma/lupus/Parkinson's syndrome    Family History Negative Brother         Good Health    Ariel-Danlos syndrome Daughter     Colon Cancer Maternal Grandmother 40        Cancer-colon,Followed by lung  with metastasis to the bone di*       Social History:  Marital Status:   [4]  Social History     Tobacco Use    Smoking status: Every Day     Packs/day: 0.50     Years: 30.00     Additional pack years: 0.00     Total pack years: 15.00     Types: Cigarettes, Vaping Device     Start date: 12/2/1986     Passive exposure: Current    Smokeless tobacco: Never    Tobacco comments:     trying   Vaping Use    Vaping Use: Every day    Substances: Nicotine, Flavoring    Devices: Disposable   Substance Use Topics    Alcohol use: Not Currently     Comment: OCCASIONAL WINE    Drug use: Yes     Types: Marijuana     Comment: medical cannabis; suboxone        Medications:    pramipexole (MIRAPEX) 0.25 MG tablet  acetaminophen (TYLENOL) 325 MG tablet  albuterol (PROAIR HFA/PROVENTIL HFA/VENTOLIN HFA) 108 (90 Base) MCG/ACT inhaler  amphetamine-dextroamphetamine (ADDERALL) 20 MG tablet  atenolol (TENORMIN) 100 MG tablet  buprenorphine-naloxone (ZUBSOLV) 5.7-1.4 MG sublingual tablet  Cholecalciferol (VITAMIN D-3) 125 MCG (5000 UT) TABS  clonazePAM (KLONOPIN) 2 MG tablet  divalproex sodium extended-release (DEPAKOTE ER) 250 MG 24 hr tablet  fluticasone (FLONASE) 50 MCG/ACT  nasal spray  ibuprofen (ADVIL/MOTRIN) 800 MG tablet  ketorolac (TORADOL) 10 MG tablet  metroNIDAZOLE (FLAGYL) 500 MG tablet          Review of Systems   Constitutional:  Negative for activity change, appetite change, chills, fatigue and fever.   HENT:  Negative for congestion, rhinorrhea and sore throat.    Eyes:  Negative for redness.   Respiratory:  Negative for cough and shortness of breath.    Cardiovascular:  Negative for chest pain.   Gastrointestinal:  Negative for abdominal pain, diarrhea, nausea and vomiting.   Genitourinary:  Negative for dysuria and hematuria.   Musculoskeletal:  Negative for arthralgias, back pain, myalgias and neck stiffness.   Skin:  Negative for rash.   Neurological:  Negative for dizziness and headaches.       Physical Exam   BP: 112/76  Pulse: 84  Temp: 97.6  F (36.4  C)  Resp: 15  SpO2: 100 %      Physical Exam  Vitals and nursing note reviewed.   HENT:      Head: Atraumatic.   Eyes:      Pupils: Pupils are equal, round, and reactive to light.   Cardiovascular:      Rate and Rhythm: Regular rhythm.      Heart sounds: Normal heart sounds.   Pulmonary:      Effort: No respiratory distress.      Breath sounds: Normal breath sounds.   Chest:      Chest wall: No tenderness.   Abdominal:      Tenderness: There is no abdominal tenderness.   Musculoskeletal:      Cervical back: No tenderness.      Thoracic back: No tenderness.      Lumbar back: No tenderness.   Neurological:      Mental Status: She is oriented to person, place, and time.         No results found for this or any previous visit (from the past 24 hour(s)).    Medications - No data to display    Assessments & Plan (with Medical Decision Making)     I have reviewed the nursing notes.    I have reviewed the findings, diagnosis, plan and need for follow up with the patient.      Medical Decision Making  The patient's presentation was of moderate complexity (a chronic illness mild to moderate exacerbation, progression, or side  effect of treatment).    The patient's evaluation involved:  history and exam without other MDM data elements    The patient's management necessitated moderate risk (prescription drug management including medications given in the ED).      Discharge Medication List as of 1/28/2024 11:17 PM        START taking these medications    Details   pramipexole (MIRAPEX) 0.25 MG tablet Take 1 tablet (0.25 mg) by mouth at bedtime for 15 days, Disp-15 tablet, R-0, E-Prescribe           Reviewed patient's  and pain contract.  She is at increased risk.  I told her that no controlled substances including opiates or benzodiazepines would be prescribed today.  Trial of pramipexole at bedtime for her restless legs this may get her some sleep as well with her chronic pain.  Follow-up per plan from surgery regarding her chronic pain.  Patient verbalized understanding plan is agreement left ED in improving condition.    Final diagnoses:   Chronic pain disorder   History of Julio's gangrene   Restless leg syndrome       1/28/2024   [facility] [place]       Aleksandr Bundy MD  01/29/24 0706

## 2024-01-29 NOTE — ED TRIAGE NOTES
Pt reports via private vehicle. Hx of necrotizing fascitis with surgical intervention 8 months ago. Pt rates pain 8/10 constantly. Uses tylenol and ibuprofen daily with little relief. Pt states she is frustrated with pain, and waiting for appointments and referrals.      Triage Assessment (Adult)       Row Name 01/28/24 9086          Triage Assessment    Airway WDL WDL        Respiratory WDL    Respiratory WDL WDL        Skin Circulation/Temperature WDL    Skin Circulation/Temperature WDL X  pt reports hx necrotizing facitis rt groin        Cardiac WDL    Cardiac WDL WDL        Peripheral/Neurovascular WDL    Peripheral Neurovascular WDL WDL        Cognitive/Neuro/Behavioral WDL    Cognitive/Neuro/Behavioral WDL WDL        Lloyd Coma Scale    Best Eye Response 4-->(E4) spontaneous     Best Motor Response 6-->(M6) obeys commands     Best Verbal Response 5-->(V5) oriented     U[place]da Coma Scale Score 15

## 2024-02-09 ENCOUNTER — THERAPY VISIT (OUTPATIENT)
Dept: PHYSICAL THERAPY | Facility: OTHER | Age: 54
End: 2024-02-09
Attending: SURGERY
Payer: COMMERCIAL

## 2024-02-09 ENCOUNTER — TELEPHONE (OUTPATIENT)
Dept: SURGERY | Facility: OTHER | Age: 54
End: 2024-02-09

## 2024-02-09 DIAGNOSIS — M79.18 MYOFASCIAL PAIN: Primary | ICD-10-CM

## 2024-02-09 DIAGNOSIS — N76.82 FOURNIER'S GANGRENE IN FEMALE (H): ICD-10-CM

## 2024-02-09 PROCEDURE — 97140 MANUAL THERAPY 1/> REGIONS: CPT | Mod: GP

## 2024-02-09 PROCEDURE — 97162 PT EVAL MOD COMPLEX 30 MIN: CPT | Mod: GP

## 2024-02-09 RX ORDER — TRAMADOL HYDROCHLORIDE 50 MG/1
50 TABLET ORAL EVERY 6 HOURS PRN
Qty: 10 TABLET | Refills: 0 | Status: SHIPPED | OUTPATIENT
Start: 2024-02-09 | End: 2024-02-12

## 2024-02-09 NOTE — PROGRESS NOTES
"PHYSICAL THERAPY EVALUATION  Type of Visit: Evaluation    See electronic medical record for Abuse and Falls Screening details.    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 Patient underwent first of three surgery on 6/1/23. Had been dealing with significant pain since drain tubes were moved. Has a referreal to [facility] plastics in [place], waiting for scheduling. Pain varies. Will get \"zingers\" in to vaginal area, pressure in vulva, pulling pains, sharp pains. Feels pain in right lower buttock at end of incision, right groin and hip, back of right thigh. Pain control has been alternating tylenol and advil with heat/ice. Pain from seams of undergarments as it rubs on skin. Limited sleep due to pain and positioning. Cannot tolerated lying with knees and hips fully extended. Unable to sit on right buttock, sits on left side with right leg crossed over. Walks bent with limited hip movement of right side to try and reduce pain. Also having low back pain   Date of onset: 12/19/23    Relevant medical history:     Past Medical History:   Diagnosis Date    Acquired absence of other organs (CODE)     9/2/2011    Chronic pain syndrome     No Comments Provided    Complex regional pain syndrome I     left ankle    Encounter for other administrative examinations     10/2012,Hydrocodone 10/325mg, #240/mo signed 10/3/12, updated 10/1/ 14    Encounter for other administrative examinations     10/1/2014,MS Contin 30 mg q 8 hours #90 per month.  Morphine sulfate IR 15 mg 2-3 tab tid prn #180 per month    Gastro-esophageal reflux disease without esophagitis     No Comments Provided    Injury of left ankle     1997,requiring surgery    Low back pain     No Comments Provided    Major depressive disorder, recurrent severe without psychotic features (H)     No Comments Provided    Migraine without status migrainosus, not intractable     " No Comments Provided    Nicotine dependence, uncomplicated     No Comments Provided    Obesity     No Comments Provided    Overweight     11/25/2014    Pain in ankle     Chronic left ankle pain secondary to reflex sympathetic dystrophy.  Patient  has had 4 previous surgeries, the most recent one done by Dr. Noriega in  2007. On Narcotic contract.    Pain in thoracic spine     No Comments Provided    Panic disorder without agoraphobia     Dr Reynoso    Personal history of other diseases of the female genital tract     No Comments Provided    Personal history of other medical treatment (CODE)     G2, P1-0-1-1 with one vaginal delivery.    Supraventricular tachycardia     5/24/2010       Dates & types of surgery:    Past Surgical History:   Procedure Laterality Date    ANKLE SURGERY      1997, 2000,Left ankle surgery x 2    CYSTECTOMY PILONIDAL N/A 6/19/2023    Procedure: Delayed closure of vulva and perineal defectes with drain placment;  Surgeon: Anthony Nolan MD;  Location: [facility] OR    HYSTERECTOMY TOTAL ABDOMINAL      11/1999,secondary to endometriosis, no malignancy; patient reports no malignancy, but abnormal cells were present *    IRRIGATION AND DEBRIDEMENT DECUBITUS WOUND, COMBINED N/A 6/1/2023    Procedure: IRRIGATION AND DEBRIDEMENT, PERINEAL, LABIAL and GLUTEAL INFECTION;  Surgeon: Anthony Nolan MD;  Location: [facility] OR    IRRIGATION AND DEBRIDEMENT DECUBITUS WOUND, COMBINED N/A 6/3/2023    Procedure: Debridement and washout of vulvar and perineal wounds;  Surgeon: Anthony Nolan MD;  Location:  OR    LAPAROSCOPIC CHOLECYSTECTOMY      No Comments Provided    LAPAROSCOPY DIAGNOSTIC (GENERAL)      1995    OTHER SURGICAL HISTORY      21299,CRANIO/MAXILLOFACIAL SURGERY,Jaw Surgery    OTHER SURGICAL HISTORY      207040,[facility] EMG,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  No other abnormalities noted.       Prior therapy history for the same diagnosis, illness or injury:    none    Prior Level of  Function  Transfers: Independent  Ambulation: Independent  ADL: Independent    Employment:    unemployed      Patient goals for therapy:  reduce pain with daily life    Pain assessment: Pain present  Location: low back, anterior pelvis, vulva, right buttock/Rating: 10/10     Objective   ADDITIONAL HISTORY:  Pain: Pain is Exacerbated By: walking, touch, clothing, sitting on right buttock, lying supine with legs flat, house hold tasks, sitting in vehicle for travel      Relieving Activities / Self Care: Heat, Ice, OTC Medication(s): , pain only controlled a small amount    POSTURE: Standing Posture: Lordosis decreased, Thoracic kyphosis increased, flexed posture  Sitting Posture: on left pelvis, right leg crossed over left with no weight on left pelvis        GAIT:   Gait Deviations:  walks with legs apart, no use of hip extensors      RANGE OF MOTION:   Hips: full motion noted with pain    MUSCLE PERFORMANCE:   Not assessed on this date    SENSATION: noted full sensation of abdomen, gluteal region, vulva    Myofascial restrictions noted with right vesical arteries, right obturator artery, right internal iliac artery, endopelvic fascia. Very tender with palpation to entire region. Good mobility of incision on gluteal area, did not tolerate touch to incision in vulva.     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  The following significant findings have been identified: Pain, Decreased strength, Impaired gait, Impaired muscle performance, Decreased activity tolerance, and Impaired posture. These impairments interfere with their ability to perform self care tasks, recreational activities, household chores, driving , household mobility, and community  mobility as compared to previous level of function.     Clinical Decision Making (Complexity):  Clinical Presentation: Evolving/Changing  Clinical Presentation Rationale: based on medical and personal factors listed in PT evaluation  Clinical Decision Making (Complexity): Moderate complexity    PLAN OF CARE  Treatment Interventions:  Modalities: Biofeedback, Cryotherapy, E-stim, Hot Pack  Interventions: Gait Training, Manual Therapy, Neuromuscular Re-education, Therapeutic Activity, Therapeutic Exercise, Self-Care/Home Management    Long Term Goals     PT Goal 1  Goal Identifier: seated posture  Goal Description: Patient to tolerate sitting even on pelvis for 20 minutes for complete a meal.  Target Date: 08/07/24  PT Goal 2  Goal Identifier: standing posture  Goal Description: Patient to tolerate standing upright with knees fully extended, hips in neutral to allow upright posture for self cares and household tasks.  Target Date: 08/07/24  PT Goal 3  Goal Identifier: gait  Goal Description: Patient to have ability to walk with hip extension and therefore use of gluteal muscles to aid in stabilization of pelvis.  Target Date: 08/07/24  PT Goal 4  Goal Identifier: sleep  Goal Description: Patient to sleep while lying supine with legs extended for up to 4 hours.  Target Date: 08/07/24      Frequency of Treatment: 1-2 times per week  Duration of Treatment: 3-6 months    Recommended Referrals to Other Professionals:  NA  Education Assessment:   Learner/Method: Patient;No Barriers to Learning    Risks and benefits of evaluation/treatment have been explained.   Patient/Family/caregiver agrees with Plan of Care.     Evaluation Time:     PT Eval, Moderate Complexity Minutes (74197): 15       Signing Clinician: Cammie Barrett PT      [facility]                                                                                   OUTPATIENT PHYSICAL THERAPY      PLAN OF TREATMENT FOR OUTPATIENT  REHABILITATION   Patient's Last Name, First Name, PRISCILARitaFOUZIARita  Kym Perea YOB: 1970   Provider's Name   [facility]   Medical Record No.  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              Referring Provider:  Anthony Nolan    Initial Assessment  See Epic Evaluation- Start of Care Date: 02/09/24

## 2024-02-09 NOTE — TELEPHONE ENCOUNTER
Patient started PT and is hurting from hip down leg to toes - is wanting to see if can get something a little stronger than Ibuprofen/tylenol as that is also making her stomach upset please call today

## 2024-02-12 NOTE — TELEPHONE ENCOUNTER
On 2/9/2024 Dr. Nolan stated:  Ok to try topical NSAIDs ointment and ultram.    Anthony Nolan MD on 2/9/2024 at 1:26 PM      Massiel Gutierrez RN  ....................  2/12/2024   10:23 AM

## 2024-02-13 NOTE — PROGRESS NOTES
"Subjective     Kym Perea is a 49 year old female who presents to clinic today for the following health issues:    HPI   Chronic Pain Follow-Up       Type / Location of Pain: ankle pain  Analgesia/pain control:       Recent changes:  same      Overall control: Inadequate pain control  Activity level/function:      Daily activities:  Can do most things most days, with some rest    Work:  not applicable  Adverse effects:  No  Adherance    Taking medication as directed?  Yes    Participating in other treatments: no - \"we've been through all of the stuff, been to 2 different pain clinics, next option was the pump placed in my back and I don't want anything placed near my spine\"  Risk Factors:    Sleep:  Fair    Mood/anxiety:  worsened    Recent family or social stressors:  death in family:  Fiance  New Years Natali and daughter just got     Other aggravating factors: prolonged standing and repetitive activities - that is bad  PHQ-9 SCORE 2018 4/3/2019 2019   PHQ-9 Total Score 13 13 18     LAINE-7 SCORE 2018 4/3/2019 2019   Total Score 18 18 21     Encounter-Level CSA - 2019:    Controlled Substance Agreement - Scan on 2019  2:23 PM: CONTROLLED SUBSTANCE AGREEMENT (below)       Encounter-Level CSA - 2018:    Controlled Substance Agreement - Scan on 2018  9:41 AM: CONTROLLED SUBSTANCE AGREEMENT (below)       Patient-Level CSA:    There are no patient-level csa.       Patient Active Problem List   Diagnosis     Pain in joint, ankle and foot     Lumbago     Chronic pain disorder     Complex regional pain syndrome type 1 affecting left lower leg     Major depressive disorder, recurrent episode, severe (H)     Esophageal reflux     Hyperlipidemia     Influenza A     Insomnia     Migraine headache     Overweight (BMI 25.0-29.9)     Pain medication agreement 18, 4/3/19     Panic disorder     Paroxysmal supraventricular tachycardia (H)     Tobacco abuse     Medical " cannabis use     Past Surgical History:   Procedure Laterality Date     ANKLE SURGERY      1997, 2000,Left ankle surgery x 2     HYSTERECTOMY TOTAL ABDOMINAL      11/1999,secondary to endometriosis, no malignancy; patient reports no malignancy, but abnormal cells were present *     LAPAROSCOPIC CHOLECYSTECTOMY      No Comments Provided     LAPAROSCOPY DIAGNOSTIC (GENERAL)      1995     OTHER SURGICAL HISTORY      21299,CRANIO/MAXILLOFACIAL SURGERY,Jaw Surgery     OTHER SURGICAL HISTORY      207040,[facility] EMG,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  No other abnormalities noted.       Social History     Tobacco Use     Smoking status: Current Every Day Smoker     Packs/day: 0.50     Years: 30.00     Pack years: 15.00     Types: Cigarettes     Start date: 12/2/1986     Smokeless tobacco: Never Used   Substance Use Topics     Alcohol use: Not Currently     Alcohol/week: 0.0 oz     Family History   Problem Relation Age of Onset     Cancer Father         Cancer,Bladder     Other - See Comments Father         Mild hypercholesterolemia     Other - See Comments Mother         scleroderma/lupus/Parkinson's syndrome     Family History Negative Brother         Good Health     Colon Cancer Maternal Grandmother 40        Cancer-colon,Followed by lung  with metastasis to the bone di*         Current Outpatient Medications   Medication Sig Dispense Refill     albuterol (PROAIR HFA/PROVENTIL HFA/VENTOLIN HFA) 108 (90 Base) MCG/ACT Inhaler Inhale 2 puffs into the lungs every 4 hours as needed for shortness of breath / dyspnea or wheezing 1 Inhaler 11     ALPRAZolam (XANAX) 2 MG tablet Take 1 tablet 3 times daily prn       amphetamine-dextroamphetamine (ADDERALL) 30 MG per tablet Take 30 mg by mouth       atenolol (TENORMIN) 100 MG tablet Take 1 tablet (100 mg) by mouth daily 90 tablet 3     fluticasone (FLONASE) 50 MCG/ACT spray 2 sprays       oxyCODONE IR (ROXICODONE) 10 MG tablet Take 1 tablet (10 mg) by mouth every 4  "hours as needed for moderate to severe pain Maximum 6 tab per day 180 tablet 0     VITAMIN D, CHOLECALCIFEROL, PO Take 1,000 Units by mouth daily       Allergies   Allergen Reactions     Arthrotec GI Disturbance     Indomethacin      Other reaction(s): Dizziness     Meloxicam GI Disturbance     Reviewed and updated as needed this visit by Provider  Tobacco  Allergies  Meds  Problems  Med Hx  Surg Hx  Fam Hx  Soc Hx          Review of Systems   ROS COMP: As above      Objective    /74   Pulse 80   Temp 98.5  F (36.9  C) (Temporal)   Resp 20   Ht 1.753 m (5' 9\")   Wt 73 kg (161 lb)   SpO2 99%   BMI 23.78 kg/m    Body mass index is 23.78 kg/m .  Physical Exam   GENERAL: healthy, alert and no distress  MS: antalgic gait  PSYCH: mentation appears normal, affect normal/bright    Diagnostic Test Results:  Labs reviewed in Epic  none         Assessment & Plan     1. Pain of joint of left ankle and foot  2. Complex regional pain syndrome type 1 affecting left lower leg  3. Pain medication agreement  4. Chronic pain disorder  Patient of Dr Stephen with chronic pain, out of pain meds and PCP is not available until Tuesday. Does have appt with PCP on the 17th, refilled until appt and no further.   - oxyCODONE IR (ROXICODONE) 10 MG tablet; Take 1 tablet (10 mg) by mouth every 4 hours as needed for moderate to severe pain Maximum 6 tab per day  Dispense: 72 tablet; Refill: 0       Radha Whalen NP  [facility]        " Walking

## 2024-03-08 ENCOUNTER — HOSPITAL ENCOUNTER (EMERGENCY)
Facility: OTHER | Age: 54
Discharge: HOME | End: 2024-03-08
Attending: PHYSICIAN ASSISTANT | Admitting: PHYSICIAN ASSISTANT
Payer: COMMERCIAL

## 2024-03-08 VITALS
OXYGEN SATURATION: 95 % | RESPIRATION RATE: 18 BRPM | WEIGHT: 144 LBS | HEART RATE: 88 BPM | DIASTOLIC BLOOD PRESSURE: 74 MMHG | SYSTOLIC BLOOD PRESSURE: 106 MMHG | BODY MASS INDEX: 21.27 KG/M2 | TEMPERATURE: 98.3 F

## 2024-03-08 DIAGNOSIS — M25.551 RIGHT HIP PAIN: ICD-10-CM

## 2024-03-08 DIAGNOSIS — M79.609 PAIN IN LIMB: ICD-10-CM

## 2024-03-08 LAB
ALBUMIN SERPL BCG-MCNC: 4.8 G/DL (ref 3.5–5.2)
ALBUMIN UR-MCNC: NEGATIVE MG/DL
ALP SERPL-CCNC: 84 U/L (ref 40–150)
ALT SERPL W P-5'-P-CCNC: 19 U/L (ref 0–50)
ANION GAP SERPL CALCULATED.3IONS-SCNC: 10 MMOL/L (ref 7–15)
APPEARANCE UR: CLEAR
AST SERPL W P-5'-P-CCNC: 21 U/L (ref 0–45)
BASOPHILS # BLD AUTO: 0 10E3/UL (ref 0–0.2)
BASOPHILS NFR BLD AUTO: 0 %
BILIRUB SERPL-MCNC: 0.2 MG/DL
BILIRUB UR QL STRIP: NEGATIVE
BUN SERPL-MCNC: 13.1 MG/DL (ref 6–20)
CALCIUM SERPL-MCNC: 9.6 MG/DL (ref 8.6–10)
CHLORIDE SERPL-SCNC: 100 MMOL/L (ref 98–107)
COLOR UR AUTO: YELLOW
CREAT SERPL-MCNC: 0.74 MG/DL (ref 0.51–0.95)
CRP SERPL-MCNC: <3 MG/L
DEPRECATED HCO3 PLAS-SCNC: 25 MMOL/L (ref 22–29)
EGFRCR SERPLBLD CKD-EPI 2021: >90 ML/MIN/1.73M2
EOSINOPHIL # BLD AUTO: 0.1 10E3/UL (ref 0–0.7)
EOSINOPHIL NFR BLD AUTO: 1 %
ERYTHROCYTE [DISTWIDTH] IN BLOOD BY AUTOMATED COUNT: 13.2 % (ref 10–15)
GLUCOSE SERPL-MCNC: 99 MG/DL (ref 70–99)
GLUCOSE UR STRIP-MCNC: NEGATIVE MG/DL
HCG UR QL: NEGATIVE
HCT VFR BLD AUTO: 37.8 % (ref 35–47)
HGB BLD-MCNC: 13.1 G/DL (ref 11.7–15.7)
HGB UR QL STRIP: NEGATIVE
HOLD SPECIMEN: NORMAL
IMM GRANULOCYTES # BLD: 0 10E3/UL
IMM GRANULOCYTES NFR BLD: 0 %
KETONES UR STRIP-MCNC: NEGATIVE MG/DL
LACTATE SERPL-SCNC: 0.8 MMOL/L (ref 0.7–2)
LEUKOCYTE ESTERASE UR QL STRIP: NEGATIVE
LIPASE SERPL-CCNC: 16 U/L (ref 13–60)
LYMPHOCYTES # BLD AUTO: 2 10E3/UL (ref 0–5.3)
LYMPHOCYTES NFR BLD AUTO: 28 %
MCH RBC QN AUTO: 31.2 PG (ref 26.5–33)
MCHC RBC AUTO-ENTMCNC: 34.7 G/DL (ref 31.5–36.5)
MCV RBC AUTO: 90 FL (ref 78–100)
MONOCYTES # BLD AUTO: 0.3 10E3/UL (ref 0–1.3)
MONOCYTES NFR BLD AUTO: 5 %
NEUTROPHILS # BLD AUTO: 4.8 10E3/UL (ref 1.6–8.3)
NEUTROPHILS NFR BLD AUTO: 66 %
NITRATE UR QL: NEGATIVE
NRBC # BLD AUTO: 0 10E3/UL
NRBC BLD AUTO-RTO: 0 /100
PH UR STRIP: 6.5 [PH] (ref 5–9)
PLATELET # BLD AUTO: 162 10E3/UL (ref 150–450)
POTASSIUM SERPL-SCNC: 4.3 MMOL/L (ref 3.4–5.3)
PROT SERPL-MCNC: 7.5 G/DL (ref 6.4–8.3)
RBC # BLD AUTO: 4.2 10E6/UL (ref 3.8–5.2)
RBC URINE: <1 /HPF
SODIUM SERPL-SCNC: 135 MMOL/L (ref 135–145)
SP GR UR STRIP: 1.01 (ref 1–1.03)
UROBILINOGEN UR STRIP-MCNC: NORMAL MG/DL
WBC # BLD AUTO: 7.3 10E3/UL (ref 4–11)
WBC URINE: <1 /HPF

## 2024-03-08 PROCEDURE — 36415 COLL VENOUS BLD VENIPUNCTURE: CPT | Performed by: PHYSICIAN ASSISTANT

## 2024-03-08 PROCEDURE — 83690 ASSAY OF LIPASE: CPT | Performed by: PHYSICIAN ASSISTANT

## 2024-03-08 PROCEDURE — 81001 URINALYSIS AUTO W/SCOPE: CPT | Performed by: PHYSICIAN ASSISTANT

## 2024-03-08 PROCEDURE — 83605 ASSAY OF LACTIC ACID: CPT | Performed by: PHYSICIAN ASSISTANT

## 2024-03-08 PROCEDURE — 99283 EMERGENCY DEPT VISIT LOW MDM: CPT | Performed by: PHYSICIAN ASSISTANT

## 2024-03-08 PROCEDURE — 250N000013 HC RX MED GY IP 250 OP 250 PS 637: Performed by: PHYSICIAN ASSISTANT

## 2024-03-08 PROCEDURE — 85049 AUTOMATED PLATELET COUNT: CPT | Performed by: PHYSICIAN ASSISTANT

## 2024-03-08 PROCEDURE — 99284 EMERGENCY DEPT VISIT MOD MDM: CPT | Performed by: PHYSICIAN ASSISTANT

## 2024-03-08 PROCEDURE — 81025 URINE PREGNANCY TEST: CPT | Performed by: PHYSICIAN ASSISTANT

## 2024-03-08 PROCEDURE — 84155 ASSAY OF PROTEIN SERUM: CPT | Performed by: PHYSICIAN ASSISTANT

## 2024-03-08 PROCEDURE — 86140 C-REACTIVE PROTEIN: CPT | Performed by: PHYSICIAN ASSISTANT

## 2024-03-08 RX ORDER — TIZANIDINE 2 MG/1
2 TABLET ORAL 3 TIMES DAILY
Qty: 30 TABLET | Refills: 0 | Status: SHIPPED | OUTPATIENT
Start: 2024-03-08 | End: 2024-03-18

## 2024-03-08 RX ORDER — ACETAMINOPHEN 500 MG
500 TABLET ORAL ONCE
Status: COMPLETED | OUTPATIENT
Start: 2024-03-08 | End: 2024-03-08

## 2024-03-08 RX ADMIN — ACETAMINOPHEN 500 MG: 500 TABLET, FILM COATED ORAL at 13:10

## 2024-03-08 ASSESSMENT — ACTIVITIES OF DAILY LIVING (ADL)
ADLS_ACUITY_SCORE: 35
ADLS_ACUITY_SCORE: 38
ADLS_ACUITY_SCORE: 38

## 2024-03-08 ASSESSMENT — COLUMBIA-SUICIDE SEVERITY RATING SCALE - C-SSRS
2. HAVE YOU ACTUALLY HAD ANY THOUGHTS OF KILLING YOURSELF IN THE PAST MONTH?: NO
1. IN THE PAST MONTH, HAVE YOU WISHED YOU WERE DEAD OR WISHED YOU COULD GO TO SLEEP AND NOT WAKE UP?: NO
6. HAVE YOU EVER DONE ANYTHING, STARTED TO DO ANYTHING, OR PREPARED TO DO ANYTHING TO END YOUR LIFE?: NO

## 2024-03-08 NOTE — DISCHARGE INSTRUCTIONS
"Get plenty of fluids and rest.  As discussed, it is not entirely sure what is causing your discomfort at this time.  Your lab work appears excellent.  We discussed that this puts you at a lower risk of having any infection like you previously have had however not \"no\" risk.  We discussed obtaining further imaging including CT scans and ultrasound however was decided not to do those today.  I encouraged you to continue to follow-up with plastic surgery for reevaluation to see if they may be able to determine what could be causing your discomfort.  Continue to alternate Tylenol ibuprofen every 4 hours, we will send you home with a muscle relaxer, use ice and heat and referrals placed to follow-up with physical therapy.  Please return if there are worsening or concerning symptoms.  "

## 2024-03-08 NOTE — ED TRIAGE NOTES
Patient presents with right leg pain. States she had surgery last June with post-op complications. States she had an infection post-op, with chronic pain after surgery. Today pain is worse than normal. No falls recently.      Triage Assessment (Adult)       Row Name 03/08/24 1203          Triage Assessment    Airway WDL WDL     Additional Documentation Breath Sounds (Group)        Respiratory WDL    Respiratory WDL WDL        Breath Sounds    Breath Sounds All Fields     All Lung Fields Breath Sounds equal bilaterally;clear        Skin Circulation/Temperature WDL    Skin Circulation/Temperature WDL WDL        Cardiac WDL    Cardiac WDL WDL        Peripheral/Neurovascular WDL    Peripheral Neurovascular WDL WDL        Cognitive/Neuro/Behavioral WDL    Cognitive/Neuro/Behavioral WDL WDL

## 2024-03-08 NOTE — ED PROVIDER NOTES
History     Chief Complaint   Patient presents with    Leg Pain     HPI  Kym Perea is a 53 year old female who presents to the ED for evaluation of leg pain. Patient presents with right leg pain. 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 States she had an infection post-op, with chronic pain after surgery. Today pain is worse than normal. No falls recently.        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     Priority: Medium    Johanne's gangrene (H28) 06/01/2023     Priority: Medium    TMJ (temporomandibular joint syndrome) 11/01/2021     Priority: Medium    Arthralgia of right temporomandibular joint 11/01/2021     Priority: Medium    Suicidal behavior 10/19/2021     Priority: Medium    Medical cannabis use 07/10/2018     Priority: Medium    Chronic pain disorder 01/29/2018     Priority: Medium     Overview:   complex regional pain syndrome left ankle      Esophageal reflux 01/29/2018     Priority: Medium    Hyperlipidemia 01/29/2018     Priority: Medium    Insomnia 01/29/2018     Priority: Medium    Panic disorder 01/29/2018     Priority: Medium     Overview:   Previously patient of Dr. Reynoso since her teens. Patient is attempting slow taper down on her Xanax. She's been on Xanax 2 mg 4 times a day for years      Tobacco abuse 01/29/2018     Priority: Medium    Pain medication agreement, 4/18/18, 4/3/19, 10/27/2020 04/21/2017     Priority: Medium     Overview:   4/20/17:  Oxycodone 10 mg  #180/mo    Hydrocodone 10/325mg, #240/mo. Stopped and changed to morphine. Agreement signed 10/3/12, updated 10/1/ 14.  Complex regional pain syndrome (aka Reflex Sympathetic Dystrophy)  MS Contin 30 mg q 8 hours #90 per month.  Morphine sulfate IR 15 mg 2-3 tab tid prn #180 per month, decreased to #90/month.   query 4/9/16 as part of chart review. Seems appropriate. 6/2/16 tapered and off Morphine. [place] 5/325 #120/month.  query appropriate. ToxAssure appropriate.    August 2016 Consultation with [facility]. Recommended spinal cord stimulator trial, but she is reluctant to pursue that. Pain clinic stated that continuing opioids was OK.      Complex regional pain syndrome type 1 affecting left lower leg 04/15/2016     Priority: Medium     Overview:   Patient with a history of complex regional pain syndrome has seen Dr. Dino Skelton4/23/16       Migraine headache 11/25/2014     Priority: Medium    Overweight (BMI 25.0-29.9) 11/25/2014     Priority: Medium    Lumbago 02/06/2012     Priority: Medium    Major depressive disorder, recurrent episode, severe (H) 07/13/2011     Priority: Medium    Paroxysmal supraventricular tachycardia 05/24/2010     Priority: Medium    Mononeuritis 07/06/2006     Priority: Medium     IMO Update 10/11      Ankle and foot joint derangement 06/05/2006     Priority: Medium     Overview:   Chronic left ankle pain secondary to reflex sympathetic dystrophy.  Patient   has had 4 previous surgeries, the most recent one done by Dr. Noriega in   2007. On Narcotic contract.    Formatting of this note might be different from the original.  Chronic left ankle pain secondary to reflex sympathetic dystrophy.  Patient   has had 4 previous surgeries, the most recent one done by Dr. Noriega in   2007. On Narcotic contract.    IMO Update 10/11          Past Medical History:    Past Medical History:   Diagnosis Date    Acquired absence of other organs (CODE)     Chronic pain syndrome      Complex regional pain syndrome I     Encounter for other administrative examinations     Encounter for other administrative examinations     Gastro-esophageal reflux disease without esophagitis     Injury of left ankle     Low back pain     Major depressive disorder, recurrent severe without psychotic features (H)     Migraine without status migrainosus, not intractable     Nicotine dependence, uncomplicated     Obesity     Overweight     Pain in ankle     Pain in thoracic spine     Panic disorder without agoraphobia     Personal history of other diseases of the female genital tract     Personal history of other medical treatment (CODE)     Supraventricular tachycardia        Past Surgical History:    Past Surgical History:   Procedure Laterality Date    ANKLE SURGERY      1997, 2000,Left ankle surgery x 2    CYSTECTOMY PILONIDAL N/A 6/19/2023    Procedure: Delayed closure of vulva and perineal defectes with drain placment;  Surgeon: Anthony Nolan MD;  Location:  OR    HYSTERECTOMY TOTAL ABDOMINAL      11/1999,secondary to endometriosis, no malignancy; patient reports no malignancy, but abnormal cells were present *    IRRIGATION AND DEBRIDEMENT DECUBITUS WOUND, COMBINED N/A 6/1/2023    Procedure: IRRIGATION AND DEBRIDEMENT, PERINEAL, LABIAL and GLUTEAL INFECTION;  Surgeon: Anthony Nolan MD;  Location:  OR    IRRIGATION AND DEBRIDEMENT DECUBITUS WOUND, COMBINED N/A 6/3/2023    Procedure: Debridement and washout of vulvar and perineal wounds;  Surgeon: Anthony Nolan MD;  Location:  OR    LAPAROSCOPIC CHOLECYSTECTOMY      No Comments Provided    LAPAROSCOPY DIAGNOSTIC (GENERAL)      1995    OTHER SURGICAL HISTORY      21299,CRANIO/MAXILLOFACIAL SURGERY,Jaw Surgery    OTHER SURGICAL HISTORY      207040,[facility] EMG,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  No other abnormalities noted.       Family History:    Family History   Problem Relation Age of Onset    Cancer Father          Cancer,Bladder    Other - See Comments Father         Mild hypercholesterolemia    Other - See Comments Mother         scleroderma/lupus/Parkinson's syndrome    Family History Negative Brother         Good Health    Ariel-Danlos syndrome Daughter     Colon Cancer Maternal Grandmother 40        Cancer-colon,Followed by lung  with metastasis to the bone di*       Social History:  Marital Status:   [4]  Social History     Tobacco Use    Smoking status: Every Day     Packs/day: 0.50     Years: 30.00     Additional pack years: 0.00     Total pack years: 15.00     Types: Cigarettes, Vaping Device     Start date: 12/2/1986     Passive exposure: Current    Smokeless tobacco: Never    Tobacco comments:     trying   Vaping Use    Vaping Use: Every day    Substances: Nicotine, Flavoring    Devices: Disposable   Substance Use Topics    Alcohol use: Not Currently     Comment: OCCASIONAL WINE    Drug use: Yes     Types: Marijuana     Comment: medical cannabis; suboxone        Medications:    tiZANidine (ZANAFLEX) 2 MG tablet  acetaminophen (TYLENOL) 325 MG tablet  albuterol (PROAIR HFA/PROVENTIL HFA/VENTOLIN HFA) 108 (90 Base) MCG/ACT inhaler  amphetamine-dextroamphetamine (ADDERALL) 20 MG tablet  atenolol (TENORMIN) 100 MG tablet  buprenorphine-naloxone (ZUBSOLV) 5.7-1.4 MG sublingual tablet  Cholecalciferol (VITAMIN D-3) 125 MCG (5000 UT) TABS  clonazePAM (KLONOPIN) 2 MG tablet  diclofenac (VOLTAREN) 1 % topical gel  divalproex sodium extended-release (DEPAKOTE ER) 250 MG 24 hr tablet  fluticasone (FLONASE) 50 MCG/ACT nasal spray  ibuprofen (ADVIL/MOTRIN) 800 MG tablet  pramipexole (MIRAPEX) 0.25 MG tablet          Review of Systems   Constitutional:  Negative for fever.   HENT:  Negative for congestion.    Eyes:  Negative for visual disturbance.   Respiratory:  Negative for shortness of breath.    Cardiovascular:  Negative for chest pain.   Gastrointestinal:  Negative for abdominal pain, nausea and vomiting.    Genitourinary:  Negative for dysuria.   Musculoskeletal:         Right leg pain   Psychiatric/Behavioral:  Negative for confusion.        Physical Exam   BP: 101/73  Pulse: 98  Temp: 98.3  F (36.8  C)  Resp: 18  Weight: 65.3 kg (144 lb)  SpO2: 98 %      Physical Exam  Constitutional:       General: She is not in acute distress.     Appearance: She is well-developed. She is not diaphoretic.   HENT:      Head: Normocephalic and atraumatic.   Eyes:      General: No scleral icterus.     Conjunctiva/sclera: Conjunctivae normal.   Cardiovascular:      Rate and Rhythm: Normal rate and regular rhythm.   Pulmonary:      Effort: Pulmonary effort is normal.      Breath sounds: Normal breath sounds.   Abdominal:      Palpations: Abdomen is soft.      Tenderness: There is no abdominal tenderness.   Musculoskeletal:         General: Tenderness present. No deformity.      Cervical back: Neck supple.      Comments: Right upper thigh/groin/buttock pain with palpation   Lymphadenopathy:      Cervical: No cervical adenopathy.   Skin:     General: Skin is warm and dry.      Coloration: Skin is not jaundiced.      Findings: No rash.   Neurological:      General: No focal deficit present.      Mental Status: She is alert. Mental status is at baseline.   Psychiatric:         Mood and Affect: Mood normal.         Behavior: Behavior normal.         ED Course        Procedures              Critical Care time:  none               No results found for this or any previous visit (from the past 24 hour(s)).    Medications   acetaminophen (TYLENOL) tablet 500 mg (500 mg Oral $Given 3/8/24 1310)       Assessments & Plan (with Medical Decision Making)   Pt nontoxic in NAD. Heart, lung, bowel sounds normal, abd soft, nontender to palpation, nondistended. VSS, afebrile.     She does have Right upper thigh/groin/buttock pain with palpation. I feel no crepitus in these areas.  The visual exam appears very well with no erythema, swelling, ecchymosis  "or necrotic tissue.  She has full range of motion of all extremities, good distal CMS in all extremities.  In general she has a very well-appearing physical exam.  Lab work is grossly normal.    LRINEC score: 1.  Scores <6 were low risk -- but not no risk -- for necrotizing soft tissue infections.    From discharge:  As discussed, it is not entirely sure what is causing your discomfort at this time.  Your lab work appears excellent.  We discussed that this puts you at a lower risk of having any infection like you previously have had however not \"no\" risk.  We discussed obtaining further imaging including CT scans and ultrasound however was decided not to do those today.  I encouraged you to continue to follow-up with plastic surgery for reevaluation to see if they may be able to determine what could be causing your discomfort.  Continue to alternate Tylenol ibuprofen every 4 hours, we will send you home with a muscle relaxer, use ice and heat and referrals placed to follow-up with physical therapy.  Please return if there are worsening or concerning symptoms.    Strict return precautions are given to the pt, they will return if symptoms are worsening or concerning. The pt understands and agrees with the plan and they are discharged.     Chalino Mendoza PA-C    I have reviewed the nursing notes.    I have reviewed the findings, diagnosis, plan and need for follow up with the patient.          Discharge Medication List as of 3/8/2024  2:39 PM        START taking these medications    Details   tiZANidine (ZANAFLEX) 2 MG tablet Take 1 tablet (2 mg) by mouth 3 times daily for 10 days, Disp-30 tablet, R-0, E-Prescribe             Final diagnoses:   Right hip pain   Pain in limb       3/8/2024   [facility] AND Chalino Orellana PA  03/09/24 1458    "

## 2024-03-09 ASSESSMENT — ENCOUNTER SYMPTOMS
VOMITING: 0
SHORTNESS OF BREATH: 0
ABDOMINAL PAIN: 0
NAUSEA: 0
FEVER: 0
CONFUSION: 0
DYSURIA: 0

## 2024-04-09 DIAGNOSIS — G89.4 CHRONIC PAIN DISORDER: ICD-10-CM

## 2024-04-09 DIAGNOSIS — M54.41 CHRONIC BILATERAL LOW BACK PAIN WITH BILATERAL SCIATICA: ICD-10-CM

## 2024-04-09 DIAGNOSIS — G89.29 CHRONIC BILATERAL LOW BACK PAIN WITH BILATERAL SCIATICA: ICD-10-CM

## 2024-04-09 DIAGNOSIS — M54.42 CHRONIC BILATERAL LOW BACK PAIN WITH BILATERAL SCIATICA: ICD-10-CM

## 2024-04-10 NOTE — PROGRESS NOTES
"    DISCHARGE  Reason for Discharge: Patient has failed to schedule further appointments.  Patient did not show for subsequent scheduled visits.     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   Target Date 08/07/24   PT Goal 2   Goal Identifier standing posture   Goal Description Patient to tolerate standing upright with knees fully extended, hips in neutral to allow upright posture for self cares and household tasks.   Target Date 08/07/24   PT Goal 3   Goal Identifier gait   Goal Description Patient to have ability to walk with hip extension and therefore use of gluteal muscles to aid in stabilization of pelvis.   Target Date 08/07/24   PT Goal 4   Goal Identifier sleep   Goal Description Patient to sleep while lying supine with legs extended for up to 4 hours.   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 Patient underwent first of three surgery on 6/1/23. Had been dealing with significant pain since drain tubes were moved. Has a referreal to [facility] plastics in [place], waiting for scheduling. Pain varies. Will get \"zingers\" in to vaginal area, " pressure in vulva, pulling pains, sharp pains. Feels pain in right lower buttock at end of incision, right groin and hip, back of right thigh. Pain control has been alternating tylenol and advil with heat/ice. Pain from seams of undergarments as it rubs on skin. Limited sleep due to pain and positioning. Cannot tolerated lying with knees and hips fully extended. Unable to sit on right buttock, sits on left side with right leg crossed over. Walks bent with limited hip movement of right side to try and reduce pain. Also having low back pain   Treatment Interventions (PT)   Interventions Manual Therapy   Manual Therapy   Manual Therapy: Mobilization, MFR, MLD, friction massage minutes (85480) 30   Manual Therapy 1 MFR, organ specific fascial mobilization (OSFM) to reduce pain and restore function   Manual Therapy 1 - Details MFR/OSFM- right vesical arteries<>internal iliac artery, right obturator artery<>internal iliac artery, right obturator fascia (anterior)   Patient Response/Progress general listening now to deep pelvis (rectum). Patient noted discomfort with MFR but did tolerate. Noted increased right hip pain after wards. Did note that patient was able to stand and walk more upright after treatment.   Eval/Assessments   PT Eval, Moderate Complexity Minutes (88191) 15   Education   Learner/Method Patient;No Barriers to Learning   Plan   Home program belly breathing   Plan for next session continue, attempted to have patient schedule more appts but she was declined due to copay cost   Comments   Pelvic Health Informed Consent Statement Discussed with patient/guardian reason for referral regarding pelvic health needs and external/internal pelvic floor muscle examination.  Opportunity provided to ask questions and verbal consent for assessment and intervention was given.   Total Session Time   Timed Code Treatment Minutes 30   Total Treatment Time (sum of timed and untimed services) 45

## 2024-04-15 PROBLEM — F11.90 CHRONIC, CONTINUOUS USE OF OPIOIDS: Status: ACTIVE | Noted: 2024-02-09

## 2024-04-15 RX ORDER — TIZANIDINE 2 MG/1
2 TABLET ORAL 3 TIMES DAILY
OUTPATIENT
Start: 2024-04-15

## 2024-04-15 RX ORDER — IBUPROFEN 800 MG/1
800 TABLET, FILM COATED ORAL EVERY 8 HOURS PRN
Qty: 90 TABLET | Refills: 4 | OUTPATIENT
Start: 2024-04-15

## 2024-04-15 NOTE — TELEPHONE ENCOUNTER
[id number] sent Rx request for the following:      Requested Prescriptions   Pending Prescriptions Disp Refills    ibuprofen (ADVIL/MOTRIN) 800 MG tablet [Pharmacy Med Name: IBUPROFEN 800MG TABLET] 90 tablet 4     Sig: TAKE 1 TABLET (800 MG) BY MOUTH EVERY 8 HOURS AS NEEDED FOR MODERATE PAIN   Last Prescription Date:   8/15/23  Last Fill Qty/Refills:         90, R-4      NSAID Medications Failed - 4/15/2024  9:41 AM        Failed - Always Fail Criteria - Chart Review Required       tiZANidine (ZANAFLEX) 2 MG tablet       Sig: Take 1 tablet (2 mg) by mouth 3 times daily   Last Prescription Date:   3/8/24  Last Fill Qty/Refills:         30, R-0 (End 3/18/24)      There is no refill protocol information for this order        Last Office Visit:              1/8/24   Future Office visit:           None    PCP: Eryn Day [place]. Pharmacy notified. Mary Shafer RN .............. 4/15/2024  9:59 AM

## 2024-04-18 RX ORDER — TIZANIDINE 2 MG/1
2 TABLET ORAL 3 TIMES DAILY
Qty: 30 TABLET | Refills: 0 | OUTPATIENT
Start: 2024-04-18

## 2024-04-18 NOTE — TELEPHONE ENCOUNTER
Non- [facility] patient, seen in ER and given medication. Refused per protocol and pharmacy notified again to send to patient pcp Eryn Hurley in [place]. Debbie Rendon RN on 4/18/2024 at 5:11 PM

## 2024-05-13 ENCOUNTER — HOSPITAL ENCOUNTER (EMERGENCY)
Facility: OTHER | Age: 54
Discharge: HOME | End: 2024-05-13
Attending: STUDENT IN AN ORGANIZED HEALTH CARE EDUCATION/TRAINING PROGRAM | Admitting: STUDENT IN AN ORGANIZED HEALTH CARE EDUCATION/TRAINING PROGRAM
Payer: COMMERCIAL

## 2024-05-13 ENCOUNTER — OFFICE VISIT (OUTPATIENT)
Dept: FAMILY MEDICINE | Facility: OTHER | Age: 54
End: 2024-05-13
Payer: COMMERCIAL

## 2024-05-13 VITALS
RESPIRATION RATE: 16 BRPM | HEART RATE: 89 BPM | DIASTOLIC BLOOD PRESSURE: 78 MMHG | BODY MASS INDEX: 21.27 KG/M2 | OXYGEN SATURATION: 98 % | TEMPERATURE: 96.7 F | SYSTOLIC BLOOD PRESSURE: 116 MMHG | WEIGHT: 144 LBS

## 2024-05-13 VITALS
TEMPERATURE: 99.6 F | HEIGHT: 69 IN | WEIGHT: 144.9 LBS | SYSTOLIC BLOOD PRESSURE: 116 MMHG | RESPIRATION RATE: 16 BRPM | HEART RATE: 92 BPM | BODY MASS INDEX: 21.46 KG/M2 | DIASTOLIC BLOOD PRESSURE: 77 MMHG | OXYGEN SATURATION: 97 %

## 2024-05-13 DIAGNOSIS — G43.809 OTHER MIGRAINE WITHOUT STATUS MIGRAINOSUS, NOT INTRACTABLE: ICD-10-CM

## 2024-05-13 DIAGNOSIS — G43.019 INTRACTABLE MIGRAINE WITHOUT AURA AND WITHOUT STATUS MIGRAINOSUS: Primary | ICD-10-CM

## 2024-05-13 PROCEDURE — 250N000011 HC RX IP 250 OP 636: Performed by: STUDENT IN AN ORGANIZED HEALTH CARE EDUCATION/TRAINING PROGRAM

## 2024-05-13 PROCEDURE — 99284 EMERGENCY DEPT VISIT MOD MDM: CPT | Mod: 25,27

## 2024-05-13 PROCEDURE — 250N000013 HC RX MED GY IP 250 OP 250 PS 637: Performed by: STUDENT IN AN ORGANIZED HEALTH CARE EDUCATION/TRAINING PROGRAM

## 2024-05-13 PROCEDURE — 99213 OFFICE O/P EST LOW 20 MIN: CPT | Performed by: STUDENT IN AN ORGANIZED HEALTH CARE EDUCATION/TRAINING PROGRAM

## 2024-05-13 PROCEDURE — 96374 THER/PROPH/DIAG INJ IV PUSH: CPT

## 2024-05-13 PROCEDURE — 250N000009 HC RX 250: Performed by: STUDENT IN AN ORGANIZED HEALTH CARE EDUCATION/TRAINING PROGRAM

## 2024-05-13 PROCEDURE — 96361 HYDRATE IV INFUSION ADD-ON: CPT

## 2024-05-13 PROCEDURE — 96372 THER/PROPH/DIAG INJ SC/IM: CPT | Performed by: STUDENT IN AN ORGANIZED HEALTH CARE EDUCATION/TRAINING PROGRAM

## 2024-05-13 PROCEDURE — 258N000003 HC RX IP 258 OP 636: Performed by: STUDENT IN AN ORGANIZED HEALTH CARE EDUCATION/TRAINING PROGRAM

## 2024-05-13 PROCEDURE — 99283 EMERGENCY DEPT VISIT LOW MDM: CPT | Performed by: STUDENT IN AN ORGANIZED HEALTH CARE EDUCATION/TRAINING PROGRAM

## 2024-05-13 PROCEDURE — 96375 TX/PRO/DX INJ NEW DRUG ADDON: CPT

## 2024-05-13 PROCEDURE — G0463 HOSPITAL OUTPT CLINIC VISIT: HCPCS | Mod: 25

## 2024-05-13 RX ORDER — METOCLOPRAMIDE HYDROCHLORIDE 5 MG/ML
10 INJECTION INTRAMUSCULAR; INTRAVENOUS ONCE
Status: COMPLETED | OUTPATIENT
Start: 2024-05-13 | End: 2024-05-13

## 2024-05-13 RX ORDER — TIZANIDINE 2 MG/1
2 TABLET ORAL ONCE
Status: COMPLETED | OUTPATIENT
Start: 2024-05-13 | End: 2024-05-13

## 2024-05-13 RX ORDER — ONDANSETRON 4 MG/1
4 TABLET, ORALLY DISINTEGRATING ORAL EVERY 6 HOURS PRN
Status: ACTIVE | OUTPATIENT
Start: 2024-05-13

## 2024-05-13 RX ORDER — KETOROLAC TROMETHAMINE 30 MG/ML
30 INJECTION, SOLUTION INTRAMUSCULAR; INTRAVENOUS EVERY 6 HOURS PRN
Status: DISCONTINUED | OUTPATIENT
Start: 2024-05-13 | End: 2024-05-13

## 2024-05-13 RX ORDER — DEXAMETHASONE SODIUM PHOSPHATE 4 MG/ML
12 VIAL (ML) INJECTION ONCE
Status: COMPLETED | OUTPATIENT
Start: 2024-05-13 | End: 2024-05-13

## 2024-05-13 RX ORDER — DIPHENHYDRAMINE HCL 25 MG
50 CAPSULE ORAL ONCE
Status: COMPLETED | OUTPATIENT
Start: 2024-05-13 | End: 2024-05-13

## 2024-05-13 RX ORDER — KETOROLAC TROMETHAMINE 30 MG/ML
30 INJECTION, SOLUTION INTRAMUSCULAR; INTRAVENOUS EVERY 6 HOURS PRN
Status: ACTIVE | OUTPATIENT
Start: 2024-05-13 | End: 2024-05-18

## 2024-05-13 RX ORDER — METOCLOPRAMIDE 10 MG/1
10 TABLET ORAL EVERY 8 HOURS PRN
Qty: 30 TABLET | Refills: 0 | Status: SHIPPED | OUTPATIENT
Start: 2024-05-13 | End: 2024-05-30

## 2024-05-13 RX ORDER — OLANZAPINE 10 MG/1
2.5 INJECTION, POWDER, LYOPHILIZED, FOR SOLUTION INTRAMUSCULAR ONCE
Status: COMPLETED | OUTPATIENT
Start: 2024-05-13 | End: 2024-05-13

## 2024-05-13 RX ADMIN — OLANZAPINE 2.5 MG: 10 INJECTION, POWDER, FOR SOLUTION INTRAMUSCULAR at 21:46

## 2024-05-13 RX ADMIN — ONDANSETRON 4 MG: 4 TABLET, ORALLY DISINTEGRATING ORAL at 13:11

## 2024-05-13 RX ADMIN — DIPHENHYDRAMINE HYDROCHLORIDE 50 MG: 25 CAPSULE ORAL at 13:40

## 2024-05-13 RX ADMIN — TIZANIDINE 2 MG: 2 TABLET ORAL at 21:47

## 2024-05-13 RX ADMIN — KETOROLAC TROMETHAMINE 30 MG: 30 INJECTION, SOLUTION INTRAMUSCULAR at 13:11

## 2024-05-13 RX ADMIN — SODIUM CHLORIDE 1000 ML: 9 INJECTION, SOLUTION INTRAVENOUS at 21:46

## 2024-05-13 RX ADMIN — DEXAMETHASONE SODIUM PHOSPHATE 12 MG: 4 INJECTION, SOLUTION INTRA-ARTICULAR; INTRALESIONAL; INTRAMUSCULAR; INTRAVENOUS; SOFT TISSUE at 13:39

## 2024-05-13 RX ADMIN — METOCLOPRAMIDE 10 MG: 5 INJECTION, SOLUTION INTRAMUSCULAR; INTRAVENOUS at 21:46

## 2024-05-13 ASSESSMENT — COLUMBIA-SUICIDE SEVERITY RATING SCALE - C-SSRS
2. HAVE YOU ACTUALLY HAD ANY THOUGHTS OF KILLING YOURSELF IN THE PAST MONTH?: NO
6. HAVE YOU EVER DONE ANYTHING, STARTED TO DO ANYTHING, OR PREPARED TO DO ANYTHING TO END YOUR LIFE?: NO
1. IN THE PAST MONTH, HAVE YOU WISHED YOU WERE DEAD OR WISHED YOU COULD GO TO SLEEP AND NOT WAKE UP?: NO

## 2024-05-13 ASSESSMENT — PAIN SCALES - GENERAL: PAINLEVEL: WORST PAIN (10)

## 2024-05-13 ASSESSMENT — ACTIVITIES OF DAILY LIVING (ADL)
ADLS_ACUITY_SCORE: 37
ADLS_ACUITY_SCORE: 37

## 2024-05-13 NOTE — NURSING NOTE
"Chief Complaint   Patient presents with    Headache     Since 10 AM yesterday 5/12/24       Initial /77 (BP Location: Left arm, Patient Position: Sitting, Cuff Size: Adult Regular)   Pulse 92   Temp 98.6  F (37  C) (Temporal)   Resp 16   Ht 1.753 m (5' 9\")   Wt 65.7 kg (144 lb 14.4 oz)   LMP 11/01/1999 (Exact Date)   SpO2 97%   BMI 21.40 kg/m   Estimated body mass index is 21.4 kg/m  as calculated from the following:    Height as of this encounter: 1.753 m (5' 9\").    Weight as of this encounter: 65.7 kg (144 lb 14.4 oz).  Medication Review: complete    The next two questions are to help us understand your food security.  If you are feeling you need any assistance in this area, we have resources available to support you today.          1/8/2024   SDOH- Food Insecurity   Within the past 12 months, did you worry that your food would run out before you got money to buy more? N   Within the past 12 months, did the food you bought just not last and you didn t have money to get more? N           Oliver Redmond      "

## 2024-05-13 NOTE — PROGRESS NOTES
"  Assessment & Plan     (G43.019) Intractable migraine without aura and without status migrainosus  (primary encounter diagnosis)    Comment: migraine, similar to past migraines. It came on over a few hours rather than instant. Started one day ago. Worsening by sleep which she states in normal for her. Migraine cocktail given in office.      Plan: diphenhydrAMINE (BENADRYL) capsule 50 mg,         ondansetron (ZOFRAN ODT) ODT tab 4 mg,         dexAMETHasone (DECADRON) injectable solution         used ORALLY 12 mg, ketorolac (TORADOL)         injection 30 mg, DISCONTINUED: ketorolac         (TORADOL) injection 30 mg        Continue OTC management. Follow up with PCP for persisting symptoms. Return to rapid clinic or ER for worsening symptoms despite medications given today. She is agreeable with this plan.    Subjective   Gemini is a 54 year old, presenting for the following health issues:  Headache (Since 10 AM yesterday 5/12/24)    HPI     Patient presents today with concerns of migraine headache. States it started slowly over a few hours yesterday. Gradually worsened throughout the night. It is accompanied by light and sound sensitivity.  Headache is mostly over the right side of the head but does extend over the back of the head across and into the neck.  States this is typical for her migraines.  She did take ibuprofen this morning around 8 AM.  She does rate pain at a 10 out of 10.  She has also felt chilled and sweaty.  No sore throat, slight congestion.      Review of Systems  Constitutional, HEENT, cardiovascular, pulmonary, gi and gu systems are negative, except as otherwise noted.          Objective    /77 (BP Location: Left arm, Patient Position: Sitting, Cuff Size: Adult Regular)   Pulse 92   Temp 99.6  F (37.6  C) (Tympanic)   Resp 16   Ht 1.753 m (5' 9\")   Wt 65.7 kg (144 lb 14.4 oz)   LMP 11/01/1999 (Exact Date)   SpO2 97%   BMI 21.40 kg/m    Body mass index is 21.4 kg/m .      Physical " Exam   GENERAL: alert and no distress  NECK: no adenopathy, no asymmetry, masses, or scars  RESP: lungs clear to auscultation - no rales, rhonchi or wheezes  CV: regular rate and rhythm, normal S1 S2, no S3 or S4, no murmur, click or rub, no peripheral edema  MS: no gross musculoskeletal defects noted, no edema  SKIN: no suspicious lesions or rashes  NEURO: Normal strength and tone, mentation intact and speech normal  PSYCH: mentation appears normal, affect normal/bright, cranial nerves 2-12 grossly intact.        Signed Electronically by: Do Cam PA-C

## 2024-05-13 NOTE — PATIENT INSTRUCTIONS
Migraine with viral illness    Migraine medications were given in the office today.    Continue home management.    Rest.    Follow-up with primary care.    Return to rapid clinic or ER for worsening or changing symptoms.

## 2024-05-14 NOTE — DISCHARGE INSTRUCTIONS
Likely discussed, get plenty of rest and hydration over the next 24 hours.  Use the prescribed Reglan in addition to ibuprofen Tylenol for any lingering symptoms.  Return if symptoms significantly worsen.

## 2024-05-14 NOTE — ED PROVIDER NOTES
Emergency Department Provider Note  : 1970 Age: 54 year old Sex: female MRN: [id number]    Chief Complaint   Patient presents with    Headache                       Medical Decision Making / Assessment / Plan   54 year old female with a PMH of migraines who presents the context of what sounds like viral gastroenteritis with ongoing migraine after being treated in clinic today.  She is very well-appearing afebrile and hemodynamically normal.  Ranging her neck without difficulty and very low suspicion for meningitis or encephalitis as a cause of her symptoms.  This seems more consistent with ongoing migrainous phenomenon in the context of a viral illness.  Will treat with fluids, this is Reglan, Zyprexa, and per the patient's request tizanidine.  When feeling improved, will plan on discharge home with ongoing Reglan use show prescribed here, ibuprofen, Tylenol, return precautions.     On reevaluation, the patient is feeling significantly improved and sleeping.  Discussed going home with the patient is comfortable with that plan with Reglan prescription to be used in addition to ibuprofen Tylenol.  Return precautions given.    The patient was informed of the plan and verbalized understanding and agreed with the plan. The patient was given strict return to Emergency Department precautions as well as appropriate follow up instructions. The patient was discharged in stable condition.    New Prescriptions    METOCLOPRAMIDE (REGLAN) 10 MG TABLET    Take 1 tablet (10 mg) by mouth every 8 hours as needed (nausea/migraine)       Final diagnoses:   Other migraine without status migrainosus, not intractable       Gabe Bay MD  2024   Emergency Department    Subjective   Kym is a 54 year old female with PMH of migraines who was seen in clinic earlier today with a migraine, treated symptomatically with the presumption that she was also experiencing a viral illness who presents with recurrent migrainous  symptoms this evening.  No new features otherwise.  Not thunderclap in onset.  Sensations identical to prior migraines.  Did not take any medications at home after leaving clinic.  Viral symptoms including fever, nausea, vomiting, diarrhea.    I have reviewed the Medications, Allergies, Past Medical and Surgical History, and Social History in the Epic System and with family.    Review of Systems:  Please see HPI for pertinent positives and negatives. All other systems reviewed and found to be negative.      Objective   BP: 116/78  Pulse: 89  Temp: (!) 96.7  F (35.9  C)  Resp: 16  Weight: 65.3 kg (144 lb)  SpO2: 98 %    Physical Exam:   Gen: Comfortable. NAD  HEENT: Ranging neck without difficulty.  Eye: EOMI.   CV: Well perfused.   Pulm: Nonlabored, equal chest rise  Abd: ND.   Ext: No significant edema.  Neuro: AOx3, no focal deficit noted  Psych: Appropriate affect, cognition intact    Procedures / Critical Care   Procedures    Critical Care Time: none         Medical/Surgical History:  Past Medical History:   Diagnosis Date    Acquired absence of other organs (CODE)     9/2/2011    Chronic pain syndrome     No Comments Provided    Complex regional pain syndrome I     left ankle    Encounter for other administrative examinations     10/2012,Hydrocodone 10/325mg, #240/mo signed 10/3/12, updated 10/1/ 14    Encounter for other administrative examinations     10/1/2014,MS Contin 30 mg q 8 hours #90 per month.  Morphine sulfate IR 15 mg 2-3 tab tid prn #180 per month    Gastro-esophageal reflux disease without esophagitis     No Comments Provided    Injury of left ankle     1997,requiring surgery    Low back pain     No Comments Provided    Major depressive disorder, recurrent severe without psychotic features (H)     No Comments Provided    Migraine without status migrainosus, not intractable     No Comments Provided    Nicotine dependence, uncomplicated     No Comments Provided    Obesity     No Comments Provided     Overweight     11/25/2014    Pain in ankle     Chronic left ankle pain secondary to reflex sympathetic dystrophy.  Patient  has had 4 previous surgeries, the most recent one done by Dr. Noriega in  2007. On Narcotic contract.    Pain in thoracic spine     No Comments Provided    Panic disorder without agoraphobia     Dr Reynoso    Personal history of other diseases of the female genital tract     No Comments Provided    Personal history of other medical treatment (CODE)     G2, P1-0-1-1 with one vaginal delivery.    Supraventricular tachycardia (H24)     5/24/2010     Past Surgical History:   Procedure Laterality Date    ANKLE SURGERY      1997, 2000,Left ankle surgery x 2    CYSTECTOMY PILONIDAL N/A 6/19/2023    Procedure: Delayed closure of vulva and perineal defectes with drain placment;  Surgeon: Anthony Nolan MD;  Location:  OR    HYSTERECTOMY TOTAL ABDOMINAL      11/1999,secondary to endometriosis, no malignancy; patient reports no malignancy, but abnormal cells were present *    IRRIGATION AND DEBRIDEMENT DECUBITUS WOUND, COMBINED N/A 6/1/2023    Procedure: IRRIGATION AND DEBRIDEMENT, PERINEAL, LABIAL and GLUTEAL INFECTION;  Surgeon: Anthony Nolan MD;  Location: [facility] OR    IRRIGATION AND DEBRIDEMENT DECUBITUS WOUND, COMBINED N/A 6/3/2023    Procedure: Debridement and washout of vulvar and perineal wounds;  Surgeon: Anthony Nolan MD;  Location:  OR    LAPAROSCOPIC CHOLECYSTECTOMY      No Comments Provided    LAPAROSCOPY DIAGNOSTIC (GENERAL)      1995    OTHER SURGICAL HISTORY      21299,CRANIO/MAXILLOFACIAL SURGERY,Jaw Surgery    OTHER SURGICAL HISTORY      207040,[facility] EMG,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  No other abnormalities noted.       Medications:  Current Facility-Administered Medications   Medication Dose Route Frequency Provider Last Rate Last Admin    ketorolac (TORADOL) injection 30 mg  30 mg Intramuscular Q6H PRN Do Cam PA-C   30 mg at 05/13/24 1311     ondansetron (ZOFRAN ODT) ODT tab 4 mg  4 mg Oral Q6H PRN    4 mg at 05/13/24 1311     Current Outpatient Medications   Medication Sig Dispense Refill    metoclopramide (REGLAN) 10 MG tablet Take 1 tablet (10 mg) by mouth every 8 hours as needed (nausea/migraine) 30 tablet 0    acetaminophen (TYLENOL) 325 MG tablet Take 2 tablets (650 mg) by mouth every 4 hours as needed for other (mild pain) 100 tablet 0    albuterol (PROAIR HFA/PROVENTIL HFA/VENTOLIN HFA) 108 (90 Base) MCG/ACT inhaler INHALE 2 PUFFS INTO THE LUNGS EVERY 4 HOURS AS NEEDED FOR SHORTNESS OF BREATH / DYSPNEA OR WHEEZING 8.5 g 11    amphetamine-dextroamphetamine (ADDERALL) 20 MG tablet Take 30 mg by mouth 2 times daily      atenolol (TENORMIN) 100 MG tablet Take 1 tablet (100 mg) by mouth daily 90 tablet 2    buprenorphine-naloxone (ZUBSOLV) 5.7-1.4 MG sublingual tablet Place 0.25 tablets under the tongue daily (Patient not taking: Reported on 5/13/2024)      Cholecalciferol (VITAMIN D-3) 125 MCG (5000 UT) TABS Take 1 tablet by mouth daily 90 tablet 11    clonazePAM (KLONOPIN) 2 MG tablet Take 1 tablet (2 mg) by mouth 2 times daily      diclofenac (VOLTAREN) 1 % topical gel Apply 4 g topically 4 times daily 350 g 0    divalproex sodium extended-release (DEPAKOTE ER) 250 MG 24 hr tablet Take 1 tablet by mouth 3 times daily      fluticasone (FLONASE) 50 MCG/ACT nasal spray Spray 2 sprays into both nostrils daily as needed for rhinitis or allergies 16 g 2    ibuprofen (ADVIL/MOTRIN) 800 MG tablet TAKE 1 TABLET (800 MG) BY MOUTH EVERY 8 HOURS AS NEEDED FOR MODERATE PAIN 90 tablet 4    pramipexole (MIRAPEX) 0.25 MG tablet Take 1 tablet (0.25 mg) by mouth at bedtime for 15 days 15 tablet 0       Allergies:  Diclofenac-misoprostol, Indomethacin, Meloxicam, and Moxifloxacin    Relevant labs, images, EKGs, Epic and outside hospital (if applicable) charts were reviewed. The findings, diagnosis, plan, and need for follow up were discussed with the patient/family.  Nursing notes were reviewed.      Gabe Bay MD  05/13/24 5512

## 2024-05-14 NOTE — ED TRIAGE NOTES
Pt is here today for an on-going migraine.   She was seen today in the [facility] and was Dx with a migraine.   She was given Decadron, Benadryl, Toradol and Zofran in the [facility].   She felt better and was discharged home.   She was not given any medications for at home.   She is c/o of headache, neck pain, fatigue and nausea.  States that her  vision is a little tunneled at times as well.       /78   Pulse 89   Temp (!) 96.7  F (35.9  C) (Temporal)   Resp 16   Wt 65.3 kg (144 lb)   LMP 11/01/1999 (Exact Date)   SpO2 98%   BMI 21.27 kg/m    Raiza Grimes RN on 5/13/2024 at 9:11 PM       Triage Assessment (Adult)       Row Name 05/13/24 0193          Triage Assessment    Airway WDL WDL        Respiratory WDL    Respiratory WDL WDL        Skin Circulation/Temperature WDL    Skin Circulation/Temperature WDL WDL        Cardiac WDL    Cardiac WDL WDL        Peripheral/Neurovascular WDL    Peripheral Neurovascular WDL WDL        Cognitive/Neuro/Behavioral WDL    Cognitive/Neuro/Behavioral WDL WDL

## 2024-05-17 ENCOUNTER — TELEPHONE (OUTPATIENT)
Dept: FAMILY MEDICINE | Facility: OTHER | Age: 54
End: 2024-05-17

## 2024-05-17 NOTE — TELEPHONE ENCOUNTER
9:06 AM    Reason for Call: OVERBOOK    Patient is having the following symptoms: Ariel Danlos Syndrome flare up for 1 weeks.     Pt is also requesting to start COVID vaccines.     The patient is requesting an appointment for Soon with Dr Hurley.    Was an appointment offered for this call? No  If yes : Appointment type              Date    Preferred method for responding to this message: Telephone Call  What is your phone number ?[phone]     If we cannot reach you directly, may we leave a detailed response at the number you provided? Yes    Can this message wait until your PCP/provider returns, if unavailable today? Not applicable    Marlys Dodd

## 2024-05-30 ENCOUNTER — OFFICE VISIT (OUTPATIENT)
Dept: FAMILY MEDICINE | Facility: OTHER | Age: 54
End: 2024-05-30
Attending: FAMILY MEDICINE
Payer: COMMERCIAL

## 2024-05-30 ENCOUNTER — TELEPHONE (OUTPATIENT)
Dept: FAMILY MEDICINE | Facility: OTHER | Age: 54
End: 2024-05-30

## 2024-05-30 VITALS
TEMPERATURE: 97.4 F | RESPIRATION RATE: 16 BRPM | HEART RATE: 92 BPM | BODY MASS INDEX: 21.27 KG/M2 | OXYGEN SATURATION: 95 % | SYSTOLIC BLOOD PRESSURE: 110 MMHG | HEIGHT: 69 IN | DIASTOLIC BLOOD PRESSURE: 70 MMHG

## 2024-05-30 DIAGNOSIS — M54.41 CHRONIC BILATERAL LOW BACK PAIN WITH BILATERAL SCIATICA: ICD-10-CM

## 2024-05-30 DIAGNOSIS — R11.0 NAUSEA: Primary | ICD-10-CM

## 2024-05-30 DIAGNOSIS — R11.0 NAUSEA: ICD-10-CM

## 2024-05-30 DIAGNOSIS — G89.4 CHRONIC PAIN DISORDER: Primary | ICD-10-CM

## 2024-05-30 DIAGNOSIS — G89.29 CHRONIC BILATERAL LOW BACK PAIN WITH BILATERAL SCIATICA: ICD-10-CM

## 2024-05-30 DIAGNOSIS — M54.42 CHRONIC BILATERAL LOW BACK PAIN WITH BILATERAL SCIATICA: ICD-10-CM

## 2024-05-30 DIAGNOSIS — N76.82 FOURNIER'S GANGRENE IN FEMALE (H): ICD-10-CM

## 2024-05-30 DIAGNOSIS — Z12.11 SPECIAL SCREENING FOR MALIGNANT NEOPLASMS, COLON: ICD-10-CM

## 2024-05-30 DIAGNOSIS — Z12.31 ENCOUNTER FOR SCREENING MAMMOGRAM FOR BREAST CANCER: ICD-10-CM

## 2024-05-30 DIAGNOSIS — Z23 HIGH PRIORITY FOR 2019-NCOV VACCINE: ICD-10-CM

## 2024-05-30 DIAGNOSIS — M26.609 TMJ (TEMPOROMANDIBULAR JOINT SYNDROME): ICD-10-CM

## 2024-05-30 PROCEDURE — G2211 COMPLEX E/M VISIT ADD ON: HCPCS | Performed by: FAMILY MEDICINE

## 2024-05-30 PROCEDURE — 90480 ADMN SARSCOV2 VAC 1/ONLY CMP: CPT

## 2024-05-30 PROCEDURE — G0463 HOSPITAL OUTPT CLINIC VISIT: HCPCS | Mod: 25

## 2024-05-30 PROCEDURE — G0463 HOSPITAL OUTPT CLINIC VISIT: HCPCS

## 2024-05-30 PROCEDURE — 99214 OFFICE O/P EST MOD 30 MIN: CPT | Performed by: FAMILY MEDICINE

## 2024-05-30 RX ORDER — IBUPROFEN 800 MG/1
800 TABLET, FILM COATED ORAL EVERY 8 HOURS PRN
Qty: 90 TABLET | Refills: 4 | Status: SHIPPED | OUTPATIENT
Start: 2024-05-30

## 2024-05-30 RX ORDER — ONDANSETRON 4 MG/1
4 TABLET, ORALLY DISINTEGRATING ORAL EVERY 8 HOURS PRN
Qty: 45 TABLET | Refills: 1 | Status: SHIPPED | OUTPATIENT
Start: 2024-05-30 | End: 2024-05-31 | Stop reason: ALTCHOICE

## 2024-05-30 RX ORDER — BUPRENORPHINE 8 MG/1
8 TABLET SUBLINGUAL 3 TIMES DAILY
COMMUNITY
Start: 2024-05-20

## 2024-05-30 ASSESSMENT — ASTHMA QUESTIONNAIRES
ACT_TOTALSCORE: 20
QUESTION_1 LAST FOUR WEEKS HOW MUCH OF THE TIME DID YOUR ASTHMA KEEP YOU FROM GETTING AS MUCH DONE AT WORK, SCHOOL OR AT HOME: NONE OF THE TIME
QUESTION_3 LAST FOUR WEEKS HOW OFTEN DID YOUR ASTHMA SYMPTOMS (WHEEZING, COUGHING, SHORTNESS OF BREATH, CHEST TIGHTNESS OR PAIN) WAKE YOU UP AT NIGHT OR EARLIER THAN USUAL IN THE MORNING: NOT AT ALL
QUESTION_4 LAST FOUR WEEKS HOW OFTEN HAVE YOU USED YOUR RESCUE INHALER OR NEBULIZER MEDICATION (SUCH AS ALBUTEROL): TWO OR THREE TIMES PER WEEK
QUESTION_5 LAST FOUR WEEKS HOW WOULD YOU RATE YOUR ASTHMA CONTROL: WELL CONTROLLED
ACT_TOTALSCORE: 20
QUESTION_2 LAST FOUR WEEKS HOW OFTEN HAVE YOU HAD SHORTNESS OF BREATH: THREE TO SIX TIMES A WEEK

## 2024-05-30 ASSESSMENT — ANXIETY QUESTIONNAIRES
GAD7 TOTAL SCORE: 13
4. TROUBLE RELAXING: MORE THAN HALF THE DAYS
7. FEELING AFRAID AS IF SOMETHING AWFUL MIGHT HAPPEN: MORE THAN HALF THE DAYS
GAD7 TOTAL SCORE: 13
7. FEELING AFRAID AS IF SOMETHING AWFUL MIGHT HAPPEN: MORE THAN HALF THE DAYS
3. WORRYING TOO MUCH ABOUT DIFFERENT THINGS: MORE THAN HALF THE DAYS
GAD7 TOTAL SCORE: 13
5. BEING SO RESTLESS THAT IT IS HARD TO SIT STILL: SEVERAL DAYS
IF YOU CHECKED OFF ANY PROBLEMS ON THIS QUESTIONNAIRE, HOW DIFFICULT HAVE THESE PROBLEMS MADE IT FOR YOU TO DO YOUR WORK, TAKE CARE OF THINGS AT HOME, OR GET ALONG WITH OTHER PEOPLE: VERY DIFFICULT
8. IF YOU CHECKED OFF ANY PROBLEMS, HOW DIFFICULT HAVE THESE MADE IT FOR YOU TO DO YOUR WORK, TAKE CARE OF THINGS AT HOME, OR GET ALONG WITH OTHER PEOPLE?: VERY DIFFICULT
1. FEELING NERVOUS, ANXIOUS, OR ON EDGE: MORE THAN HALF THE DAYS
2. NOT BEING ABLE TO STOP OR CONTROL WORRYING: MORE THAN HALF THE DAYS
6. BECOMING EASILY ANNOYED OR IRRITABLE: MORE THAN HALF THE DAYS

## 2024-05-30 ASSESSMENT — ENCOUNTER SYMPTOMS
ROS GI COMMENTS: FECAL URGENCY
BACK PAIN: 1
ARTHRALGIAS: 1

## 2024-05-30 ASSESSMENT — PATIENT HEALTH QUESTIONNAIRE - PHQ9
10. IF YOU CHECKED OFF ANY PROBLEMS, HOW DIFFICULT HAVE THESE PROBLEMS MADE IT FOR YOU TO DO YOUR WORK, TAKE CARE OF THINGS AT HOME, OR GET ALONG WITH OTHER PEOPLE: VERY DIFFICULT
SUM OF ALL RESPONSES TO PHQ QUESTIONS 1-9: 13
SUM OF ALL RESPONSES TO PHQ QUESTIONS 1-9: 13

## 2024-05-30 NOTE — TELEPHONE ENCOUNTER
PRIOR AUTHORIZATION DENIED    Medication: ONDANSETRON 4 MG PO TBDP  Insurance Company: [employer] ([facility]) - Phone [phone] Fax [phone]  Denial Date: 5/30/2024  Denial Reason(s):     Appeal Information:     Patient Notified: No     - - -

## 2024-05-30 NOTE — PROGRESS NOTES
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 Will work to get referral faxed    Nausea  - ondansetron (ZOFRAN ODT) 4 MG ODT tab; Take 1 tablet (4 mg) by mouth every 8 hours as needed for nausea    Encounter for screening mammogram for breast cancer  - MA Screen Bilateral w/Dontae; Future    Special screening for malignant neoplasms, colon  - Colonoscopy Screening  Referral; Future    High priority for 2019-nCoV vaccine  Given today     The longitudinal plan of care for the diagnosis(es)/condition(s) as documented were addressed during this visit. Due to the added complexity in care, I will continue to support Gemini in the subsequent management and with ongoing continuity of care.      See Patient Instructions    Return in about 3 months (around 8/30/2024) for Lab Work, Physical Exam.    Subjective   Gemini is a 54 year old, presenting for the following health issues:  Back Pain        5/30/2024     2:36 PM   Additional Questions   Roomed by Rosetta Rich   Accompanied by none         5/30/2024     2:36 PM   Patient Reported Additional Medications   Patient reports taking the following new medications none     History of Present Illness       Back Pain:  She presents for follow up of back pain. Patient's back pain is a chronic problem.  Location of back pain:  Left middle of back and other  Description of back pain: burning, cramping, fullness, sharp,  shooting, stabbing and other  Back pain spreads: right buttocks, left buttocks, right thigh, left thigh, right knee, left knee, right foot and left foot    Since patient first noticed back pain, pain is: gradually worsening  Does back pain interfere with her job:  Yes       She eats 2-3 servings of fruits and vegetables daily.She consumes 0 sweetened beverage(s) daily.She exercises with enough effort to increase her heart rate 9 or less minutes per day.  She exercises with enough effort to increase her heart rate 3 or less days per week. She is missing 2 dose(s) of medications per week.  She is not taking prescribed medications regularly due to remembering to take.       Chronic/Recurring Back Pain Follow Up    Where is your back pain located? (Select all that apply) middle of back left  How would you describe your back pain?  burning, cramping, fullness, sharp, shooting, and stabbing  Where does your back pain spread? the right and left buttock, the right and left  thigh, the right and left  knee, and the right and left foot  Since your last clinic visit for back pain, how has your pain changed? gradually worsening  Does your back pain interfere with your job? YES  Since your last visit, have you tried any new treatment? Yes -  activity or exercise, chiropractor, heat, muscle relaxants, NSAIDs (Ibuprofen, Naproxen), Physical Therapy, stretching, and topical pain relievers    - dx with EDS. No genetic testing.   - incision line going towards CRPS  - lower back along with hip are painful -> if she does too much, she needs to rest for 4 to 5 days  - ibu and tizanidine for jaw pain   - was getting zofran for n/v  - referral to plastic - but referral did not get to [facility] [place]    # h/o Julio's gangrene     Wt Readings from Last 4 Encounters:   05/13/24 65.3 kg (144 lb)   05/13/24 65.7 kg (144 lb 14.4 oz)   03/08/24 65.3 kg (144 lb)   01/08/24 68.9 kg (151 lb 12.8 oz)         Estimated body mass index is 21.27 kg/m   "as calculated from the following:    Height as of this encounter: 1.753 m (5' 9\").    Weight as of 5/13/24: 65.3 kg (144 lb).    Complete \"Weight Managment Plan\" in the progress note from the Adult Preventative or Medicare smartsets, use phrase .WEIGHTPLAN, or choose an option from Weight Management Resources smartset below.      Review of Systems   Gastrointestinal:         Fecal urgency    Musculoskeletal:  Positive for arthralgias and back pain.           Objective    /70 (BP Location: Right arm, Patient Position: Sitting, Cuff Size: Adult Regular)   Pulse 92   Temp 97.4  F (36.3  C) (Tympanic)   Resp 16   Ht 1.753 m (5' 9\")   LMP 11/01/1999 (Exact Date)   SpO2 95%   BMI 21.27 kg/m    Body mass index is 21.27 kg/m .  Physical Exam  Constitutional:       General: She is not in acute distress.     Appearance: She is not ill-appearing.   Cardiovascular:      Rate and Rhythm: Normal rate and regular rhythm.      Heart sounds: No murmur heard.  Pulmonary:      Effort: Pulmonary effort is normal. No respiratory distress.      Breath sounds: No wheezing or rales.   Neurological:      Mental Status: She is alert.   Psychiatric:         Mood and Affect: Mood is depressed.          Creatinine   Date Value Ref Range Status   03/08/2024 0.74 0.51 - 0.95 mg/dL Final   03/06/2021 0.87 0.60 - 1.20 mg/dL Final           Signed Electronically by: Eryn Hurley MD    "

## 2024-05-31 RX ORDER — ONDANSETRON 4 MG/1
4 TABLET, FILM COATED ORAL EVERY 8 HOURS PRN
Qty: 45 TABLET | Refills: 1 | Status: SHIPPED | OUTPATIENT
Start: 2024-05-31

## 2024-06-03 ENCOUNTER — TELEPHONE (OUTPATIENT)
Dept: FAMILY MEDICINE | Facility: OTHER | Age: 54
End: 2024-06-03

## 2024-06-07 ENCOUNTER — HOSPITAL ENCOUNTER (OUTPATIENT)
Dept: CT IMAGING | Facility: HOSPITAL | Age: 54
Discharge: HOME | End: 2024-06-07
Attending: FAMILY MEDICINE
Payer: COMMERCIAL

## 2024-06-07 ENCOUNTER — OFFICE VISIT (OUTPATIENT)
Dept: FAMILY MEDICINE | Facility: OTHER | Age: 54
End: 2024-06-07
Attending: FAMILY MEDICINE
Payer: COMMERCIAL

## 2024-06-07 ENCOUNTER — TELEPHONE (OUTPATIENT)
Dept: FAMILY MEDICINE | Facility: OTHER | Age: 54
End: 2024-06-07

## 2024-06-07 ENCOUNTER — TELEPHONE (OUTPATIENT)
Dept: CARE COORDINATION | Facility: OTHER | Age: 54
End: 2024-06-07

## 2024-06-07 ENCOUNTER — LAB (OUTPATIENT)
Dept: LAB | Facility: OTHER | Age: 54
End: 2024-06-07
Attending: FAMILY MEDICINE
Payer: COMMERCIAL

## 2024-06-07 VITALS
HEIGHT: 69 IN | WEIGHT: 149.7 LBS | DIASTOLIC BLOOD PRESSURE: 58 MMHG | BODY MASS INDEX: 22.17 KG/M2 | HEART RATE: 77 BPM | TEMPERATURE: 97.4 F | OXYGEN SATURATION: 96 % | RESPIRATION RATE: 17 BRPM | SYSTOLIC BLOOD PRESSURE: 90 MMHG

## 2024-06-07 DIAGNOSIS — Z72.0 TOBACCO USE: ICD-10-CM

## 2024-06-07 DIAGNOSIS — Z80.52 FHX: BLADDER CANCER: ICD-10-CM

## 2024-06-07 DIAGNOSIS — M25.531 RIGHT WRIST PAIN: ICD-10-CM

## 2024-06-07 DIAGNOSIS — R39.9 URINARY SYMPTOM OR SIGN: Primary | ICD-10-CM

## 2024-06-07 DIAGNOSIS — K59.00 CONSTIPATION, UNSPECIFIED CONSTIPATION TYPE: ICD-10-CM

## 2024-06-07 DIAGNOSIS — R31.9 HEMATURIA, UNSPECIFIED TYPE: Primary | ICD-10-CM

## 2024-06-07 DIAGNOSIS — R31.9 HEMATURIA, UNSPECIFIED TYPE: ICD-10-CM

## 2024-06-07 DIAGNOSIS — R39.9 URINARY SYMPTOM OR SIGN: ICD-10-CM

## 2024-06-07 LAB
ALBUMIN SERPL BCG-MCNC: 4.2 G/DL (ref 3.5–5.2)
ALBUMIN UR-MCNC: 30 MG/DL
ALP SERPL-CCNC: 72 U/L (ref 40–150)
ALT SERPL W P-5'-P-CCNC: 12 U/L (ref 0–50)
ANION GAP SERPL CALCULATED.3IONS-SCNC: 10 MMOL/L (ref 7–15)
APPEARANCE UR: ABNORMAL
AST SERPL W P-5'-P-CCNC: 17 U/L (ref 0–45)
BACTERIA #/AREA URNS HPF: ABNORMAL /HPF
BASOPHILS # BLD AUTO: 0.1 10E3/UL (ref 0–0.2)
BASOPHILS NFR BLD AUTO: 1 %
BILIRUB SERPL-MCNC: 0.2 MG/DL
BILIRUB UR QL STRIP: NEGATIVE
BUN SERPL-MCNC: 17 MG/DL (ref 6–20)
CALCIUM SERPL-MCNC: 8.9 MG/DL (ref 8.6–10)
CHLORIDE SERPL-SCNC: 101 MMOL/L (ref 98–107)
COLOR UR AUTO: ABNORMAL
CREAT SERPL-MCNC: 0.72 MG/DL (ref 0.51–0.95)
CRP SERPL-MCNC: <3 MG/L
DEPRECATED HCO3 PLAS-SCNC: 25 MMOL/L (ref 22–29)
EGFRCR SERPLBLD CKD-EPI 2021: >90 ML/MIN/1.73M2
EOSINOPHIL # BLD AUTO: 0.2 10E3/UL (ref 0–0.7)
EOSINOPHIL NFR BLD AUTO: 2 %
ERYTHROCYTE [DISTWIDTH] IN BLOOD BY AUTOMATED COUNT: 12.8 % (ref 10–15)
GLUCOSE SERPL-MCNC: 105 MG/DL (ref 70–99)
GLUCOSE UR STRIP-MCNC: NEGATIVE MG/DL
HCT VFR BLD AUTO: 37.2 % (ref 35–47)
HGB BLD-MCNC: 12.4 G/DL (ref 11.7–15.7)
HGB UR QL STRIP: ABNORMAL
IMM GRANULOCYTES # BLD: 0 10E3/UL
IMM GRANULOCYTES NFR BLD: 0 %
KETONES UR STRIP-MCNC: NEGATIVE MG/DL
LEUKOCYTE ESTERASE UR QL STRIP: NEGATIVE
LYMPHOCYTES # BLD AUTO: 3.3 10E3/UL (ref 0.8–5.3)
LYMPHOCYTES NFR BLD AUTO: 43 %
MCH RBC QN AUTO: 31.2 PG (ref 26.5–33)
MCHC RBC AUTO-ENTMCNC: 33.3 G/DL (ref 31.5–36.5)
MCV RBC AUTO: 94 FL (ref 78–100)
MONOCYTES # BLD AUTO: 0.4 10E3/UL (ref 0–1.3)
MONOCYTES NFR BLD AUTO: 5 %
NEUTROPHILS # BLD AUTO: 3.7 10E3/UL (ref 1.6–8.3)
NEUTROPHILS NFR BLD AUTO: 49 %
NITRATE UR QL: NEGATIVE
NRBC # BLD AUTO: 0 10E3/UL
NRBC BLD AUTO-RTO: 0 /100
PH UR STRIP: 6.5 [PH] (ref 5–7)
PLATELET # BLD AUTO: 213 10E3/UL (ref 150–450)
POTASSIUM SERPL-SCNC: 4 MMOL/L (ref 3.4–5.3)
PROT SERPL-MCNC: 6.7 G/DL (ref 6.4–8.3)
RBC # BLD AUTO: 3.97 10E6/UL (ref 3.8–5.2)
RBC #/AREA URNS AUTO: >100 /HPF
SODIUM SERPL-SCNC: 136 MMOL/L (ref 135–145)
SP GR UR STRIP: >=1.03 (ref 1–1.03)
SQUAMOUS #/AREA URNS AUTO: ABNORMAL /LPF
UROBILINOGEN UR STRIP-ACNC: 0.2 E.U./DL
WBC # BLD AUTO: 7.6 10E3/UL (ref 4–11)
WBC #/AREA URNS AUTO: ABNORMAL /HPF

## 2024-06-07 PROCEDURE — 99214 OFFICE O/P EST MOD 30 MIN: CPT | Performed by: FAMILY MEDICINE

## 2024-06-07 PROCEDURE — 74176 CT ABD & PELVIS W/O CONTRAST: CPT

## 2024-06-07 PROCEDURE — 81003 URINALYSIS AUTO W/O SCOPE: CPT | Mod: ZL

## 2024-06-07 PROCEDURE — 81001 URINALYSIS AUTO W/SCOPE: CPT | Mod: ZL

## 2024-06-07 PROCEDURE — 36415 COLL VENOUS BLD VENIPUNCTURE: CPT | Mod: ZL | Performed by: FAMILY MEDICINE

## 2024-06-07 PROCEDURE — 85004 AUTOMATED DIFF WBC COUNT: CPT | Mod: ZL | Performed by: FAMILY MEDICINE

## 2024-06-07 PROCEDURE — G0463 HOSPITAL OUTPT CLINIC VISIT: HCPCS | Mod: 25

## 2024-06-07 PROCEDURE — 82040 ASSAY OF SERUM ALBUMIN: CPT | Mod: ZL | Performed by: FAMILY MEDICINE

## 2024-06-07 PROCEDURE — 86140 C-REACTIVE PROTEIN: CPT | Mod: ZL | Performed by: FAMILY MEDICINE

## 2024-06-07 ASSESSMENT — ENCOUNTER SYMPTOMS
ABDOMINAL PAIN: 1
DYSURIA: 0
FLANK PAIN: 1
CHILLS: 1
DIFFICULTY URINATING: 1
HEMATURIA: 1
CONSTIPATION: 1
FEVER: 0

## 2024-06-07 ASSESSMENT — PAIN SCALES - GENERAL: PAINLEVEL: SEVERE PAIN (7)

## 2024-06-07 NOTE — TELEPHONE ENCOUNTER
Patient called to report that she is having blood in her urine starting this morning. Patient denies any other UTI symptoms. She does report headache for the past 2 days and she has been feeling tired. Patient requesting lab orders be sent to [place]. Provider updated and agreed to send orders. Orders pended for provider to sign if appropriate.

## 2024-06-07 NOTE — TELEPHONE ENCOUNTER
Symptom or reason needing to speak to RN: Blood in urine     Best number to return call: [phone]     Best time to return call: Anytime

## 2024-06-07 NOTE — PROGRESS NOTES
Assessment & Plan     Hematuria, unspecified type / FHx: bladder cancer / Tobacco use  Labs and imaging negative, but due to gross hematuria (Gemini is confident blood was from bladder), current smoker and FHx of bladder cancer, referral to urology placed. No kidney stones on imaging  UA from today, negative for infection   - CT Abdomen Pelvis w/o Contrast; Future  - CBC with Platelets & Differential  - Comprehensive metabolic panel (BMP + Alb, Alk Phos, ALT, AST, Total. Bili, TP)  - CRP, inflammation  - referral to [facility] urology    Right wrist pain  Pain on lateral side. No thumb pain  - DME sent for wrist brace    Constipation  Gemini has medications at home  Constipation is most likely contributing to abdominal and b/l flank pain     See Patient Instructions    Next visit 7/5/2024    Subjective   Gemini is a 54 year old, presenting for the following health issues:  genitourinary    HPI     Genitourinary - Female  Onset/Duration: This am  Description:   Painful urination (Dysuria): No           Frequency: No  Blood in urine (Hematuria): YES  Delay in urine (Hesitency): YES  Intensity: severe  Progression of Symptoms:  worsening and constant  Accompanying Signs & Symptoms:  Fever/chills: YES- chills  Flank pain: YES  Nausea and vomiting: YES- nausea and vomiting  Vaginal symptoms: No   Abdominal/Pelvic Pain: YES  History:   History of frequent UTI s: No  History of kidney stones: No  Sexually Active: YES  Possibility of pregnancy: Yes  Precipitating or alleviating factors: None  Therapies tried and outcome:  na     - last BM couple of day ago   - father with bladder cancer     Review of Systems   Constitutional:  Positive for chills. Negative for fever.   Gastrointestinal:  Positive for abdominal pain (lower abd pain) and constipation (chronic).   Genitourinary:  Positive for difficulty urinating (takes awhile), flank pain and hematuria. Negative for dysuria and vaginal bleeding.   Skin:         No open  "sores in vuvla           Objective    BP 90/58   Pulse 77   Temp 97.4  F (36.3  C) (Tympanic)   Resp 17   Ht 1.753 m (5' 9\")   Wt 67.9 kg (149 lb 11.2 oz)   LMP 11/01/1999 (Exact Date)   SpO2 96%   BMI 22.11 kg/m    Body mass index is 22.11 kg/m .  Physical Exam  Constitutional:       General: She is not in acute distress.     Appearance: She is not ill-appearing.   Cardiovascular:      Rate and Rhythm: Normal rate and regular rhythm.      Heart sounds: No murmur heard.  Pulmonary:      Effort: Pulmonary effort is normal. No respiratory distress.      Breath sounds: No wheezing or rales.   Abdominal:      General: Bowel sounds are normal.      Tenderness: There is abdominal tenderness in the right lower quadrant. There is right CVA tenderness and left CVA tenderness.   Neurological:      Mental Status: She is alert.        Results for orders placed or performed during the hospital encounter of 06/07/24   CT Abdomen Pelvis w/o Contrast     Status: None    Narrative    EXAM: CT ABDOMEN PELVIS W/O CONTRAST 6/7/2024 4:09 PM    HISTORY: UA negative for infection. RBC >100; Hematuria, unspecified  type    COMPARISON: CT abdomen and pelvis 3/7/2021    TECHNIQUE:   Imaging protocol: Computed tomography of abdomen and pelvis without  contrast.  Acquisition: This CT exam was performed using one or more the  following dose reduction techniques: automated exposure control,  adjustment of the mA and/or kV according to patient size, and/or  iterative reconstruction technique.    FINDINGS:    LOWER CHEST:  Bibasilar atelectasis. No pulmonary mass or focal consolidation.    ABDOMEN/PELVIS:  LIVER: Normal contour. No suspicious liver lesions.  GALLBLADDER: No radio-opaque stones. No gallbladder wall thickening.  BILE DUCTS: No biliary tract dilatation.  PANCREAS: Within normal limits.  SPLEEN: Within normal limits.  ADRENALS: Stable bilateral symmetric adrenal hyperplasia.    KIDNEYS/URETERS: No soft tissue mass. No " hydronephrosis. No  nephrolithiasis.  URINARY BLADDER: Within normal limits.  REPRODUCTIVE ORGANS: No pelvic masses.    BOWEL: No bowel dilatation or wall thickening. Normal appendix. Large  stool burden.  PERITONEUM/RETROPERITONEUM: No free air or free fluid.  VESSELS: Within normal limits.  LYMPH NODES: There are no pathologically enlarged lymph nodes.    BONES AND SOFT TISSUES:  No suspicious osseous lesions. Degenerative periarticular changes and  spinal spondylosis.      Impression    IMPRESSION:  No nephrolithiasis or obstructive uropathy. No acute abnormality.    JAMA GARZA MD         SYSTEM ID:  [id number]   Results for orders placed or performed in visit on 06/07/24   Comprehensive metabolic panel (BMP + Alb, Alk Phos, ALT, AST, Total. Bili, TP)     Status: Abnormal   Result Value Ref Range    Sodium 136 135 - 145 mmol/L    Potassium 4.0 3.4 - 5.3 mmol/L    Carbon Dioxide (CO2) 25 22 - 29 mmol/L    Anion Gap 10 7 - 15 mmol/L    Urea Nitrogen 17.0 6.0 - 20.0 mg/dL    Creatinine 0.72 0.51 - 0.95 mg/dL    GFR Estimate >90 >60 mL/min/1.73m2    Calcium 8.9 8.6 - 10.0 mg/dL    Chloride 101 98 - 107 mmol/L    Glucose 105 (H) 70 - 99 mg/dL    Alkaline Phosphatase 72 40 - 150 U/L    AST 17 0 - 45 U/L    ALT 12 0 - 50 U/L    Protein Total 6.7 6.4 - 8.3 g/dL    Albumin 4.2 3.5 - 5.2 g/dL    Bilirubin Total 0.2 <=1.2 mg/dL   CRP, inflammation     Status: Normal   Result Value Ref Range    CRP Inflammation <3.00 <5.00 mg/L   CBC with platelets and differential     Status: None   Result Value Ref Range    WBC Count 7.6 4.0 - 11.0 10e3/uL    RBC Count 3.97 3.80 - 5.20 10e6/uL    Hemoglobin 12.4 11.7 - 15.7 g/dL    Hematocrit 37.2 35.0 - 47.0 %    MCV 94 78 - 100 fL    MCH 31.2 26.5 - 33.0 pg    MCHC 33.3 31.5 - 36.5 g/dL    RDW 12.8 10.0 - 15.0 %    Platelet Count 213 150 - 450 10e3/uL    % Neutrophils 49 %    % Lymphocytes 43 %    % Monocytes 5 %    % Eosinophils 2 %    % Basophils 1 %    % Immature Granulocytes 0 %     NRBCs per 100 WBC 0 <1 /100    Absolute Neutrophils 3.7 1.6 - 8.3 10e3/uL    Absolute Lymphocytes 3.3 0.8 - 5.3 10e3/uL    Absolute Monocytes 0.4 0.0 - 1.3 10e3/uL    Absolute Eosinophils 0.2 0.0 - 0.7 10e3/uL    Absolute Basophils 0.1 0.0 - 0.2 10e3/uL    Absolute Immature Granulocytes 0.0 <=0.4 10e3/uL    Absolute NRBCs 0.0 10e3/uL   CBC with Platelets & Differential     Status: None    Narrative    The following orders were created for panel order CBC with Platelets & Differential.  Procedure                               Abnormality         Status                     ---------                               -----------         ------                     CBC with platelets and d...[[id number]]                      Final result                 Please view results for these tests on the individual orders.   Results for orders placed or performed in visit on 06/07/24   UA Macroscopic with reflex to Microscopic and Culture     Status: Abnormal    Specimen: Urine, Clean Catch   Result Value Ref Range    Color Urine Red (A) Colorless, Straw, Light Yellow, Yellow    Appearance Urine Cloudy (A) Clear    Glucose Urine Negative Negative mg/dL    Bilirubin Urine Negative Negative    Ketones Urine Negative Negative mg/dL    Specific Gravity Urine >=1.030 1.003 - 1.035    Blood Urine Large (A) Negative    pH Urine 6.5 5.0 - 7.0    Protein Albumin Urine 30 (A) Negative mg/dL    Urobilinogen Urine 0.2 0.2, 1.0 E.U./dL    Nitrite Urine Negative Negative    Leukocyte Esterase Urine Negative Negative    Narrative    dipstick performed on supernatant. Grossly Bloody   UA Microscopic with Reflex to Culture     Status: Abnormal   Result Value Ref Range    Bacteria Urine None Seen None Seen /HPF    RBC Urine >100 (A) 0-2 /HPF /HPF    WBC Urine None Seen 0-5 /HPF /HPF    Squamous Epithelials Urine None Seen None Seen /LPF    Narrative    Unable to quantitate microscopic elements other than RBC's do to packed RBC's.  Urine Culture not  indicated             Signed Electronically by: Eryn Hurley MD

## 2024-06-24 NOTE — NURSING NOTE
"Chief Complaint   Patient presents with    RECHECK     Right lower abd pain at incision site          Medication reconciliation completed.        Initial /58 (BP Location: Right arm, Patient Position: Sitting, Cuff Size: Adult Regular)   Pulse 71   Temp 98.1  F (36.7  C) (Temporal)   Resp 20   Wt 71.2 kg (157 lb)   LMP 11/01/1999 (Exact Date)   SpO2 96%   BMI 23.18 kg/m   Estimated body mass index is 23.18 kg/m  as calculated from the following:    Height as of 11/12/23: 1.753 m (5' 9\").    Weight as of this encounter: 71.2 kg (157 lb).       Nat Redd LPN .......  12/19/2023  3:58 PM    " In an effort to ensure that our patients [employer], a Team Member has reviewed your chart and identified an opportunity to provide the best care possible. An attempt was made to discuss or schedule due or overdue Preventive or Chronic Condition care.    The Outcome was Contact was made, care gap was discussed - see further documentation. Care Gaps identified: Breast Cancer Screening and Cervical Cancer Screening.    Appointment needed: Follow-up Visit patient states she will get PAP done at next visit. Will get mammogram before next appointment

## 2024-06-26 ENCOUNTER — OFFICE VISIT (OUTPATIENT)
Dept: FAMILY MEDICINE | Facility: OTHER | Age: 54
End: 2024-06-26
Attending: FAMILY MEDICINE
Payer: COMMERCIAL

## 2024-06-26 VITALS
TEMPERATURE: 97.6 F | OXYGEN SATURATION: 98 % | DIASTOLIC BLOOD PRESSURE: 60 MMHG | WEIGHT: 142.3 LBS | BODY MASS INDEX: 21.08 KG/M2 | HEART RATE: 73 BPM | HEIGHT: 69 IN | SYSTOLIC BLOOD PRESSURE: 92 MMHG

## 2024-06-26 DIAGNOSIS — M54.16 LUMBAR RADICULOPATHY: ICD-10-CM

## 2024-06-26 DIAGNOSIS — M51.369 DDD (DEGENERATIVE DISC DISEASE), LUMBAR: Primary | ICD-10-CM

## 2024-06-26 PROCEDURE — 99214 OFFICE O/P EST MOD 30 MIN: CPT | Performed by: FAMILY MEDICINE

## 2024-06-26 PROCEDURE — G0463 HOSPITAL OUTPT CLINIC VISIT: HCPCS

## 2024-06-26 RX ORDER — METHYLPREDNISOLONE 4 MG
TABLET, DOSE PACK ORAL
Qty: 21 TABLET | Refills: 0 | Status: SHIPPED | OUTPATIENT
Start: 2024-06-26 | End: 2024-07-11

## 2024-06-26 ASSESSMENT — PAIN SCALES - GENERAL: PAINLEVEL: EXTREME PAIN (8)

## 2024-06-26 NOTE — PROGRESS NOTES
"  Assessment & Plan     DDD (degenerative disc disease), lumbar  Extensive chart review.  No red flags.   Lumbar radiculopathy.  Right sided today.  Already on Subutex, tizanadine and ibuprofen.  Advised physical therapy - she deferred at this time - partly due to cost.  Consider MRI - though she states she would decline injection  Not surgical at this point.  Medrol dose pack prescribed.  Reassess with PCP if not improving.  CC\"d Dr Hurley today.  Letter for apartment that she was present today for appointment per her request.  - methylPREDNISolone (MEDROL DOSEPAK) 4 MG tablet therapy pack; Follow Package Directions    Lumbar radiculopathy  As above.  - methylPREDNISolone (MEDROL DOSEPAK) 4 MG tablet therapy pack; Follow Package Directions          30 minutes spent by me on the date of the encounter doing chart review, history and exam, documentation and further activities per the note    See Patient Instructions    No follow-ups on file.    Subjective   Gemini is a 54 year old, presenting for the following health issues:  back and hip pain     History of Present Illness       Reason for visit:  Follow up    She eats 4 or more servings of fruits and vegetables daily.She consumes 0 sweetened beverage(s) daily.She exercises with enough effort to increase her heart rate 9 or less minutes per day.  She exercises with enough effort to increase her heart rate 3 or less days per week. She is missing 1 dose(s) of medications per week.       Musculoskeletal problem/pain  Duration: has been having back and hip pain for years; feels like this is a flare up  - past couple weeks  Description  Location: back and both hips  Intensity:  8/10  Accompanying signs and symptoms: radiation of pain to both lower extremities and having cramping in her calves as well, numbness, weakness of both legs.  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  But did have \"the flu\"  Apartment living - HUD inspection soon - trying to get it cleaned.   Pelvic pressure with standing  Feels numb like - paresthesia - legs bilateral all the way down  Legs feel weak  7 ankle surgeries  Last MRI 2018 - L4/5 facet changes  \"Nobody is doing anymore injections in my back\"  No back surgeries  Last PT course couple years ago  Tizanidine TID with flares; otherwise HS related to poor dentures, teeth pain  Ibuprofen 800 mg 2-3 times per day  Tylenol as well  Topical - forgot to bring it in - combination lidocaine, menthol- helpful but brief  Subutex - for pain management  Prior chronic opiates  current medical cannabis  No recent steroid - last oral 2022  Defers PT at this time - reports $45 per visit with her insurance    PDMP reviewed -   Buprenorphine last fill 6/20/24 - [facility].  Klonopin last fill 6/1/24 - same provider  Adderall 20 mg last fill 6/11/24 - same provider    Recent CT abdomen/pelvis- 6/7/224 - negative for stones.  Xray pelvis and right hip 1/2024 - mild arthritis of SI joints and lumbar spine.        Requesting a note - was here for apartment    Review of Systems  Constitutional, HEENT, cardiovascular, pulmonary, gi and gu systems are negative, except as otherwise noted.      Objective    BP 92/60 (BP Location: Left arm, Patient Position: Sitting, Cuff Size: Adult Regular)   Pulse 73   Temp 97.6  F (36.4  C) (Tympanic)   Ht 1.753 m (5' 9\")   Wt 64.5 kg (142 lb 4.8 oz)   LMP 11/01/1999 (Exact Date)   SpO2 98%   BMI 21.01 kg/m    Body mass " index is 21.01 kg/m .  Physical Exam   GENERAL: alert and no distress  RESP: lungs clear to auscultation - no rales, rhonchi or wheezes  CV: regular rate and rhythm, normal S1 S2, no S3 or S4, no murmur, click or rub, no peripheral edema  MS: normal muscle tone, no edema, and tenderness to palpation lumbar spine; SLR negative bilaterally; symmetric strength and sensation to lower legs; when palpating spine, patient reports tingling pain shooting to right leg  SKIN: no suspicious lesions or rashes  NEURO: Normal strength and tone, mentation intact and speech normal  PSYCH: mentation appears normal, affect normal/bright          Signed Electronically by: Marianne Francois MD

## 2024-06-26 NOTE — LETTER
June 26, 2024      Kym Perea  [address] [place] [address]        To Whom It May Concern,       Patient was seen today for a scheduled office visit for flare up of back pain.    Sincerely,        Marianne Francois MD

## 2024-06-26 NOTE — PATIENT INSTRUCTIONS
Complete steroid taper.  Consider physical therapy.  Follow up with Dr Hurley.  May need to update MRI lumbar spine.  Note for apartment.

## 2024-07-09 ENCOUNTER — TELEPHONE (OUTPATIENT)
Dept: FAMILY MEDICINE | Facility: OTHER | Age: 54
End: 2024-07-09

## 2024-07-09 NOTE — PROGRESS NOTES
Assessment & Plan     Chronic pain disorder / Chronic bilateral low back pain with bilateral sciatica  On subutex 8mg tid by another provider  trialed: meloxicam (not effective), lyrica (memory issues), gabapentin (memory issues), steroids (not effective), flexeril (memory issues), naproxen (no difference than ibu), mirtazapine (diarrhea)  Will start with updating imaging and referral to neurosx. Declines further injections / pain pump  - MR Lumbar Spine w/o Contrast; Future  - c/w tiZANidine (ZANAFLEX) 4 MG tablet; Take 1 tablet (4 mg) by mouth 3 times daily as needed for muscle spasms  - Orthopedic  Referral; Future  - ketorolac (TORADOL) injection 30 mg  - c/w ibuprofen   - consideration for topamax, but issues with low weight  - ?ketamine    Hip pain, bilateral  Xray normal  - XR Pelvis and Hip Bilateral 2 Views (Clinic Performed); Future  - Orthopedic  Referral; Future  - ketorolac (TORADOL) injection 30 mg    Right wrist pain  Xray normal  - Orthopedic  Referral; Future  - XR Wrist Right G/E 3 Views (Clinic Performed); Future  - ketorolac (TORADOL) injection 30 mg    Acute pharyngitis, unspecified etiology  negative  - Group A Streptococcus PCR Throat Swab    Paroxysmal supraventricular tachycardia (H24)  Currently not taking atenolol     Constipation, unspecified constipation type  Okay to increase miralax to bid to tid    Hypotension, unspecified hypotension type / Food insecurity / Weight loss  Suspect hypotension is linked with food insecurity  Greatly appreciate social work time and efforts today   Weight (6/2/2023) 188lbs. Weight today 141lbs  - update preventive cares at next visit     Adrenal hyperplasia  Cortisol (3/6/2021) normal  CT abd / pelvis (6/7/2024) b/l symmetric adrenal hyperplasia. Discussed with radiology, very common finding, no further work up required  Na/K normal      60 minutes spent on the date of the encounter doing chart review, review of test results,  "interpretation of tests, patient visit, documentation and discussion with social work and radiology    The longitudinal plan of care for the diagnosis(es)/condition(s) as documented were addressed during this visit. Due to the added complexity in care, I will continue to support Gemini in the subsequent management and with ongoing continuity of care.      See Patient Instructions    Return in about 2 weeks (around 7/25/2024) for hypotension.    Subjective   Gemini is a 54 year old, presenting for the following health issues:  Musculoskeletal Problem    History of Present Illness       Back Pain:  She presents for follow up of back pain. Patient's back pain is a chronic problem.  Location of back pain:  Other  Description of back pain: burning, cramping, dull ache, fullness, gnawing, sharp, shooting, stabbing and other  Back pain spreads: right buttocks, left buttocks, right thigh, left thigh, right knee, left knee, right foot and left foot    Since patient first noticed back pain, pain is: rapidly worsening  Does back pain interfere with her job:  Not applicable       Reason for visit:  Follow up regarding wrist, low back and hip pain. Also extreme pain overincision sightgoing down to knees with strange pain and was swelling over posterior area where drain was. Alsohad extreme \"rash\"(looked like strep rash but started on mylegs?    She eats 0-1 servings of fruits and vegetables daily.She consumes 0 sweetened beverage(s) daily.She exercises with enough effort to increase her heart rate 9 or less minutes per day.  She exercises with enough effort to increase her heart rate 3 or less days per week. She is missing 1 dose(s) of medications per week.       Musculoskeletal problem/pain    Duration: F/U  Description  Location: Hips, wrist, legs  Intensity:  severe  Accompanying signs and symptoms: radiation of pain to legs and feet, numbness, tingling, weakness of legs, and swelling  History  Previous similar problem: " YES  Previous evaluation:  none  Precipitating or alleviating factors:  Trauma or overuse: no   Aggravating factors include: sitting, standing, walking, climbing stairs, lifting, exercise, overuse, and cold or damp weather  Therapies tried and outcome: rest/inactivity, heat, ice, acetaminophen, and Ibuprofen       - currently on subutex 8mg tid - does not help with the pain   - ibu 800mg - tid + APAP  - tizanidine - taking bid to tid    - trialed: meloxicam (not effective), lyrica (memory issues), gabapentin (memory issues), steroids (not effective), flexeril (memory issues), naproxen (no difference than ibu), mirtazapine (diarrhea)    - right wrist pain is not improving, brace did not help. H/o break 15 years ago     Chronic/Recurring Back Pain Follow Up    Where is your back pain located? (Select all that apply) low back bilateral  How would you describe your back pain?  burning, cramping, dull ache, fullness, gnawing, sharp, shooting, and stabbing  Where does your back pain spread? Hips and legs  Since your last clinic visit for back pain, how has your pain changed? gradually worsening  Does your back pain interfere with your job? Not applicable  Since your last visit, have you tried any new treatment? No    - last MRI (5/23/2018)   - xray hip 1/2024  - getting worse  - h/o injections w/o success. Injections done at [place] (pain clinic). Was also being treated for CRPS    - has had over 30 injections w/o improvement  - has been seen by (3) pain clinics  - does not want a pain pump     Wt Readings from Last 4 Encounters:   07/11/24 64.1 kg (141 lb 6.4 oz)   06/26/24 64.5 kg (142 lb 4.8 oz)   06/07/24 67.9 kg (149 lb 11.2 oz)   05/13/24 65.3 kg (144 lb)     # HTN / hypotension   - on atenolol - not taking for one month   - does not eat much, has not had food for a couple of days   - does not have a     BP Readings from Last 6 Encounters:   07/11/24 (!) 88/58   06/26/24 92/60   06/07/24 90/58  "  05/30/24 110/70   05/13/24 116/78   05/13/24 116/77     Answers submitted by the patient for this visit:  Patient Health Questionnaire (Submitted on 7/11/2024)  If you checked off any problems, how difficult have these problems made it for you to do your work, take care of things at home, or get along with other people?: Extremely difficult  PHQ9 TOTAL SCORE: 19    # Rash / # Illness  - legs, spread to face and chest   - mostly resolved  - had a sore throat   - saw white spots on her throat    Review of Systems   Constitutional:  Positive for unexpected weight change (decrease).   HENT:  Positive for sore throat (improved).    Gastrointestinal:  Positive for constipation and nausea.   Musculoskeletal:  Positive for arthralgias and back pain.   Skin:  Positive for rash (resolved).   Neurological:  Positive for dizziness and light-headedness.           Objective    BP (!) 88/58   Pulse 71   Temp 97.9  F (36.6  C) (Tympanic)   Resp 17   Ht 1.753 m (5' 9\")   Wt 64.1 kg (141 lb 6.4 oz)   LMP 11/01/1999 (Exact Date)   SpO2 98%   BMI 20.88 kg/m    Body mass index is 20.88 kg/m .  MAP 68    Physical Exam  Exam conducted with a chaperone present.   Constitutional:       General: She is not in acute distress.     Appearance: She is not ill-appearing.   HENT:      Right Ear: There is impacted cerumen.      Left Ear: There is impacted cerumen.      Mouth/Throat:      Pharynx: No posterior oropharyngeal erythema or uvula swelling.      Tonsils: No tonsillar exudate or tonsillar abscesses.   Cardiovascular:      Rate and Rhythm: Normal rate and regular rhythm.      Heart sounds: No murmur heard.  Pulmonary:      Effort: Pulmonary effort is normal. No respiratory distress.      Breath sounds: No wheezing or rales.   Musculoskeletal:      Comments: Issues getting up from chair. Very stiff with ambulation    Skin:     Comments: No concerning finding on right buttock.  No abscess / drainage   Neurological:      Mental " Status: She is alert.   Psychiatric:         Mood and Affect: Mood is anxious and depressed.          Results for orders placed or performed in visit on 07/11/24 (from the past 24 hour(s))   Group A Streptococcus PCR Throat Swab    Specimen: Throat; Swab   Result Value Ref Range    Group A strep by PCR Not Detected Not Detected    Narrative    The Xpert Xpress Strep A test, performed on the [employer], is a rapid, qualitative in vitro diagnostic test for the detection of Streptococcus pyogenes (Group A ß-hemolytic Streptococcus, Strep A) in throat swab specimens from patients with signs and symptoms of pharyngitis. The Xpert Xpress Strep A test can be used as an aid in the diagnosis of Group A Streptococcal pharyngitis. The assay is not intended to monitor treatment for Group A Streptococcus infections. The Xpert Xpress Strep A test utilizes an automated real-time polymerase chain reaction (PCR) to detect Streptococcus pyogenes DNA.     XR Pelvis and Hip Bilateral 2 Views (Clinic Performed)    Result Date: 7/11/2024  PROCEDURE: XR PELVIS AND HIP BILATERAL 2 VIEWS 7/11/2024 11:18 AM HISTORY: Hip pain, bilateral; Hip pain, bilateral COMPARISONS: None. TECHNIQUE: Pelvis one view, right hip 2 views, left hip 2 views FINDINGS: Pelvis: The pelvis is intact sacrum and sacroiliac joints appear normal. Mild degenerative changes are noted in the lower lumbar spine. Left hip: The articular spaces normal height. The acetabulum and left proximal femurs normal. Right hip 2 views: The articular spaces are normal in height. The acetabulum and right proximal femur is intact.        IMPRESSION: No hip abnormalities are seen. DAISHA MONIQUE MD   SYSTEM ID:  [id number]    XR Wrist Right G/E 3 Views (Clinic Performed)    Result Date: 7/11/2024  PROCEDURE:  XR WRIST RIGHT G/E 3 VIEWS HISTORY: Right wrist pain COMPARISON:  None. TECHNIQUE:  4 views of the right wrist were obtained. FINDINGS:  No fracture or dislocation  is identified. The joint spaces are preserved.      IMPRESSION: Normal right wrist  DAISHA MONIQUE MD   SYSTEM ID:  [id number]           Signed Electronically by: Eryn Hurley MD

## 2024-07-09 NOTE — TELEPHONE ENCOUNTER
"10:44 AM    Reason for Call: Phone Call    Description: pt has what she called a \"strep rash\" for about a week. She is now having sore throat and feeling pretty low energy. Pt hoping to get throat swabbed 07/09 or 07/10 - has appt with amadean on 07/11    Was an appointment offered for this call? No  If yes : Appointment type              Date    Preferred method for responding to this message: Telephone Call  What is your phone number? [phone]    If we cannot reach you directly, may we leave a detailed response at the number you provided? Yes - pt may not have space on VM    Can this message wait until your PCP/provider returns, if available today? Not applicable    Sulma Best"

## 2024-07-11 ENCOUNTER — OFFICE VISIT (OUTPATIENT)
Dept: FAMILY MEDICINE | Facility: OTHER | Age: 54
End: 2024-07-11
Attending: FAMILY MEDICINE
Payer: COMMERCIAL

## 2024-07-11 ENCOUNTER — ANCILLARY PROCEDURE (OUTPATIENT)
Dept: GENERAL RADIOLOGY | Facility: OTHER | Age: 54
End: 2024-07-11
Attending: FAMILY MEDICINE
Payer: COMMERCIAL

## 2024-07-11 ENCOUNTER — CARE COORDINATION (OUTPATIENT)
Dept: FAMILY MEDICINE | Facility: OTHER | Age: 54
End: 2024-07-11

## 2024-07-11 VITALS
RESPIRATION RATE: 17 BRPM | WEIGHT: 141.4 LBS | OXYGEN SATURATION: 98 % | SYSTOLIC BLOOD PRESSURE: 88 MMHG | HEIGHT: 69 IN | BODY MASS INDEX: 20.94 KG/M2 | HEART RATE: 71 BPM | DIASTOLIC BLOOD PRESSURE: 58 MMHG | TEMPERATURE: 97.9 F

## 2024-07-11 DIAGNOSIS — M25.552 HIP PAIN, BILATERAL: ICD-10-CM

## 2024-07-11 DIAGNOSIS — E27.8 ADRENAL HYPERPLASIA (H): ICD-10-CM

## 2024-07-11 DIAGNOSIS — M25.551 HIP PAIN, BILATERAL: ICD-10-CM

## 2024-07-11 DIAGNOSIS — I47.10 PAROXYSMAL SUPRAVENTRICULAR TACHYCARDIA (H): ICD-10-CM

## 2024-07-11 DIAGNOSIS — G89.29 CHRONIC BILATERAL LOW BACK PAIN WITH BILATERAL SCIATICA: ICD-10-CM

## 2024-07-11 DIAGNOSIS — J02.9 ACUTE PHARYNGITIS, UNSPECIFIED ETIOLOGY: ICD-10-CM

## 2024-07-11 DIAGNOSIS — M25.531 RIGHT WRIST PAIN: ICD-10-CM

## 2024-07-11 DIAGNOSIS — K59.00 CONSTIPATION, UNSPECIFIED CONSTIPATION TYPE: ICD-10-CM

## 2024-07-11 DIAGNOSIS — G89.4 CHRONIC PAIN DISORDER: Primary | ICD-10-CM

## 2024-07-11 DIAGNOSIS — M54.42 CHRONIC BILATERAL LOW BACK PAIN WITH BILATERAL SCIATICA: ICD-10-CM

## 2024-07-11 DIAGNOSIS — Z59.41 FOOD INSECURITY: ICD-10-CM

## 2024-07-11 DIAGNOSIS — R63.4 WEIGHT LOSS: ICD-10-CM

## 2024-07-11 DIAGNOSIS — M54.41 CHRONIC BILATERAL LOW BACK PAIN WITH BILATERAL SCIATICA: ICD-10-CM

## 2024-07-11 DIAGNOSIS — I95.9 HYPOTENSION, UNSPECIFIED HYPOTENSION TYPE: ICD-10-CM

## 2024-07-11 LAB — GROUP A STREP BY PCR: NOT DETECTED

## 2024-07-11 PROCEDURE — 73110 X-RAY EXAM OF WRIST: CPT | Mod: TC,RT

## 2024-07-11 PROCEDURE — 87651 STREP A DNA AMP PROBE: CPT | Mod: ZL | Performed by: FAMILY MEDICINE

## 2024-07-11 PROCEDURE — 99215 OFFICE O/P EST HI 40 MIN: CPT | Performed by: FAMILY MEDICINE

## 2024-07-11 PROCEDURE — 250N000011 HC RX IP 250 OP 636: Mod: JZ | Performed by: FAMILY MEDICINE

## 2024-07-11 PROCEDURE — G0463 HOSPITAL OUTPT CLINIC VISIT: HCPCS | Mod: 25

## 2024-07-11 PROCEDURE — 96372 THER/PROPH/DIAG INJ SC/IM: CPT | Performed by: FAMILY MEDICINE

## 2024-07-11 PROCEDURE — G2211 COMPLEX E/M VISIT ADD ON: HCPCS | Performed by: FAMILY MEDICINE

## 2024-07-11 PROCEDURE — 73522 X-RAY EXAM HIPS BI 3-4 VIEWS: CPT | Mod: TC

## 2024-07-11 RX ORDER — KETOROLAC TROMETHAMINE 30 MG/ML
30 INJECTION, SOLUTION INTRAMUSCULAR; INTRAVENOUS ONCE
Status: COMPLETED | OUTPATIENT
Start: 2024-07-11 | End: 2024-07-11

## 2024-07-11 RX ADMIN — KETOROLAC TROMETHAMINE 30 MG: 30 INJECTION, SOLUTION INTRAMUSCULAR at 09:17

## 2024-07-11 SDOH — ECONOMIC STABILITY - FOOD INSECURITY: FOOD INSECURITY: Z59.41

## 2024-07-11 ASSESSMENT — ENCOUNTER SYMPTOMS
NAUSEA: 1
UNEXPECTED WEIGHT CHANGE: 1
CONSTIPATION: 1
LIGHT-HEADEDNESS: 1
SORE THROAT: 1
BACK PAIN: 1
DIZZINESS: 1
ARTHRALGIAS: 1

## 2024-07-11 ASSESSMENT — PATIENT HEALTH QUESTIONNAIRE - PHQ9
SUM OF ALL RESPONSES TO PHQ QUESTIONS 1-9: 19
SUM OF ALL RESPONSES TO PHQ QUESTIONS 1-9: 19
10. IF YOU CHECKED OFF ANY PROBLEMS, HOW DIFFICULT HAVE THESE PROBLEMS MADE IT FOR YOU TO DO YOUR WORK, TAKE CARE OF THINGS AT HOME, OR GET ALONG WITH OTHER PEOPLE: EXTREMELY DIFFICULT

## 2024-07-11 ASSESSMENT — PAIN SCALES - PAIN ENJOYMENT GENERAL ACTIVITY SCALE (PEG)
INTERFERED_GENERAL_ACTIVITY: 7
PEG_TOTALSCORE: 8.33
INTERFERED_ENJOYMENT_LIFE: 10
AVG_PAIN_PASTWEEK: 8
PEG_TOTALSCORE: 8.33
INTERFERED_ENJOYMENT_LIFE: 10 - COMPLETELY INTERFERES
INTERFERED_GENERAL_ACTIVITY: 7
AVG_PAIN_PASTWEEK: 8

## 2024-07-11 ASSESSMENT — PAIN SCALES - GENERAL: PAINLEVEL: EXTREME PAIN (9)

## 2024-07-11 NOTE — PATIENT INSTRUCTIONS
Order placed for MRI of your lower back     Referral to Dr Pearce, [facility] orthopedics     We will call or MyChart you with your xray results.     Referral placed to Dr Kemal Abarca, Orthopedic Associate, neurosurgeon     You were given an injection of Toradol today. No further NSAIDs today (ibuprofen )    Increase your miralax to twice per day to three times per day

## 2024-07-11 NOTE — PROGRESS NOTES
Met with patient after her visit with PCP.  Patient has missed some meals of late due to no money.  Patient lives in [place] in an apartment.  On Social Security Disability.  Does not receive/qualify for SNAP.  This SW brought a food box in to the meeting along with a snack bag from the pantry.      Based  on income, this patient will qualify for the [facility] program whereby the [facility] pays patients Medicare premium.($177)  Patient filled out the paperwork and this SW faxed it to [facility].  Told patient to follow-up with [facility] in 2 weeks via telephone.      Patient is interested in finding a part time job that would work with her social security limits.  We discussed what she likes to do and did a brief internet search for jobs.  Patient left with some options - [employer]  in [place] and the [employer]  in [place].    Patient will be stopping at the food shelf on the way home.      After patient had left, this SW had an after thought and called and left a message for patient inquiring if she is on the low income subsidy for prescriptions.  If not, this is a program she would qualify for also.  If patient is not on this program, this is something the patient could do at a local library and sign up for.

## 2024-07-13 ENCOUNTER — HEALTH MAINTENANCE LETTER (OUTPATIENT)
Age: 54
End: 2024-07-13

## 2024-07-21 ENCOUNTER — HOSPITAL ENCOUNTER (EMERGENCY)
Facility: OTHER | Age: 54
Discharge: HOME | End: 2024-07-21
Attending: EMERGENCY MEDICINE | Admitting: EMERGENCY MEDICINE
Payer: COMMERCIAL

## 2024-07-21 VITALS
BODY MASS INDEX: 20.73 KG/M2 | OXYGEN SATURATION: 97 % | HEIGHT: 69 IN | HEART RATE: 76 BPM | TEMPERATURE: 98.4 F | WEIGHT: 140 LBS | SYSTOLIC BLOOD PRESSURE: 107 MMHG | DIASTOLIC BLOOD PRESSURE: 71 MMHG | RESPIRATION RATE: 18 BRPM

## 2024-07-21 DIAGNOSIS — G89.29 CHRONIC PAIN OF LEFT ANKLE: ICD-10-CM

## 2024-07-21 DIAGNOSIS — M25.572 CHRONIC PAIN OF LEFT ANKLE: ICD-10-CM

## 2024-07-21 DIAGNOSIS — Z87.39 H/O NECROTIZING FASCIITIS: ICD-10-CM

## 2024-07-21 LAB
ANION GAP SERPL CALCULATED.3IONS-SCNC: 10 MMOL/L (ref 7–15)
BASOPHILS # BLD AUTO: 0 10E3/UL (ref 0–0.2)
BASOPHILS NFR BLD AUTO: 1 %
BUN SERPL-MCNC: 21.4 MG/DL (ref 6–20)
CALCIUM SERPL-MCNC: 9.2 MG/DL (ref 8.8–10.4)
CHLORIDE SERPL-SCNC: 102 MMOL/L (ref 98–107)
CREAT SERPL-MCNC: 0.99 MG/DL (ref 0.51–0.95)
CRP SERPL-MCNC: <3 MG/L
EGFRCR SERPLBLD CKD-EPI 2021: 68 ML/MIN/1.73M2
EOSINOPHIL # BLD AUTO: 0.2 10E3/UL (ref 0–0.7)
EOSINOPHIL NFR BLD AUTO: 3 %
ERYTHROCYTE [DISTWIDTH] IN BLOOD BY AUTOMATED COUNT: 12.1 % (ref 10–15)
ERYTHROCYTE [SEDIMENTATION RATE] IN BLOOD BY WESTERGREN METHOD: 5 MM/HR (ref 0–30)
GLUCOSE SERPL-MCNC: 85 MG/DL (ref 70–99)
HCO3 SERPL-SCNC: 26 MMOL/L (ref 22–29)
HCT VFR BLD AUTO: 37.8 % (ref 35–47)
HGB BLD-MCNC: 12.6 G/DL (ref 11.7–15.7)
HOLD SPECIMEN: NORMAL
IMM GRANULOCYTES # BLD: 0 10E3/UL
IMM GRANULOCYTES NFR BLD: 0 %
LYMPHOCYTES # BLD AUTO: 3.3 10E3/UL (ref 0.8–5.3)
LYMPHOCYTES NFR BLD AUTO: 42 %
MCH RBC QN AUTO: 31.7 PG (ref 26.5–33)
MCHC RBC AUTO-ENTMCNC: 33.3 G/DL (ref 31.5–36.5)
MCV RBC AUTO: 95 FL (ref 78–100)
MONOCYTES # BLD AUTO: 0.4 10E3/UL (ref 0–1.3)
MONOCYTES NFR BLD AUTO: 5 %
NEUTROPHILS # BLD AUTO: 3.8 10E3/UL (ref 1.6–8.3)
NEUTROPHILS NFR BLD AUTO: 49 %
NRBC # BLD AUTO: 0 10E3/UL
NRBC BLD AUTO-RTO: 0 /100
PLATELET # BLD AUTO: 152 10E3/UL (ref 150–450)
POTASSIUM SERPL-SCNC: 4.4 MMOL/L (ref 3.4–5.3)
RBC # BLD AUTO: 3.97 10E6/UL (ref 3.8–5.2)
SODIUM SERPL-SCNC: 138 MMOL/L (ref 135–145)
WBC # BLD AUTO: 7.8 10E3/UL (ref 4–11)

## 2024-07-21 PROCEDURE — 99283 EMERGENCY DEPT VISIT LOW MDM: CPT | Performed by: EMERGENCY MEDICINE

## 2024-07-21 PROCEDURE — 80048 BASIC METABOLIC PNL TOTAL CA: CPT | Performed by: EMERGENCY MEDICINE

## 2024-07-21 PROCEDURE — 85025 COMPLETE CBC W/AUTO DIFF WBC: CPT | Performed by: EMERGENCY MEDICINE

## 2024-07-21 PROCEDURE — 36415 COLL VENOUS BLD VENIPUNCTURE: CPT | Performed by: EMERGENCY MEDICINE

## 2024-07-21 PROCEDURE — 85652 RBC SED RATE AUTOMATED: CPT | Performed by: EMERGENCY MEDICINE

## 2024-07-21 PROCEDURE — 99284 EMERGENCY DEPT VISIT MOD MDM: CPT | Performed by: EMERGENCY MEDICINE

## 2024-07-21 PROCEDURE — 86140 C-REACTIVE PROTEIN: CPT | Performed by: EMERGENCY MEDICINE

## 2024-07-21 ASSESSMENT — ENCOUNTER SYMPTOMS
HEMATURIA: 0
MYALGIAS: 1
DYSURIA: 0
DIFFICULTY URINATING: 0
ARTHRALGIAS: 1
FLANK PAIN: 0

## 2024-07-21 ASSESSMENT — ACTIVITIES OF DAILY LIVING (ADL): ADLS_ACUITY_SCORE: 35

## 2024-07-21 NOTE — ED TRIAGE NOTES
"Pt presents to ED via private car with c/o pain in her Lt heal with no injury and pain in her vagina. Pt reports having necrotizing fascitis last year and is concerned that something may be infected again. /71   Pulse 76   Temp 98.4  F (36.9  C) (Tympanic)   Resp 18   Ht 1.753 m (5' 9\")   Wt 63.5 kg (140 lb)   LMP 11/01/1999 (Exact Date)   SpO2 97%   BMI 20.67 kg/m         Triage Assessment (Adult)       Row Name 07/21/24 1733          Triage Assessment    Airway WDL WDL        Respiratory WDL    Respiratory WDL WDL        Skin Circulation/Temperature WDL    Skin Circulation/Temperature WDL WDL        Cardiac WDL    Cardiac WDL WDL        Peripheral/Neurovascular WDL    Peripheral Neurovascular WDL WDL        Cognitive/Neuro/Behavioral WDL    Cognitive/Neuro/Behavioral WDL WDL                     "

## 2024-07-22 ENCOUNTER — TELEPHONE (OUTPATIENT)
Dept: FAMILY MEDICINE | Facility: OTHER | Age: 54
End: 2024-07-22

## 2024-07-22 ENCOUNTER — HOSPITAL ENCOUNTER (EMERGENCY)
Facility: HOSPITAL | Age: 54
Discharge: HOME | End: 2024-07-23
Attending: EMERGENCY MEDICINE | Admitting: EMERGENCY MEDICINE
Payer: COMMERCIAL

## 2024-07-22 ENCOUNTER — MEDICAL CORRESPONDENCE (OUTPATIENT)
Dept: MRI IMAGING | Facility: HOSPITAL | Age: 54
End: 2024-07-22

## 2024-07-22 VITALS
OXYGEN SATURATION: 99 % | TEMPERATURE: 97.8 F | HEART RATE: 63 BPM | WEIGHT: 147.05 LBS | RESPIRATION RATE: 16 BRPM | SYSTOLIC BLOOD PRESSURE: 105 MMHG | DIASTOLIC BLOOD PRESSURE: 68 MMHG | BODY MASS INDEX: 21.72 KG/M2

## 2024-07-22 DIAGNOSIS — R10.2 VAGINAL PAIN: ICD-10-CM

## 2024-07-22 PROCEDURE — 250N000013 HC RX MED GY IP 250 OP 250 PS 637: Performed by: EMERGENCY MEDICINE

## 2024-07-22 PROCEDURE — 99283 EMERGENCY DEPT VISIT LOW MDM: CPT | Performed by: EMERGENCY MEDICINE

## 2024-07-22 PROCEDURE — 99283 EMERGENCY DEPT VISIT LOW MDM: CPT

## 2024-07-22 RX ORDER — ACETAMINOPHEN 325 MG/1
975 TABLET ORAL ONCE
Status: COMPLETED | OUTPATIENT
Start: 2024-07-22 | End: 2024-07-22

## 2024-07-22 RX ADMIN — ACETAMINOPHEN 975 MG: 325 TABLET, FILM COATED ORAL at 23:05

## 2024-07-22 NOTE — ED PROVIDER NOTES
History     Chief Complaint   Patient presents with    Vaginal Problem    Foot Pain     HPI  Kym Perea is a 54 year old female who has a complex history including vulvar necrotizing fasciitis about 1 year ago also subacute left ankle and foot pain for which she was seen in the primary clinic 10 days ago and had a negative x-ray.  She has not injured it since.  Unfortunately she has not had good follow-up for the surgical debridement and she is concerned about the possibility of recurrence.  She has not had drainage or fevers.  Her surgeon apparently has retired.  She reports that she has RSD and previously was affiliated with the pain clinic.  She reports this was managed with pain medication and is not interested in this.    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     Priority: Medium    Perineal pain in female 06/24/2023     Priority: Medium    Postoperative state 06/24/2023     Priority: Medium    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     Priority: Medium    Julio's gangrene (H28) 06/01/2023     Priority: Medium    TMJ (temporomandibular joint syndrome) 11/01/2021     Priority: Medium    Arthralgia of right temporomandibular joint 11/01/2021     Priority: Medium    Suicidal behavior 10/19/2021     Priority: Medium    Medical cannabis use 07/10/2018     Priority: Medium    Chronic pain disorder 01/29/2018     Priority: Medium     Overview:   complex regional pain syndrome left  ankle      Esophageal reflux 01/29/2018     Priority: Medium    Hyperlipidemia 01/29/2018     Priority: Medium    Insomnia 01/29/2018     Priority: Medium    Panic disorder 01/29/2018     Priority: Medium     Overview:   Previously patient of Dr. Reynoso since her teens. Patient is attempting slow taper down on her Xanax. She's been on Xanax 2 mg 4 times a day for years      Tobacco abuse 01/29/2018     Priority: Medium    Pain medication agreement, 4/18/18, 4/3/19, 10/27/2020 04/21/2017     Priority: Medium     Overview:   4/20/17:  Oxycodone 10 mg #180/mo    Hydrocodone 10/325mg, #240/mo. Stopped and changed to morphine. Agreement signed 10/3/12, updated 10/1/ 14.  Complex regional pain syndrome (aka Reflex Sympathetic Dystrophy)  MS Contin 30 mg q 8 hours #90 per month.  Morphine sulfate IR 15 mg 2-3 tab tid prn #180 per month, decreased to #90/month.   query 4/9/16 as part of chart review. Seems appropriate. 6/2/16 tapered and off Morphine. [place] 5/325 #120/month.  query appropriate. ToxAssure appropriate.    August 2016 Consultation with [facility]. Recommended spinal cord stimulator trial, but she is reluctant to pursue that. Pain clinic stated that continuing opioids was OK.      Complex regional pain syndrome type 1 affecting left lower leg 04/15/2016     Priority: Medium     Overview:   Patient with a history of complex regional pain syndrome has seen Dr. Dino Skelton4/23/16       Migraine headache 11/25/2014     Priority: Medium    Lumbago 02/06/2012     Priority: Medium    Major depressive disorder, recurrent episode, severe (H) 07/13/2011     Priority: Medium    Paroxysmal supraventricular tachycardia (H24) 05/24/2010     Priority: Medium    Mononeuritis 07/06/2006     Priority: Medium     IMO Update 10/11      Ankle and foot joint derangement 06/05/2006     Priority: Medium     Overview:   Chronic left ankle pain secondary to reflex sympathetic dystrophy.  Patient   has had 4  previous surgeries, the most recent one done by Dr. Noriega in   2007. On Narcotic contract.    Formatting of this note might be different from the original.  Chronic left ankle pain secondary to reflex sympathetic dystrophy.  Patient   has had 4 previous surgeries, the most recent one done by Dr. Noriega in   2007. On Narcotic contract.    IMO Update 10/11          Past Medical History:    Past Medical History:   Diagnosis Date    Acquired absence of other organs (CODE)     Chronic pain syndrome     Complex regional pain syndrome I     Encounter for other administrative examinations     Encounter for other administrative examinations     Gastro-esophageal reflux disease without esophagitis     Injury of left ankle     Low back pain     Major depressive disorder, recurrent severe without psychotic features (H)     Migraine without status migrainosus, not intractable     Nicotine dependence, uncomplicated     Obesity     Overweight     Pain in ankle     Pain in thoracic spine     Panic disorder without agoraphobia     Personal history of other diseases of the female genital tract     Personal history of other medical treatment (CODE)     Supraventricular tachycardia (H24)        Past Surgical History:    Past Surgical History:   Procedure Laterality Date    ANKLE SURGERY      1997, 2000,Left ankle surgery x 2    CYSTECTOMY PILONIDAL N/A 6/19/2023    Procedure: Delayed closure of vulva and perineal defectes with drain placment;  Surgeon: Anthony Nolan MD;  Location:  OR    HYSTERECTOMY TOTAL ABDOMINAL      11/1999,secondary to endometriosis, no malignancy; patient reports no malignancy, but abnormal cells were present *    IRRIGATION AND DEBRIDEMENT DECUBITUS WOUND, COMBINED N/A 6/1/2023    Procedure: IRRIGATION AND DEBRIDEMENT, PERINEAL, LABIAL and GLUTEAL INFECTION;  Surgeon: Anthony Nolan MD;  Location:  OR    IRRIGATION AND DEBRIDEMENT DECUBITUS WOUND, COMBINED N/A 6/3/2023    Procedure: Debridement  and washout of vulvar and perineal wounds;  Surgeon: Anthony Nolan MD;  Location: [facility] OR    LAPAROSCOPIC CHOLECYSTECTOMY      No Comments Provided    LAPAROSCOPY DIAGNOSTIC (GENERAL)      1995    OTHER SURGICAL HISTORY      21299,CRANIO/MAXILLOFACIAL SURGERY,Jaw Surgery    OTHER SURGICAL HISTORY      207040,[facility],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  No other abnormalities noted.       Family History:    Family History   Problem Relation Age of Onset    Cancer Father         Cancer,Bladder    Other - See Comments Father         Mild hypercholesterolemia    Other - See Comments Mother         scleroderma/lupus/Parkinson's syndrome    Family History Negative Brother         Good Health    Ariel-Danlos syndrome Daughter     Colon Cancer Maternal Grandmother 40        Cancer-colon,Followed by lung  with metastasis to the bone di*       Social History:  Marital Status:   [4]  Social History     Tobacco Use    Smoking status: Every Day     Current packs/day: 0.50     Average packs/day: 0.5 packs/day for 37.6 years (18.8 ttl pk-yrs)     Types: Cigarettes, Vaping Device     Start date: 12/2/1986     Passive exposure: Current    Smokeless tobacco: Never    Tobacco comments:     trying   Vaping Use    Vaping status: Former    Substances: Nicotine, Flavoring    Devices: Disposable   Substance Use Topics    Alcohol use: Not Currently     Comment: OCCASIONAL WINE    Drug use: Yes     Types: Marijuana     Comment: medical cannabis; suboxone        Medications:    acetaminophen (TYLENOL) 325 MG tablet  albuterol (PROAIR HFA/PROVENTIL HFA/VENTOLIN HFA) 108 (90 Base) MCG/ACT inhaler  amphetamine-dextroamphetamine (ADDERALL) 20 MG tablet  buprenorphine (SUBUTEX) 8 MG SUBL sublingual tablet  Cholecalciferol (VITAMIN D-3) 125 MCG (5000 UT) TABS  clonazePAM (KLONOPIN) 2 MG tablet  divalproex sodium extended-release (DEPAKOTE ER) 250 MG 24 hr tablet  fluticasone (FLONASE) 50 MCG/ACT nasal spray  ibuprofen  "(ADVIL/MOTRIN) 800 MG tablet  ondansetron (ZOFRAN) 4 MG tablet  pramipexole (MIRAPEX) 0.25 MG tablet  tiZANidine (ZANAFLEX) 4 MG tablet          Review of Systems   Genitourinary:  Negative for difficulty urinating, dysuria, flank pain, genital sores, hematuria, pelvic pain, vaginal bleeding and vaginal discharge.   Musculoskeletal:  Positive for arthralgias and myalgias.   Skin:  Positive for rash.   All other systems reviewed and are negative.      Physical Exam   BP: 107/71  Pulse: 76  Temp: 98.4  F (36.9  C)  Resp: 18  Height: 175.3 cm (5' 9\")  Weight: 63.5 kg (140 lb)  SpO2: 97 %      Physical Exam  Vitals and nursing note reviewed.   Constitutional:       General: She is not in acute distress.     Appearance: She is well-developed.   HENT:      Head: Normocephalic and atraumatic.      Mouth/Throat:      Mouth: Mucous membranes are moist.   Eyes:      General: No scleral icterus.     Conjunctiva/sclera: Conjunctivae normal.      Pupils: Pupils are equal, round, and reactive to light.   Cardiovascular:      Rate and Rhythm: Normal rate and regular rhythm.      Heart sounds: Normal heart sounds.   Pulmonary:      Effort: Pulmonary effort is normal. No respiratory distress.      Breath sounds: Normal breath sounds. No wheezing.   Abdominal:      General: Abdomen is flat.      Palpations: Abdomen is soft.   Genitourinary:     Comments: The patient was fully undressed and examined in the presence of nurse.  I examined the area of surgical excision of the right labia majora.  I see no abnormalities no swelling no discoloration no drainage no local tenderness.  I examined the entire perineum and the rectal area all of which appears normal.  Musculoskeletal:      Cervical back: Neck supple.        Feet:       Comments: Left calcaneal pain not associated with any recent trauma.  She declined x-ray imaging as this was done about 10 days ago and was normal   Skin:     General: Skin is warm and dry.   Neurological:      " General: No focal deficit present.      Mental Status: She is alert and oriented to person, place, and time.      Cranial Nerves: No cranial nerve deficit.   Psychiatric:         Mood and Affect: Mood normal.         Behavior: Behavior normal.         ED Course        Procedures                Results for orders placed or performed during the hospital encounter of 07/21/24 (from the past 24 hour(s))   CBC with platelets differential    Narrative    The following orders were created for panel order CBC with platelets differential.  Procedure                               Abnormality         Status                     ---------                               -----------         ------                     CBC with platelets and d...[[id number]]                      Final result                 Please view results for these tests on the individual orders.   CRP inflammation   Result Value Ref Range    CRP Inflammation <3.00 <5.00 mg/L   Erythrocyte sedimentation rate auto   Result Value Ref Range    Erythrocyte Sedimentation Rate 5 0 - 30 mm/hr   Basic metabolic panel   Result Value Ref Range    Sodium 138 135 - 145 mmol/L    Potassium 4.4 3.4 - 5.3 mmol/L    Chloride 102 98 - 107 mmol/L    Carbon Dioxide (CO2) 26 22 - 29 mmol/L    Anion Gap 10 7 - 15 mmol/L    Urea Nitrogen 21.4 (H) 6.0 - 20.0 mg/dL    Creatinine 0.99 (H) 0.51 - 0.95 mg/dL    GFR Estimate 68 >60 mL/min/1.73m2    Calcium 9.2 8.8 - 10.4 mg/dL    Glucose 85 70 - 99 mg/dL   CBC with platelets and differential   Result Value Ref Range    WBC Count 7.8 4.0 - 11.0 10e3/uL    RBC Count 3.97 3.80 - 5.20 10e6/uL    Hemoglobin 12.6 11.7 - 15.7 g/dL    Hematocrit 37.8 35.0 - 47.0 %    MCV 95 78 - 100 fL    MCH 31.7 26.5 - 33.0 pg    MCHC 33.3 31.5 - 36.5 g/dL    RDW 12.1 10.0 - 15.0 %    Platelet Count 152 150 - 450 10e3/uL    % Neutrophils 49 %    % Lymphocytes 42 %    % Monocytes 5 %    % Eosinophils 3 %    % Basophils 1 %    % Immature Granulocytes 0 %    NRBCs  per 100 WBC 0 <1 /100    Absolute Neutrophils 3.8 1.6 - 8.3 10e3/uL    Absolute Lymphocytes 3.3 0.8 - 5.3 10e3/uL    Absolute Monocytes 0.4 0.0 - 1.3 10e3/uL    Absolute Eosinophils 0.2 0.0 - 0.7 10e3/uL    Absolute Basophils 0.0 0.0 - 0.2 10e3/uL    Absolute Immature Granulocytes 0.0 <=0.4 10e3/uL    Absolute NRBCs 0.0 10e3/uL   Extra Tube    Narrative    The following orders were created for panel order Extra Tube.  Procedure                               Abnormality         Status                     ---------                               -----------         ------                     Extra Blue Top Tube[[id number]]                              In process                 Extra Red Top Tube[[id number]]                               In process                 Extra Green Top (Lithium...[[id number]]                      In process                   Please view results for these tests on the individual orders.       Medications - No data to display      7:38 PM postop shoe ordered, she declined crutches as she has multiple sets at home.  Assessments & Plan (with Medical Decision Making)   54-year-old female with 2 subacute issues to be addressed today 1 was concerns about recurrent necrotizing fasciitis of the perineum and vulva.  The other is chronic left heel pain thought to be related to RSD.  Her inflammatory markers are negative her exam is normal I am not concerned about the necrotizing fasciitis.  Regarding the ankle pain I offered an x-ray and she declined.  We talked at length about management of these difficult chronic issues how they are best done through a primary care clinic or a chronic pain clinic.  She has been affiliated with the pain clinic in the past and encouraged her to get reestablished.  I do not see an indication for opiates.  She did not specifically request them.  I have reviewed the nursing notes.    I have reviewed the findings, diagnosis, plan and need for follow up with the  patient.          Medical Decision Making  The patient's presentation was of moderate complexity (follow-up of necrotizing fasciitis, subacute ankle pain.).    The patient's evaluation involved:  ordering and/or review of 3+ test(s) in this encounter (CBC, ESR, CRP)    The patient's management necessitated only low risk treatment.  Strong recommendation for appropriate follow-up for the subacute issues        New Prescriptions    No medications on file       Final diagnoses:   Chronic pain of left ankle   H/O necrotizing fasciitis       7/21/2024   [facility] AND [facility] CenterHerb MD  07/21/24 1941

## 2024-07-22 NOTE — TELEPHONE ENCOUNTER
"7/22/2024 11:28 AM  Writer called patient back regarding ER follow-up. Seen in ER on 7/21/24. Pt denies any changes since ER visit. Discharge instructions to follow-up with pain clinic. Pt stated,  \"The pain clinic is no longer around to follow\".  \"I've been to three pain clinics already and I'm not going to another, I just want the area fixed so it doesn't hurt anymore an old incision site fixed, inside of my right inner thigh\". Pt reports chronic pain history and no changes. Pt stated, \"I'm scheduled with [facility] end of August for my back, hips, and wrist.\" Pt reports acute heel pain and chronic heel pain. Patient's  speech slow at times pt changes her answers.     Patient notified if needing immediate medical attention go back to ER/UC immediately, call 911 if needed. Pt verbalizes understanding and agrees to plan.    Pt already has follow-up scheduled to see PCP.    Irina RN, [facility] Nurse          "

## 2024-07-22 NOTE — DISCHARGE INSTRUCTIONS
Based on your examination and the normal labs I am not concerned about recurrent necrotizing fasciitis    The RSD is difficult to control and should be managed by a pain clinic

## 2024-07-22 NOTE — TELEPHONE ENCOUNTER
Symptom or reason needing to speak to RN: MIKEL Thomas 7/21/24     Best number to return call: [phone]     Best time to return call: Anytime     [facility] Pregnancy And Lactation Text: This medication is contraindicated in pregnancy and when breast feeding.

## 2024-07-22 NOTE — LETTER
July 23, 2024      To Whom It May Concern:      Kym Perea was seen in our Emergency Department today, 07/23/24.  I expect her condition to improve over the next few days.  She may return to work/school when improved.    Sincerely,        Lorenzo Hebert MD

## 2024-07-23 LAB
ALBUMIN UR-MCNC: NEGATIVE MG/DL
APPEARANCE UR: CLEAR
BACTERIA #/AREA URNS HPF: ABNORMAL /HPF
BILIRUB UR QL STRIP: NEGATIVE
COLOR UR AUTO: ABNORMAL
GLUCOSE UR STRIP-MCNC: NEGATIVE MG/DL
HGB UR QL STRIP: NEGATIVE
HYALINE CASTS: 2 /LPF
KETONES UR STRIP-MCNC: NEGATIVE MG/DL
LEUKOCYTE ESTERASE UR QL STRIP: ABNORMAL
MUCOUS THREADS #/AREA URNS LPF: PRESENT /LPF
NITRATE UR QL: NEGATIVE
PH UR STRIP: 6 [PH] (ref 4.7–8)
RBC URINE: 2 /HPF
SP GR UR STRIP: 1.02 (ref 1–1.03)
SQUAMOUS EPITHELIAL: 6 /HPF
UROBILINOGEN UR STRIP-MCNC: NORMAL MG/DL
WBC URINE: 4 /HPF

## 2024-07-23 PROCEDURE — 87086 URINE CULTURE/COLONY COUNT: CPT | Performed by: EMERGENCY MEDICINE

## 2024-07-23 PROCEDURE — 81001 URINALYSIS AUTO W/SCOPE: CPT | Performed by: EMERGENCY MEDICINE

## 2024-07-23 ASSESSMENT — ENCOUNTER SYMPTOMS
COUGH: 0
SHORTNESS OF BREATH: 0
FEVER: 0
CHILLS: 0

## 2024-07-23 NOTE — ED PROVIDER NOTES
History     Chief Complaint   Patient presents with    Vaginal Problem     HPI  Kym Perea is a 54 year old female who is here with pain to her right labia.  Was operated on over a year ago, had necrotizing fasciitis with surgery.  Has a plastic surgery follow-up several months from now.  Denies any injuries.    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     Priority: Medium    Perineal pain in female 06/24/2023     Priority: Medium    Postoperative state 06/24/2023     Priority: Medium    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     Priority: Medium    Julio's gangrene (H28) 06/01/2023     Priority: Medium    TMJ (temporomandibular joint syndrome) 11/01/2021     Priority: Medium    Arthralgia of right temporomandibular joint 11/01/2021     Priority: Medium    Suicidal behavior 10/19/2021     Priority: Medium    Medical cannabis use 07/10/2018     Priority: Medium    Chronic pain disorder 01/29/2018     Priority: Medium     Overview:   complex regional pain syndrome left ankle      Esophageal reflux 01/29/2018     Priority: Medium    Hyperlipidemia 01/29/2018     Priority: Medium    Insomnia 01/29/2018     Priority: Medium    Panic disorder 01/29/2018     Priority: Medium     Overview:   Previously patient of Dr. Reynoso since her teens. Patient is attempting slow taper down on her Xanax. She's been on Xanax 2 mg 4 times a day for years      Tobacco abuse 01/29/2018     Priority:  Medium    Pain medication agreement, 4/18/18, 4/3/19, 10/27/2020 04/21/2017     Priority: Medium     Overview:   4/20/17:  Oxycodone 10 mg #180/mo    Hydrocodone 10/325mg, #240/mo. Stopped and changed to morphine. Agreement signed 10/3/12, updated 10/1/ 14.  Complex regional pain syndrome (aka Reflex Sympathetic Dystrophy)  MS Contin 30 mg q 8 hours #90 per month.  Morphine sulfate IR 15 mg 2-3 tab tid prn #180 per month, decreased to #90/month.   query 4/9/16 as part of chart review. Seems appropriate. 6/2/16 tapered and off Morphine. [place] 5/325 #120/month.  query appropriate. ToxAssure appropriate.    August 2016 Consultation with [facility]. Recommended spinal cord stimulator trial, but she is reluctant to pursue that. Pain clinic stated that continuing opioids was OK.      Complex regional pain syndrome type 1 affecting left lower leg 04/15/2016     Priority: Medium     Overview:   Patient with a history of complex regional pain syndrome has seen Dr. Dino Skelton4/23/16       Migraine headache 11/25/2014     Priority: Medium    Lumbago 02/06/2012     Priority: Medium    Major depressive disorder, recurrent episode, severe (H) 07/13/2011     Priority: Medium    Paroxysmal supraventricular tachycardia (H24) 05/24/2010     Priority: Medium    Mononeuritis 07/06/2006     Priority: Medium     IMO Update 10/11      Ankle and foot joint derangement 06/05/2006     Priority: Medium     Overview:   Chronic left ankle pain secondary to reflex sympathetic dystrophy.  Patient   has had 4 previous surgeries, the most recent one done by Dr. Noriega in   2007. On Narcotic contract.    Formatting of this note might be different from the original.  Chronic left ankle pain secondary to reflex sympathetic dystrophy.  Patient   has had 4 previous surgeries, the most recent one done by Dr. Noriega in   2007. On Narcotic contract.    IMO Update 10/11          Past Medical History:    Past Medical History:    Diagnosis Date    Acquired absence of other organs (CODE)     Chronic pain syndrome     Complex regional pain syndrome I     Encounter for other administrative examinations     Encounter for other administrative examinations     Gastro-esophageal reflux disease without esophagitis     Injury of left ankle     Low back pain     Major depressive disorder, recurrent severe without psychotic features (H)     Migraine without status migrainosus, not intractable     Nicotine dependence, uncomplicated     Obesity     Overweight     Pain in ankle     Pain in thoracic spine     Panic disorder without agoraphobia     Personal history of other diseases of the female genital tract     Personal history of other medical treatment (CODE)     Supraventricular tachycardia (H24)        Past Surgical History:    Past Surgical History:   Procedure Laterality Date    ANKLE SURGERY      1997, 2000,Left ankle surgery x 2    CYSTECTOMY PILONIDAL N/A 6/19/2023    Procedure: Delayed closure of vulva and perineal defectes with drain placment;  Surgeon: Anthony Nolan MD;  Location:  OR    HYSTERECTOMY TOTAL ABDOMINAL      11/1999,secondary to endometriosis, no malignancy; patient reports no malignancy, but abnormal cells were present *    IRRIGATION AND DEBRIDEMENT DECUBITUS WOUND, COMBINED N/A 6/1/2023    Procedure: IRRIGATION AND DEBRIDEMENT, PERINEAL, LABIAL and GLUTEAL INFECTION;  Surgeon: Anthony Nolan MD;  Location:  OR    IRRIGATION AND DEBRIDEMENT DECUBITUS WOUND, COMBINED N/A 6/3/2023    Procedure: Debridement and washout of vulvar and perineal wounds;  Surgeon: Anthony Nolan MD;  Location:  OR    LAPAROSCOPIC CHOLECYSTECTOMY      No Comments Provided    LAPAROSCOPY DIAGNOSTIC (GENERAL)      1995    OTHER SURGICAL HISTORY      21299,CRANIO/MAXILLOFACIAL SURGERY,Jaw Surgery    OTHER SURGICAL HISTORY      207040,[facility],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  No other abnormalities noted.       Family  History:    Family History   Problem Relation Age of Onset    Cancer Father         Cancer,Bladder    Other - See Comments Father         Mild hypercholesterolemia    Other - See Comments Mother         scleroderma/lupus/Parkinson's syndrome    Family History Negative Brother         Good Health    Ariel-Danlos syndrome Daughter     Colon Cancer Maternal Grandmother 40        Cancer-colon,Followed by lung  with metastasis to the bone di*       Social History:  Marital Status:   [4]  Social History     Tobacco Use    Smoking status: Every Day     Current packs/day: 0.50     Average packs/day: 0.5 packs/day for 37.6 years (18.8 ttl pk-yrs)     Types: Cigarettes, Vaping Device     Start date: 12/2/1986     Passive exposure: Current    Smokeless tobacco: Never    Tobacco comments:     trying   Vaping Use    Vaping status: Former    Substances: Nicotine, Flavoring    Devices: Disposable   Substance Use Topics    Alcohol use: Not Currently     Comment: OCCASIONAL WINE    Drug use: Yes     Types: Marijuana     Comment: medical cannabis; suboxone        Medications:    acetaminophen (TYLENOL) 325 MG tablet  albuterol (PROAIR HFA/PROVENTIL HFA/VENTOLIN HFA) 108 (90 Base) MCG/ACT inhaler  amphetamine-dextroamphetamine (ADDERALL) 20 MG tablet  buprenorphine (SUBUTEX) 8 MG SUBL sublingual tablet  Cholecalciferol (VITAMIN D-3) 125 MCG (5000 UT) TABS  clonazePAM (KLONOPIN) 2 MG tablet  divalproex sodium extended-release (DEPAKOTE ER) 250 MG 24 hr tablet  fluticasone (FLONASE) 50 MCG/ACT nasal spray  ibuprofen (ADVIL/MOTRIN) 800 MG tablet  ondansetron (ZOFRAN) 4 MG tablet  pramipexole (MIRAPEX) 0.25 MG tablet  tiZANidine (ZANAFLEX) 4 MG tablet          Review of Systems   Constitutional:  Negative for chills and fever.   Respiratory:  Negative for cough and shortness of breath.    All other systems reviewed and are negative.      Physical Exam   BP: 105/68  Pulse: 63  Temp: 97.8  F (36.6  C)  Resp: 16  Weight: 66.7 kg  (147 lb 0.8 oz)  SpO2: 99 %      Physical Exam  Constitutional:       General: She is not in acute distress.     Appearance: Normal appearance.   HENT:      Head: Normocephalic and atraumatic.      Right Ear: External ear normal.      Left Ear: External ear normal.      Nose: Nose normal.   Eyes:      General: No scleral icterus.     Extraocular Movements: Extraocular movements intact.   Pulmonary:      Effort: Pulmonary effort is normal. No respiratory distress.   Genitourinary:     Comments: JUAN Hopson chaperoned exam, no lesions no erythema no redness no abnormal findings to vaginal area, inguinal region or buttocks  Musculoskeletal:         General: No deformity or signs of injury.   Skin:     General: Skin is dry.      Capillary Refill: Capillary refill takes less than 2 seconds.      Coloration: Skin is not jaundiced.   Neurological:      General: No focal deficit present.      Mental Status: She is alert and oriented to person, place, and time.   Psychiatric:         Mood and Affect: Mood normal.         Behavior: Behavior normal.         ED Course        Procedures             Critical Care time:               Results for orders placed or performed during the hospital encounter of 07/22/24 (from the past 24 hour(s))   UA with Microscopic reflex to Culture    Specimen: Urine, NOS   Result Value Ref Range    Color Urine Light Yellow Colorless, Straw, Light Yellow, Yellow    Appearance Urine Clear Clear    Glucose Urine Negative Negative mg/dL    Bilirubin Urine Negative Negative    Ketones Urine Negative Negative mg/dL    Specific Gravity Urine 1.022 1.003 - 1.035    Blood Urine Negative Negative    pH Urine 6.0 4.7 - 8.0    Protein Albumin Urine Negative Negative mg/dL    Urobilinogen Urine Normal Normal, 2.0 mg/dL    Nitrite Urine Negative Negative    Leukocyte Esterase Urine Moderate (A) Negative    Bacteria Urine Few (A) None Seen /HPF    Mucus Urine Present (A) None Seen /LPF    RBC Urine 2 <=2 /HPF    WBC  Urine 4 <=5 /HPF    Squamous Epithelials Urine 6 (H) <=1 /HPF    Hyaline Casts Urine 2 <=2 /LPF    Narrative    Urine Culture ordered based on laboratory criteria       Medications   acetaminophen (TYLENOL) tablet 975 mg (975 mg Oral $Given 7/22/24 5372)       Assessments & Plan (with Medical Decision Making)     I have reviewed the nursing notes.    I have reviewed the findings, diagnosis, plan and need for follow up with the patient.          Medical Decision Making  The patient's presentation was of moderate complexity (a chronic illness mild to moderate exacerbation, progression, or side effect of treatment).    The patient's evaluation involved:  ordering and/or review of 1 test(s) in this encounter (see separate area of note for details)    The patient's management necessitated moderate risk (prescription drug management including medications given in the ED).    54-year-old female here with pain to her vaginal area, on the right.  I suspect this is related to her previous surgery.  I discussed the patient has a good chance that perhaps some of her nerves are growing back.  She was seen for identical complaint yesterday and was discharged, no abnormal findings were on her exam at that time as well.  I told patient the best thing for her to do is follow-up with her surgeon, patient tells me she has been try to get them but they want to bump up her appointment and I told her to keep trying.    Discharge Medication List as of 7/23/2024  1:02 AM          Final diagnoses:   Vaginal pain       7/22/2024   [facility]       Lorenzo Hebert MD  07/23/24 0554

## 2024-07-23 NOTE — ED NOTES
Patient comes out of room and states she does not wasn't to stay and wants to go home because she is getting sleepy. She then exits out the ED to go home.

## 2024-07-23 NOTE — ED TRIAGE NOTES
Patient presents with c/o bilateral groin/hip pain since last June, also having vaginal pain that started about 2 weeks ago, denies any sores at this time,

## 2024-07-23 NOTE — ED NOTES
States that she had surgery done for necrotizing fasciitis a year ago and since the surgery she has been having sores on her labia because her underpants move around. States she has had the sores since the surgery. States that she was seen at [facility] ER last night for same symptoms.

## 2024-07-24 LAB — BACTERIA UR CULT: NORMAL

## 2024-07-31 ENCOUNTER — PATIENT OUTREACH (OUTPATIENT)
Dept: CARE COORDINATION | Facility: CLINIC | Age: 54
End: 2024-07-31
Payer: COMMERCIAL

## 2024-08-05 ENCOUNTER — TELEPHONE (OUTPATIENT)
Dept: FAMILY MEDICINE | Facility: OTHER | Age: 54
End: 2024-08-05

## 2024-08-05 NOTE — TELEPHONE ENCOUNTER
Recommendation for ER/UC evaluation d/t not being able to keep food down  Okay for ER/UC follow up appt next week. No openings this week  Eryn Hurley MD

## 2024-08-05 NOTE — TELEPHONE ENCOUNTER
2:59 PM    Reason for Call: OVERBOOK    Patient is having the following symptoms: hand foot mouth for 5 days. Pt has not been able to keep food down. And then pinched nerve pain in L hip leg pain that caused her to be on floor for 30 minutes     The patient is requesting an appointment for soon with Dr. Hurley.    Was an appointment offered for this call? No  If yes : Appointment type              Date    Preferred method for responding  this message: Telephone Call  What is your phone number ? [phone]     If we cannot reach you directly, may we leave a detailed response at the number you provided? Yes    Can this message wait until your PCP/provider returns, if unavailable today? Missy Nelson

## 2024-08-06 NOTE — TELEPHONE ENCOUNTER
"Spoke with patient. She stated that she had tried to call and get an appointment but was able. Mentioned to her that she did have an appointment and \"no showed\". She said that she will go to the UC  "

## 2024-08-15 NOTE — PROGRESS NOTES
Clinic Care Coordination Contact  Care Team Conversations    Care coordinator was asked by surgical team to follow up with patient to help her get scheduled with plastic surgeon. A referral was made in December, 23, but she never heard from anyone after she chose an [place] provider.     Plan:  Spoke with the referral department to resend this. They are noting it as a priority due to the length of time she has waited. I will follow up to ensure she gets scheduled. Otherwise patient is now seen at  [facility] and her new PCP is helping her with her other medical concerns.     Will not add to my care coordination panel, but will see this particular request through.  Mary Anthony RN on 8/15/2024 at 11:30 AM

## 2024-08-22 ENCOUNTER — PATIENT OUTREACH (OUTPATIENT)
Dept: CARE COORDINATION | Facility: CLINIC | Age: 54
End: 2024-08-22
Payer: COMMERCIAL

## 2024-08-22 NOTE — PROGRESS NOTES
"Clinic Care Coordination Contact  Care Team Conversations    Called [facility] plastic surgery in [place] after checking in with patient and finding that she had not received a call to schedule a consult yet. We had faxed the referral twice. They said they hadn't received it.     Of note, when discussing the surgical need, the  said they do not do labial reconstructions. They feel it must have been another plastic surgeons office as \"there are many in [place]\".    Plan:  CC attempted to research surgical options, and it is not clear who would be appropriate for these needs. Forwarding to surgeon who made the referral to see who it was intended for.  [facility] has a wait of at least 3 months and will need a new referral as current one will .   Mary Springer RN on 2024 at 4:26 PM    "

## 2024-08-27 DIAGNOSIS — N76.82 FOURNIER'S GANGRENE IN FEMALE (H): Primary | ICD-10-CM

## 2024-08-29 ENCOUNTER — TRANSCRIBE ORDERS (OUTPATIENT)
Dept: OTHER | Age: 54
End: 2024-08-29

## 2024-08-29 ENCOUNTER — OFFICE VISIT (OUTPATIENT)
Dept: FAMILY MEDICINE | Facility: OTHER | Age: 54
End: 2024-08-29
Attending: NURSE PRACTITIONER
Payer: COMMERCIAL

## 2024-08-29 VITALS
DIASTOLIC BLOOD PRESSURE: 67 MMHG | WEIGHT: 140.2 LBS | TEMPERATURE: 97 F | RESPIRATION RATE: 12 BRPM | BODY MASS INDEX: 20.76 KG/M2 | HEART RATE: 78 BPM | OXYGEN SATURATION: 97 % | SYSTOLIC BLOOD PRESSURE: 99 MMHG | HEIGHT: 69 IN

## 2024-08-29 DIAGNOSIS — G89.4 CHRONIC PAIN DISORDER: ICD-10-CM

## 2024-08-29 DIAGNOSIS — Z87.438 HISTORY OF FOURNIER'S GANGRENE: ICD-10-CM

## 2024-08-29 DIAGNOSIS — N76.82 FOURNIER'S GANGRENE IN FEMALE (H): Primary | ICD-10-CM

## 2024-08-29 DIAGNOSIS — K59.00 CONSTIPATION, UNSPECIFIED CONSTIPATION TYPE: ICD-10-CM

## 2024-08-29 DIAGNOSIS — R10.2 VULVAR PAIN: Primary | ICD-10-CM

## 2024-08-29 PROCEDURE — G0463 HOSPITAL OUTPT CLINIC VISIT: HCPCS

## 2024-08-29 PROCEDURE — 99213 OFFICE O/P EST LOW 20 MIN: CPT | Performed by: FAMILY MEDICINE

## 2024-08-29 ASSESSMENT — PAIN SCALES - GENERAL: PAINLEVEL: EXTREME PAIN (8)

## 2024-08-29 NOTE — PATIENT INSTRUCTIONS
You need help with constipation.    Miralax daily. Initially 2-3 doses. Then may need 1-2 cap fulls a day.    I suggest you have an exam by one of the OB-GYN doctors.    Establish with a primary MD.    Colonoscopy.

## 2024-08-29 NOTE — NURSING NOTE
"Chief Complaint   Patient presents with    Rash     Rash/painful sores. Legs, hands, throat. First noticed in July 2024.     Vaginal Problem     Bleeding and pain       Initial BP 99/67 (BP Location: Left arm, Patient Position: Sitting, Cuff Size: Adult Regular)   Pulse 78   Temp 97  F (36.1  C) (Tympanic)   Resp 12   Ht 1.753 m (5' 9\")   Wt 63.6 kg (140 lb 3.2 oz)   LMP 11/01/1999 (Exact Date)   SpO2 97%   BMI 20.70 kg/m   Estimated body mass index is 20.7 kg/m  as calculated from the following:    Height as of this encounter: 1.753 m (5' 9\").    Weight as of this encounter: 63.6 kg (140 lb 3.2 oz).  Medication Review: complete    The next two questions are to help us understand your food security.  If you are feeling you need any assistance in this area, we have resources available to support you today.          1/8/2024   SDOH- Food Insecurity   Within the past 12 months, did you worry that your food would run out before you got money to buy more? N   Within the past 12 months, did the food you bought just not last and you didn t have money to get more? N              Oliver Redmond      "

## 2024-08-29 NOTE — PROGRESS NOTES
(R10.2) Vulvar pain  (primary encounter diagnosis)  Comment:   No open wounds seen, no apparent vaginal bleeding.  Plan:   I have suggested that she have an exam by OB/GYN to reassure.      (Z87.438) History of Julio's gangrene  Comment:   Well-healed incision inferior aspect of labia majora on right extending down into right buttock.  Some tenderness in this area noted.  Plan:       (K59.00) Constipation, unspecified constipation type  Comment:   Large amount of hard stool noted.  Plan:   MiraLAX recommended and diligence in managing constipation recommended.      (G89.4) Chronic pain disorder  Comment:   Taking daily Subutex.  Plan:         CHIEF COMPLAINT    Vulvar discomfort, possible sores in this area.  History of Julio's gangrene.      HISTORY    Patient had initial infection back in 6-2023.  Incision and drainage and debridement were performed and she had a long course of healing.    Right now she is complaining of irritation in the vulvar area from clothing and she apparently noticed some blood at 1 time and was not sure where it was coming from.  She says she at times feels like her insides are going to fall out vaginally.    She is on opiates for chronic leg pain and tends to be constipated.    She is s/p hysterectomy.  Still has her ovaries.      Patient Active Problem List   Diagnosis    Ankle and foot joint derangement    Lumbago    Chronic pain disorder    Complex regional pain syndrome type 1 affecting left lower leg    Major depressive disorder, recurrent episode, severe (H)    Esophageal reflux    Hyperlipidemia    Insomnia    Migraine headache    Pain medication agreement, 4/18/18, 4/3/19, 10/27/2020    Panic disorder    Paroxysmal supraventricular tachycardia (H24)    Tobacco abuse    Medical cannabis use    Suicidal behavior    Mononeuritis    TMJ (temporomandibular joint syndrome)    Arthralgia of right temporomandibular joint    Julio's gangrene (H28)    Julio's gangrene in  "female (H)    History of Julio's gangrene    Perineal pain in female    Postoperative state    Chronic, continuous use of opioids    Adrenal hyperplasia (H24)       Current Outpatient Medications   Medication Sig Dispense Refill    acetaminophen (TYLENOL) 325 MG tablet Take 2 tablets (650 mg) by mouth every 4 hours as needed for other (mild pain) 100 tablet 0    albuterol (PROAIR HFA/PROVENTIL HFA/VENTOLIN HFA) 108 (90 Base) MCG/ACT inhaler INHALE 2 PUFFS INTO THE LUNGS EVERY 4 HOURS AS NEEDED FOR SHORTNESS OF BREATH / DYSPNEA OR WHEEZING 8.5 g 11    amphetamine-dextroamphetamine (ADDERALL) 20 MG tablet Take 30 mg by mouth 3 times daily      buprenorphine (SUBUTEX) 8 MG SUBL sublingual tablet Place 8 mg under the tongue 3 times daily      Cholecalciferol (VITAMIN D-3) 125 MCG (5000 UT) TABS Take 1 tablet by mouth daily 90 tablet 11    clonazePAM (KLONOPIN) 2 MG tablet Take 1 tablet (2 mg) by mouth 2 times daily      divalproex sodium extended-release (DEPAKOTE ER) 250 MG 24 hr tablet Take 1 tablet by mouth 3 times daily      fluticasone (FLONASE) 50 MCG/ACT nasal spray Spray 2 sprays into both nostrils daily as needed for rhinitis or allergies 16 g 2    ibuprofen (ADVIL/MOTRIN) 800 MG tablet Take 1 tablet (800 mg) by mouth every 8 hours as needed for moderate pain 90 tablet 4    ondansetron (ZOFRAN) 4 MG tablet Take 1 tablet (4 mg) by mouth every 8 hours as needed for nausea 45 tablet 1    tiZANidine (ZANAFLEX) 4 MG tablet Take 1 tablet (4 mg) by mouth 3 times daily as needed for muscle spasms 90 tablet 2         REVIEW OF SYSTEMS    No fever.  No breathing problems.  No chest pains.  No abdominal pain, nausea.  No weakness.  No swelling.        EXAM  BP 99/67 (BP Location: Left arm, Patient Position: Sitting, Cuff Size: Adult Regular)   Pulse 78   Temp 97  F (36.1  C) (Tympanic)   Resp 12   Ht 1.753 m (5' 9\")   Wt 63.6 kg (140 lb 3.2 oz)   LMP 11/01/1999 (Exact Date)   SpO2 97%   BMI 20.70 kg/m  "     Patient is thin.  NAD.  HEENT unremarkable.  No cough or labored breathing.  Cardiac RSR.  Abdomen nondistended, no palpable tenderness.  Pelvic exam -  Healed incision lower right labial region extending onto right buttock.  Appears well-healed.  There is slight vulvar deformity inferior right side.  No vulvar wounds were seen.  Vaginal exam with 1 digit showed no blood or obvious mass vaginally.  Rectal exam shows large lumpy stool present in the rectum.

## 2024-09-11 ENCOUNTER — TELEPHONE (OUTPATIENT)
Dept: PLASTIC SURGERY | Facility: CLINIC | Age: 54
End: 2024-09-11
Payer: COMMERCIAL

## 2024-09-11 NOTE — CONFIDENTIAL NOTE
Elverr received voicemail from Dot, RN at [facility] following up on gender care referral for pt. Called back and discussed that Dr. Cordova in urology is likely the best person to see this pt, and writer had spoken with her about this via in basket on 9/9 and 9/10. Writer gave phone number to schedule with urology and encouraged Dot or [facility] to call back if they are still having issues finding a provider.

## 2024-09-25 ENCOUNTER — HOSPITAL ENCOUNTER (EMERGENCY)
Facility: OTHER | Age: 54
Discharge: HOME | End: 2024-09-25
Attending: FAMILY MEDICINE
Payer: COMMERCIAL

## 2024-09-25 VITALS
TEMPERATURE: 98.2 F | OXYGEN SATURATION: 95 % | WEIGHT: 135 LBS | SYSTOLIC BLOOD PRESSURE: 113 MMHG | RESPIRATION RATE: 18 BRPM | BODY MASS INDEX: 19.99 KG/M2 | DIASTOLIC BLOOD PRESSURE: 67 MMHG | HEIGHT: 69 IN | HEART RATE: 16 BPM

## 2024-09-25 DIAGNOSIS — M25.551 BILATERAL HIP PAIN: ICD-10-CM

## 2024-09-25 DIAGNOSIS — M25.552 BILATERAL HIP PAIN: ICD-10-CM

## 2024-09-25 LAB
ALBUMIN SERPL BCG-MCNC: 4.1 G/DL (ref 3.5–5.2)
ALP SERPL-CCNC: 65 U/L (ref 40–150)
ALT SERPL W P-5'-P-CCNC: 12 U/L (ref 0–50)
ANION GAP SERPL CALCULATED.3IONS-SCNC: 11 MMOL/L (ref 7–15)
AST SERPL W P-5'-P-CCNC: 16 U/L (ref 0–45)
BASOPHILS # BLD AUTO: 0 10E3/UL (ref 0–0.2)
BASOPHILS NFR BLD AUTO: 0 %
BILIRUB SERPL-MCNC: 0.2 MG/DL
BUN SERPL-MCNC: 11.5 MG/DL (ref 6–20)
CALCIUM SERPL-MCNC: 9 MG/DL (ref 8.8–10.4)
CHLORIDE SERPL-SCNC: 101 MMOL/L (ref 98–107)
CREAT SERPL-MCNC: 0.79 MG/DL (ref 0.51–0.95)
CRP SERPL-MCNC: <3 MG/L
EGFRCR SERPLBLD CKD-EPI 2021: 89 ML/MIN/1.73M2
EOSINOPHIL # BLD AUTO: 0.2 10E3/UL (ref 0–0.7)
EOSINOPHIL NFR BLD AUTO: 3 %
ERYTHROCYTE [DISTWIDTH] IN BLOOD BY AUTOMATED COUNT: 12.5 % (ref 10–15)
ERYTHROCYTE [SEDIMENTATION RATE] IN BLOOD BY WESTERGREN METHOD: 8 MM/HR (ref 0–30)
GLUCOSE SERPL-MCNC: 86 MG/DL (ref 70–99)
HCO3 SERPL-SCNC: 23 MMOL/L (ref 22–29)
HCT VFR BLD AUTO: 36.6 % (ref 35–47)
HGB BLD-MCNC: 12.3 G/DL (ref 11.7–15.7)
HOLD SPECIMEN: NORMAL
IMM GRANULOCYTES # BLD: 0 10E3/UL
IMM GRANULOCYTES NFR BLD: 0 %
LYMPHOCYTES # BLD AUTO: 2.5 10E3/UL (ref 0.8–5.3)
LYMPHOCYTES NFR BLD AUTO: 35 %
MCH RBC QN AUTO: 31.9 PG (ref 26.5–33)
MCHC RBC AUTO-ENTMCNC: 33.6 G/DL (ref 31.5–36.5)
MCV RBC AUTO: 95 FL (ref 78–100)
MONOCYTES # BLD AUTO: 0.4 10E3/UL (ref 0–1.3)
MONOCYTES NFR BLD AUTO: 5 %
NEUTROPHILS # BLD AUTO: 4.1 10E3/UL (ref 1.6–8.3)
NEUTROPHILS NFR BLD AUTO: 57 %
NRBC # BLD AUTO: 0 10E3/UL
NRBC BLD AUTO-RTO: 0 /100
PLATELET # BLD AUTO: 168 10E3/UL (ref 150–450)
POTASSIUM SERPL-SCNC: 3.8 MMOL/L (ref 3.4–5.3)
PROT SERPL-MCNC: 6.7 G/DL (ref 6.4–8.3)
RBC # BLD AUTO: 3.86 10E6/UL (ref 3.8–5.2)
SODIUM SERPL-SCNC: 135 MMOL/L (ref 135–145)
WBC # BLD AUTO: 7.3 10E3/UL (ref 4–11)

## 2024-09-25 PROCEDURE — 36415 COLL VENOUS BLD VENIPUNCTURE: CPT | Performed by: FAMILY MEDICINE

## 2024-09-25 PROCEDURE — 99283 EMERGENCY DEPT VISIT LOW MDM: CPT | Performed by: FAMILY MEDICINE

## 2024-09-25 PROCEDURE — 85652 RBC SED RATE AUTOMATED: CPT | Performed by: FAMILY MEDICINE

## 2024-09-25 PROCEDURE — 80053 COMPREHEN METABOLIC PANEL: CPT | Performed by: FAMILY MEDICINE

## 2024-09-25 PROCEDURE — 85004 AUTOMATED DIFF WBC COUNT: CPT | Performed by: FAMILY MEDICINE

## 2024-09-25 PROCEDURE — 86140 C-REACTIVE PROTEIN: CPT | Performed by: FAMILY MEDICINE

## 2024-09-25 RX ORDER — TIZANIDINE 2 MG/1
4 TABLET ORAL 3 TIMES DAILY PRN
Status: DISCONTINUED | OUTPATIENT
Start: 2024-09-25 | End: 2024-09-25 | Stop reason: HOSPADM

## 2024-09-25 RX ORDER — BUPRENORPHINE 2 MG/1
8 TABLET SUBLINGUAL 3 TIMES DAILY
Status: DISCONTINUED | OUTPATIENT
Start: 2024-09-25 | End: 2024-09-25 | Stop reason: HOSPADM

## 2024-09-25 ASSESSMENT — ENCOUNTER SYMPTOMS
BACK PAIN: 1
DYSURIA: 0
FATIGUE: 0
FEVER: 0

## 2024-09-25 ASSESSMENT — ACTIVITIES OF DAILY LIVING (ADL)
ADLS_ACUITY_SCORE: 35
ADLS_ACUITY_SCORE: 37
ADLS_ACUITY_SCORE: 37

## 2024-09-25 NOTE — ED TRIAGE NOTES
"Pt presents to ED via private car with c/o Lt hip/leg pain that started a few weeks ago. Pt also c/o black spot in her groin that she's had assessed multiple times with a hx of necrotizing fascitis . /67   Pulse (!) 16   Temp 98.2  F (36.8  C) (Tympanic)   Resp 18   Ht 1.753 m (5' 9\")   Wt 61.2 kg (135 lb)   LMP 11/01/1999 (Exact Date)   SpO2 95%   BMI 19.94 kg/m         Triage Assessment (Adult)       Row Name 09/25/24 1408          Triage Assessment    Airway WDL WDL        Respiratory WDL    Respiratory WDL WDL        Skin Circulation/Temperature WDL    Skin Circulation/Temperature WDL WDL        Cardiac WDL    Cardiac WDL WDL        Peripheral/Neurovascular WDL    Peripheral Neurovascular WDL WDL        Cognitive/Neuro/Behavioral WDL    Cognitive/Neuro/Behavioral WDL WDL                     "

## 2024-09-25 NOTE — ED PROVIDER NOTES
History     Chief Complaint   Patient presents with    Hip Pain     HPI  Kym Perea is a 54 year old female who presents for chronic bilateral hip pain.  She has known arthritis.  However, she is also had a prior history of necrotizing fasciitis specifically on the right leg.  She has a small bluish-black dot in the medial posterior aspect of the right upper thigh.  She is concerned that this could be an early sign of necrotizing fasciitis.  She denies fevers or chills.  No nausea or vomiting.  She does suffer with chronic pain.  Appears as though she has Subutex on her medication list.  She has had ongoing complaints and concerns related to nerve type pain of the vulvar area.  She has been told that this is some sort of complex regional pain type situation. Her surgeon apparently has retired. She reports that she has RSD and previously was affiliated with the pain clinic. She reports this was managed with pain medication and is not interested in this.     Per office visit note from 7/11/2024  Chronic pain disorder / Chronic bilateral low back pain with bilateral sciatica  On subutex 8mg tid by another provider  trialed: meloxicam (not effective), lyrica (memory issues), gabapentin (memory issues), steroids (not effective), flexeril (memory issues), naproxen (no difference than ibu), mirtazapine (diarrhea)  Will start with updating imaging and referral to neurosx. Declines further injections / pain pump  - MR Lumbar Spine w/o Contrast; Future  - c/w tiZANidine (ZANAFLEX) 4 MG tablet; Take 1 tablet (4 mg) by mouth 3 times daily as needed for muscle spasms  - Orthopedic  Referral; Future  - ketorolac (TORADOL) injection 30 mg  - c/w ibuprofen   - consideration for topamax, but issues with low weight  - ?ketamine    I do not see an MRI on file.  It appears as though this was recommended by her regular provider.  X-ray hip and pelvis from 7/11/2024 showed mild degenerative changes in the lower lumbar  spine otherwise normal.    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     Priority: Medium    Perineal pain in female 06/24/2023     Priority: Medium    Postoperative state 06/24/2023     Priority: Medium    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     Priority: Medium    Julio's gangrene (H28) 06/01/2023     Priority: Medium    TMJ (temporomandibular joint syndrome) 11/01/2021     Priority: Medium    Arthralgia of right temporomandibular joint 11/01/2021     Priority: Medium    Suicidal behavior 10/19/2021     Priority: Medium    Medical cannabis use 07/10/2018     Priority: Medium    Chronic pain disorder 01/29/2018     Priority: Medium     Overview:   complex regional pain syndrome left ankle      Esophageal reflux 01/29/2018     Priority: Medium    Hyperlipidemia 01/29/2018     Priority: Medium    Insomnia 01/29/2018     Priority: Medium    Panic disorder 01/29/2018     Priority: Medium     Overview:   Previously patient of Dr. Reynoso since her teens. Patient is attempting slow taper down on her Xanax. She's been on Xanax 2 mg 4 times a day for years      Tobacco abuse 01/29/2018     Priority: Medium    Pain medication agreement, 4/18/18, 4/3/19, 10/27/2020 04/21/2017     Priority: Medium     Overview:   4/20/17:  Oxycodone 10 mg #180/mo    Hydrocodone 10/325mg, #240/mo. Stopped and changed to morphine. Agreement signed 10/3/12, updated 10/1/ 14.  Complex regional pain syndrome (aka Reflex  Sympathetic Dystrophy)  MS Contin 30 mg q 8 hours #90 per month.  Morphine sulfate IR 15 mg 2-3 tab tid prn #180 per month, decreased to #90/month.   query 4/9/16 as part of chart review. Seems appropriate. 6/2/16 tapered and off Morphine. [place] 5/325 #120/month.  query appropriate. ToxAssure appropriate.    August 2016 Consultation with [facility]. Recommended spinal cord stimulator trial, but she is reluctant to pursue that. Pain clinic stated that continuing opioids was OK.      Complex regional pain syndrome type 1 affecting left lower leg 04/15/2016     Priority: Medium     Overview:   Patient with a history of complex regional pain syndrome has seen Dr. Dino Skelton4/23/16       Migraine headache 11/25/2014     Priority: Medium    Lumbago 02/06/2012     Priority: Medium    Major depressive disorder, recurrent episode, severe (H) 07/13/2011     Priority: Medium    Paroxysmal supraventricular tachycardia (H24) 05/24/2010     Priority: Medium    Mononeuritis 07/06/2006     Priority: Medium     IMO Update 10/11      Ankle and foot joint derangement 06/05/2006     Priority: Medium     Overview:   Chronic left ankle pain secondary to reflex sympathetic dystrophy.  Patient   has had 4 previous surgeries, the most recent one done by Dr. Noriega in   2007. On Narcotic contract.    Formatting of this note might be different from the original.  Chronic left ankle pain secondary to reflex sympathetic dystrophy.  Patient   has had 4 previous surgeries, the most recent one done by Dr. Noriega in   2007. On Narcotic contract.    IMO Update 10/11          Past Medical History:    Past Medical History:   Diagnosis Date    Acquired absence of other organs (CODE)     Chronic pain syndrome     Complex regional pain syndrome I     Encounter for other administrative examinations     Encounter for other administrative examinations     Gastro-esophageal reflux disease without esophagitis     Injury of left  ankle     Low back pain     Major depressive disorder, recurrent severe without psychotic features (H)     Migraine without status migrainosus, not intractable     Nicotine dependence, uncomplicated     Obesity     Overweight     Pain in ankle     Pain in thoracic spine     Panic disorder without agoraphobia     Personal history of other diseases of the female genital tract     Personal history of other medical treatment (CODE)     Supraventricular tachycardia (H24)        Past Surgical History:    Past Surgical History:   Procedure Laterality Date    ANKLE SURGERY      1997, 2000,Left ankle surgery x 2    CYSTECTOMY PILONIDAL N/A 6/19/2023    Procedure: Delayed closure of vulva and perineal defectes with drain placment;  Surgeon: Anthony Nolan MD;  Location: [facility] OR    HYSTERECTOMY TOTAL ABDOMINAL      11/1999,secondary to endometriosis, no malignancy; patient reports no malignancy, but abnormal cells were present *    IRRIGATION AND DEBRIDEMENT DECUBITUS WOUND, COMBINED N/A 6/1/2023    Procedure: IRRIGATION AND DEBRIDEMENT, PERINEAL, LABIAL and GLUTEAL INFECTION;  Surgeon: Anthony Nolan MD;  Location:  OR    IRRIGATION AND DEBRIDEMENT DECUBITUS WOUND, COMBINED N/A 6/3/2023    Procedure: Debridement and washout of vulvar and perineal wounds;  Surgeon: Anthony Nolan MD;  Location:  OR    LAPAROSCOPIC CHOLECYSTECTOMY      No Comments Provided    LAPAROSCOPY DIAGNOSTIC (GENERAL)      1995    OTHER SURGICAL HISTORY      21299,CRANIO/MAXILLOFACIAL SURGERY,Jaw Surgery    OTHER SURGICAL HISTORY      207040,[facility],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  No other abnormalities noted.       Family History:    Family History   Problem Relation Age of Onset    Cancer Father         Cancer,Bladder    Other - See Comments Father         Mild hypercholesterolemia    Other - See Comments Mother         scleroderma/lupus/Parkinson's syndrome    Family History Negative Brother         Good Health     "Ariel-Danlos syndrome Daughter     Colon Cancer Maternal Grandmother 40        Cancer-colon,Followed by lung  with metastasis to the bone di*       Social History:  Marital Status:   [4]  Social History     Tobacco Use    Smoking status: Every Day     Current packs/day: 0.50     Average packs/day: 0.5 packs/day for 37.8 years (18.9 ttl pk-yrs)     Types: Cigarettes, Vaping Device     Start date: 12/2/1986     Passive exposure: Current    Smokeless tobacco: Never    Tobacco comments:     trying   Vaping Use    Vaping status: Some Days    Substances: Nicotine, Flavoring    Devices: Disposable   Substance Use Topics    Alcohol use: Not Currently     Comment: OCCASIONAL WINE    Drug use: Yes     Types: Marijuana     Comment: medical cannabis; suboxone        Medications:    acetaminophen (TYLENOL) 325 MG tablet  albuterol (PROAIR HFA/PROVENTIL HFA/VENTOLIN HFA) 108 (90 Base) MCG/ACT inhaler  amphetamine-dextroamphetamine (ADDERALL) 20 MG tablet  buprenorphine (SUBUTEX) 8 MG SUBL sublingual tablet  Cholecalciferol (VITAMIN D-3) 125 MCG (5000 UT) TABS  clonazePAM (KLONOPIN) 2 MG tablet  divalproex sodium extended-release (DEPAKOTE ER) 250 MG 24 hr tablet  fluticasone (FLONASE) 50 MCG/ACT nasal spray  ibuprofen (ADVIL/MOTRIN) 800 MG tablet  ondansetron (ZOFRAN) 4 MG tablet  tiZANidine (ZANAFLEX) 4 MG tablet          Review of Systems   Constitutional:  Negative for fatigue and fever.   Genitourinary:  Negative for dysuria.   Musculoskeletal:  Positive for back pain.        Bilateral hip pain   Skin:  Negative for pallor and rash.       Physical Exam   BP: 113/67  Pulse: (!) 16  Temp: 98.2  F (36.8  C)  Resp: 18  Height: 175.3 cm (5' 9\")  Weight: 61.2 kg (135 lb)  SpO2: 95 %      Physical Exam  Constitutional:       Appearance: Normal appearance.      Comments: Thin and chronically ill, appears older than stated age but in no acute distress   HENT:      Head: Normocephalic and atraumatic.   Cardiovascular:      " Rate and Rhythm: Normal rate and regular rhythm.      Pulses: Normal pulses.      Heart sounds: No murmur heard.  Pulmonary:      Effort: Pulmonary effort is normal. No respiratory distress.      Breath sounds: No wheezing.   Abdominal:      General: Abdomen is flat.   Musculoskeletal:      Comments: Patient had difficulty moving in the bed due to discomfort.  She had pain with logroll test of both legs.  She had tenderness to palpation of the lumbar paraspinal area.  She had normal strength of the bilateral lower extremities.   Skin:     General: Skin is warm.   Neurological:      Mental Status: She is alert.         ED Course        Procedures              Critical Care time:  none               Results for orders placed or performed during the hospital encounter of 09/25/24 (from the past 24 hour(s))   CBC with platelets differential    Narrative    The following orders were created for panel order CBC with platelets differential.  Procedure                               Abnormality         Status                     ---------                               -----------         ------                     CBC with platelets and d...[[id number]]                      Final result                 Please view results for these tests on the individual orders.   Comprehensive metabolic panel   Result Value Ref Range    Sodium 135 135 - 145 mmol/L    Potassium 3.8 3.4 - 5.3 mmol/L    Carbon Dioxide (CO2) 23 22 - 29 mmol/L    Anion Gap 11 7 - 15 mmol/L    Urea Nitrogen 11.5 6.0 - 20.0 mg/dL    Creatinine 0.79 0.51 - 0.95 mg/dL    GFR Estimate 89 >60 mL/min/1.73m2    Calcium 9.0 8.8 - 10.4 mg/dL    Chloride 101 98 - 107 mmol/L    Glucose 86 70 - 99 mg/dL    Alkaline Phosphatase 65 40 - 150 U/L    AST 16 0 - 45 U/L    ALT 12 0 - 50 U/L    Protein Total 6.7 6.4 - 8.3 g/dL    Albumin 4.1 3.5 - 5.2 g/dL    Bilirubin Total 0.2 <=1.2 mg/dL   CRP inflammation   Result Value Ref Range    CRP Inflammation <3.00 <5.00 mg/L    Erythrocyte sedimentation rate auto   Result Value Ref Range    Erythrocyte Sedimentation Rate 8 0 - 30 mm/hr   Extra Tube    Narrative    The following orders were created for panel order Extra Tube.  Procedure                               Abnormality         Status                     ---------                               -----------         ------                     Extra Blue Top Tube[[id number]]                              Final result               Extra Red Top Tube[[id number]]                               Final result               Extra Green Top (Lithium...[[id number]]                      Final result                 Please view results for these tests on the individual orders.   Extra Blue Top Tube   Result Value Ref Range    Hold Specimen JIC    Extra Red Top Tube   Result Value Ref Range    Hold Specimen JIC    Extra Green Top (Lithium Heparin) ON ICE   Result Value Ref Range    Hold Specimen JIC    CBC with platelets and differential   Result Value Ref Range    WBC Count 7.3 4.0 - 11.0 10e3/uL    RBC Count 3.86 3.80 - 5.20 10e6/uL    Hemoglobin 12.3 11.7 - 15.7 g/dL    Hematocrit 36.6 35.0 - 47.0 %    MCV 95 78 - 100 fL    MCH 31.9 26.5 - 33.0 pg    MCHC 33.6 31.5 - 36.5 g/dL    RDW 12.5 10.0 - 15.0 %    Platelet Count 168 150 - 450 10e3/uL    % Neutrophils 57 %    % Lymphocytes 35 %    % Monocytes 5 %    % Eosinophils 3 %    % Basophils 0 %    % Immature Granulocytes 0 %    NRBCs per 100 WBC 0 <1 /100    Absolute Neutrophils 4.1 1.6 - 8.3 10e3/uL    Absolute Lymphocytes 2.5 0.8 - 5.3 10e3/uL    Absolute Monocytes 0.4 0.0 - 1.3 10e3/uL    Absolute Eosinophils 0.2 0.0 - 0.7 10e3/uL    Absolute Basophils 0.0 0.0 - 0.2 10e3/uL    Absolute Immature Granulocytes 0.0 <=0.4 10e3/uL    Absolute NRBCs 0.0 10e3/uL       Medications   tiZANidine (ZANAFLEX) tablet 4 mg (has no administration in time range)   buprenorphine (SUBUTEX) sublingual tablet 8 mg (has no administration in time range)        Assessments & Plan (with Medical Decision Making)     I have reviewed the nursing notes.    I have reviewed the findings, diagnosis, plan and need for follow up with the patient.           Medical Decision Making  The patient's presentation was of moderate complexity (an undiagnosed new problem with uncertain prognosis).    The patient's evaluation involved:  review of external note(s) from 3+ sources (see separate area of note for details)  review of 3+ test result(s) ordered prior to this encounter (see separate area of note for details)  ordering and/or review of 3+ test(s) in this encounter (see separate area of note for details)    The patient's management necessitated moderate risk (prescription drug management including medications given in the ED).        Discharge Medication List as of 9/25/2024  5:37 PM          Final diagnoses:   Bilateral hip pain   I attempted to review the chart as thoroughly as possible.  Have a conversation with patient.  Her main complaint was pain.  I offered her home Subutex.  She declined.  She was asking for something stronger.  She appears to have a very complicated past history.  Several imaging tests have been done.  There have been no new acute findings.  She does have a prior history of Julio's gangrene which is certainly probably causing some anxiety.  Patient was not happy with her care, she was not getting the pain medication that she wanted.  She did not wait for her imaging or remainder of her lab test to result for us to have a further more in-depth conversation.  She left AGAINST MEDICAL ADVICE without being seen to discuss her results.  Hopefully she will follow-up closely with her regular doctor for ongoing pain management.  On exam I did appreciate she was uncomfortable.  She appears to probably have some chronic hip pain.  She did not leave a urine sample.  I have now reviewed her labs that have been drawn and there is no acute findings.  She did not  stay for CT imaging.    9/25/2024   [facility]       Lucia Brand DO  09/25/24 [id number]

## 2024-10-01 DIAGNOSIS — M54.41 CHRONIC BILATERAL LOW BACK PAIN WITH BILATERAL SCIATICA: ICD-10-CM

## 2024-10-01 DIAGNOSIS — G89.29 CHRONIC BILATERAL LOW BACK PAIN WITH BILATERAL SCIATICA: ICD-10-CM

## 2024-10-01 DIAGNOSIS — M54.42 CHRONIC BILATERAL LOW BACK PAIN WITH BILATERAL SCIATICA: ICD-10-CM

## 2024-10-01 NOTE — TELEPHONE ENCOUNTER
Zanaflex      Last Written Prescription Date:  9.3.24  Last Fill Quantity: #90,   # refills: 0  Last Office Visit: 7.11.24  Future Office visit:       Routing refill request to provider for review/approval because:  Drug not on the [facility], [facility] or [facility] refill protocol or controlled substance

## 2024-10-24 ENCOUNTER — APPOINTMENT (OUTPATIENT)
Dept: CT IMAGING | Facility: HOSPITAL | Age: 54
End: 2024-10-24
Attending: EMERGENCY MEDICINE
Payer: COMMERCIAL

## 2024-10-24 ENCOUNTER — HOSPITAL ENCOUNTER (EMERGENCY)
Facility: HOSPITAL | Age: 54
Discharge: HOME | End: 2024-10-24
Attending: EMERGENCY MEDICINE | Admitting: EMERGENCY MEDICINE
Payer: COMMERCIAL

## 2024-10-24 VITALS
SYSTOLIC BLOOD PRESSURE: 99 MMHG | RESPIRATION RATE: 16 BRPM | TEMPERATURE: 98.7 F | DIASTOLIC BLOOD PRESSURE: 61 MMHG | OXYGEN SATURATION: 97 % | HEART RATE: 71 BPM

## 2024-10-24 DIAGNOSIS — R10.9 FLANK PAIN: ICD-10-CM

## 2024-10-24 DIAGNOSIS — R31.9 HEMATURIA, UNSPECIFIED TYPE: ICD-10-CM

## 2024-10-24 DIAGNOSIS — N10 PYELONEPHRITIS, ACUTE: ICD-10-CM

## 2024-10-24 LAB
ALBUMIN UR-MCNC: 100 MG/DL
ANION GAP SERPL CALCULATED.3IONS-SCNC: 11 MMOL/L (ref 7–15)
APPEARANCE UR: ABNORMAL
BASOPHILS # BLD AUTO: 0 10E3/UL (ref 0–0.2)
BASOPHILS NFR BLD AUTO: 0 %
BILIRUB UR QL STRIP: NEGATIVE
BUN SERPL-MCNC: 20.3 MG/DL (ref 6–20)
CALCIUM SERPL-MCNC: 8.6 MG/DL (ref 8.8–10.4)
CHLORIDE SERPL-SCNC: 98 MMOL/L (ref 98–107)
COLOR UR AUTO: ABNORMAL
CREAT SERPL-MCNC: 0.89 MG/DL (ref 0.51–0.95)
EGFRCR SERPLBLD CKD-EPI 2021: 77 ML/MIN/1.73M2
EOSINOPHIL # BLD AUTO: 0.2 10E3/UL (ref 0–0.7)
EOSINOPHIL NFR BLD AUTO: 3 %
ERYTHROCYTE [DISTWIDTH] IN BLOOD BY AUTOMATED COUNT: 12.4 % (ref 10–15)
GLUCOSE SERPL-MCNC: 90 MG/DL (ref 70–99)
GLUCOSE UR STRIP-MCNC: NEGATIVE MG/DL
HCO3 SERPL-SCNC: 23 MMOL/L (ref 22–29)
HCT VFR BLD AUTO: 36.1 % (ref 35–47)
HGB BLD-MCNC: 12.3 G/DL (ref 11.7–15.7)
HGB UR QL STRIP: ABNORMAL
HOLD SPECIMEN: NORMAL
IMM GRANULOCYTES # BLD: 0 10E3/UL
IMM GRANULOCYTES NFR BLD: 0 %
KETONES UR STRIP-MCNC: 10 MG/DL
LEUKOCYTE ESTERASE UR QL STRIP: ABNORMAL
LYMPHOCYTES # BLD AUTO: 3.3 10E3/UL (ref 0.8–5.3)
LYMPHOCYTES NFR BLD AUTO: 44 %
MCH RBC QN AUTO: 32 PG (ref 26.5–33)
MCHC RBC AUTO-ENTMCNC: 34.1 G/DL (ref 31.5–36.5)
MCV RBC AUTO: 94 FL (ref 78–100)
MONOCYTES # BLD AUTO: 0.4 10E3/UL (ref 0–1.3)
MONOCYTES NFR BLD AUTO: 6 %
NEUTROPHILS # BLD AUTO: 3.6 10E3/UL (ref 1.6–8.3)
NEUTROPHILS NFR BLD AUTO: 47 %
NITRATE UR QL: NEGATIVE
NRBC # BLD AUTO: 0 10E3/UL
NRBC BLD AUTO-RTO: 0 /100
PH UR STRIP: 6.5 [PH] (ref 4.7–8)
PLATELET # BLD AUTO: 152 10E3/UL (ref 150–450)
POTASSIUM SERPL-SCNC: 5.2 MMOL/L (ref 3.4–5.3)
RBC # BLD AUTO: 3.84 10E6/UL (ref 3.8–5.2)
RBC URINE: >182 /HPF
SODIUM SERPL-SCNC: 132 MMOL/L (ref 135–145)
SP GR UR STRIP: 1.02 (ref 1–1.03)
SQUAMOUS EPITHELIAL: 0 /HPF
UROBILINOGEN UR STRIP-MCNC: NORMAL MG/DL
WBC # BLD AUTO: 7.6 10E3/UL (ref 4–11)
WBC CLUMPS #/AREA URNS HPF: PRESENT /HPF
WBC URINE: >182 /HPF

## 2024-10-24 PROCEDURE — 87086 URINE CULTURE/COLONY COUNT: CPT | Performed by: EMERGENCY MEDICINE

## 2024-10-24 PROCEDURE — 96365 THER/PROPH/DIAG IV INF INIT: CPT | Mod: XU

## 2024-10-24 PROCEDURE — 80048 BASIC METABOLIC PNL TOTAL CA: CPT | Performed by: EMERGENCY MEDICINE

## 2024-10-24 PROCEDURE — 96376 TX/PRO/DX INJ SAME DRUG ADON: CPT

## 2024-10-24 PROCEDURE — 85025 COMPLETE CBC W/AUTO DIFF WBC: CPT | Performed by: EMERGENCY MEDICINE

## 2024-10-24 PROCEDURE — 250N000011 HC RX IP 250 OP 636: Performed by: EMERGENCY MEDICINE

## 2024-10-24 PROCEDURE — 99284 EMERGENCY DEPT VISIT MOD MDM: CPT | Performed by: EMERGENCY MEDICINE

## 2024-10-24 PROCEDURE — 81001 URINALYSIS AUTO W/SCOPE: CPT | Performed by: EMERGENCY MEDICINE

## 2024-10-24 PROCEDURE — 96375 TX/PRO/DX INJ NEW DRUG ADDON: CPT

## 2024-10-24 PROCEDURE — 99285 EMERGENCY DEPT VISIT HI MDM: CPT | Mod: 25

## 2024-10-24 PROCEDURE — 258N000003 HC RX IP 258 OP 636: Performed by: EMERGENCY MEDICINE

## 2024-10-24 PROCEDURE — 74178 CT ABD&PLV WO CNTR FLWD CNTR: CPT

## 2024-10-24 PROCEDURE — 96361 HYDRATE IV INFUSION ADD-ON: CPT

## 2024-10-24 PROCEDURE — 36415 COLL VENOUS BLD VENIPUNCTURE: CPT | Performed by: EMERGENCY MEDICINE

## 2024-10-24 RX ORDER — HYDROMORPHONE HYDROCHLORIDE 1 MG/ML
0.5 INJECTION, SOLUTION INTRAMUSCULAR; INTRAVENOUS; SUBCUTANEOUS EVERY 30 MIN PRN
Status: DISCONTINUED | OUTPATIENT
Start: 2024-10-24 | End: 2024-10-24 | Stop reason: HOSPADM

## 2024-10-24 RX ORDER — ONDANSETRON 2 MG/ML
4 INJECTION INTRAMUSCULAR; INTRAVENOUS EVERY 30 MIN PRN
Status: DISCONTINUED | OUTPATIENT
Start: 2024-10-24 | End: 2024-10-24 | Stop reason: HOSPADM

## 2024-10-24 RX ORDER — ONDANSETRON 4 MG/1
4 TABLET, ORALLY DISINTEGRATING ORAL EVERY 8 HOURS PRN
Qty: 10 TABLET | Refills: 0 | Status: SHIPPED | OUTPATIENT
Start: 2024-10-24

## 2024-10-24 RX ORDER — CEFTRIAXONE SODIUM 1 G/50ML
1 INJECTION, SOLUTION INTRAVENOUS ONCE
Status: COMPLETED | OUTPATIENT
Start: 2024-10-24 | End: 2024-10-24

## 2024-10-24 RX ORDER — OXYCODONE HYDROCHLORIDE 5 MG/1
5 TABLET ORAL EVERY 6 HOURS PRN
Qty: 10 TABLET | Refills: 0 | Status: SHIPPED | OUTPATIENT
Start: 2024-10-24

## 2024-10-24 RX ORDER — IOPAMIDOL 755 MG/ML
97 INJECTION, SOLUTION INTRAVASCULAR ONCE
Status: COMPLETED | OUTPATIENT
Start: 2024-10-24 | End: 2024-10-24

## 2024-10-24 RX ORDER — CEFDINIR 300 MG/1
300 CAPSULE ORAL 2 TIMES DAILY
Qty: 20 CAPSULE | Refills: 0 | Status: SHIPPED | OUTPATIENT
Start: 2024-10-24

## 2024-10-24 RX ADMIN — HYDROMORPHONE HYDROCHLORIDE 0.5 MG: 1 INJECTION, SOLUTION INTRAMUSCULAR; INTRAVENOUS; SUBCUTANEOUS at 16:28

## 2024-10-24 RX ADMIN — ONDANSETRON 4 MG: 2 INJECTION INTRAMUSCULAR; INTRAVENOUS at 16:25

## 2024-10-24 RX ADMIN — ONDANSETRON 4 MG: 2 INJECTION INTRAMUSCULAR; INTRAVENOUS at 17:28

## 2024-10-24 RX ADMIN — CEFTRIAXONE SODIUM 1 G: 1 INJECTION, SOLUTION INTRAVENOUS at 16:30

## 2024-10-24 RX ADMIN — IOPAMIDOL 97 ML: 755 INJECTION, SOLUTION INTRAVENOUS at 17:40

## 2024-10-24 RX ADMIN — SODIUM CHLORIDE 1000 ML: 9 INJECTION, SOLUTION INTRAVENOUS at 16:31

## 2024-10-24 ASSESSMENT — ACTIVITIES OF DAILY LIVING (ADL)
ADLS_ACUITY_SCORE: 0

## 2024-10-24 ASSESSMENT — COLUMBIA-SUICIDE SEVERITY RATING SCALE - C-SSRS
1. IN THE PAST MONTH, HAVE YOU WISHED YOU WERE DEAD OR WISHED YOU COULD GO TO SLEEP AND NOT WAKE UP?: NO
6. HAVE YOU EVER DONE ANYTHING, STARTED TO DO ANYTHING, OR PREPARED TO DO ANYTHING TO END YOUR LIFE?: NO
2. HAVE YOU ACTUALLY HAD ANY THOUGHTS OF KILLING YOURSELF IN THE PAST MONTH?: NO

## 2024-10-24 NOTE — ED TRIAGE NOTES
Patient has been having pain intermittently for the last month. The last 3 days has been worse and she has been voiding shantal blood and clots since Monday night

## 2024-10-24 NOTE — ED PROVIDER NOTES
History     Chief Complaint   Patient presents with    Flank Pain    Abdominal Pain     HPI  Kym Perea is a 54 year old female who presents to the ED with a history of intermittent flank pain mainly right-sided but occasionally left over the past several months and now over the last several days some gross hematuria no clots.  Has not had any fever but has had nausea has been eating and drinking poorly.  Pain today is mainly right flank into the right groin area.  No increase in urinary frequency or dysuria.  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     Priority: Medium    Perineal pain in female 06/24/2023     Priority: Medium    Postoperative state 06/24/2023     Priority: Medium    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     Priority: Medium    Julio's gangrene (H) 06/01/2023     Priority: Medium    TMJ (temporomandibular joint syndrome) 11/01/2021     Priority: Medium    Arthralgia of right temporomandibular joint 11/01/2021     Priority: Medium    Suicidal behavior 10/19/2021     Priority: Medium    Medical cannabis use 07/10/2018     Priority: Medium    Chronic pain disorder 01/29/2018     Priority: Medium     Overview:   complex regional pain syndrome left ankle      Esophageal reflux 01/29/2018     Priority: Medium    Hyperlipidemia 01/29/2018     Priority: Medium    Insomnia 01/29/2018      Priority: Medium    Panic disorder 01/29/2018     Priority: Medium     Overview:   Previously patient of Dr. Reynoso since her teens. Patient is attempting slow taper down on her Xanax. She's been on Xanax 2 mg 4 times a day for years      Tobacco abuse 01/29/2018     Priority: Medium    Pain medication agreement, 4/18/18, 4/3/19, 10/27/2020 04/21/2017     Priority: Medium     Overview:   4/20/17:  Oxycodone 10 mg #180/mo    Hydrocodone 10/325mg, #240/mo. Stopped and changed to morphine. Agreement signed 10/3/12, updated 10/1/ 14.  Complex regional pain syndrome (aka Reflex Sympathetic Dystrophy)  MS Contin 30 mg q 8 hours #90 per month.  Morphine sulfate IR 15 mg 2-3 tab tid prn #180 per month, decreased to #90/month.   query 4/9/16 as part of chart review. Seems appropriate. 6/2/16 tapered and off Morphine. [place] 5/325 #120/month.  query appropriate. ToxAssure appropriate.    August 2016 Consultation with [facility]. Recommended spinal cord stimulator trial, but she is reluctant to pursue that. Pain clinic stated that continuing opioids was OK.      Complex regional pain syndrome type 1 affecting left lower leg 04/15/2016     Priority: Medium     Overview:   Patient with a history of complex regional pain syndrome has seen Dr. Dino Skelton4/23/16       Migraine headache 11/25/2014     Priority: Medium    Lumbago 02/06/2012     Priority: Medium    Major depressive disorder, recurrent episode, severe (H) 07/13/2011     Priority: Medium    Paroxysmal supraventricular tachycardia (H) 05/24/2010     Priority: Medium    Mononeuritis 07/06/2006     Priority: Medium     IMO Update 10/11      Ankle and foot joint derangement 06/05/2006     Priority: Medium     Overview:   Chronic left ankle pain secondary to reflex sympathetic dystrophy.  Patient   has had 4 previous surgeries, the most recent one done by Dr. Noriega in   2007. On Narcotic contract.    Formatting of this note might be different  from the original.  Chronic left ankle pain secondary to reflex sympathetic dystrophy.  Patient   has had 4 previous surgeries, the most recent one done by Dr. Noriega in   2007. On Narcotic contract.    IMO Update 10/11          Past Medical History:    Past Medical History:   Diagnosis Date    Acquired absence of other organs (CODE)     Chronic pain syndrome     Complex regional pain syndrome I     Encounter for other administrative examinations     Encounter for other administrative examinations     Gastro-esophageal reflux disease without esophagitis     Injury of left ankle     Low back pain     Major depressive disorder, recurrent severe without psychotic features (H)     Migraine without status migrainosus, not intractable     Nicotine dependence, uncomplicated     Obesity     Overweight     Pain in ankle     Pain in thoracic spine     Panic disorder without agoraphobia     Personal history of other diseases of the female genital tract     Personal history of other medical treatment (CODE)     Supraventricular tachycardia (H)        Past Surgical History:    Past Surgical History:   Procedure Laterality Date    ANKLE SURGERY      1997, 2000,Left ankle surgery x 2    CYSTECTOMY PILONIDAL N/A 6/19/2023    Procedure: Delayed closure of vulva and perineal defectes with drain placment;  Surgeon: Anthony Nolan MD;  Location:  OR    HYSTERECTOMY TOTAL ABDOMINAL      11/1999,secondary to endometriosis, no malignancy; patient reports no malignancy, but abnormal cells were present *    IRRIGATION AND DEBRIDEMENT DECUBITUS WOUND, COMBINED N/A 6/1/2023    Procedure: IRRIGATION AND DEBRIDEMENT, PERINEAL, LABIAL and GLUTEAL INFECTION;  Surgeon: Anthony Nolan MD;  Location:  OR    IRRIGATION AND DEBRIDEMENT DECUBITUS WOUND, COMBINED N/A 6/3/2023    Procedure: Debridement and washout of vulvar and perineal wounds;  Surgeon: Anthony Nolan MD;  Location:  OR    LAPAROSCOPIC CHOLECYSTECTOMY      No Comments  Provided    LAPAROSCOPY DIAGNOSTIC (GENERAL)      1995    OTHER SURGICAL HISTORY      21299,CRANIO/MAXILLOFACIAL SURGERY,Jaw Surgery    OTHER SURGICAL HISTORY      207040,[facility] EMG,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  No other abnormalities noted.       Family History:    Family History   Problem Relation Age of Onset    Cancer Father         Cancer,Bladder    Other - See Comments Father         Mild hypercholesterolemia    Other - See Comments Mother         scleroderma/lupus/Parkinson's syndrome    Family History Negative Brother         Good Health    Ariel-Danlos syndrome Daughter     Colon Cancer Maternal Grandmother 40        Cancer-colon,Followed by lung  with metastasis to the bone di*       Social History:  Marital Status:   [4]  Social History     Tobacco Use    Smoking status: Every Day     Current packs/day: 0.50     Average packs/day: 0.5 packs/day for 37.9 years (18.9 ttl pk-yrs)     Types: Cigarettes, Vaping Device     Start date: 12/2/1986     Passive exposure: Current    Smokeless tobacco: Never    Tobacco comments:     trying   Vaping Use    Vaping status: Some Days    Substances: Nicotine, Flavoring    Devices: Disposable   Substance Use Topics    Alcohol use: Not Currently     Comment: OCCASIONAL WINE    Drug use: Yes     Types: Marijuana     Comment: medical cannabis; suboxone        Medications:    cefdinir (OMNICEF) 300 MG capsule  ondansetron (ZOFRAN ODT) 4 MG ODT tab  oxyCODONE (ROXICODONE) 5 MG tablet  acetaminophen (TYLENOL) 325 MG tablet  albuterol (PROAIR HFA/PROVENTIL HFA/VENTOLIN HFA) 108 (90 Base) MCG/ACT inhaler  amphetamine-dextroamphetamine (ADDERALL) 20 MG tablet  buprenorphine (SUBUTEX) 8 MG SUBL sublingual tablet  Cholecalciferol (VITAMIN D-3) 125 MCG (5000 UT) TABS  clonazePAM (KLONOPIN) 2 MG tablet  divalproex sodium extended-release (DEPAKOTE ER) 250 MG 24 hr tablet  fluticasone (FLONASE) 50 MCG/ACT nasal spray  ibuprofen (ADVIL/MOTRIN) 800 MG  tablet  ondansetron (ZOFRAN) 4 MG tablet  tiZANidine (ZANAFLEX) 4 MG tablet          Review of Systems   All other systems reviewed and are negative.      Physical Exam   BP: 99/61  Pulse: 71  Temp: 98.7  F (37.1  C)  Resp: 16  SpO2: 97 %      Physical Exam  Vitals and nursing note reviewed.   Constitutional:       General: She is not in acute distress.     Appearance: Normal appearance. She is not diaphoretic.   HENT:      Head: Atraumatic.      Mouth/Throat:      Mouth: Mucous membranes are moist.   Eyes:      General: No scleral icterus.     Conjunctiva/sclera: Conjunctivae normal.   Cardiovascular:      Rate and Rhythm: Normal rate and regular rhythm.      Heart sounds: Normal heart sounds.   Pulmonary:      Effort: Pulmonary effort is normal. No respiratory distress.      Breath sounds: Normal breath sounds.   Abdominal:      General: Abdomen is flat. Bowel sounds are normal. There is no distension.      Palpations: Abdomen is soft.      Tenderness: There is generalized abdominal tenderness. There is right CVA tenderness and left CVA tenderness. There is no guarding or rebound.      Hernia: No hernia is present.   Musculoskeletal:      Cervical back: Neck supple.   Skin:     General: Skin is warm and dry.      Capillary Refill: Capillary refill takes less than 2 seconds.      Findings: No rash.   Neurological:      General: No focal deficit present.      Mental Status: She is alert and oriented to person, place, and time.   Psychiatric:         Mood and Affect: Mood normal.         Behavior: Behavior normal.         ED Course        Procedures              Critical Care time:  none       Results for orders placed or performed during the hospital encounter of 10/24/24 (from the past 24 hours)   UA Macroscopic with reflex to Microscopic and Culture    Specimen: Urine, Midstream   Result Value Ref Range    Color Urine Dark Brown (A) Colorless, Straw, Light Yellow, Yellow    Appearance Urine Cloudy (A) Clear     Glucose Urine Negative Negative mg/dL    Bilirubin Urine Negative Negative    Ketones Urine 10 (A) Negative mg/dL    Specific Gravity Urine 1.022 1.003 - 1.035    Blood Urine Large (A) Negative    pH Urine 6.5 4.7 - 8.0    Protein Albumin Urine 100 (A) Negative mg/dL    Urobilinogen Urine Normal Normal, 2.0 mg/dL    Nitrite Urine Negative Negative    Leukocyte Esterase Urine Moderate (A) Negative    WBC Clumps Urine Present (A) None Seen /HPF    RBC Urine >182 (H) <=2 /HPF    WBC Urine >182 (H) <=5 /HPF    Squamous Epithelials Urine 0 <=1 /HPF    Narrative    Urine Culture ordered based on laboratory criteria   CBC with Platelets & Differential    Narrative    The following orders were created for panel order CBC with Platelets & Differential.  Procedure                               Abnormality         Status                     ---------                               -----------         ------                     CBC with platelets and d...[[id number]]                      Final result                 Please view results for these tests on the individual orders.   Basic metabolic panel   Result Value Ref Range    Sodium 132 (L) 135 - 145 mmol/L    Potassium 5.2 3.4 - 5.3 mmol/L    Chloride 98 98 - 107 mmol/L    Carbon Dioxide (CO2) 23 22 - 29 mmol/L    Anion Gap 11 7 - 15 mmol/L    Urea Nitrogen 20.3 (H) 6.0 - 20.0 mg/dL    Creatinine 0.89 0.51 - 0.95 mg/dL    GFR Estimate 77 >60 mL/min/1.73m2    Calcium 8.6 (L) 8.8 - 10.4 mg/dL    Glucose 90 70 - 99 mg/dL   CBC with platelets and differential   Result Value Ref Range    WBC Count 7.6 4.0 - 11.0 10e3/uL    RBC Count 3.84 3.80 - 5.20 10e6/uL    Hemoglobin 12.3 11.7 - 15.7 g/dL    Hematocrit 36.1 35.0 - 47.0 %    MCV 94 78 - 100 fL    MCH 32.0 26.5 - 33.0 pg    MCHC 34.1 31.5 - 36.5 g/dL    RDW 12.4 10.0 - 15.0 %    Platelet Count 152 150 - 450 10e3/uL    % Neutrophils 47 %    % Lymphocytes 44 %    % Monocytes 6 %    % Eosinophils 3 %    % Basophils 0 %    % Immature  Granulocytes 0 %    NRBCs per 100 WBC 0 <1 /100    Absolute Neutrophils 3.6 1.6 - 8.3 10e3/uL    Absolute Lymphocytes 3.3 0.8 - 5.3 10e3/uL    Absolute Monocytes 0.4 0.0 - 1.3 10e3/uL    Absolute Eosinophils 0.2 0.0 - 0.7 10e3/uL    Absolute Basophils 0.0 0.0 - 0.2 10e3/uL    Absolute Immature Granulocytes 0.0 <=0.4 10e3/uL    Absolute NRBCs 0.0 10e3/uL   Extra Tube    Narrative    The following orders were created for panel order Extra Tube.  Procedure                               Abnormality         Status                     ---------                               -----------         ------                     Extra Blue Top Tube[[id number]]                              Final result               Extra Red Top Tube[[id number]]                               Final result               Extra Heparinized Syringe[[id number]]                        Final result                 Please view results for these tests on the individual orders.   Extra Blue Top Tube   Result Value Ref Range    Hold Specimen JIC    Extra Red Top Tube   Result Value Ref Range    Hold Specimen JIC    Extra Heparinized Syringe   Result Value Ref Range    Hold Specimen OK    CT Urogram wo & w Contrast    Narrative    CT UROGRAM WO & W CONTRAST    CLINICAL HISTORY: Female, age 54 years,  flank pain with gross  hematuria intermittent for months;    Comparison:  CT scan abdomen and pelvis without contrast 6/7/2024    TECHNIQUE:  CT scanwas performed of the abdomen and pelvis before and  after the administration of IV contrast. Axial, sagittal and coronal  images were reviewed.  This facility minimizes radiation dose by adjusting the mA and/or kV  according to each patient size.  This CT scan was performed using one or more the following dose  reduction techniques:  -Automated exposure control,  -Adjustment of the mA and/or kV according to patient's size, and/or,  -Use of iterative reconstruction technique.      FINDINGS:  There is now groundglass  airspace consolidation seen in the periphery  at the left lower lobe. Visualized portions of the heart are  unremarkable.    Stomach and duodenum: Unremarkable.    Liver: Unremarkable.    Gallbladder surgically absent. Mild intrahepatic and moderate extra  hepatic biliary dilatation does not appear to be significantly  different.    Spleen: Unremarkable.    Pancreas: Unremarkable.    Adrenal glands: Bilateral adrenal gland enlargement is not  significantly different compared to prior study.    Kidneys: No evidence of radiodense calculus. There is slight  heterogeneity in enhancement of both left and right kidney. There is  no evidence of apparent perinephric fluid collection.    Ureters: Unremarkable.    Urinary bladder: Unremarkable.    Large and small bowel: Large volume of stool throughout the colon. No  distinct evidence of acute abnormality otherwise. No evidence of  appendicitis.    The abdominal aorta and inferior vena cava demonstrate no acute  abnormality. There is no evidence of retroperitoneal lymphadenopathy.    Bony structures: No acute abnormality. Postoperative changes and  degenerative changes of the lumbar spine are again seen.        Impression    IMPRESSION:   Subtle heterogeneous enhancement of the kidneys suggesting mild  bilateral pyelonephritis. No evidence of apparent abscess, mass or  evidence of radiodense calculus of the kidneys, ureters or bladder.     Airspace opacification now seen at the left lung base suggesting  interval development of subtle pneumonia.    STEVEN WELLER MD         SYSTEM ID:  [id number]       Medications   ondansetron (ZOFRAN) injection 4 mg (4 mg Intravenous $Given 10/24/24 1728)   HYDROmorphone (PF) (DILAUDID) injection 0.5 mg (0.5 mg Intravenous $Given 10/24/24 1628)   sodium chloride 0.9% BOLUS 1,000 mL (0 mLs Intravenous Stopped 10/24/24 1849)   cefTRIAXone in d5w (ROCEPHIN) intermittent infusion 1 g (0 g Intravenous Stopped 10/24/24 1658)   sodium chloride  (PF) 0.9% PF flush 150 mL (150 mLs Intravenous $Given 10/24/24 1740)   iopamidol (ISOVUE-370) solution 97 mL (97 mLs Intravenous $Given 10/24/24 1740)       Assessments & Plan (with Medical Decision Making)     I have reviewed the nursing notes.    I have reviewed the findings, diagnosis, plan and need for follow up with the patient.           Medical Decision Making  The patient's presentation was of moderate complexity (an acute illness with systemic symptoms).    The patient's evaluation involved:  ordering and/or review of 3+ test(s) in this encounter (CT scan multiple lab test)    The patient's management necessitated moderate risk (prescription drug management including medications given in the ED).    The patient is a 54 year old year old female with a past medical history as above of who presents to the ED with a complaint of bilateral flank pain worse on the right with some associated hematuria intermittently for a month.  History was obtained from the patient.  Presentation combined with history increase my concerns for urinary tract infection, kidney stone, renal cell carcinoma.  It was decided necessary to order ED investigations in order to properly assess patient's acute emergent complaint.  My independent interpretation of revealed and is in agreement with radiology interpretation showing bilateral pyelonephritis. i.  ED labs reveal elevated BUN suggesting dehydration, urinalysis showed microscopic hematuria and leukocytes.  After prolonged ED observation interpretation of vital signs history physical ED studies feel the patient has bilateral pyelonephritis.  Shared decision-making was discussed in layman terms with the patient or caregiver and they agree with plan.  Return to ED if any concerns, strong return precautions were given.  Prescription for analgesia was prescribed in the past she has used small amounts of oxycodone along with her regular pain medication opiate warning was discussed she is  intolerant of NSAIDs and start cefdinir tomorrow Zofran prescribed as well.  Cefdinir prescribed at her pharmacy in [place]    New Prescriptions    CEFDINIR (OMNICEF) 300 MG CAPSULE    Take 1 capsule (300 mg) by mouth 2 times daily.    ONDANSETRON (ZOFRAN ODT) 4 MG ODT TAB    Take 1 tablet (4 mg) by mouth every 8 hours as needed for nausea.    OXYCODONE (ROXICODONE) 5 MG TABLET    Take 1 tablet (5 mg) by mouth every 6 hours as needed for severe pain.       Final diagnoses:   Flank pain   Hematuria, unspecified type   Pyelonephritis, acute       10/24/2024   [facility]       Hebert Brewer MD  10/24/24 [id number]

## 2024-10-26 LAB — BACTERIA UR CULT: NORMAL

## 2024-11-14 ENCOUNTER — APPOINTMENT (OUTPATIENT)
Dept: CT IMAGING | Facility: HOSPITAL | Age: 54
End: 2024-11-14
Attending: PHYSICIAN ASSISTANT
Payer: COMMERCIAL

## 2024-11-14 ENCOUNTER — HOSPITAL ENCOUNTER (EMERGENCY)
Facility: HOSPITAL | Age: 54
Discharge: HOME | End: 2024-11-14
Attending: PHYSICIAN ASSISTANT
Payer: COMMERCIAL

## 2024-11-14 VITALS
DIASTOLIC BLOOD PRESSURE: 58 MMHG | OXYGEN SATURATION: 93 % | SYSTOLIC BLOOD PRESSURE: 107 MMHG | RESPIRATION RATE: 18 BRPM | HEART RATE: 78 BPM | TEMPERATURE: 97.8 F

## 2024-11-14 DIAGNOSIS — G47.00 INSOMNIA, UNSPECIFIED TYPE: ICD-10-CM

## 2024-11-14 DIAGNOSIS — F41.0 PANIC ATTACK: ICD-10-CM

## 2024-11-14 LAB
ANION GAP SERPL CALCULATED.3IONS-SCNC: 10 MMOL/L (ref 7–15)
BASOPHILS # BLD AUTO: 0.1 10E3/UL (ref 0–0.2)
BASOPHILS NFR BLD AUTO: 1 %
BUN SERPL-MCNC: 27.1 MG/DL (ref 6–20)
CALCIUM SERPL-MCNC: 8.8 MG/DL (ref 8.8–10.4)
CHLORIDE SERPL-SCNC: 106 MMOL/L (ref 98–107)
CREAT SERPL-MCNC: 0.85 MG/DL (ref 0.51–0.95)
EGFRCR SERPLBLD CKD-EPI 2021: 81 ML/MIN/1.73M2
EOSINOPHIL # BLD AUTO: 0.2 10E3/UL (ref 0–0.7)
EOSINOPHIL NFR BLD AUTO: 3 %
ERYTHROCYTE [DISTWIDTH] IN BLOOD BY AUTOMATED COUNT: 12.8 % (ref 10–15)
GLUCOSE SERPL-MCNC: 102 MG/DL (ref 70–99)
HCO3 SERPL-SCNC: 21 MMOL/L (ref 22–29)
HCT VFR BLD AUTO: 35 % (ref 35–47)
HGB BLD-MCNC: 11.9 G/DL (ref 11.7–15.7)
HOLD SPECIMEN: NORMAL
IMM GRANULOCYTES # BLD: 0 10E3/UL
IMM GRANULOCYTES NFR BLD: 0 %
LYMPHOCYTES # BLD AUTO: 2.7 10E3/UL (ref 0.8–5.3)
LYMPHOCYTES NFR BLD AUTO: 37 %
MAGNESIUM SERPL-MCNC: 2.1 MG/DL (ref 1.7–2.3)
MCH RBC QN AUTO: 31.7 PG (ref 26.5–33)
MCHC RBC AUTO-ENTMCNC: 34 G/DL (ref 31.5–36.5)
MCV RBC AUTO: 93 FL (ref 78–100)
MONOCYTES # BLD AUTO: 0.5 10E3/UL (ref 0–1.3)
MONOCYTES NFR BLD AUTO: 7 %
NEUTROPHILS # BLD AUTO: 3.7 10E3/UL (ref 1.6–8.3)
NEUTROPHILS NFR BLD AUTO: 52 %
NRBC # BLD AUTO: 0 10E3/UL
NRBC BLD AUTO-RTO: 0 /100
PLATELET # BLD AUTO: 161 10E3/UL (ref 150–450)
POTASSIUM SERPL-SCNC: 4.1 MMOL/L (ref 3.4–5.3)
RBC # BLD AUTO: 3.75 10E6/UL (ref 3.8–5.2)
SODIUM SERPL-SCNC: 137 MMOL/L (ref 135–145)
WBC # BLD AUTO: 7.2 10E3/UL (ref 4–11)

## 2024-11-14 PROCEDURE — 85004 AUTOMATED DIFF WBC COUNT: CPT | Performed by: PHYSICIAN ASSISTANT

## 2024-11-14 PROCEDURE — 250N000011 HC RX IP 250 OP 636: Performed by: PHYSICIAN ASSISTANT

## 2024-11-14 PROCEDURE — 36415 COLL VENOUS BLD VENIPUNCTURE: CPT | Performed by: PHYSICIAN ASSISTANT

## 2024-11-14 PROCEDURE — 80048 BASIC METABOLIC PNL TOTAL CA: CPT | Performed by: PHYSICIAN ASSISTANT

## 2024-11-14 PROCEDURE — 85018 HEMOGLOBIN: CPT | Performed by: PHYSICIAN ASSISTANT

## 2024-11-14 PROCEDURE — 96372 THER/PROPH/DIAG INJ SC/IM: CPT | Performed by: PHYSICIAN ASSISTANT

## 2024-11-14 PROCEDURE — 99285 EMERGENCY DEPT VISIT HI MDM: CPT | Mod: 25

## 2024-11-14 PROCEDURE — 99284 EMERGENCY DEPT VISIT MOD MDM: CPT | Performed by: PHYSICIAN ASSISTANT

## 2024-11-14 PROCEDURE — 70450 CT HEAD/BRAIN W/O DYE: CPT

## 2024-11-14 PROCEDURE — 83735 ASSAY OF MAGNESIUM: CPT | Performed by: PHYSICIAN ASSISTANT

## 2024-11-14 RX ORDER — LORAZEPAM 2 MG/ML
2 INJECTION INTRAMUSCULAR ONCE
Status: COMPLETED | OUTPATIENT
Start: 2024-11-14 | End: 2024-11-14

## 2024-11-14 RX ADMIN — LORAZEPAM 2 MG: 2 INJECTION, SOLUTION INTRAMUSCULAR; INTRAVENOUS at 18:03

## 2024-11-14 ASSESSMENT — ENCOUNTER SYMPTOMS
FEVER: 0
COUGH: 0
BACK PAIN: 0
ABDOMINAL PAIN: 0
SHORTNESS OF BREATH: 0

## 2024-11-14 ASSESSMENT — ACTIVITIES OF DAILY LIVING (ADL)
ADLS_ACUITY_SCORE: 0
ADLS_ACUITY_SCORE: 0

## 2024-11-14 NOTE — ED PROVIDER NOTES
"  History     Chief Complaint   Patient presents with    Panic Attack     The history is provided by the patient.     Kmy Perea is a 54 year old female who presented to the emergency department ambulatory for evaluation of persistent panic attack.  The patient reports she has been experiencing panic disorder since she was 18 years old and this is identical to her usual panic except for this 1 is lasting longer.  She has had some worsening stress at home.  No thoughts of harming herself or others.  She reports 2 weeks of \"brain fog.\"  She is having difficulty sleeping.    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     Priority: Medium    Perineal pain in female 06/24/2023     Priority: Medium    Postoperative state 06/24/2023     Priority: Medium    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     Priority: Medium    Julio's gangrene (H) 06/01/2023     Priority: Medium    TMJ (temporomandibular joint syndrome) 11/01/2021     Priority: Medium    Arthralgia of right temporomandibular joint 11/01/2021     Priority: Medium    Suicidal behavior 10/19/2021     Priority: Medium    Medical cannabis use 07/10/2018     Priority: Medium    Chronic pain disorder 01/29/2018     Priority: Medium     Overview:   complex regional pain syndrome left ankle      Esophageal reflux 01/29/2018     Priority: Medium    Hyperlipidemia 01/29/2018     Priority: Medium    Insomnia 01/29/2018     Priority: Medium    Panic " disorder 01/29/2018     Priority: Medium     Overview:   Previously patient of Dr. Reynoso since her teens. Patient is attempting slow taper down on her Xanax. She's been on Xanax 2 mg 4 times a day for years      Tobacco abuse 01/29/2018     Priority: Medium    Pain medication agreement, 4/18/18, 4/3/19, 10/27/2020 04/21/2017     Priority: Medium     Overview:   4/20/17:  Oxycodone 10 mg #180/mo    Hydrocodone 10/325mg, #240/mo. Stopped and changed to morphine. Agreement signed 10/3/12, updated 10/1/ 14.  Complex regional pain syndrome (aka Reflex Sympathetic Dystrophy)  MS Contin 30 mg q 8 hours #90 per month.  Morphine sulfate IR 15 mg 2-3 tab tid prn #180 per month, decreased to #90/month.   query 4/9/16 as part of chart review. Seems appropriate. 6/2/16 tapered and off Morphine. Premont 5/325 #120/month.  query appropriate. ToxAssure appropriate.    August 2016 Consultation with [facility]. Recommended spinal cord stimulator trial, but she is reluctant to pursue that. Pain clinic stated that continuing opioids was OK.      Complex regional pain syndrome type 1 affecting left lower leg 04/15/2016     Priority: Medium     Overview:   Patient with a history of complex regional pain syndrome has seen Dr. Dino Skelton4/23/16       Migraine headache 11/25/2014     Priority: Medium    Lumbago 02/06/2012     Priority: Medium    Major depressive disorder, recurrent episode, severe (H) 07/13/2011     Priority: Medium    Paroxysmal supraventricular tachycardia (H) 05/24/2010     Priority: Medium    Mononeuritis 07/06/2006     Priority: Medium     IMO Update 10/11      Ankle and foot joint derangement 06/05/2006     Priority: Medium     Overview:   Chronic left ankle pain secondary to reflex sympathetic dystrophy.  Patient   has had 4 previous surgeries, the most recent one done by Dr. Noriega in   2007. On Narcotic contract.    Formatting of this note might be different from the original.  Chronic  left ankle pain secondary to reflex sympathetic dystrophy.  Patient   has had 4 previous surgeries, the most recent one done by Dr. Noriega in   2007. On Narcotic contract.    IMO Update 10/11          Past Medical History:    Past Medical History:   Diagnosis Date    Acquired absence of other organs (CODE)     Chronic pain syndrome     Complex regional pain syndrome I     Encounter for other administrative examinations     Encounter for other administrative examinations     Gastro-esophageal reflux disease without esophagitis     Injury of left ankle     Low back pain     Major depressive disorder, recurrent severe without psychotic features (H)     Migraine without status migrainosus, not intractable     Nicotine dependence, uncomplicated     Obesity     Overweight     Pain in ankle     Pain in thoracic spine     Panic disorder without agoraphobia     Personal history of other diseases of the female genital tract     Personal history of other medical treatment (CODE)     Supraventricular tachycardia (H)        Past Surgical History:    Past Surgical History:   Procedure Laterality Date    ANKLE SURGERY      1997, 2000,Left ankle surgery x 2    CYSTECTOMY PILONIDAL N/A 6/19/2023    Procedure: Delayed closure of vulva and perineal defectes with drain placment;  Surgeon: Anthony Nolan MD;  Location:  OR    HYSTERECTOMY TOTAL ABDOMINAL      11/1999,secondary to endometriosis, no malignancy; patient reports no malignancy, but abnormal cells were present *    IRRIGATION AND DEBRIDEMENT DECUBITUS WOUND, COMBINED N/A 6/1/2023    Procedure: IRRIGATION AND DEBRIDEMENT, PERINEAL, LABIAL and GLUTEAL INFECTION;  Surgeon: Anthony Nolan MD;  Location:  OR    IRRIGATION AND DEBRIDEMENT DECUBITUS WOUND, COMBINED N/A 6/3/2023    Procedure: Debridement and washout of vulvar and perineal wounds;  Surgeon: Anthony Nolan MD;  Location:  OR    LAPAROSCOPIC CHOLECYSTECTOMY      No Comments Provided    LAPAROSCOPY  DIAGNOSTIC (GENERAL)      1995    OTHER SURGICAL HISTORY      21299,CRANIO/MAXILLOFACIAL SURGERY,Jaw Surgery    OTHER SURGICAL HISTORY      207040,[facility] EMG,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  No other abnormalities noted.       Family History:    Family History   Problem Relation Age of Onset    Cancer Father         Cancer,Bladder    Other - See Comments Father         Mild hypercholesterolemia    Other - See Comments Mother         scleroderma/lupus/Parkinson's syndrome    Family History Negative Brother         Good Health    Ariel-Danlos syndrome Daughter     Colon Cancer Maternal Grandmother 40        Cancer-colon,Followed by lung  with metastasis to the bone di*       Social History:  Marital Status:   [4]  Social History     Tobacco Use    Smoking status: Every Day     Current packs/day: 0.50     Average packs/day: 0.5 packs/day for 38.0 years (19.0 ttl pk-yrs)     Types: Cigarettes, Vaping Device     Start date: 12/2/1986     Passive exposure: Current    Smokeless tobacco: Never    Tobacco comments:     trying   Vaping Use    Vaping status: Some Days    Substances: Nicotine, Flavoring    Devices: Disposable   Substance Use Topics    Alcohol use: Not Currently     Comment: OCCASIONAL WINE    Drug use: Yes     Types: Marijuana     Comment: medical cannabis; suboxone        Medications:    acetaminophen (TYLENOL) 325 MG tablet  albuterol (PROAIR HFA/PROVENTIL HFA/VENTOLIN HFA) 108 (90 Base) MCG/ACT inhaler  Cholecalciferol (VITAMIN D-3) 125 MCG (5000 UT) TABS  clonazePAM (KLONOPIN) 2 MG tablet  divalproex sodium extended-release (DEPAKOTE ER) 250 MG 24 hr tablet  Docusate Sodium (COLACE PO)  fluticasone (FLONASE) 50 MCG/ACT nasal spray  ibuprofen (ADVIL/MOTRIN) 800 MG tablet  medical cannabis (Patient's own supply)  ondansetron (ZOFRAN) 4 MG tablet  tiZANidine (ZANAFLEX) 4 MG tablet  amphetamine-dextroamphetamine (ADDERALL) 20 MG tablet  buprenorphine (SUBUTEX) 8 MG SUBL sublingual  tablet  cefdinir (OMNICEF) 300 MG capsule  ondansetron (ZOFRAN ODT) 4 MG ODT tab  oxyCODONE (ROXICODONE) 5 MG tablet          Review of Systems   Constitutional:  Negative for fever.   Respiratory:  Negative for cough and shortness of breath.    Gastrointestinal:  Negative for abdominal pain.   Musculoskeletal:  Negative for back pain.   Psychiatric/Behavioral:          See HPI       Physical Exam   BP: 107/69  Pulse: 92  Temp: 98.5  F (36.9  C)  Resp: 16  SpO2: 94 %      Physical Exam  Vitals and nursing note reviewed.   Constitutional:       General: She is not in acute distress.     Appearance: Normal appearance. She is normal weight. She is not ill-appearing, toxic-appearing or diaphoretic.   Cardiovascular:      Rate and Rhythm: Normal rate and regular rhythm.   Skin:     General: Skin is warm and dry.      Capillary Refill: Capillary refill takes less than 2 seconds.   Neurological:      General: No focal deficit present.      Mental Status: She is alert and oriented to person, place, and time.   Psychiatric:      Comments: Tearful.  There is no delusions, psychosis, or flight of ideas.         ED Course     ED Course as of 11/14/24 1909   Th Nov 14, 2024   1830 WBC: 7.2     Procedures              Critical Care time:  none              Results for orders placed or performed during the hospital encounter of 11/14/24 (from the past 24 hours)   CBC with platelets differential    Narrative    The following orders were created for panel order CBC with platelets differential.  Procedure                               Abnormality         Status                     ---------                               -----------         ------                     CBC with platelets and d...[[id number]]  Abnormal            Final result                 Please view results for these tests on the individual orders.   Basic metabolic panel   Result Value Ref Range    Sodium 137 135 - 145 mmol/L    Potassium 4.1 3.4 - 5.3 mmol/L     Chloride 106 98 - 107 mmol/L    Carbon Dioxide (CO2) 21 (L) 22 - 29 mmol/L    Anion Gap 10 7 - 15 mmol/L    Urea Nitrogen 27.1 (H) 6.0 - 20.0 mg/dL    Creatinine 0.85 0.51 - 0.95 mg/dL    GFR Estimate 81 >60 mL/min/1.73m2    Calcium 8.8 8.8 - 10.4 mg/dL    Glucose 102 (H) 70 - 99 mg/dL   Magnesium   Result Value Ref Range    Magnesium 2.1 1.7 - 2.3 mg/dL   CBC with platelets and differential   Result Value Ref Range    WBC Count 7.2 4.0 - 11.0 10e3/uL    RBC Count 3.75 (L) 3.80 - 5.20 10e6/uL    Hemoglobin 11.9 11.7 - 15.7 g/dL    Hematocrit 35.0 35.0 - 47.0 %    MCV 93 78 - 100 fL    MCH 31.7 26.5 - 33.0 pg    MCHC 34.0 31.5 - 36.5 g/dL    RDW 12.8 10.0 - 15.0 %    Platelet Count 161 150 - 450 10e3/uL    % Neutrophils 52 %    % Lymphocytes 37 %    % Monocytes 7 %    % Eosinophils 3 %    % Basophils 1 %    % Immature Granulocytes 0 %    NRBCs per 100 WBC 0 <1 /100    Absolute Neutrophils 3.7 1.6 - 8.3 10e3/uL    Absolute Lymphocytes 2.7 0.8 - 5.3 10e3/uL    Absolute Monocytes 0.5 0.0 - 1.3 10e3/uL    Absolute Eosinophils 0.2 0.0 - 0.7 10e3/uL    Absolute Basophils 0.1 0.0 - 0.2 10e3/uL    Absolute Immature Granulocytes 0.0 <=0.4 10e3/uL    Absolute NRBCs 0.0 10e3/uL   Extra Tube    Narrative    The following orders were created for panel order Extra Tube.  Procedure                               Abnormality         Status                     ---------                               -----------         ------                     Extra Blue Top Tube[[id number]]                              In process                 Extra Red Top Tube[[id number]]                               In process                 Extra Heparinized Syringe[[id number]]                        Final result                 Please view results for these tests on the individual orders.   Extra Heparinized Syringe   Result Value Ref Range    Hold Specimen Ok    CT Head w/o Contrast    Narrative    Exam: CT HEAD W/O CONTRAST      Exam reason: Confusion and  brain fog    Technique:   Axial images of the head obtained without contrast. Coronal and  sagittal reformations were generated. This CT was performed using one  or more of the following dose reduction techniques: automated exposure  control, adjustment of the mA and/or kV according to patient size,  and/or use of iterative reconstruction technique.    Comparison: 10/18/2021       Findings:      Parenchyma: No evidence of intraparenchymal hemorrhage, mass, acute  cortical infarct or prior infarct in a major vascular territory.      No midline shift. The basilar cisterns are patent.    Extra-axial spaces: No extra-axial fluid collection or hemorrhage.     Ventricles: Normal.  Paranasal sinuses: Clear.   Mastoid air cells: Clear.    Osseous: No acute findings.  Orbits: Normal.    Soft tissues: Unremarkable.       Impression    Impression:  No acute intracranial abnormality.      LIANNA CHILEL MD         SYSTEM ID:  [id number]       Medications   LORazepam (ATIVAN) injection 2 mg (2 mg Intramuscular $Given 11/14/24 0846)       Assessments & Plan (with Medical Decision Making)   54-year-old female complaining of acute exacerbation of her underlying chronic panic disorder.  She also reports protracted difficulty sleeping.  She denies any thoughts of harming herself or others.  She reports intermittent brain fog.  Noncontrast CT scan of the head shows no concerning features.  Laboratory evaluation as noted.  Reports feeling significantly improved after IM Ativan.  Advised to reach out to her primary to discuss other medications for sleep.  She has a multitude of allergies and drug reactions.  Social work referral placed to help established with a new psych medication manager as hers moved to the [facility].  Strict return precautions were provided.  The patient was happy and agreeable.  She had no further questions or concerns for me.    This document was prepared using a combination of typing and voice generated software.   While every attempt was made for accuracy, spelling and grammatical errors may exist.     I have reviewed the nursing notes.    I have reviewed the findings, diagnosis, plan and need for follow up with the patient.           Medical Decision Making  The patient's presentation was of moderate complexity (a chronic illness mild to moderate exacerbation, progression, or side effect of treatment).    The patient's evaluation involved:  ordering and/or review of 3+ test(s) in this encounter (multiple labs and CT)    The patient's management necessitated high risk (a parenteral controlled substance).        New Prescriptions    No medications on file       Final diagnoses:   Panic attack   Insomnia, unspecified type       11/14/2024   [facility]       Sonya Dean PA-C  11/14/24 0184

## 2024-11-14 NOTE — ED TRIAGE NOTES
"Patient was evaluated in triage by Urgent Care provider and deemed inappropriate for UC.    Patient to be seen in Emergency Department.    Pt presents reporting that she's been having panic attacks. Pt has hx of panic attacks since she was 18. Pt reports this one has been different. She feels like she is having periods of brain fog and has \"blackout\" moments. Pt reports this has been happening for the past 2 weeks. Pt takes klonopin and has been taking her medications as prescribed. Pt has been having headaches. Pt tearful in triage.         "

## 2024-11-15 ENCOUNTER — TELEPHONE (OUTPATIENT)
Dept: EMERGENCY MEDICINE | Facility: HOSPITAL | Age: 54
End: 2024-11-15

## 2024-11-15 DIAGNOSIS — F41.0 PANIC DISORDER: Primary | ICD-10-CM

## 2024-11-15 DIAGNOSIS — G47.00 INSOMNIA, UNSPECIFIED TYPE: ICD-10-CM

## 2024-11-15 DIAGNOSIS — F33.2 SEVERE EPISODE OF RECURRENT MAJOR DEPRESSIVE DISORDER, WITHOUT PSYCHOTIC FEATURES (H): ICD-10-CM

## 2024-11-15 NOTE — ED NOTES
Care Transitions focused note:      Staff message received from ED provider as patient lost her psych medication provider and presented to the ED with panic attack.      Message sent to PCP and Care Team 3 to get patient in for follow up and possible referral to psych med provider.    ROSA Hoffman

## 2024-11-15 NOTE — DISCHARGE INSTRUCTIONS
Please return to this emergency department for any other questions or concerns.    Follow-up in the clinic.    You can expect a call from our department tomorrow to help establish with a new medication provider.

## 2024-11-15 NOTE — ED NOTES
AVS reviewed. All questions and concerns answered. Education reinforced. Verbalizes understanding with no other questions at this time.

## 2024-11-20 NOTE — PROGRESS NOTES
{PROVIDER CHARTING PREFERENCE:437939}    Subjective   Gemini is a 54 year old, presenting for the following health issues:  Anxiety, Asthma, and Depression        11/21/2024     7:59 AM   Additional Questions   Roomed by Torrie Diaz   Accompanied by self     HPI     Patient's Psych provider stopped taking insurance and moved to [facility].   Current does not have a current provider at this time.   Won't go to [facility] as she has relatives that work there and doesn't want them to know her issues.  Has not had her  Subutex or Adderall for a few days.    Was in ER recently for panic attack, also hip pain.  States anxiety increasing due to being close to being out of meds.  States ended up on 5th floor approx 3 yrs ago when she tried taking herself off of Xanax, so got her psych to change her to Klonopin.    Bilateral hip pain.  States 7 surgeries on left ankle, chronic pain up to knee.  Lower back started bothering her about 5 years ago.  No recent imaging.  Used to see pain clinic in [place] - had numberous injections trying to localize pain source but didn't work          Asthma Follow-Up    Was ACT completed today?  Yes        5/30/2024     2:29 PM   ACT Total Scores   ACT TOTAL SCORE (Goal Greater than or Equal to 20) 20   In the past 12 months, how many times did you visit the emergency room for your asthma without being admitted to the hospital? 0    In the past 12 months, how many times were you hospitalized overnight because of your asthma? 0        Patient-reported        How many days per week do you miss taking your asthma controller medication?  0  Please describe any recent triggers for your asthma:  Season changes  Have you had any Emergency Room Visits, Urgent Care Visits, or Hospital Admissions since your last office visit?  No    Abnormal Mood Symptoms    Duration: f/u  Description:  Depression: YES  Anxiety: YES  Panic attacks: YES- severe   Accompanying signs and symptoms: see  PHQ-9 and LAINE scores  History (similar episodes/previous evaluation): since she was 18  Precipitating or alleviating factors: None  Therapies tried and outcome: Klonopin (Clonazepam)     - suboxone     Social History     Tobacco Use    Smoking status: Every Day     Current packs/day: 0.50     Average packs/day: 0.5 packs/day for 38.0 years (19.0 ttl pk-yrs)     Types: Cigarettes, Vaping Device     Start date: 12/2/1986     Passive exposure: Current    Smokeless tobacco: Never    Tobacco comments:     trying   Vaping Use    Vaping status: Some Days    Substances: Nicotine, Flavoring    Devices: Disposable   Substance Use Topics    Alcohol use: Not Currently     Comment: OCCASIONAL WINE    Drug use: Yes     Types: Marijuana     Comment: medical cannabis; suboxone         5/30/2024     2:19 PM 7/11/2024     8:02 AM 11/21/2024     7:55 AM   PHQ   PHQ-9 Total Score 13 19 18    Q9: Thoughts of better off dead/self-harm past 2 weeks Not at all  Not at all  Not at all        Patient-reported         2/28/2020    10:40 AM 5/30/2024     2:20 PM 11/21/2024     7:56 AM   LAINE-7 SCORE   Total Score  13 (moderate anxiety) 21 (severe anxiety)   Total Score 14 13 21        Patient-reported     Answers submitted by the patient for this visit:  Patient Health Questionnaire (Submitted on 11/21/2024)  If you checked off any problems, how difficult have these problems made it for you to do your work, take care of things at home, or get along with other people?: Extremely difficult  PHQ9 TOTAL SCORE: 18  Patient Health Questionnaire (G7) (Submitted on 11/21/2024)  LAINE 7 TOTAL SCORE: 21  Depression / Anxiety Questionnaire (Submitted on 11/21/2024)  Chief Complaint: Chronic problems general questions HPI Form  Depression/Anxiety: Depression & Anxiety  Depression & Anxiety (Submitted on 11/21/2024)  Chief Complaint: Chronic problems general questions HPI Form  Status since last visit:: worse  Anxiety since last: : bad  Other associated  "symptoms of depression:: Yes  Other associated symotome: : Yes  Significant life event: : relationship concerns, job concerns, financial concerns, grief or loss  Anxious:: Yes  Current substance use:: No  Back Pain Visit Questionnaire (Submitted on 11/21/2024)  Your back pain is: chronic  Chronic or Recurring Back Pain Visit Questionnaire (Submitted on 11/21/2024)  Where is your back pain located? : other  How would you describe your back pain? : gnawing, shooting  Where does your back pain spread? : right buttocks, left buttocks, right thigh, left thigh, right knee, left knee, left foot  Since you noticed your back pain, how has it changed? : rapidly worsening  Does your back pain interfere with your job?: Not applicable  If yes, which:: acetaminophen (Tylenol), activity or exercise, cold, heat, massage, muscle relaxants, NSAIDS (Ibuprofen, Naproxen), rest, stretching, topical pain relievers  General Questionnaire (Submitted on 11/21/2024)  Chief Complaint: Chronic problems general questions HPI Form  How many servings of fruits and vegetables do you eat daily?: 0-1  On average, how many sweetened beverages do you drink each day (Examples: soda, juice, sweet tea, etc.  Do NOT count diet or artificially sweetened beverages)?: 0  How many minutes a day do you exercise enough to make your heart beat faster?: 20 to 29  How many days a week do you exercise enough to make your heart beat faster?: 5  How many days per week do you miss taking your medication?: 1  What makes it hard for you to take your medication every day?: cost of medication  Questionnaire about: Chronic problems general questions HPI Form (Submitted on 11/21/2024)  Chief Complaint: Chronic problems general questions HPI Form      {ROS Picklists (Optional):221791}      Objective    BP 92/60   Pulse 67   Temp 97.2  F (36.2  C) (Tympanic)   Resp 17   Ht 1.753 m (5' 9\")   Wt 62.6 kg (137 lb 14.4 oz)   LMP 11/01/1999 (Exact Date)   SpO2 94%   BMI " 20.36 kg/m    Body mass index is 20.36 kg/m .  Physical Exam   {Exam List (Optional):061368}    {Diagnostic Test Results (Optional):989648}        Signed Electronically by: Eryn Hurley MD  {Email feedback regarding this note to [email or web address]   :[id number]}   "medication every day?: cost of medication  Questionnaire about: Chronic problems general questions HPI Form (Submitted on 11/21/2024)  Chief Complaint: Chronic problems general questions HPI Form            Objective    BP 92/60   Pulse 67   Temp 97.2  F (36.2  C) (Tympanic)   Resp 17   Ht 1.753 m (5' 9\")   Wt 62.6 kg (137 lb 14.4 oz)   LMP 11/01/1999 (Exact Date)   SpO2 94%   BMI 20.36 kg/m    Body mass index is 20.36 kg/m .  Physical Exam  Constitutional:       General: She is not in acute distress.     Appearance: Normal appearance.   Cardiovascular:      Rate and Rhythm: Normal rate and regular rhythm.      Heart sounds: Normal heart sounds. No murmur heard.  Pulmonary:      Effort: Pulmonary effort is normal.      Breath sounds: Normal breath sounds.   Neurological:      Mental Status: She is alert and oriented to person, place, and time.                    Signed Electronically by: Eryn Hurley MD    "

## 2024-11-21 ENCOUNTER — OFFICE VISIT (OUTPATIENT)
Dept: FAMILY MEDICINE | Facility: OTHER | Age: 54
End: 2024-11-21
Attending: FAMILY MEDICINE
Payer: COMMERCIAL

## 2024-11-21 VITALS
HEIGHT: 69 IN | HEART RATE: 67 BPM | WEIGHT: 137.9 LBS | RESPIRATION RATE: 17 BRPM | SYSTOLIC BLOOD PRESSURE: 92 MMHG | DIASTOLIC BLOOD PRESSURE: 60 MMHG | OXYGEN SATURATION: 94 % | BODY MASS INDEX: 20.42 KG/M2 | TEMPERATURE: 97.2 F

## 2024-11-21 DIAGNOSIS — G89.29 CHRONIC BILATERAL LOW BACK PAIN WITH BILATERAL SCIATICA: Primary | ICD-10-CM

## 2024-11-21 DIAGNOSIS — F98.8 ATTENTION DEFICIT DISORDER, UNSPECIFIED TYPE: ICD-10-CM

## 2024-11-21 DIAGNOSIS — F41.0 PANIC DISORDER: ICD-10-CM

## 2024-11-21 DIAGNOSIS — M54.41 CHRONIC BILATERAL LOW BACK PAIN WITH BILATERAL SCIATICA: Primary | ICD-10-CM

## 2024-11-21 DIAGNOSIS — Z79.899 HIGH RISK MEDICATION USE: ICD-10-CM

## 2024-11-21 DIAGNOSIS — M54.42 CHRONIC BILATERAL LOW BACK PAIN WITH BILATERAL SCIATICA: Primary | ICD-10-CM

## 2024-11-21 LAB
CANNABINOIDS UR QL SCN: ABNORMAL
CREAT UR-MCNC: 122 MG/DL

## 2024-11-21 PROCEDURE — 80307 DRUG TEST PRSMV CHEM ANLYZR: CPT | Mod: ZL | Performed by: FAMILY MEDICINE

## 2024-11-21 PROCEDURE — 99214 OFFICE O/P EST MOD 30 MIN: CPT | Performed by: FAMILY MEDICINE

## 2024-11-21 PROCEDURE — 80367 DRUG SCREENING PROPOXYPHENE: CPT | Mod: ZL | Performed by: FAMILY MEDICINE

## 2024-11-21 PROCEDURE — 80353 DRUG SCREENING COCAINE: CPT | Mod: ZL | Performed by: FAMILY MEDICINE

## 2024-11-21 PROCEDURE — G0463 HOSPITAL OUTPT CLINIC VISIT: HCPCS | Mod: 25

## 2024-11-21 RX ORDER — BUPRENORPHINE 8 MG/1
8 TABLET SUBLINGUAL 3 TIMES DAILY
Qty: 21 TABLET | Refills: 0 | Status: SHIPPED | OUTPATIENT
Start: 2024-11-21 | End: 2024-11-21

## 2024-11-21 RX ORDER — CLONAZEPAM 2 MG/1
2 TABLET ORAL 2 TIMES DAILY PRN
Qty: 14 TABLET | Refills: 0 | Status: CANCELLED | OUTPATIENT
Start: 2024-11-21

## 2024-11-21 RX ORDER — DEXTROAMPHETAMINE SACCHARATE, AMPHETAMINE ASPARTATE, DEXTROAMPHETAMINE SULFATE AND AMPHETAMINE SULFATE 5; 5; 5; 5 MG/1; MG/1; MG/1; MG/1
20 TABLET ORAL 3 TIMES DAILY
Qty: 21 TABLET | Refills: 0 | Status: SHIPPED | OUTPATIENT
Start: 2024-11-21

## 2024-11-21 RX ORDER — BUPRENORPHINE 8 MG/1
8 TABLET SUBLINGUAL 3 TIMES DAILY
Qty: 21 TABLET | Refills: 0 | Status: SHIPPED | OUTPATIENT
Start: 2024-11-21

## 2024-11-21 ASSESSMENT — ANXIETY QUESTIONNAIRES
6. BECOMING EASILY ANNOYED OR IRRITABLE: NEARLY EVERY DAY
8. IF YOU CHECKED OFF ANY PROBLEMS, HOW DIFFICULT HAVE THESE MADE IT FOR YOU TO DO YOUR WORK, TAKE CARE OF THINGS AT HOME, OR GET ALONG WITH OTHER PEOPLE?: EXTREMELY DIFFICULT
5. BEING SO RESTLESS THAT IT IS HARD TO SIT STILL: NEARLY EVERY DAY
7. FEELING AFRAID AS IF SOMETHING AWFUL MIGHT HAPPEN: NEARLY EVERY DAY
2. NOT BEING ABLE TO STOP OR CONTROL WORRYING: NEARLY EVERY DAY
3. WORRYING TOO MUCH ABOUT DIFFERENT THINGS: NEARLY EVERY DAY
GAD7 TOTAL SCORE: 21
GAD7 TOTAL SCORE: 21
7. FEELING AFRAID AS IF SOMETHING AWFUL MIGHT HAPPEN: NEARLY EVERY DAY
1. FEELING NERVOUS, ANXIOUS, OR ON EDGE: NEARLY EVERY DAY
GAD7 TOTAL SCORE: 21
IF YOU CHECKED OFF ANY PROBLEMS ON THIS QUESTIONNAIRE, HOW DIFFICULT HAVE THESE PROBLEMS MADE IT FOR YOU TO DO YOUR WORK, TAKE CARE OF THINGS AT HOME, OR GET ALONG WITH OTHER PEOPLE: EXTREMELY DIFFICULT
4. TROUBLE RELAXING: NEARLY EVERY DAY

## 2024-11-21 ASSESSMENT — PAIN SCALES - GENERAL: PAINLEVEL_OUTOF10: EXTREME PAIN (9)

## 2024-11-21 ASSESSMENT — PATIENT HEALTH QUESTIONNAIRE - PHQ9
SUM OF ALL RESPONSES TO PHQ QUESTIONS 1-9: 18
10. IF YOU CHECKED OFF ANY PROBLEMS, HOW DIFFICULT HAVE THESE PROBLEMS MADE IT FOR YOU TO DO YOUR WORK, TAKE CARE OF THINGS AT HOME, OR GET ALONG WITH OTHER PEOPLE: EXTREMELY DIFFICULT
SUM OF ALL RESPONSES TO PHQ QUESTIONS 1-9: 18

## 2024-11-25 LAB
7AMINOCLONAZEPAM UR QL CFM: PRESENT
BUPRENORPHINE UR CFM-MCNC: 24 NG/ML
BUPRENORPHINE/CREAT UR: 20 NG/MG {CREAT}
NORBUPRENORPHINE UR CFM-MCNC: 306 NG/ML
NORBUPRENORPHINE/CREAT UR: 251 NG/MG {CREAT}

## 2024-11-26 ENCOUNTER — HOSPITAL ENCOUNTER (EMERGENCY)
Facility: OTHER | Age: 54
Discharge: HOME | End: 2024-11-26
Attending: PHYSICIAN ASSISTANT
Payer: COMMERCIAL

## 2024-11-26 VITALS
HEIGHT: 69 IN | OXYGEN SATURATION: 97 % | TEMPERATURE: 98.1 F | DIASTOLIC BLOOD PRESSURE: 73 MMHG | HEART RATE: 95 BPM | WEIGHT: 137 LBS | SYSTOLIC BLOOD PRESSURE: 114 MMHG | BODY MASS INDEX: 20.29 KG/M2

## 2024-11-26 DIAGNOSIS — F41.0 ANXIETY ATTACK: ICD-10-CM

## 2024-11-26 PROCEDURE — 99283 EMERGENCY DEPT VISIT LOW MDM: CPT | Performed by: PHYSICIAN ASSISTANT

## 2024-11-26 PROCEDURE — 99284 EMERGENCY DEPT VISIT MOD MDM: CPT | Performed by: PHYSICIAN ASSISTANT

## 2024-11-26 RX ORDER — LORAZEPAM 0.5 MG/1
0.5 TABLET ORAL EVERY 6 HOURS PRN
Qty: 10 TABLET | Refills: 0 | Status: SHIPPED | OUTPATIENT
Start: 2024-11-26

## 2024-11-27 NOTE — DISCHARGE INSTRUCTIONS
Get plenty of fluids and rest.  As discussed, we will prescribe you some Ativan to take only as needed.  We discussed obtaining further lab studies and mental health assessments this evening but she did decline there is no indication that we need to have those performed this evening.  Please continue to follow-up with previously scheduled appointments, return to the ED if there are worsening or concerning symptoms.

## 2024-11-27 NOTE — ED TRIAGE NOTES
"ED Nursing Triage Note (General)   ________________________________    Kym Perea is a 54 year old Female that presents to triage via private vehicle with complaints of anxiety and panic attacks.  Patient states significant hx of panic attacks since she was 18 years old.  Patient states she normally takes medication for anxiety, however, states her primary moved to [place] and now only takes private pay.  Patient states she ran out of her klonopin last night and is now having restless legs. Patient states she was able to get into [facility] with [facility] and has an apt on the second, however, is looking for medication to assist with sx until her apt.   Significant symptoms had onset 2 day(s) ago.  /73   Pulse 95   Temp 98.1  F (36.7  C)   Ht 1.753 m (5' 9\")   Wt 62.1 kg (137 lb)   LMP 11/01/1999 (Exact Date)   SpO2 97%   BMI 20.23 kg/m    Vital signs:  Temp: 98.1  F (36.7  C)   BP: 114/73 Pulse: 95     SpO2: 97 %     Height: 175.3 cm (5' 9\") Weight: 62.1 kg (137 lb)  Estimated body mass index is 20.23 kg/m  as calculated from the following:    Height as of this encounter: 1.753 m (5' 9\").    Weight as of this encounter: 62.1 kg (137 lb).  PRE HOSPITAL PRIOR LIVING SITUATION Alone      Triage Assessment (Adult)       Row Name 11/26/24 8537          Triage Assessment    Airway WDL WDL        Respiratory WDL    Respiratory WDL WDL        Skin Circulation/Temperature WDL    Skin Circulation/Temperature WDL WDL        Cardiac WDL    Cardiac WDL WDL     Cardiac Rhythm NSR        Peripheral/Neurovascular WDL    Peripheral Neurovascular WDL WDL     Capillary Refill, General less than/equal to 3 secs        Cognitive/Neuro/Behavioral WDL    Cognitive/Neuro/Behavioral WDL WDL        Lloyd Coma Scale    Best Eye Response 4-->(E4) spontaneous     Best Motor Response 6-->(M6) obeys commands     Best Verbal Response 5-->(V5) oriented     Lloyd Coma Scale Score 15                     "

## 2024-11-27 NOTE — ED PROVIDER NOTES
History     Chief Complaint   Patient presents with     Anxiety     HPI  Kym Perea is a 54 year old female who ***    Allergies:  Allergies   Allergen Reactions     Gabapentin Other (See Comments)     Memory issues     Diclofenac-Misoprostol GI Disturbance     Flexeril [Cyclobenzaprine] Other (See Comments)     Memory issues     Indomethacin      Other reaction(s): Dizziness     Meloxicam GI Disturbance     Moxifloxacin GI Disturbance       Problem List:    Patient Active Problem List    Diagnosis Date Noted     Adrenal hyperplasia (H) 07/11/2024     Priority: Medium     Chronic, continuous use of opioids 02/09/2024     Priority: Medium     History of Julio's gangrene 06/24/2023     Priority: Medium     Perineal pain in female 06/24/2023     Priority: Medium     Postoperative state 06/24/2023     Priority: Medium     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     Priority: Medium     Julio's gangrene (H) 06/01/2023     Priority: Medium     TMJ (temporomandibular joint syndrome) 11/01/2021     Priority: Medium     Arthralgia of right temporomandibular joint 11/01/2021     Priority: Medium     Suicidal behavior 10/19/2021     Priority: Medium     Medical cannabis use 07/10/2018     Priority: Medium     Chronic pain disorder 01/29/2018     Priority: Medium     Overview:   complex regional pain syndrome left ankle       Esophageal reflux 01/29/2018     Priority: Medium     Hyperlipidemia 01/29/2018     Priority: Medium     Insomnia 01/29/2018     Priority: Medium     Panic disorder 01/29/2018     Priority: Medium     Overview:   Previously patient of Dr. Reynoso since her teens. Patient is attempting slow taper down on her Xanax. She's been on Xanax 2 mg 4 times a day for years       Tobacco abuse 01/29/2018     Priority: Medium     Pain medication agreement, 4/18/18, 4/3/19, 10/27/2020 04/21/2017     Priority: Medium     Overview:   4/20/17:  Oxycodone 10 mg #180/mo    Hydrocodone 10/325mg,  #240/mo. Stopped and changed to morphine. Agreement signed 10/3/12, updated 10/1/ 14.  Complex regional pain syndrome (aka Reflex Sympathetic Dystrophy)  MS Contin 30 mg q 8 hours #90 per month.  Morphine sulfate IR 15 mg 2-3 tab tid prn #180 per month, decreased to #90/month.   query 4/9/16 as part of chart review. Seems appropriate. 6/2/16 tapered and off Morphine. [place] 5/325 #120/month.  query appropriate. ToxAssure appropriate.    August 2016 Consultation with [facility]. Recommended spinal cord stimulator trial, but she is reluctant to pursue that. Pain clinic stated that continuing opioids was OK.       Complex regional pain syndrome type 1 affecting left lower leg 04/15/2016     Priority: Medium     Overview:   Patient with a history of complex regional pain syndrome has seen Dr. Dino Skelton4/23/16        Migraine headache 11/25/2014     Priority: Medium     Lumbago 02/06/2012     Priority: Medium     Major depressive disorder, recurrent episode, severe (H) 07/13/2011     Priority: Medium     Paroxysmal supraventricular tachycardia (H) 05/24/2010     Priority: Medium     Mononeuritis 07/06/2006     Priority: Medium     IMO Update 10/11       Ankle and foot joint derangement 06/05/2006     Priority: Medium     Overview:   Chronic left ankle pain secondary to reflex sympathetic dystrophy.  Patient   has had 4 previous surgeries, the most recent one done by Dr. Noriega in   2007. On Narcotic contract.    Formatting of this note might be different from the original.  Chronic left ankle pain secondary to reflex sympathetic dystrophy.  Patient   has had 4 previous surgeries, the most recent one done by Dr. Noriega in   2007. On Narcotic contract.    IMO Update 10/11          Past Medical History:    Past Medical History:   Diagnosis Date     Acquired absence of other organs (CODE)      Chronic pain syndrome      Complex regional pain syndrome I      Encounter for other administrative  examinations      Encounter for other administrative examinations      Gastro-esophageal reflux disease without esophagitis      Injury of left ankle      Low back pain      Major depressive disorder, recurrent severe without psychotic features (H)      Migraine without status migrainosus, not intractable      Nicotine dependence, uncomplicated      Obesity      Overweight      Pain in ankle      Pain in thoracic spine      Panic disorder without agoraphobia      Personal history of other diseases of the female genital tract      Personal history of other medical treatment (CODE)      Supraventricular tachycardia (H)        Past Surgical History:    Past Surgical History:   Procedure Laterality Date     ANKLE SURGERY      1997, 2000,Left ankle surgery x 2     CYSTECTOMY PILONIDAL N/A 6/19/2023    Procedure: Delayed closure of vulva and perineal defectes with drain placment;  Surgeon: Anthony Nolan MD;  Location: [facility] OR     HYSTERECTOMY TOTAL ABDOMINAL      11/1999,secondary to endometriosis, no malignancy; patient reports no malignancy, but abnormal cells were present *     IRRIGATION AND DEBRIDEMENT DECUBITUS WOUND, COMBINED N/A 6/1/2023    Procedure: IRRIGATION AND DEBRIDEMENT, PERINEAL, LABIAL and GLUTEAL INFECTION;  Surgeon: Anthony Nolan MD;  Location:  OR     IRRIGATION AND DEBRIDEMENT DECUBITUS WOUND, COMBINED N/A 6/3/2023    Procedure: Debridement and washout of vulvar and perineal wounds;  Surgeon: Anthony Nolan MD;  Location:  OR     LAPAROSCOPIC CHOLECYSTECTOMY      No Comments Provided     LAPAROSCOPY DIAGNOSTIC (GENERAL)      1995     OTHER SURGICAL HISTORY      21299,CRANIO/MAXILLOFACIAL SURGERY,Jaw Surgery     OTHER SURGICAL HISTORY      207040,[facility] EMG,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  No other abnormalities noted.       Family History:    Family History   Problem Relation Age of Onset     Cancer Father         Cancer,Bladder     Other - See Comments Father          "Mild hypercholesterolemia     Other - See Comments Mother         scleroderma/lupus/Parkinson's syndrome     Family History Negative Brother         Good Health     Raiel-Danlos syndrome Daughter      Colon Cancer Maternal Grandmother 40        Cancer-colon,Followed by lung  with metastasis to the bone di*       Social History:  Marital Status:   [4]  Social History     Tobacco Use     Smoking status: Every Day     Current packs/day: 0.50     Average packs/day: 0.5 packs/day for 38.0 years (19.0 ttl pk-yrs)     Types: Cigarettes, Vaping Device     Start date: 12/2/1986     Passive exposure: Current     Smokeless tobacco: Never     Tobacco comments:     trying   Vaping Use     Vaping status: Some Days     Substances: Nicotine, Flavoring     Devices: Disposable   Substance Use Topics     Alcohol use: Not Currently     Comment: OCCASIONAL WINE     Drug use: Yes     Types: Marijuana     Comment: medical cannabis; suboxone        Medications:    acetaminophen (TYLENOL) 325 MG tablet  albuterol (PROAIR HFA/PROVENTIL HFA/VENTOLIN HFA) 108 (90 Base) MCG/ACT inhaler  amphetamine-dextroamphetamine (ADDERALL) 20 MG tablet  buprenorphine (SUBUTEX) 8 MG SUBL sublingual tablet  Cholecalciferol (VITAMIN D-3) 125 MCG (5000 UT) TABS  clonazePAM (KLONOPIN) 2 MG tablet  divalproex sodium extended-release (DEPAKOTE ER) 250 MG 24 hr tablet  Docusate Sodium (COLACE PO)  fluticasone (FLONASE) 50 MCG/ACT nasal spray  ibuprofen (ADVIL/MOTRIN) 800 MG tablet  LORazepam (ATIVAN) 0.5 MG tablet  medical cannabis (Patient's own supply)  ondansetron (ZOFRAN) 4 MG tablet  tiZANidine (ZANAFLEX) 4 MG tablet          Review of Systems    Physical Exam   BP: 114/73  Pulse: 95  Temp: 98.1  F (36.7  C)  Height: 175.3 cm (5' 9\")  Weight: 62.1 kg (137 lb)  SpO2: 97 %      Physical Exam    ED Course        Procedures         {EKG done?:068792}    Critical Care time:  {none or minutes:762377}  {Trauma Activation or Fall?:210989}  {Sepsis/Septic " "Shock/Stemi/Stroke:940933}         No results found for this or any previous visit (from the past 24 hours).    Medications - No data to display    Assessments & Plan (with Medical Decision Making)     I have reviewed the nursing notes.    I have reviewed the findings, diagnosis, plan and need for follow up with the patient.  {ED Addendum:821836::\" \"}        Medical Decision Making  The patient's presentation was of {[facility] Problem:817260}.    The patient's evaluation involved:  {[facility] Data:358983}    The patient's management necessitated {[facility] Management:572592}.        New Prescriptions    LORAZEPAM (ATIVAN) 0.5 MG TABLET    Take 1 tablet (0.5 mg) by mouth every 6 hours as needed for anxiety.       Final diagnoses:   Anxiety attack       11/26/2024   [facility]    " Care time:  none          No results found for this or any previous visit (from the past 24 hours).    Medications - No data to display    Assessments & Plan (with Medical Decision Making)   Pt nontoxic but anxious appearing. Heart, lung, bowel sounds normal, abd soft, nontender to palpation, nondistended. VSS, afebrile.     She says her anxiety today is similar to previous anxiety/panic attacks she has had. She has no thoughts of SI/HI. She declines a DEC. She has no other medical complaints.     We will prescribe a short course of ativan until she can follow up with PCP.     Strict return precautions are given to the pt, they will return if symptoms are worsening or concerning. The pt understands and agrees with the plan and they are discharged.     Chalino Mendoza PA-C      I have reviewed the nursing notes.    I have reviewed the findings, diagnosis, plan and need for follow up with the patient.          Discharge Medication List as of 11/26/2024 10:22 PM        START taking these medications    Details   LORazepam (ATIVAN) 0.5 MG tablet Take 1 tablet (0.5 mg) by mouth every 6 hours as needed for anxiety., Disp-10 tablet, R-0, [employer]             Final diagnoses:   Anxiety attack       11/26/2024   [facility]       Chalino Mendoza PA  12/01/24 8109

## 2024-11-28 ENCOUNTER — HOSPITAL ENCOUNTER (EMERGENCY)
Facility: OTHER | Age: 54
Discharge: HOME | End: 2024-11-28
Attending: EMERGENCY MEDICINE
Payer: COMMERCIAL

## 2024-11-28 VITALS
SYSTOLIC BLOOD PRESSURE: 92 MMHG | DIASTOLIC BLOOD PRESSURE: 62 MMHG | RESPIRATION RATE: 18 BRPM | TEMPERATURE: 97.8 F | HEART RATE: 95 BPM | OXYGEN SATURATION: 99 %

## 2024-11-28 DIAGNOSIS — F41.0 PANIC ATTACK: ICD-10-CM

## 2024-11-28 PROCEDURE — 250N000013 HC RX MED GY IP 250 OP 250 PS 637: Performed by: EMERGENCY MEDICINE

## 2024-11-28 PROCEDURE — 99283 EMERGENCY DEPT VISIT LOW MDM: CPT | Performed by: EMERGENCY MEDICINE

## 2024-11-28 PROCEDURE — 99284 EMERGENCY DEPT VISIT MOD MDM: CPT | Performed by: EMERGENCY MEDICINE

## 2024-11-28 RX ORDER — LORAZEPAM 1 MG/1
1 TABLET ORAL ONCE
Status: COMPLETED | OUTPATIENT
Start: 2024-11-28 | End: 2024-11-28

## 2024-11-28 RX ADMIN — LORAZEPAM 1 MG: 1 TABLET ORAL at 21:33

## 2024-11-28 ASSESSMENT — ENCOUNTER SYMPTOMS
GASTROINTESTINAL NEGATIVE: 1
ALLERGIC/IMMUNOLOGIC NEGATIVE: 1
CONSTITUTIONAL NEGATIVE: 1
RESPIRATORY NEGATIVE: 1
NEUROLOGICAL NEGATIVE: 1
ENDOCRINE NEGATIVE: 1
MUSCULOSKELETAL NEGATIVE: 1
SLEEP DISTURBANCE: 0
HALLUCINATIONS: 0
EYES NEGATIVE: 1
HEMATOLOGIC/LYMPHATIC NEGATIVE: 1
CARDIOVASCULAR NEGATIVE: 1
NERVOUS/ANXIOUS: 1

## 2024-11-29 NOTE — ED TRIAGE NOTES
Pt arrives with concerns of a panic attack. Pt has a history of these. Pt states she is out of her klonopin. Pt was seen here two days ago and prescribed ativan but has used all that were prescribed. Pt resting comfortably  in triage.   BP 92/62   Pulse 95   Temp 97.8  F (36.6  C)   Resp 18   LMP 11/01/1999 (Exact Date)   SpO2 99%       Triage Assessment (Adult)       Row Name 11/28/24 1935          Triage Assessment    Airway WDL WDL        Respiratory WDL    Respiratory WDL WDL        Skin Circulation/Temperature WDL    Skin Circulation/Temperature WDL WDL        Cardiac WDL    Cardiac WDL WDL        Peripheral/Neurovascular WDL    Peripheral Neurovascular WDL WDL        Cognitive/Neuro/Behavioral WDL    Cognitive/Neuro/Behavioral WDL WDL

## 2024-11-29 NOTE — ED PROVIDER NOTES
History     Chief Complaint   Patient presents with    Panic Attack     HPI  Kym Perea is a 54 year old female who presents today with complaints of anxiety.  Has been seen twice recently with same complaints and treated with anxiolytics.  On last visit 2 days ago she was given a prescription for 10 pills.  Patient states that provider told her that if she is unable to see her doctor she should come back to the emergency department.  Denying any suicidal homicidal complaints.  States she has an appointment next week.    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     Priority: Medium    Perineal pain in female 06/24/2023     Priority: Medium    Postoperative state 06/24/2023     Priority: Medium    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     Priority: Medium    Julio's gangrene (H) 06/01/2023     Priority: Medium    TMJ (temporomandibular joint syndrome) 11/01/2021     Priority: Medium    Arthralgia of right temporomandibular joint 11/01/2021     Priority: Medium    Suicidal behavior 10/19/2021     Priority: Medium    Medical cannabis use 07/10/2018     Priority: Medium    Chronic pain disorder 01/29/2018     Priority: Medium     Overview:   complex regional pain syndrome left ankle      Esophageal reflux 01/29/2018     Priority: Medium    Hyperlipidemia 01/29/2018     Priority: Medium    Insomnia 01/29/2018     Priority: Medium    Panic disorder 01/29/2018     Priority: Medium      Overview:   Previously patient of Dr. Reynoso since her teens. Patient is attempting slow taper down on her Xanax. She's been on Xanax 2 mg 4 times a day for years      Tobacco abuse 01/29/2018     Priority: Medium    Pain medication agreement, 4/18/18, 4/3/19, 10/27/2020 04/21/2017     Priority: Medium     Overview:   4/20/17:  Oxycodone 10 mg #180/mo    Hydrocodone 10/325mg, #240/mo. Stopped and changed to morphine. Agreement signed 10/3/12, updated 10/1/ 14.  Complex regional pain syndrome (aka Reflex Sympathetic Dystrophy)  MS Contin 30 mg q 8 hours #90 per month.  Morphine sulfate IR 15 mg 2-3 tab tid prn #180 per month, decreased to #90/month.   query 4/9/16 as part of chart review. Seems appropriate. 6/2/16 tapered and off Morphine. [place] 5/325 #120/month.  query appropriate. ToxAssure appropriate.    August 2016 Consultation with [facility]. Recommended spinal cord stimulator trial, but she is reluctant to pursue that. Pain clinic stated that continuing opioids was OK.      Complex regional pain syndrome type 1 affecting left lower leg 04/15/2016     Priority: Medium     Overview:   Patient with a history of complex regional pain syndrome has seen Dr. Dino Skelton4/23/16       Migraine headache 11/25/2014     Priority: Medium    Lumbago 02/06/2012     Priority: Medium    Major depressive disorder, recurrent episode, severe (H) 07/13/2011     Priority: Medium    Paroxysmal supraventricular tachycardia (H) 05/24/2010     Priority: Medium    Mononeuritis 07/06/2006     Priority: Medium     IMO Update 10/11      Ankle and foot joint derangement 06/05/2006     Priority: Medium     Overview:   Chronic left ankle pain secondary to reflex sympathetic dystrophy.  Patient   has had 4 previous surgeries, the most recent one done by Dr. Noriega in   2007. On Narcotic contract.    Formatting of this note might be different from the original.  Chronic left ankle pain secondary to reflex  sympathetic dystrophy.  Patient   has had 4 previous surgeries, the most recent one done by Dr. Noriega in   2007. On Narcotic contract.    IMO Update 10/11          Past Medical History:    Past Medical History:   Diagnosis Date    Acquired absence of other organs (CODE)     Chronic pain syndrome     Complex regional pain syndrome I     Encounter for other administrative examinations     Encounter for other administrative examinations     Gastro-esophageal reflux disease without esophagitis     Injury of left ankle     Low back pain     Major depressive disorder, recurrent severe without psychotic features (H)     Migraine without status migrainosus, not intractable     Nicotine dependence, uncomplicated     Obesity     Overweight     Pain in ankle     Pain in thoracic spine     Panic disorder without agoraphobia     Personal history of other diseases of the female genital tract     Personal history of other medical treatment (CODE)     Supraventricular tachycardia (H)        Past Surgical History:    Past Surgical History:   Procedure Laterality Date    ANKLE SURGERY      1997, 2000,Left ankle surgery x 2    CYSTECTOMY PILONIDAL N/A 6/19/2023    Procedure: Delayed closure of vulva and perineal defectes with drain placment;  Surgeon: Anthony Nolan MD;  Location:  OR    HYSTERECTOMY TOTAL ABDOMINAL      11/1999,secondary to endometriosis, no malignancy; patient reports no malignancy, but abnormal cells were present *    IRRIGATION AND DEBRIDEMENT DECUBITUS WOUND, COMBINED N/A 6/1/2023    Procedure: IRRIGATION AND DEBRIDEMENT, PERINEAL, LABIAL and GLUTEAL INFECTION;  Surgeon: Anthony Nolan MD;  Location:  OR    IRRIGATION AND DEBRIDEMENT DECUBITUS WOUND, COMBINED N/A 6/3/2023    Procedure: Debridement and washout of vulvar and perineal wounds;  Surgeon: Anthony Nolan MD;  Location:  OR    LAPAROSCOPIC CHOLECYSTECTOMY      No Comments Provided    LAPAROSCOPY DIAGNOSTIC (GENERAL)      1995    OTHER  SURGICAL HISTORY      21299,CRANIO/MAXILLOFACIAL SURGERY,Jaw Surgery    OTHER SURGICAL HISTORY      207040,[facility] EMG,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  No other abnormalities noted.       Family History:    Family History   Problem Relation Age of Onset    Cancer Father         Cancer,Bladder    Other - See Comments Father         Mild hypercholesterolemia    Other - See Comments Mother         scleroderma/lupus/Parkinson's syndrome    Family History Negative Brother         Good Health    Ariel-Danlos syndrome Daughter     Colon Cancer Maternal Grandmother 40        Cancer-colon,Followed by lung  with metastasis to the bone di*       Social History:  Marital Status:   [4]  Social History     Tobacco Use    Smoking status: Every Day     Current packs/day: 0.50     Average packs/day: 0.5 packs/day for 38.0 years (19.0 ttl pk-yrs)     Types: Cigarettes, Vaping Device     Start date: 12/2/1986     Passive exposure: Current    Smokeless tobacco: Never    Tobacco comments:     trying   Vaping Use    Vaping status: Some Days    Substances: Nicotine, Flavoring    Devices: Disposable   Substance Use Topics    Alcohol use: Not Currently     Comment: OCCASIONAL WINE    Drug use: Yes     Types: Marijuana     Comment: medical cannabis; suboxone        Medications:    acetaminophen (TYLENOL) 325 MG tablet  albuterol (PROAIR HFA/PROVENTIL HFA/VENTOLIN HFA) 108 (90 Base) MCG/ACT inhaler  amphetamine-dextroamphetamine (ADDERALL) 20 MG tablet  buprenorphine (SUBUTEX) 8 MG SUBL sublingual tablet  Cholecalciferol (VITAMIN D-3) 125 MCG (5000 UT) TABS  clonazePAM (KLONOPIN) 2 MG tablet  divalproex sodium extended-release (DEPAKOTE ER) 250 MG 24 hr tablet  Docusate Sodium (COLACE PO)  fluticasone (FLONASE) 50 MCG/ACT nasal spray  ibuprofen (ADVIL/MOTRIN) 800 MG tablet  LORazepam (ATIVAN) 0.5 MG tablet  medical cannabis (Patient's own supply)  ondansetron (ZOFRAN) 4 MG tablet  tiZANidine (ZANAFLEX) 4 MG  tablet          Review of Systems   Constitutional: Negative.    HENT: Negative.     Eyes: Negative.    Respiratory: Negative.     Cardiovascular: Negative.    Gastrointestinal: Negative.    Endocrine: Negative.    Genitourinary: Negative.    Musculoskeletal: Negative.    Skin: Negative.    Allergic/Immunologic: Negative.    Neurological: Negative.    Hematological: Negative.    Psychiatric/Behavioral:  Negative for hallucinations, self-injury, sleep disturbance and suicidal ideas. The patient is nervous/anxious.        Physical Exam   BP: 92/62  Pulse: 95  Temp: 97.8  F (36.6  C)  Resp: 18  SpO2: 99 %      Physical Exam  Constitutional:       General: She is not in acute distress.     Appearance: Normal appearance. She is normal weight. She is not toxic-appearing.   HENT:      Head: Normocephalic.   Cardiovascular:      Rate and Rhythm: Normal rate and regular rhythm.   Pulmonary:      Effort: Pulmonary effort is normal.   Abdominal:      General: Abdomen is flat.      Palpations: Abdomen is soft.      Tenderness: There is no right CVA tenderness or left CVA tenderness.   Musculoskeletal:         General: Normal range of motion.      Cervical back: Normal range of motion.   Skin:     General: Skin is warm.      Capillary Refill: Capillary refill takes less than 2 seconds.   Neurological:      General: No focal deficit present.      Mental Status: She is alert.   Psychiatric:         Mood and Affect: Mood normal.         Behavior: Behavior normal.         Thought Content: Thought content normal.         ED Course        Procedures         No results found for this or any previous visit (from the past 24 hours).    Medications   LORazepam (ATIVAN) tablet 1 mg (has no administration in time range)       Assessments & Plan (with Medical Decision Making)     Well-appearing female with complaints of panic attacks.  Seen couple times in the ER with same.  No new complaints.  Has had a CT of the head and labs recently.   Patient here because she wants a refill of her medications.  Very calm here.  In no distress.  Showing no signs of anxiety.  Denied any suicidal or homicidal ideations.  Explained to patient that given number of medications given on her last visit, we will not be able to prescribe any further refills. We will however treat her for anxiety here in the emergency department.  Patient understands to follow-up with her primary care doctor next week.  Patient given Ativan p.o. here and understands to return if she develops any new or worsening symptoms.  Patient taken home by father.    New Prescriptions    No medications on file       Final diagnoses:   Panic attack       11/28/2024   [facility]       Ye Sharp MD  11/29/24 8863

## 2024-11-29 NOTE — DISCHARGE INSTRUCTIONS
1) Follow the aftercare instructions provided.  2) You have been given a medication or prescription for a medication that can make you drowsy. Do not drink, drive, ride a bicycle or operate any heavy machinery while using this medication.   3) If you develop any new or worsening symptoms including any thoughts of hurting yourself or hurting anyone else return to the ER for recheck.  4) Follow up with your doctor next week.

## 2024-11-30 ENCOUNTER — HOSPITAL ENCOUNTER (EMERGENCY)
Facility: OTHER | Age: 54
Discharge: HOME | End: 2024-11-30
Attending: PHYSICIAN ASSISTANT
Payer: COMMERCIAL

## 2024-11-30 VITALS
OXYGEN SATURATION: 98 % | WEIGHT: 131 LBS | RESPIRATION RATE: 20 BRPM | HEIGHT: 69 IN | SYSTOLIC BLOOD PRESSURE: 132 MMHG | HEART RATE: 107 BPM | TEMPERATURE: 99.1 F | DIASTOLIC BLOOD PRESSURE: 78 MMHG | BODY MASS INDEX: 19.4 KG/M2

## 2024-11-30 DIAGNOSIS — F41.9 ANXIETY: ICD-10-CM

## 2024-11-30 DIAGNOSIS — F41.0 PANIC DISORDER: ICD-10-CM

## 2024-11-30 DIAGNOSIS — R11.2 NAUSEA AND VOMITING: ICD-10-CM

## 2024-11-30 DIAGNOSIS — R10.9 ABDOMINAL DISCOMFORT: ICD-10-CM

## 2024-11-30 PROCEDURE — 99284 EMERGENCY DEPT VISIT MOD MDM: CPT | Performed by: PHYSICIAN ASSISTANT

## 2024-11-30 PROCEDURE — 250N000013 HC RX MED GY IP 250 OP 250 PS 637: Performed by: PHYSICIAN ASSISTANT

## 2024-11-30 PROCEDURE — 250N000011 HC RX IP 250 OP 636: Performed by: PHYSICIAN ASSISTANT

## 2024-11-30 RX ORDER — CLONAZEPAM 2 MG/1
2 TABLET ORAL 2 TIMES DAILY
Qty: 6 TABLET | Refills: 0 | Status: SHIPPED | OUTPATIENT
Start: 2024-11-30 | End: 2024-12-03

## 2024-11-30 RX ORDER — ONDANSETRON 4 MG/1
4 TABLET, ORALLY DISINTEGRATING ORAL ONCE
Status: COMPLETED | OUTPATIENT
Start: 2024-11-30 | End: 2024-11-30

## 2024-11-30 RX ORDER — ONDANSETRON 4 MG/1
4 TABLET, ORALLY DISINTEGRATING ORAL EVERY 6 HOURS PRN
Qty: 15 TABLET | Refills: 0 | Status: SHIPPED | OUTPATIENT
Start: 2024-11-30

## 2024-11-30 RX ORDER — CLONAZEPAM 0.5 MG/1
2 TABLET ORAL ONCE
Status: COMPLETED | OUTPATIENT
Start: 2024-11-30 | End: 2024-11-30

## 2024-11-30 RX ADMIN — CLONAZEPAM 2 MG: 0.5 TABLET ORAL at 14:33

## 2024-11-30 RX ADMIN — ONDANSETRON 4 MG: 4 TABLET, ORALLY DISINTEGRATING ORAL at 14:33

## 2024-11-30 NOTE — ED TRIAGE NOTES
"Pt presents with increased panic attacks beginning earlier his week.  Pt takes medication at home but has ran out, medication provider \"Up and quit\".  Ran out of medication earlier this week  Licha Jensen RN.............................11/30/2024 2:15 PM       Triage Assessment (Adult)       Row Name 11/30/24 1414          Triage Assessment    Airway WDL WDL        Respiratory WDL    Respiratory WDL WDL        Skin Circulation/Temperature WDL    Skin Circulation/Temperature WDL WDL        Cardiac WDL    Cardiac WDL WDL        Peripheral/Neurovascular WDL    Peripheral Neurovascular WDL WDL        Cognitive/Neuro/Behavioral WDL    Cognitive/Neuro/Behavioral WDL WDL                     "

## 2024-11-30 NOTE — DISCHARGE INSTRUCTIONS
-Prescription for Klonopin was refilled to get you through until your Monday's appointment at [facility] by Phyllis  -EKG, laboratory studies, and imaging were offered here in the ER for further evaluation but you declined.  If you do change your mind, please return to the ER.  -Also return to the ER for any new or worsening of symptoms.

## 2024-11-30 NOTE — ED PROVIDER NOTES
EMERGENCY DEPARTMENT ENCOUNTER      NAME: Kym Perea  AGE: 54 year old female  YOB: 1970  MRN: [id number]  EVALUATION DATE & TIME: 11/30/2024  2:16 PM    PCP: Eryn Hurley    ED PROVIDER: Ranjeet Giron PA-C       CHIEF COMPLAINT:  Chief Complaint   Patient presents with    Panic Attack         HPI  Kym Perea is a pleasant 54 year old female with history anxiety who presents to the ER today for Klonopin refill.  Patient has an appointment on Monday at her new mental health provider at [facility] by Phyllis.  Patient normally takes Klonopin 2 mg twice daily for anxiety.  Patient has been having increasing anxiety and panic attacks as well as secondary nausea and vomiting.  Patient has been out of her Klonopin for some time.  Patient was previously seeing Phuong Magallanes NP in [place], but her provider apparently stopped seeing patients and encouraged patient to go see another provider.  Patient also takes Subutex 8 mg which patient still has plenty of supplies for.  Patient having episodes of nausea or vomiting but denies any chest pain, hematemesis, diarrhea, or bloody/melanotic stools.  Patient states that she does not want any other testing done today.  Would only like her Klonopin refilled until her appointment on Monday.    REVIEW OF SYSTEMS   Review of Systems  As above, otherwise ROS is unremarkable.      PAST MEDICAL HISTORY:  Past Medical History:   Diagnosis Date    Acquired absence of other organs (CODE)     9/2/2011    Chronic pain syndrome     No Comments Provided    Complex regional pain syndrome I     left ankle    Encounter for other administrative examinations     10/2012,Hydrocodone 10/325mg, #240/mo signed 10/3/12, updated 10/1/ 14    Encounter for other administrative examinations     10/1/2014,MS Contin 30 mg q 8 hours #90 per month.  Morphine sulfate IR 15 mg 2-3 tab tid prn #180 per month    Gastro-esophageal reflux disease without esophagitis     No  Comments Provided    Injury of left ankle     1997,requiring surgery    Low back pain     No Comments Provided    Major depressive disorder, recurrent severe without psychotic features (H)     No Comments Provided    Migraine without status migrainosus, not intractable     No Comments Provided    Nicotine dependence, uncomplicated     No Comments Provided    Obesity     No Comments Provided    Overweight     11/25/2014    Pain in ankle     Chronic left ankle pain secondary to reflex sympathetic dystrophy.  Patient  has had 4 previous surgeries, the most recent one done by Dr. Noriega in  2007. On Narcotic contract.    Pain in thoracic spine     No Comments Provided    Panic disorder without agoraphobia     Dr Reynoso    Personal history of other diseases of the female genital tract     No Comments Provided    Personal history of other medical treatment (CODE)     G2, P1-0-1-1 with one vaginal delivery.    Supraventricular tachycardia (H)     5/24/2010         PAST SURGICAL HISTORY:  Past Surgical History:   Procedure Laterality Date    ANKLE SURGERY      1997, 2000,Left ankle surgery x 2    CYSTECTOMY PILONIDAL N/A 6/19/2023    Procedure: Delayed closure of vulva and perineal defectes with drain placment;  Surgeon: Anthony Nolan MD;  Location:  OR    HYSTERECTOMY TOTAL ABDOMINAL      11/1999,secondary to endometriosis, no malignancy; patient reports no malignancy, but abnormal cells were present *    IRRIGATION AND DEBRIDEMENT DECUBITUS WOUND, COMBINED N/A 6/1/2023    Procedure: IRRIGATION AND DEBRIDEMENT, PERINEAL, LABIAL and GLUTEAL INFECTION;  Surgeon: Anthony Nolan MD;  Location:  OR    IRRIGATION AND DEBRIDEMENT DECUBITUS WOUND, COMBINED N/A 6/3/2023    Procedure: Debridement and washout of vulvar and perineal wounds;  Surgeon: Anthony Nolan MD;  Location:  OR    LAPAROSCOPIC CHOLECYSTECTOMY      No Comments Provided    LAPAROSCOPY DIAGNOSTIC (GENERAL)      1995    OTHER SURGICAL HISTORY       21299,CRANIO/MAXILLOFACIAL SURGERY,Jaw Surgery    OTHER SURGICAL HISTORY      [id number],[facility] EMG,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  No other abnormalities noted.         CURRENT MEDICATIONS:    Current Outpatient Medications   Medication Instructions    acetaminophen (TYLENOL) 650 mg, Oral, EVERY 4 HOURS PRN    albuterol (PROAIR HFA/PROVENTIL HFA/VENTOLIN HFA) 108 (90 Base) MCG/ACT inhaler 2 puffs, Inhalation, EVERY 4 HOURS PRN    amphetamine-dextroamphetamine (ADDERALL) 20 MG tablet 20 mg, Oral, 3 TIMES DAILY    buprenorphine (SUBUTEX) 8 mg, Sublingual, 3 TIMES DAILY    Cholecalciferol (VITAMIN D-3) 125 MCG (5000 UT) TABS 1 tablet, Oral, DAILY    clonazePAM (KLONOPIN) 2 mg, Oral, 2 TIMES DAILY    divalproex sodium extended-release (DEPAKOTE ER) 250 MG 24 hr tablet 1 tablet, 3 TIMES DAILY (Diuretics and Nitrates)    Docusate Sodium (COLACE PO) PRN    fluticasone (FLONASE) 50 MCG/ACT nasal spray 2 sprays, Both Nostrils, DAILY PRN    ibuprofen (ADVIL/MOTRIN) 800 mg, Oral, EVERY 8 HOURS PRN    LORazepam (ATIVAN) 0.5 mg, Oral, EVERY 6 HOURS PRN    medical cannabis (Patient's own supply) SEE ADMIN INSTRUCTIONS    ondansetron (ZOFRAN ODT) 4 mg, Oral, EVERY 6 HOURS PRN    ondansetron (ZOFRAN) 4 mg, Oral, EVERY 8 HOURS PRN    tiZANidine (ZANAFLEX) 4 mg, Oral, 3 TIMES DAILY PRN         ALLERGIES:  Allergies   Allergen Reactions    Gabapentin Other (See Comments)     Memory issues    Diclofenac-Misoprostol GI Disturbance    Flexeril [Cyclobenzaprine] Other (See Comments)     Memory issues    Indomethacin      Other reaction(s): Dizziness    Meloxicam GI Disturbance    Moxifloxacin GI Disturbance         FAMILY HISTORY:  Family History   Problem Relation Age of Onset    Cancer Father         Cancer,Bladder    Other - See Comments Father         Mild hypercholesterolemia    Other - See Comments Mother         scleroderma/lupus/Parkinson's syndrome    Family History Negative Brother         Good Health     Ariel-Danlos syndrome Daughter     Colon Cancer Maternal Grandmother 40        Cancer-colon,Followed by lung  with metastasis to the bone di*         SOCIAL HISTORY:   Social History     Socioeconomic History    Marital status:    Tobacco Use    Smoking status: Every Day     Current packs/day: 0.50     Average packs/day: 0.5 packs/day for 38.0 years (19.0 ttl pk-yrs)     Types: Cigarettes, Vaping Device     Start date: 12/2/1986     Passive exposure: Current    Smokeless tobacco: Never    Tobacco comments:     trying   Vaping Use    Vaping status: Some Days    Substances: Nicotine, Flavoring    Devices: Disposable   Substance and Sexual Activity    Alcohol use: Not Currently     Comment: OCCASIONAL WINE    Drug use: Yes     Types: Marijuana     Comment: medical cannabis; suboxone    Sexual activity: Not Currently     Partners: Male   Social History Narrative     ; currently unemployed. 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 She has a daughter who is now 20, lives in [place]     Social Drivers of Health     Financial Resource Strain: Low Risk  (1/8/2024)    Financial Resource Strain     Within the past 12 months, have you or your family members you live with been unable to get utilities (heat, electricity) when it was really needed?: No   Food Insecurity: Low Risk  (1/8/2024)    Food Insecurity     Within the past 12 months, did you worry that your food would run out before you got money to buy more?: No     Within the past 12 months, did the food you bought just not last and you didn t have money to get more?: No   Transportation Needs: Low Risk  (1/8/2024)    Transportation Needs     Within the past 12 months, has lack of transportation kept you from medical appointments, getting your medicines, non-medical meetings or appointments, work, or from getting things that you need?: No   Interpersonal Safety: Low Risk  (8/29/2024)    Interpersonal Safety     Do you feel physically and  "emotionally safe where you currently live?: Yes     Within the past 12 months, have you been hit, slapped, kicked or otherwise physically hurt by someone?: No     Within the past 12 months, have you been humiliated or emotionally abused in other ways by your partner or ex-partner?: No   Housing Stability: Low Risk  (1/8/2024)    Housing Stability     Do you have housing? : Yes     Are you worried about losing your housing?: No       ==================================================================================================================================    PHYSICAL EXAM    VITAL SIGNS: /78   Pulse 107   Temp 99.1  F (37.3  C) (Tympanic)   Resp 20   Ht 1.753 m (5' 9\")   Wt 59.4 kg (131 lb)   LMP 11/01/1999 (Exact Date)   SpO2 98%   BMI 19.35 kg/m      Patient Vitals for the past 24 hrs:   BP Temp Temp src Pulse Resp SpO2 Height Weight   11/30/24 1413 132/78 99.1  F (37.3  C) Tympanic 107 20 98 % 1.753 m (5' 9\") 59.4 kg (131 lb)       Physical Exam  Vitals and nursing note reviewed.   Constitutional:       Appearance: Normal appearance.      Comments: Patient appeared nauseated but not ill-appearing or any acute distress.   HENT:      Nose: Nose normal.      Mouth/Throat:      Mouth: Mucous membranes are moist.      Pharynx: Oropharynx is clear.   Eyes:      Conjunctiva/sclera: Conjunctivae normal.   Cardiovascular:      Pulses: Normal pulses.      Heart sounds: Normal heart sounds.   Pulmonary:      Effort: Pulmonary effort is normal.      Breath sounds: Normal breath sounds.   Abdominal:      General: Abdomen is flat.      Palpations: Abdomen is soft.      Tenderness: There is abdominal tenderness (Mild generalized tenderness without rebound or guarding).   Musculoskeletal:         General: Normal range of motion.      Cervical back: Normal range of motion and neck supple.   Skin:     General: Skin is warm and dry.      Capillary Refill: Capillary refill takes less than 2 seconds. " "  Neurological:      General: No focal deficit present.      Mental Status: She is alert.            ==================================================================================================================================    LABS & RADIOLOGY:  All pertinent labs reviewed and interpreted. Reviewed all pertinent imaging. Please see official radiology report.     No orders to display       ==================================================================================================================================    ED COURSE, MEDICAL DECISION MAKING, FINAL IMPRESSION AND PLAN:     Assessment / Plan:  1. Anxiety    2. Panic disorder    3. Nausea and vomiting    4. Abdominal discomfort        The patient was interviewed and examined.  HPI and physical exam as below.  Differential diagnosis and MDM Key Documentation Elements as below.  Vitals, triage note, and nursing notes were reviewed.  /78   Pulse 107   Temp 99.1  F (37.3  C) (Tympanic)   Resp 20   Ht 1.753 m (5' 9\")   Wt 59.4 kg (131 lb)   LMP 11/01/1999 (Exact Date)   SpO2 98%   BMI 19.35 kg/m      Differential includes but is not limited to anxiety/panic attack, benzodiazepine withdrawal, ACS/MI, electrolyte abnormalities, cholecystitis, pancreatitis    1.  Anxiety  2.  Nausea and vomiting  3.  Generalized abdominal discomfort  -Patient was afebrile, tachycardic, otherwise normal vitals.  Patient was in no acute distress but did appear to be anxious and nauseated.  Patient has generalized abdominal discomfort upon direct palpation without guarding or rebound.  Lungs are clear.  Heart tachycardic.  No chest wall tenderness.  -Patient was offered EKG, laboratory studies, urinalysis, chest/abdominal imaging, IV and IV medications but patient declined.  Patient states that she only would like to have Klonopin and oral Zofran here in the ER.  Workup and interventions otherwise declined by patient.  -[facility] reviewed.  No objective " "drug-seeking behavior seen.  -Plans for patient to be given Klonopin 2 mg twice daily for quantity 6 until patient can see her mental health provider on Monday.  If patient does have any worsening of symptoms, she will return to the ER for more complete workup.  -Symptoms slightly improved with Klonopin and Zofran here in the ER.  -Return to the ER for any new or worsening symptoms.    Pertinent Labs & Imaging studies reviewed. (See chart for details)     No results found for: \"ABORH\"      Reassessments, Medications, Interventions, & Response to Treatments:  -as above    Medications given during today's ER visit:  Medications   clonazePAM (klonoPIN) tablet 2 mg (2 mg Oral $Given 11/30/24 1433)   ondansetron (ZOFRAN ODT) ODT tab 4 mg (4 mg Oral $Given 11/30/24 1433)       Consultations:  None    Decision Rules, Medical Calculators, and Risk Stratification Tools:  None    MDM Key Documentation Elements for Patient's Evaluation:  Differential diagnosis to include high risk considerations: As above  Escalation to admission/observation considered: Admission/observation considered, but patient does not meet admission criteria  Discussions and management with other clinicians:    3a. Independent interpretation of testing performed by another health professional:  -No  3b. Discussion of management or test interpretation with another health professional: -No  Independent interpretation of tests:  Ordering and/or review of 0 test(s)  Discussion of test interpretations with radiology:  No  History obtained from source other than patient or assessment requiring an independent historian:  No  Review of non-ED/external records:  review of 3+ records  Diagnostic tests considered but not ultimately performed/deferred:  -CBC, CMP, lipase, CT abdomen pelvis with IV contrast, EKG, troponin, chest x-ray, all interventions declined by patient.  Prescription medications considered but not prescribed:  -Antibiotics  Chronic conditions " affecting care:  -Anxiety  Care affected by social determinants of health:  -None    The patient's management involved:   - Prescription drug management      A shared decision making model was used. Time was taken to answer all questions.  Patient and/or associated parties understood and were agreeable to treatment plan.  Plan and all results were discussed. Warning signs and close return precautions to return to the ED given. Copy of results given. Discharged in stable condition. Discharged with discharge instructions outlining plan for further care and follow up.      New prescriptions started at today's ER visit  New Prescriptions    ONDANSETRON (ZOFRAN ODT) 4 MG ODT TAB    Take 1 tablet (4 mg) by mouth every 6 hours as needed for nausea.       ==================================================================================================================================      Sanford FRAGOSO PA-C, personally performed the services described in this documentation, and it is both accurate and complete.       Ranjeet Giron PA-C  11/30/24 1639

## 2024-12-01 ASSESSMENT — ENCOUNTER SYMPTOMS
DYSURIA: 0
ABDOMINAL PAIN: 0
FEVER: 0
COUGH: 0
CHILLS: 0
NERVOUS/ANXIOUS: 1
SHORTNESS OF BREATH: 0

## 2024-12-03 DIAGNOSIS — M54.42 CHRONIC BILATERAL LOW BACK PAIN WITH BILATERAL SCIATICA: ICD-10-CM

## 2024-12-03 DIAGNOSIS — M54.41 CHRONIC BILATERAL LOW BACK PAIN WITH BILATERAL SCIATICA: ICD-10-CM

## 2024-12-03 DIAGNOSIS — G89.29 CHRONIC BILATERAL LOW BACK PAIN WITH BILATERAL SCIATICA: ICD-10-CM

## 2024-12-03 DIAGNOSIS — G89.4 CHRONIC PAIN DISORDER: ICD-10-CM

## 2024-12-03 RX ORDER — IBUPROFEN 800 MG/1
800 TABLET, FILM COATED ORAL EVERY 8 HOURS PRN
Qty: 90 TABLET | Refills: 3 | Status: SHIPPED | OUTPATIENT
Start: 2024-12-03

## 2024-12-03 NOTE — TELEPHONE ENCOUNTER
Ibuprofen (Advil/Motrin) 800 MG tablet     Last Written Prescription Date:  05/30/2024  Last Fill Quantity: 90,   # refills: 3  Last Office Visit: 11/21/2024  Future Office visit:    Next 5 appointments (look out 90 days)      Dec 04, 2024 9:30 AM  (Arrive by 9:15 AM)  Return Visit with Anthony Nolan MD  [facility] ([facility]) [address]  [place] [address] [phone]     Dec 09, 2024 8:30 AM  (Arrive by 8:15 AM)  Provider Visit with Eryn Hurley MD  [facility] [place] ([facility] [place] ) [address]  [place] [address]  [phone]             Routing refill request to provider for review/approval because:  Protocol failed on the Ibuprofen.

## 2024-12-04 ENCOUNTER — OFFICE VISIT (OUTPATIENT)
Dept: SURGERY | Facility: OTHER | Age: 54
End: 2024-12-04
Attending: SURGERY
Payer: COMMERCIAL

## 2024-12-04 VITALS
OXYGEN SATURATION: 97 % | HEART RATE: 83 BPM | BODY MASS INDEX: 19.13 KG/M2 | TEMPERATURE: 98.1 F | WEIGHT: 129.2 LBS | SYSTOLIC BLOOD PRESSURE: 110 MMHG | DIASTOLIC BLOOD PRESSURE: 79 MMHG | HEIGHT: 69 IN | RESPIRATION RATE: 17 BRPM

## 2024-12-04 DIAGNOSIS — N49.3: Primary | ICD-10-CM

## 2024-12-04 DIAGNOSIS — L72.0 INCLUSION CYST: ICD-10-CM

## 2024-12-04 PROCEDURE — G0463 HOSPITAL OUTPT CLINIC VISIT: HCPCS

## 2024-12-04 RX ORDER — GUANFACINE 1 MG/1
1 TABLET, EXTENDED RELEASE ORAL AT BEDTIME
COMMUNITY
Start: 2024-12-02

## 2024-12-04 ASSESSMENT — PAIN SCALES - GENERAL: PAINLEVEL_OUTOF10: EXTREME PAIN (8)

## 2024-12-04 NOTE — PROGRESS NOTES
"Patient presents for follow-up.  She was advised to see surgery after visiting [facility].  She complains of pain that goes down her leg.  She identifies a small dark area as the pinpoint starting area.    /79 (BP Location: Right arm, Patient Position: Sitting, Cuff Size: Adult Large)   Pulse 83   Temp 98.1  F (36.7  C) (Tympanic)   Resp 17   Ht 1.753 m (5' 9\")   Wt 58.6 kg (129 lb 3.2 oz)   LMP 11/01/1999 (Exact Date)   SpO2 97%   BMI 19.08 kg/m      General: NAD, pleasant and cooperative with exam and interview.  Perineum: Examination shows weight loss as well as well mobile not on adherent scar through the groin.  There is a inclusion cyst/blackhead of the medial thigh.  This is unroofed easily with a scissors and suture removal set.  There is no surrounding infection.  Psychiatry: awake, alert and oriented. Appropriate affect.    Assessment/Plan:  54-year-old female with significant anxiety.  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  Now has sciatic symptoms.  Inclusion cyst unrelated to sciatic symptoms.    Follow-up as needed    At this point she is healed and has mobile not on tethered flaps that probably cannot be improved with plastic surgery    Anthony Nolan MD on 12/4/2024 at 10:51 AM      "

## 2024-12-04 NOTE — NURSING NOTE
"Chief Complaint   Patient presents with    RECHECK       Initial /79 (BP Location: Right arm, Patient Position: Sitting, Cuff Size: Adult Large)   Pulse 83   Temp 98.1  F (36.7  C) (Tympanic)   Resp 17   Ht 1.753 m (5' 9\")   Wt 58.6 kg (129 lb 3.2 oz)   LMP 11/01/1999 (Exact Date)   SpO2 97%   BMI 19.08 kg/m   Estimated body mass index is 19.08 kg/m  as calculated from the following:    Height as of this encounter: 1.753 m (5' 9\").    Weight as of this encounter: 58.6 kg (129 lb 3.2 oz).  Meds Reconciled: complete      FOOD SECURITY SCREENING QUESTIONS  Hunger Vital Signs:  Within the past 12 months we worried whether our food would run out before we got money to buy more. Never  Within the past 12 months the food we bought just didn't last and we didn't have money to get more. Never  Massiel Gutierrez RN 12/4/2024 9:47 AM        Massiel Gutierrez RN     "

## 2024-12-09 ENCOUNTER — TELEPHONE (OUTPATIENT)
Dept: FAMILY MEDICINE | Facility: OTHER | Age: 54
End: 2024-12-09

## 2025-03-18 ENCOUNTER — TELEPHONE (OUTPATIENT)
Dept: FAMILY MEDICINE | Facility: OTHER | Age: 55
End: 2025-03-18

## 2025-03-18 NOTE — TELEPHONE ENCOUNTER
Attempt # 1  Outcome: No Answer/Busy   Comment: tried calling pt to schedule a physical per quality list, voice mail was full unable to leave message

## 2025-04-10 ENCOUNTER — HOSPITAL ENCOUNTER (EMERGENCY)
Facility: OTHER | Age: 55
End: 2025-04-10
Payer: COMMERCIAL

## 2025-04-10 ENCOUNTER — APPOINTMENT (OUTPATIENT)
Dept: MRI IMAGING | Facility: OTHER | Age: 55
End: 2025-04-10
Payer: COMMERCIAL

## 2025-04-10 VITALS
SYSTOLIC BLOOD PRESSURE: 127 MMHG | BODY MASS INDEX: 18.96 KG/M2 | HEIGHT: 69 IN | WEIGHT: 128 LBS | DIASTOLIC BLOOD PRESSURE: 72 MMHG | OXYGEN SATURATION: 100 % | RESPIRATION RATE: 16 BRPM | HEART RATE: 66 BPM | TEMPERATURE: 97.8 F

## 2025-04-10 DIAGNOSIS — N39.0 UTI (URINARY TRACT INFECTION): ICD-10-CM

## 2025-04-10 DIAGNOSIS — R33.9 URINARY RETENTION: ICD-10-CM

## 2025-04-10 DIAGNOSIS — M48.00 CENTRAL STENOSIS OF SPINAL CANAL: ICD-10-CM

## 2025-04-10 DIAGNOSIS — M54.16 LUMBAR BACK PAIN WITH RADICULOPATHY AFFECTING LOWER EXTREMITY: ICD-10-CM

## 2025-04-10 LAB
ALBUMIN UR-MCNC: NEGATIVE MG/DL
ANION GAP SERPL CALCULATED.3IONS-SCNC: 7 MMOL/L (ref 7–15)
APPEARANCE UR: CLEAR
BACTERIA #/AREA URNS HPF: ABNORMAL /HPF
BASOPHILS # BLD AUTO: 0 10E3/UL (ref 0–0.2)
BASOPHILS NFR BLD AUTO: 1 %
BILIRUB UR QL STRIP: NEGATIVE
BUN SERPL-MCNC: 23.4 MG/DL (ref 6–20)
CALCIUM SERPL-MCNC: 9 MG/DL (ref 8.8–10.4)
CHLORIDE SERPL-SCNC: 103 MMOL/L (ref 98–107)
COLOR UR AUTO: ABNORMAL
CREAT SERPL-MCNC: 0.81 MG/DL (ref 0.51–0.95)
EGFRCR SERPLBLD CKD-EPI 2021: 86 ML/MIN/1.73M2
EOSINOPHIL # BLD AUTO: 0.1 10E3/UL (ref 0–0.7)
EOSINOPHIL NFR BLD AUTO: 2 %
ERYTHROCYTE [DISTWIDTH] IN BLOOD BY AUTOMATED COUNT: 12.7 % (ref 10–15)
GLUCOSE SERPL-MCNC: 93 MG/DL (ref 70–99)
GLUCOSE UR STRIP-MCNC: NEGATIVE MG/DL
HCO3 SERPL-SCNC: 25 MMOL/L (ref 22–29)
HCT VFR BLD AUTO: 34.4 % (ref 35–47)
HGB BLD-MCNC: 11.7 G/DL (ref 11.7–15.7)
HGB UR QL STRIP: NEGATIVE
HOLD SPECIMEN: NORMAL
HYALINE CASTS: 1 /LPF
IMM GRANULOCYTES # BLD: 0 10E3/UL
IMM GRANULOCYTES NFR BLD: 0 %
KETONES UR STRIP-MCNC: NEGATIVE MG/DL
LEUKOCYTE ESTERASE UR QL STRIP: ABNORMAL
LYMPHOCYTES # BLD AUTO: 1.9 10E3/UL (ref 0.8–5.3)
LYMPHOCYTES NFR BLD AUTO: 32 %
MCH RBC QN AUTO: 31.5 PG (ref 26.5–33)
MCHC RBC AUTO-ENTMCNC: 34 G/DL (ref 31.5–36.5)
MCV RBC AUTO: 93 FL (ref 78–100)
MONOCYTES # BLD AUTO: 0.3 10E3/UL (ref 0–1.3)
MONOCYTES NFR BLD AUTO: 5 %
NEUTROPHILS # BLD AUTO: 3.7 10E3/UL (ref 1.6–8.3)
NEUTROPHILS NFR BLD AUTO: 60 %
NITRATE UR QL: NEGATIVE
NRBC # BLD AUTO: 0 10E3/UL
NRBC BLD AUTO-RTO: 0 /100
PH UR STRIP: 6 [PH] (ref 5–9)
PLATELET # BLD AUTO: 134 10E3/UL (ref 150–450)
POTASSIUM SERPL-SCNC: 4.7 MMOL/L (ref 3.4–5.3)
RBC # BLD AUTO: 3.71 10E6/UL (ref 3.8–5.2)
RBC URINE: 1 /HPF
SODIUM SERPL-SCNC: 135 MMOL/L (ref 135–145)
SP GR UR STRIP: 1.01 (ref 1–1.03)
SQUAMOUS EPITHELIAL: 1 /HPF
UROBILINOGEN UR STRIP-MCNC: NORMAL MG/DL
WBC # BLD AUTO: 6.1 10E3/UL (ref 4–11)
WBC URINE: 24 /HPF

## 2025-04-10 PROCEDURE — 51798 US URINE CAPACITY MEASURE: CPT

## 2025-04-10 PROCEDURE — 72148 MRI LUMBAR SPINE W/O DYE: CPT

## 2025-04-10 PROCEDURE — 87086 URINE CULTURE/COLONY COUNT: CPT

## 2025-04-10 PROCEDURE — 36415 COLL VENOUS BLD VENIPUNCTURE: CPT

## 2025-04-10 PROCEDURE — 99291 CRITICAL CARE FIRST HOUR: CPT | Mod: 25

## 2025-04-10 PROCEDURE — 99283 EMERGENCY DEPT VISIT LOW MDM: CPT

## 2025-04-10 PROCEDURE — 96372 THER/PROPH/DIAG INJ SC/IM: CPT

## 2025-04-10 PROCEDURE — 96375 TX/PRO/DX INJ NEW DRUG ADDON: CPT

## 2025-04-10 PROCEDURE — 250N000013 HC RX MED GY IP 250 OP 250 PS 637

## 2025-04-10 PROCEDURE — 81001 URINALYSIS AUTO W/SCOPE: CPT

## 2025-04-10 PROCEDURE — 250N000011 HC RX IP 250 OP 636: Mod: JZ

## 2025-04-10 PROCEDURE — 250N000009 HC RX 250: Performed by: FAMILY MEDICINE

## 2025-04-10 PROCEDURE — 250N000012 HC RX MED GY IP 250 OP 636 PS 637

## 2025-04-10 PROCEDURE — 85025 COMPLETE CBC W/AUTO DIFF WBC: CPT

## 2025-04-10 PROCEDURE — 72148 MRI LUMBAR SPINE W/O DYE: CPT | Mod: 26 | Performed by: RADIOLOGY

## 2025-04-10 PROCEDURE — 80048 BASIC METABOLIC PNL TOTAL CA: CPT

## 2025-04-10 PROCEDURE — 250N000013 HC RX MED GY IP 250 OP 250 PS 637: Performed by: FAMILY MEDICINE

## 2025-04-10 RX ORDER — KETOROLAC TROMETHAMINE 30 MG/ML
30 INJECTION, SOLUTION INTRAMUSCULAR; INTRAVENOUS ONCE
Status: COMPLETED | OUTPATIENT
Start: 2025-04-10 | End: 2025-04-10

## 2025-04-10 RX ORDER — GUANFACINE 2 MG/1
2 TABLET, EXTENDED RELEASE ORAL AT BEDTIME
Status: DISCONTINUED | OUTPATIENT
Start: 2025-04-10 | End: 2025-04-11 | Stop reason: HOSPADM

## 2025-04-10 RX ORDER — BUPRENORPHINE 2 MG/1
8 TABLET SUBLINGUAL ONCE
Status: COMPLETED | OUTPATIENT
Start: 2025-04-10 | End: 2025-04-10

## 2025-04-10 RX ORDER — ACETAMINOPHEN 325 MG/1
975 TABLET ORAL ONCE
Status: COMPLETED | OUTPATIENT
Start: 2025-04-10 | End: 2025-04-10

## 2025-04-10 RX ORDER — NITROFURANTOIN 25; 75 MG/1; MG/1
100 CAPSULE ORAL ONCE
Status: COMPLETED | OUTPATIENT
Start: 2025-04-10 | End: 2025-04-10

## 2025-04-10 RX ORDER — CLONAZEPAM 0.5 MG/1
2 TABLET ORAL 2 TIMES DAILY
Status: DISCONTINUED | OUTPATIENT
Start: 2025-04-11 | End: 2025-04-11 | Stop reason: HOSPADM

## 2025-04-10 RX ORDER — TIZANIDINE 2 MG/1
4 TABLET ORAL ONCE
Status: COMPLETED | OUTPATIENT
Start: 2025-04-10 | End: 2025-04-10

## 2025-04-10 RX ADMIN — CLONAZEPAM 2 MG: 0.5 TABLET ORAL at 23:47

## 2025-04-10 RX ADMIN — KETOROLAC TROMETHAMINE 30 MG: 30 INJECTION, SOLUTION INTRAMUSCULAR at 14:10

## 2025-04-10 RX ADMIN — TIZANIDINE 4 MG: 2 TABLET ORAL at 14:10

## 2025-04-10 RX ADMIN — Medication 10 MG: at 23:47

## 2025-04-10 RX ADMIN — KETOROLAC TROMETHAMINE 30 MG: 30 INJECTION, SOLUTION INTRAMUSCULAR at 19:17

## 2025-04-10 RX ADMIN — NITROFURANTOIN (MONOHYDRATE/MACROCRYSTALS) 100 MG: 25; 75 CAPSULE ORAL at 21:09

## 2025-04-10 RX ADMIN — ACETAMINOPHEN 975 MG: 325 TABLET, FILM COATED ORAL at 19:06

## 2025-04-10 RX ADMIN — BUPRENORPHINE 8 MG: 2 TABLET SUBLINGUAL at 19:06

## 2025-04-10 ASSESSMENT — ENCOUNTER SYMPTOMS
WEAKNESS: 1
BACK PAIN: 1

## 2025-04-10 ASSESSMENT — ACTIVITIES OF DAILY LIVING (ADL)
ADLS_ACUITY_SCORE: 58

## 2025-04-10 NOTE — ED PROVIDER NOTES
History     Chief Complaint   Patient presents with    Back Pain     The history is provided by the patient and medical records.     Kym Perea is a 55 year old female who presents to the ER today complaining of lower back pain that radiates down both of her legs posteriorly, pain and tingling. Has been taking ibuprofen, tylenol at home without relief. Reports weakness in her legs related to the pain, worse over the last couple of days, she has increased back pain with walking. . 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 Denies problems with urination, bowel movements.   She was supposed to have an MRI last winter but missed her appointment due to poor weather. She does not have a PCP that she follows with.     No recent trauma to back, fever, surgery, weight loss, IV drug use, she is not on blood thinners.     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     Priority: Medium    Perineal pain in female 06/24/2023     Priority: Medium    Postoperative state 06/24/2023     Priority: Medium    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     Priority: Medium    Johanne's gangrene (H) 06/01/2023     Priority: Medium    TMJ (temporomandibular joint syndrome) 11/01/2021     Priority: Medium    Arthralgia of right temporomandibular joint 11/01/2021     Priority: Medium    Suicidal behavior 10/19/2021     Priority: Medium    Medical  cannabis use 07/10/2018     Priority: Medium    Chronic pain disorder 01/29/2018     Priority: Medium     Overview:   complex regional pain syndrome left ankle      Esophageal reflux 01/29/2018     Priority: Medium    Hyperlipidemia 01/29/2018     Priority: Medium    Insomnia 01/29/2018     Priority: Medium    Panic disorder 01/29/2018     Priority: Medium     Overview:   Previously patient of Dr. Reynoso since her teens. Patient is attempting slow taper down on her Xanax. She's been on Xanax 2 mg 4 times a day for years      Tobacco abuse 01/29/2018     Priority: Medium    Pain medication agreement, 4/18/18, 4/3/19, 10/27/2020 04/21/2017     Priority: Medium     Overview:   4/20/17:  Oxycodone 10 mg #180/mo    Hydrocodone 10/325mg, #240/mo. Stopped and changed to morphine. Agreement signed 10/3/12, updated 10/1/ 14.  Complex regional pain syndrome (aka Reflex Sympathetic Dystrophy)  MS Contin 30 mg q 8 hours #90 per month.  Morphine sulfate IR 15 mg 2-3 tab tid prn #180 per month, decreased to #90/month.   query 4/9/16 as part of chart review. Seems appropriate. 6/2/16 tapered and off Morphine. Stanton 5/325 #120/month.  query appropriate. ToxAssure appropriate.    August 2016 Consultation with [facility]. Recommended spinal cord stimulator trial, but she is reluctant to pursue that. Pain clinic stated that continuing opioids was OK.      Complex regional pain syndrome type 1 affecting left lower leg 04/15/2016     Priority: Medium     Overview:   Patient with a history of complex regional pain syndrome has seen Dr. Dino Skelton4/23/16       Migraine headache 11/25/2014     Priority: Medium    Lumbago 02/06/2012     Priority: Medium    Major depressive disorder, recurrent episode, severe (H) 07/13/2011     Priority: Medium    Paroxysmal supraventricular tachycardia 05/24/2010     Priority: Medium    Mononeuritis 07/06/2006     Priority: Medium     IMO Update 10/11      Ankle and foot joint  derangement 06/05/2006     Priority: Medium     Overview:   Chronic left ankle pain secondary to reflex sympathetic dystrophy.  Patient   has had 4 previous surgeries, the most recent one done by Dr. Noriega in   2007. On Narcotic contract.    Formatting of this note might be different from the original.  Chronic left ankle pain secondary to reflex sympathetic dystrophy.  Patient   has had 4 previous surgeries, the most recent one done by Dr. Noriega in   2007. On Narcotic contract.    IMO Update 10/11          Past Medical History:    Past Medical History:   Diagnosis Date    Acquired absence of other organs (CODE)     Chronic pain syndrome     Complex regional pain syndrome I     Encounter for other administrative examinations     Encounter for other administrative examinations     Gastro-esophageal reflux disease without esophagitis     Injury of left ankle     Low back pain     Major depressive disorder, recurrent severe without psychotic features (H)     Migraine without status migrainosus, not intractable     Nicotine dependence, uncomplicated     Obesity     Overweight     Pain in ankle     Pain in thoracic spine     Panic disorder without agoraphobia     Personal history of other diseases of the female genital tract     Personal history of other medical treatment (CODE)     Supraventricular tachycardia        Past Surgical History:    Past Surgical History:   Procedure Laterality Date    ANKLE SURGERY      1997, 2000,Left ankle surgery x 2    CYSTECTOMY PILONIDAL N/A 6/19/2023    Procedure: Delayed closure of vulva and perineal defectes with drain placment;  Surgeon: Anthony Nolan MD;  Location: [facility] OR    HYSTERECTOMY TOTAL ABDOMINAL      11/1999,secondary to endometriosis, no malignancy; patient reports no malignancy, but abnormal cells were present *    IRRIGATION AND DEBRIDEMENT DECUBITUS WOUND, COMBINED N/A 6/1/2023    Procedure: IRRIGATION AND DEBRIDEMENT, PERINEAL, LABIAL and GLUTEAL INFECTION;   Surgeon: Anthony Nolan MD;  Location: [facility] OR    IRRIGATION AND DEBRIDEMENT DECUBITUS WOUND, COMBINED N/A 6/3/2023    Procedure: Debridement and washout of vulvar and perineal wounds;  Surgeon: Anthony Nolan MD;  Location: [facility] OR    LAPAROSCOPIC CHOLECYSTECTOMY      No Comments Provided    LAPAROSCOPY DIAGNOSTIC (GENERAL)      1995    OTHER SURGICAL HISTORY      21299,CRANIO/MAXILLOFACIAL SURGERY,Jaw Surgery    OTHER SURGICAL HISTORY      207040,[facility],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  No other abnormalities noted.       Family History:    Family History   Problem Relation Age of Onset    Cancer Father         Cancer,Bladder    Other - See Comments Father         Mild hypercholesterolemia    Other - See Comments Mother         scleroderma/lupus/Parkinson's syndrome    Family History Negative Brother         Good Health    Ariel-Danlos syndrome Daughter     Colon Cancer Maternal Grandmother 40        Cancer-colon,Followed by lung  with metastasis to the bone di*       Social History:  Marital Status:   [4]  Social History     Tobacco Use    Smoking status: Every Day     Current packs/day: 0.50     Average packs/day: 0.5 packs/day for 38.4 years (19.2 ttl pk-yrs)     Types: Cigarettes, Vaping Device     Start date: 12/2/1986     Passive exposure: Current    Smokeless tobacco: Never    Tobacco comments:     trying   Vaping Use    Vaping status: Some Days    Substances: Nicotine, Flavoring    Devices: Disposable   Substance Use Topics    Alcohol use: Not Currently     Comment: OCCASIONAL WINE    Drug use: Yes     Types: Marijuana     Comment: medical cannabis; suboxone        Medications:    acetaminophen (TYLENOL) 325 MG tablet  albuterol (PROAIR HFA/PROVENTIL HFA/VENTOLIN HFA) 108 (90 Base) MCG/ACT inhaler  amphetamine-dextroamphetamine (ADDERALL) 20 MG tablet  buprenorphine (SUBUTEX) 8 MG SUBL sublingual tablet  Cholecalciferol (VITAMIN D-3) 125 MCG (5000 UT) TABS  clonazePAM  "(KLONOPIN) 2 MG tablet  divalproex sodium delayed-release (DEPAKOTE) 250 MG DR tablet  Docusate Sodium (COLACE PO)  fluticasone (FLONASE) 50 MCG/ACT nasal spray  guanFACINE (INTUNIV) 1 MG TB24 24 hr tablet  ibuprofen (ADVIL/MOTRIN) 800 MG tablet  LORazepam (ATIVAN) 0.5 MG tablet  medical cannabis (Patient's own supply)  ondansetron (ZOFRAN ODT) 4 MG ODT tab  ondansetron (ZOFRAN) 4 MG tablet  tiZANidine (ZANAFLEX) 4 MG tablet          Review of Systems   Musculoskeletal:  Positive for back pain.   Neurological:  Positive for weakness.   All other systems reviewed and are negative.  See HPI    Physical Exam   BP: 127/72  Pulse: 66  Temp: 97.8  F (36.6  C)  Resp: 16  Height: 175.3 cm (5' 9\")  Weight: 58.1 kg (128 lb)  SpO2: 100 %      Physical Exam  Vitals and nursing note reviewed.   Constitutional:       General: She is in acute distress.      Appearance: She is not ill-appearing or toxic-appearing.   HENT:      Head: Normocephalic.      Nose: Nose normal.      Mouth/Throat:      Mouth: Mucous membranes are moist.   Cardiovascular:      Rate and Rhythm: Normal rate and regular rhythm.      Pulses: Normal pulses.      Heart sounds: Normal heart sounds.   Pulmonary:      Effort: Pulmonary effort is normal.      Breath sounds: Normal breath sounds.   Abdominal:      General: Abdomen is flat.      Palpations: Abdomen is soft.   Musculoskeletal:      Cervical back: Normal, full passive range of motion without pain and neck supple.      Thoracic back: Normal.      Lumbar back: Tenderness present. No swelling, edema or deformity. Negative right straight leg raise test and negative left straight leg raise test.      Comments: No obvious deformity, step offs. Appropriate pedal and dorsal strength bilaterally, symmetrical.    Skin:     General: Skin is warm.      Capillary Refill: Capillary refill takes less than 2 seconds.   Neurological:      General: No focal deficit present.      Mental Status: She is alert.      Cranial " Nerves: Cranial nerves 2-12 are intact.      Deep Tendon Reflexes:      Reflex Scores:       Patellar reflexes are 2+ on the right side and 2+ on the left side.  Psychiatric:         Mood and Affect: Mood normal.         Behavior: Behavior is cooperative.         ED Course         Results for orders placed or performed during the hospital encounter of 04/10/25 (from the past 24 hours)   CBC with platelets differential    Narrative    The following orders were created for panel order CBC with platelets differential.  Procedure                               Abnormality         Status                     ---------                               -----------         ------                     CBC with platelets and ...[[id number]]  Abnormal            Final result                 Please view results for these tests on the individual orders.   Basic metabolic panel   Result Value Ref Range    Sodium 135 135 - 145 mmol/L    Potassium 4.7 3.4 - 5.3 mmol/L    Chloride 103 98 - 107 mmol/L    Carbon Dioxide (CO2) 25 22 - 29 mmol/L    Anion Gap 7 7 - 15 mmol/L    Urea Nitrogen 23.4 (H) 6.0 - 20.0 mg/dL    Creatinine 0.81 0.51 - 0.95 mg/dL    GFR Estimate 86 >60 mL/min/1.73m2    Calcium 9.0 8.8 - 10.4 mg/dL    Glucose 93 70 - 99 mg/dL   CBC with platelets and differential   Result Value Ref Range    WBC Count 6.1 4.0 - 11.0 10e3/uL    RBC Count 3.71 (L) 3.80 - 5.20 10e6/uL    Hemoglobin 11.7 11.7 - 15.7 g/dL    Hematocrit 34.4 (L) 35.0 - 47.0 %    MCV 93 78 - 100 fL    MCH 31.5 26.5 - 33.0 pg    MCHC 34.0 31.5 - 36.5 g/dL    RDW 12.7 10.0 - 15.0 %    Platelet Count 134 (L) 150 - 450 10e3/uL    % Neutrophils 60 %    % Lymphocytes 32 %    % Monocytes 5 %    % Eosinophils 2 %    % Basophils 1 %    % Immature Granulocytes 0 %    NRBCs per 100 WBC 0 <1 /100    Absolute Neutrophils 3.7 1.6 - 8.3 10e3/uL    Absolute Lymphocytes 1.9 0.8 - 5.3 10e3/uL    Absolute Monocytes 0.3 0.0 - 1.3 10e3/uL    Absolute Eosinophils 0.1 0.0 - 0.7  10e3/uL    Absolute Basophils 0.0 0.0 - 0.2 10e3/uL    Absolute Immature Granulocytes 0.0 <=0.4 10e3/uL    Absolute NRBCs 0.0 10e3/uL   Extra Tube    Narrative    The following orders were created for panel order Extra Tube.  Procedure                               Abnormality         Status                     ---------                               -----------         ------                     Extra Blue Top Tube[[id number]]                             Final result               Extra Red Top Tube[[id number]]                              Final result               Extra Green Top (Lithiu...[[id number]]                      Final result                 Please view results for these tests on the individual orders.   Extra Blue Top Tube   Result Value Ref Range    Hold Specimen JIC    Extra Red Top Tube   Result Value Ref Range    Hold Specimen hold    Extra Green Top (Lithium Heparin) Tube   Result Value Ref Range    Hold Specimen JIC    UA with Microscopic reflex to Culture    Specimen: Urine, Midstream   Result Value Ref Range    Color Urine Light Yellow Colorless, Straw, Light Yellow, Yellow    Appearance Urine Clear Clear    Glucose Urine Negative Negative mg/dL    Bilirubin Urine Negative Negative    Ketones Urine Negative Negative mg/dL    Specific Gravity Urine 1.014 1.000 - 1.030    Blood Urine Negative Negative    pH Urine 6.0 5.0 - 9.0    Protein Albumin Urine Negative Negative mg/dL    Urobilinogen Urine Normal Normal mg/dL    Nitrite Urine Negative Negative    Leukocyte Esterase Urine Moderate (A) Negative    Bacteria Urine Few (A) None Seen /HPF    RBC Urine 1 <=2 /HPF    WBC Urine 24 (H) <=5 /HPF    Squamous Epithelials Urine 1 <=1 /HPF    Hyaline Casts Urine 1 <=2 /LPF    Narrative    Urine Culture ordered based on laboratory criteria   MR Lumbar Spine w/o Contrast    Narrative    EXAM: MR LUMBAR SPINE W/O CONTRAST  LOCATION: [facility]  DATE: 4/10/2025    INDICATION: Low back  pain, bilateral sciatica.  COMPARISON: CT abdomen pelvis dated 06/07/2024.  TECHNIQUE: Routine Lumbar Spine MRI without IV contrast.    FINDINGS:   Nomenclature is based on 5 lumbar type vertebral bodies. Normal vertebral body heights. Mildly heterogeneous marrow signal pattern. No suspicious marrow edema. At L4 on L5, there is approximately 6 mm of anterolisthesis. Normal distal spinal cord. The   conus terminates at L1-L2. No acute extraspinal abnormality. Unremarkable visualized bony pelvis.    T12-L1: Small degenerative Schmorl's node involving the inferior endplate of T12. Trace concentric disc bulge, eccentric to the left posteriorly. Minimal facet arthropathy. Trace fluid in the left facet joint. No significant canal stenosis. No left or   right neural foraminal stenosis.     L1-L2: Mild disc desiccation. Minimal disc height loss. Mild facet arthropathy. Trace concentric disc bulge. No significant canal stenosis. No neural foraminal stenosis.    L2-L3: Disc desiccation. Mild disc height loss. Bilateral facet arthropathy. Mild ligamentum flavum thickening. Trace fluid in the facet joints. Trace concentric disc bulge. No significant canal stenosis. No left neural foraminal stenosis. No right   neural foraminal stenosis.     L3-L4: Normal disc height. Normal disc signal. Bilateral facet arthropathy. Mild ligamentum flavum thickening. Trace fluid in the facet joints. Trace concentric disc bulge. Minimal canal stenosis. No significant left neural foraminal stenosis. No right   neural foraminal stenosis.    L4-L5: Disc desiccation. Disc height loss. Uncovering of the disc. Advanced bilateral facet arthropathy and ligamentum flavum thickening. There is fluid involving the bilateral facet joints, left worse than right. Concentric disc bulge with an apparent   superimposed midline disc protrusion and high-intensity zone/annular tear (image 9 series 18 and image 36 series 19) which causes severe/critical central spinal  canal stenosis. There is mild to moderate left and moderate right neural foraminal stenosis.    L5-S1: Disc desiccation. Disc height loss. Bilateral facet arthropathy. Trace concentric disc bulge. There is a small high-intensity zone/annular tear along the left foraminal component of the disc. No significant canal stenosis. No significant left   neural foraminal stenosis. No significant right neural foraminal stenosis.      Impression    IMPRESSION:  1.  Multilevel degenerative changes of the lumbar spine, as above, with 6 mm of anterolisthesis at L4 and L5.  2.  At L4-L5, there is a concentric disc bulge with an apparent superimposed midline disc protrusion and high-intensity zone/annular tear which causes severe/critical central spinal canal stenosis. At this level, there is mild to moderate left and   moderate right neural foraminal stenosis. Neurosurgical consultation is recommended.  3.  Additional multilevel degenerative changes, as above.         Medications   nitroFURantoin macrocrystal-monohydrate (MACROBID) capsule 100 mg (has no administration in time range)   tiZANidine (ZANAFLEX) tablet 4 mg (4 mg Oral $Given 4/10/25 1410)   ketorolac (TORADOL) injection 30 mg (30 mg Intramuscular $Given 4/10/25 1410)   buprenorphine (SUBUTEX) sublingual tablet 8 mg (8 mg Sublingual $Given 4/10/25 1906)   acetaminophen (TYLENOL) tablet 975 mg (975 mg Oral $Given 4/10/25 1906)   ketorolac (TORADOL) injection 30 mg (30 mg Intramuscular $Given 4/10/25 1917)       Assessments & Plan (with Medical Decision Making)  Kym Perea is a 55 year old female who presents to the ER today complaining of lower back pain that radiates down both of her legs posteriorly, pain and tingling. Has been taking ibuprofen, tylenol at home without relief. Reports weakness in her legs related to the pain, worse over the last couple of days, she has increased back pain with walking. . 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 Denies problems with urination, bowel movements.   She was supposed to have an MRI last winter but missed her appointment due to poor weather. She does not have a PCP that she follows with.   No recent trauma to back, fever, surgery, weight loss, IV drug use, she is not on blood thinners.   /72   Pulse 66   Temp 97.8  F (36.6  C) (Tympanic)   Resp 16   Ht 1.753 m (5' 9\")   Wt 58.1 kg (128 lb)   LMP 11/01/1999 (Exact Date)   SpO2 100%   BMI 18.90 kg/m    she is vitally stable.   Patient is alert, orientated, somewhat restless with back pain during evaluation.   Lower back pain, chronic with worsening bilateral leg weakness, pain. Symmetrical dorsal/pedal strength, patellar reflexes. No foot drop. No deformities, swelling noted to back, step offs. Normal rectal tone. Reports decreased sensation in perineum during exam. Weak with standing, ambulation. No IV drug use, fever to suggest epidural abscess.   Bladder scan post void: 371 ml urine retention. Urinalysis implies possible UTI. Previous culture 2022 pan sensitive. Culture pending. Treating with macrobid  MRI lumbar:   Multilevel degenerative changes of the lumbar spine, as above, with 6 mm of anterolisthesis at L4 and L5. At L4-L5, there is a concentric disc bulge with an apparent superimposed midline disc protrusion and high-intensity zone/annular tear which causes severe/critical central spinal canal stenosis. At this level, there is mild to moderate left and moderate right neural foraminal stenosis.  Meds: Toradol, home subutex, tizanidine. Reports some relief.  8:05 PM: I consulted with Dr Lizama, neurosurgery [facility]. Discussed exam findings, MRI results. He advised discharge with medrol dose pack, follow up with neurosurgery in 6 weeks.   8:10 PM: Reached out to [facility] neurosurgery for second opinion, due to concern for urinary retention, bilateral leg weakness,  Awaiting " return call.   9:02 PM end of shift report given to Dr. Bundy, please see his note.      I have reviewed the nursing notes.    I have reviewed the findings, diagnosis, plan and need for follow up with the patient.    Medical Decision Making  The patient's presentation was of high complexity (low back pain, neurological deficits, consult, critical MRI findings).    The patient's evaluation involved:  review of external note(s) from 1 sources (see separate area of note for details)  ordering and/or review of 3+ test(s) in this encounter (see separate area of note for details)  discussion of management or test interpretation with another health professional (see separate area of note for details)    The patient's management necessitated further care after sign-out to Dr. Bundy (see their note for further management).        New Prescriptions    No medications on file       Final diagnoses:   Lumbar back pain with radiculopathy affecting lower extremity   Central stenosis of spinal canal - lumbar L4-L5   UTI (urinary tract infection)   Urinary retention       4/10/2025   [facility], APRN CNP  04/10/25 3971     plan, she is in agreement.    1:46 AM--I also spoke with Dr. Gaming ER attending at the [place] currently who is accepting patient for an ER to ER transport.  We discussed presentation pain control strategy and anticipated definitive care.    5:41 AM--Unable to fly due to weather.  Ground transport not available until 0830.      New Prescriptions    No medications on file       Final diagnoses:   Lumbar back pain with radiculopathy affecting lower extremity   Central stenosis of spinal canal - lumbar L4-L5   UTI (urinary tract infection)   Urinary retention            Aleksandr Bundy MD  04/11/25 0121       Aleksandr Bundy MD  04/11/25 0147       Aleksandr Bundy MD  04/11/25 0543

## 2025-04-10 NOTE — ED TRIAGE NOTES
"Patient arrives today with c/o back pain. Patient has a history of chronic back pain and states that she was supposed to get an MRI in December but could not make it due to the weather. States that she \"feels like her sciatica is flaring up\" as the pain is shooting down her legs and making it difficult to ambulate.      Triage Assessment (Adult)       Row Name 04/10/25 1212          Triage Assessment    Airway WDL WDL        Respiratory WDL    Respiratory WDL WDL        Skin Circulation/Temperature WDL    Skin Circulation/Temperature WDL WDL        Cardiac WDL    Cardiac WDL WDL        Peripheral/Neurovascular WDL    Peripheral Neurovascular WDL WDL        Cognitive/Neuro/Behavioral WDL    Cognitive/Neuro/Behavioral WDL WDL                     "

## 2025-04-11 ENCOUNTER — ANESTHESIA EVENT (OUTPATIENT)
Dept: SURGERY | Facility: CLINIC | Age: 55
DRG: 518 | End: 2025-04-11
Payer: COMMERCIAL

## 2025-04-11 ENCOUNTER — HOSPITAL ENCOUNTER (OUTPATIENT)
Facility: CLINIC | Age: 55
End: 2025-04-11
Attending: NEUROLOGICAL SURGERY | Admitting: NEUROLOGICAL SURGERY
Payer: COMMERCIAL

## 2025-04-11 ENCOUNTER — ANESTHESIA (OUTPATIENT)
Dept: SURGERY | Facility: CLINIC | Age: 55
DRG: 518 | End: 2025-04-11
Payer: COMMERCIAL

## 2025-04-11 ENCOUNTER — HOSPITAL ENCOUNTER (INPATIENT)
Facility: CLINIC | Age: 55
LOS: 5 days | Discharge: HOME | DRG: 518 | End: 2025-04-16
Attending: STUDENT IN AN ORGANIZED HEALTH CARE EDUCATION/TRAINING PROGRAM | Admitting: NEUROLOGICAL SURGERY
Payer: COMMERCIAL

## 2025-04-11 ENCOUNTER — APPOINTMENT (OUTPATIENT)
Dept: GENERAL RADIOLOGY | Facility: CLINIC | Age: 55
DRG: 518 | End: 2025-04-11
Attending: NEUROLOGICAL SURGERY
Payer: COMMERCIAL

## 2025-04-11 VITALS
BODY MASS INDEX: 18.96 KG/M2 | WEIGHT: 128 LBS | HEART RATE: 70 BPM | RESPIRATION RATE: 16 BRPM | DIASTOLIC BLOOD PRESSURE: 84 MMHG | SYSTOLIC BLOOD PRESSURE: 117 MMHG | TEMPERATURE: 97.8 F | HEIGHT: 69 IN | OXYGEN SATURATION: 97 %

## 2025-04-11 DIAGNOSIS — M43.16 SPONDYLOLISTHESIS OF LUMBAR REGION: Primary | ICD-10-CM

## 2025-04-11 DIAGNOSIS — R20.0 NUMBNESS: ICD-10-CM

## 2025-04-11 DIAGNOSIS — R53.1 WEAKNESS: ICD-10-CM

## 2025-04-11 DIAGNOSIS — F11.90 CHRONIC, CONTINUOUS USE OF OPIOIDS: ICD-10-CM

## 2025-04-11 DIAGNOSIS — M48.00 CENTRAL STENOSIS OF SPINAL CANAL: ICD-10-CM

## 2025-04-11 DIAGNOSIS — M54.9 ACUTE BACK PAIN, UNSPECIFIED BACK LOCATION, UNSPECIFIED BACK PAIN LATERALITY: ICD-10-CM

## 2025-04-11 LAB
ABO + RH BLD: NORMAL
ANION GAP SERPL CALCULATED.3IONS-SCNC: 10 MMOL/L (ref 7–15)
APTT PPP: 22 SECONDS (ref 22–38)
BLD GP AB SCN SERPL QL: NEGATIVE
BUN SERPL-MCNC: 21.8 MG/DL (ref 6–20)
CALCIUM SERPL-MCNC: 8.7 MG/DL (ref 8.8–10.4)
CHLORIDE SERPL-SCNC: 104 MMOL/L (ref 98–107)
CREAT SERPL-MCNC: 0.86 MG/DL (ref 0.51–0.95)
EGFRCR SERPLBLD CKD-EPI 2021: 80 ML/MIN/1.73M2
ERYTHROCYTE [DISTWIDTH] IN BLOOD BY AUTOMATED COUNT: 12.6 % (ref 10–15)
GLUCOSE SERPL-MCNC: 89 MG/DL (ref 70–99)
HCO3 SERPL-SCNC: 22 MMOL/L (ref 22–29)
HCT VFR BLD AUTO: 36.1 % (ref 35–47)
HGB BLD-MCNC: 12 G/DL (ref 11.7–15.7)
INR PPP: 0.94 (ref 0.85–1.15)
MCH RBC QN AUTO: 31.2 PG (ref 26.5–33)
MCHC RBC AUTO-ENTMCNC: 33.2 G/DL (ref 31.5–36.5)
MCV RBC AUTO: 94 FL (ref 78–100)
PLATELET # BLD AUTO: 128 10E3/UL (ref 150–450)
POTASSIUM SERPL-SCNC: 4.5 MMOL/L (ref 3.4–5.3)
RBC # BLD AUTO: 3.85 10E6/UL (ref 3.8–5.2)
SODIUM SERPL-SCNC: 136 MMOL/L (ref 135–145)
SPECIMEN EXP DATE BLD: NORMAL
WBC # BLD AUTO: 5.3 10E3/UL (ref 4–11)

## 2025-04-11 PROCEDURE — 250N000009 HC RX 250: Performed by: FAMILY MEDICINE

## 2025-04-11 PROCEDURE — 250N000011 HC RX IP 250 OP 636: Mod: JW | Performed by: NURSE ANESTHETIST, CERTIFIED REGISTERED

## 2025-04-11 PROCEDURE — 710N000010 HC RECOVERY PHASE 1, LEVEL 2, PER MIN: Performed by: NEUROLOGICAL SURGERY

## 2025-04-11 PROCEDURE — 250N000009 HC RX 250: Mod: JW | Performed by: STUDENT IN AN ORGANIZED HEALTH CARE EDUCATION/TRAINING PROGRAM

## 2025-04-11 PROCEDURE — 0ST20ZZ RESECTION OF LUMBAR VERTEBRAL DISC, OPEN APPROACH: ICD-10-PCS | Performed by: NEUROLOGICAL SURGERY

## 2025-04-11 PROCEDURE — 250N000013 HC RX MED GY IP 250 OP 250 PS 637: Performed by: FAMILY MEDICINE

## 2025-04-11 PROCEDURE — 250N000011 HC RX IP 250 OP 636

## 2025-04-11 PROCEDURE — 250N000013 HC RX MED GY IP 250 OP 250 PS 637

## 2025-04-11 PROCEDURE — 85730 THROMBOPLASTIN TIME PARTIAL: CPT

## 2025-04-11 PROCEDURE — 96376 TX/PRO/DX INJ SAME DRUG ADON: CPT

## 2025-04-11 PROCEDURE — 258N000003 HC RX IP 258 OP 636: Performed by: ANESTHESIOLOGY

## 2025-04-11 PROCEDURE — 250N000011 HC RX IP 250 OP 636: Performed by: ANESTHESIOLOGY

## 2025-04-11 PROCEDURE — 85610 PROTHROMBIN TIME: CPT

## 2025-04-11 PROCEDURE — 120N000002 HC R&B MED SURG/OB UMMC

## 2025-04-11 PROCEDURE — 250N000011 HC RX IP 250 OP 636: Mod: JZ | Performed by: STUDENT IN AN ORGANIZED HEALTH CARE EDUCATION/TRAINING PROGRAM

## 2025-04-11 PROCEDURE — 8E0WXBZ COMPUTER ASSISTED PROCEDURE OF TRUNK REGION: ICD-10-PCS | Performed by: NEUROLOGICAL SURGERY

## 2025-04-11 PROCEDURE — 250N000012 HC RX MED GY IP 250 OP 636 PS 637

## 2025-04-11 PROCEDURE — 250N000009 HC RX 250: Performed by: NURSE ANESTHETIST, CERTIFIED REGISTERED

## 2025-04-11 PROCEDURE — 250N000011 HC RX IP 250 OP 636: Mod: JZ | Performed by: ANESTHESIOLOGY

## 2025-04-11 PROCEDURE — 99285 EMERGENCY DEPT VISIT HI MDM: CPT | Performed by: STUDENT IN AN ORGANIZED HEALTH CARE EDUCATION/TRAINING PROGRAM

## 2025-04-11 PROCEDURE — 258N000003 HC RX IP 258 OP 636

## 2025-04-11 PROCEDURE — 80048 BASIC METABOLIC PNL TOTAL CA: CPT

## 2025-04-11 PROCEDURE — 999N000141 HC STATISTIC PRE-PROCEDURE NURSING ASSESSMENT: Performed by: NEUROLOGICAL SURGERY

## 2025-04-11 PROCEDURE — 370N000017 HC ANESTHESIA TECHNICAL FEE, PER MIN: Performed by: NEUROLOGICAL SURGERY

## 2025-04-11 PROCEDURE — 96375 TX/PRO/DX INJ NEW DRUG ADDON: CPT | Performed by: STUDENT IN AN ORGANIZED HEALTH CARE EDUCATION/TRAINING PROGRAM

## 2025-04-11 PROCEDURE — 99285 EMERGENCY DEPT VISIT HI MDM: CPT | Mod: 25 | Performed by: STUDENT IN AN ORGANIZED HEALTH CARE EDUCATION/TRAINING PROGRAM

## 2025-04-11 PROCEDURE — 250N000009 HC RX 250: Performed by: ANESTHESIOLOGY

## 2025-04-11 PROCEDURE — 96374 THER/PROPH/DIAG INJ IV PUSH: CPT | Performed by: STUDENT IN AN ORGANIZED HEALTH CARE EDUCATION/TRAINING PROGRAM

## 2025-04-11 PROCEDURE — 96375 TX/PRO/DX INJ NEW DRUG ADDON: CPT

## 2025-04-11 PROCEDURE — 250N000011 HC RX IP 250 OP 636: Performed by: NURSE ANESTHETIST, CERTIFIED REGISTERED

## 2025-04-11 PROCEDURE — 250N000011 HC RX IP 250 OP 636: Performed by: FAMILY MEDICINE

## 2025-04-11 PROCEDURE — 96366 THER/PROPH/DIAG IV INF ADDON: CPT

## 2025-04-11 PROCEDURE — 360N000084 HC SURGERY LEVEL 4 W/ FLUORO, PER MIN: Performed by: NEUROLOGICAL SURGERY

## 2025-04-11 PROCEDURE — 999N000179 XR SURGERY CARM FLUORO LESS THAN 5 MIN W STILLS

## 2025-04-11 PROCEDURE — 36415 COLL VENOUS BLD VENIPUNCTURE: CPT

## 2025-04-11 PROCEDURE — 96376 TX/PRO/DX INJ SAME DRUG ADON: CPT | Performed by: STUDENT IN AN ORGANIZED HEALTH CARE EDUCATION/TRAINING PROGRAM

## 2025-04-11 PROCEDURE — 86900 BLOOD TYPING SEROLOGIC ABO: CPT

## 2025-04-11 PROCEDURE — 0SB20ZZ EXCISION OF LUMBAR VERTEBRAL DISC, OPEN APPROACH: ICD-10-PCS | Performed by: NEUROLOGICAL SURGERY

## 2025-04-11 PROCEDURE — 250N000025 HC SEVOFLURANE, PER MIN: Performed by: NEUROLOGICAL SURGERY

## 2025-04-11 PROCEDURE — 63030 LAMOT DCMPRN NRV RT 1 LMBR: CPT | Mod: LT | Performed by: NEUROLOGICAL SURGERY

## 2025-04-11 PROCEDURE — 96365 THER/PROPH/DIAG IV INF INIT: CPT

## 2025-04-11 PROCEDURE — 272N000001 HC OR GENERAL SUPPLY STERILE: Performed by: NEUROLOGICAL SURGERY

## 2025-04-11 PROCEDURE — 250N000009 HC RX 250: Performed by: NEUROLOGICAL SURGERY

## 2025-04-11 PROCEDURE — 00NY0ZZ RELEASE LUMBAR SPINAL CORD, OPEN APPROACH: ICD-10-PCS | Performed by: NEUROLOGICAL SURGERY

## 2025-04-11 PROCEDURE — 85027 COMPLETE CBC AUTOMATED: CPT

## 2025-04-11 RX ORDER — ONDANSETRON 4 MG/1
4 TABLET, ORALLY DISINTEGRATING ORAL EVERY 6 HOURS PRN
Status: DISCONTINUED | OUTPATIENT
Start: 2025-04-11 | End: 2025-04-16 | Stop reason: HOSPADM

## 2025-04-11 RX ORDER — ALBUTEROL SULFATE 90 UG/1
2 INHALANT RESPIRATORY (INHALATION) EVERY 4 HOURS PRN
OUTPATIENT
Start: 2025-04-11

## 2025-04-11 RX ORDER — ACETAMINOPHEN 325 MG/1
650 TABLET ORAL EVERY 4 HOURS PRN
Status: DISCONTINUED | OUTPATIENT
Start: 2025-04-11 | End: 2025-04-11

## 2025-04-11 RX ORDER — HYDROMORPHONE HCL IN WATER/PF 6 MG/30 ML
0.4 PATIENT CONTROLLED ANALGESIA SYRINGE INTRAVENOUS EVERY 5 MIN PRN
Status: DISCONTINUED | OUTPATIENT
Start: 2025-04-11 | End: 2025-04-11 | Stop reason: HOSPADM

## 2025-04-11 RX ORDER — LABETALOL HYDROCHLORIDE 5 MG/ML
10 INJECTION, SOLUTION INTRAVENOUS EVERY 10 MIN PRN
Status: DISCONTINUED | OUTPATIENT
Start: 2025-04-11 | End: 2025-04-16 | Stop reason: HOSPADM

## 2025-04-11 RX ORDER — GUANFACINE 1 MG/1
2 TABLET, EXTENDED RELEASE ORAL AT BEDTIME
Status: DISCONTINUED | OUTPATIENT
Start: 2025-04-11 | End: 2025-04-16 | Stop reason: HOSPADM

## 2025-04-11 RX ORDER — BUPIVACAINE HYDROCHLORIDE AND EPINEPHRINE 2.5; 5 MG/ML; UG/ML
INJECTION, SOLUTION INFILTRATION; PERINEURAL PRN
Status: DISCONTINUED | OUTPATIENT
Start: 2025-04-11 | End: 2025-04-11 | Stop reason: HOSPADM

## 2025-04-11 RX ORDER — BUPRENORPHINE 8 MG/1
8 TABLET SUBLINGUAL 3 TIMES DAILY
Status: DISCONTINUED | OUTPATIENT
Start: 2025-04-11 | End: 2025-04-11

## 2025-04-11 RX ORDER — HYDROMORPHONE HCL IN WATER/PF 6 MG/30 ML
0.2 PATIENT CONTROLLED ANALGESIA SYRINGE INTRAVENOUS EVERY 5 MIN PRN
Status: DISCONTINUED | OUTPATIENT
Start: 2025-04-11 | End: 2025-04-11 | Stop reason: HOSPADM

## 2025-04-11 RX ORDER — ACETAMINOPHEN 325 MG/1
650 TABLET ORAL EVERY 4 HOURS PRN
OUTPATIENT
Start: 2025-04-11

## 2025-04-11 RX ORDER — PROCHLORPERAZINE MALEATE 10 MG
10 TABLET ORAL EVERY 6 HOURS PRN
Status: DISCONTINUED | OUTPATIENT
Start: 2025-04-11 | End: 2025-04-16 | Stop reason: HOSPADM

## 2025-04-11 RX ORDER — ONDANSETRON 4 MG/1
4 TABLET, ORALLY DISINTEGRATING ORAL EVERY 30 MIN PRN
Status: DISCONTINUED | OUTPATIENT
Start: 2025-04-11 | End: 2025-04-11 | Stop reason: HOSPADM

## 2025-04-11 RX ORDER — BISACODYL 10 MG
10 SUPPOSITORY, RECTAL RECTAL DAILY PRN
Status: DISCONTINUED | OUTPATIENT
Start: 2025-04-14 | End: 2025-04-16

## 2025-04-11 RX ORDER — MAGNESIUM SULFATE HEPTAHYDRATE 40 MG/ML
INJECTION, SOLUTION INTRAVENOUS PRN
Status: DISCONTINUED | OUTPATIENT
Start: 2025-04-11 | End: 2025-04-11

## 2025-04-11 RX ORDER — CLONAZEPAM 0.5 MG/1
2 TABLET ORAL ONCE
Status: COMPLETED | OUTPATIENT
Start: 2025-04-11 | End: 2025-04-11

## 2025-04-11 RX ORDER — FENTANYL CITRATE 50 UG/ML
50 INJECTION, SOLUTION INTRAMUSCULAR; INTRAVENOUS EVERY 5 MIN PRN
Status: DISCONTINUED | OUTPATIENT
Start: 2025-04-11 | End: 2025-04-11 | Stop reason: HOSPADM

## 2025-04-11 RX ORDER — SODIUM CHLORIDE, SODIUM LACTATE, POTASSIUM CHLORIDE, CALCIUM CHLORIDE 600; 310; 30; 20 MG/100ML; MG/100ML; MG/100ML; MG/100ML
INJECTION, SOLUTION INTRAVENOUS CONTINUOUS
Status: DISCONTINUED | OUTPATIENT
Start: 2025-04-11 | End: 2025-04-11

## 2025-04-11 RX ORDER — NALOXONE HYDROCHLORIDE 0.4 MG/ML
0.1 INJECTION, SOLUTION INTRAMUSCULAR; INTRAVENOUS; SUBCUTANEOUS
Status: DISCONTINUED | OUTPATIENT
Start: 2025-04-11 | End: 2025-04-11 | Stop reason: HOSPADM

## 2025-04-11 RX ORDER — ACETAMINOPHEN 10 MG/ML
1000 INJECTION, SOLUTION INTRAVENOUS ONCE
Status: COMPLETED | OUTPATIENT
Start: 2025-04-11 | End: 2025-04-11

## 2025-04-11 RX ORDER — ONDANSETRON 2 MG/ML
4 INJECTION INTRAMUSCULAR; INTRAVENOUS EVERY 6 HOURS PRN
Status: DISCONTINUED | OUTPATIENT
Start: 2025-04-11 | End: 2025-04-16 | Stop reason: HOSPADM

## 2025-04-11 RX ORDER — LIDOCAINE 40 MG/G
CREAM TOPICAL
Status: DISCONTINUED | OUTPATIENT
Start: 2025-04-11 | End: 2025-04-16 | Stop reason: HOSPADM

## 2025-04-11 RX ORDER — ONDANSETRON 4 MG/1
4 TABLET, ORALLY DISINTEGRATING ORAL EVERY 6 HOURS PRN
Status: DISCONTINUED | OUTPATIENT
Start: 2025-04-11 | End: 2025-04-11

## 2025-04-11 RX ORDER — KETAMINE HYDROCHLORIDE 10 MG/ML
INJECTION INTRAMUSCULAR; INTRAVENOUS PRN
Status: DISCONTINUED | OUTPATIENT
Start: 2025-04-11 | End: 2025-04-11

## 2025-04-11 RX ORDER — AMOXICILLIN 250 MG
2 CAPSULE ORAL 2 TIMES DAILY
Status: DISCONTINUED | OUTPATIENT
Start: 2025-04-11 | End: 2025-04-16 | Stop reason: HOSPADM

## 2025-04-11 RX ORDER — ESMOLOL HYDROCHLORIDE 10 MG/ML
INJECTION INTRAVENOUS PRN
Status: DISCONTINUED | OUTPATIENT
Start: 2025-04-11 | End: 2025-04-11

## 2025-04-11 RX ORDER — SODIUM CHLORIDE, SODIUM LACTATE, POTASSIUM CHLORIDE, CALCIUM CHLORIDE 600; 310; 30; 20 MG/100ML; MG/100ML; MG/100ML; MG/100ML
INJECTION, SOLUTION INTRAVENOUS CONTINUOUS PRN
Status: DISCONTINUED | OUTPATIENT
Start: 2025-04-11 | End: 2025-04-11

## 2025-04-11 RX ORDER — ONDANSETRON 2 MG/ML
4 INJECTION INTRAMUSCULAR; INTRAVENOUS EVERY 6 HOURS PRN
Status: DISCONTINUED | OUTPATIENT
Start: 2025-04-11 | End: 2025-04-11

## 2025-04-11 RX ORDER — DEXTROAMPHETAMINE SACCHARATE, AMPHETAMINE ASPARTATE, DEXTROAMPHETAMINE SULFATE AND AMPHETAMINE SULFATE 5; 5; 5; 5 MG/1; MG/1; MG/1; MG/1
20 TABLET ORAL 3 TIMES DAILY
Status: DISCONTINUED | OUTPATIENT
Start: 2025-04-11 | End: 2025-04-11

## 2025-04-11 RX ORDER — HYDROMORPHONE HCL IN WATER/PF 6 MG/30 ML
0.2 PATIENT CONTROLLED ANALGESIA SYRINGE INTRAVENOUS
Status: DISCONTINUED | OUTPATIENT
Start: 2025-04-11 | End: 2025-04-12

## 2025-04-11 RX ORDER — HYDROXYZINE HYDROCHLORIDE 25 MG/1
25 TABLET, FILM COATED ORAL EVERY 6 HOURS PRN
Status: DISCONTINUED | OUTPATIENT
Start: 2025-04-11 | End: 2025-04-16 | Stop reason: HOSPADM

## 2025-04-11 RX ORDER — BUPRENORPHINE 2 MG/1
8 TABLET SUBLINGUAL 3 TIMES DAILY
OUTPATIENT
Start: 2025-04-11

## 2025-04-11 RX ORDER — PROPOFOL 10 MG/ML
INJECTION, EMULSION INTRAVENOUS PRN
Status: DISCONTINUED | OUTPATIENT
Start: 2025-04-11 | End: 2025-04-11

## 2025-04-11 RX ORDER — HYDROMORPHONE HCL IN WATER/PF 6 MG/30 ML
0.4 PATIENT CONTROLLED ANALGESIA SYRINGE INTRAVENOUS EVERY 5 MIN PRN
Status: DISCONTINUED | OUTPATIENT
Start: 2025-04-11 | End: 2025-04-11

## 2025-04-11 RX ORDER — HYDRALAZINE HYDROCHLORIDE 20 MG/ML
2.5-5 INJECTION INTRAMUSCULAR; INTRAVENOUS EVERY 10 MIN PRN
Status: DISCONTINUED | OUTPATIENT
Start: 2025-04-11 | End: 2025-04-11 | Stop reason: HOSPADM

## 2025-04-11 RX ORDER — LIDOCAINE HYDROCHLORIDE 20 MG/ML
INJECTION, SOLUTION INFILTRATION; PERINEURAL PRN
Status: DISCONTINUED | OUTPATIENT
Start: 2025-04-11 | End: 2025-04-11

## 2025-04-11 RX ORDER — CLONAZEPAM 2 MG/1
2 TABLET ORAL 2 TIMES DAILY PRN
Status: DISCONTINUED | OUTPATIENT
Start: 2025-04-11 | End: 2025-04-16 | Stop reason: HOSPADM

## 2025-04-11 RX ORDER — NALOXONE HYDROCHLORIDE 0.4 MG/ML
0.4 INJECTION, SOLUTION INTRAMUSCULAR; INTRAVENOUS; SUBCUTANEOUS
Status: DISCONTINUED | OUTPATIENT
Start: 2025-04-11 | End: 2025-04-16 | Stop reason: HOSPADM

## 2025-04-11 RX ORDER — NALOXONE HYDROCHLORIDE 0.4 MG/ML
0.2 INJECTION, SOLUTION INTRAMUSCULAR; INTRAVENOUS; SUBCUTANEOUS
Status: DISCONTINUED | OUTPATIENT
Start: 2025-04-11 | End: 2025-04-16 | Stop reason: HOSPADM

## 2025-04-11 RX ORDER — CLONAZEPAM 0.5 MG/1
2 TABLET ORAL 2 TIMES DAILY PRN
OUTPATIENT
Start: 2025-04-11

## 2025-04-11 RX ORDER — DIAZEPAM 5 MG/1
5 TABLET ORAL EVERY 6 HOURS PRN
Status: DISCONTINUED | OUTPATIENT
Start: 2025-04-11 | End: 2025-04-16 | Stop reason: HOSPADM

## 2025-04-11 RX ORDER — SODIUM CHLORIDE 9 MG/ML
INJECTION, SOLUTION INTRAVENOUS CONTINUOUS
OUTPATIENT
Start: 2025-04-11

## 2025-04-11 RX ORDER — POLYETHYLENE GLYCOL 3350 17 G/17G
17 POWDER, FOR SOLUTION ORAL DAILY
Status: DISCONTINUED | OUTPATIENT
Start: 2025-04-12 | End: 2025-04-14

## 2025-04-11 RX ORDER — MEPERIDINE HYDROCHLORIDE 25 MG/ML
12.5 INJECTION INTRAMUSCULAR; INTRAVENOUS; SUBCUTANEOUS EVERY 5 MIN PRN
Status: DISCONTINUED | OUTPATIENT
Start: 2025-04-11 | End: 2025-04-11 | Stop reason: HOSPADM

## 2025-04-11 RX ORDER — HYDROMORPHONE HYDROCHLORIDE 1 MG/ML
0.5 INJECTION, SOLUTION INTRAMUSCULAR; INTRAVENOUS; SUBCUTANEOUS ONCE
Status: COMPLETED | OUTPATIENT
Start: 2025-04-11 | End: 2025-04-11

## 2025-04-11 RX ORDER — BISACODYL 10 MG
10 SUPPOSITORY, RECTAL RECTAL DAILY PRN
Status: DISCONTINUED | OUTPATIENT
Start: 2025-04-14 | End: 2025-04-11

## 2025-04-11 RX ORDER — BUPRENORPHINE HYDROCHLORIDE AND NALOXONE HYDROCHLORIDE DIHYDRATE 2; .5 MG/1; MG/1
2 TABLET SUBLINGUAL 2 TIMES DAILY
Status: DISCONTINUED | OUTPATIENT
Start: 2025-04-11 | End: 2025-04-11

## 2025-04-11 RX ORDER — PROCHLORPERAZINE MALEATE 10 MG
10 TABLET ORAL EVERY 6 HOURS PRN
Status: DISCONTINUED | OUTPATIENT
Start: 2025-04-11 | End: 2025-04-11

## 2025-04-11 RX ORDER — SODIUM CHLORIDE 9 MG/ML
INJECTION, SOLUTION INTRAVENOUS CONTINUOUS
Status: DISCONTINUED | OUTPATIENT
Start: 2025-04-11 | End: 2025-04-11

## 2025-04-11 RX ORDER — DEXAMETHASONE SODIUM PHOSPHATE 4 MG/ML
4 INJECTION, SOLUTION INTRA-ARTICULAR; INTRALESIONAL; INTRAMUSCULAR; INTRAVENOUS; SOFT TISSUE
Status: DISCONTINUED | OUTPATIENT
Start: 2025-04-11 | End: 2025-04-11 | Stop reason: HOSPADM

## 2025-04-11 RX ORDER — HYDROMORPHONE HYDROCHLORIDE 1 MG/ML
0.5 INJECTION, SOLUTION INTRAMUSCULAR; INTRAVENOUS; SUBCUTANEOUS
Status: DISCONTINUED | OUTPATIENT
Start: 2025-04-11 | End: 2025-04-12

## 2025-04-11 RX ORDER — DIVALPROEX SODIUM 250 MG/1
250 TABLET, DELAYED RELEASE ORAL
OUTPATIENT
Start: 2025-04-11

## 2025-04-11 RX ORDER — HYDRALAZINE HYDROCHLORIDE 20 MG/ML
10 INJECTION INTRAMUSCULAR; INTRAVENOUS EVERY 30 MIN PRN
Status: DISCONTINUED | OUTPATIENT
Start: 2025-04-11 | End: 2025-04-16 | Stop reason: HOSPADM

## 2025-04-11 RX ORDER — LABETALOL HYDROCHLORIDE 5 MG/ML
5 INJECTION, SOLUTION INTRAVENOUS EVERY 5 MIN PRN
Status: DISCONTINUED | OUTPATIENT
Start: 2025-04-11 | End: 2025-04-11 | Stop reason: HOSPADM

## 2025-04-11 RX ORDER — METHOCARBAMOL 500 MG/1
1000 TABLET, FILM COATED ORAL EVERY 6 HOURS PRN
Status: DISCONTINUED | OUTPATIENT
Start: 2025-04-11 | End: 2025-04-12

## 2025-04-11 RX ORDER — SODIUM CHLORIDE 9 MG/ML
INJECTION, SOLUTION INTRAVENOUS CONTINUOUS
Status: ACTIVE | OUTPATIENT
Start: 2025-04-11 | End: 2025-04-12

## 2025-04-11 RX ORDER — ACETAMINOPHEN 325 MG/1
975 TABLET ORAL EVERY 8 HOURS
Status: DISCONTINUED | OUTPATIENT
Start: 2025-04-11 | End: 2025-04-16 | Stop reason: HOSPADM

## 2025-04-11 RX ORDER — FENTANYL CITRATE 50 UG/ML
25 INJECTION, SOLUTION INTRAMUSCULAR; INTRAVENOUS EVERY 5 MIN PRN
Status: DISCONTINUED | OUTPATIENT
Start: 2025-04-11 | End: 2025-04-11 | Stop reason: HOSPADM

## 2025-04-11 RX ORDER — AMOXICILLIN 250 MG
1 CAPSULE ORAL 2 TIMES DAILY
Status: DISCONTINUED | OUTPATIENT
Start: 2025-04-11 | End: 2025-04-11

## 2025-04-11 RX ORDER — LIDOCAINE 40 MG/G
CREAM TOPICAL
Status: DISCONTINUED | OUTPATIENT
Start: 2025-04-11 | End: 2025-04-11

## 2025-04-11 RX ORDER — DEXAMETHASONE SODIUM PHOSPHATE 4 MG/ML
INJECTION, SOLUTION INTRA-ARTICULAR; INTRALESIONAL; INTRAMUSCULAR; INTRAVENOUS; SOFT TISSUE PRN
Status: DISCONTINUED | OUTPATIENT
Start: 2025-04-11 | End: 2025-04-11

## 2025-04-11 RX ORDER — POLYETHYLENE GLYCOL 3350 17 G/17G
17 POWDER, FOR SOLUTION ORAL DAILY
Status: DISCONTINUED | OUTPATIENT
Start: 2025-04-12 | End: 2025-04-11

## 2025-04-11 RX ORDER — CEFAZOLIN SODIUM/WATER 2 G/20 ML
2 SYRINGE (ML) INTRAVENOUS
Status: COMPLETED | OUTPATIENT
Start: 2025-04-11 | End: 2025-04-11

## 2025-04-11 RX ORDER — DEXTROAMPHETAMINE SACCHARATE, AMPHETAMINE ASPARTATE, DEXTROAMPHETAMINE SULFATE AND AMPHETAMINE SULFATE 5; 5; 5; 5 MG/1; MG/1; MG/1; MG/1
20 TABLET ORAL 3 TIMES DAILY
OUTPATIENT
Start: 2025-04-11

## 2025-04-11 RX ORDER — GLYCOPYRROLATE 0.2 MG/ML
INJECTION, SOLUTION INTRAMUSCULAR; INTRAVENOUS PRN
Status: DISCONTINUED | OUTPATIENT
Start: 2025-04-11 | End: 2025-04-11

## 2025-04-11 RX ORDER — BUPRENORPHINE 2 MG/1
4 TABLET SUBLINGUAL 2 TIMES DAILY
Status: DISCONTINUED | OUTPATIENT
Start: 2025-04-11 | End: 2025-04-16 | Stop reason: HOSPADM

## 2025-04-11 RX ORDER — DOCUSATE SODIUM 100 MG/1
100 CAPSULE, LIQUID FILLED ORAL 2 TIMES DAILY PRN
OUTPATIENT
Start: 2025-04-11

## 2025-04-11 RX ORDER — ONDANSETRON 2 MG/ML
INJECTION INTRAMUSCULAR; INTRAVENOUS PRN
Status: DISCONTINUED | OUTPATIENT
Start: 2025-04-11 | End: 2025-04-11

## 2025-04-11 RX ORDER — HYDROMORPHONE HCL IN WATER/PF 6 MG/30 ML
0.5 PATIENT CONTROLLED ANALGESIA SYRINGE INTRAVENOUS
Status: DISCONTINUED | OUTPATIENT
Start: 2025-04-11 | End: 2025-04-11 | Stop reason: HOSPADM

## 2025-04-11 RX ORDER — ALBUTEROL SULFATE 90 UG/1
2 INHALANT RESPIRATORY (INHALATION) EVERY 4 HOURS PRN
Status: DISCONTINUED | OUTPATIENT
Start: 2025-04-11 | End: 2025-04-16 | Stop reason: HOSPADM

## 2025-04-11 RX ORDER — ALBUTEROL SULFATE 0.83 MG/ML
2.5 SOLUTION RESPIRATORY (INHALATION) EVERY 4 HOURS PRN
Status: DISCONTINUED | OUTPATIENT
Start: 2025-04-11 | End: 2025-04-11 | Stop reason: HOSPADM

## 2025-04-11 RX ORDER — HYDROMORPHONE HCL IN WATER/PF 6 MG/30 ML
0.2 PATIENT CONTROLLED ANALGESIA SYRINGE INTRAVENOUS EVERY 5 MIN PRN
Status: DISCONTINUED | OUTPATIENT
Start: 2025-04-11 | End: 2025-04-11

## 2025-04-11 RX ORDER — METHADONE HYDROCHLORIDE 10 MG/ML
10 INJECTION, SOLUTION INTRAMUSCULAR; INTRAVENOUS; SUBCUTANEOUS ONCE
Status: COMPLETED | OUTPATIENT
Start: 2025-04-11 | End: 2025-04-11

## 2025-04-11 RX ORDER — DIAZEPAM 10 MG/2ML
2.5 INJECTION, SOLUTION INTRAMUSCULAR; INTRAVENOUS ONCE
Status: COMPLETED | OUTPATIENT
Start: 2025-04-11 | End: 2025-04-11

## 2025-04-11 RX ORDER — TIZANIDINE 2 MG/1
4 TABLET ORAL ONCE
Status: COMPLETED | OUTPATIENT
Start: 2025-04-11 | End: 2025-04-11

## 2025-04-11 RX ORDER — AMOXICILLIN 250 MG
1 CAPSULE ORAL 2 TIMES DAILY
Status: DISCONTINUED | OUTPATIENT
Start: 2025-04-11 | End: 2025-04-14

## 2025-04-11 RX ORDER — OXYCODONE HYDROCHLORIDE 10 MG/1
10 TABLET ORAL EVERY 4 HOURS PRN
Status: DISCONTINUED | OUTPATIENT
Start: 2025-04-11 | End: 2025-04-12

## 2025-04-11 RX ORDER — ONDANSETRON 2 MG/ML
4 INJECTION INTRAMUSCULAR; INTRAVENOUS EVERY 30 MIN PRN
Status: DISCONTINUED | OUTPATIENT
Start: 2025-04-11 | End: 2025-04-11 | Stop reason: HOSPADM

## 2025-04-11 RX ORDER — DIVALPROEX SODIUM 250 MG/1
500 TABLET, DELAYED RELEASE ORAL 2 TIMES DAILY
Status: DISCONTINUED | OUTPATIENT
Start: 2025-04-11 | End: 2025-04-16 | Stop reason: HOSPADM

## 2025-04-11 RX ORDER — CEFAZOLIN SODIUM/WATER 2 G/20 ML
2 SYRINGE (ML) INTRAVENOUS SEE ADMIN INSTRUCTIONS
Status: DISCONTINUED | OUTPATIENT
Start: 2025-04-11 | End: 2025-04-11 | Stop reason: HOSPADM

## 2025-04-11 RX ADMIN — HYDROMORPHONE HYDROCHLORIDE 0.4 MG: 0.2 INJECTION, SOLUTION INTRAMUSCULAR; INTRAVENOUS; SUBCUTANEOUS at 21:32

## 2025-04-11 RX ADMIN — GUANFACINE 2 MG: 1 TABLET, EXTENDED RELEASE ORAL at 23:47

## 2025-04-11 RX ADMIN — GLYCOPYRROLATE 0.2 MG: 0.2 INJECTION, SOLUTION INTRAMUSCULAR; INTRAVENOUS at 14:05

## 2025-04-11 RX ADMIN — DIAZEPAM 2.5 MG: 5 INJECTION INTRAMUSCULAR; INTRAVENOUS at 20:26

## 2025-04-11 RX ADMIN — SODIUM CHLORIDE, SODIUM LACTATE, POTASSIUM CHLORIDE, AND CALCIUM CHLORIDE: .6; .31; .03; .02 INJECTION, SOLUTION INTRAVENOUS at 13:54

## 2025-04-11 RX ADMIN — SODIUM CHLORIDE, SODIUM LACTATE, POTASSIUM CHLORIDE, AND CALCIUM CHLORIDE: .6; .31; .03; .02 INJECTION, SOLUTION INTRAVENOUS at 17:41

## 2025-04-11 RX ADMIN — MIDAZOLAM 2 MG: 1 INJECTION INTRAMUSCULAR; INTRAVENOUS at 14:01

## 2025-04-11 RX ADMIN — HYDROMORPHONE HYDROCHLORIDE 0.5 MG: 1 INJECTION, SOLUTION INTRAMUSCULAR; INTRAVENOUS; SUBCUTANEOUS at 08:49

## 2025-04-11 RX ADMIN — HYDROMORPHONE HYDROCHLORIDE 0.4 MG: 0.2 INJECTION, SOLUTION INTRAMUSCULAR; INTRAVENOUS; SUBCUTANEOUS at 21:11

## 2025-04-11 RX ADMIN — METHADONE HYDROCHLORIDE 10 MG: 10 INJECTION, SOLUTION INTRAMUSCULAR; INTRAVENOUS; SUBCUTANEOUS at 14:01

## 2025-04-11 RX ADMIN — Medication 20 MG: at 16:27

## 2025-04-11 RX ADMIN — DEXAMETHASONE SODIUM PHOSPHATE 2 MG: 4 INJECTION, SOLUTION INTRA-ARTICULAR; INTRALESIONAL; INTRAMUSCULAR; INTRAVENOUS; SOFT TISSUE at 14:30

## 2025-04-11 RX ADMIN — Medication 0.5 G: at 15:12

## 2025-04-11 RX ADMIN — DIAZEPAM 5 MG: 5 TABLET ORAL at 23:47

## 2025-04-11 RX ADMIN — Medication 5 MG: at 06:06

## 2025-04-11 RX ADMIN — CLONAZEPAM 2 MG: 2 TABLET ORAL at 23:57

## 2025-04-11 RX ADMIN — HYDROMORPHONE HYDROCHLORIDE 0.5 MG: 1 INJECTION, SOLUTION INTRAMUSCULAR; INTRAVENOUS; SUBCUTANEOUS at 20:00

## 2025-04-11 RX ADMIN — HYDROMORPHONE HYDROCHLORIDE 0.5 MG: 0.2 INJECTION, SOLUTION INTRAMUSCULAR; INTRAVENOUS; SUBCUTANEOUS at 13:19

## 2025-04-11 RX ADMIN — Medication 20 MG: at 17:19

## 2025-04-11 RX ADMIN — BUPRENORPHINE 4 MG: 2 TABLET SUBLINGUAL at 23:49

## 2025-04-11 RX ADMIN — Medication 2 G: at 14:12

## 2025-04-11 RX ADMIN — Medication 50 MG: at 14:03

## 2025-04-11 RX ADMIN — TIZANIDINE 4 MG: 2 TABLET ORAL at 03:38

## 2025-04-11 RX ADMIN — DIVALPROEX SODIUM 500 MG: 250 TABLET, DELAYED RELEASE ORAL at 23:23

## 2025-04-11 RX ADMIN — Medication: at 21:05

## 2025-04-11 RX ADMIN — FENTANYL CITRATE 50 MCG: 50 INJECTION, SOLUTION INTRAMUSCULAR; INTRAVENOUS at 20:28

## 2025-04-11 RX ADMIN — Medication 100 MG: at 19:43

## 2025-04-11 RX ADMIN — ACETAMINOPHEN 1000 MG: 10 INJECTION, SOLUTION INTRAVENOUS at 01:32

## 2025-04-11 RX ADMIN — Medication 20 MG: at 14:44

## 2025-04-11 RX ADMIN — FENTANYL CITRATE 50 MCG: 50 INJECTION, SOLUTION INTRAMUSCULAR; INTRAVENOUS at 20:38

## 2025-04-11 RX ADMIN — ESMOLOL HYDROCHLORIDE 20 MG: 10 INJECTION, SOLUTION INTRAVENOUS at 14:06

## 2025-04-11 RX ADMIN — PROPOFOL 30 MG: 10 INJECTION, EMULSION INTRAVENOUS at 19:48

## 2025-04-11 RX ADMIN — HYDROMORPHONE HYDROCHLORIDE 0.4 MG: 0.2 INJECTION, SOLUTION INTRAMUSCULAR; INTRAVENOUS; SUBCUTANEOUS at 21:02

## 2025-04-11 RX ADMIN — PROPOFOL 150 MG: 10 INJECTION, EMULSION INTRAVENOUS at 14:01

## 2025-04-11 RX ADMIN — Medication 5 MG: at 03:38

## 2025-04-11 RX ADMIN — METHOCARBAMOL 1000 MG: 500 TABLET ORAL at 22:56

## 2025-04-11 RX ADMIN — Medication 5 MG: at 04:57

## 2025-04-11 RX ADMIN — PHENYLEPHRINE HYDROCHLORIDE 150 MCG: 10 INJECTION INTRAVENOUS at 18:17

## 2025-04-11 RX ADMIN — Medication 10 MG: at 12:00

## 2025-04-11 RX ADMIN — Medication 5 MG: at 02:39

## 2025-04-11 RX ADMIN — HYDROMORPHONE HYDROCHLORIDE 1 MG: 1 INJECTION, SOLUTION INTRAMUSCULAR; INTRAVENOUS; SUBCUTANEOUS at 07:52

## 2025-04-11 RX ADMIN — MAGNESIUM SULFATE HEPTAHYDRATE 1.4 G: 2 INJECTION, SOLUTION INTRAVENOUS at 15:22

## 2025-04-11 RX ADMIN — MAGNESIUM SULFATE HEPTAHYDRATE 0.2 G: 2 INJECTION, SOLUTION INTRAVENOUS at 14:52

## 2025-04-11 RX ADMIN — CLONAZEPAM 2 MG: 0.5 TABLET ORAL at 06:13

## 2025-04-11 RX ADMIN — SODIUM CHLORIDE: 0.9 INJECTION, SOLUTION INTRAVENOUS at 20:35

## 2025-04-11 RX ADMIN — SODIUM CHLORIDE, SODIUM LACTATE, POTASSIUM CHLORIDE, AND CALCIUM CHLORIDE: .6; .31; .03; .02 INJECTION, SOLUTION INTRAVENOUS at 15:58

## 2025-04-11 RX ADMIN — DEXAMETHASONE SODIUM PHOSPHATE 8 MG: 4 INJECTION, SOLUTION INTRA-ARTICULAR; INTRALESIONAL; INTRAMUSCULAR; INTRAVENOUS; SOFT TISSUE at 14:04

## 2025-04-11 RX ADMIN — ACETAMINOPHEN 975 MG: 325 TABLET, FILM COATED ORAL at 23:23

## 2025-04-11 RX ADMIN — Medication 5 MG: at 01:31

## 2025-04-11 RX ADMIN — HYDROMORPHONE HYDROCHLORIDE 0.4 MG: 0.2 INJECTION, SOLUTION INTRAMUSCULAR; INTRAVENOUS; SUBCUTANEOUS at 20:53

## 2025-04-11 RX ADMIN — Medication 10 MG: at 18:26

## 2025-04-11 RX ADMIN — MAGNESIUM SULFATE HEPTAHYDRATE 0.2 G: 2 INJECTION, SOLUTION INTRAVENOUS at 14:58

## 2025-04-11 RX ADMIN — DEXMEDETOMIDINE HYDROCHLORIDE 8 MCG: 100 INJECTION, SOLUTION INTRAVENOUS at 17:49

## 2025-04-11 RX ADMIN — MAGNESIUM SULFATE HEPTAHYDRATE 0.2 G: 2 INJECTION, SOLUTION INTRAVENOUS at 14:55

## 2025-04-11 RX ADMIN — Medication 2 G: at 19:12

## 2025-04-11 RX ADMIN — Medication 30 MG: at 15:17

## 2025-04-11 RX ADMIN — DEXMEDETOMIDINE HYDROCHLORIDE 8 MCG: 100 INJECTION, SOLUTION INTRAVENOUS at 19:49

## 2025-04-11 RX ADMIN — Medication 30 MG: at 14:01

## 2025-04-11 RX ADMIN — ONDANSETRON 4 MG: 2 INJECTION INTRAMUSCULAR; INTRAVENOUS at 19:18

## 2025-04-11 RX ADMIN — LIDOCAINE HYDROCHLORIDE 60 MG: 20 INJECTION, SOLUTION INFILTRATION; PERINEURAL at 14:01

## 2025-04-11 ASSESSMENT — ACTIVITIES OF DAILY LIVING (ADL)
ADLS_ACUITY_SCORE: 58
ADLS_ACUITY_SCORE: 62
ADLS_ACUITY_SCORE: 58
ADLS_ACUITY_SCORE: 62
ADLS_ACUITY_SCORE: 58
ADLS_ACUITY_SCORE: 62
ADLS_ACUITY_SCORE: 62
ADLS_ACUITY_SCORE: 63
ADLS_ACUITY_SCORE: 58
ADLS_ACUITY_SCORE: 62
ADLS_ACUITY_SCORE: 58
ADLS_ACUITY_SCORE: 62
ADLS_ACUITY_SCORE: 62
ADLS_ACUITY_SCORE: 58

## 2025-04-11 ASSESSMENT — ENCOUNTER SYMPTOMS
DYSRHYTHMIAS: 1
BACK PAIN: 1
WEAKNESS: 1

## 2025-04-11 ASSESSMENT — LIFESTYLE VARIABLES: TOBACCO_USE: 1

## 2025-04-11 NOTE — ED NOTES
Bed: ED08  Expected date: 4/11/25  Expected time:   Means of arrival:   Comments:  [place] vitale, Kym HARPER

## 2025-04-11 NOTE — INTERIM SUMMARY
NEUROSURGERY BRIEF NOTE    Patient presenting with worsening BLE numbness and weakness associated with intermittent urinary retention and worsening groin numbness from baseline.    Imaging demonstrates a large disc herniation associated with spondylolisthesis at L4-5.    Plan for OR today for L4-5 laminectomy and microdiscectomy with Dr. Mensah urgently. NPO for now. Preop orders placed. Admit to NSGY, 6a, with addiction medicine consultation.    Patient consented on paper. Consent is in the possession of this author.    Please page #[phone] if questions regarding the consent or further plans.    Arlen Argueta MD, PhD  PGY-3 Neurosurgery    Please contact neurosurgery resident on call with questions.    Dial * * [phone], enter [phone] when prompted.

## 2025-04-11 NOTE — PROGRESS NOTES
No air or ground transport at this time.  Air and [place] will continue to monitor weather and availability of ground rigs.

## 2025-04-11 NOTE — ED TRIAGE NOTES
Pt arrived via ems. Pt flown from [place] for neuro consult. Reported hr 40's in route. L4-6 compression fracture and bulging disk. Recg fent 50mcg and versed [id number]     Triage Assessment (Adult)       Row Name 04/11/25 1108          Triage Assessment    Airway WDL WDL        Respiratory WDL    Respiratory WDL WDL        Cardiac WDL    Cardiac WDL X     Cardiac Rhythm SB

## 2025-04-11 NOTE — ED NOTES
RUDOLPH adjusted to more appropriately reflect level of care. Pt receiving hourly sedating medications and requiring emergent transfer.     Emiliana Strickland RN

## 2025-04-11 NOTE — ED NOTES
Patient requiring hourly pain management.  Tolerates doses of ketamine 5mg well.  She requested her MD leeroy gave orders for it.  Patient resting comfortably at this time.  Awaiting EMS transport at this time.

## 2025-04-11 NOTE — ED PROVIDER NOTES
Transfer of care from previous shift provider:. L4-5 disc bulge with severe critical stenosis, urinary retention, bilateral posterior radiculopathy and concern for acute perineal anesthesia (although possibly chronic perineal symptoms). [facility] recommends urgent inpatient surgery. Transportation delays. On suboxone therefore using ketamine that is working well.    ED Course as of 04/11/25 0957   Thu Apr 10, 2025   1951 MR Lumbar Spine w/o Contrast   Fri Apr 11, 2025   0749 Last ketamine dose did nothing for her.      Assessment and Plan:  Final diagnoses:   Lumbar back pain with radiculopathy affecting lower extremity   Central stenosis of spinal canal - lumbar L4-L5   UTI (urinary tract infection)   Urinary retention      Given dilaudid for pain initially 1 mg, later 0.5 mg before EMS transfer. Disposition: [facility]     Jose Angel MD  04/11/25 0959

## 2025-04-11 NOTE — ANESTHESIA PREPROCEDURE EVALUATION
Anesthesia Pre-Procedure Evaluation    Patient: Kym Perea   MRN: [id number] : 1970        Procedure : Procedure(s):  Lumbar 4, 5 Laminectomy and Microdisectomy need microscope          Past Medical History:   Diagnosis Date    Acquired absence of other organs (CODE)     2011    Chronic pain syndrome     No Comments Provided    Complex regional pain syndrome I     left ankle    Encounter for other administrative examinations     10/2012,Hydrocodone 10/325mg, #240/mo signed 10/3/12, updated 10/1/ 14    Encounter for other administrative examinations     10/1/2014,MS Contin 30 mg q 8 hours #90 per month.  Morphine sulfate IR 15 mg 2-3 tab tid prn #180 per month    Gastro-esophageal reflux disease without esophagitis     No Comments Provided    Injury of left ankle     ,requiring surgery    Low back pain     No Comments Provided    Major depressive disorder, recurrent severe without psychotic features (H)     No Comments Provided    Migraine without status migrainosus, not intractable     No Comments Provided    Nicotine dependence, uncomplicated     No Comments Provided    Obesity     No Comments Provided    Overweight     2014    Pain in ankle     Chronic left ankle pain secondary to reflex sympathetic dystrophy.  Patient  has had 4 previous surgeries, the most recent one done by Dr. Noriega in  . On Narcotic contract.    Pain in thoracic spine     No Comments Provided    Panic disorder without agoraphobia     Dr Reynoso    Personal history of other diseases of the female genital tract     No Comments Provided    Personal history of other medical treatment (CODE)     G2, P1-0-1-1 with one vaginal delivery.    Supraventricular tachycardia     2010      Past Surgical History:   Procedure Laterality Date    ANKLE SURGERY      , ,Left ankle surgery x 2    CYSTECTOMY PILONIDAL N/A 2023    Procedure: Delayed closure of vulva and perineal defectes with drain placment;   Surgeon: Anthony Nolan MD;  Location: [facility] OR    HYSTERECTOMY TOTAL ABDOMINAL      11/1999,secondary to endometriosis, no malignancy; patient reports no malignancy, but abnormal cells were present *    IRRIGATION AND DEBRIDEMENT DECUBITUS WOUND, COMBINED N/A 6/1/2023    Procedure: IRRIGATION AND DEBRIDEMENT, PERINEAL, LABIAL and GLUTEAL INFECTION;  Surgeon: Anthony Nolan MD;  Location: [facility] OR    IRRIGATION AND DEBRIDEMENT DECUBITUS WOUND, COMBINED N/A 6/3/2023    Procedure: Debridement and washout of vulvar and perineal wounds;  Surgeon: Anthony Nolan MD;  Location:  OR    LAPAROSCOPIC CHOLECYSTECTOMY      No Comments Provided    LAPAROSCOPY DIAGNOSTIC (GENERAL)      1995    OTHER SURGICAL HISTORY      21299,CRANIO/MAXILLOFACIAL SURGERY,Jaw Surgery    OTHER SURGICAL HISTORY      207040,[facility] EMG,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  No other abnormalities noted.      Allergies   Allergen Reactions    Gabapentin Other (See Comments)     Memory issues    Diclofenac-Misoprostol GI Disturbance    Flexeril [Cyclobenzaprine] Other (See Comments)     Memory issues    Indomethacin      Other reaction(s): Dizziness    Meloxicam GI Disturbance    Moxifloxacin GI Disturbance      Social History     Tobacco Use    Smoking status: Every Day     Current packs/day: 0.50     Average packs/day: 0.5 packs/day for 38.4 years (19.2 ttl pk-yrs)     Types: Cigarettes, Vaping Device     Start date: 12/2/1986     Passive exposure: Current    Smokeless tobacco: Never    Tobacco comments:     trying   Substance Use Topics    Alcohol use: Not Currently     Comment: OCCASIONAL WINE      Wt Readings from Last 1 Encounters:   04/11/25 62.6 kg (138 lb)        Anesthesia Evaluation   Pt has had prior anesthetic.     No history of anesthetic complications       ROS/MED HX  ENT/Pulmonary:     (+)                tobacco use, Current use,                       Neurologic:     (+)    peripheral neuropathy,  migraines,                           Cardiovascular: Comment: Hx SVT    (+) Dyslipidemia - -   -  - -                        dysrhythmias, Other, Irregular Heartbeat/Palpitations,            METS/Exercise Tolerance: >4 METS    Hematologic:  - neg hematologic  ROS     Musculoskeletal: Comment: Complex regional pain syndrome   TMJ  (+)  arthritis,             GI/Hepatic:     (+) GERD, Asymptomatic on medication,                  Renal/Genitourinary:  - neg Renal ROS     Endo:  - neg endo ROS     Psychiatric/Substance Use: Comment: Long history of chronic opioid use, off opioids now  Panic disorder    (+) psychiatric history anxiety and depression   Recreational drug usage: Cannabis.    Infectious Disease: Comment: Open wound of vulva       Malignancy:  - neg malignancy ROS     Other:  - neg other ROS    (+)  , H/O Chronic Pain,         Physical Exam    Airway        Mallampati: II   TM distance: > 3 FB   Neck ROM: full   Mouth opening: > 3 cm    Respiratory Devices and Support         Dental     Comment: Most teeth missing    (+) Multiple visibly decayed, broken teeth and Removable bridges or other hardware      Cardiovascular             Pulmonary                   OUTSIDE LABS:  CBC:   Lab Results   Component Value Date    WBC 5.3 04/11/2025    WBC 6.1 04/10/2025    HGB 12.0 04/11/2025    HGB 11.7 04/10/2025    HCT 36.1 04/11/2025    HCT 34.4 (L) 04/10/2025     (L) 04/11/2025     (L) 04/10/2025     BMP:   Lab Results   Component Value Date     04/11/2025     04/10/2025    POTASSIUM 4.5 04/11/2025    POTASSIUM 4.7 04/10/2025    CHLORIDE 104 04/11/2025    CHLORIDE 103 04/10/2025    CO2 22 04/11/2025    CO2 25 04/10/2025    BUN 21.8 (H) 04/11/2025    BUN 23.4 (H) 04/10/2025    CR 0.86 04/11/2025    CR 0.81 04/10/2025    GLC 89 04/11/2025    GLC 93 04/10/2025     COAGS:   Lab Results   Component Value Date    PTT 22 04/11/2025    INR 0.94 04/11/2025     POC:   Lab Results   Component Value Date    HCG Negative  03/08/2024     HEPATIC:   Lab Results   Component Value Date    ALBUMIN 4.1 09/25/2024    PROTTOTAL 6.7 09/25/2024    ALT 12 09/25/2024    AST 16 09/25/2024    ALKPHOS 65 09/25/2024    BILITOTAL 0.2 09/25/2024    BILIDIRECT 0.06 01/11/2018    FREDERICK 11 10/19/2021     OTHER:   Lab Results   Component Value Date    PH 7.37 11/25/2014    LACT 0.8 03/08/2024    NORMA 8.7 (L) 04/11/2025    MAG 2.1 11/14/2024    LIPASE 16 03/08/2024    SED 8 09/25/2024       Anesthesia Plan    ASA Status:  3    NPO Status:  NPO Appropriate    Anesthesia Type: General.     - Airway: ETT   Induction: Intravenous, Propofol.   Maintenance: Balanced.   Techniques and Equipment:       - Drips/Meds: Ketamine, Dexmed. bolus     Consents    Anesthesia Plan(s) and associated risks, benefits, and realistic alternatives discussed. Questions answered and patient/representative(s) expressed understanding.     - Discussed:     - Discussed with:  Patient            Postoperative Care    Pain management: IV analgesics, Multi-modal analgesia.   PONV prophylaxis: Ondansetron (or other 5HT-3), Dexamethasone or Solumedrol     Comments:               Jamari Felder MD    Clinically Significant Risk Factors Present on Admission                 # Thrombocytopenia: Lowest platelets = 128 in last 2 days, will monitor for bleeding                 # Transportation: concerns for reliable transportation noted in nursing assessment

## 2025-04-11 NOTE — ED PROVIDER NOTES
[place] EMERGENCY DEPARTMENT ([facility])    4/11/25       ED PROVIDER NOTE    History     Chief Complaint   Patient presents with    Back Pain     HPI  Kym Perea is a 55 year old female with a past medical history of right lower extremity extending into perineal necrotizing infection about a year and a half ago, chronic pain disorder, hyperlipidemia, and anxiety who presents to the emergency department via EMS for back pain.     Per chart review, patient was seen at [facility] ED yesterday for lower back pain. Bladder scan post void notable for 371 ml of urine retention. MRI lumbar spine showed concentric disc bulge at L4-L5 with superimposed midline disc protrusion and high intensity zone/annular tear. Patient was transferred to [facility] for neurosurgery consult.    She reports that she initially had gotten ketamine for pain control and this did improve her symptoms but after they decreased the dose she no longer had significant relief.  Was getting fentanyl and Versed en route for pain control and anxiety control.  Endorses persistent pain in her back and weakness and numbness down the back of both of her legs down to the bottom of her feet.    MRI LUMBAR SPINE W/O CONTRAST 4/10/25  IMPRESSION:   1.  Multilevel degenerative changes of the lumbar spine, as above, with 6 mm of anterolisthesis at L4 on L5.  2.  At L4-L5, there is a concentric disc bulge with an apparent superimposed midline disc protrusion and associated high-intensity zone/annular tear which causes severe/critical central spinal canal stenosis (superimposed upon by facet arthropathy and ligamentum flavum thickening). At this level, there is mild to moderate left and moderate right neural foraminal stenosis. Neurosurgical consultation is recommended.  3.  Additional multilevel degenerative changes, as above.    Past Medical History  Past Medical History:   Diagnosis Date    Acquired absence of other organs (CODE)     9/2/2011     Chronic pain syndrome     No Comments Provided    Complex regional pain syndrome I     left ankle    Encounter for other administrative examinations     10/2012,Hydrocodone 10/325mg, #240/mo signed 10/3/12, updated 10/1/ 14    Encounter for other administrative examinations     10/1/2014,MS Contin 30 mg q 8 hours #90 per month.  Morphine sulfate IR 15 mg 2-3 tab tid prn #180 per month    Gastro-esophageal reflux disease without esophagitis     No Comments Provided    Injury of left ankle     1997,requiring surgery    Low back pain     No Comments Provided    Major depressive disorder, recurrent severe without psychotic features (H)     No Comments Provided    Migraine without status migrainosus, not intractable     No Comments Provided    Nicotine dependence, uncomplicated     No Comments Provided    Obesity     No Comments Provided    Overweight     11/25/2014    Pain in ankle     Chronic left ankle pain secondary to reflex sympathetic dystrophy.  Patient  has had 4 previous surgeries, the most recent one done by Dr. Noriega in  2007. On Narcotic contract.    Pain in thoracic spine     No Comments Provided    Panic disorder without agoraphobia     Dr Reynoso    Personal history of other diseases of the female genital tract     No Comments Provided    Personal history of other medical treatment (CODE)     G2, P1-0-1-1 with one vaginal delivery.    Supraventricular tachycardia     5/24/2010     Past Surgical History:   Procedure Laterality Date    ANKLE SURGERY      1997, 2000,Left ankle surgery x 2    CYSTECTOMY PILONIDAL N/A 6/19/2023    Procedure: Delayed closure of vulva and perineal defectes with drain placment;  Surgeon: Anthony Nolan MD;  Location: [facility] OR    HYSTERECTOMY TOTAL ABDOMINAL      11/1999,secondary to endometriosis, no malignancy; patient reports no malignancy, but abnormal cells were present *    IRRIGATION AND DEBRIDEMENT DECUBITUS WOUND, COMBINED N/A 6/1/2023    Procedure: IRRIGATION AND  DEBRIDEMENT, PERINEAL, LABIAL and GLUTEAL INFECTION;  Surgeon: Anthony Nolan MD;  Location: [facility] OR    IRRIGATION AND DEBRIDEMENT DECUBITUS WOUND, COMBINED N/A 6/3/2023    Procedure: Debridement and washout of vulvar and perineal wounds;  Surgeon: Anthony Nolan MD;  Location:  OR    LAPAROSCOPIC CHOLECYSTECTOMY      No Comments Provided    LAPAROSCOPY DIAGNOSTIC (GENERAL)      1995    OTHER SURGICAL HISTORY      21299,CRANIO/MAXILLOFACIAL SURGERY,Jaw Surgery    OTHER SURGICAL HISTORY      [id number],[facility] EMG,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  No other abnormalities noted.     acetaminophen (TYLENOL) 325 MG tablet  albuterol (PROAIR HFA/PROVENTIL HFA/VENTOLIN HFA) 108 (90 Base) MCG/ACT inhaler  amphetamine-dextroamphetamine (ADDERALL) 20 MG tablet  buprenorphine (SUBUTEX) 8 MG SUBL sublingual tablet  Cholecalciferol (VITAMIN D-3) 125 MCG (5000 UT) TABS  clonazePAM (KLONOPIN) 2 MG tablet  divalproex sodium delayed-release (DEPAKOTE) 250 MG DR tablet  Docusate Sodium (COLACE PO)  fluticasone (FLONASE) 50 MCG/ACT nasal spray  guanFACINE (INTUNIV) 1 MG TB24 24 hr tablet  ibuprofen (ADVIL/MOTRIN) 800 MG tablet  LORazepam (ATIVAN) 0.5 MG tablet  medical cannabis (Patient's own supply)  ondansetron (ZOFRAN ODT) 4 MG ODT tab  ondansetron (ZOFRAN) 4 MG tablet  tiZANidine (ZANAFLEX) 4 MG tablet      Allergies   Allergen Reactions    Gabapentin Other (See Comments)     Memory issues    Diclofenac-Misoprostol GI Disturbance    Flexeril [Cyclobenzaprine] Other (See Comments)     Memory issues    Indomethacin      Other reaction(s): Dizziness    Meloxicam GI Disturbance    Moxifloxacin GI Disturbance     Family History  Family History   Problem Relation Age of Onset    Cancer Father         Cancer,Bladder    Other - See Comments Father         Mild hypercholesterolemia    Other - See Comments Mother         scleroderma/lupus/Parkinson's syndrome    Family History Negative Brother         Good Health     Ariel-Danlos syndrome Daughter     Colon Cancer Maternal Grandmother 40        Cancer-colon,Followed by lung  with metastasis to the bone di*     Social History   Social History     Tobacco Use    Smoking status: Every Day     Current packs/day: 0.50     Average packs/day: 0.5 packs/day for 38.4 years (19.2 ttl pk-yrs)     Types: Cigarettes, Vaping Device     Start date: 12/2/1986     Passive exposure: Current    Smokeless tobacco: Never    Tobacco comments:     trying   Vaping Use    Vaping status: Some Days    Substances: Nicotine, Flavoring    Devices: Disposable   Substance Use Topics    Alcohol use: Not Currently     Comment: OCCASIONAL WINE    Drug use: Yes     Types: Marijuana     Comment: medical cannabis; suboxone      A medically appropriate review of systems was performed with pertinent positives and negatives noted in the HPI, and all other systems negative.    Physical Exam   BP: 124/75  Pulse: 57  Temp: 98.3  F (36.8  C)  Resp: 16  Weight: 62.6 kg (138 lb)  SpO2: 99 %  Physical Exam  GEN: Uncomfortable, writhing in bed  HEENT: normocephalic and atraumatic, PERRLA, EOMI  CV: well-perfused, normal skin color for ethnicity, sinus bradycardia  PULM: breathing comfortably, in no respiratory distress  ABD: nondistended  EXT: Pain elicited on palpation of her posterior and lower back and buttocks, posterior legs given a gentle touch  NEURO: awake, conversant, grossly normal bilateral upper extremity strength & ROM; 5 out of 5 bilateral lower extremity strength with the exception of dorsiflexion of her left ankle and great toe; sensation intact  SKIN: No rashes, ecchymosis, or lacerations  PSYCH: Calm and cooperative, interactive      ED Course, Procedures, & Data      Procedures          Results for orders placed or performed during the hospital encounter of 04/10/25   MR Lumbar Spine w/o Contrast     Status: None    Narrative    EXAM: MR LUMBAR SPINE W/O CONTRAST  LOCATION: [facility]  DATE: 4/10/2025    INDICATION: Low back pain, bilateral sciatica.  COMPARISON: CT abdomen pelvis dated 06/07/2024.  TECHNIQUE: Routine Lumbar Spine MRI without IV contrast.    FINDINGS:   Nomenclature is based on 5 lumbar type vertebral bodies. Normal vertebral body heights. Mildly heterogeneous marrow signal pattern. No suspicious marrow edema. At L4 on L5, there is approximately 6 mm of anterolisthesis. Normal distal spinal cord. The   conus terminates at L1-L2. No acute extraspinal abnormality. Unremarkable visualized bony pelvis.    T12-L1: Small degenerative Schmorl's node involving the inferior endplate of T12. Trace concentric disc bulge, eccentric to the left posteriorly. Minimal facet arthropathy. Trace fluid in the left facet joint. No significant canal stenosis. No left or   right neural foraminal stenosis.     L1-L2: Mild disc desiccation. Minimal disc height loss. Mild facet arthropathy. Trace concentric disc bulge. No significant canal stenosis. No neural foraminal stenosis.    L2-L3: Disc desiccation. Mild disc height loss. Bilateral facet arthropathy. Mild ligamentum flavum thickening. Trace fluid in the facet joints. Trace concentric disc bulge. No significant canal stenosis. No left neural foraminal stenosis. No right   neural foraminal stenosis.     L3-L4: Normal disc height. Normal disc signal. Bilateral facet arthropathy. Mild ligamentum flavum thickening. Trace fluid in the facet joints. Trace concentric disc bulge. Minimal canal stenosis. No significant left neural foraminal stenosis. No right   neural foraminal stenosis.    L4-L5: Disc desiccation. Disc height loss. Uncovering of the disc. Advanced bilateral facet arthropathy and ligamentum flavum thickening. There is fluid involving the bilateral facet joints, left worse than right. Concentric disc bulge with an apparent   superimposed midline disc protrusion and an associated high-intensity zone/annular tear (image 9 series 18 and  image 36 series 19) which causes severe/critical central spinal canal stenosis (superimposed upon by facet arthropathy and ligamentum flavum   thickening. There is mild to moderate left and moderate right neural foraminal stenosis.    L5-S1: Disc desiccation. Disc height loss. Bilateral facet arthropathy. Trace concentric disc bulge. There is a small high-intensity zone/annular tear along the left foraminal component of the disc. No significant canal stenosis. No significant left   neural foraminal stenosis. No significant right neural foraminal stenosis.      Impression    IMPRESSION:  1.  Multilevel degenerative changes of the lumbar spine, as above, with 6 mm of anterolisthesis at L4 on L5.  2.  At L4-L5, there is a concentric disc bulge with an apparent superimposed midline disc protrusion and associated high-intensity zone/annular tear which causes severe/critical central spinal canal stenosis (superimposed upon by facet arthropathy and   ligamentum flavum thickening). At this level, there is mild to moderate left and moderate right neural foraminal stenosis. Neurosurgical consultation is recommended.  3.  Additional multilevel degenerative changes, as above.     Basic metabolic panel     Status: Abnormal   Result Value Ref Range    Sodium 135 135 - 145 mmol/L    Potassium 4.7 3.4 - 5.3 mmol/L    Chloride 103 98 - 107 mmol/L    Carbon Dioxide (CO2) 25 22 - 29 mmol/L    Anion Gap 7 7 - 15 mmol/L    Urea Nitrogen 23.4 (H) 6.0 - 20.0 mg/dL    Creatinine 0.81 0.51 - 0.95 mg/dL    GFR Estimate 86 >60 mL/min/1.73m2    Calcium 9.0 8.8 - 10.4 mg/dL    Glucose 93 70 - 99 mg/dL   UA with Microscopic reflex to Culture     Status: Abnormal    Specimen: Urine, Midstream   Result Value Ref Range    Color Urine Light Yellow Colorless, Straw, Light Yellow, Yellow    Appearance Urine Clear Clear    Glucose Urine Negative Negative mg/dL    Bilirubin Urine Negative Negative    Ketones Urine Negative Negative mg/dL    Specific  Gravity Urine 1.014 1.000 - 1.030    Blood Urine Negative Negative    pH Urine 6.0 5.0 - 9.0    Protein Albumin Urine Negative Negative mg/dL    Urobilinogen Urine Normal Normal mg/dL    Nitrite Urine Negative Negative    Leukocyte Esterase Urine Moderate (A) Negative    Bacteria Urine Few (A) None Seen /HPF    RBC Urine 1 <=2 /HPF    WBC Urine 24 (H) <=5 /HPF    Squamous Epithelials Urine 1 <=1 /HPF    Hyaline Casts Urine 1 <=2 /LPF    Narrative    Urine Culture ordered based on laboratory criteria   CBC with platelets and differential     Status: Abnormal   Result Value Ref Range    WBC Count 6.1 4.0 - 11.0 10e3/uL    RBC Count 3.71 (L) 3.80 - 5.20 10e6/uL    Hemoglobin 11.7 11.7 - 15.7 g/dL    Hematocrit 34.4 (L) 35.0 - 47.0 %    MCV 93 78 - 100 fL    MCH 31.5 26.5 - 33.0 pg    MCHC 34.0 31.5 - 36.5 g/dL    RDW 12.7 10.0 - 15.0 %    Platelet Count 134 (L) 150 - 450 10e3/uL    % Neutrophils 60 %    % Lymphocytes 32 %    % Monocytes 5 %    % Eosinophils 2 %    % Basophils 1 %    % Immature Granulocytes 0 %    NRBCs per 100 WBC 0 <1 /100    Absolute Neutrophils 3.7 1.6 - 8.3 10e3/uL    Absolute Lymphocytes 1.9 0.8 - 5.3 10e3/uL    Absolute Monocytes 0.3 0.0 - 1.3 10e3/uL    Absolute Eosinophils 0.1 0.0 - 0.7 10e3/uL    Absolute Basophils 0.0 0.0 - 0.2 10e3/uL    Absolute Immature Granulocytes 0.0 <=0.4 10e3/uL    Absolute NRBCs 0.0 10e3/uL   Extra Tube     Status: None    Narrative    The following orders were created for panel order Extra Tube.  Procedure                               Abnormality         Status                     ---------                               -----------         ------                     Extra Blue Top Tube[[id number]]                             Final result               Extra Red Top Tube[[id number]]                              Final result               Extra Green Top (Lithiu...[[id number]]                      Final result                 Please view results for these tests on  the individual orders.   Extra Blue Top Tube     Status: None   Result Value Ref Range    Hold Specimen JIC    Extra Red Top Tube     Status: None   Result Value Ref Range    Hold Specimen hold    Extra Green Top (Lithium Heparin) Tube     Status: None   Result Value Ref Range    Hold Specimen JIC    CBC with platelets differential     Status: Abnormal    Narrative    The following orders were created for panel order CBC with platelets differential.  Procedure                               Abnormality         Status                     ---------                               -----------         ------                     CBC with platelets and ...[[id number]]  Abnormal            Final result                 Please view results for these tests on the individual orders.     Medications   ketamine (KETALAR) injection 10 mg (has no administration in time range)     Labs Ordered and Resulted from Time of ED Arrival to Time of ED Departure - No data to display  No orders to display          Critical care was not performed.     Medical Decision Making  The patient's presentation was of high complexity (an acute health issue posing potential threat to life or bodily function).    The patient's evaluation involved:  review of external note(s) from 3+ sources (see separate area of note for details)  review of 3+ test result(s) ordered prior to this encounter (see separate area of note for details)  discussion of management or test interpretation with another health professional (see separate area of note for details)    The patient's management necessitated high risk (a parenteral controlled substance) and high risk (a decision regarding hospitalization).    Assessment & Plan    55-year old female with a history of opiate use disorder, chronic pain disorder, Julio's Gangrene, presenting to [facility] ED in the setting of persistent worsening back pain found to have elevated post urine void residual and MRI demonstrating  L4-L5 disc bulge and significant spinal stenosis transferred for evaluation by neurosurgery.    Here, she does have some very slight weakness in her left foot, and possible baseline perineal anesthesia.  She was evaluated by neurosurgery shortly after arrival, they will plan to admit to their service and plan for operative intervention this afternoon.  Will plan to give her pain based dosing of ketamine again today as 10 mg of ketamine did help her with her pain and lower doses did not significantly improve it.    I have reviewed the nursing notes. I have reviewed the findings, diagnosis, plan and need for follow up with the patient.    New Prescriptions    No medications on file       Final diagnoses:   Acute back pain, unspecified back location, unspecified back pain laterality   Central stenosis of spinal canal       Debbie Mederos MD   [facility] EMERGENCY DEPARTMENT  4/11/2025     Debbie Mederos MD  04/11/25 1144

## 2025-04-11 NOTE — INTERIM SUMMARY
Brief Addiction Medicine Note    55 year old female with past medical history of right lower extremity extending into perineal necrotizing infection about a year and a half ago, chronic pain disorder, hyperlipidemia, and anxiety who presented to the emergency department via EMS 2 days ago for back and bilateral leg pain significantly worsened from her baseline,  MRI imaging consistent with disc protrusion associated with spondylolisthesis at L4-5 causing critical central canal stenosis. Addiction medicine team consulted for assistance with post-operative pain management given patient is on Suboxone prior to admission.     Patient was not able to be seen yet, currently in the OR. In the interim below are our recommendations concerning suboxone post procedure.     Recommendations  - Reduce PTA Suboxone to 4mg BID. (Takes PTA Suboxone 8 mg TID) this dose will allow for the utilization of opiate agonists and prevent her from having to be re-induced back onto suboxone  - Given she was on 24 mg of suboxone prior to this procedure she will likely need higher doses than normal of opiate agonsits. Consider starting with Oxycodone 10 mg q4hr PRN or dilaudid 4 mg with IV Dilaudid 0.5 mg q2h PRN for breakthrough pain and increasing as needed  - If pain not controlled on above regimen, consider starting patient on a ketamine infusion or increasing her opiate dose. At that point would recommend a pain team consult.   - We will see her after her procedure to discuss suboxone dosing    Alex Ponce DO on 4/11/2025 at 1:18 PM

## 2025-04-11 NOTE — H&P
[facility]       H&P NOTE    HPI: Kym Perea is a 55 year old female who was seen for concern cauda equina syndrome.She has had lower back pain for the past 20 years with intermittent sciatica. She has past medical history of right lower extremity extending into perineal necrotizing infection about a year and a half ago, chronic pain disorder, hyperlipidemia, and anxiety who presented to the emergency department via EMS 2 days ago for back and bilateral leg pain significantly worsened from her baseline.     Today she presents to the ED with a 2 day history of worsening shooting pains in her lower back radiating to both thighs posteriorly into the bottoms of her feet, as well as worsening numbness on both sides but most notably along the right leg posteriorly. She also reports worsening lower back pain predominantly on the left side. In addition, the patient has lost the ability to walk due to both pain and a subjective weakness predominantly on the right side and inability to dorsiflex her left ankle, although this is mildly impaired at baseline. Kym also reports intermittent urinary retention over the last couple of days as well. No changes in bowel symptoms. She does have chronic groin numbness from a prior necrotizing infection of the groin and associated surgeries, but feels this has worsened on the right side over the last few days.    Surgical history included - She underwent surgery for her necrotizing fascitis which left her with numbness in the groin and genital area. She has also had surgery on her left ankle, including a procedure involving manipulation and disruption of the sural nerve. Kym still has some pain in the ankle and foot region on the left as a result.    PAST MEDICAL HISTORY:   Past Medical History:   Diagnosis Date    Acquired absence of other organs (CODE)     9/2/2011    Chronic pain syndrome     No Comments Provided     Complex regional pain syndrome I     left ankle    Encounter for other administrative examinations     10/2012,Hydrocodone 10/325mg, #240/mo signed 10/3/12, updated 10/1/ 14    Encounter for other administrative examinations     10/1/2014,MS Contin 30 mg q 8 hours #90 per month.  Morphine sulfate IR 15 mg 2-3 tab tid prn #180 per month    Gastro-esophageal reflux disease without esophagitis     No Comments Provided    Injury of left ankle     1997,requiring surgery    Low back pain     No Comments Provided    Major depressive disorder, recurrent severe without psychotic features (H)     No Comments Provided    Migraine without status migrainosus, not intractable     No Comments Provided    Nicotine dependence, uncomplicated     No Comments Provided    Obesity     No Comments Provided    Overweight     11/25/2014    Pain in ankle     Chronic left ankle pain secondary to reflex sympathetic dystrophy.  Patient  has had 4 previous surgeries, the most recent one done by Dr. Noriega in  2007. On Narcotic contract.    Pain in thoracic spine     No Comments Provided    Panic disorder without agoraphobia     Dr Reynoso    Personal history of other diseases of the female genital tract     No Comments Provided    Personal history of other medical treatment (CODE)     G2, P1-0-1-1 with one vaginal delivery.    Supraventricular tachycardia     5/24/2010       PAST SURGICAL HISTORY:   Past Surgical History:   Procedure Laterality Date    ANKLE SURGERY      1997, 2000,Left ankle surgery x 2    CYSTECTOMY PILONIDAL N/A 6/19/2023    Procedure: Delayed closure of vulva and perineal defectes with drain placment;  Surgeon: Anthony Nolan MD;  Location: [facility] OR    HYSTERECTOMY TOTAL ABDOMINAL      11/1999,secondary to endometriosis, no malignancy; patient reports no malignancy, but abnormal cells were present *    IRRIGATION AND DEBRIDEMENT DECUBITUS WOUND, COMBINED N/A 6/1/2023    Procedure: IRRIGATION AND DEBRIDEMENT, PERINEAL,  LABIAL and GLUTEAL INFECTION;  Surgeon: Anthnoy Nolan MD;  Location: [facility] OR    IRRIGATION AND DEBRIDEMENT DECUBITUS WOUND, COMBINED N/A 6/3/2023    Procedure: Debridement and washout of vulvar and perineal wounds;  Surgeon: Anthony Nolan MD;  Location: [facility] OR    LAPAROSCOPIC CHOLECYSTECTOMY      No Comments Provided    LAPAROSCOPY DIAGNOSTIC (GENERAL)      1995    OTHER SURGICAL HISTORY      21299,CRANIO/MAXILLOFACIAL SURGERY,Jaw Surgery    OTHER SURGICAL HISTORY      207040,[facility] EMG,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  No other abnormalities noted.       FAMILY HISTORY:   Family History   Problem Relation Age of Onset    Cancer Father         Cancer,Bladder    Other - See Comments Father         Mild hypercholesterolemia    Other - See Comments Mother         scleroderma/lupus/Parkinson's syndrome    Family History Negative Brother         Good Health    Ariel-Danlos syndrome Daughter     Colon Cancer Maternal Grandmother 40        Cancer-colon,Followed by lung  with metastasis to the bone di*       SOCIAL HISTORY:   Social History     Tobacco Use    Smoking status: Every Day     Current packs/day: 0.50     Average packs/day: 0.5 packs/day for 38.4 years (19.2 ttl pk-yrs)     Types: Cigarettes, Vaping Device     Start date: 12/2/1986     Passive exposure: Current    Smokeless tobacco: Never    Tobacco comments:     trying   Substance Use Topics    Alcohol use: Not Currently     Comment: OCCASIONAL WINE     MEDICATIONS:    Current Outpatient Medications   Medication Instructions    acetaminophen (TYLENOL) 650 mg, Oral, EVERY 4 HOURS PRN    albuterol (PROAIR HFA/PROVENTIL HFA/VENTOLIN HFA) 108 (90 Base) MCG/ACT inhaler 2 puffs, Inhalation, EVERY 4 HOURS PRN    amphetamine-dextroamphetamine (ADDERALL) 20 MG tablet 20 mg, Oral, 3 TIMES DAILY    buprenorphine (SUBUTEX) 8 mg, Sublingual, 3 TIMES DAILY    Cholecalciferol (VITAMIN D-3) 125 MCG (5000 UT) TABS 1 tablet, Oral, DAILY    clonazePAM  (KLONOPIN) 2 mg, Oral, 2 TIMES DAILY    divalproex sodium delayed-release (DEPAKOTE) 250 MG DR tablet 1 tablet, 3 TIMES DAILY (Diuretics and Nitrates)    Docusate Sodium (COLACE PO) PRN    fluticasone (FLONASE) 50 MCG/ACT nasal spray 2 sprays, Both Nostrils, DAILY PRN    guanFACINE (INTUNIV) 1 mg, AT BEDTIME    ibuprofen (ADVIL/MOTRIN) 800 mg, Oral, EVERY 8 HOURS PRN    LORazepam (ATIVAN) 0.5 mg, Oral, EVERY 6 HOURS PRN    medical cannabis (Patient's own supply) SEE ADMIN INSTRUCTIONS    ondansetron (ZOFRAN ODT) 4 mg, Oral, EVERY 6 HOURS PRN    ondansetron (ZOFRAN) 4 mg, Oral, EVERY 8 HOURS PRN    tiZANidine (ZANAFLEX) 4 mg, Oral, 3 TIMES DAILY PRN     Allergies:  Allergies   Allergen Reactions    Gabapentin Other (See Comments)     Memory issues    Diclofenac-Misoprostol GI Disturbance    Flexeril [Cyclobenzaprine] Other (See Comments)     Memory issues    Indomethacin      Other reaction(s): Dizziness    Meloxicam GI Disturbance    Moxifloxacin GI Disturbance     Physical exam:   Blood pressure 124/75, pulse 57, temperature 98.3  F (36.8  C), temperature source Oral, resp. rate 16, weight 62.6 kg (138 lb), last menstrual period 11/01/1999, SpO2 99%, not currently breastfeeding.  CV: extremities warm, well-perfused  PULM: breathing comfortably on room air  ABD: soft, non-distended  NEUROLOGIC:  -- Awake; Alert; oriented x 3  -- Exam limited by severe pain  -- Follows commands briskly  -- Speech fluent, spontaneous. No aphasia or dysarthria.  -- no gaze preference. No apparent hemineglect.  -- Intact rectal tone     Motor:  Normal bulk / tone; no tremor, rigidity, or bradykinesia.  No muscle wasting or fasciculations  No Pronator Drift  Motor exam of the lower extremities heavily limited by pain.     Delt Bi Tri Hand Flexion/  Extension Iliopsoas Quadriceps Hamstrings Tibialis Anterior Gastroc    C5 C6 C7 C8/T1 L2 L3 L4-S1 L4 S1   R 5 5 5 5 4+ 4+ 4+ 4+ 4+   L 5 5 5 5 4+ 4+ 4+ 4- 4+   *pain-limited weakness with  exception of left TA weakness    Sensory:  decreased sensation on the right leg as compared to the left one, primarily posteriorly (S1-S2 dermatome). Decreased sensation in the groin and genital area, right worse than left (s/p necrotizing fascitis surgery).    LABS:  Recent Labs   Lab 04/10/25  1435      POTASSIUM 4.7   CHLORIDE 103   CO2 25   ANIONGAP 7   GLC 93   BUN 23.4*   CR 0.81   NORMA 9.0       Recent Labs   Lab 04/10/25  1435   WBC 6.1   RBC 3.71*   HGB 11.7   HCT 34.4*   MCV 93   MCH 31.5   MCHC 34.0   RDW 12.7   *         IMAGING:  Recent Results (from the past 24 hours)   MR Lumbar Spine w/o Contrast    Narrative    EXAM: MR LUMBAR SPINE W/O CONTRAST  LOCATION: [facility]  DATE: 4/10/2025    INDICATION: Low back pain, bilateral sciatica.  COMPARISON: CT abdomen pelvis dated 06/07/2024.  TECHNIQUE: Routine Lumbar Spine MRI without IV contrast.    FINDINGS:   Nomenclature is based on 5 lumbar type vertebral bodies. Normal vertebral body heights. Mildly heterogeneous marrow signal pattern. No suspicious marrow edema. At L4 on L5, there is approximately 6 mm of anterolisthesis. Normal distal spinal cord. The   conus terminates at L1-L2. No acute extraspinal abnormality. Unremarkable visualized bony pelvis.    T12-L1: Small degenerative Schmorl's node involving the inferior endplate of T12. Trace concentric disc bulge, eccentric to the left posteriorly. Minimal facet arthropathy. Trace fluid in the left facet joint. No significant canal stenosis. No left or   right neural foraminal stenosis.     L1-L2: Mild disc desiccation. Minimal disc height loss. Mild facet arthropathy. Trace concentric disc bulge. No significant canal stenosis. No neural foraminal stenosis.    L2-L3: Disc desiccation. Mild disc height loss. Bilateral facet arthropathy. Mild ligamentum flavum thickening. Trace fluid in the facet joints. Trace concentric disc bulge. No significant canal stenosis. No left  neural foraminal stenosis. No right   neural foraminal stenosis.     L3-L4: Normal disc height. Normal disc signal. Bilateral facet arthropathy. Mild ligamentum flavum thickening. Trace fluid in the facet joints. Trace concentric disc bulge. Minimal canal stenosis. No significant left neural foraminal stenosis. No right   neural foraminal stenosis.    L4-L5: Disc desiccation. Disc height loss. Uncovering of the disc. Advanced bilateral facet arthropathy and ligamentum flavum thickening. There is fluid involving the bilateral facet joints, left worse than right. Concentric disc bulge with an apparent   superimposed midline disc protrusion and an associated high-intensity zone/annular tear (image 9 series 18 and image 36 series 19) which causes severe/critical central spinal canal stenosis (superimposed upon by facet arthropathy and ligamentum flavum   thickening. There is mild to moderate left and moderate right neural foraminal stenosis.    L5-S1: Disc desiccation. Disc height loss. Bilateral facet arthropathy. Trace concentric disc bulge. There is a small high-intensity zone/annular tear along the left foraminal component of the disc. No significant canal stenosis. No significant left   neural foraminal stenosis. No significant right neural foraminal stenosis.      Impression    IMPRESSION:  1.  Multilevel degenerative changes of the lumbar spine, as above, with 6 mm of anterolisthesis at L4 on L5.  2.  At L4-L5, there is a concentric disc bulge with an apparent superimposed midline disc protrusion and associated high-intensity zone/annular tear which causes severe/critical central spinal canal stenosis (superimposed upon by facet arthropathy and   ligamentum flavum thickening). At this level, there is mild to moderate left and moderate right neural foraminal stenosis. Neurosurgical consultation is recommended.  3.  Additional multilevel degenerative changes, as above.         ASSESSMENT:  Kym Perea is a  55 year old female presenting with worsening pain in her lower back shooting down her legs bilaterally and numbness along the right thigh posteriorly associated with worsening groin numbness and urinary retention. MRI imaging consistent with disc protrusion associated with spondylolisthesis at L4-5 causing critical central canal stenosis, which correlates with her clinical picture.     RECOMMENDATIONS:  Plan for OR today for L4-5 laminectomy and microdiscectomy with Dr. Mensah urgently. NPO for now. Preop orders placed. Admit to NSGY, 6a, with addiction medicine consultation.   Patient consented on paper.     The patient was discussed with Dr. Velazquez, neurosurgery chief resident, and Dr. Mensah, neurosurgery staff, and they agree with the above.    Kade Givens, Medical Student  Neurosurgery Subintern    I, Arlen Argueta MD, PhD saw the patient with the medical student and have edited the note to reflect our combined findings. I agree with the history, exam, assessment, and plan as outlined above and have discussed this patient with the fellow and staff on service.    Arlen Argueta MD, PhD  PGY-3 Neurosurgery    Please contact neurosurgery resident on call with questions.    Dial * * [phone], enter [phone] when prompted.

## 2025-04-11 NOTE — ANESTHESIA PROCEDURE NOTES
Airway       Patient location during procedure: OR       Procedure Start/Stop Times: 4/11/2025 2:06 PM  Staff -        Other Anesthesia Staff: Petra Fonseca       Performed By: SRNAIndications and Patient Condition       Indications for airway management: roger-procedural         Mask difficulty assessment: 2 - vent by mask + OA or adjuvant +/- NMBA    Final Airway Details       Final airway type: endotracheal airway       Successful airway: ETT - single  Endotracheal Airway Details        ETT size (mm): 7.0       Cuffed: yes       Cuff volume (mL): 8       Successful intubation technique: direct laryngoscopy       DL Blade Type: [place] 2       Grade View of Cords: 1       Adjucts: stylet       Position: Right       Measured from: gums/teeth       Secured at (cm): 21       Bite block used: None    Post intubation assessment        Placement verified by: capnometry, equal breath sounds and chest rise        Number of attempts at approach: 1       Secured with: tape       Ease of procedure: easy       Dentition: Intact    Medication(s) Administered   Medication Administration Time: 4/11/2025 2:06 PM

## 2025-04-11 NOTE — PROGRESS NOTES
Writer called [facility] to inquire about the call back from neurosurg that was made a few hours ago.  A second request was put in.

## 2025-04-12 ENCOUNTER — APPOINTMENT (OUTPATIENT)
Dept: PHYSICAL THERAPY | Facility: CLINIC | Age: 55
DRG: 518 | End: 2025-04-12
Payer: COMMERCIAL

## 2025-04-12 PROBLEM — M48.00 CENTRAL STENOSIS OF SPINAL CANAL: Status: ACTIVE | Noted: 2025-04-12

## 2025-04-12 LAB
ANION GAP SERPL CALCULATED.3IONS-SCNC: 7 MMOL/L (ref 7–15)
BUN SERPL-MCNC: 13.4 MG/DL (ref 6–20)
CALCIUM SERPL-MCNC: 8.4 MG/DL (ref 8.8–10.4)
CHLORIDE SERPL-SCNC: 105 MMOL/L (ref 98–107)
CREAT SERPL-MCNC: 0.81 MG/DL (ref 0.51–0.95)
EGFRCR SERPLBLD CKD-EPI 2021: 86 ML/MIN/1.73M2
ERYTHROCYTE [DISTWIDTH] IN BLOOD BY AUTOMATED COUNT: 12.6 % (ref 10–15)
GLUCOSE BLDC GLUCOMTR-MCNC: 108 MG/DL (ref 70–99)
GLUCOSE SERPL-MCNC: 103 MG/DL (ref 70–99)
HCO3 SERPL-SCNC: 22 MMOL/L (ref 22–29)
HCT VFR BLD AUTO: 29.2 % (ref 35–47)
HGB BLD-MCNC: 9.9 G/DL (ref 11.7–15.7)
MAGNESIUM SERPL-MCNC: 1.8 MG/DL (ref 1.7–2.3)
MCH RBC QN AUTO: 30.6 PG (ref 26.5–33)
MCHC RBC AUTO-ENTMCNC: 33.9 G/DL (ref 31.5–36.5)
MCV RBC AUTO: 90 FL (ref 78–100)
PHOSPHATE SERPL-MCNC: 3.8 MG/DL (ref 2.5–4.5)
PLATELET # BLD AUTO: 131 10E3/UL (ref 150–450)
POTASSIUM SERPL-SCNC: 4.5 MMOL/L (ref 3.4–5.3)
RBC # BLD AUTO: 3.24 10E6/UL (ref 3.8–5.2)
SODIUM SERPL-SCNC: 134 MMOL/L (ref 135–145)
WBC # BLD AUTO: 9.8 10E3/UL (ref 4–11)

## 2025-04-12 PROCEDURE — 250N000013 HC RX MED GY IP 250 OP 250 PS 637

## 2025-04-12 PROCEDURE — 250N000012 HC RX MED GY IP 250 OP 636 PS 637

## 2025-04-12 PROCEDURE — 99418 PROLNG IP/OBS E/M EA 15 MIN: CPT | Performed by: STUDENT IN AN ORGANIZED HEALTH CARE EDUCATION/TRAINING PROGRAM

## 2025-04-12 PROCEDURE — 84100 ASSAY OF PHOSPHORUS: CPT

## 2025-04-12 PROCEDURE — 99223 1ST HOSP IP/OBS HIGH 75: CPT | Performed by: STUDENT IN AN ORGANIZED HEALTH CARE EDUCATION/TRAINING PROGRAM

## 2025-04-12 PROCEDURE — 999N000128 HC STATISTIC PERIPHERAL IV START W/O US GUIDANCE

## 2025-04-12 PROCEDURE — 97161 PT EVAL LOW COMPLEX 20 MIN: CPT | Mod: GP | Performed by: PHYSICAL THERAPIST

## 2025-04-12 PROCEDURE — 97530 THERAPEUTIC ACTIVITIES: CPT | Mod: GP | Performed by: PHYSICAL THERAPIST

## 2025-04-12 PROCEDURE — 120N000002 HC R&B MED SURG/OB UMMC

## 2025-04-12 PROCEDURE — 258N000003 HC RX IP 258 OP 636

## 2025-04-12 PROCEDURE — 96376 TX/PRO/DX INJ SAME DRUG ADON: CPT | Performed by: STUDENT IN AN ORGANIZED HEALTH CARE EDUCATION/TRAINING PROGRAM

## 2025-04-12 PROCEDURE — 83735 ASSAY OF MAGNESIUM: CPT

## 2025-04-12 PROCEDURE — 999N000248 HC STATISTIC IV INSERT WITH US BY RN

## 2025-04-12 PROCEDURE — 250N000011 HC RX IP 250 OP 636

## 2025-04-12 PROCEDURE — 250N000009 HC RX 250

## 2025-04-12 PROCEDURE — 80048 BASIC METABOLIC PNL TOTAL CA: CPT

## 2025-04-12 PROCEDURE — 97116 GAIT TRAINING THERAPY: CPT | Mod: GP | Performed by: PHYSICAL THERAPIST

## 2025-04-12 PROCEDURE — 99222 1ST HOSP IP/OBS MODERATE 55: CPT | Performed by: ANESTHESIOLOGY

## 2025-04-12 PROCEDURE — 36415 COLL VENOUS BLD VENIPUNCTURE: CPT

## 2025-04-12 PROCEDURE — 82310 ASSAY OF CALCIUM: CPT

## 2025-04-12 PROCEDURE — 85014 HEMATOCRIT: CPT

## 2025-04-12 PROCEDURE — 96375 TX/PRO/DX INJ NEW DRUG ADDON: CPT | Performed by: STUDENT IN AN ORGANIZED HEALTH CARE EDUCATION/TRAINING PROGRAM

## 2025-04-12 RX ORDER — GUANFACINE 2 MG/1
2 TABLET, EXTENDED RELEASE ORAL AT BEDTIME
COMMUNITY

## 2025-04-12 RX ORDER — MAGNESIUM OXIDE 400 MG/1
400 TABLET ORAL EVERY 4 HOURS
Status: COMPLETED | OUTPATIENT
Start: 2025-04-12 | End: 2025-04-12

## 2025-04-12 RX ORDER — PHENOL 1.4 %
10 AEROSOL, SPRAY (ML) MUCOUS MEMBRANE
COMMUNITY

## 2025-04-12 RX ORDER — DIVALPROEX SODIUM 500 MG/1
500 TABLET, DELAYED RELEASE ORAL 2 TIMES DAILY
COMMUNITY

## 2025-04-12 RX ORDER — HYDROXYZINE PAMOATE 50 MG/1
50 CAPSULE ORAL 2 TIMES DAILY PRN
COMMUNITY
Start: 2025-03-20

## 2025-04-12 RX ORDER — SIMETHICONE 125 MG
1 CAPSULE ORAL DAILY PRN
COMMUNITY

## 2025-04-12 RX ORDER — DEXTROAMPHETAMINE SACCHARATE, AMPHETAMINE ASPARTATE, DEXTROAMPHETAMINE SULFATE AND AMPHETAMINE SULFATE 7.5; 7.5; 7.5; 7.5 MG/1; MG/1; MG/1; MG/1
1 TABLET ORAL
COMMUNITY
Start: 2025-03-24

## 2025-04-12 RX ORDER — TIZANIDINE 2 MG/1
2 TABLET ORAL EVERY 6 HOURS
Status: DISCONTINUED | OUTPATIENT
Start: 2025-04-12 | End: 2025-04-16 | Stop reason: HOSPADM

## 2025-04-12 RX ORDER — DULOXETIN HYDROCHLORIDE 20 MG/1
20 CAPSULE, DELAYED RELEASE ORAL DAILY
COMMUNITY
Start: 2025-03-20

## 2025-04-12 RX ORDER — CLONAZEPAM 2 MG/1
2 TABLET ORAL 2 TIMES DAILY
COMMUNITY

## 2025-04-12 RX ADMIN — TIZANIDINE 2 MG: 2 TABLET ORAL at 16:01

## 2025-04-12 RX ADMIN — TIZANIDINE 2 MG: 2 TABLET ORAL at 11:49

## 2025-04-12 RX ADMIN — ACETAMINOPHEN 975 MG: 325 TABLET, FILM COATED ORAL at 16:01

## 2025-04-12 RX ADMIN — KETAMINE HYDROCHLORIDE 5 MG/HR: 10 INJECTION INTRAMUSCULAR; INTRAVENOUS at 13:10

## 2025-04-12 RX ADMIN — HYDROXYZINE HYDROCHLORIDE 25 MG: 25 TABLET ORAL at 00:05

## 2025-04-12 RX ADMIN — SODIUM CHLORIDE 1000 ML: 9 INJECTION, SOLUTION INTRAVENOUS at 16:00

## 2025-04-12 RX ADMIN — ACETAMINOPHEN 975 MG: 325 TABLET, FILM COATED ORAL at 08:20

## 2025-04-12 RX ADMIN — DIVALPROEX SODIUM 500 MG: 250 TABLET, DELAYED RELEASE ORAL at 08:20

## 2025-04-12 RX ADMIN — DIAZEPAM 5 MG: 5 TABLET ORAL at 05:52

## 2025-04-12 RX ADMIN — BUPRENORPHINE 4 MG: 2 TABLET SUBLINGUAL at 19:46

## 2025-04-12 RX ADMIN — BUPRENORPHINE 4 MG: 2 TABLET SUBLINGUAL at 08:20

## 2025-04-12 RX ADMIN — ONDANSETRON 4 MG: 2 INJECTION, SOLUTION INTRAMUSCULAR; INTRAVENOUS at 05:04

## 2025-04-12 RX ADMIN — DIVALPROEX SODIUM 500 MG: 250 TABLET, DELAYED RELEASE ORAL at 19:46

## 2025-04-12 RX ADMIN — HYDROXYZINE HYDROCHLORIDE 25 MG: 25 TABLET ORAL at 05:52

## 2025-04-12 RX ADMIN — SENNOSIDES AND DOCUSATE SODIUM 2 TABLET: 50; 8.6 TABLET ORAL at 19:46

## 2025-04-12 RX ADMIN — GUANFACINE 2 MG: 1 TABLET, EXTENDED RELEASE ORAL at 22:11

## 2025-04-12 RX ADMIN — HYDROXYZINE HYDROCHLORIDE 25 MG: 25 TABLET ORAL at 19:46

## 2025-04-12 RX ADMIN — METHOCARBAMOL 1000 MG: 500 TABLET ORAL at 04:55

## 2025-04-12 RX ADMIN — TIZANIDINE 2 MG: 2 TABLET ORAL at 22:11

## 2025-04-12 ASSESSMENT — ACTIVITIES OF DAILY LIVING (ADL)
ADLS_ACUITY_SCORE: 69
ADLS_ACUITY_SCORE: 67
ADLS_ACUITY_SCORE: 69
ADLS_ACUITY_SCORE: 69
ADLS_ACUITY_SCORE: 67
ADLS_ACUITY_SCORE: 67
ADLS_ACUITY_SCORE: 69
ADLS_ACUITY_SCORE: 67
ADLS_ACUITY_SCORE: 67
ADLS_ACUITY_SCORE: 69
ADLS_ACUITY_SCORE: 63
ADLS_ACUITY_SCORE: 67
ADLS_ACUITY_SCORE: 69
ADLS_ACUITY_SCORE: 67
ADLS_ACUITY_SCORE: 69
DEPENDENT_IADLS:: INDEPENDENT

## 2025-04-12 NOTE — PHARMACY-ADMISSION MEDICATION HISTORY
Pharmacy Intern Admission Medication History    Admission medication history is complete. The information provided in this note is only as accurate as the sources available at the time of the update.    Information Source(s): Patient and CareEverywhere/SureScripts via in-person    Pertinent Information:   Buprenorphine: per patient, she has been reducing her dose to BID with the goal to taper off the medication  Cannabis: per patient, she is using cannabis indica 1-2 puffs qam and 3-4 puffs at bedtime    Changes made to PTA medication list:  Added:   Amphetamine-dextroamphetamine 30 mg tablet  Divalproex sodium delayed-release 500 mg tablet  Docusate 50 mg capsule  Duloxetine 20 mg DR capsule  Hydroxyzine 50 mg capsule  Melatonin 10 mg tablet   Simethicone 125 mg capsule  Deleted:   Amphetamine-dextroamphetamine 20 mg tablet  Divalproex sodium delayed-release 250 mg tablet  Docusate sodium (COLACE PO)  Ondansetron 4 mg ODT  Ondansetron 4 mg tablet  Changed: None    Allergies reviewed with patient and updates made in EHR: no    Medication History Completed By: Petra [facility] 4/12/2025 2:48 PM      PTA Med List   Medication Sig Note Last Dose/Taking    acetaminophen (TYLENOL) 325 MG tablet Take 2 tablets (650 mg) by mouth every 4 hours as needed for other (mild pain)  Past Week    albuterol (PROAIR HFA/PROVENTIL HFA/VENTOLIN HFA) 108 (90 Base) MCG/ACT inhaler INHALE 2 PUFFS INTO THE LUNGS EVERY 4 HOURS AS NEEDED FOR SHORTNESS OF BREATH / DYSPNEA OR WHEEZING  More than a month    amphetamine-dextroamphetamine (ADDERALL) 30 MG tablet Take 1 tablet by mouth 2 times daily.  Past Week    buprenorphine (SUBUTEX) 8 MG SUBL sublingual tablet Place 1 tablet (8 mg) under the tongue 3 times daily. (Patient taking differently: Place 8 mg under the tongue 2 times daily.)  Past Week    Cholecalciferol (VITAMIN D-3) 125 MCG (5000 UT) TABS Take 1 tablet by mouth daily  4/10/2025    clonazePAM (KLONOPIN) 2 MG tablet Take 2 mg by mouth 2  times daily.  Past Week    divalproex sodium delayed-release (DEPAKOTE) 500 MG DR tablet Take 500 mg by mouth 2 times daily.  4/11/2025    docusate sodium (COLACE) 50 MG capsule Take 50 mg by mouth daily as needed for constipation.  Past Week    DULoxetine (CYMBALTA) 20 MG capsule Take 20 mg by mouth daily.  4/11/2025 Morning    fluticasone (FLONASE) 50 MCG/ACT nasal spray Spray 2 sprays into both nostrils daily as needed for rhinitis or allergies  More than a month    guanFACINE (INTUNIV) 1 MG TB24 24 hr tablet Take 1 mg by mouth at bedtime.  Past Week    hydrOXYzine (VISTARIL) 50 MG capsule Take 50 mg by mouth 2 times daily as needed for anxiety.  Past Week    ibuprofen (ADVIL/MOTRIN) 800 MG tablet TAKE 1 TABLET BY MOUTH EVERY 8 HOURS AS NEEDED FOR MODERATE PAIN  Past Week    LORazepam (ATIVAN) 0.5 MG tablet Take 1 tablet (0.5 mg) by mouth every 6 hours as needed for anxiety.  Past Week    medical cannabis (Patient's own supply) See Admin Instructions. (The purpose of this order is to document that the patient reports taking medical cannabis.  This is not a prescription, and is not used to certify that the patient has a qualifying medical condition.)  Ordered 3-4 times a day 4/12/2025: Cannabis indica 1-2 puffs qam and 3-4 puffs at bedtime Taking    Melatonin 10 MG TABS tablet Take 10 mg by mouth nightly as needed for sleep.  Past Week    Simethicone (GAS-X EXTRA STRENGTH) 125 MG CAPS Take 1 Capful by mouth daily as needed.  Past Month    tiZANidine (ZANAFLEX) 4 MG tablet TAKE 1 TABLET (4 MG) BY MOUTH 3 TIMES DAILY AS NEEDED FOR MUSCLE SPASMS  Past Week

## 2025-04-12 NOTE — CONSULTS
[facility]   Consult Note - Addiction Service     Date of Admission:  4/11/2025    Consult Requested by: Dr. Argueta   Reason for Consult: Chronic pain on subutex     Assessment & Plan   The patient has a PMHx that includes, chronic back pain right lower extremity extending into perineal necrotizing infection about a year and a half ago, chronic pain disorder, hyperlipidemia, and anxiety. She is being admitted to the [facility] [place] for a L4-5 laminectomy and microdiscectomy in response to disc protrusion and concern for cauda equina syndrome. Addiction medicine was consulted to assist with suboxone and pain management.    Chronic back s/p L4-5 laminectomy and microdiscectomy   Chronic lower extremity pain   Chronic pain syndrome with no history of opiate use disorder  Gemini has a long history of chronic pain that has been treated with opiates in the past and is now on subutex which she has been on for about a year and a half. On review of the chart and after discussion with Gemini she has no history of OUD or current features of a use disorder and is on subutex for chronic pain. Given the surgery It is very reasonable to treat her pain with opiate agonists and then wean as able. She will likely have poor pain tolerance and it may be difficult to wean due to various other causes of pain. We would recommend continuing subutex at 4 mg BID until she is mostly weaned off of her other opiate agonists and then increasing back to TID.   - Gemini does NOT have OUD   - Continue subutex 4 mg BID until weaned off of other opiates for pain control   - defer pain management to primary team  - we can continue to follow along for support and help titrate suboxone back up when appropriate    Chronic benzodiazepine use   Panic disorder   Patient has a history of panic disorder for which she is on clonazepam 2 mg BID. I see this is ordered  but also diazepam. I think given the concurrent opiate  use I would not have the additional diazepam for right now and see how she does with just the clonazepam.  - consider stopping valium   - Continue clonazepam, I agree with PRN for now but if she doesn't take any for a few days would need to schedule it to prevent withdrawal     ADHD  Continue PTA meds        # Peer Support:   -Our peer  will meet the patient if agreeable and still hospitalized on Thursday, to provide additional outpatient resources  -To contact Mirlande Peer  from [facility] ([facility]): call or text: [phone]    # Addiction Social Worker:   Our addiction social worker Dusty Santamaria can be contacted if needed, on her pager [phone] or texted/called at [phone]    # Linkage to Care  Patient will follow up with PCP to continue subutex     The patient's care was discussed with the Patient.    I spent 120 minutes on the unit/floor managing the care of Kym Perea. Over 50% of my time was spent on the following:   Significant education and counseling spent on: how substance use disorders and dependence occur, and how it can become a chronic relapsing and remitting medical condition.  In addition, the pharmacology of medical treatments including subutex, oxycodone, dilaudid, the importance of follow up, and Harm Reduction advice on how to use substances in a less harmful way why trying to cut down were discussed today.      Alex Ponce DO  [facility]   Contact information available via [facility] Paging/Directory  Please see sign in/sign out for up to date coverage information    ChAT team (Addiction Consult Team): Coverage daily 8-4pm     ______________________________________________________________________    Chief Complaint   Chronic pain on suboxone     History is obtained from the patient    History of Present Illness   The patient has a PMHx that includes, chronic back pain right lower extremity extending into  perineal necrotizing infection about a year and a half ago, chronic pain disorder, hyperlipidemia, and anxiety. She is being admitted to the [facility] service for a L4-5 laminectomy and microdiscectomy in response to disc protrusion and concern for cauda equina syndrome. Addiction medicine was consulted to assist with suboxone and pain management.    Gemini notes that she has a long history of chronic pain for which she has been on many different medications. It sounds like a lot of it is related to her lower extremities but also her back. She had been on various types of opiates for about 20 years and was sick of taking them especially with all the concerns around substance use disorder. She first tried medical marijuana which did not work that well and then she was started on zubsolv then subutex for her chronic pain. While she was on chronic opiates she denies any features of use disorder. She denies ever running out early, no taking more than prescribed, no overdoses, no buying illicit opiates. She notes that the subutex helps a little bit but does not take her pain away and she has to take a lot of ibuprofen and tylenol as well.     Most recently she presented to the ED due to increasing back pain and was found to have a disc protrusion and was transferred to [facility] for urgent surgery.     She lives with her parents in a house in [place]. She does have some difficulty with mobility and is looking for additional resources.    Review of Systems   The 10 point Review of Systems is negative other than noted in the HPI or here.    Past Medical History:   Diagnosis Date    Acquired absence of other organs (CODE)     9/2/2011    Chronic pain syndrome     No Comments Provided    Complex regional pain syndrome I     left ankle    Encounter for other administrative examinations     10/2012,Hydrocodone 10/325mg, #240/mo signed 10/3/12, updated 10/1/ 14    Encounter for other administrative examinations     10/1/2014,MS Contin  30 mg q 8 hours #90 per month.  Morphine sulfate IR 15 mg 2-3 tab tid prn #180 per month    Gastro-esophageal reflux disease without esophagitis     No Comments Provided    Injury of left ankle     1997,requiring surgery    Low back pain     No Comments Provided    Major depressive disorder, recurrent severe without psychotic features (H)     No Comments Provided    Migraine without status migrainosus, not intractable     No Comments Provided    Nicotine dependence, uncomplicated     No Comments Provided    Obesity     No Comments Provided    Overweight     11/25/2014    Pain in ankle     Chronic left ankle pain secondary to reflex sympathetic dystrophy.  Patient  has had 4 previous surgeries, the most recent one done by Dr. Noriega in  2007. On Narcotic contract.    Pain in thoracic spine     No Comments Provided    Panic disorder without agoraphobia     Dr Reynoso    Personal history of other diseases of the female genital tract     No Comments Provided    Personal history of other medical treatment (CODE)     G2, P1-0-1-1 with one vaginal delivery.    Supraventricular tachycardia     5/24/2010       Past Surgical History:   Procedure Laterality Date    ANKLE SURGERY      1997, 2000,Left ankle surgery x 2    CYSTECTOMY PILONIDAL N/A 6/19/2023    Procedure: Delayed closure of vulva and perineal defectes with drain placment;  Surgeon: Anthony Nolan MD;  Location:  OR    HYSTERECTOMY TOTAL ABDOMINAL      11/1999,secondary to endometriosis, no malignancy; patient reports no malignancy, but abnormal cells were present *    IRRIGATION AND DEBRIDEMENT DECUBITUS WOUND, COMBINED N/A 6/1/2023    Procedure: IRRIGATION AND DEBRIDEMENT, PERINEAL, LABIAL and GLUTEAL INFECTION;  Surgeon: Anthony Nolan MD;  Location:  OR    IRRIGATION AND DEBRIDEMENT DECUBITUS WOUND, COMBINED N/A 6/3/2023    Procedure: Debridement and washout of vulvar and perineal wounds;  Surgeon: Anthony Nolan MD;  Location:  OR     LAPAROSCOPIC CHOLECYSTECTOMY      No Comments Provided    LAPAROSCOPY DIAGNOSTIC (GENERAL)      1995    OTHER SURGICAL HISTORY      21299,CRANIO/MAXILLOFACIAL SURGERY,Jaw Surgery    OTHER SURGICAL HISTORY      207040,[facility] EMG,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  No other abnormalities noted.       Social History   Social History     Socioeconomic History    Marital status:      Spouse name: Not on file    Number of children: Not on file    Years of education: Not on file    Highest education level: Not on file   Occupational History    Not on file   Tobacco Use    Smoking status: Every Day     Current packs/day: 0.50     Average packs/day: 0.5 packs/day for 38.4 years (19.2 ttl pk-yrs)     Types: Cigarettes, Vaping Device     Start date: 12/2/1986     Passive exposure: Current    Smokeless tobacco: Never    Tobacco comments:     trying   Vaping Use    Vaping status: Some Days    Substances: Nicotine, Flavoring    Devices: Disposable   Substance and Sexual Activity    Alcohol use: Not Currently     Comment: OCCASIONAL WINE    Drug use: Yes     Types: Marijuana     Comment: medical cannabis; suboxone    Sexual activity: Not Currently     Partners: Male   Other Topics Concern    Parent/sibling w/ CABG, MI or angioplasty before 65F 55M? 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 She has a daughter who is now 20, lives in [place]     Social Drivers of Health     Financial Resource Strain: Low Risk  (1/8/2024)    Financial Resource Strain     Within the past 12 months, have you or your family members you live with been unable to get utilities (heat, electricity) when it was really needed?: No   Food Insecurity: Low Risk  (1/8/2024)    Food Insecurity     Within the past 12 months, did you worry that your food would run out before you got money to buy more?: No     Within the past 12 months, did the food you  bought just not last and you didn t have money to get more?: No   Transportation Needs: Low Risk  (1/8/2024)    Transportation Needs     Within the past 12 months, has lack of transportation kept you from medical appointments, getting your medicines, non-medical meetings or appointments, work, or from getting things that you need?: No   Physical Activity: Not on file   Stress: Not on file   Social Connections: Not on file   Interpersonal Safety: Low Risk  (12/4/2024)    Interpersonal Safety     Do you feel physically and emotionally safe where you currently live?: Yes     Within the past 12 months, have you been hit, slapped, kicked or otherwise physically hurt by someone?: No     Within the past 12 months, have you been humiliated or emotionally abused in other ways by your partner or ex-partner?: No   Housing Stability: Low Risk  (1/8/2024)    Housing Stability     Do you have housing? : Yes     Are you worried about losing your housing?: No       Family History   I have reviewed this patient's family history and updated it with pertinent information if needed.  Family History   Problem Relation Age of Onset    Cancer Father         Cancer,Bladder    Other - See Comments Father         Mild hypercholesterolemia    Other - See Comments Mother         scleroderma/lupus/Parkinson's syndrome    Family History Negative Brother         Good Health    Ariel-Danlos syndrome Daughter     Colon Cancer Maternal Grandmother 40        Cancer-colon,Followed by lung  with metastasis to the bone di*         Medications   I have reviewed this patient's current medications    Allergies   No Known Allergies    Physical Exam   Temp: 98.4  F (36.9  C) Temp src: Oral BP: 90/51 Pulse: 71   Resp: 17 SpO2: 96 % O2 Device: None (Room air) Oxygen Delivery: 2 LPM      General Appearance: In bed, no distress   Respiratory: breathing comfortably on room air   Cardiovascular: RRR  GI: non distended   Skin: no rashes on exposed skin    Other: Moving all four extremities      Due to regulation of Title 42 of the Code of Federal Regulations (CFR) Part 2: Confidentiality laws apply to this note and the information wherein.  Thus, this note cannot be copy and pasted into any other health care staff's note nor can it be included in general medical records sent to ANY outside agency without the patient's written consent.

## 2025-04-12 NOTE — CONSULTS
Care Management Initial Consult    General Information  Assessment completed with: Patient, VM-chart review,    Type of CM/SW Visit: Initial Assessment    Primary Care Provider verified and updated as needed: Yes   Readmission within the last 30 days: no previous admission in last 30 days      Reason for Consult: other (see comments) (elevated risk score)  Advance Care Planning: Advance Care Planning Reviewed: no concerns identified          Communication Assessment  Patient's communication style: spoken language (English or Bilingual)             Cognitive  Cognitive/Neuro/Behavioral: WDL  Level of Consciousness: alert  Arousal Level: opens eyes spontaneously  Orientation: oriented x 4  Mood/Behavior: cooperative, calm  Best Language: 0 - No aphasia  Speech: clear, spontaneous    Living Environment:   People in home: alone     Current living Arrangements: house      Able to return to prior arrangements: yes       Family/Social Support:  Care provided by: self  Provides care for: no one  Marital Status:   Support system: Children, Friend          Description of Support System: Supportive, Involved         Current Resources:   Patient receiving home care services: No        Community Resources: None  Equipment currently used at home: none  Supplies currently used at home: None    Employment/Financial:  Employment Status: disabled        Financial Concerns: none           Does the patient's insurance plan have a 3 day qualifying hospital stay waiver?  No    Lifestyle & Psychosocial Needs:  Social Drivers of Health     Food Insecurity: Low Risk  (1/8/2024)    Food Insecurity     Within the past 12 months, did you worry that your food would run out before you got money to buy more?: No     Within the past 12 months, did the food you bought just not last and you didn t have money to get more?: No   Depression: At risk (11/21/2024)    PHQ-2     PHQ-2 Score: 4   Housing Stability: Low Risk  (1/8/2024)    Housing  Stability     Do you have housing? : Yes     Are you worried about losing your housing?: No   Tobacco Use: High Risk (12/4/2024)    Patient History     Smoking Tobacco Use: Every Day     Smokeless Tobacco Use: Never     Passive Exposure: Current   Financial Resource Strain: Low Risk  (1/8/2024)    Financial Resource Strain     Within the past 12 months, have you or your family members you live with been unable to get utilities (heat, electricity) when it was really needed?: No   Alcohol Use: Not on file   Transportation Needs: Low Risk  (1/8/2024)    Transportation Needs     Within the past 12 months, has lack of transportation kept you from medical appointments, getting your medicines, non-medical meetings or appointments, work, or from getting things that you need?: No   Physical Activity: Not on file   Interpersonal Safety: Low Risk  (12/4/2024)    Interpersonal Safety     Do you feel physically and emotionally safe where you currently live?: Yes     Within the past 12 months, have you been hit, slapped, kicked or otherwise physically hurt by someone?: No     Within the past 12 months, have you been humiliated or emotionally abused in other ways by your partner or ex-partner?: No   Stress: Not on file   Social Connections: Not on file   Health Literacy: Not on file       Functional Status:  Prior to admission patient needed assistance:   Dependent ADLs:: Independent  Dependent IADLs:: Independent       Mental Health Status:  Mental Health Status: Past Concern       Chemical Dependency Status:  Chemical Dependency Status: Past Concern             Values/Beliefs:  Spiritual, Cultural Beliefs, Zoroastrianism Practices, Values that affect care: no               Discussed  Partnership in Safe Discharge Planning  document with patient/family: Yes    Additional Information:  Care management consulted automatically due to elevated risk score. Care management assessment completed at bedside with patient after chart review.   Patient reports she lives alone. She is independent ADL/IADL.  No current services or use of DME. Family/friend can transport at discharge.     Physical and Occupational therapy consult spending.     Next Steps: Follow up on PT/OT recommendation    Cordell Doshi RN    4/12/2025  Nurse Coordinator      Social Work and Care Management Department       SEARCHABLE in [facility] - search CARE COORDINATOR       [place] [facility] [place] ([phone]) Saturday & Sunday; ([phone]) [facility] Recognized Holidays     Units: 5A Onc 5201 - [phone] RNCC,  5A Onc 5220 thru 5240 RNCC, 5C OFFSERVICE [phone] RNCC & 5C OFF SERVICE [phone] RNCC       Units: 6B Vocera, 6C Card 6401 thru 6420 RNCC, 6C Card 6502 thru 6514 RNCC & 6C Card 6515 thru 6519 RNCC        Units: 7A SOT RNCC Vocera, 7B Med Surg Vocera, 7C Med Surg 7401 thru 7418 RNCC & 7C Med Surg 7502 thru 7521 RNCC       Units: 6A Vocera & 4A CVICU Vocera, 4C MICU Vocera, and 4E SICU Vocera         Units: 5 Ortho Vocera & 5 Med Surg Vocera        Units: 6 Med Surg Vocera & 8 Med Surg Vocera

## 2025-04-12 NOTE — PLAN OF CARE
Goal Outcome Evaluation:      Plan of Care Reviewed With: patient          Outcome Evaluation: Discharge plan to be determined pending clinical course, PT/OT eval.

## 2025-04-12 NOTE — ANESTHESIA CARE TRANSFER NOTE
Patient: Kym Perea    Procedure: Procedure(s):  Lumbar 4-5 Full Laminectomy and Bilateral Microdiscectomy       Diagnosis: Disc displacement, lumbar [M51.26]  Diagnosis Additional Information: No value filed.    Anesthesia Type:   General     Note:    Oropharynx: oropharynx clear of all foreign objects and spontaneously breathing  Level of Consciousness: awake  Oxygen Supplementation: nasal cannula  Level of Supplemental Oxygen (L/min / FiO2): 4  Independent Airway: airway patency satisfactory and stable  Dentition: dentition unchanged  Vital Signs Stable: post-procedure vital signs reviewed and stable  Report to RN Given: handoff report given  Patient transferred to: PACU    Handoff Report: Identifed the Patient, Identified the Reponsible Provider, Reviewed the pertinent medical history, Discussed the surgical course, Reviewed Intra-OP anesthesia mangement and issues during anesthesia, Set expectations for post-procedure period and Allowed opportunity for questions and acknowledgement of understanding      Vitals:  Vitals Value Taken Time   /67 04/11/25 2003   Temp 36.2    Pulse 70 04/11/25 2008   Resp 19 04/11/25 2008   SpO2 98 % 04/11/25 2008   Vitals shown include unfiled device data.    Electronically Signed By: CHRISTY LORENZ CRNA  April 11, 2025  8:09 PM

## 2025-04-12 NOTE — ANESTHESIA POSTPROCEDURE EVALUATION
Patient: Kym Perea    Procedure: Procedure(s):  Lumbar 4-5 Full Laminectomy and Bilateral Microdiscectomy       Anesthesia Type:  General    Note:  Disposition: Inpatient   Postop Pain Control: Uneventful            Sign Out: Well controlled pain   PONV: No   Neuro/Psych: Uneventful            Sign Out: Acceptable/Baseline neuro status   Airway/Respiratory: Uneventful            Sign Out: Acceptable/Baseline resp. status   CV/Hemodynamics: Uneventful            Sign Out: Acceptable CV status; No obvious hypovolemia; No obvious fluid overload   Other NRE: NONE   DID A NON-ROUTINE EVENT OCCUR?            Last vitals:  Vitals Value Taken Time   /83 04/11/25 2100   Temp     Pulse 59 04/11/25 2111   Resp 7 04/11/25 2111   SpO2 98 % 04/11/25 2111   Vitals shown include unfiled device data.    Electronically Signed By: Reema Garcia MD  April 11, 2025  9:12 PM

## 2025-04-12 NOTE — OP NOTE
where patient identification was confirmed after consent for the procedure was verified. Our anesthesia colleagues induced the patient under general anesthesia and an endotracheal tube was placed. A waller catheter was placed for the procedure. The patient was then flipped into the prone position and all pressure points were padded for the duration of the case.    The C-arm was present in the room and brought into the field for localization of the Lumbar 4-5 interspace. A ~5 cm midline incision was planned after the verification of the correct levels. The area was then prepared and draped in the standard sterile fashion. A standard surgical timeout was performed.    The planned incision was injected with 0.25% marcaine with epinephrine, and the incision was carried down to the level of the spinous processes with a #10 blade. Hemostasis was maintained with a combination of bipolar and monopolar cautery. After this was achieved, the C-arm was draped sterile and used to confirm the spinous process of L4 and the L4-5 disc space. This constituted the spinal time out. Following this, a subperiosteal dissection was performed where the latissimus dorsi and erector spinae muscles were dissected off of the lumbar spinous processes using the Plata and monopolar cautery to expose the L4-5 lamina and out medially to the facets.    At this point a Leksell rongeur was used to remove the L4 spinous process and interspinous ligaments, and the Dremel with the AM8 cutting bit was used to transect the lamina medial to the facets, and a combination of Leksell and Kerrison #3 and #4 rongeurs were used to complete the laminectomy to the level of the ligamentum flavum. The ligamentum flavum was dissected off the spinal cord dura with an initial incision with the #15 blade followed by careful dissection using the KrowdPad dental instrument and #2 and #3 Kerrison rongeurs.    After the completion of the laminectomy the spinal cord and dura was  visualized to be superiorly elevated, and a large centrally extruded disc was able to be palpated inferiorly. A Penfield #1 instrument was left in the ventral epidural space and the C-arm was used to confirm that this was the L4-5 disc.    At this point the sterile draped microscope was brought into the field. A #15 blade was used to margarito the annulus fibrosus and a pituitary rongeur was used to evacuate disc material initially from the LEFT. Early on a single large disc fragment was able to be evacuated, with additional fragments evacuated as well. The microscope was also used to examine the RIGHT lateral epidural space and similarly a #15 blade was used to margarito the annulus fibrosus and a pituitary rongeur was used to evacuate additional disc material. A Corning dental was used to palpate the deep epidural space and we found that the thecal sac was well decompressed.    At this point our attention was turned to closure. After meticulous hemostasis was achieved, an initial muscle layer was closed using 2-0 Vicryl suture, followed by closure of the thoracolumbar fascia also with 2-0 Vicryl suture. The dermal-subdermal  layer was approximated using 2-0 Vicryl suture, and the skin was closed with 3-0 Monocryl, cleaned, then coated with skin glue.    At the end of the case counts were correct x2. The patient was turned back into the supine position then extubated uneventfully. The patient was transported to PACU in stable condition.      The attending, Dr. Emilia Mensah, was available at all times and performed the critical portions of the procedure.      Alfred Sood MD  Neurosurgery PGY2  On call pager #[phone]  I was present for the critical portions of the operation and immediately available for the remainder.  AMP

## 2025-04-12 NOTE — PLAN OF CARE
BP 90/51 (BP Location: Right arm)   Pulse 71   Temp 98.4  F (36.9  C) (Oral)   Resp 17   Wt 62.6 kg (138 lb)   LMP 11/01/1999 (Exact Date)   SpO2 96%   BMI 20.38 kg/m      Cares from: 0700 - 1530    VS & Pain: VSS except soft BP (within range). On RA. C/o incisional pain, managed with PCA dilaudid & scheduled Tylenol   Neuro: AxO x4  Respiratory: denied shortness of breath   Cardiac: wdl   Peripheral neurovascular: LLE numbness & tingling, baseline per pt   GI/: + bowel sounds. No BM this shift. Voids spont in toilet    Nutrition: regular, good appetite with adequate fluid intake. Denied nausea & vomiting   Skin: lumbar incision- primapore dressing in place   Musculoskeletal: significantly impaired   Lines: L. PIV infusing PCA dilaudid. R. PIV- infusing ketamine   Activity: assist x1 gait belt + IV pole   Electrolytes: potassium & phosphorus within range. Pt refused magnesium replacement   Events this shift: started ketamine infusion this shift per order     Plan: continue plan of care    Goal Outcome Evaluation:  Plan of Care Reviewed With: patient  Overall Patient Progress: improving

## 2025-04-12 NOTE — PROGRESS NOTES
Arrived from: PACU  Belongings/meds: arrived with patient, no meds  2 RN Skin Assessment Completed by: Flaquita GAN and Saadia GONCALVES  Skin Findings:  - Lumbar incision covered with primapore CDI- changed by Reagan Wills / Provider  - Blanchable redness on ribs and L thigh from OR positioning  Non-intact findings documented (yes/no/NA): yes    Trach disposable or reusable inner cannula?:  NA

## 2025-04-12 NOTE — BRIEF OP NOTE
[facility]    Brief Operative Note    Pre-operative diagnosis: Disc displacement, lumbar [M51.26]  Post-operative diagnosis Same as pre-operative diagnosis    Procedure: Lumbar 4 Full Laminectomy and Bilateral L4-5 Microdiscectomy  Surgeon: Surgeons and Role:     * Emilia Mensah MD - Primary     * Alfred Sood MD - Resident - Assisting  Anesthesia: General   Estimated Blood Loss: 50 mL    Drains: None  Specimens: * No specimens in log *  Findings:  Large centrally extruded disc evacuated from L4-5 from the LEFT. Well decompressed thecal sac by end of case.  Complications: None.  Implants: * No implants in log *      I was present for the critical portions of the operation and immediately available for the remainder.  AMP

## 2025-04-12 NOTE — CONSULTS
"Pain Service Consultation Note  [facility]      Patient Name: Kym Perea  MRN: [id number]   Age: 55 year old  Sex: female  Date: April 12, 2025                                        Reviewed: Yes  Per MN  review pulled from system on 04/12/25. Last refill on 3/20/25, indicating the following analgesic usage: Bupinorphine 8 mg TID.   Pain Medications/Prescriber: Phyllis Philippe    Referring Provider:  Reagan Wills MD   Referring Service:  NSG  Reason for Consultation: Acute postoperative pain    Assessment/Recommendations:  55 year old female with PMH of chronic back pain, anxiety, and lumbar radiculopathy s/p L4/5 laminectomy on 4/11/25    Plan:   Cease continuous PCA infusion. Increase demand dose  Discontinue all other forms of opioid (oxycodone/IV HM)  Continue buprenorphine 4 mg BID  Initiate ketamine 5 mg/hr (can increase to 10 mg/hr later today if no adverse events)  Plan on discontinuation of PCA tomorrow with transition to oral hydromorphone 2-4 mg Q3H/PRN  Discontinue methocarbamol and initiate PTA tizanidine dosing scheduled  Bowel regimen    Thank you for the opportunity to participate in the care of Kmy Perea  Pain Service will continue to follow.    Discussed with attending anesthesiologist  Primary Service Contacted with Recommendations? Yes    Jean Beltrán MD  4/12/2025      Chief Complaint:  Back Pain      History of Present Illness:  Kym Perea is a 55 year old female with PMH of chronic back pain, anxiety, and lumbar radiculopathy s/p L4/5 laminectomy on 4/11/25. Patient reports that she has a long history (~20 years) of pain and has tried many different medications for it. She also states that she wants to \"get off\" buprenorphine. I informed her that we would continue it now but that it is something she could wean off of in the outpatient setting. She reports her baseline pain to be 5/10, which is similar to what she is reporting currently. I " informed her that she is at risk with her current regimen and that we should simplify it today with plans to transition her to oral medication tomorrow. I would like to use a ketamine infusion to make that transition easier, which the patient agrees to.    Past Medical History:  Past Medical History:   Diagnosis Date    Acquired absence of other organs (CODE)     9/2/2011    Chronic pain syndrome     No Comments Provided    Complex regional pain syndrome I     left ankle    Encounter for other administrative examinations     10/2012,Hydrocodone 10/325mg, #240/mo signed 10/3/12, updated 10/1/ 14    Encounter for other administrative examinations     10/1/2014,MS Contin 30 mg q 8 hours #90 per month.  Morphine sulfate IR 15 mg 2-3 tab tid prn #180 per month    Gastro-esophageal reflux disease without esophagitis     No Comments Provided    Injury of left ankle     1997,requiring surgery    Low back pain     No Comments Provided    Major depressive disorder, recurrent severe without psychotic features (H)     No Comments Provided    Migraine without status migrainosus, not intractable     No Comments Provided    Nicotine dependence, uncomplicated     No Comments Provided    Obesity     No Comments Provided    Overweight     11/25/2014    Pain in ankle     Chronic left ankle pain secondary to reflex sympathetic dystrophy.  Patient  has had 4 previous surgeries, the most recent one done by Dr. Noriega in  2007. On Narcotic contract.    Pain in thoracic spine     No Comments Provided    Panic disorder without agoraphobia     Dr Reynoso    Personal history of other diseases of the female genital tract     No Comments Provided    Personal history of other medical treatment (CODE)     G2, P1-0-1-1 with one vaginal delivery.    Supraventricular tachycardia     5/24/2010         Family History:    Family History   Problem Relation Age of Onset    Cancer Father         Cancer,Bladder    Other - See Comments Father          Mild hypercholesterolemia    Other - See Comments Mother         scleroderma/lupus/Parkinson's syndrome    Family History Negative Brother         Good Health    Ariel-Danlos syndrome Daughter     Colon Cancer Maternal Grandmother 40        Cancer-colon,Followed by lung  with metastasis to the bone di*       Social History:  Social History     Tobacco Use    Smoking status: Every Day     Current packs/day: 0.50     Average packs/day: 0.5 packs/day for 38.4 years (19.2 ttl pk-yrs)     Types: Cigarettes, Vaping Device     Start date: 12/2/1986     Passive exposure: Current    Smokeless tobacco: Never    Tobacco comments:     trying   Substance Use Topics    Alcohol use: Not Currently     Comment: OCCASIONAL WINE             Review of Systems:  Complete ROS reviewed. Unless otherwise noted, all other systems found to be negative.        Laboratory Results:  Recent Labs   Lab Test 04/12/25  0704 04/11/25  1215   INR  --  0.94   * 128*   PTT  --  22   BUN 13.4 21.8*       Allergies:  Allergies   Allergen Reactions    Gabapentin Other (See Comments)     Memory issues    Diclofenac-Misoprostol GI Disturbance    Flexeril [Cyclobenzaprine] Other (See Comments)     Memory issues    Indomethacin      Other reaction(s): Dizziness    Meloxicam GI Disturbance    Moxifloxacin GI Disturbance         Current Pain Related Medications:  Medications related to Pain Management (From now, onward)      Start     Dose/Rate Route Frequency Ordered Stop    04/14/25 0000  bisacodyl (DULCOLAX) suppository 10 mg         10 mg Rectal DAILY PRN 04/11/25 2236 04/13/25 0000  magnesium hydroxide (MILK OF MAGNESIA) suspension 30 mL         30 mL Oral DAILY PRN 04/11/25 2236 04/12/25 1030  HYDROmorphone (DILAUDID) PCA 0.2 mg/mL OPIOID TOLERANT          Intravenous CONTINUOUS 04/12/25 1017      04/12/25 1030  ketamine (KETALAR) 2 mg/mL in sodium chloride 0.9 % 50 mL ANALGESIA infusion         5 mg/hr  2.5 mL/hr  Intravenous CONTINUOUS  "04/12/25 1017      04/12/25 1030  tiZANidine (ZANAFLEX) tablet 2 mg         2 mg Oral EVERY 6 HOURS 04/12/25 1017      04/12/25 0800  polyethylene glycol (MIRALAX) Packet 17 g         17 g Oral DAILY 04/11/25 2236 04/11/25 2330  divalproex sodium delayed-release (DEPAKOTE) DR tablet 500 mg         500 mg Oral 2 TIMES DAILY 04/11/25 1959 04/11/25 2330  buprenorphine (SUBUTEX) sublingual tablet 4 mg        Note to Pharmacy: PTA Sig:Place 1 tablet (8 mg) under the tongue 3 times daily.      4 mg Sublingual 2 TIMES DAILY 04/11/25 2305 04/11/25 2300  acetaminophen (TYLENOL) tablet 975 mg         975 mg Oral EVERY 8 HOURS 04/11/25 2236 04/11/25 2236  hydrOXYzine HCl (ATARAX) tablet 25 mg         25 mg Oral EVERY 6 HOURS PRN 04/11/25 2236 04/11/25 2236  lidocaine 1 % 0.1-1 mL         0.1-1 mL Other EVERY 1 HOUR PRN 04/11/25 2236 04/11/25 2236  lidocaine (LMX4) cream          Topical EVERY 1 HOUR PRN 04/11/25 2236 04/11/25 2130  senna-docusate (SENOKOT-S/PERICOLACE) 8.6-50 MG per tablet 1 tablet        Placed in \"Or\" Linked Group    1 tablet Oral 2 TIMES DAILY 04/11/25 2104 04/11/25 2130  senna-docusate (SENOKOT-S/PERICOLACE) 8.6-50 MG per tablet 2 tablet        Placed in \"Or\" Linked Group    2 tablet Oral 2 TIMES DAILY 04/11/25 2104 04/11/25 2020  diazepam (VALIUM) tablet 5 mg         5 mg Oral EVERY 6 HOURS PRN 04/11/25 2021 04/11/25 1959  clonazePAM (klonoPIN) tablet 2 mg        Note to Pharmacy: PTA Sig:Take 1 tablet (2 mg) by mouth 2 times daily for 3 days.      2 mg Oral 2 TIMES DAILY PRN 04/11/25 1959 04/11/25 1959  docusate sodium (COLACE) capsule 50 mg         50 mg Oral 2 TIMES DAILY PRN 04/11/25 1959                Physical Exam:  Vitals: BP 90/51 (BP Location: Right arm)   Pulse 71   Temp 98.4  F (36.9  C) (Oral)   Resp 17   Wt 62.6 kg (138 lb)   LMP 11/01/1999 (Exact Date)   SpO2 96%   BMI 20.38 kg/m      Physical Exam:   CONSTITUTIONAL/GENERAL " "APPEARANCE:  NAD  EYES: EOMI, sclera anicteric  ENT/NECK: atraumatic, lips and oral mucous membranes dry  RESPIRATORY: non-labored breathing  CV: RRR  ABDOMEN: Non-distended  MUSCULOSKELETAL/BACK/SPINE/EXTREMITIES: Moves all extremities purposefully.  No edema or obvious joint deformities   NEURO: Alert and Oriented x3. Answers questions appropriately  SKIN/VASCULAR EXAM:  No jaundice, no visible rashes or lesions      Please see A&P for additional details of medical decision making.  58 MINUTES SPENT BY ME on the date of service doing chart review, history, exam, documentation & further activities per the note.      Acute Inpatient Pain Service [facility]  Hours of pain coverage 24/7   Please PAGE via MediSwipe - Link to MediSwipe Here - Search Pain  Page via Amcom- Please Page the Pain Team Via Amcom: \"PAIN MANAGEMENT ACUTE INPATIENT/ [facility]\"            "

## 2025-04-12 NOTE — PROGRESS NOTES
"Physical Therapy Initial Evaluation   04/12/25 7376   Appointment Info   Signing Clinician's Name / Credentials (PT) Davian Valdivia, PT   Rehab Comments (PT) spinal precs, leg numbness/buckling   Living Environment   People in Home parent(s)   Current Living Arrangements house   Home Accessibility stairs to enter home;stairs within home   Number of Stairs, Main Entrance 3   Stair Railings, Main Entrance railings safe and in good condition   Number of Stairs, Within Home, Primary ten   Stair Railings, Within Home, Primary   (railing on 1 side)   Transportation Anticipated family or friend will provide   Living Environment Comments Reports she is \"homeless\" and has been staying at her parents house, sleeping on an air mattress. Mother recently fell and was in a rehab facility but back home now. She also reports there is no room to utilize a walker inside the house.   Self-Care   Usual Activity Tolerance moderate   Current Activity Tolerance fair   Regular Exercise No   Equipment Currently Used at Home none   Fall history within last six months yes   Number of times patient has fallen within last six months 4  (reports legs give out and/or loses balance)   Activity/Exercise/Self-Care Comment Reports no AD at baseline and a walker would not fit in her parents house. 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   General Information   Onset of Illness/Injury or Date of Surgery 04/11/25   Referring Physician Arlen Argueta MD   Patient/Family Therapy Goals Statement (PT) improve pain, return \"home\"   Pertinent History of Current Problem (include personal factors and/or comorbidities that impact the POC) Kym Perea is a 55 year old female with PMHx right lower extremity extending into perineal necrotizing infection about a year and a half ago, chronic pain disorder on suboxone, hyperlipidemia, and anxiety of presenting with worsening pain in her lower back shooting down her legs bilaterally and numbness along the right thigh " posteriorly associated with worsening groin numbness and urinary retention. MRI imaging consistent with disc protrusion associated with spondylolisthesis at L4-5 causing critical central canal stenosis, which correlates with her clinical picture.      She is POD #1 from Lumbar 4 Full Laminectomy and Bilateral Microdiscectomy with Dr. Mensah.   Existing Precautions/Restrictions spinal   Cognition   Affect/Mental Status (Cognition) WFL   Strength (Manual Muscle Testing)   Strength Comments full AROM in LE's, reports significant pain with L DF, painful/uncomfortable with laq's.   Bed Mobility   Comment, (Bed Mobility) SBA, increased time/effort.   Transfers   Comment, (Transfers) Sit<>stand CGA x1, increased time/effort   Gait/Stairs (Locomotion)   Comment, (Gait/Stairs) Ambulates 20ft with FWW, L leg did buckle 1x, heavy reliance on FWW, very quick to fatigue and increase in discomfort/numbness. Unable to complete stairs due to fatigue/pain.   Balance   Balance Comments heavy reliance on FWW in standing. Discomfort at EOB but SBA   Clinical Impression   Criteria for Skilled Therapeutic Intervention Yes, treatment indicated   PT Diagnosis (PT) impaired functional mobility   Influenced by the following impairments weakness, deconditioning, pain, numbness in legs   Functional limitations due to impairments transfers, ambulation, stair negotiation   Clinical Presentation (PT Evaluation Complexity) stable   Clinical Presentation Rationale clinical judgment   Clinical Decision Making (Complexity) low complexity   Planned Therapy Interventions (PT) balance training;bed mobility training;gait training;home exercise program;neuromuscular re-education;stair training;strengthening;transfer training   Risk & Benefits of therapy have been explained evaluation/treatment results reviewed;care plan/treatment goals reviewed;risks/benefits reviewed;current/potential barriers reviewed;participants voiced agreement with care  plan;participants included;patient   PT Total Evaluation Time   PT Fernando Low Complexity Minutes (03571) 7   Physical Therapy Goals   PT Frequency 6x/week   PT Predicted Duration/Target Date for Goal Attainment 04/19/25   PT Goals Bed Mobility;Transfers;Gait;Stairs   PT: Bed Mobility Independent;Supine to/from sit;Within precautions   PT: Transfers Modified independent;Bed to/from chair   PT: Gait Modified independent;Greater than 200 feet  (LRAD)   PT: Stairs Supervision/stand-by assist;10 stairs   PT Discharge Planning   PT Plan sit to stands, progress gait distance/independence and safety, provide spinal precs handout   PT Discharge Recommendation (DC Rec) Acute Rehab Center-Motivated patient will benefit from intensive, interdisciplinary therapy.  Anticipate will be able to tolerate 3 hours of therapy per day;home with assist   PT Rationale for DC Rec Patient well below independent baseline mobility. Currently very painful and uncomfortable, numbness in bilateral LE's, legs buckling, very high fall risk. Has lots of stairs and sleeping on the floor at home. Currently not interested in rehab upon DC but may benefit from stay at rehab to increase strength, independence, and safety with mobility. Will continue to assess as she progresses.   PT Brief overview of current status 1A FWW very short distance, commode vs  bathroom

## 2025-04-12 NOTE — PROGRESS NOTES
[facility], [place]   04/12/2025  Neurosurgery Progress Note:      Interval History: sNo acute overnight events. Pain has improved in lower extremities. Gemini has been able to get up and walk to the bathroom. Her pain was well managed overnight with her PCA dilaudid. No BM since surgery.     Assessment:  Kym Perea is a 55 year old female with PMHx right lower extremity extending into perineal necrotizing infection about a year and a half ago, chronic pain disorder on suboxone, hyperlipidemia, and anxiety of presenting with worsening pain in her lower back shooting down her legs bilaterally and numbness along the right thigh posteriorly associated with worsening groin numbness and urinary retention. MRI imaging consistent with disc protrusion associated with spondylolisthesis at L4-5 causing critical central canal stenosis, which correlates with her clinical picture.     She is POD #1 from Lumbar 4 Full Laminectomy and Bilateral Microdiscectomy with Dr. Mensah.     Plan:    Neuro:  - Serial neuro exams  - Pain team consult placed to help manage post op pain, appreciate the recs:  - PCA dilaudid for breakthrough post-operative pain, continuous drip was cancelled. PCA dose increased.   - Ketamine started, consider increasing dose if breakthrough pain and patient is not delirious  - Started tizanidine  - Tomorrow: change PCA dilaudid to oral hydromorphone 2-4 mg Q3H PRN. PRN IV if needed   - SBP < 160  - PRN antiemetics  - Bowel regimen: miralax, senna  - PCDs for DVT prophylaxis  - PT/OT consults placed for dispo      -----------------------------------  Eric Salas, MS3    Alfred Sood MD  Neurosurgery PGY2  On call pager #[phone]   -----------------------------------    Objective:   Temp:  [98  F (36.7  C)-99.1  F (37.3  C)] 98.4  F (36.9  C)  Pulse:  [47-74] 71  Resp:  [7-20] 17  BP: ()/(51-90) 90/51  SpO2:  [94 %-100 %] 96 %  I/O last 3 completed shifts:  In: 7750.3  [I.V.:2400.3; IV Piggyback:50]  Out: 1225 [Urine:1175; Blood:50]    CV: extremities warm, well-perfused  PULM: breathing comfortably on room air  ABD: soft, non-distended  NEUROLOGIC:  -- Awake; Alert; oriented x 3  -- Follows commands briskly  -- Speech fluent, spontaneous. No aphasia or dysarthria.  -- no gaze preference. No apparent hemineglect.  -- Intact rectal tone      Motor:  Normal bulk / tone; no tremor, rigidity, or bradykinesia.  No muscle wasting or fasciculations  No Pronator Drift  Motor exam of the lower extremities heavily limited by pain.       Delt Bi Tri Hand Flexion/  Extension Iliopsoas Quadriceps Hamstrings Tibialis Anterior Gastroc     C5 C6 C7 C8/T1 L2 L3 L4-S1 L4 S1   R 5 5 5 5 5 5 5 5 5   L 5 5 5 5 5 4+ 4+ 4+ 4+   *pain-limited weakness with exception of left TA weakness     Sensory:  decreased sensation on the right leg as compared to the left one, primarily posteriorly (S1-S2 dermatome). Decreased sensation in the groin and genital area, right worse than left (s/p necrotizing fascitis surgery).      LABS:  Recent Labs   Lab 04/12/25  0704 04/12/25  0625 04/11/25  1215 04/10/25  1435   *  --  136 135   POTASSIUM 4.5  --  4.5 4.7   CHLORIDE 105  --  104 103   CO2 22  --  22 25   ANIONGAP 7  --  10 7   * 108* 89 93   BUN 13.4  --  21.8* 23.4*   CR 0.81  --  0.86 0.81   NORMA 8.4*  --  8.7* 9.0       Recent Labs   Lab 04/12/25  0704   WBC 9.8   RBC 3.24*   HGB 9.9*   HCT 29.2*   MCV 90   MCH 30.6   MCHC 33.9   RDW 12.6   *       IMAGING:  Recent Results (from the past 24 hours)   XR Surgery IDA L/T 5 Min Fluoro w Stills    Narrative    This exam was marked as non-reportable because it will not be read by a   radiologist or a [place] non-radiologist provider.

## 2025-04-13 LAB
ANION GAP SERPL CALCULATED.3IONS-SCNC: 8 MMOL/L (ref 7–15)
BACTERIA UR CULT: NORMAL
BUN SERPL-MCNC: 10.7 MG/DL (ref 6–20)
CALCIUM SERPL-MCNC: 8.6 MG/DL (ref 8.8–10.4)
CHLORIDE SERPL-SCNC: 104 MMOL/L (ref 98–107)
CREAT SERPL-MCNC: 0.67 MG/DL (ref 0.51–0.95)
EGFRCR SERPLBLD CKD-EPI 2021: >90 ML/MIN/1.73M2
ERYTHROCYTE [DISTWIDTH] IN BLOOD BY AUTOMATED COUNT: 13 % (ref 10–15)
GLUCOSE SERPL-MCNC: 104 MG/DL (ref 70–99)
HCO3 SERPL-SCNC: 21 MMOL/L (ref 22–29)
HCT VFR BLD AUTO: 29.9 % (ref 35–47)
HGB BLD-MCNC: 10.1 G/DL (ref 11.7–15.7)
MAGNESIUM SERPL-MCNC: 1.6 MG/DL (ref 1.7–2.3)
MCH RBC QN AUTO: 30.8 PG (ref 26.5–33)
MCHC RBC AUTO-ENTMCNC: 33.8 G/DL (ref 31.5–36.5)
MCV RBC AUTO: 91 FL (ref 78–100)
PHOSPHATE SERPL-MCNC: 3.6 MG/DL (ref 2.5–4.5)
PLATELET # BLD AUTO: 128 10E3/UL (ref 150–450)
POTASSIUM SERPL-SCNC: 4 MMOL/L (ref 3.4–5.3)
RBC # BLD AUTO: 3.28 10E6/UL (ref 3.8–5.2)
SODIUM SERPL-SCNC: 133 MMOL/L (ref 135–145)
WBC # BLD AUTO: 8.1 10E3/UL (ref 4–11)

## 2025-04-13 PROCEDURE — 258N000003 HC RX IP 258 OP 636

## 2025-04-13 PROCEDURE — 99232 SBSQ HOSP IP/OBS MODERATE 35: CPT | Performed by: STUDENT IN AN ORGANIZED HEALTH CARE EDUCATION/TRAINING PROGRAM

## 2025-04-13 PROCEDURE — 250N000013 HC RX MED GY IP 250 OP 250 PS 637: Performed by: NEUROLOGICAL SURGERY

## 2025-04-13 PROCEDURE — 250N000013 HC RX MED GY IP 250 OP 250 PS 637

## 2025-04-13 PROCEDURE — 36415 COLL VENOUS BLD VENIPUNCTURE: CPT

## 2025-04-13 PROCEDURE — 99232 SBSQ HOSP IP/OBS MODERATE 35: CPT | Mod: GC | Performed by: ANESTHESIOLOGY

## 2025-04-13 PROCEDURE — 250N000011 HC RX IP 250 OP 636

## 2025-04-13 PROCEDURE — 80048 BASIC METABOLIC PNL TOTAL CA: CPT

## 2025-04-13 PROCEDURE — 120N000002 HC R&B MED SURG/OB UMMC

## 2025-04-13 PROCEDURE — 84100 ASSAY OF PHOSPHORUS: CPT

## 2025-04-13 PROCEDURE — 83735 ASSAY OF MAGNESIUM: CPT

## 2025-04-13 PROCEDURE — 250N000009 HC RX 250

## 2025-04-13 PROCEDURE — 85027 COMPLETE CBC AUTOMATED: CPT

## 2025-04-13 PROCEDURE — 250N000012 HC RX MED GY IP 250 OP 636 PS 637

## 2025-04-13 RX ORDER — HYDROMORPHONE HYDROCHLORIDE 2 MG/1
2-4 TABLET ORAL
Status: DISCONTINUED | OUTPATIENT
Start: 2025-04-13 | End: 2025-04-16 | Stop reason: HOSPADM

## 2025-04-13 RX ORDER — MAGNESIUM OXIDE 400 MG/1
400 TABLET ORAL EVERY 4 HOURS
Status: COMPLETED | OUTPATIENT
Start: 2025-04-13 | End: 2025-04-13

## 2025-04-13 RX ORDER — HEPARIN SODIUM 5000 [USP'U]/.5ML
5000 INJECTION, SOLUTION INTRAVENOUS; SUBCUTANEOUS EVERY 8 HOURS
Status: DISCONTINUED | OUTPATIENT
Start: 2025-04-13 | End: 2025-04-16 | Stop reason: HOSPADM

## 2025-04-13 RX ADMIN — DIVALPROEX SODIUM 500 MG: 250 TABLET, DELAYED RELEASE ORAL at 08:49

## 2025-04-13 RX ADMIN — GUANFACINE 2 MG: 1 TABLET, EXTENDED RELEASE ORAL at 21:13

## 2025-04-13 RX ADMIN — MAGNESIUM OXIDE TAB 400 MG (241.3 MG ELEMENTAL MG) 400 MG: 400 (241.3 MG) TAB at 08:57

## 2025-04-13 RX ADMIN — ONDANSETRON 4 MG: 4 TABLET, ORALLY DISINTEGRATING ORAL at 23:51

## 2025-04-13 RX ADMIN — HEPARIN SODIUM 5000 UNITS: 5000 INJECTION, SOLUTION INTRAVENOUS; SUBCUTANEOUS at 14:28

## 2025-04-13 RX ADMIN — DIVALPROEX SODIUM 500 MG: 250 TABLET, DELAYED RELEASE ORAL at 21:05

## 2025-04-13 RX ADMIN — ACETAMINOPHEN 975 MG: 325 TABLET, FILM COATED ORAL at 15:58

## 2025-04-13 RX ADMIN — HEPARIN SODIUM 5000 UNITS: 5000 INJECTION, SOLUTION INTRAVENOUS; SUBCUTANEOUS at 21:07

## 2025-04-13 RX ADMIN — TIZANIDINE 2 MG: 2 TABLET ORAL at 23:42

## 2025-04-13 RX ADMIN — BUPRENORPHINE 4 MG: 2 TABLET SUBLINGUAL at 21:06

## 2025-04-13 RX ADMIN — HYDROMORPHONE HYDROCHLORIDE 4 MG: 2 TABLET ORAL at 19:29

## 2025-04-13 RX ADMIN — HYDROXYZINE HYDROCHLORIDE 25 MG: 25 TABLET ORAL at 21:13

## 2025-04-13 RX ADMIN — KETAMINE HYDROCHLORIDE 5 MG/HR: 10 INJECTION INTRAMUSCULAR; INTRAVENOUS at 09:25

## 2025-04-13 RX ADMIN — CLONAZEPAM 2 MG: 2 TABLET ORAL at 15:58

## 2025-04-13 RX ADMIN — ACETAMINOPHEN 975 MG: 325 TABLET, FILM COATED ORAL at 00:51

## 2025-04-13 RX ADMIN — ONDANSETRON 4 MG: 4 TABLET, ORALLY DISINTEGRATING ORAL at 09:57

## 2025-04-13 RX ADMIN — HYDROMORPHONE HYDROCHLORIDE 4 MG: 2 TABLET ORAL at 23:42

## 2025-04-13 RX ADMIN — TIZANIDINE 2 MG: 2 TABLET ORAL at 09:57

## 2025-04-13 RX ADMIN — ACETAMINOPHEN 975 MG: 325 TABLET, FILM COATED ORAL at 08:47

## 2025-04-13 RX ADMIN — HYDROMORPHONE HYDROCHLORIDE 4 MG: 2 TABLET ORAL at 16:03

## 2025-04-13 RX ADMIN — TIZANIDINE 2 MG: 2 TABLET ORAL at 15:58

## 2025-04-13 RX ADMIN — CLONAZEPAM 2 MG: 2 TABLET ORAL at 08:58

## 2025-04-13 RX ADMIN — BUPRENORPHINE 4 MG: 2 TABLET SUBLINGUAL at 08:49

## 2025-04-13 RX ADMIN — TIZANIDINE 2 MG: 2 TABLET ORAL at 04:10

## 2025-04-13 RX ADMIN — MAGNESIUM OXIDE TAB 400 MG (241.3 MG ELEMENTAL MG) 400 MG: 400 (241.3 MG) TAB at 13:17

## 2025-04-13 RX ADMIN — ACETAMINOPHEN 975 MG: 325 TABLET, FILM COATED ORAL at 23:42

## 2025-04-13 ASSESSMENT — ACTIVITIES OF DAILY LIVING (ADL)
ADLS_ACUITY_SCORE: 64
ADLS_ACUITY_SCORE: 67
ADLS_ACUITY_SCORE: 67
ADLS_ACUITY_SCORE: 64
ADLS_ACUITY_SCORE: 67
ADLS_ACUITY_SCORE: 67
ADLS_ACUITY_SCORE: 64
ADLS_ACUITY_SCORE: 64
ADLS_ACUITY_SCORE: 67
ADLS_ACUITY_SCORE: 64
ADLS_ACUITY_SCORE: 67
ADLS_ACUITY_SCORE: 64
ADLS_ACUITY_SCORE: 64
ADLS_ACUITY_SCORE: 67
ADLS_ACUITY_SCORE: 67
ADLS_ACUITY_SCORE: 64
ADLS_ACUITY_SCORE: 67
ADLS_ACUITY_SCORE: 67
ADLS_ACUITY_SCORE: 64
ADLS_ACUITY_SCORE: 64
ADLS_ACUITY_SCORE: 67

## 2025-04-13 NOTE — PLAN OF CARE
Status: L4 full laminectomy   Vitals: BP a little soft at the beginning of shift; all other vitals otherwise stable   Neuros: A&Ox3; intermittently forgetful about place; startles when woken   IV: PIV x2 with ketamine and dilaudid   Resp: WDL  Diet: Regular   : Voiding spontaneously; hx of retention   GI: Passing gas LBM PTA   Skin: PIV X2  Pain: 8-10/10; PCA dilaudid and ketamine infusion   Activity: Ax1 with walker to commode   Social: daughter included in care, not at bedside   Plan: PO pain med trial this AM   Updates this shift: No new updates or issues this shift     Goal Outcome Evaluation:           Overall Patient Progress: improvingOverall Patient Progress: improving

## 2025-04-13 NOTE — PROGRESS NOTES
Pain Service Progress Note  [facility]  Date: 04/13/2025       Patient Name: Kym Perea  MRN: [id number]  Age: 55 year old  Sex: female      Assessment:  55 year old female with PMH of chronic back pain, anxiety, and lumbar radiculopathy s/p L4/5 laminectomy on 4/11/25       Plan/Recommendations:  asher:   Stop PCA,  transition to oral hydromorphone 2-4 mg Q3H/PRN  Continue buprenorphine 4 mg BID  Continue ketamine 5 mg/hr   Continue Tizanidine  Bowel regimen    Pain Service will continue to follow.    Discussed with attending anesthesiologist    Stanford Patterson MD  04/13/2025     Overnight Events: NAEO    Tubes/Drains: No      Subjective:  I had a crummy night but feel better this morning   Nausea: No  Vomiting: No  Pruritus: No  Symptoms of LAST: No    Pain Location:  Back        Diet: Advance Diet as Tolerated: Regular Diet Adult; Regular Diet Adult    Relevant Labs:  Recent Labs   Lab Test 04/13/25  0728 04/12/25  0704 04/11/25  1215   INR  --   --  0.94   *   < > 128*   PTT  --   --  22   BUN 10.7   < > 21.8*    < > = values in this interval not displayed.       Physical Exam:  Vitals: /74 (BP Location: Right arm)   Pulse 75   Temp 36.8  C (98.3  F) (Oral)   Resp 15   Wt 62.6 kg (138 lb)   LMP 11/01/1999 (Exact Date)   SpO2 96%   BMI 20.38 kg/m      Physical Exam:   Orientation:  Alert, oriented, and in no acute distress: Yes  Sedation: No    Motor Examination:  5/5 Strength in lower extremities: Yes    Sensory Level:   Decrease in sensation: No        Relevant Medications:  Current Pain Medications:  Medications related to Pain Management (From now, onward)      Start     Dose/Rate Route Frequency Ordered Stop    04/14/25 0000  bisacodyl (DULCOLAX) suppository 10 mg         10 mg Rectal DAILY PRN 04/11/25 2236 04/13/25 0000  magnesium hydroxide (MILK OF MAGNESIA) suspension 30 mL         30 mL Oral DAILY PRN 04/11/25 2236 04/12/25 1030  HYDROmorphone  "(DILAUDID) PCA 0.2 mg/mL OPIOID TOLERANT          Intravenous CONTINUOUS 04/12/25 1017      04/12/25 1030  ketamine (KETALAR) 2 mg/mL in sodium chloride 0.9 % 50 mL ANALGESIA infusion         5 mg/hr  2.5 mL/hr  Intravenous CONTINUOUS 04/12/25 1017      04/12/25 1030  tiZANidine (ZANAFLEX) tablet 2 mg         2 mg Oral EVERY 6 HOURS 04/12/25 1017      04/12/25 0800  polyethylene glycol (MIRALAX) Packet 17 g         17 g Oral DAILY 04/11/25 2236 04/11/25 2330  divalproex sodium delayed-release (DEPAKOTE) DR tablet 500 mg         500 mg Oral 2 TIMES DAILY 04/11/25 1959 04/11/25 2330  buprenorphine (SUBUTEX) sublingual tablet 4 mg        Note to Pharmacy: PTA Sig:Place 1 tablet (8 mg) under the tongue 3 times daily.      4 mg Sublingual 2 TIMES DAILY 04/11/25 2305 04/11/25 2300  acetaminophen (TYLENOL) tablet 975 mg         975 mg Oral EVERY 8 HOURS 04/11/25 2236 04/11/25 2236  hydrOXYzine HCl (ATARAX) tablet 25 mg         25 mg Oral EVERY 6 HOURS PRN 04/11/25 2236 04/11/25 2236  lidocaine 1 % 0.1-1 mL         0.1-1 mL Other EVERY 1 HOUR PRN 04/11/25 2236 04/11/25 2236  lidocaine (LMX4) cream          Topical EVERY 1 HOUR PRN 04/11/25 2236 04/11/25 2130  senna-docusate (SENOKOT-S/PERICOLACE) 8.6-50 MG per tablet 1 tablet        Placed in \"Or\" Linked Group    1 tablet Oral 2 TIMES DAILY 04/11/25 2104 04/11/25 2130  senna-docusate (SENOKOT-S/PERICOLACE) 8.6-50 MG per tablet 2 tablet        Placed in \"Or\" Linked Group    2 tablet Oral 2 TIMES DAILY 04/11/25 2104 04/11/25 2020  diazepam (VALIUM) tablet 5 mg         5 mg Oral EVERY 6 HOURS PRN 04/11/25 2021 04/11/25 1959  clonazePAM (klonoPIN) tablet 2 mg        Note to Pharmacy: PTA Sig:Take 1 tablet (2 mg) by mouth 2 times daily for 3 days.      2 mg Oral 2 TIMES DAILY PRN 04/11/25 1959 04/11/25 1959  docusate sodium (COLACE) capsule 50 mg         50 mg Oral 2 TIMES DAILY PRN 04/11/25 1959              Primary Service " "Contacted with Recommendations? Yes            Acute Inpatient Pain Service [facility]  Hours of pain coverage 24/7   Please Page via [facility] [facility]  - Link to [employer] [facility] Here - Search Pain  Page via [employer]- Please Page the Pain Team Via [facility]: \"PAIN MANAGEMENT ACUTE INPATIENT/ [facility]\"           "

## 2025-04-13 NOTE — PLAN OF CARE
Status: POD #1 from L4 full laminectomy and bilateral Microdiskectomy. PMHx of right lower extremities extending into perineal infection, chronic pain, HLD, anxiety,  urinary retention.   Vitals: VSS ex soft BP,  MD notified and aware,1 L NS bolus given and SBP within MD parameter.   Neuros: AOX4. Lisa double/blurry vision. N/T to LLE at baseline. BUE 5/5 . BLE 4/5.   IV: R PIV infusing Ketamine 5 mg/hr & L PIV infusing Dilaudid PCA 0.2 mg/mL.   Labs/Electrolytes: .   Resp: RA. LSC. Lisa SOB.   Diet: Regular and poor po. Encourage intake of food and fluids.   :Voiding with retention, PVR in flowsheet.   GI: LBM PTA. Passing gas. Schedule bowel meds taken.   Skin:Back incision covered with primapore CDI. Abdominal covered with dressing and UTV.   Pain: Back back rating 6-8/10 with PCA Dilaudid, ketamine infusion, schedule tylenol and PRN atarax.   Activity: A1/GB and walker. Up on commode X2. Refused up on chair for meal or walk to bathroom d/t back pain.   Social: Pt's daughter visited at bedside this evening   Plan: CC following discharge plan and pending PT/OT eval. Continue monitor and follow POC.   Goal Outcome Evaluation:      Plan of Care Reviewed With: patient    Overall Patient Progress: improvingOverall Patient Progress: improving

## 2025-04-13 NOTE — PLAN OF CARE
/74 (BP Location: Right arm)   Pulse 75   Temp 98.3  F (36.8  C) (Oral)   Resp 15   Wt 62.6 kg (138 lb)   LMP 11/01/1999 (Exact Date)   SpO2 96%   BMI 20.38 kg/m      Cares from: 0700 - 1530     VS & Pain: VSS. On RA. C/o incisional pain, managed with ketamine infusion & scheduled Tylenol. Received klonopin x1 for anxiety   Neuro: AxO x4  Respiratory: denied shortness of breath   Cardiac: wdl   Peripheral neurovascular: LLE numbness & tingling, baseline per pt   GI/: + bowel sounds. No BM this shift. Voids spont in toilet    Nutrition: regular, good appetite with adequate fluid intake. C/o nausea & vomiting. Zofran given x1- effective   Skin: lumbar incision- primapore dressing in place   Musculoskeletal: significantly impaired   Lines: L. PIV- SL. R. PIV- infusing ketamine   Activity: assist x1 gait belt + IV pole   Electrolytes: potassium & phosphorus within range. Replaced magnesium. Recheck ordered      Plan: continue plan of care    Goal Outcome Evaluation:  Plan of Care Reviewed With: patient  Overall Patient Progress: improving

## 2025-04-13 NOTE — PROGRESS NOTES
[facility], [place]   04/13/2025  Neurosurgery Progress Note:      Interval History:   No acute interval events.   Hypotensive, responsive to fluids; likely hypo-volemic.  Pain under control with PCA - PO regimen to start today with guidance of Pain Service  ARU dispo per PT    Assessment:  Kym Perea is a 55 year old female with PMHx right lower extremity extending into perineal necrotizing infection about a year and a half ago, chronic pain disorder on suboxone, hyperlipidemia, and anxiety of presenting with worsening pain in her lower back shooting down her legs bilaterally and numbness along the right thigh posteriorly associated with worsening groin numbness and urinary retention. MRI imaging consistent with disc protrusion associated with spondylolisthesis at L4-5 causing critical central canal stenosis, which correlates with her clinical picture.     She is POD #2 from Lumbar 4 Full Laminectomy and Bilateral Microdiscectomy with Dr. Mensah.     Plan:    Neuro:  - Serial neuro exams  - Pain team consult placed to help manage post op pain, appreciate the recs:  - PCA dilaudid for breakthrough post-operative pain, continuous drip was cancelled. PCA dose increased.   - Ketamine started, consider increasing dose if breakthrough pain and patient is not delirious  - Started tizanidine  - Tomorrow: change PCA dilaudid to oral hydromorphone 2-4 mg Q3H PRN. PRN IV if needed   - SBP < 160  - PRN antiemetics  - Bowel regimen: miralax, senna  - PCDs for DVT prophylaxis  - PT/OT consults placed for dispo      -----------------------------------  Eric Salas, MS3    Alfred Sood MD  Neurosurgery PGY2  On call pager #[phone]   -----------------------------------    Objective:   Temp:  [98  F (36.7  C)-98.8  F (37.1  C)] 98.8  F (37.1  C)  Pulse:  [52-76] 76  Resp:  [14-18] 14  BP: ()/(36-74) 106/74  SpO2:  [94 %-100 %] 94 %  I/O last 3 completed shifts:  In: 1396.75 [P.O.:1360;  I.V.:36.75]  Out: 725 [Urine:725]    CV: extremities warm, well-perfused  PULM: breathing comfortably on room air  ABD: soft, non-distended  NEUROLOGIC:  -- Awake; Alert; oriented x 3  -- Follows commands briskly  -- Speech fluent, spontaneous. No aphasia or dysarthria.  -- no gaze preference. No apparent hemineglect.  -- Intact rectal tone      Motor:  Normal bulk / tone; no tremor, rigidity, or bradykinesia.  No muscle wasting or fasciculations  No Pronator Drift  Motor exam of the lower extremities heavily limited by pain.       Delt Bi Tri Hand Flexion/  Extension Iliopsoas Quadriceps Hamstrings Tibialis Anterior Gastroc     C5 C6 C7 C8/T1 L2 L3 L4-S1 L4 S1   R 5 5 5 5 5 5 5 5 5   L 5 5 5 5 5 4+ 4+ 4+ 4+   *pain-limited weakness with exception of left TA weakness     Sensory:  decreased sensation on the right leg as compared to the left one, primarily posteriorly (S1-S2 dermatome). Decreased sensation in the groin and genital area, right worse than left (s/p necrotizing fascitis surgery).      LABS:  Recent Labs   Lab 04/12/25  0704 04/12/25  0625 04/11/25  1215 04/10/25  1435   *  --  136 135   POTASSIUM 4.5  --  4.5 4.7   CHLORIDE 105  --  104 103   CO2 22  --  22 25   ANIONGAP 7  --  10 7   * 108* 89 93   BUN 13.4  --  21.8* 23.4*   CR 0.81  --  0.86 0.81   NORMA 8.4*  --  8.7* 9.0       Recent Labs   Lab 04/12/25  0704   WBC 9.8   RBC 3.24*   HGB 9.9*   HCT 29.2*   MCV 90   MCH 30.6   MCHC 33.9   RDW 12.6   *       IMAGING:  No results found for this or any previous visit (from the past 24 hours).

## 2025-04-13 NOTE — PROGRESS NOTES
[facility]     Addiction Progress Note - Addiction Service        Date of Admission:  4/11/2025  Assessment & Plan       The patient has a PMHx that includes, chronic back pain right lower extremity extending into perineal necrotizing infection about a year and a half ago, chronic pain disorder, hyperlipidemia, and anxiety. [place] [facility] [place] L4-5 laminectomy and microdiscectomy in response to disc protrusion and concern for cauda equina syndrome. Addiction medicine was consulted to assist with suboxone and pain management.     Chronic back s/p L4-5 laminectomy and microdiscectomy   Chronic lower extremity pain   Chronic pain syndrome with no history of opiate use disorder  Gemini has a long history of chronic pain that has been treated with opiates in the past and is now on subutex which she has been on for about a year and a half. On review of the chart and after discussion with Gemini she has no history of OUD or current features of a use disorder and is on subutex for chronic pain. Given the surgery It is very reasonable to treat her pain with opiate agonists and then wean as able. She will likely have poor pain tolerance and it may be difficult to wean due to various other causes of pain. We would recommend continuing subutex at 4 mg BID until she is mostly weaned off of her other opiate agonists and then increasing back to TID.   - Gemini does NOT have OUD   - Continue subutex 4 mg BID until weaned off of other opiates for pain control   - defer pain management to primary team and pain team   - we can continue to follow along for support and help titrate suboxone back up when appropriate     Chronic benzodiazepine use   Panic disorder   Patient has a history of panic disorder for which she is on clonazepam 2 mg BID. I see this is ordered  but also diazepam. I think given the concurrent opiate use I would not have the additional diazepam for  "right now and see how she does with just the clonazepam.  - consider stopping valium   - Continue clonazepam, I agree with PRN for now but if she doesn't take any for a few days would need to schedule it to prevent withdrawal      ADHD  Continue PTA meds      # Peer Support:   -Our peer  will meet the patient if agreeable and still hospitalized on Thursday, to provide additional outpatient resources  -To contact Mirlande, Peer  from [facility] ([facility]): call or text: [phone]     # Addiction Social Worker:   Our addiction social worker Dusty Rosalio can be contacted if needed, on her pager [phone] or texted/called at [phone]     # Linkage to Care  Patient will follow up with PCP to continue subutex       The patient's care was discussed with the Patient.    Time Spent on this Encounter   I spent 40 minutes on the unit/floor managing the care of Kym SOTOMAYOR Eliazar. Over 50% of my time was spent on the following:   - Counseling the patient and/or family regarding: risks and benefits of treatment options, recommended follow-up, and medical compliance  - Coordination of care with the: coordination of care not performed today      Alex Ponce, DO on 4/13/2025 at 11:41 AM   Addiction Service   [facility]     Contact information available via [facility] Paging/Directory under \"addiction medicine\"        ______________________________________________________________________    Interval History     Patient doing ok. Ambulated a bit yesterday. Still having a lot of pain and is hopeful the oral meds will work better than the PCA. Discussed plans for subutex. She notes she had been working on getting off prior. I told her I am guessing the plan will be do discharge with no additional opiates and am worried she would have more pain being on just 4 mg BID subutex. She understands.    ROS:  CV, Pulm, GI and  assessed. Pertinent positives as above, " otherwise negative.     Data reviewed today: I reviewed all medications, new labs and imaging results over the last 24 hours.     Physical Exam   Temp: 98.3  F (36.8  C) Temp src: Oral BP: 107/74 Pulse: 75   Resp: 15 SpO2: 96 % O2 Device: None (Room air)      General Appearance:  In bed, no distress   Respiratory: breathing comfortably on room air   Cardiovascular: RRR  GI: non distended   Skin: no rashes on exposed skin   Other:  Moving all four extremities      Due to regulation of Title 42 of the Code of Federal Regulations (CFR) Part 2: Confidentiality laws apply to this note and the information wherein.  Thus, this note cannot be copy and pasted into any other health care staff's note nor can it be included in general medical records sent to ANY outside agency without the patient's written consent.

## 2025-04-14 ENCOUNTER — APPOINTMENT (OUTPATIENT)
Dept: PHYSICAL THERAPY | Facility: CLINIC | Age: 55
DRG: 519 | End: 2025-04-14
Payer: COMMERCIAL

## 2025-04-14 ENCOUNTER — APPOINTMENT (OUTPATIENT)
Dept: OCCUPATIONAL THERAPY | Facility: CLINIC | Age: 55
DRG: 519 | End: 2025-04-14
Payer: COMMERCIAL

## 2025-04-14 LAB
ANION GAP SERPL CALCULATED.3IONS-SCNC: 7 MMOL/L (ref 7–15)
BUN SERPL-MCNC: 9.8 MG/DL (ref 6–20)
CALCIUM SERPL-MCNC: 8.4 MG/DL (ref 8.8–10.4)
CHLORIDE SERPL-SCNC: 104 MMOL/L (ref 98–107)
CREAT SERPL-MCNC: 0.68 MG/DL (ref 0.51–0.95)
EGFRCR SERPLBLD CKD-EPI 2021: >90 ML/MIN/1.73M2
ERYTHROCYTE [DISTWIDTH] IN BLOOD BY AUTOMATED COUNT: 12.8 % (ref 10–15)
GLUCOSE SERPL-MCNC: 95 MG/DL (ref 70–99)
HCO3 SERPL-SCNC: 24 MMOL/L (ref 22–29)
HCT VFR BLD AUTO: 29.4 % (ref 35–47)
HGB BLD-MCNC: 9.8 G/DL (ref 11.7–15.7)
MAGNESIUM SERPL-MCNC: 1.8 MG/DL (ref 1.7–2.3)
MCH RBC QN AUTO: 30.7 PG (ref 26.5–33)
MCHC RBC AUTO-ENTMCNC: 33.3 G/DL (ref 31.5–36.5)
MCV RBC AUTO: 92 FL (ref 78–100)
PHOSPHATE SERPL-MCNC: 3.8 MG/DL (ref 2.5–4.5)
PLATELET # BLD AUTO: 109 10E3/UL (ref 150–450)
POTASSIUM SERPL-SCNC: 3.9 MMOL/L (ref 3.4–5.3)
RBC # BLD AUTO: 3.19 10E6/UL (ref 3.8–5.2)
SODIUM SERPL-SCNC: 135 MMOL/L (ref 135–145)
WBC # BLD AUTO: 6.3 10E3/UL (ref 4–11)

## 2025-04-14 PROCEDURE — 85014 HEMATOCRIT: CPT

## 2025-04-14 PROCEDURE — 258N000003 HC RX IP 258 OP 636

## 2025-04-14 PROCEDURE — 250N000013 HC RX MED GY IP 250 OP 250 PS 637

## 2025-04-14 PROCEDURE — 99232 SBSQ HOSP IP/OBS MODERATE 35: CPT

## 2025-04-14 PROCEDURE — 250N000013 HC RX MED GY IP 250 OP 250 PS 637: Performed by: NEUROLOGICAL SURGERY

## 2025-04-14 PROCEDURE — 97530 THERAPEUTIC ACTIVITIES: CPT | Mod: GP

## 2025-04-14 PROCEDURE — 250N000009 HC RX 250

## 2025-04-14 PROCEDURE — 250N000011 HC RX IP 250 OP 636

## 2025-04-14 PROCEDURE — 83735 ASSAY OF MAGNESIUM: CPT

## 2025-04-14 PROCEDURE — 84100 ASSAY OF PHOSPHORUS: CPT

## 2025-04-14 PROCEDURE — 97165 OT EVAL LOW COMPLEX 30 MIN: CPT | Mod: GO

## 2025-04-14 PROCEDURE — 250N000012 HC RX MED GY IP 250 OP 636 PS 637

## 2025-04-14 PROCEDURE — 97535 SELF CARE MNGMENT TRAINING: CPT | Mod: GO

## 2025-04-14 PROCEDURE — 36415 COLL VENOUS BLD VENIPUNCTURE: CPT

## 2025-04-14 PROCEDURE — 250N000013 HC RX MED GY IP 250 OP 250 PS 637: Performed by: NURSE PRACTITIONER

## 2025-04-14 PROCEDURE — 999N000127 HC STATISTIC PERIPHERAL IV START W US GUIDANCE

## 2025-04-14 PROCEDURE — 120N000002 HC R&B MED SURG/OB UMMC

## 2025-04-14 PROCEDURE — 97116 GAIT TRAINING THERAPY: CPT | Mod: GP

## 2025-04-14 PROCEDURE — 80048 BASIC METABOLIC PNL TOTAL CA: CPT

## 2025-04-14 RX ORDER — POLYETHYLENE GLYCOL 3350 17 G/17G
17 POWDER, FOR SOLUTION ORAL 2 TIMES DAILY
Status: DISCONTINUED | OUTPATIENT
Start: 2025-04-14 | End: 2025-04-16 | Stop reason: HOSPADM

## 2025-04-14 RX ORDER — MAGNESIUM OXIDE 400 MG/1
400 TABLET ORAL EVERY 4 HOURS
Status: COMPLETED | OUTPATIENT
Start: 2025-04-14 | End: 2025-04-14

## 2025-04-14 RX ORDER — LIDOCAINE 4 G/G
2 PATCH TOPICAL
Status: DISCONTINUED | OUTPATIENT
Start: 2025-04-14 | End: 2025-04-16 | Stop reason: HOSPADM

## 2025-04-14 RX ORDER — SODIUM CHLORIDE, SODIUM GLUCONATE, SODIUM ACETATE, POTASSIUM CHLORIDE AND MAGNESIUM CHLORIDE 526; 502; 368; 37; 30 MG/100ML; MG/100ML; MG/100ML; MG/100ML; MG/100ML
1000 INJECTION, SOLUTION INTRAVENOUS ONCE
Status: COMPLETED | OUTPATIENT
Start: 2025-04-14 | End: 2025-04-14

## 2025-04-14 RX ADMIN — TIZANIDINE 2 MG: 2 TABLET ORAL at 18:17

## 2025-04-14 RX ADMIN — HEPARIN SODIUM 5000 UNITS: 5000 INJECTION, SOLUTION INTRAVENOUS; SUBCUTANEOUS at 05:38

## 2025-04-14 RX ADMIN — MAGNESIUM OXIDE TAB 400 MG (241.3 MG ELEMENTAL MG) 400 MG: 400 (241.3 MG) TAB at 08:13

## 2025-04-14 RX ADMIN — ONDANSETRON 4 MG: 4 TABLET, ORALLY DISINTEGRATING ORAL at 12:58

## 2025-04-14 RX ADMIN — HYDROMORPHONE HYDROCHLORIDE 4 MG: 2 TABLET ORAL at 21:56

## 2025-04-14 RX ADMIN — MAGNESIUM OXIDE TAB 400 MG (241.3 MG ELEMENTAL MG) 400 MG: 400 (241.3 MG) TAB at 11:53

## 2025-04-14 RX ADMIN — ACETAMINOPHEN 975 MG: 325 TABLET, FILM COATED ORAL at 07:48

## 2025-04-14 RX ADMIN — LIDOCAINE 4% 2 PATCH: 40 PATCH TOPICAL at 10:42

## 2025-04-14 RX ADMIN — HEPARIN SODIUM 5000 UNITS: 5000 INJECTION, SOLUTION INTRAVENOUS; SUBCUTANEOUS at 21:57

## 2025-04-14 RX ADMIN — CLONAZEPAM 2 MG: 2 TABLET ORAL at 13:11

## 2025-04-14 RX ADMIN — HYDROMORPHONE HYDROCHLORIDE 4 MG: 2 TABLET ORAL at 03:40

## 2025-04-14 RX ADMIN — HYDROMORPHONE HYDROCHLORIDE 4 MG: 2 TABLET ORAL at 06:37

## 2025-04-14 RX ADMIN — TIZANIDINE 2 MG: 2 TABLET ORAL at 05:38

## 2025-04-14 RX ADMIN — HYDROMORPHONE HYDROCHLORIDE 4 MG: 2 TABLET ORAL at 11:53

## 2025-04-14 RX ADMIN — TIZANIDINE 2 MG: 2 TABLET ORAL at 23:21

## 2025-04-14 RX ADMIN — ACETAMINOPHEN 975 MG: 325 TABLET, FILM COATED ORAL at 16:41

## 2025-04-14 RX ADMIN — GUANFACINE 2 MG: 1 TABLET, EXTENDED RELEASE ORAL at 21:57

## 2025-04-14 RX ADMIN — ACETAMINOPHEN 975 MG: 325 TABLET, FILM COATED ORAL at 23:21

## 2025-04-14 RX ADMIN — HYDROMORPHONE HYDROCHLORIDE 4 MG: 2 TABLET ORAL at 15:01

## 2025-04-14 RX ADMIN — DIVALPROEX SODIUM 500 MG: 250 TABLET, DELAYED RELEASE ORAL at 07:48

## 2025-04-14 RX ADMIN — CLONAZEPAM 2 MG: 2 TABLET ORAL at 23:21

## 2025-04-14 RX ADMIN — SENNOSIDES AND DOCUSATE SODIUM 2 TABLET: 50; 8.6 TABLET ORAL at 20:14

## 2025-04-14 RX ADMIN — HEPARIN SODIUM 5000 UNITS: 5000 INJECTION, SOLUTION INTRAVENOUS; SUBCUTANEOUS at 15:01

## 2025-04-14 RX ADMIN — BUPRENORPHINE 4 MG: 2 TABLET SUBLINGUAL at 20:14

## 2025-04-14 RX ADMIN — HYDROMORPHONE HYDROCHLORIDE 4 MG: 2 TABLET ORAL at 18:17

## 2025-04-14 RX ADMIN — TIZANIDINE 2 MG: 2 TABLET ORAL at 11:53

## 2025-04-14 RX ADMIN — SODIUM CHLORIDE, SODIUM GLUCONATE, SODIUM ACETATE, POTASSIUM CHLORIDE AND MAGNESIUM CHLORIDE 1000 ML: 526; 502; 368; 37; 30 INJECTION, SOLUTION INTRAVENOUS at 22:28

## 2025-04-14 RX ADMIN — DIVALPROEX SODIUM 500 MG: 250 TABLET, DELAYED RELEASE ORAL at 20:14

## 2025-04-14 RX ADMIN — BUPRENORPHINE 4 MG: 2 TABLET SUBLINGUAL at 07:49

## 2025-04-14 RX ADMIN — KETAMINE HYDROCHLORIDE 5 MG/HR: 10 INJECTION INTRAMUSCULAR; INTRAVENOUS at 05:33

## 2025-04-14 ASSESSMENT — ACTIVITIES OF DAILY LIVING (ADL)
ADLS_ACUITY_SCORE: 39
ADLS_ACUITY_SCORE: 64
ADLS_ACUITY_SCORE: 42
ADLS_ACUITY_SCORE: 39
ADLS_ACUITY_SCORE: 39
ADLS_ACUITY_SCORE: 64
ADLS_ACUITY_SCORE: 39
ADLS_ACUITY_SCORE: 38
ADLS_ACUITY_SCORE: 39

## 2025-04-14 NOTE — PLAN OF CARE
Goal Outcome Evaluation:      Plan of Care Reviewed With: patient    Overall Patient Progress: no changeOverall Patient Progress: no change     BP 99/61 (BP Location: Left arm)   Pulse 60   Temp 98.4  F (36.9  C) (Oral)   Resp 18   Wt 62.6 kg (138 lb)   LMP 11/01/1999 (Exact Date)   SpO2 100%   BMI 20.38 kg/m      Status: POD # 3 from L4 full laminectomy and bilateral Microdiskectomy.   PMHx of right lower extremities extending into perineal infection, chronic pain, HLD, anxiety,  urinary retention   Neuro: A&Ox4. Baseline N/T to LLE. BUE 5/5; BLE 4/5. Otherwise neuro intact.  Cardiac: Afebrile, VSS.   Respiratory: RA   GI/: Voiding spontaneously. LBM PTA (4/10). + flatus. Audible BS   Diet/appetite: Tolerating diet. Nausea maintained at tolerable level with prn zofran  Activity: Up with assist of 1   Pain: . Continued reports of pain. Ketamine gtt maintained at ordered rate. PRN po dilaudid admin in addition to scheduled Tylenol, zanaflex.   Skin: back incision primapore. CDI. Dressing over umbilicus with piercing stabilizer in place.   Lines: PIV x2: sl'd and ketamine gtt  Replacement: RN managed replacement protocols in place. Awaiting am labs results for review.  Plan: ARU recommendation when medically ready for discharge    Rested btwn cares. Able to make needs known. Continue to monitor. Notify MD of changes/concerns      Problem: Adult Inpatient Plan of Care  Goal: Optimal Comfort and Wellbeing  Outcome: Not Progressing     Problem: Adult Inpatient Plan of Care  Goal: Readiness for Transition of Care  Outcome: Not Progressing     Problem: Surgery Nonspecified  Goal: Effective Bowel Elimination  Outcome: Not Progressing     Problem: Surgery Nonspecified  Goal: Optimal Pain Control and Function  Outcome: Not Progressing

## 2025-04-14 NOTE — PROGRESS NOTES
"   04/14/25 1100   Appointment Info   Signing Clinician's Name / Credentials (OT) MERRITT Horowitz   Rehab Comments (OT) spinal precs, leg numbness/buckling, watch BP's   Living Environment   People in Home parent(s)   Current Living Arrangements house   Home Accessibility stairs to enter home;stairs within home   Number of Stairs, Main Entrance 3   Stair Railings, Main Entrance railings safe and in good condition   Number of Stairs, Within Home, Primary ten   Stair Railings, Within Home, Primary railings on both sides of stairs   Transportation Anticipated family or friend will provide   Living Environment Comments Pt reports is currently homeless, however, stays at her parents house and lives in the basement. PT reports helping out mother due her recent fall.   Self-Care   Usual Activity Tolerance moderate   Current Activity Tolerance fair   Regular Exercise No   Equipment Currently Used at Home none   Fall history within last six months yes   Number of times patient has fallen within last six months 1   Activity/Exercise/Self-Care Comment Pt reports PLOF as IND with ADL and mobility within home. Pt reports no use of AD at baseline, however, reported using a cane as needed a few years ago for lower back pain.   Instrumental Activities of Daily Living (IADL)   IADL Comments Pt reports PLOF as IND with IADL tasks. Pt reports drives, does laundry and cooks as needed, howeber, limited task completion due to increase pain.   General Information   Onset of Illness/Injury or Date of Surgery 04/10/25   Referring Physician Emilia Mensah MD   Patient/Family Therapy Goal Statement (OT) To return home   Additional Occupational Profile Info/Pertinent History of Current Problem Per chart: \"Kym Perea is a 55 year old female with PMHx right lower extremity extending into perineal necrotizing infection about a year and a half ago, chronic pain disorder on suboxone, hyperlipidemia, and anxiety of presenting with " "worsening pain in her lower back shooting down her legs bilaterally and numbness along the right thigh posteriorly associated with worsening groin numbness and urinary retention. MRI imaging consistent with disc protrusion associated with spondylolisthesis at L4-5 causing critical central canal stenosis, which correlates with her clinical picture. She is POD #1 from Lumbar 4 Full Laminectomy and Bilateral Microdiscectomy with Dr. Mensah.\"   Existing Precautions/Restrictions spinal;fall   Left Upper Extremity (Weight-bearing Status) full weight-bearing (FWB)   Right Upper Extremity (Weight-bearing Status) full weight-bearing (FWB)   Left Lower Extremity (Weight-bearing Status) full weight-bearing (FWB)   Right Lower Extremity (Weight-bearing Status) full weight-bearing (FWB)   General Observations and Info Activity: up with assist   Cognitive Status Examination   Orientation Status orientation to person, place and time   Cognitive Status Comments cognition appears intact   Visual Perception   Visual Impairment/Limitations corrective lenses full-time   Sensory   Sensory Comments LE tingliness possibly d/t general deconditioning   Pain Assessment   Patient Currently in Pain Yes, see Vital Sign flowsheet   Posture   Posture protracted shoulders   Range of Motion Comprehensive   General Range of Motion no range of motion deficits identified   Strength Comprehensive (MMT)   Comment, General Manual Muscle Testing (MMT) Assessment General deconditioning   Muscle Tone Assessment   Muscle Tone Comments General deconditioning   Coordination   Upper Extremity Coordination No deficits were identified   Bed Mobility   Bed Mobility supine-sit   Comment (Bed Mobility) SBA   Transfers   Transfers bed-chair transfer;sit-stand transfer   Transfer Skill: Bed to Chair/Chair to Bed   Bed-Chair Brea (Transfers) contact guard   Sit-Stand Transfer   Sit-Stand Brea (Transfers) contact guard   Balance   Balance Assessment " standing static balance;standing dynamic balance   Balance Comments CGA   Activities of Daily Living   BADL Assessment/Intervention bathing;upper body dressing;lower body dressing;toileting   Bathing Assessment/Intervention   Harvey Level (Bathing) set up;supervision   Comment, (Bathing) Per clinical judgement   Upper Body Dressing Assessment/Training   Harvey Level (Upper Body Dressing) set up;supervision   Comment, (Upper Body Dressing) Per clinical judgement   Lower Body Dressing Assessment/Training   Harvey Level (Lower Body Dressing) set up;supervision   Comment, (Lower Body Dressing) Per clinical judgement   Toileting   Harvey Level (Toileting) set up;supervision   Comment, (Toileting) Per clinical judgement   Clinical Impression   Criteria for Skilled Therapeutic Interventions Met (OT) Yes, treatment indicated   OT Diagnosis Decrease IND in ADL function   OT Problem List-Impairments impacting ADL problems related to;activity tolerance impaired;mobility;muscle tone;strength;pain;post-surgical precautions   Assessment of Occupational Performance 3-5 Performance Deficits   Identified Performance Deficits Dressing, bathing, driving, activity tolerance, mobility, pain, post-op precautions   Planned Therapy Interventions (OT) ADL retraining;IADL retraining;strengthening;transfer training;home program guidelines;progressive activity/exercise;risk factor education   Risk & Benefits of therapy have been explained evaluation/treatment results reviewed;care plan/treatment goals reviewed;risks/benefits reviewed;current/potential barriers reviewed;participants voiced agreement with care plan;participants included;patient   Clinical Impression Comments Pt will benefit skilled OT treatment during IP stay to progress ADL/safety and return to PLOF   OT Total Evaluation Time   OT Eval, Low Complexity Minutes (29678) 10   OT Goals   Therapy Frequency (OT) 5 times/week   OT Predicted Duration/Target Date  for Goal Attainment 04/28/25   OT Goals Hygiene/Grooming;Upper Body Dressing;Lower Body Dressing;Upper Body Bathing;Lower Body Bathing;Toilet Transfer/Toileting   OT: Hygiene/Grooming modified independent;within precautions   OT: Upper Body Dressing Modified independent;within precautions   OT: Lower Body Dressing Modified independent;within precautions   OT: Upper Body Bathing Modified independent;within precautions   OT: Lower Body Bathing Modified independent;with precautions   OT: Toilet Transfer/Toileting Modified independent;within precautions   Self-Care/Home Management   Self-Care/Home Mgmt/ADL, Compensatory, Meal Prep Minutes (63987) 36   Symptoms Noted During/After Treatment (Meal Preparation/Planning Training) increased pain;significant change in vital signs   Treatment Detail/Skilled Intervention OT: Facilited funtional ADL for increase IND in everyday tasks. Pt greeted supine in bed. Evaluation complete and treatment indicated.  educated pt on spinal precautons for safety in mobility and ADL tasks. Pt verbally acknwoledged education and was able to teach back with 100% accuracy,  obtained BP, 87/79- RN aware and bp within pt's parameters. Pt reported no pain or dizziness.  faciliated bed mobillity, pt required SBA for safety.  obtained BP while pt seated EOB, 104/68. Pt with no reports of dizziness or changes in symptom. Pt STS from EOB required CGA and FWW for safety.  obtained BP, 100/54.  continued to assess for changes in symptom, pt with no changes. Pt SPT from EOB to recliner, required CGA and FWW for safe transfer. Pt reported small spasms in lower back when transfering, pt reports spasms consistent at baseline.  faciliated seated ADL of oral cares and face hygeine task. Pt required set up A and SBA thoughout for safety in task completion. Pt demonstrated good tolerance throughout. Time extended for pt to complete task.  obtained BP when pt finished ADL task, 102/78. Pt with no  changes in symptom. Th educated pt on compensatory strategies for activity tolerance and body mechanics for safe mobility and IND in ADL tasks. Pt acknowledged information. Pt left seat in recliner, call light in reach, chair alarm on and all needs met.   OT Discharge Planning   OT Plan Monitor BP, spinal prec. education + handout, standing tolerance, standing ADLs, toliet transfer using commode chair vs toliet   OT Discharge Recommendation (DC Rec) [facility]-Motivated patient will benefit from intensive, interdisciplinary therapy.  Anticipate will be able to tolerate 3 hours of therapy per day;home with assist   OT Rationale for DC Rec Pt below baseline due to post-op precautions and overall pain and weakness. Pt currently moving CGA-SBA with FWW for mobility on this date. Anticipate, may benefit from ARU to increase strength, independence, and safety with ADLs and mobility. Will continue to assess as she progresses.   OT Brief overview of current status Monitor BP, CGA-SBA with FWW for mobility   OT Total Distance Amb During Session (feet) 5   Total Session Time   Timed Code Treatment Minutes 36   Total Session Time (sum of timed and untimed services) 46

## 2025-04-14 NOTE — PROGRESS NOTES
[facility], [place]   04/14/2025  Neurosurgery Progress Note:      Interval History:   No acute interval events.   Passing flatus, no BM  PCA discontinued yesterday, started on oral dilaudid, remains on ketamine     Assessment:  Kym Perea is a 55 year old female with PMHx right lower extremity extending into perineal necrotizing infection about a year and a half ago, chronic pain disorder on suboxone, hyperlipidemia, and anxiety of presenting with worsening pain in her lower back shooting down her legs bilaterally and numbness along the right thigh posteriorly associated with worsening groin numbness and urinary retention. MRI imaging consistent with disc protrusion associated with spondylolisthesis at L4-5 causing critical central canal stenosis, which correlates with her clinical picture.     She is POD #3 from Lumbar 4 Full Laminectomy and Bilateral Microdiscectomy with Dr. Mensah.     Plan:    Neuro:  - Serial neuro exams  - Pain team consult placed to help manage post op pain, appreciate the recs:  - PO dilaudid for breakthrough pain  - Ketamine gtt per Pain team   - SBP < 160  - PRN antiemetics  - Bowel regimen: miralax, senna  - subcutaneous heparin for DVT prophylaxis  - PT/OT- ARU  - Bowel regimen increased       -----------------------------------  CHRISTY Prabhakar, CNP  Department of Neurosurgery  Pager: [phone]    -----------------------------------    Objective:   Temp:  [98.2  F (36.8  C)-98.5  F (36.9  C)] 98.5  F (36.9  C)  Pulse:  [50-68] 50  Resp:  [16-18] 16  BP: ()/(40-75) 97/74  SpO2:  [91 %-100 %] 99 %  I/O last 3 completed shifts:  In: 1484.55 [P.O.:1380; I.V.:104.55]  Out: 1325 [Urine:1325]    CV: extremities warm, well-perfused  PULM: breathing comfortably on room air  ABD: soft, non-distended  NEUROLOGIC:  -- Awake; Alert; oriented x 3  -- Follows commands briskly  -- Speech fluent, spontaneous. No aphasia or dysarthria.  -- no gaze  preference. No apparent hemineglect.  -- Intact rectal tone      Motor:  Normal bulk / tone; no tremor, rigidity, or bradykinesia.  No muscle wasting or fasciculations  No Pronator Drift  Motor exam of the lower extremities heavily limited by pain.       Delt Bi Tri Hand Flexion/  Extension Iliopsoas Quadriceps Hamstrings Tibialis Anterior Gastroc     C5 C6 C7 C8/T1 L2 L3 L4-S1 L4 S1   R 5 5 5 5 5 5 5 5 5   L 5 5 5 5 5 4+ 4+ 4+ 4+   *pain-limited weakness with exception of left TA weakness     Sensory:  decreased sensation on the right leg as compared to the left one, primarily posteriorly (S1-S2 dermatome). Decreased sensation in the groin and genital area, right worse than left (s/p necrotizing fascitis surgery).      LABS:  Recent Labs   Lab 04/14/25  0702 04/13/25  0728 04/12/25  0704    133* 134*   POTASSIUM 3.9 4.0 4.5   CHLORIDE 104 104 105   CO2 24 21* 22   ANIONGAP 7 8 7   GLC 95 104* 103*   BUN 9.8 10.7 13.4   CR 0.68 0.67 0.81   NORMA 8.4* 8.6* 8.4*       Recent Labs   Lab 04/14/25  0702   WBC 6.3   RBC 3.19*   HGB 9.8*   HCT 29.4*   MCV 92   MCH 30.7   MCHC 33.3   RDW 12.8   *       IMAGING:  No results found for this or any previous visit (from the past 24 hours).

## 2025-04-14 NOTE — PLAN OF CARE
Status: POD # 2 from L4 full laminectomy and bilateral Microdiskectomy. PMHx of right lower extremities extending into perineal infection, chronic pain, HLD, anxiety,  urinary retention.   Vitals:VSS ex soft BP, within MD parameter.   Neuros: AOX4. Lisa double/blurry vision. N/T to LLE at baseline. BUE 5/5 . BLE 4/5.   IV: R PIV infusing Ketamine 5 mg/hr & L PIV SL.   Labs/Electrolytes: . Mag redraw am.   Resp: RA. LSC. Lisa SOB.   Diet: Regular and poor po. Encourage intake of food and fluids.   :Voiding with some retention.   GI: LBM PTA. Passing gas. Refused schedule bowel medication.   Skin:Back incision covered with primapore CDI. Abdominal covered with dressing and UTV.   Pain: Back back rating 7-9/10, ketamine infusion, schedule tylenol and PRN atarax.   Activity: A1/GB and walker. Up on commode X2. Refused up on chair for meal or walk to bathroom d/t back pain.   Social: Pt's daughter visited at bedside this evening   Plan:CC following discharge plan and PT/OT recc ARU. Continue monitor and follow POC.   Goal Outcome Evaluation:    Plan of Care Reviewed With: patient  Overall Patient Progress: improvingOverall Patient Progress: improving

## 2025-04-14 NOTE — PLAN OF CARE
"Status: Pt POD #3 s/p L4 full laminectomy and bilateral microdiscectomy.   Vitals: VSS on RA ex hypotension this AM.   Neuros: A&O x4. Baseline N/T to BLE, worse on left. BUE 5/5, BLE 4/5.   IV: PIV infusing ketamine gtt at 5 mg/hr.   Labs/Electrolytes: See \"results.\" Mag replaced.  Resp: LS clear. Denies SOB.   Diet: Regular, fair intake.   : Voids spontaneously via bedside commode.   GI: LBM PTA. Per pt, going up to two weeks without a BM is her \"norm.\"  Skin: Back incision FERMÍN. Dressing over abdomen with piercing stabilizer in place.   Pain: C/o moderate to severe pain throughout shift, worse with activity. Ketamine gtt running at 5mg/hr. PO Dilaudid and Zanaflex also given - partially effective.   Activity: Assist of one, GB, walker. Up to bedside commode. Worked with therapies.   Social: No visitors this shift.   Plan: ARU at discharge. Pain management per pain team.   Updates this shift: Zofran given x1 for nausea.   Education completed: Pt educated on importance of not taking pain medications when BP is low.       Goal Outcome Evaluation:    Plan of Care Reviewed With: patient  Overall Patient Progress: no change  Outcome Evaluation: Pt tolerating current pain regimen. Low BP this shift - improved with PO fluids and pt lying down.      "

## 2025-04-14 NOTE — PROGRESS NOTES
Pain Service Progress Note  [facility]  Date: 04/14/2025       Patient Name: Kym Perea  MRN: [id number]  Age: 55 year old  Sex: female      Assessment:  55 year old female with PMH of chronic back pain, anxiety, and lumbar radiculopathy s/p L4/5 laminectomy on 4/11/25     Today, Gemini is seen along with bedside RN sitting up in bed and does not appear to be in acute distress. She reports pain is better controlled now with current regimen. Per RN, BP is <90/60 this morning, though patient is asymptomatic. This may be med associated side effect (tizanidine, opioids), see plan below for recommendation on med timing.         Plan/Recommendations:  asher:   Continue oral hydromorphone 2-4 mg Q3H/PRN  Continue buprenorphine 4 mg BID  Continue ketamine 5 mg/hr. Could consider stopping at any point, if pain is adequately controlled with oral medications.   Recommend transitioning tizanidine to PRN and consider 2+ hours in between oral Dilaudid doses to reduce hypotensive side effects.   Bowel regimen    Pain Service will continue to follow.    Discussed with attending anesthesiologist    Karie Best DNP APRN CNP  04/14/2025     Overnight Events: None     Tubes/Drains: No    Subjective:  Pain is better controlled now   Nausea: No  Vomiting: No  Pruritus: No    Pain Location:  Back    Pain Intensity:    Pain currently: 4/10   Satisfied with your level of pain control: Yes    Diet: Advance Diet as Tolerated: Regular Diet Adult; Regular Diet Adult    Relevant Labs:  Recent Labs   Lab Test 04/14/25  0702 04/12/25  0704 04/11/25  1215   INR  --   --  0.94   *   < > 128*   PTT  --   --  22   BUN 9.8   < > 21.8*    < > = values in this interval not displayed.       Physical Exam:  Vitals: /71 (BP Location: Right arm)   Pulse 50   Temp 98.3  F (36.8  C) (Oral)   Resp 18   Wt 62.6 kg (138 lb)   LMP 11/01/1999 (Exact Date)   SpO2 94%   BMI 20.38 kg/m      Physical Exam:    Orientation:  Alert, oriented, and in no acute distress: Yes  Sedation: No    Motor Examination:  5/5 Strength in lower extremities: Yes        Relevant Medications:  Current Pain Medications:  Medications related to Pain Management (From now, onward)      Start     Dose/Rate Route Frequency Ordered Stop    04/14/25 2000  polyethylene glycol (MIRALAX) Packet 17 g         17 g Oral 2 TIMES DAILY 04/14/25 0939      04/14/25 1000  Lidocaine (LIDOCARE) 4 % Patch 2 patch         2 patch  over 12 Hours Transdermal EVERY 24 HOURS 0800 04/14/25 0940      04/14/25 0000  bisacodyl (DULCOLAX) suppository 10 mg         10 mg Rectal DAILY PRN 04/11/25 2236      04/13/25 1322  HYDROmorphone (DILAUDID) tablet 2-4 mg         2-4 mg Oral EVERY 3 HOURS PRN 04/13/25 1323      04/13/25 0000  magnesium hydroxide (MILK OF MAGNESIA) suspension 30 mL         30 mL Oral DAILY PRN 04/11/25 2236 04/12/25 1030  ketamine (KETALAR) 2 mg/mL in sodium chloride 0.9 % 50 mL ANALGESIA infusion         5 mg/hr  2.5 mL/hr  Intravenous CONTINUOUS 04/12/25 1017      04/12/25 1030  tiZANidine (ZANAFLEX) tablet 2 mg         2 mg Oral EVERY 6 HOURS 04/12/25 1017      04/11/25 2330  divalproex sodium delayed-release (DEPAKOTE) DR tablet 500 mg         500 mg Oral 2 TIMES DAILY 04/11/25 1959 04/11/25 2330  buprenorphine (SUBUTEX) sublingual tablet 4 mg        Note to Pharmacy: PTA Sig:Place 1 tablet (8 mg) under the tongue 3 times daily.      4 mg Sublingual 2 TIMES DAILY 04/11/25 2305 04/11/25 2300  acetaminophen (TYLENOL) tablet 975 mg         975 mg Oral EVERY 8 HOURS 04/11/25 2236 04/11/25 2236  hydrOXYzine HCl (ATARAX) tablet 25 mg         25 mg Oral EVERY 6 HOURS PRN 04/11/25 2236 04/11/25 2236  lidocaine 1 % 0.1-1 mL         0.1-1 mL Other EVERY 1 HOUR PRN 04/11/25 2236 04/11/25 2236  lidocaine (LMX4) cream          Topical EVERY 1 HOUR PRN 04/11/25 2236 04/11/25 2130  senna-docusate (SENOKOT-S/PERICOLACE) 8.6-50 MG  "per tablet 2 tablet        Placed in \"Or\" Linked Group    2 tablet Oral 2 TIMES DAILY 04/11/25 2104 04/11/25 2020  diazepam (VALIUM) tablet 5 mg         5 mg Oral EVERY 6 HOURS PRN 04/11/25 2021 04/11/25 1959  clonazePAM (klonoPIN) tablet 2 mg        Note to Pharmacy: PTA Sig:Take 1 tablet (2 mg) by mouth 2 times daily for 3 days.      2 mg Oral 2 TIMES DAILY PRN 04/11/25 1959 04/11/25 1959  docusate sodium (COLACE) capsule 50 mg         50 mg Oral 2 TIMES DAILY PRN 04/11/25 1959              Primary Service Contacted with Recommendations? Yes - Tigerlily message sent to neurosurgery CHONG      40 MINUTES SPENT BY ME on the date of service doing chart review, history, exam, documentation & further activities per the note.      Acute Inpatient Pain Service [facility]  Hours of pain coverage 24/7   Please Page via Tigerlily  - Link to Tigerlily Here - Search Pain  Page via Amcom- Please Page the Pain Team Via Amcom: \"PAIN MANAGEMENT ACUTE INPATIENT/ [facility]\"             "

## 2025-04-15 ENCOUNTER — APPOINTMENT (OUTPATIENT)
Dept: OCCUPATIONAL THERAPY | Facility: CLINIC | Age: 55
DRG: 519 | End: 2025-04-15
Payer: COMMERCIAL

## 2025-04-15 LAB
ANION GAP SERPL CALCULATED.3IONS-SCNC: 9 MMOL/L (ref 7–15)
BUN SERPL-MCNC: 16 MG/DL (ref 6–20)
CALCIUM SERPL-MCNC: 8.4 MG/DL (ref 8.8–10.4)
CHLORIDE SERPL-SCNC: 102 MMOL/L (ref 98–107)
CREAT SERPL-MCNC: 0.64 MG/DL (ref 0.51–0.95)
EGFRCR SERPLBLD CKD-EPI 2021: >90 ML/MIN/1.73M2
ERYTHROCYTE [DISTWIDTH] IN BLOOD BY AUTOMATED COUNT: 12.7 % (ref 10–15)
GLUCOSE SERPL-MCNC: 97 MG/DL (ref 70–99)
HCO3 SERPL-SCNC: 23 MMOL/L (ref 22–29)
HCT VFR BLD AUTO: 27.4 % (ref 35–47)
HGB BLD-MCNC: 9.1 G/DL (ref 11.7–15.7)
MAGNESIUM SERPL-MCNC: 1.9 MG/DL (ref 1.7–2.3)
MCH RBC QN AUTO: 30.7 PG (ref 26.5–33)
MCHC RBC AUTO-ENTMCNC: 33.2 G/DL (ref 31.5–36.5)
MCV RBC AUTO: 93 FL (ref 78–100)
PHOSPHATE SERPL-MCNC: 4.2 MG/DL (ref 2.5–4.5)
PLATELET # BLD AUTO: 126 10E3/UL (ref 150–450)
POTASSIUM SERPL-SCNC: 4.2 MMOL/L (ref 3.4–5.3)
RBC # BLD AUTO: 2.96 10E6/UL (ref 3.8–5.2)
SODIUM SERPL-SCNC: 134 MMOL/L (ref 135–145)
WBC # BLD AUTO: 6 10E3/UL (ref 4–11)

## 2025-04-15 PROCEDURE — 99232 SBSQ HOSP IP/OBS MODERATE 35: CPT | Performed by: INTERNAL MEDICINE

## 2025-04-15 PROCEDURE — 250N000013 HC RX MED GY IP 250 OP 250 PS 637: Performed by: NURSE PRACTITIONER

## 2025-04-15 PROCEDURE — 99232 SBSQ HOSP IP/OBS MODERATE 35: CPT | Performed by: PHYSICIAN ASSISTANT

## 2025-04-15 PROCEDURE — 250N000011 HC RX IP 250 OP 636

## 2025-04-15 PROCEDURE — 250N000013 HC RX MED GY IP 250 OP 250 PS 637

## 2025-04-15 PROCEDURE — 82310 ASSAY OF CALCIUM: CPT

## 2025-04-15 PROCEDURE — 120N000002 HC R&B MED SURG/OB UMMC

## 2025-04-15 PROCEDURE — 83735 ASSAY OF MAGNESIUM: CPT

## 2025-04-15 PROCEDURE — 84100 ASSAY OF PHOSPHORUS: CPT

## 2025-04-15 PROCEDURE — 250N000012 HC RX MED GY IP 250 OP 636 PS 637

## 2025-04-15 PROCEDURE — 80048 BASIC METABOLIC PNL TOTAL CA: CPT

## 2025-04-15 PROCEDURE — 250N000009 HC RX 250

## 2025-04-15 PROCEDURE — 258N000003 HC RX IP 258 OP 636

## 2025-04-15 PROCEDURE — 85027 COMPLETE CBC AUTOMATED: CPT

## 2025-04-15 PROCEDURE — 250N000013 HC RX MED GY IP 250 OP 250 PS 637: Performed by: NEUROLOGICAL SURGERY

## 2025-04-15 PROCEDURE — 36415 COLL VENOUS BLD VENIPUNCTURE: CPT

## 2025-04-15 PROCEDURE — 97535 SELF CARE MNGMENT TRAINING: CPT | Mod: GO

## 2025-04-15 PROCEDURE — 97530 THERAPEUTIC ACTIVITIES: CPT | Mod: GO

## 2025-04-15 RX ORDER — AMOXICILLIN 250 MG
2 CAPSULE ORAL 2 TIMES DAILY
Qty: 56 TABLET | Refills: 0 | Status: SHIPPED | OUTPATIENT
Start: 2025-04-15 | End: 2025-04-29

## 2025-04-15 RX ORDER — LACTULOSE 10 G/10G
20 SOLUTION ORAL 3 TIMES DAILY
Status: DISCONTINUED | OUTPATIENT
Start: 2025-04-15 | End: 2025-04-16 | Stop reason: HOSPADM

## 2025-04-15 RX ORDER — TIZANIDINE 2 MG/1
2 TABLET ORAL EVERY 6 HOURS
Qty: 30 TABLET | Refills: 0 | Status: SHIPPED | OUTPATIENT
Start: 2025-04-15 | End: 2025-04-16

## 2025-04-15 RX ORDER — HYDROMORPHONE HYDROCHLORIDE 2 MG/1
2-4 TABLET ORAL
Qty: 50 TABLET | Refills: 0 | Status: SHIPPED | OUTPATIENT
Start: 2025-04-15 | End: 2025-04-16

## 2025-04-15 RX ORDER — MAGNESIUM OXIDE 400 MG/1
400 TABLET ORAL EVERY 4 HOURS
Status: COMPLETED | OUTPATIENT
Start: 2025-04-15 | End: 2025-04-15

## 2025-04-15 RX ADMIN — HYDROMORPHONE HYDROCHLORIDE 4 MG: 2 TABLET ORAL at 21:19

## 2025-04-15 RX ADMIN — HYDROMORPHONE HYDROCHLORIDE 4 MG: 2 TABLET ORAL at 12:40

## 2025-04-15 RX ADMIN — HYDROMORPHONE HYDROCHLORIDE 4 MG: 2 TABLET ORAL at 17:11

## 2025-04-15 RX ADMIN — HEPARIN SODIUM 5000 UNITS: 5000 INJECTION, SOLUTION INTRAVENOUS; SUBCUTANEOUS at 14:10

## 2025-04-15 RX ADMIN — TIZANIDINE 2 MG: 2 TABLET ORAL at 14:10

## 2025-04-15 RX ADMIN — MAGNESIUM OXIDE TAB 400 MG (241.3 MG ELEMENTAL MG) 400 MG: 400 (241.3 MG) TAB at 14:10

## 2025-04-15 RX ADMIN — BUPRENORPHINE 4 MG: 2 TABLET SUBLINGUAL at 07:37

## 2025-04-15 RX ADMIN — BUPRENORPHINE 4 MG: 2 TABLET SUBLINGUAL at 21:13

## 2025-04-15 RX ADMIN — ACETAMINOPHEN 975 MG: 325 TABLET, FILM COATED ORAL at 07:36

## 2025-04-15 RX ADMIN — GUANFACINE 2 MG: 1 TABLET, EXTENDED RELEASE ORAL at 22:42

## 2025-04-15 RX ADMIN — HYDROMORPHONE HYDROCHLORIDE 4 MG: 2 TABLET ORAL at 02:08

## 2025-04-15 RX ADMIN — CLONAZEPAM 2 MG: 2 TABLET ORAL at 21:52

## 2025-04-15 RX ADMIN — HEPARIN SODIUM 5000 UNITS: 5000 INJECTION, SOLUTION INTRAVENOUS; SUBCUTANEOUS at 06:33

## 2025-04-15 RX ADMIN — CLONAZEPAM 2 MG: 2 TABLET ORAL at 07:59

## 2025-04-15 RX ADMIN — TIZANIDINE 2 MG: 2 TABLET ORAL at 06:33

## 2025-04-15 RX ADMIN — ACETAMINOPHEN 975 MG: 325 TABLET, FILM COATED ORAL at 15:47

## 2025-04-15 RX ADMIN — MAGNESIUM HYDROXIDE 30 ML: 400 SUSPENSION ORAL at 07:37

## 2025-04-15 RX ADMIN — NICOTINE POLACRILEX 2 MG: 2 GUM, CHEWING BUCCAL at 10:38

## 2025-04-15 RX ADMIN — DIVALPROEX SODIUM 500 MG: 250 TABLET, DELAYED RELEASE ORAL at 07:36

## 2025-04-15 RX ADMIN — KETAMINE HYDROCHLORIDE 5 MG/HR: 10 INJECTION INTRAMUSCULAR; INTRAVENOUS at 01:27

## 2025-04-15 RX ADMIN — HYDROMORPHONE HYDROCHLORIDE 4 MG: 2 TABLET ORAL at 06:52

## 2025-04-15 RX ADMIN — DIAZEPAM 5 MG: 5 TABLET ORAL at 09:59

## 2025-04-15 RX ADMIN — DIAZEPAM 5 MG: 5 TABLET ORAL at 19:12

## 2025-04-15 RX ADMIN — SENNOSIDES AND DOCUSATE SODIUM 2 TABLET: 50; 8.6 TABLET ORAL at 07:37

## 2025-04-15 RX ADMIN — MAGNESIUM OXIDE TAB 400 MG (241.3 MG ELEMENTAL MG) 400 MG: 400 (241.3 MG) TAB at 10:38

## 2025-04-15 RX ADMIN — LIDOCAINE 4% 2 PATCH: 40 PATCH TOPICAL at 07:37

## 2025-04-15 RX ADMIN — DIVALPROEX SODIUM 500 MG: 250 TABLET, DELAYED RELEASE ORAL at 21:13

## 2025-04-15 ASSESSMENT — ACTIVITIES OF DAILY LIVING (ADL)
ADLS_ACUITY_SCORE: 38

## 2025-04-15 NOTE — PROGRESS NOTES
"Care Management Follow Up    Length of Stay (days): 4    Expected Discharge Date: 04/16/2025     Concerns to be Addressed: Discharge planning     Patient plan of care discussed at interdisciplinary rounds: Yes    Anticipated Discharge Disposition: Pt states that she is homeless and was staying in her parents basement prior to hospitalization.  Pt states that she will either return to her parents or will \"couch hop\" at a friends.  Pt states that she used to live in public housing but owes \"HRA\" money.  For this reason,  pt states that she does not believe that she would be eligible to return to public housing.  Pt also states that she would not be open to returning to public housing as she does not want to \"live by all of their rules.\"  Pt voices familiarity with homeless shelters indicating that she used to work at the shelter ([employer]) in [place].  Pt states that she would not be open to staying in any homeless shelter ([place] or [place]).   Pt states that she will be returning to the [place] area upon discharge from [facility].              Anticipated Discharge Services: OT and Physical Therapy recommend discharge to home  Anticipated Discharge DME:   Not applicable at this time    Patient/family educated on Medicare website which has current facility and service quality ratings:  Not applicable at this time  Education Provided on the Discharge Plan: Yes  Patient/Family in Agreement with the Plan:  Yes    Referrals Placed by CM/TONY:   None  Private pay costs discussed: TONY has informed pt that transport back to [place] would be private pay if pt is not able to secure family/friend to provide transport.    Discussed  Partnership in Safe Discharge Planning  document with patient/family: No     Handoff Completed: No, handoff not indicated or clinically appropriate    Additional Information:  TONY initially met with pt as Phy Therapy was recommending discharge to home and OT was recommending ARU.  " "However,  OT has since changed their recommendation to home.  Per Emiliana Carlton NP, pt will be ready for discharge on 4/16/2025 or 4/17/2025.    SW met with pt who stated that she is homeless and was staying in her parents basement prior to hospitalization.  Pt states that she will either return to her parents or will \"couch hop\" at a friends.  Pt states that she used to live in public housing but owes \"HRA\" money.  For this reason,  pt states that she does not believe that she would be eligible to return to public housing.  Pt also states that she would not be open to returning to public housing as she does not want to \"live by all of their rules.\"  Pt voices familiarity with homeless shelters indicating that she used to work at the shelter in [place].  Pt states that she would not be open to staying in any homeless shelter ([place] or [place]).   Pt states that she will be returning to the [place] area upon discharge from [facility].  Pt states that she was flown to [facility] from the [facility].  Pt states that her car is parked in the ED at the [facility] and she has been in contact with a friend today to request their assistance in getting the car moved to her parents home.  Pt states that she receives SSD benefits of $1350.00 per month.  Pt states that she had a EBT card in the past and was eligible for food assistance but states that she is no longer eligible for this benefit.   Pt declined offered food shelf information states that she is familiar with the available food resources in [place].   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  Pt states that she was not utilizing any community resources.  Pt states that she see's a therapist  through \"[facility]\" in [place] monthly for a history of depression, anxiety, PTSD and panic attacks.   Pt states that her mental health needs are also treated with medication therapy. .  Pt states that her mom is age 74 and her father is age " "78.  Pt states that she has just one sibling, a brother who does not have much contact with the family.    Pt is  and has one child (Alessandro) who resides in [place].  Pt states that her relationship with Alessandro \"is a little different right now.\"  Pt chose not to expand on this.  Pt voiced concern about how she will get back to [place].  SW informed pt that the 6A CHW can assist with arranging a transport but it would be private pay.  Pt stated that if she has a day in advance notice she will be able to find someone to provide transport.  Pt denies having financial concerns.  TONY informed Emiliana Bianka NP that pt needs 1 day advance notice of discharge. Emiliana states that she has told pt that she will be ready for discharge on 4/16 or 4/17/2025, whichever day she has transport.      Next Steps:  Pt does not have her own home.  Pt states that she will indep make arrangements for where she will stay upon discharge.  Pt declines offered shelter and states that she is not planning to seek public housing.  Pt states that she is not eligible for food assistance through the [facility] and declined offered food shelf information.   Pt states that she will indep work on making her transportation arrangements for discharge to home.    ROSA Gordillo  Social Work, 6A  Phone:  [phone]  Pager:  [phone]  4/15/2025       RE Castro     "

## 2025-04-15 NOTE — PROGRESS NOTES
"Care Management Follow Up    Length of Stay (days): 4    Expected Discharge Date: 04/16/2025     Concerns to be Addressed: Discharge planning     Patient plan of care discussed at interdisciplinary rounds: Yes    Anticipated Discharge Disposition: Home              Anticipated Discharge Services:  Home  Anticipated Discharge DME:   Not applicable at this time    Patient/family educated on Medicare website which has current facility and service quality ratings:  Not applicable at this time  Education Provided on the Discharge Plan: Yes  Patient/Family in Agreement with the Plan:  Yes    Referrals Placed by CM/SW:   None  Private pay costs discussed: transportation costs    Discussed  Partnership in Safe Discharge Planning  document with patient/family: No     Handoff Completed: No, handoff not indicated or clinically appropriate    Additional Information:    TONY received a message to call pt's son (Alessandro) [phone]).  TONY met with pt who gave verbal permission for TONY to speak with Alessandro and pt states that there are no restrictions in regards to what information can be shared.  TONY phoned Alessandro.  Alessandro inquired about discharge planning. TONY informed Alessandro that OT/PT are recommending discharge to home. TONY informed Alessandro that discharge is anticipated on 4/16/2025 or 4/17/2025.  TONY voiced awareness that pt does not have her own home and was living with parents prior to this hospitalization.  TONY informed Alessandro of contents of conversation with pt as documented in this SW's 11:29am 4/1/52025 progress note entry.   TONY informed Alessandro that pt declined offered shelter stay.   Alessandro states that he does not have space for pt to stay with him.  Alessandro states that he and  pt do not have \"a great relationship.\"   Alessandro states that he was hopeful that a rehab stay would be recommended as pt will not have anyone to assist her upon discharge from acute hospitalization with adl's or IADL's.  Alessandro states that after pt's last " "surgery pt discharged directly to home and \"that didn't go so well.\"  TONY stated that as therapy's are recommending discharge to home pt would not  have coverage for a TCU stay but could pay privately for a respite stay within a SNF.  Alessandro states that this would not be affordable.  Alessandro states that he will check with his Godmother in regards to whether or not pt could stay with her.  Alessandro stated that pt would not have available transport to home.   TONY informed Alessandro that transport arrangements can be made for pt but would be private  pay.  Alessandro states that he will work on trying to secure transport for pt.  TONY updated the 6A Physical Therapist and the 6A RNCC (Licha).      Next Steps: Anticipate follow up call from Alessandro.    ROSA Gordillo  Social Work, 6A  Phone:  [phone]  Pager:  [phone]  4/15/2025       "

## 2025-04-15 NOTE — PROGRESS NOTES
[facility], [place]   04/15/2025  Neurosurgery Progress Note:      Interval History:   Ketamine gtt discontinued  Passing flatus, no BM  PT/OT continue to recommending  HWA vs ARU  Remains hypotensive at times, states this is her baseline.  Denies dizziness.  Hypotension has been documented in her chart in the past and felt to be secondary to intake.   States she is tolerating diet and drinking fluids appropriately     Assessment:  Kym Perea is a 55 year old female with PMHx right lower extremity extending into perineal necrotizing infection about a year and a half ago, chronic pain disorder on suboxone, hyperlipidemia, and anxiety of presenting with worsening pain in her lower back shooting down her legs bilaterally and numbness along the right thigh posteriorly associated with worsening groin numbness and urinary retention. MRI imaging consistent with disc protrusion associated with spondylolisthesis at L4-5 causing critical central canal stenosis, which correlates with her clinical picture.     She is POD #4 from Lumbar 4 Full Laminectomy and Bilateral Microdiscectomy with Dr. Mensah.     Plan:    Neuro:  - Serial neuro exams  - Pain team consult placed to help manage post op pain, appreciate the recs:  - PO dilaudid for breakthrough pain  - scheduled Robaxin and PRN valium for muscle relaxation   - SBP < 160  - PRN antiemetics  - Bowel regimen: miralax, senna  - subcutaneous heparin for DVT prophylaxis  - PT/OT- ARU  - Bowel regimen increased       -----------------------------------  CHRISTY Prabhakar, CNP  Department of Neurosurgery  Pager: [phone]    -----------------------------------    Objective:   Temp:  [97.5  F (36.4  C)-98.3  F (36.8  C)] 98.2  F (36.8  C)  Pulse:  [51] 51  Resp:  [14-18] 16  BP: ()/(47-75) 90/47  SpO2:  [94 %-100 %] 95 %  I/O last 3 completed shifts:  In: -   Out: 300 [Urine:300]    CV: extremities warm, well-perfused  PULM: breathing  comfortably on room air  ABD: soft, non-distended  NEUROLOGIC:  -- Awake; Alert; oriented x 3  -- Follows commands briskly  -- Speech fluent, spontaneous. No aphasia or dysarthria.  -- no gaze preference. No apparent hemineglect.  -- Intact rectal tone      Motor:  Normal bulk / tone; no tremor, rigidity, or bradykinesia.  No muscle wasting or fasciculations  No Pronator Drift  Motor exam of the lower extremities heavily limited by pain.       Delt Bi Tri Hand Flexion/  Extension Iliopsoas Quadriceps Hamstrings Tibialis Anterior Gastroc     C5 C6 C7 C8/T1 L2 L3 L4-S1 L4 S1   R 5 5 5 5 5 5 5 5 5   L 5 5 5 5 5 4+ 4+ 4+ 4+   *pain-limited weakness with exception of left TA weakness     Sensory:  decreased sensation on the right leg as compared to the left one, primarily posteriorly (S1-S2 dermatome). Decreased sensation in the groin and genital area, right worse than left (s/p necrotizing fascitis surgery).      LABS:  Recent Labs   Lab 04/15/25  0655 04/14/25  0702 04/13/25  0728   * 135 133*   POTASSIUM 4.2 3.9 4.0   CHLORIDE 102 104 104   CO2 23 24 21*   ANIONGAP 9 7 8   GLC 97 95 104*   BUN 16.0 9.8 10.7   CR 0.64 0.68 0.67   NORMA 8.4* 8.4* 8.6*       Recent Labs   Lab 04/15/25  0655   WBC 6.0   RBC 2.96*   HGB 9.1*   HCT 27.4*   MCV 93   MCH 30.7   MCHC 33.2   RDW 12.7   *       IMAGING:  No results found for this or any previous visit (from the past 24 hours).

## 2025-04-15 NOTE — PROGRESS NOTES
Care Management Follow Up    Length of Stay (days): 4    Expected Discharge Date: 04/16/2025     Concerns to be Addressed: discharge planning     Patient plan of care discussed at interdisciplinary rounds: Yes    Anticipated Discharge Disposition: Plans to stay with parents vs friend  Anticipated Discharge Services: Outpatient PT  Anticipated Discharge DME: None    Patient/family educated on Medicare website which has current facility and service quality ratings:  NA  Education Provided on the Discharge Plan: Yes  Patient/Family in Agreement with the Plan:  Yes    Referrals Placed by CM/SW:  NA  Private pay costs discussed: Not applicable    Discussed  Partnership in Safe Discharge Planning  document with patient/family: Not at this time     Handoff Completed: No, handoff not indicated or clinically appropriate at this time     Additional Information:  Patient status reviewed, discussed in discharge rounds. Per provider, patient may be medically ready for discharge tomorrow. OT updated their recommendations to home with assist and PT recommends home with home care.     Met with patient to discuss discharge planning and therapy recommendations. Patient states she is unsure if she will be staying with her parents or a friend following discharge. She declined home care and would prefer to attend outpatient therapy. She states she is currently working to secure a ride home.     Provider will need to enter outpatient PT orders for discharge.     Next Steps:  RNCC will continue to follow  - Ensure outpatient PT orders are placed for discharge.     Licha Lundy RN, BSN  6A RN Care Coordinator  Ph: [phone]   Patricia: MOI Neuro RNCC

## 2025-04-15 NOTE — PLAN OF CARE
Goal Outcome Evaluation:      Plan of Care Reviewed With: patient    Overall Patient Progress: no change       Status: POD # 3/4 from L4 full laminectomy and bilateral microdiscectomy.   PMHx of right lower extremities extending into perineal infection, chronic pain, HLD, anxiety, urinary retention   Neuro: A&Ox4. BUE: 4/5, LUE 4/5 c tingling and numbness (baseline) to LLE: discomfort during palpation.   Cardiac: Hypotension, team aware. Bolus 1000mL of Plasma-lyte given @ 250mL/hr. OVSS, RA.           Respiratory: WDL  GI/: +BS, flatus, no BM overnight. Pt declined PO milk of mag and MiraLAX this shift.   Diet/appetite: Reg/fair.  Activity: Ax1 c GB and walker, up to bathroom and back to bed this shift x1.   Pain: 7-8/10 managed c PRN PO hydromorphone, PCA ketamine gtt @ 5mg/hr, and scheduled meds c mild relief. Ketamine cartridge replaced this shift.   Skin: lower back incision FERMÍN w/o drainage. noted bruising to LUE near AC.   Lines: L PIV x2 (1) infusing, (1) SL.   Replacement: RN managed electrolyte replacement protocol in place, refer to labs in the morning.  Updates this shift: 2nd PIV placed. Bolus 1000mL of Plasma-lyte given @ 250mL/hr for hypotension.    Plan: ARU when med-ready for d/c

## 2025-04-15 NOTE — PROGRESS NOTES
Pain Service Progress Note  [facility]  Date: 04/15/2025       Patient Name: Kym Perea  MRN: [id number]  Age: 55 year old  Sex: female      Assessment:  Kym Preea is a 55 year old female who  has PMH of chronic back pain, anxiety, and lumbar radiculopathy s/p L4/5 laminectomy on 4/11/25 .    Gemini seen with RN at the bedside. States pain is tolerable at 5-6/10, which is close to baseline pain level.  Feels PO Dilaudid 4mg (x6) is providing relief.  Ketamine infusion is discontinued.  Discussed as pain improves and nearer to discharge , goal is to taper down and off PO Dilaudid. She will contact outpatient Subutex prescriber  (psychiatrist) for continued management.     Plan/Recommendations:  Continue oral hydromorphone 2-4 mg Q3H/PRN while inpatient.   To taper, recommend looking at last 24 hours of PO Dilaudid use and start at that dose for 1-2days then taper by 1 dose per day every 1-2days until zero/day   Continue buprenorphine 4 mg BID.   She will contact outpatient Subutex prescriber  (psychiatrist) for continued management.  NOte PTA, was on 8mg TID.   discontinue ketamine 5 mg/hr  tizanidine PRN and consider 2+ hours in between oral Dilaudid doses to reduce hypotensive side effects.   Bowel regimen        Pain Service will sign off.    Discussed with attending anesthesiologist    Carley De Leon PA-C  04/15/2025       Diet: Advance Diet as Tolerated: Regular Diet Adult; Regular Diet Adult  Diet    Relevant Labs:  Recent Labs   Lab Test 04/15/25  0655 04/12/25  0704 04/11/25  1215   INR  --   --  0.94   *   < > 128*   PTT  --   --  22   BUN 16.0   < > 21.8*    < > = values in this interval not displayed.       Physical Exam:  Vitals: /44 (BP Location: Right arm)   Pulse 58   Temp 98.7  F (37.1  C) (Oral)   Resp 16   Wt 62.6 kg (138 lb)   LMP 11/01/1999 (Exact Date)   SpO2 98%   BMI 20.38 kg/m      Physical Exam:   CONSTITUTIONAL/GENERAL  APPEARANCE: Conversant.  EYES: EOMI, sclerae clear  ENT/NECK: neck is supple  RESPIRATORY:non labored breathing, on room air  CARDIOVASCULAR: bradycardia    MUSCULOSKELETAL/BACK/SPINE/EXTREMITIES: Moving UE and LE independently.     NEURO:  AAOx3.   SKIN/VASCULAR EXAM:  Dry and warm.  PSYCHIATRIC/BEHAVIORAL/OBSERVATIONS:  No objective signs of pain observed during our interview.   Judgment/Insight -fair   Orientation - x3   Memory -fair   Mood and affect - calm, pleasant, cooperative         Relevant Medications:  Current Pain Medications:  Medications related to Pain Management (From now, onward)      Start     Dose/Rate Route Frequency Ordered Stop    04/15/25 1400  lactulose (CEPHULAC) Packet 20 g         20 g Oral 3 TIMES DAILY 04/15/25 1147      04/15/25 0800  glycerin (ADULT) Suppository 1 suppository         1 suppository Rectal DAILY 04/14/25 1836      04/15/25 0000  tiZANidine (ZANAFLEX) 2 MG tablet         2 mg Oral EVERY 6 HOURS 04/15/25 1444      04/15/25 0000  HYDROmorphone (DILAUDID) 2 MG tablet         2-4 mg Oral EVERY 3 HOURS PRN 04/15/25 1444      04/15/25 0000  senna-docusate (SENOKOT-S/PERICOLACE) 8.6-50 MG tablet         2 tablet Oral 2 TIMES DAILY 04/15/25 1444 04/29/25 2359    04/14/25 2000  polyethylene glycol (MIRALAX) Packet 17 g         17 g Oral 2 TIMES DAILY 04/14/25 0939      04/14/25 1900  magnesium hydroxide (MILK OF MAGNESIA) suspension 30 mL         30 mL Oral DAILY 04/14/25 1836      04/14/25 1000  Lidocaine (LIDOCARE) 4 % Patch 2 patch         2 patch  over 12 Hours Transdermal EVERY 24 HOURS 0800 04/14/25 0940      04/14/25 0000  bisacodyl (DULCOLAX) suppository 10 mg         10 mg Rectal DAILY PRN 04/11/25 2236      04/13/25 1322  HYDROmorphone (DILAUDID) tablet 2-4 mg         2-4 mg Oral EVERY 3 HOURS PRN 04/13/25 1323      04/12/25 1030  tiZANidine (ZANAFLEX) tablet 2 mg         2 mg Oral EVERY 6 HOURS 04/12/25 1017      04/11/25 3038  divalproex sodium delayed-release  "(DEPAKOTE) DR tablet 500 mg         500 mg Oral 2 TIMES DAILY 04/11/25 1959 04/11/25 2330  buprenorphine (SUBUTEX) sublingual tablet 4 mg        Note to Pharmacy: PTA Sig:Place 1 tablet (8 mg) under the tongue 3 times daily.      4 mg Sublingual 2 TIMES DAILY 04/11/25 2305 04/11/25 2300  acetaminophen (TYLENOL) tablet 975 mg         975 mg Oral EVERY 8 HOURS 04/11/25 2236 04/11/25 2236  hydrOXYzine HCl (ATARAX) tablet 25 mg         25 mg Oral EVERY 6 HOURS PRN 04/11/25 2236 04/11/25 2236  lidocaine 1 % 0.1-1 mL         0.1-1 mL Other EVERY 1 HOUR PRN 04/11/25 2236 04/11/25 2236  lidocaine (LMX4) cream          Topical EVERY 1 HOUR PRN 04/11/25 2236 04/11/25 2130  senna-docusate (SENOKOT-S/PERICOLACE) 8.6-50 MG per tablet 2 tablet        Placed in \"Or\" Linked Group    2 tablet Oral 2 TIMES DAILY 04/11/25 2104 04/11/25 2020  diazepam (VALIUM) tablet 5 mg         5 mg Oral EVERY 6 HOURS PRN 04/11/25 2021 04/11/25 1959  clonazePAM (klonoPIN) tablet 2 mg        Note to Pharmacy: PTA Sig:Take 1 tablet (2 mg) by mouth 2 times daily for 3 days.      2 mg Oral 2 TIMES DAILY PRN 04/11/25 1959 04/11/25 1959  docusate sodium (COLACE) capsule 50 mg         50 mg Oral 2 TIMES DAILY PRN 04/11/25 1959                        Acute Inpatient Pain Service [facility]  Hours of pain coverage 24/7   Please Page via Vocera  - Link to Vocera Here - Search Pain  Page via Amcom- Please Page the Pain Team Via Amcom: \"PAIN MANAGEMENT ACUTE INPATIENT/ [facility]\"            "

## 2025-04-16 ENCOUNTER — APPOINTMENT (OUTPATIENT)
Dept: OCCUPATIONAL THERAPY | Facility: CLINIC | Age: 55
DRG: 519 | End: 2025-04-16
Payer: COMMERCIAL

## 2025-04-16 VITALS
BODY MASS INDEX: 20.38 KG/M2 | SYSTOLIC BLOOD PRESSURE: 113 MMHG | HEART RATE: 62 BPM | DIASTOLIC BLOOD PRESSURE: 67 MMHG | TEMPERATURE: 98 F | OXYGEN SATURATION: 95 % | RESPIRATION RATE: 18 BRPM | WEIGHT: 138 LBS

## 2025-04-16 LAB
ANION GAP SERPL CALCULATED.3IONS-SCNC: 6 MMOL/L (ref 7–15)
BUN SERPL-MCNC: 16 MG/DL (ref 6–20)
CALCIUM SERPL-MCNC: 8.3 MG/DL (ref 8.8–10.4)
CHLORIDE SERPL-SCNC: 105 MMOL/L (ref 98–107)
CREAT SERPL-MCNC: 0.65 MG/DL (ref 0.51–0.95)
EGFRCR SERPLBLD CKD-EPI 2021: >90 ML/MIN/1.73M2
ERYTHROCYTE [DISTWIDTH] IN BLOOD BY AUTOMATED COUNT: 12.9 % (ref 10–15)
GLUCOSE SERPL-MCNC: 97 MG/DL (ref 70–99)
HCO3 SERPL-SCNC: 24 MMOL/L (ref 22–29)
HCT VFR BLD AUTO: 27.9 % (ref 35–47)
HGB BLD-MCNC: 9.1 G/DL (ref 11.7–15.7)
MAGNESIUM SERPL-MCNC: 2.2 MG/DL (ref 1.7–2.3)
MCH RBC QN AUTO: 30.4 PG (ref 26.5–33)
MCHC RBC AUTO-ENTMCNC: 32.6 G/DL (ref 31.5–36.5)
MCV RBC AUTO: 93 FL (ref 78–100)
PHOSPHATE SERPL-MCNC: 4.2 MG/DL (ref 2.5–4.5)
PLATELET # BLD AUTO: 136 10E3/UL (ref 150–450)
POTASSIUM SERPL-SCNC: 4.5 MMOL/L (ref 3.4–5.3)
RBC # BLD AUTO: 2.99 10E6/UL (ref 3.8–5.2)
SODIUM SERPL-SCNC: 135 MMOL/L (ref 135–145)
WBC # BLD AUTO: 5.1 10E3/UL (ref 4–11)

## 2025-04-16 PROCEDURE — 97535 SELF CARE MNGMENT TRAINING: CPT | Mod: GO | Performed by: OCCUPATIONAL THERAPIST

## 2025-04-16 PROCEDURE — 99232 SBSQ HOSP IP/OBS MODERATE 35: CPT | Mod: GC | Performed by: INTERNAL MEDICINE

## 2025-04-16 PROCEDURE — 250N000012 HC RX MED GY IP 250 OP 636 PS 637

## 2025-04-16 PROCEDURE — 84100 ASSAY OF PHOSPHORUS: CPT

## 2025-04-16 PROCEDURE — 36415 COLL VENOUS BLD VENIPUNCTURE: CPT

## 2025-04-16 PROCEDURE — 85027 COMPLETE CBC AUTOMATED: CPT

## 2025-04-16 PROCEDURE — 250N000013 HC RX MED GY IP 250 OP 250 PS 637: Performed by: NURSE PRACTITIONER

## 2025-04-16 PROCEDURE — 250N000013 HC RX MED GY IP 250 OP 250 PS 637

## 2025-04-16 PROCEDURE — 80048 BASIC METABOLIC PNL TOTAL CA: CPT

## 2025-04-16 PROCEDURE — 258N000003 HC RX IP 258 OP 636

## 2025-04-16 PROCEDURE — 83735 ASSAY OF MAGNESIUM: CPT

## 2025-04-16 RX ORDER — HYDROMORPHONE HYDROCHLORIDE 4 MG/1
4 TABLET ORAL
Qty: 50 TABLET | Refills: 0 | Status: SHIPPED | OUTPATIENT
Start: 2025-04-16

## 2025-04-16 RX ORDER — BISACODYL 10 MG
10 SUPPOSITORY, RECTAL RECTAL DAILY
Status: DISCONTINUED | OUTPATIENT
Start: 2025-04-16 | End: 2025-04-16 | Stop reason: HOSPADM

## 2025-04-16 RX ORDER — TIZANIDINE 2 MG/1
2 TABLET ORAL EVERY 6 HOURS PRN
Qty: 30 TABLET | Refills: 0 | Status: SHIPPED | OUTPATIENT
Start: 2025-04-16

## 2025-04-16 RX ORDER — BUPRENORPHINE 2 MG/1
4 TABLET SUBLINGUAL 2 TIMES DAILY
Qty: 120 TABLET | Refills: 0 | Status: SHIPPED | OUTPATIENT
Start: 2025-04-16 | End: 2025-05-16

## 2025-04-16 RX ORDER — HYDROMORPHONE HYDROCHLORIDE 2 MG/1
4 TABLET ORAL
Qty: 50 TABLET | Refills: 0 | Status: SHIPPED | OUTPATIENT
Start: 2025-04-16 | End: 2025-04-16

## 2025-04-16 RX ADMIN — CLONAZEPAM 2 MG: 2 TABLET ORAL at 10:47

## 2025-04-16 RX ADMIN — HYDROMORPHONE HYDROCHLORIDE 4 MG: 2 TABLET ORAL at 04:16

## 2025-04-16 RX ADMIN — ACETAMINOPHEN 975 MG: 325 TABLET, FILM COATED ORAL at 00:31

## 2025-04-16 RX ADMIN — HYDROMORPHONE HYDROCHLORIDE 4 MG: 2 TABLET ORAL at 09:44

## 2025-04-16 RX ADMIN — TIZANIDINE 2 MG: 2 TABLET ORAL at 11:50

## 2025-04-16 RX ADMIN — BUPRENORPHINE 4 MG: 2 TABLET SUBLINGUAL at 08:01

## 2025-04-16 RX ADMIN — SODIUM CHLORIDE 500 ML: 9 INJECTION, SOLUTION INTRAVENOUS at 04:17

## 2025-04-16 RX ADMIN — HYDROMORPHONE HYDROCHLORIDE 4 MG: 2 TABLET ORAL at 00:31

## 2025-04-16 RX ADMIN — SODIUM CHLORIDE 500 ML: 9 INJECTION, SOLUTION INTRAVENOUS at 02:59

## 2025-04-16 RX ADMIN — ACETAMINOPHEN 975 MG: 325 TABLET, FILM COATED ORAL at 08:04

## 2025-04-16 RX ADMIN — DIVALPROEX SODIUM 500 MG: 250 TABLET, DELAYED RELEASE ORAL at 08:04

## 2025-04-16 RX ADMIN — LIDOCAINE 4% 2 PATCH: 40 PATCH TOPICAL at 08:05

## 2025-04-16 ASSESSMENT — ACTIVITIES OF DAILY LIVING (ADL)
ADLS_ACUITY_SCORE: 38

## 2025-04-16 NOTE — PLAN OF CARE
"Status: Pt POD #4 s/p L4 full laminectomy and bilateral microdiscectomy.   Vitals: VSS on RA ex hypotension this AM.   Neuros: A&O x4. Baseline N/T to BLE, worse on left. BUE 5/5, BLE 4/5.   IV: PIV SL.   Labs/Electrolytes: See \"results.\" Mag replaced.  Resp: LS clear. Denies SOB.   Diet: Regular, fair intake.   : Voids spontaneously via bedside commode. Encouraged to ambulate to bathroom.   GI: LBM PTA. Per pt, going up to two weeks without a BM is her \"norm.\" Pt took milk of mag and senna this shift, but refused Miralax and lactulose - team notified.  Skin: Back incision FERMÍN. Dressing over abdomen with piercing stabilizer in place.   Pain: C/o moderate to severe pain throughout shift, worse with activity. Ketamine gtt discontinued this AM. PO Dilaudid, Zanaflex, and Valium also given - partially effective.   Activity: Assist of one, GB, walker. Up to bedside commode. Worked with therapies.   Social: No visitors this shift.   Plan: Possible discharge tomorrow pending ride and housing.   Updates this shift: Pt upset about plan to discharge tomorrow. She reports not having stable housing and that it would be too difficult to discharge back to her parents' home d/t stairs and lack of help. Team aware.     Goal Outcome Evaluation:    Plan of Care Reviewed With: patient  Overall Patient Progress: no change  Outcome Evaluation: Continuing to have low BP - team aware. Possible discharge home with family or a friend tomorrow.      "

## 2025-04-16 NOTE — PROGRESS NOTES
[facility]     Addiction Progress Note - Addiction Service        Date of Admission:  4/11/2025  Assessment & Plan       The patient has a PMHx that includes, chronic back pain right lower extremity extending into perineal necrotizing infection about a year and a half ago, chronic pain disorder, hyperlipidemia, and anxiety. [place] [facility] [place]4-5 lamine[place] and microdiscectomy in response to disc protrusion and concern for cauda equina syndrome. Addiction medicine was consulted to assist with suboxone and pain management.    UPDATES:  - Pain control per primary team  - Will discharge home on Suboxone 4 mg BID. Will send new Suboxone order to discharge pharmacy.  - Her goal is to eventually get off Suboxone. Will create taper plan for her Suboxone, understands that it will likely be over 4-6 months.     Chronic back s/p L4-5 laminectomy and microdiscectomy   Chronic lower extremity pain   Chronic pain syndrome with no history of opiate use disorder  Gemini has a long history of chronic pain that has been treated with opiates in the past and is now on subutex which she has been on for about a year and a half. On review of the chart and after discussion with Gemini she has no history of OUD or current features of a use disorder and is on subutex for chronic pain. Given the surgery It is very reasonable to treat her pain with opiate agonists and then wean as able. She will likely have poor pain tolerance and it may be difficult to wean due to various other causes of pain. We would recommend continuing subutex at 4 mg BID until she is mostly weaned off of her other opiate agonists   - Gemini does NOT have OUD   - Will discharge home on Suboxone 4 mg BID. Will send new Suboxone order to discharge pharmacy  - Her goal is to eventually get off Suboxone. Will create taper plan for her Suboxone, understands that it will likely be over 4-6 month  - defer  "pain management to primary team and pain team   - no other changes     Chronic benzodiazepine use   Panic disorder   Patient has a history of panic disorder for which she is on clonazepam 2 mg BID. I see this is ordered  but also diazepam. I think given the concurrent opiate use I would not have the additional diazepam for right now and see how she does with just the clonazepam.  - consider stopping valium   - Continue clonazepam, I agree with PRN for now but if she doesn't take any for a few days would need to schedule it to prevent withdrawal      ADHD  Continue PTA meds      # Peer Support:   -Our peer  will meet the patient if agreeable and still hospitalized on Thursday, to provide additional outpatient resources  -To contact Mirlande Peer  from [facility] ([facility]): call or text: [phone]     # Addiction Social Worker:   Our addiction social worker Dusty Santamaria can be contacted if needed, on her pager [phone] or texted/called at [phone]     # Linkage to Care  Patient will follow up with PCP to continue subutex       The patient's care was discussed with attending Dr Baker    Time Spent on this Encounter   I spent 40 minutes on the unit/floor managing the care of Kym Perea. Over 50% of my time was spent on the following:   - Counseling the patient and/or family regarding: risks and benefits of treatment options, recommended follow-up, and medical compliance  - Coordination of care with the: coordination of care not performed today      Elvis Rowan MD on 4/13/2025 at 11:41 AM  Internal Medicine Resident    Addiction Service   [facility]     Contact information available via [facility] Paging/Directory under \"addiction medicine\"        ______________________________________________________________________    Interval History   No acute events overnight. Still endorses a lot of back pain but feels pain meds are helping. " Discussed her Suboxone plan with her, will send home on 4 mg BID, and then create a taper plan for her, she understands it will likely be over 4-6 months    ROS:  CV, Pulm, GI and  assessed. Pertinent positives as above, otherwise negative.     Data reviewed today: I reviewed all medications, new labs and imaging results over the last 24 hours.     Physical Exam   Temp: 98  F (36.7  C) Temp src: Oral BP: 111/77 Pulse: 62   Resp: 18 SpO2: 95 % O2 Device: None (Room air)      General Appearance:  In bed, no distress   Respiratory: breathing comfortably on room air   Cardiovascular: RRR  GI: non distended   Skin: no rashes on exposed skin   Other:  Moving all four extremities      Due to regulation of Title 42 of the Code of Federal Regulations (CFR) Part 2: Confidentiality laws apply to this note and the information wherein.  Thus, this note cannot be copy and pasted into any other health care staff's note nor can it be included in general medical records sent to ANY outside agency without the patient's written consent.

## 2025-04-16 NOTE — PLAN OF CARE
Goal Outcome Evaluation:      Plan of Care Reviewed With: patient    Overall Patient Progress: no change    Reason for Admission: Spondylolisthesis of lumbar region     Pt POD #5 s/p L4 full laminectomy and bilateral microdiscectomy.     RN assumed cares at [phone]    Pain: 7-8/10 pain, given PRN oral Dilaudid x3 this shift with mild relief. PRN clonazepam given x1 this shift per pt request for anxiety  Neuro: A/O x4, Baseline N/T to BLE, BUE 5/5, BLE 4/5   Cardiac: SBPs in 70s overnight, Reagan Wills MD informed, 500ml bolus given x2 per orders, pt denies chest pain  Respiratory: VSS, RA, denies SOB  GI/: voids spontaneously to bathroom, last BM PTA  IV/Drains: PIV x2  Activity: SBA w/ walker/GB  Skin: Back incision FERMÍN, Dressing over abdomen with piercing stabilizer  Diet: Regular    Plan of Care:  continue with POC, tentative discharge in AM or 4/17, pt refused heparin and zanaflex, team aware

## 2025-04-16 NOTE — PROGRESS NOTES
[facility]     Addiction Progress Note - Addiction Service        Date of Admission:  4/11/2025  Assessment & Plan       The patient has a PMHx that includes, chronic back pain right lower extremity extending into perineal necrotizing infection about a year and a half ago, chronic pain disorder, hyperlipidemia, and anxiety. She is being [place] [facility] [place] for a L4-5 laminectomy and microdiscectomy in response to disc protrusion and concern for cauda equina syndrome. Addiction medicine was consulted to assist with suboxone and pain management.    UPDATES:  - She wants to taper off of suboxone eventually, willing to do a very slow taper, understands that it will likely be over 4-6 months.     Chronic back s/p L4-5 laminectomy and microdiscectomy   Chronic lower extremity pain   Chronic pain syndrome with no history of opiate use disorder  Gemini has a long history of chronic pain that has been treated with opiates in the past and is now on subutex which she has been on for about a year and a half. On review of the chart and after discussion with Gemini she has no history of OUD or current features of a use disorder and is on subutex for chronic pain. Given the surgery It is very reasonable to treat her pain with opiate agonists and then wean as able. She will likely have poor pain tolerance and it may be difficult to wean due to various other causes of pain. We would recommend continuing subutex at 4 mg BID until she is mostly weaned off of her other opiate agonists and then increasing back to TID.   - Gemini does NOT have OUD   - Continue subutex 4 mg BID until weaned off of other opiates for pain control   - defer pain management to primary team and pain team   - we can continue to follow along for support and help titrate suboxone back up when appropriate  - no other changes     Chronic benzodiazepine use   Panic disorder   Patient has a history of panic disorder  "for which she is on clonazepam 2 mg BID. I see this is ordered  but also diazepam. I think given the concurrent opiate use I would not have the additional diazepam for right now and see how she does with just the clonazepam.  - consider stopping valium   - Continue clonazepam, I agree with PRN for now but if she doesn't take any for a few days would need to schedule it to prevent withdrawal      ADHD  Continue PTA meds      # Peer Support:   -Our peer  will meet the patient if agreeable and still hospitalized on Thursday, to provide additional outpatient resources  -To contact Mirlande Peer  from [facility] ([facility]): call or text: [phone]     # Addiction Social Worker:   Our addiction social worker Dusty Santamaria can be contacted if needed, on her pager [phone] or texted/called at [phone]     # Linkage to Care  Patient will follow up with PCP to continue subutex       The patient's care was discussed with the Patient.    Time Spent on this Encounter   I spent 40 minutes on the unit/floor managing the care of Kym Perea. Over 50% of my time was spent on the following:   - Counseling the patient and/or family regarding: risks and benefits of treatment options, recommended follow-up, and medical compliance  - Coordination of care with the: coordination of care not performed today      Armando Baker MD on 4/13/2025 at 11:41 AM   Addiction Service   [facility]     Contact information available via [facility] Paging/Directory under \"addiction medicine\"        ______________________________________________________________________    Interval History     Patient doing ok. Ambulated a bit yesterday. Still having a lot of pain and is hopeful the oral meds will work better than the PCA. Discussed plans for subutex. She notes she had been working on getting off prior. I told her I am guessing the plan will be do discharge with no " additional opiates and am worried she would have more pain being on just 4 mg BID subutex. She understands. She wants to transition off of buprenorphine.    ROS:  CV, Pulm, GI and  assessed. Pertinent positives as above, otherwise negative.     Data reviewed today: I reviewed all medications, new labs and imaging results over the last 24 hours.     Physical Exam   Temp: 98.1  F (36.7  C) Temp src: Oral BP: 93/54 Pulse: 67   Resp: 16 SpO2: 98 % O2 Device: None (Room air)      General Appearance:  In bed, no distress   Respiratory: breathing comfortably on room air   Cardiovascular: RRR  GI: non distended   Skin: no rashes on exposed skin   Other:  Moving all four extremities      Due to regulation of Title 42 of the Code of Federal Regulations (CFR) Part 2: Confidentiality laws apply to this note and the information wherein.  Thus, this note cannot be copy and pasted into any other health care staff's note nor can it be included in general medical records sent to ANY outside agency without the patient's written consent.

## 2025-04-16 NOTE — DISCHARGE SUMMARY
[facility] Discharge Summary and Instructions    Kym Perea MRN# [id number]   Age: 55 year old YOB: 1970     Date of Admission:  4/11/2025  Date of Discharge::  4/16/2025  Admitting Physician:  Emilia Mensah MD  Discharge Physician:  Emilia Mensah MD          Admission Diagnoses:   Spondylolisthesis of lumbar region [M43.16]  Acute back pain, unspecified back location, unspecified back pain laterality [M54.9]  Central stenosis of spinal canal [M48.00]          Discharge Diagnosis:     Spondylolisthesis of lumbar region [M43.16]  Acute back pain, unspecified back location, unspecified back pain laterality [M54.9]  Central stenosis of spinal canal [M48.00]     Clinically Significant Risk Factors Present on Admission         # Hyponatremia: Lowest Na = 134 mmol/L in last 2 days, will monitor as appropriate             # Thrombocytopenia: Lowest platelets = 126 in last 2 days, will monitor for bleeding                        # Financial/Environmental Concerns: none  # Transportation: concerns for reliable transportation noted in nursing assessment               Procedures:   4/11/2025  Lumbar 4 Full Laminectomy and Bilateral L4-5 Microdiscectomy            Brief History of Illness:   Kmy Perea is a 55 year old female who was seen for concern cauda equina syndrome.She has had lower back pain for the past 20 years with intermittent sciatica. She has past medical history of right lower extremity extending into perineal necrotizing infection about a year and a half ago, chronic pain disorder, hyperlipidemia, and anxiety who presented to the emergency department via EMS 2 days ago for back and bilateral leg pain significantly worsened from her baseline.      She presented to the ED with a 2 day history of worsening shooting pains in her lower back radiating to both thighs posteriorly into the bottoms of her feet, as well as worsening numbness on both sides but most notably  along the right leg posteriorly. She also reported worsening lower back pain predominantly on the left side. In addition, the patient had lost the ability to walk due to both pain and a subjective weakness predominantly on the right side and inability to dorsiflex her left ankle, although this is mildly impaired at baseline. Kym also reported intermittent urinary retention over the last couple of days as well. No changes in bowel symptoms. On imaging a large disc herniation associated with spondylolisthesis at L4-5 was noted, given her symptoms patient was taken urgently to the OR for decompression.            Hospital Course:   Patient underwent above-mentioned procedure on 4/11/2025.  Following the procedure the patient was transferred to the general neurosurgical floor.  The patient initially experienced severe pain requiring a ketamine drip and PCA.  The Pain management team followed patient and assisted in management.   Eventually patient was able to wean from PCA and ketamine drip to oral dilaudid with adjunctive muscle relaxers.  The patient was noted to be hypotensive throughout her course, however was asymptomatic.  This was partially attributed to medication management and patient has documented low systolic blood pressure readings from her PCP in the past.  PT and OT initially felt patient may need ARU placement, however the patient was able to progress and was felt to be safe to discharge home with therapies.  The patient has housing issues and was offered multiple options from social work, however she declined to pursue these.    On POD#5 the patient was voiding, passing flatus, mobilizing with assistance of walker, and pain was controlled.  Therefore she was discharged home with family.   She will follow-up in 2 weeks for wound check in Neurosurgery clinic.             Discharge Medications:     Current Discharge Medication List        START taking these medications    Details   HYDROmorphone  (DILAUDID) 2 MG tablet Take 2 tablets (4 mg) by mouth every 3 hours as needed for moderate pain.  Qty: 50 tablet, Refills: 0    Associated Diagnoses: Central stenosis of spinal canal      senna-docusate (SENOKOT-S/PERICOLACE) 8.6-50 MG tablet Take 2 tablets by mouth 2 times daily for 14 days.  Qty: 56 tablet, Refills: 0    Associated Diagnoses: Central stenosis of spinal canal           CONTINUE these medications which have CHANGED    Details   tiZANidine (ZANAFLEX) 2 MG tablet Take 1 tablet (2 mg) by mouth every 6 hours.  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 - Pharmacy may dispense brand covered by insurance (Proair, or proventil or ventolin or generic albuterol inhaler)  Associated Diagnoses: Mild intermittent asthma without complication      amphetamine-dextroamphetamine (ADDERALL) 30 MG tablet Take 1 tablet by mouth 2 times daily.      buprenorphine (SUBUTEX) 8 MG SUBL sublingual tablet Place 1 tablet (8 mg) under the tongue 3 times daily.  Qty: 21 tablet, Refills: 0    Comments: Gap Rx being sent in due to running out and changing providers  Associated Diagnoses: Chronic bilateral low back pain with bilateral sciatica; High risk medication use      Cholecalciferol (VITAMIN D-3) 125 MCG (5000 UT) TABS Take 1 tablet by mouth daily  Qty: 90 tablet, Refills: 11    Associated Diagnoses: Vitamin D deficiency      clonazePAM (KLONOPIN) 2  MG tablet Take 2 mg by mouth 2 times daily.      divalproex sodium delayed-release (DEPAKOTE) 500 MG DR tablet Take 500 mg by mouth 2 times daily.      docusate sodium (COLACE) 50 MG capsule Take 50 mg by mouth daily as needed for constipation.      DULoxetine (CYMBALTA) 20 MG capsule Take 20 mg by mouth daily.      fluticasone (FLONASE) 50 MCG/ACT nasal spray Spray 2 sprays into both nostrils daily as needed for rhinitis or allergies  Qty: 16 g, Refills: 2    Comments: Prescription not previously filled at [facility]. Please authorize a new RX for this patient. Thank you.  Associated Diagnoses: Nasal congestion      guanFACINE (INTUNIV) 2 MG TB24 24 hr tablet Take 2 mg by mouth at bedtime.      hydrOXYzine (VISTARIL) 50 MG capsule Take 50 mg by mouth 2 times daily as needed for anxiety.      ibuprofen (ADVIL/MOTRIN) 800 MG tablet TAKE 1 TABLET BY MOUTH EVERY 8 HOURS AS NEEDED FOR MODERATE PAIN  Qty: 90 tablet, Refills: 3    Associated Diagnoses: Chronic pain disorder; Chronic bilateral low back pain with bilateral sciatica      LORazepam (ATIVAN) 0.5 MG tablet Take 1 tablet (0.5 mg) by mouth every 6 hours as needed for anxiety.  Qty: 10 tablet, Refills: 0      medical cannabis (Patient's own supply) See Admin Instructions. (The purpose of this order is to document that the patient reports taking medical cannabis.  This is not a prescription, and is not used to certify that the patient has a qualifying medical condition.)  Ordered 3-4 times a day      Melatonin 10 MG TABS tablet Take 10 mg by mouth nightly as needed for sleep.      Simethicone (GAS-X EXTRA STRENGTH) 125 MG CAPS Take 1 Capful by mouth daily as needed.                    Exam:   Physical Exam  /77   Pulse 62   Temp 98  F (36.7  C) (Oral)   Resp 18   Wt 62.6 kg (138 lb)   LMP 11/01/1999 (Exact Date)   SpO2 95%   BMI 20.38 kg/m    General: Appears comfortable, NAD  Wound: Incision, clean, dry, intact without strikethrough  Neurologic  Exam:  - AOx3.  - Follows commands.  - Speech fluent, spontaneous. No aphasia or dysarthria.  - No gaze preference. No apparent hemineglect.  - PERRL, EOMI.  - Face symmetric with sensation intact to light touch.  - Palate elevates symmetrically, uvula midline, tongue protrudes midline.  - Trapezii and sternocleidomastoid muscles 5/5 bilaterally.  - No pronator drift.  Motor: Normal bulk/tone; no tremor, rigidity, or bradykinesia.   Right:  Deltoid 5/5, tricep 5/5, bicep 5/5, wrist flexor 5/5, wrist extensor 5/5, finger intrinsic 5/5  Left:  Deltoid 5/5, tricep 5/5, bicep 5/5, wrist flexor 5/5, wrist extensor 5/5, finger intrinsic 5/5  Right: Iliopsoas 5/5, quadricep 5/5, hamstring 5/5, tibialis anterior 5/5, gastrocnemius 5/5, EHL 5/5  Left:  Iliopsoas 5/5, quadricep 4+/5, hamstring 4+/5, tibialis anterior 4+/5, gastrocnemius 4+/5  Sensation intact in bilateral L4-S1 dermatones               Discharge Instructions and Follow-Up:     Discharge diet: Regular    Discharge activity: You may advance activity as tolerated. No strenuous exercise or heay lifting greater than 10 lbs for 4 weeks or until seen and cleared in clinic.      Discharge follow-up: Follow-up with Neurosurgery [facility] in 2 weeks for wound check/post-hospital evaluation.          Wound care: Ok to shower,however no scrubbing of the wound and no soaking of the wound, meaning no bathtubs or swimming pools. Pat dry only. Leave wound open to air.  Patient to have wound checked 2 weeks following surgery.                 Please call if you have:  1. increased pain, redness, drainage, swelling at your incision  2. fevers > 101.5 F degrees  3. with any questions or concerns.  You may reach the Neurosurgery clinic at [phone] during regular work hours. ER at [phone].    and ask for the Neurosurgery Resident on call at [phone], during off hours or weekends.         Discharge Disposition:     Discharged to home        Emiliana Carlton  APRN, CNP  Department of Neurosurgery  Pager: [phone]

## 2025-04-16 NOTE — PROGRESS NOTES
Discharge time/date: 4/16/25 at 1240  Walked or Wheelchair: wheelchair   PIV removed: yes 2 PIVs removed   Reviewed AVS with patient: reviewed with pt, pt verbalized understanding   Medication due times added to AVS in EPIC: yes and reviewed with patient   Verbalized understanding of discharge with teachback: yes   Medications retrieved from pharmacy: pt going to retrieve medications with transport on the way to her ride  Supplies sent home: none needed  Belongings from security with patient: no belongings with security. All belongings in pts room packed up and sent with pt

## 2025-04-17 NOTE — PLAN OF CARE
Occupational Therapy Discharge Summary    Reason for therapy discharge:    All goals and outcomes met, no further needs identified.    Progress towards therapy goal(s). See goals on Care Plan in [facility] electronic health record for goal details.  Goals partially met.  Barriers to achieving goals:   discharge from facility.    Therapy recommendation(s):    No further therapy is recommended.

## 2025-04-29 ENCOUNTER — TELEPHONE (OUTPATIENT)
Dept: NEUROSURGERY | Facility: CLINIC | Age: 55
End: 2025-04-29

## 2025-04-29 NOTE — PROGRESS NOTES
Patient was to follow-up with Brenda Bermudez PA-C today however due to transportation issues, patient was unable to come.  Patient states she's having significant low back pain and left posterior leg pain.  States her leg feels as if it will give out when pain is severe.  No bowel or bladder issues.  No fever, chills, or drainage from the wound.  I asked patient when she may be able to come down for a visit but patient states she does not have a car and would need to find a ride.   Will set patient up for virtual visit with Brenda given these issues.  In the meantime, I did provide the patient with a medrol dose pack and sent it to a pharmacy near her home.  Instructed patient to go to a local ED if she should develop weakness, bowel or bladder issues, or unrelenting pain.  Patient was agreeable.     CHRISTY Prabhakar, CNP  Department of Neurosurgery  Pager: [phone]

## 2025-04-30 ENCOUNTER — VIRTUAL VISIT (OUTPATIENT)
Dept: NEUROSURGERY | Facility: CLINIC | Age: 55
End: 2025-04-30
Payer: COMMERCIAL

## 2025-04-30 DIAGNOSIS — Z98.890 STATUS POST LUMBAR SURGERY: Primary | ICD-10-CM

## 2025-04-30 NOTE — LETTER
4/30/2025       RE: Kym Perea  [address] [place] [address]     Dear Colleague,    Thank you for referring your patient, Kym Perea, to the [facility] NEUROSURGERY CLINIC [facility]. Please see a copy of my visit note below.        Neurosurgery Clinic Note    Kym Perea is a 55 year old female who is s/p L4 lami, bilateral L4/5 microdiscectomy by Dr. Mensah on 4/11/25 for large HNP.  Closed with absorbable sutures and skin glue.    Patient is about 2 weeks out from surgery.  The patient unfortunately is set up via video visit and she was unable to connect.  She was instructed to follow in person.        Brenda Bermudez PA-C  Department of Neurosurgery  Office: [phone]            Again, thank you for allowing me to participate in the care of your patient.      Sincerely,    Brenda Bermudez PA-C

## 2025-04-30 NOTE — NURSING NOTE
Current patient location: [address] [place] [address] [address] [place] [address]    Is the patient currently in the state of [place]? YES    Visit mode: VIDEO    If the visit is dropped, the patient can be reconnected by:TELEPHONE VISIT: Phone number:   Telephone Information:   Mobile [phone]       Will anyone else be joining the visit? NO  (If patient encounters technical issues they should call [phone]    Are changes needed to the allergy or medication list? Pt stated no med changes    Are refills needed on medications prescribed by this physician? NO    Rooming Documentation:  Questionnaire(s) completed    Reason for visit: No chief complaint on file.    Nichelle LAI

## 2025-04-30 NOTE — PROGRESS NOTES
"Virtual Visit Details    Type of service:  Video Visit   Video Start Time: {video visit start/end time for provider to select:611887}  Video End Time:{video visit start/end time for provider to select:137397}    Originating Location (pt. Location): {video visit patient location:429784::\"Home\"}  {PROVIDER LOCATION On-site should be selected for visits conducted from your clinic location or adjoining [facility] hospital, academic office, or other nearby [facility] building. Off-site should be selected for all other provider locations, including home:960869}  Distant Location (provider location):  {virtual location provider:673145}  Platform used for Video Visit: {Virtual Visit Platforms:533922::\"TopBlip\"}    "

## 2025-04-30 NOTE — PROGRESS NOTES
Neurosurgery Clinic Note    Kym Perea is a 55 year old female who is s/p L4 lami, bilateral L4/5 microdiscectomy by Dr. Mensah on 4/11/25 for large HNP.  Closed with absorbable sutures and skin glue.    Patient is about 2 weeks out from surgery.  The patient unfortunately is set up via video visit and she was unable to connect.  She was instructed to follow in person.        Brenda Bermudez PA-C  Department of Neurosurgery  Office: [phone]

## 2025-06-07 ENCOUNTER — HEALTH MAINTENANCE LETTER (OUTPATIENT)
Age: 55
End: 2025-06-07

## 2025-07-01 ENCOUNTER — PATIENT OUTREACH (OUTPATIENT)
Dept: CARE COORDINATION | Facility: CLINIC | Age: 55
End: 2025-07-01
Payer: COMMERCIAL

## 2025-07-01 ENCOUNTER — OFFICE VISIT (OUTPATIENT)
Dept: FAMILY MEDICINE | Facility: OTHER | Age: 55
End: 2025-07-01
Attending: PHYSICIAN ASSISTANT
Payer: COMMERCIAL

## 2025-07-01 VITALS
SYSTOLIC BLOOD PRESSURE: 90 MMHG | DIASTOLIC BLOOD PRESSURE: 61 MMHG | BODY MASS INDEX: 20.38 KG/M2 | HEART RATE: 94 BPM | HEIGHT: 69 IN | TEMPERATURE: 97 F | OXYGEN SATURATION: 97 %

## 2025-07-01 DIAGNOSIS — Z98.890 STATUS POST LUMBAR SURGERY: ICD-10-CM

## 2025-07-01 DIAGNOSIS — M54.16 LUMBAR RADICULOPATHY: ICD-10-CM

## 2025-07-01 DIAGNOSIS — M54.41 CHRONIC BILATERAL LOW BACK PAIN WITH BILATERAL SCIATICA: Primary | ICD-10-CM

## 2025-07-01 DIAGNOSIS — M48.00 CENTRAL STENOSIS OF SPINAL CANAL: ICD-10-CM

## 2025-07-01 DIAGNOSIS — G89.4 CHRONIC PAIN DISORDER: ICD-10-CM

## 2025-07-01 DIAGNOSIS — G89.29 CHRONIC BILATERAL LOW BACK PAIN WITH BILATERAL SCIATICA: Primary | ICD-10-CM

## 2025-07-01 DIAGNOSIS — M43.16 SPONDYLOLISTHESIS OF LUMBAR REGION: ICD-10-CM

## 2025-07-01 DIAGNOSIS — F33.2 SEVERE EPISODE OF RECURRENT MAJOR DEPRESSIVE DISORDER, WITHOUT PSYCHOTIC FEATURES (H): ICD-10-CM

## 2025-07-01 DIAGNOSIS — M54.42 CHRONIC BILATERAL LOW BACK PAIN WITH BILATERAL SCIATICA: Primary | ICD-10-CM

## 2025-07-01 DIAGNOSIS — F33.1 MODERATE RECURRENT MAJOR DEPRESSION (H): ICD-10-CM

## 2025-07-01 PROCEDURE — G0463 HOSPITAL OUTPT CLINIC VISIT: HCPCS

## 2025-07-01 RX ORDER — IBUPROFEN 800 MG/1
800 TABLET, FILM COATED ORAL EVERY 8 HOURS PRN
Qty: 90 TABLET | Refills: 5 | Status: SHIPPED | OUTPATIENT
Start: 2025-07-01

## 2025-07-01 RX ORDER — TIZANIDINE 2 MG/1
2-4 TABLET ORAL EVERY 6 HOURS PRN
Qty: 40 TABLET | Refills: 11 | Status: SHIPPED | OUTPATIENT
Start: 2025-07-01

## 2025-07-01 ASSESSMENT — PATIENT HEALTH QUESTIONNAIRE - PHQ9
SUM OF ALL RESPONSES TO PHQ QUESTIONS 1-9: 23
SUM OF ALL RESPONSES TO PHQ QUESTIONS 1-9: 23
10. IF YOU CHECKED OFF ANY PROBLEMS, HOW DIFFICULT HAVE THESE PROBLEMS MADE IT FOR YOU TO DO YOUR WORK, TAKE CARE OF THINGS AT HOME, OR GET ALONG WITH OTHER PEOPLE: VERY DIFFICULT

## 2025-07-01 ASSESSMENT — ASTHMA QUESTIONNAIRES
QUESTION_4 LAST FOUR WEEKS HOW OFTEN HAVE YOU USED YOUR RESCUE INHALER OR NEBULIZER MEDICATION (SUCH AS ALBUTEROL): TWO OR THREE TIMES PER WEEK
QUESTION_5 LAST FOUR WEEKS HOW WOULD YOU RATE YOUR ASTHMA CONTROL: WELL CONTROLLED
QUESTION_1 LAST FOUR WEEKS HOW MUCH OF THE TIME DID YOUR ASTHMA KEEP YOU FROM GETTING AS MUCH DONE AT WORK, SCHOOL OR AT HOME: A LITTLE OF THE TIME
QUESTION_3 LAST FOUR WEEKS HOW OFTEN DID YOUR ASTHMA SYMPTOMS (WHEEZING, COUGHING, SHORTNESS OF BREATH, CHEST TIGHTNESS OR PAIN) WAKE YOU UP AT NIGHT OR EARLIER THAN USUAL IN THE MORNING: NOT AT ALL
QUESTION_2 LAST FOUR WEEKS HOW OFTEN HAVE YOU HAD SHORTNESS OF BREATH: ONCE A DAY
ACT_TOTALSCORE: 18

## 2025-07-01 ASSESSMENT — PAIN SCALES - GENERAL: PAINLEVEL_OUTOF10: SEVERE PAIN (8)

## 2025-07-01 NOTE — NURSING NOTE
"Chief Complaint   Patient presents with    Musculoskeletal Problem     Patient hurt her back starting a weed whacker. She had surgery on two herniated disks 8 weeks ago. Also has swelling on back of leg.  Initial BP 90/61   Pulse 94   Temp 97  F (36.1  C) (Tympanic)   Ht 1.753 m (5' 9\")   LMP 11/01/1999 (Exact Date)   SpO2 97%   BMI 20.38 kg/m   Estimated body mass index is 20.38 kg/m  as calculated from the following:    Height as of this encounter: 1.753 m (5' 9\").    Weight as of 4/11/25: 62.6 kg (138 lb).  Medication Review: complete    The next two questions are to help us understand your food security.  If you are feeling you need any assistance in this area, we have resources available to support you today.          4/14/2025   SDOH- Food Insecurity   Within the past 12 months, did you worry that your food would run out before you got money to buy more? Y   Within the past 12 months, did the food you bought just not last and you didn t have money to get more? Y         Health Care Directive:  Patient does not have a Health Care Directive: Discussed advance care planning with patient; however, patient declined at this time.    Dali Gomez MA      "

## 2025-07-01 NOTE — LETTER
My Asthma Action Plan    Name: Kym Perea   YOB: 1970  Date: 7/1/2025   My doctor: Do Burrows PA-C   My clinic: [facility]        My Rescue Medicine: Albuterol (Proair/Ventolin/Proventil HFA) 2-4 puffs EVERY 4 HOURS as needed. Use a spacer if recommended by your provider.   My Asthma Severity:   Intermittent / Exercise Induced  Know your asthma triggers: pollens  Season changes          GREEN ZONE   Good Control  I feel good  No cough or wheeze  Can work, sleep and play without asthma symptoms       Take your asthma control medicine every day.     If exercise triggers your asthma, take your rescue medication  15 minutes before exercise or sports, and  During exercise if you have asthma symptoms  Spacer to use with inhaler: If you have a spacer, make sure to use it with your inhaler             YELLOW ZONE Getting Worse  I have ANY of these:  I do not feel good  Cough or wheeze  Chest feels tight  Wake up at night   Keep taking your Green Zone medications  Start taking your rescue medicine:  every 20 minutes for up to 1 hour. Then every 4 hours for 24-48 hours.  If you stay in the Yellow Zone for more than 12-24 hours, contact your doctor.  If you do not return to the Green Zone in 12-24 hours or you get worse, start taking your oral steroid medicine if prescribed by your provider.           RED ZONE Medical Alert - Get Help  I have ANY of these:  I feel awful  Medicine is not helping  Breathing getting harder  Trouble walking or talking  Nose opens wide to breathe       Take your rescue medicine NOW  If your provider has prescribed an oral steroid medicine, start taking it NOW  Call your doctor NOW  If you are still in the Red Zone after 20 minutes and you have not reached your doctor:  Take your rescue medicine again and  Call 911 or go to the emergency room right away    See your regular doctor within 2 weeks of an Emergency Room or Urgent Care visit for follow-up  treatment.          Annual Reminders:  Meet with Asthma Educator,  Flu Shot in the Fall, consider Pneumonia Vaccination for patients with asthma (aged 19 and older).    Pharmacy: [facility] - [place], [place] - [address] AT [facility]    Electronically signed by Do Burrows PA-C   Date: 07/01/25                    Asthma Triggers  How To Control Things That Make Your Asthma Worse    Triggers are things that make your asthma worse.  Look at the list below to help you find your triggers and   what you can do about them. You can help prevent asthma flare-ups by staying away from your triggers.      Trigger                                                          What you can do   Cigarette Smoke  Tobacco smoke can make asthma worse. Do not allow smoking in your home, car or around you.  Be sure no one smokes at a child s day care or school.  If you smoke, ask your health care provider for ways to help you quit.  Ask family members to quit too.  Ask your health care provider for a referral to Quit Plan to help you quit smoking, or call 8-223-041-PLAN.     Colds, Flu, Bronchitis  These are common triggers of asthma. Wash your hands often.  Don t touch your eyes, nose or mouth.  Get a flu shot every year.     Dust Mites  These are tiny bugs that live in cloth or carpet. They are too small to see. Wash sheets and blankets in hot water every week.   Encase pillows and mattress in dust mite proof covers.  Avoid having carpet if you can. If you have carpet, vacuum weekly.   Use a dust mask and HEPA vacuum.   Pollen and Outdoor Mold  Some people are allergic to trees, grass, or weed pollen, or molds. Try to keep your windows closed.  Limit time out doors when pollen count is high.   Ask you health care provider about taking medicine during allergy season.     Animal Dander  Some people are allergic to skin flakes, urine or saliva from pets with fur or feathers. Keep pets with  fur or feathers out of your home.    If you can t keep the pet outdoors, then keep the pet out of your bedroom.  Keep the bedroom door closed.  Keep pets off cloth furniture and away from stuffed toys.     Mice, Rats, and Cockroaches  Some people are allergic to the waste from these pests.   Cover food and garbage.  Clean up spills and food crumbs.  Store grease in the refrigerator.   Keep food out of the bedroom.   Indoor Mold  This can be a trigger if your home has high moisture. Fix leaking faucets, pipes, or other sources of water.   Clean moldy surfaces.  Dehumidify basement if it is damp and smelly.   Smoke, Strong Odors, and Sprays  These can reduce air quality. Stay away from strong odors and sprays, such as perfume, powder, hair spray, paints, smoke incense, paint, cleaning products, candles and new carpet.   Exercise or Sports  Some people with asthma have this trigger. Be active!  Ask your doctor about taking medicine before sports or exercise to prevent symptoms.    Warm up for 5-10 minutes before and after sports or exercise.     Other Triggers of Asthma  Cold air:  Cover your nose and mouth with a scarf.  Sometimes laughing or crying can be a trigger.  Some medicines and food can trigger asthma.

## 2025-07-01 NOTE — PROGRESS NOTES
Assessment & Plan   Problem List Items Addressed This Visit          Behavioral Health    Major depressive disorder, recurrent episode, severe (H)       Orthopedic/Musculoskeletal    Lumbago - Primary    Relevant Medications    tiZANidine (ZANAFLEX) 2 MG tablet    ibuprofen (ADVIL/MOTRIN) 800 MG tablet    diclofenac (VOLTAREN) 1 % topical gel    Other Relevant Orders    MR Lumbar Spine w/o & w Contrast    Adult Neurosurgery [facility] Referral    Primary Care - Care Coordination Referral    Spondylolisthesis of lumbar region    Relevant Medications    tiZANidine (ZANAFLEX) 2 MG tablet    ibuprofen (ADVIL/MOTRIN) 800 MG tablet    diclofenac (VOLTAREN) 1 % topical gel    Other Relevant Orders    MR Lumbar Spine w/o & w Contrast    Adult Neurosurgery [facility] Referral    Primary Care - Care Coordination Referral    Central stenosis of spinal canal    Relevant Medications    tiZANidine (ZANAFLEX) 2 MG tablet    ibuprofen (ADVIL/MOTRIN) 800 MG tablet    diclofenac (VOLTAREN) 1 % topical gel    Other Relevant Orders    MR Lumbar Spine w/o & w Contrast    Adult Neurosurgery [facility] Referral    Primary Care - Care Coordination Referral       Surgery/Wound/Pain    Chronic pain disorder    Relevant Medications    ibuprofen (ADVIL/MOTRIN) 800 MG tablet    diclofenac (VOLTAREN) 1 % topical gel     Other Visit Diagnoses         Moderate recurrent major depression (H)          Lumbar radiculopathy        Relevant Medications    tiZANidine (ZANAFLEX) 2 MG tablet    ibuprofen (ADVIL/MOTRIN) 800 MG tablet    diclofenac (VOLTAREN) 1 % topical gel    Other Relevant Orders    MR Lumbar Spine w/o & w Contrast    Adult Neurosurgery [facility] Referral    Primary Care - Care Coordination Referral      Status post lumbar surgery        Relevant Medications    diclofenac (VOLTAREN) 1 % topical gel    Other Relevant Orders    MR Lumbar Spine w/o & w Contrast    Adult Neurosurgery [facility] Referral    Primary Care - Care Coordination  Referral           Chronic bilateral low back pain with bilateral sciatica, lumbar radiculopathy, spondylolisthesis of lumbar region, central stenosis of spinal canal, status post lumbar surgery: Refer back to neurosurgery for postop follow-up along with monitoring.  Referred for a lumbar MRI to rule out concerns.  Gave warning signs and symptoms.  Refilled tizanidine to use as needed for muscle inflammation and tension.  Refilled ibuprofen 800 mg tablet to take every 6-8 hours as needed.  Patient was given Voltaren gel to use topically as needed for inflammation.  Referred to care coordination for assistance with travel if needed to be able to make her appointment.    Major depressive disorder recurrent episode severe, severe episode of recurrent major depressive disorder without psychotic features: Continue to follow with psychiatry.  Stable.       Nicotine/Tobacco Cessation  She reports that she has been smoking cigarettes and vaping device. She started smoking about 38 years ago. She has a 19.3 pack-year smoking history. She has been exposed to tobacco smoke. She has never used smokeless tobacco.  Nicotine/Tobacco Cessation Plan  Information offered: Patient not interested at this time          Subjective   Gemini is a 55 year old, presenting for the following health issues:  Musculoskeletal Problem        7/1/2025     8:51 AM   Additional Questions   Roomed by Dali GAMBLE CMA     Musculoskeletal Problem    History of Present Illness       Reason for visit:  Back pain followng surgery/Left thumb/problems worsened following most recent surgery (such as pitting edema in lower limbs.) She is missing 2 dose(s) of medications per week.  She is not taking prescribed medications regularly due to other.      Patient is coming today with low back pain.  Her back has been persistently hurting.  Had surgery in April.  Was not able to make it down to the [facility] for her postop appointment.  History of RSD in the left leg which  "causes a lot of pain on the left lower leg.  Having numbness in the left leg.  History of necrotizing fasciitis in the right groin.  Her right groin crease to the buttock had the infection before.  This was treated.  She is now having some pain in the right anterior medial proximal thigh.  Has hurt since surgery in April.  Both legs feel heavy on each side.  Legs are swollen last night.  Better today.  Feels like her back pain is gradually worsened over the last few weeks.  She did try using a weighted wafer which may have exacerbated her back pain.  No bowel or bladder incontinence.  No problems going to bathroom.  Drinking fluids.  Had blood in the urine 2 weeks ago.  No dysuria, frequency, urgency.  No further episodes.  Using Tylenol and ibuprofen.  Helps take the edge off the pain.  Ice helps but makes it worse at times.      Review of Systems  Constitutional, HEENT, cardiovascular, pulmonary, gi and gu systems are negative, except as otherwise noted.      Objective    BP 90/61   Pulse 94   Temp 97  F (36.1  C) (Tympanic)   Ht 1.753 m (5' 9\")   LMP 11/01/1999 (Exact Date)   SpO2 97%   BMI 20.38 kg/m    Body mass index is 20.38 kg/m .  Physical Exam  Vitals and nursing note reviewed.   Constitutional:       Appearance: Normal appearance.   HENT:      Head: Normocephalic and atraumatic.   Eyes:      Extraocular Movements: Extraocular movements intact.      Conjunctiva/sclera: Conjunctivae normal.   Cardiovascular:      Rate and Rhythm: Normal rate and regular rhythm.      Heart sounds: Normal heart sounds.   Pulmonary:      Effort: Pulmonary effort is normal.      Breath sounds: Normal breath sounds.   Musculoskeletal:         General: No swelling or signs of injury. Normal range of motion.      Cervical back: Normal range of motion.      Comments: Moderate pain with palpation throughout the lumbar spine and paraspinous muscle region and across the low back.  Mild SI joint pain on palpation bilaterally.  " Left lower leg pain with palpation throughout.  No swelling or bruising appreciated.  1+ pedal edema bilaterally.     Skin:     General: Skin is warm and dry.   Neurological:      General: No focal deficit present.      Mental Status: She is alert and oriented to person, place, and time.      Motor: No weakness.      Coordination: Coordination normal.      Gait: Gait normal.      Deep Tendon Reflexes: Reflexes normal.   Psychiatric:         Mood and Affect: Mood normal.         Behavior: Behavior normal.          No results found for any visits on 07/01/25.        Signed Electronically by: Do Burrows PA-C

## 2025-07-01 NOTE — PROGRESS NOTES
Clinic Care Coordination Contact  [facility]/Voicemail    Clinical Data: Care Coordinator Outreach    Outreach Documentation Number of Outreach Attempt   7/1/2025   2:23 PM 1       Left message on patient's voicemail with call back information and requested return call.      Plan: Care Coordinator will send care coordination introduction letter with care coordinator contact information and explanation of care coordination services via [employer]. Care Coordinator will try to reach patient again in 1-2 business days.    Bruna Morel RN on 7/1/2025 at 2:23 PM

## 2025-07-19 ENCOUNTER — HEALTH MAINTENANCE LETTER (OUTPATIENT)
Age: 55
End: 2025-07-19

## (undated) DEVICE — BLADE KNIFE SURG 15 SAFETY 373915

## (undated) DEVICE — Device

## (undated) DEVICE — GLOVE BIOGEL PI MICRO SZ 7.0 48570

## (undated) DEVICE — DRAPE POUCH INSTRUMENT 1018

## (undated) DEVICE — SU VICRYL 0 CT-1 CR 8X18" J740D

## (undated) DEVICE — SOL NACL 0.9% IRRIG 1000ML BOTTLE 2F7124

## (undated) DEVICE — SU MONOCRYL 3-0 PS-1 27" Y936H

## (undated) DEVICE — SU SILK 2-0 SH 30" K833H

## (undated) DEVICE — DRSG KERLIX 4 1/2"X4YDS ROLL 6715

## (undated) DEVICE — COVER LIGHT HANDLE LT-F02

## (undated) DEVICE — SOL ISOPROPYL RUBBING ALCOHOL USP 70% 4OZ HDX-20 I0020

## (undated) DEVICE — PACK LITHOTOMY SBA15LIFCA

## (undated) DEVICE — DRAPE C-ARM W/STRAPS 42X72" 07-CA104

## (undated) DEVICE — SOL WATER 1500ML

## (undated) DEVICE — DRAPE STERI TOWEL LG 1010

## (undated) DEVICE — LINEN TOWEL PACK X6 WHITE 5487

## (undated) DEVICE — GLOVE BIOGEL INDICATOR 7.5 LF 41675

## (undated) DEVICE — SU VICRYL 3-0 SH 27" UND J416H

## (undated) DEVICE — SU VICRYL 2-0 CT-1 CR 8X18" UND J839D

## (undated) DEVICE — SUCTION TIP YANKAUER W/O VENT BULBOUS TIP

## (undated) DEVICE — DRAPE MICROSCOPE LEICA 54X150" 09-MK653

## (undated) DEVICE — SURGICEL HEMOSTAT 4X8" 1952

## (undated) DEVICE — SUCTION MANIFOLD NEPTUNE 2 SYS 4 PORT 0702-020-000

## (undated) DEVICE — PREP POVIDONE-IODINE 7.5% SCRUB 4OZ BOTTLE MDS093945

## (undated) DEVICE — PREP POVIDONE-IODINE 10% SOLUTION 4OZ BOTTLE MDS093944

## (undated) DEVICE — SU VICRYL 3-0 SH 27" J784G

## (undated) DEVICE — BUR STRK CARBIDE MATCH HEAD 3.0MM 5820-107-530C

## (undated) DEVICE — ESU ELEC BLADE 6" COATED E1450-6

## (undated) DEVICE — SPECIMEN COLLECTION BBL VACUTAINER

## (undated) DEVICE — CATH TRAY FOLEY SURESTEP 16FR W/TMP PRB STLK LATEX A319416AM

## (undated) DEVICE — DRAPE XRAY CASSETTE UNIV 4955

## (undated) DEVICE — PENCIL MEGADYNE TELESCOPING SMOKE EVACUATION 10 FT 251010J

## (undated) DEVICE — PACK NEURO MINOR UMMC SNE32MNMU4

## (undated) DEVICE — DRAIN JACKSON PRATT RESERVOIR 100ML SU130-1305

## (undated) DEVICE — SPONGE SURGIFOAM 12 1972

## (undated) DEVICE — ESU GROUND PAD ADULT W/CORD E7507

## (undated) DEVICE — SPONGE SURGIFOAM 01GM POWDER 1978

## (undated) DEVICE — DRSG KERLIX SUPER SPONGE 7310

## (undated) DEVICE — SPONGE LAP 18X18" X8435

## (undated) DEVICE — SU MONOCRYL 4-0 PS-2 27" UND Y426H

## (undated) DEVICE — DRAIN JACKSON PRATT CHANNEL 15FR ROUND HUBLESS SIL JP-2228

## (undated) DEVICE — PANTIES MESH LG/XLG 2PK 706M2

## (undated) DEVICE — SU VICRYL 2-0 CT-1 18' J739D

## (undated) DEVICE — SYR 10ML FINGER CONTROL W/O NDL 309695

## (undated) DEVICE — SU DERMABOND ADVANCED .7ML DNX12

## (undated) DEVICE — PREP SKIN SCRUB TRAY 4461A

## (undated) DEVICE — SU CHROMIC 3-0 SH 27" G122H

## (undated) DEVICE — GLOVE PROTEXIS POWDER FREE SMT 7.5  2D72PT75X

## (undated) DEVICE — DRAIN BLAKE 19FR SIL 2231

## (undated) DEVICE — SU VICRYL 2-0 CT-2 27" UND J269H

## (undated) DEVICE — DRAPE FLUID WARMING 52"X66" ORS-301

## (undated) DEVICE — SU VICRYL 2-0 CT-2 CR 8X18" J726D

## (undated) DEVICE — GLOVE BIOGEL PI MICRO INDICATOR UNDERGLOVE SZ 7.0 48970

## (undated) DEVICE — DRAPE MAYO STAND 23X54 8337

## (undated) DEVICE — SPONGE COTTONOID 1/2X1/2" 80-1400

## (undated) DEVICE — HANDPIECE INTERPULSE W/HIGH FLOW TIP & SUCTION

## (undated) DEVICE — SYR 30ML LL W/O NDL 302832

## (undated) DEVICE — LINEN TOWEL PACK X30 5481

## (undated) DEVICE — DECANTER VIAL 2006S

## (undated) DEVICE — PACK MAJOR EXTREMITY SOP15MEFCA

## (undated) DEVICE — PACK GOWN 3/PK DISP XL SBA32GPFCB

## (undated) DEVICE — DRSG DRAIN 4X4" 7086

## (undated) DEVICE — SLEEVE COMPRESSION SCD KNEE MED 74022

## (undated) RX ORDER — DIAZEPAM 10 MG/2ML
INJECTION, SOLUTION INTRAMUSCULAR; INTRAVENOUS
Status: DISPENSED
Start: 2025-04-11

## (undated) RX ORDER — LORAZEPAM 1 MG/1
TABLET ORAL
Status: DISPENSED
Start: 2023-06-24

## (undated) RX ORDER — FENTANYL CITRATE-0.9 % NACL/PF 10 MCG/ML
PLASTIC BAG, INJECTION (ML) INTRAVENOUS
Status: DISPENSED
Start: 2023-06-01

## (undated) RX ORDER — ONDANSETRON 2 MG/ML
INJECTION INTRAMUSCULAR; INTRAVENOUS
Status: DISPENSED
Start: 2021-03-06

## (undated) RX ORDER — KETOROLAC TROMETHAMINE 30 MG/ML
INJECTION, SOLUTION INTRAMUSCULAR; INTRAVENOUS
Status: DISPENSED
Start: 2024-05-13

## (undated) RX ORDER — ACETAMINOPHEN 500 MG
TABLET ORAL
Status: DISPENSED
Start: 2024-03-08

## (undated) RX ORDER — KETOROLAC TROMETHAMINE 30 MG/ML
INJECTION, SOLUTION INTRAMUSCULAR; INTRAVENOUS
Status: DISPENSED
Start: 2023-10-16

## (undated) RX ORDER — MAGNESIUM CARB/ALUMINUM HYDROX 105-160MG
TABLET,CHEWABLE ORAL
Status: DISPENSED
Start: 2021-03-07

## (undated) RX ORDER — ONDANSETRON 2 MG/ML
INJECTION INTRAMUSCULAR; INTRAVENOUS
Status: DISPENSED
Start: 2025-04-11

## (undated) RX ORDER — CLONAZEPAM 0.5 MG/1
TABLET ORAL
Status: DISPENSED
Start: 2024-11-30

## (undated) RX ORDER — ONDANSETRON 2 MG/ML
INJECTION INTRAMUSCULAR; INTRAVENOUS
Status: DISPENSED
Start: 2023-06-03

## (undated) RX ORDER — CLONAZEPAM 0.5 MG/1
TABLET ORAL
Status: DISPENSED
Start: 2025-04-11

## (undated) RX ORDER — LORAZEPAM 1 MG/1
TABLET ORAL
Status: DISPENSED
Start: 2024-11-28

## (undated) RX ORDER — KETOROLAC TROMETHAMINE 30 MG/ML
INJECTION, SOLUTION INTRAMUSCULAR; INTRAVENOUS
Status: DISPENSED
Start: 2025-04-10

## (undated) RX ORDER — CLONAZEPAM 0.5 MG/1
TABLET ORAL
Status: DISPENSED
Start: 2025-04-10

## (undated) RX ORDER — CEFAZOLIN SODIUM/WATER 2 G/20 ML
SYRINGE (ML) INTRAVENOUS
Status: DISPENSED
Start: 2023-06-19

## (undated) RX ORDER — MAGNESIUM SULFATE HEPTAHYDRATE 40 MG/ML
INJECTION, SOLUTION INTRAVENOUS
Status: DISPENSED
Start: 2023-08-25

## (undated) RX ORDER — OXYCODONE HYDROCHLORIDE 5 MG/1
TABLET ORAL
Status: DISPENSED
Start: 2022-04-24

## (undated) RX ORDER — HYDROMORPHONE HYDROCHLORIDE 1 MG/ML
INJECTION, SOLUTION INTRAMUSCULAR; INTRAVENOUS; SUBCUTANEOUS
Status: DISPENSED
Start: 2025-04-11

## (undated) RX ORDER — DEXAMETHASONE SODIUM PHOSPHATE 4 MG/ML
INJECTION, SOLUTION INTRA-ARTICULAR; INTRALESIONAL; INTRAMUSCULAR; INTRAVENOUS; SOFT TISSUE
Status: DISPENSED
Start: 2025-04-11

## (undated) RX ORDER — METOCLOPRAMIDE HYDROCHLORIDE 5 MG/ML
INJECTION INTRAMUSCULAR; INTRAVENOUS
Status: DISPENSED
Start: 2023-02-05

## (undated) RX ORDER — SODIUM CHLORIDE, SODIUM LACTATE, POTASSIUM CHLORIDE, CALCIUM CHLORIDE 600; 310; 30; 20 MG/100ML; MG/100ML; MG/100ML; MG/100ML
INJECTION, SOLUTION INTRAVENOUS
Status: DISPENSED
Start: 2023-06-19

## (undated) RX ORDER — DEXAMETHASONE SODIUM PHOSPHATE 4 MG/ML
INJECTION, SOLUTION INTRA-ARTICULAR; INTRALESIONAL; INTRAMUSCULAR; INTRAVENOUS; SOFT TISSUE
Status: DISPENSED
Start: 2024-05-13

## (undated) RX ORDER — PROPOFOL 10 MG/ML
INJECTION, EMULSION INTRAVENOUS
Status: DISPENSED
Start: 2023-06-19

## (undated) RX ORDER — OXYCODONE HYDROCHLORIDE 5 MG/1
TABLET ORAL
Status: DISPENSED
Start: 2023-06-24

## (undated) RX ORDER — ACETAMINOPHEN 325 MG/1
TABLET ORAL
Status: DISPENSED
Start: 2025-04-10

## (undated) RX ORDER — FENTANYL CITRATE 50 UG/ML
INJECTION, SOLUTION INTRAMUSCULAR; INTRAVENOUS
Status: DISPENSED
Start: 2023-06-19

## (undated) RX ORDER — ONDANSETRON 4 MG/1
TABLET, ORALLY DISINTEGRATING ORAL
Status: DISPENSED
Start: 2022-04-24

## (undated) RX ORDER — LORAZEPAM 2 MG/ML
INJECTION INTRAMUSCULAR
Status: DISPENSED
Start: 2018-03-16

## (undated) RX ORDER — KETOROLAC TROMETHAMINE 30 MG/ML
INJECTION, SOLUTION INTRAMUSCULAR; INTRAVENOUS
Status: DISPENSED
Start: 2023-08-25

## (undated) RX ORDER — FENTANYL CITRATE 50 UG/ML
INJECTION, SOLUTION INTRAMUSCULAR; INTRAVENOUS
Status: DISPENSED
Start: 2020-02-03

## (undated) RX ORDER — LIDOCAINE HYDROCHLORIDE 10 MG/ML
INJECTION, SOLUTION EPIDURAL; INFILTRATION; INTRACAUDAL; PERINEURAL
Status: DISPENSED
Start: 2022-04-04

## (undated) RX ORDER — BUPIVACAINE HYDROCHLORIDE 2.5 MG/ML
INJECTION, SOLUTION EPIDURAL; INFILTRATION; INTRACAUDAL
Status: DISPENSED
Start: 2023-06-19

## (undated) RX ORDER — HYDROMORPHONE HCL IN WATER/PF 6 MG/30 ML
PATIENT CONTROLLED ANALGESIA SYRINGE INTRAVENOUS
Status: DISPENSED
Start: 2025-04-11

## (undated) RX ORDER — HYDROXYZINE PAMOATE 25 MG/1
CAPSULE ORAL
Status: DISPENSED
Start: 2023-08-27

## (undated) RX ORDER — CEFAZOLIN SODIUM 1 G/3ML
INJECTION, POWDER, FOR SOLUTION INTRAMUSCULAR; INTRAVENOUS
Status: DISPENSED
Start: 2025-04-11

## (undated) RX ORDER — CEFTRIAXONE SODIUM 1 G
VIAL (EA) INJECTION
Status: DISPENSED
Start: 2022-04-04

## (undated) RX ORDER — PROPOFOL 10 MG/ML
INJECTION, EMULSION INTRAVENOUS
Status: DISPENSED
Start: 2023-06-03

## (undated) RX ORDER — KETOROLAC TROMETHAMINE 15 MG/ML
INJECTION, SOLUTION INTRAMUSCULAR; INTRAVENOUS
Status: DISPENSED
Start: 2023-08-27

## (undated) RX ORDER — SODIUM CHLORIDE 9 MG/ML
INJECTION, SOLUTION INTRAVENOUS
Status: DISPENSED
Start: 2025-04-11

## (undated) RX ORDER — ACETAMINOPHEN 10 MG/ML
INJECTION, SOLUTION INTRAVENOUS
Status: DISPENSED
Start: 2023-06-01

## (undated) RX ORDER — ONDANSETRON 2 MG/ML
INJECTION INTRAMUSCULAR; INTRAVENOUS
Status: DISPENSED
Start: 2023-06-19

## (undated) RX ORDER — DEXAMETHASONE SODIUM PHOSPHATE 4 MG/ML
INJECTION, SOLUTION INTRA-ARTICULAR; INTRALESIONAL; INTRAMUSCULAR; INTRAVENOUS; SOFT TISSUE
Status: DISPENSED
Start: 2023-06-01

## (undated) RX ORDER — SODIUM CHLORIDE 9 MG/ML
INJECTION, SOLUTION INTRAVENOUS
Status: DISPENSED
Start: 2021-10-18

## (undated) RX ORDER — DEXAMETHASONE SODIUM PHOSPHATE 4 MG/ML
INJECTION, SOLUTION INTRA-ARTICULAR; INTRALESIONAL; INTRAMUSCULAR; INTRAVENOUS; SOFT TISSUE
Status: DISPENSED
Start: 2023-06-03

## (undated) RX ORDER — PROPOFOL 10 MG/ML
INJECTION, EMULSION INTRAVENOUS
Status: DISPENSED
Start: 2023-06-01

## (undated) RX ORDER — METOCLOPRAMIDE HYDROCHLORIDE 5 MG/ML
INJECTION INTRAMUSCULAR; INTRAVENOUS
Status: DISPENSED
Start: 2024-05-13

## (undated) RX ORDER — LIDOCAINE HYDROCHLORIDE AND EPINEPHRINE 10; 10 MG/ML; UG/ML
INJECTION, SOLUTION INFILTRATION; PERINEURAL
Status: DISPENSED
Start: 2023-06-24

## (undated) RX ORDER — BUPIVACAINE HYDROCHLORIDE AND EPINEPHRINE 2.5; 5 MG/ML; UG/ML
INJECTION, SOLUTION EPIDURAL; INFILTRATION; INTRACAUDAL; PERINEURAL
Status: DISPENSED
Start: 2025-04-11

## (undated) RX ORDER — ONDANSETRON 4 MG/1
TABLET, ORALLY DISINTEGRATING ORAL
Status: DISPENSED
Start: 2024-11-30

## (undated) RX ORDER — GLYCOPYRROLATE 0.2 MG/ML
INJECTION, SOLUTION INTRAMUSCULAR; INTRAVENOUS
Status: DISPENSED
Start: 2023-06-03

## (undated) RX ORDER — KETOROLAC TROMETHAMINE 30 MG/ML
INJECTION, SOLUTION INTRAMUSCULAR; INTRAVENOUS
Status: DISPENSED
Start: 2023-11-12

## (undated) RX ORDER — DIPHENHYDRAMINE HYDROCHLORIDE 50 MG/ML
INJECTION INTRAMUSCULAR; INTRAVENOUS
Status: DISPENSED
Start: 2023-08-25

## (undated) RX ORDER — EPHEDRINE SULFATE 50 MG/ML
INJECTION, SOLUTION INTRAMUSCULAR; INTRAVENOUS; SUBCUTANEOUS
Status: DISPENSED
Start: 2023-06-19

## (undated) RX ORDER — KETOROLAC TROMETHAMINE 30 MG/ML
INJECTION, SOLUTION INTRAMUSCULAR; INTRAVENOUS
Status: DISPENSED
Start: 2018-05-17

## (undated) RX ORDER — DIPHENHYDRAMINE HYDROCHLORIDE 50 MG/ML
INJECTION INTRAMUSCULAR; INTRAVENOUS
Status: DISPENSED
Start: 2023-02-05

## (undated) RX ORDER — FENTANYL CITRATE 50 UG/ML
INJECTION, SOLUTION INTRAMUSCULAR; INTRAVENOUS
Status: DISPENSED
Start: 2021-03-07

## (undated) RX ORDER — TIZANIDINE 2 MG/1
TABLET ORAL
Status: DISPENSED
Start: 2024-05-13

## (undated) RX ORDER — CYCLOBENZAPRINE HCL 10 MG
TABLET ORAL
Status: DISPENSED
Start: 2020-02-03

## (undated) RX ORDER — PROPOFOL 10 MG/ML
INJECTION, EMULSION INTRAVENOUS
Status: DISPENSED
Start: 2025-04-11

## (undated) RX ORDER — LORAZEPAM 2 MG/ML
INJECTION INTRAMUSCULAR
Status: DISPENSED
Start: 2020-02-03

## (undated) RX ORDER — KETOROLAC TROMETHAMINE 15 MG/ML
INJECTION, SOLUTION INTRAMUSCULAR; INTRAVENOUS
Status: DISPENSED
Start: 2023-02-05

## (undated) RX ORDER — DEXAMETHASONE SODIUM PHOSPHATE 4 MG/ML
INJECTION, SOLUTION INTRA-ARTICULAR; INTRALESIONAL; INTRAMUSCULAR; INTRAVENOUS; SOFT TISSUE
Status: DISPENSED
Start: 2022-09-03

## (undated) RX ORDER — NITROFURANTOIN 25; 75 MG/1; MG/1
CAPSULE ORAL
Status: DISPENSED
Start: 2025-04-10

## (undated) RX ORDER — FENTANYL CITRATE 50 UG/ML
INJECTION, SOLUTION INTRAMUSCULAR; INTRAVENOUS
Status: DISPENSED
Start: 2025-04-11

## (undated) RX ORDER — DIPHENHYDRAMINE HCL 25 MG
CAPSULE ORAL
Status: DISPENSED
Start: 2024-05-13

## (undated) RX ORDER — SODIUM CHLORIDE, SODIUM LACTATE, POTASSIUM CHLORIDE, CALCIUM CHLORIDE 600; 310; 30; 20 MG/100ML; MG/100ML; MG/100ML; MG/100ML
INJECTION, SOLUTION INTRAVENOUS
Status: DISPENSED
Start: 2019-02-28

## (undated) RX ORDER — CEFAZOLIN SODIUM 1 G/3ML
INJECTION, POWDER, FOR SOLUTION INTRAMUSCULAR; INTRAVENOUS
Status: DISPENSED
Start: 2023-08-27

## (undated) RX ORDER — LIDOCAINE HYDROCHLORIDE 10 MG/ML
INJECTION, SOLUTION INFILTRATION; PERINEURAL
Status: DISPENSED
Start: 2020-03-05

## (undated) RX ORDER — MORPHINE SULFATE 4 MG/ML
INJECTION, SOLUTION INTRAMUSCULAR; INTRAVENOUS
Status: DISPENSED
Start: 2023-10-17

## (undated) RX ORDER — KETOROLAC TROMETHAMINE 30 MG/ML
INJECTION, SOLUTION INTRAMUSCULAR; INTRAVENOUS
Status: DISPENSED
Start: 2020-02-03

## (undated) RX ORDER — FENTANYL CITRATE 50 UG/ML
INJECTION, SOLUTION INTRAMUSCULAR; INTRAVENOUS
Status: DISPENSED
Start: 2018-03-16

## (undated) RX ORDER — ONDANSETRON 4 MG/1
TABLET, ORALLY DISINTEGRATING ORAL
Status: DISPENSED
Start: 2024-05-13

## (undated) RX ORDER — OLANZAPINE 10 MG/2ML
INJECTION, POWDER, FOR SOLUTION INTRAMUSCULAR
Status: DISPENSED
Start: 2024-05-13

## (undated) RX ORDER — HYDROCODONE BITARTRATE AND ACETAMINOPHEN 5; 325 MG/1; MG/1
TABLET ORAL
Status: DISPENSED
Start: 2023-08-27

## (undated) RX ORDER — WATER 10 ML/10ML
INJECTION INTRAMUSCULAR; INTRAVENOUS; SUBCUTANEOUS
Status: DISPENSED
Start: 2021-10-19

## (undated) RX ORDER — TIZANIDINE 2 MG/1
TABLET ORAL
Status: DISPENSED
Start: 2025-04-11

## (undated) RX ORDER — PREDNISONE 20 MG/1
TABLET ORAL
Status: DISPENSED
Start: 2022-06-27

## (undated) RX ORDER — ACETAMINOPHEN 10 MG/ML
INJECTION, SOLUTION INTRAVENOUS
Status: DISPENSED
Start: 2025-04-11

## (undated) RX ORDER — DIPHENHYDRAMINE HYDROCHLORIDE 50 MG/ML
INJECTION INTRAMUSCULAR; INTRAVENOUS
Status: DISPENSED
Start: 2019-02-28

## (undated) RX ORDER — GLYCOPYRROLATE 0.2 MG/ML
INJECTION, SOLUTION INTRAMUSCULAR; INTRAVENOUS
Status: DISPENSED
Start: 2025-04-11

## (undated) RX ORDER — DEXAMETHASONE SODIUM PHOSPHATE 10 MG/ML
INJECTION, SOLUTION INTRAMUSCULAR; INTRAVENOUS
Status: DISPENSED
Start: 2023-02-05

## (undated) RX ORDER — LIDOCAINE HYDROCHLORIDE 10 MG/ML
INJECTION, SOLUTION EPIDURAL; INFILTRATION; INTRACAUDAL; PERINEURAL
Status: DISPENSED
Start: 2020-02-06

## (undated) RX ORDER — TRIAMCINOLONE ACETONIDE 40 MG/ML
INJECTION, SUSPENSION INTRA-ARTICULAR; INTRAMUSCULAR
Status: DISPENSED
Start: 2020-02-06

## (undated) RX ORDER — FENTANYL CITRATE 50 UG/ML
INJECTION, SOLUTION INTRAMUSCULAR; INTRAVENOUS
Status: DISPENSED
Start: 2021-03-06

## (undated) RX ORDER — FENTANYL CITRATE 50 UG/ML
INJECTION, SOLUTION INTRAMUSCULAR; INTRAVENOUS
Status: DISPENSED
Start: 2023-06-01

## (undated) RX ORDER — ONDANSETRON 2 MG/ML
INJECTION INTRAMUSCULAR; INTRAVENOUS
Status: DISPENSED
Start: 2023-10-17

## (undated) RX ORDER — OLANZAPINE 10 MG/2ML
INJECTION, POWDER, FOR SOLUTION INTRAMUSCULAR
Status: DISPENSED
Start: 2021-10-19

## (undated) RX ORDER — FENTANYL CITRATE 50 UG/ML
INJECTION, SOLUTION INTRAMUSCULAR; INTRAVENOUS
Status: DISPENSED
Start: 2023-11-12

## (undated) RX ORDER — LABETALOL HYDROCHLORIDE 5 MG/ML
INJECTION, SOLUTION INTRAVENOUS
Status: DISPENSED
Start: 2025-04-11

## (undated) RX ORDER — TIZANIDINE 2 MG/1
TABLET ORAL
Status: DISPENSED
Start: 2025-04-10

## (undated) RX ORDER — ACETAMINOPHEN 10 MG/ML
INJECTION, SOLUTION INTRAVENOUS
Status: DISPENSED
Start: 2023-06-03

## (undated) RX ORDER — SODIUM CHLORIDE, SODIUM LACTATE, POTASSIUM CHLORIDE, CALCIUM CHLORIDE 600; 310; 30; 20 MG/100ML; MG/100ML; MG/100ML; MG/100ML
INJECTION, SOLUTION INTRAVENOUS
Status: DISPENSED
Start: 2023-06-01

## (undated) RX ORDER — LIDOCAINE HYDROCHLORIDE AND EPINEPHRINE 10; 10 MG/ML; UG/ML
INJECTION, SOLUTION INFILTRATION; PERINEURAL
Status: DISPENSED
Start: 2023-06-03

## (undated) RX ORDER — ONDANSETRON 2 MG/ML
INJECTION INTRAMUSCULAR; INTRAVENOUS
Status: DISPENSED
Start: 2023-06-01

## (undated) RX ORDER — ACETAMINOPHEN 500 MG
TABLET ORAL
Status: DISPENSED
Start: 2018-03-16

## (undated) RX ORDER — BUPIVACAINE HYDROCHLORIDE 2.5 MG/ML
INJECTION, SOLUTION EPIDURAL; INFILTRATION; INTRACAUDAL
Status: DISPENSED
Start: 2023-06-03

## (undated) RX ORDER — DEXAMETHASONE SODIUM PHOSPHATE 4 MG/ML
INJECTION, SOLUTION INTRA-ARTICULAR; INTRALESIONAL; INTRAMUSCULAR; INTRAVENOUS; SOFT TISSUE
Status: DISPENSED
Start: 2023-06-19

## (undated) RX ORDER — LORAZEPAM 2 MG/ML
INJECTION INTRAMUSCULAR
Status: DISPENSED
Start: 2018-11-05

## (undated) RX ORDER — BUPIVACAINE HYDROCHLORIDE 5 MG/ML
INJECTION, SOLUTION EPIDURAL; INTRACAUDAL
Status: DISPENSED
Start: 2020-02-06

## (undated) RX ORDER — OXYCODONE HYDROCHLORIDE 5 MG/1
TABLET ORAL
Status: DISPENSED
Start: 2022-06-27